# Patient Record
Sex: MALE | Race: WHITE | NOT HISPANIC OR LATINO | Employment: PART TIME | URBAN - METROPOLITAN AREA
[De-identification: names, ages, dates, MRNs, and addresses within clinical notes are randomized per-mention and may not be internally consistent; named-entity substitution may affect disease eponyms.]

---

## 2017-02-06 ENCOUNTER — OFFICE VISIT (OUTPATIENT)
Dept: URGENT CARE | Facility: URGENT CARE | Age: 57
End: 2017-02-06
Payer: COMMERCIAL

## 2017-02-06 VITALS
OXYGEN SATURATION: 97 % | TEMPERATURE: 98.4 F | HEIGHT: 70 IN | BODY MASS INDEX: 29.06 KG/M2 | WEIGHT: 203 LBS | SYSTOLIC BLOOD PRESSURE: 120 MMHG | DIASTOLIC BLOOD PRESSURE: 70 MMHG | HEART RATE: 76 BPM

## 2017-02-06 DIAGNOSIS — T14.8XXA BRUISING: ICD-10-CM

## 2017-02-06 DIAGNOSIS — R25.2 MUSCLE CRAMPS: ICD-10-CM

## 2017-02-06 DIAGNOSIS — S86.111A GASTROCNEMIUS MUSCLE TEAR, RIGHT, INITIAL ENCOUNTER: Primary | ICD-10-CM

## 2017-02-06 PROCEDURE — 99213 OFFICE O/P EST LOW 20 MIN: CPT | Performed by: PHYSICIAN ASSISTANT

## 2017-02-06 NOTE — PATIENT INSTRUCTIONS
Leg Cramps  A muscle cramp or spasm is a strong contraction of the muscle fibers. It is also called a charley horse. This may occur in the foot, calf, or thigh at night when the legs are elevated. If the spasm is prolonged, it can become very painful.  This may be caused by sleeping in an uncomfortable position, muscle fatigue, poor muscle tone from lack of exercise and stretching, dehydration, electrolyte imbalance, diabetes, alcohol use, and certain medicine.  Home care    Drink plenty of fluids during the day to prevent dehydration.    Stretch your legs before bedtime.    Eat a diet high in potassium. These foods include fresh fruit, such as bananas, oranges, cantaloupe, and honeydew melon. It also includes apple, prune, orange, grape and pineapple juices. Other foods high in potassium are white, red, and bell beans, baked potatoes, raw spinach, cod, flounder, halibut, salmon, and scallops.    Talk with your healthcare provider about taking mineral and vitamin supplements that contain magnesium and vitamin B-12 if you are not already taking these. Other prescription medicines may also be used.    Avoid stimulants such as caffeine, nicotine, decongestants.  How to relieve an acute leg cramp    For mild pain, getting out of the bed and walking may help. Some people find relief with heat and massage. You can apply heat with a warm shower, bath, or compress. Some people feel better with a cold packs. You can make an ice pack by filling a plastic bag that seals at the top with ice cubes and then wrapping it with a thin towel. Try both and use the method that feels best for 15 to 20 minutes at a time.    For severe pain, stretching the muscle that is in spasm may quickly relieve the pain.    When the spasm is in your foot, your toes may curl up or down. To stretch the muscle in spasm, bend your toes in the opposite direction. If the spasm pulls your toes up, bend them down. If the spasm pulls them down, bend them  up.    When the spasm is in your calf, bend the ankle so the foot points upward toward your knee.    When the spasm is in your thigh, bend or straighten the knee and hip until you feel relief.  Follow-up care  Follow up with your healthcare provider, or as advised.  When to seek medical advice  Call your healthcare provider right away if any of these occur:    Walking makes your pain worse and rest makes it better    You develop weakness in the affected leg    Pain or frequency of spasms increases and is not controlled by the above measures    5990-9705 [employer]. [address] [place] [address]89. All rights reserved. This information is not intended as a substitute for professional medical care. Always follow your healthcare professional's instructions.

## 2017-02-06 NOTE — NURSING NOTE
"Chief Complaint   Patient presents with     Urgent Care     Pt reports that about 3 days ago, he was walking then felt a severe sharp pain on the back of his right lower leg. Now, pt has brusing and swelling on the akle and the back of the leg.       Initial /70 mmHg  Pulse 76  Temp(Src) 98.4  F (36.9  C)  Ht 5' 10\" (1.778 m)  Wt 203 lb (92.08 kg)  BMI 29.13 kg/m2  SpO2 97% Estimated body mass index is 29.13 kg/(m^2) as calculated from the following:    Height as of this encounter: 5' 10\" (1.778 m).    Weight as of this encounter: 203 lb (92.08 kg).  Medication Reconciliation: complete    "

## 2017-02-07 DIAGNOSIS — E78.1 HYPERTRIGLYCERIDEMIA: ICD-10-CM

## 2017-02-07 LAB
CHOLEST SERPL-MCNC: 276 MG/DL
HDLC SERPL-MCNC: 34 MG/DL
LDLC SERPL CALC-MCNC: ABNORMAL MG/DL
LDLC SERPL DIRECT ASSAY-MCNC: 130 MG/DL
NONHDLC SERPL-MCNC: 242 MG/DL
TRIGL SERPL-MCNC: 495 MG/DL

## 2017-02-07 PROCEDURE — 83721 ASSAY OF BLOOD LIPOPROTEIN: CPT | Mod: 59 | Performed by: INTERNAL MEDICINE

## 2017-02-07 PROCEDURE — 80061 LIPID PANEL: CPT | Performed by: INTERNAL MEDICINE

## 2017-02-07 PROCEDURE — 36415 COLL VENOUS BLD VENIPUNCTURE: CPT | Performed by: INTERNAL MEDICINE

## 2017-03-01 DIAGNOSIS — E78.1 HYPERTRIGLYCERIDEMIA: ICD-10-CM

## 2017-03-01 DIAGNOSIS — Z91.89 RISK FOR CORONARY ARTERY DISEASE BETWEEN 10% AND 20% IN NEXT 10 YEARS: ICD-10-CM

## 2017-03-01 NOTE — TELEPHONE ENCOUNTER
Pt took fasting labs on 2/7 and still has not heard or received results  Please contact patient at [phone] to leave a detailed message on his machine

## 2017-03-08 NOTE — TELEPHONE ENCOUNTER
Let him know sorry about the delay-   Is this on the statin? If so I'd like to change it from simvastatin to atorvastatin to better help the #  If not then we need to restart it.

## 2017-03-09 RX ORDER — SIMVASTATIN 40 MG
40 TABLET ORAL AT BEDTIME
Qty: 90 TABLET | Refills: 1 | Status: SHIPPED | OUTPATIENT
Start: 2017-03-09 | End: 2017-12-09

## 2017-03-09 NOTE — TELEPHONE ENCOUNTER
Pt was not taking statin, he was hoping diet change and exercise would have helped cholesterol but evidently it didn't.  Med pended.

## 2017-03-13 DIAGNOSIS — I10 ESSENTIAL HYPERTENSION: ICD-10-CM

## 2017-03-13 RX ORDER — METOPROLOL TARTRATE 25 MG/1
25 TABLET, FILM COATED ORAL 2 TIMES DAILY
Qty: 60 TABLET | Refills: 2 | Status: SHIPPED | OUTPATIENT
Start: 2017-03-13 | End: 2017-07-12

## 2017-03-13 NOTE — TELEPHONE ENCOUNTER
metoprolol 25mg      Last Written Prescription Date: 02/22/16  Last Fill Quantity: 60, # refills: 11    Last Office Visit with G, P or [facility] prescribing provider:  06/29/16   Future Office Visit:   0     BP Readings from Last 3 Encounters:   02/06/17 120/70   06/29/16 118/70   06/22/16 118/80

## 2017-04-10 ENCOUNTER — OFFICE VISIT (OUTPATIENT)
Dept: INTERNAL MEDICINE | Facility: CLINIC | Age: 57
End: 2017-04-10
Payer: COMMERCIAL

## 2017-04-10 VITALS
BODY MASS INDEX: 28.91 KG/M2 | SYSTOLIC BLOOD PRESSURE: 158 MMHG | DIASTOLIC BLOOD PRESSURE: 94 MMHG | TEMPERATURE: 99.8 F | OXYGEN SATURATION: 94 % | WEIGHT: 201.5 LBS | HEART RATE: 77 BPM

## 2017-04-10 DIAGNOSIS — F10.20 UNCOMPLICATED ALCOHOL DEPENDENCE (H): ICD-10-CM

## 2017-04-10 DIAGNOSIS — I10 ESSENTIAL HYPERTENSION: ICD-10-CM

## 2017-04-10 DIAGNOSIS — M72.0 DUPUYTREN'S CONTRACTURE OF HAND: Primary | ICD-10-CM

## 2017-04-10 LAB
ALT SERPL W P-5'-P-CCNC: 70 U/L (ref 0–70)
AST SERPL W P-5'-P-CCNC: 47 U/L (ref 0–45)

## 2017-04-10 PROCEDURE — 36415 COLL VENOUS BLD VENIPUNCTURE: CPT | Performed by: INTERNAL MEDICINE

## 2017-04-10 PROCEDURE — 99214 OFFICE O/P EST MOD 30 MIN: CPT | Performed by: INTERNAL MEDICINE

## 2017-04-10 PROCEDURE — 84460 ALANINE AMINO (ALT) (SGPT): CPT | Performed by: INTERNAL MEDICINE

## 2017-04-10 PROCEDURE — 84450 TRANSFERASE (AST) (SGOT): CPT | Performed by: INTERNAL MEDICINE

## 2017-04-10 RX ORDER — NALTREXONE HYDROCHLORIDE 50 MG/1
50 TABLET, FILM COATED ORAL DAILY
Qty: 30 TABLET | Refills: 5 | Status: SHIPPED | OUTPATIENT
Start: 2017-04-10 | End: 2018-03-06

## 2017-04-10 NOTE — NURSING NOTE
"Chief Complaint   Patient presents with     Musculoskeletal Problem       Initial BP (!) 158/94  Pulse 77  Temp 99.8  F (37.7  C) (Oral)  Wt 201 lb 8 oz (91.4 kg)  SpO2 94%  BMI 28.91 kg/m2 Estimated body mass index is 28.91 kg/(m^2) as calculated from the following:    Height as of 2/6/17: 5' 10\" (1.778 m).    Weight as of this encounter: 201 lb 8 oz (91.4 kg).  Medication Reconciliation: complete    "

## 2017-04-10 NOTE — LETTER
[facility]  [address]  [address]  [phone]        Durga Aguirre  [address]  [facility] [address]      4/10/2017        Dear Mr. Durga Aguirre:    I am writing to inform you of the results of the laboratory tests you had done recently.    The results of your recent labs are as noted.   Liver function: minimal elevation related to alcohol  -ok to start the naltrexone    Thank you for allowing me to participate in your care. If you have any further questions or problems, please contact me via our nurse line at [phone].    Sincerely,          Rohit Solitario MD  Department of Internal Medicine  [facility]

## 2017-04-10 NOTE — PROGRESS NOTES
SUBJECTIVE:                                                    Durga Aguirre is a 57 year old male who presents to clinic today for the following health issues:    Patient is looking for a referral to go to ortho for his hand pain, long hx of dupuytren's contracture treated w/xiaflex injections with some success.      Starting drinking again- knows he needs to quit and intends to start tonight by going to AA.      Problem list and histories reviewed & adjusted, as indicated.  Additional history: as documented    Labs reviewed in EPIC    Reviewed and updated as needed this visit by clinical staff  Tobacco  Allergies       Reviewed and updated as needed this visit by Provider         ROS:  Constitutional, HEENT, cardiovascular, pulmonary, gi and gu systems are negative, except as otherwise noted.    OBJECTIVE:                                                    BP (!) 158/94  Pulse 77  Temp 99.8  F (37.7  C) (Oral)  Wt 201 lb 8 oz (91.4 kg)  SpO2 94%  BMI 28.91 kg/m2  Body mass index is 28.91 kg/(m^2).  GENERAL APPEARANCE: alert and no distress  MS: chronic subcutaneous linear scarring in palm L hand    Diagnostic test results:  Results for orders placed or performed in visit on 04/10/17 (from the past 24 hour(s))   AST   Result Value Ref Range    AST 47 (H) 0 - 45 U/L   ALT   Result Value Ref Range    ALT 70 0 - 70 U/L        ASSESSMENT/PLAN:                                                    1. Dupuytren's contracture of hand  Repeat xiaflex inj w/hand specialist  - ORTHOPEDICS ADULT REFERRAL    2. Essential hypertension  Poor control. Did not take med today + drinking more heavily  Cut back on alcohol - d/c entirely if can  con't meds and get BP rechecked next month in pharmacy     3. Uncomplicated alcohol dependence (H)  Start naltrexone as long as LFTs look ok   - AST  - ALT      Follow up with Provider - 3 mo      Rohit Solitario MD  [facility]

## 2017-07-03 ENCOUNTER — TELEPHONE (OUTPATIENT)
Dept: INTERNAL MEDICINE | Facility: CLINIC | Age: 57
End: 2017-07-03

## 2017-07-03 DIAGNOSIS — F41.1 GENERALIZED ANXIETY DISORDER: ICD-10-CM

## 2017-07-03 NOTE — TELEPHONE ENCOUNTER
Reason for Call:  Other call back    Detailed comments: patient received an a letter from [facility] requesting him to be part of a  Blood pressure study. He would like to know if he thought he would be a good candidate. Please call back.    Phone Number Patient can be reached at: Home number on file [phone] (home)    Best Time: anytime    Can we leave a detailed message on this number? YES    Call taken on 7/3/2017 at 2:43 PM by GRANT THOMPSON

## 2017-07-05 NOTE — TELEPHONE ENCOUNTER
He could be.     He can contact the # below and if still interested f/u to see me    [facility] Hypertension Study Summary     Thank you for your interest in the [facility] hypertension research study. If you would like to enroll or know more about using your genetics to determine which hypertensive medications may work best for you please contact the research coordinator as [phone] or email [email or web address]    If enrolled you would be asked to attend 5 to 8 study visits over the next 12 months.    The  may reach out to you to ask some questions about your medical history and availability for future visits.

## 2017-07-06 RX ORDER — CITALOPRAM HYDROBROMIDE 40 MG/1
TABLET ORAL
Qty: 45 TABLET | Refills: 2 | Status: SHIPPED | OUTPATIENT
Start: 2017-07-06 | End: 2018-03-30

## 2017-07-12 DIAGNOSIS — I10 ESSENTIAL HYPERTENSION: ICD-10-CM

## 2017-07-13 RX ORDER — METOPROLOL TARTRATE 25 MG/1
TABLET, FILM COATED ORAL
Qty: 60 TABLET | Refills: 0 | Status: SHIPPED | OUTPATIENT
Start: 2017-07-13 | End: 2017-08-14

## 2017-07-13 NOTE — TELEPHONE ENCOUNTER
metoprolol      Last Written Prescription Date: 03/13/17  Last Fill Quantity: 60, # refills: 2    Last Office Visit with G, P or [facility] prescribing provider:  04/10/17   Future Office Visit:    0    BP Readings from Last 3 Encounters:   04/10/17 (!) 158/94   02/06/17 120/70   06/29/16 118/70

## 2017-07-25 ENCOUNTER — OFFICE VISIT (OUTPATIENT)
Dept: INTERNAL MEDICINE | Facility: CLINIC | Age: 57
End: 2017-07-25
Payer: COMMERCIAL

## 2017-07-25 ENCOUNTER — TELEPHONE (OUTPATIENT)
Dept: ADDICTION MEDICINE | Facility: CLINIC | Age: 57
End: 2017-07-25

## 2017-07-25 VITALS
WEIGHT: 206.2 LBS | BODY MASS INDEX: 29.59 KG/M2 | HEART RATE: 50 BPM | TEMPERATURE: 99.2 F | DIASTOLIC BLOOD PRESSURE: 90 MMHG | OXYGEN SATURATION: 97 % | SYSTOLIC BLOOD PRESSURE: 132 MMHG

## 2017-07-25 DIAGNOSIS — F10.288 ALCOHOL DEPENDENCE WITH OTHER ALCOHOL-INDUCED DISORDER (H): ICD-10-CM

## 2017-07-25 DIAGNOSIS — F10.10 ALCOHOL ABUSE, EPISODIC DRINKING BEHAVIOR: ICD-10-CM

## 2017-07-25 DIAGNOSIS — I10 ESSENTIAL HYPERTENSION: Primary | ICD-10-CM

## 2017-07-25 LAB
ANION GAP SERPL CALCULATED.3IONS-SCNC: 5 MMOL/L (ref 3–14)
BUN SERPL-MCNC: 10 MG/DL (ref 7–30)
CALCIUM SERPL-MCNC: 8.2 MG/DL (ref 8.5–10.1)
CHLORIDE SERPL-SCNC: 107 MMOL/L (ref 94–109)
CO2 SERPL-SCNC: 26 MMOL/L (ref 20–32)
CREAT SERPL-MCNC: 0.85 MG/DL (ref 0.66–1.25)
GFR SERPL CREATININE-BSD FRML MDRD: ABNORMAL ML/MIN/1.7M2
GLUCOSE SERPL-MCNC: 110 MG/DL (ref 70–99)
POTASSIUM SERPL-SCNC: 4.3 MMOL/L (ref 3.4–5.3)
SODIUM SERPL-SCNC: 138 MMOL/L (ref 133–144)

## 2017-07-25 PROCEDURE — 36415 COLL VENOUS BLD VENIPUNCTURE: CPT | Performed by: INTERNAL MEDICINE

## 2017-07-25 PROCEDURE — 80048 BASIC METABOLIC PNL TOTAL CA: CPT | Performed by: INTERNAL MEDICINE

## 2017-07-25 PROCEDURE — 99214 OFFICE O/P EST MOD 30 MIN: CPT | Performed by: INTERNAL MEDICINE

## 2017-07-25 NOTE — PATIENT INSTRUCTIONS
1. Consider Addiction medicine consultation  2. Use benadryl/diphenhydramine for insect bite on arm.    3. Get your BP rechecked in our pharmacy in the next few weeks

## 2017-07-25 NOTE — PROGRESS NOTES
SUBJECTIVE:                                                    Durga Aguirre is a 57 year old male who presents to clinic today for the following health issues:      Hypertension Follow-up  BP Readings from Last 3 Encounters:   07/25/17 132/90   04/10/17 (!) 158/94   02/06/17 120/70      since seen unfortunately still drinking episodically- yesterday had 7 beers, most days has some at least.  He is taking naltrexone despite this.      Outpatient blood pressures are not being checked.    Low Salt Diet: low salt      Amount of exercise or physical activity: None    Problems taking medications regularly: No    Medication side effects: none  Diet: regular (no restrictions)    Problem list and histories reviewed & adjusted, as indicated.  Additional history: as documented    Labs reviewed in EPIC    Reviewed and updated as needed this visit by clinical staffAllergies       Reviewed and updated as needed this visit by Provider         ROS:  Constitutional, HEENT, cardiovascular, pulmonary, gi and gu systems are negative, except as otherwise noted.      OBJECTIVE:                                                    /90  Pulse 50  Temp 99.2  F (37.3  C) (Oral)  Wt 206 lb 3.2 oz (93.5 kg)  SpO2 97%  BMI 29.59 kg/m2  Body mass index is 29.59 kg/(m^2).  GENERAL APPEARANCE: alert, no distress and over weight  HENT: nose and mouth without ulcers or lesions  NECK: no adenopathy, no asymmetry, masses, or scars and thyroid normal to palpation  RESP: lungs clear to auscultation - no rales, rhonchi or wheezes  CV: regular rates and rhythm, normal S1 S2, no S3 or S4 and no murmur, click or rub  MS: extremities normal- no gross deformities noted  SKIN: no suspicious lesions or rashes    Diagnostic test results:  Results for orders placed or performed in visit on 07/25/17 (from the past 24 hour(s))   Basic metabolic panel   Result Value Ref Range    Sodium 138 133 - 144 mmol/L    Potassium 4.3 3.4 - 5.3 mmol/L    Chloride  107 94 - 109 mmol/L    Carbon Dioxide 26 20 - 32 mmol/L    Anion Gap 5 3 - 14 mmol/L    Glucose 110 (H) 70 - 99 mg/dL    Urea Nitrogen 10 7 - 30 mg/dL    Creatinine 0.85 0.66 - 1.25 mg/dL    GFR Estimate >90  Non  GFR Calc   >60 mL/min/1.7m2    GFR Estimate If Black >90   GFR Calc   >60 mL/min/1.7m2    Calcium 8.2 (L) 8.5 - 10.1 mg/dL          ASSESSMENT/PLAN:                                                    1. Essential hypertension  Continue medication - not at goal but did not take medication this am . Recheck later this month in pharmacy.  d/c alcohol  - Basic metabolic panel    2. Alcohol abuse, episodic drinking behavior  3. Alcohol dependence with other alcohol-induced disorder (H)  Hasn't been able to quit- in  My opinion the alcohol use is likely worsening his BP  - ADDICTION MEDICINE REFERRAL      Follow up with Provider - 1 mo or in pharmacy for BP recheck     Rohit Solitario MD  [facility]

## 2017-07-25 NOTE — LETTER
Durga Aguirre  [address]  [facility] [address]        July 26, 2017          Dear ,    We are writing to inform you of your test results.    Your test results fall within the expected range(s) or remain unchanged from previous results.  Please continue with current treatment plan.    Resulted Orders   Basic metabolic panel   Result Value Ref Range    Sodium 138 133 - 144 mmol/L    Potassium 4.3 3.4 - 5.3 mmol/L    Chloride 107 94 - 109 mmol/L    Carbon Dioxide 26 20 - 32 mmol/L    Anion Gap 5 3 - 14 mmol/L    Glucose 110 (H) 70 - 99 mg/dL    Urea Nitrogen 10 7 - 30 mg/dL    Creatinine 0.85 0.66 - 1.25 mg/dL    GFR Estimate >90  Non  GFR Calc   >60 mL/min/1.7m2    GFR Estimate If Black >90   GFR Calc   >60 mL/min/1.7m2    Calcium 8.2 (L) 8.5 - 10.1 mg/dL       If you have any questions or concerns, please call the clinic at the number listed above.       Sincerely,        Rohit Solitario MD

## 2017-07-25 NOTE — TELEPHONE ENCOUNTER
----- Message from Jyoti Redmond sent at 7/25/2017  8:14 AM CDT -----  Regarding: Addiction Med Referral  Please review.

## 2017-07-25 NOTE — MR AVS SNAPSHOT
After Visit Summary   7/25/2017    Durga Aguirre    MRN: [id number]           Patient Information     Date Of Birth          1960        Visit Information        Provider Department      7/25/2017 7:00 AM Rohit Solitario MD [facility]        Today's Diagnoses     Essential hypertension    -  1    Alcohol abuse, episodic drinking behavior        Alcohol dependence with other alcohol-induced disorder (H)          Care Instructions    1. Consider Addiction medicine consultation  2. Use benadryl/diphenhydramine for insect bite on arm.    3. Get your BP rechecked in our pharmacy in the next few weeks          Follow-ups after your visit        Additional Services     ADDICTION MEDICINE REFERRAL       The Addiction Medicine Service is prepared to provide consultation for and, if necessary, ongoing care for patients with the disease of Addiction.  As defined by the American Society of Addiction Medicine, Addiction is a primary, chronic disease of brain reward, motivation, memory and related circuitry.       Common problems that may warrant referral to the Addiction Medicine Service are:  Alcohol use disorder - diagnosis, treatment referral, acute and protracted withdrawal management, and ongoing medication assisted treatment including Antabuse and Naltrexone.  Opoid Use Disorder - medication assisted treatment including Buprenorphine (Suboxone) or extended release Naltrexone (Vivitrol)  Benzodiazepine dependence - extended outpatient detoxification  Many other issues pertaining to addiction, relapse, and recovery    Referrals to the Addiction Medicine Service assume that the referring provider has discussed the referral with the patient.  Referral will be reviewed and if appropriate, the patient will be contacted to schedule appointment.    Please answer the following questions so we can better service your patient:    Drug of choice: alcohol  Need for detox: No  Need for  "ongoing addiction treatment: Yes;  Please call detox number: [phone]  Do they have a [facility] PCP:  Yes   Are they willing to participate in a Suboxone group?  n/a  Please bring the following to your appointment:  >>   List of current medications   >>   Any relevant history                  Who to contact     If you have questions or need follow up information about today's clinic visit or your schedule please contact [facility] directly at [phone].  Normal or non-critical lab and imaging results will be communicated to you by MyChart, letter or phone within 4 business days after the clinic has received the results. If you do not hear from us within 7 days, please contact the clinic through [employer] or phone. If you have a critical or abnormal lab result, we will notify you by phone as soon as possible.  Submit refill requests through [email or web address] or call your pharmacy and they will forward the refill request to us. Please allow 3 business days for your refill to be completed.          Additional Information About Your Visit        [employer] [facility] Information     [email or web address] lets you send messages to your doctor, view your test results, renew your prescriptions, schedule appointments and more. To sign up, go to [email or web address] . Click on \"Log in\" on the left side of the screen, which will take you to the Welcome page. Then click on \"Sign up Now\" on the right side of the page.     You will be asked to enter the access code listed below, as well as some personal information. Please follow the directions to create your username and password.     Your access code is: [email or web address]  Expires: 10/23/2017  7:41 AM     Your access code will  in 90 days. If you need help or a new code, please call your [facility] or [phone].        Care EveryWhere ID     This is your Care EveryWhere ID. This could be used by other organizations to access your [facility] medical records  [id number]      "   Your Vitals Were     Pulse Temperature Pulse Oximetry BMI (Body Mass Index)          50 99.2  F (37.3  C) (Oral) 97% 29.59 kg/m2         Blood Pressure from Last 3 Encounters:   07/25/17 132/90   04/10/17 (!) 158/94   02/06/17 120/70    Weight from Last 3 Encounters:   07/25/17 206 lb 3.2 oz (93.5 kg)   04/10/17 201 lb 8 oz (91.4 kg)   02/06/17 203 lb (92.1 kg)              We Performed the Following     ADDICTION MEDICINE REFERRAL     Basic metabolic panel        Primary Care Provider Office Phone # Fax #    Rohit Solitario [phone]       [facility] [address] [place] [address]        Equal Access to Services     ARLENE CORLEY : Hadii soraida mesa hadasho Soomaali, waaxda luqadaha, qaybta kaalmada adeegyada, trung hilario . So [facility] [phone].    ATENCIÓN: Si habla español, tiene a lim disposición servicios gratuitos de asistencia lingüística. Canelo al [phone].    We comply with applicable federal civil rights laws and [place] laws. We do not discriminate on the basis of race, color, national origin, age, disability sex, sexual orientation or gender identity.            Thank you!     Thank you for choosing [facility]  for your care. Our goal is always to provide you with excellent care. Hearing back from our patients is one way we can continue to improve our services. Please take a few minutes to complete the written survey that you may receive in the mail after your visit with us. Thank you!             Your Updated Medication List - Protect others around you: Learn how to safely use, store and throw away your medicines at www.disposemymeds.org.          This list is accurate as of: 7/25/17  7:41 AM.  Always use your most recent med list.                   Brand Name Dispense Instructions for use Diagnosis    citalopram 40 MG tablet    celeXA    45 tablet    take one half tablet (20 mg) by mouth once a day     Generalized anxiety disorder       metoprolol 25 MG tablet    LOPRESSOR    60 tablet    TAKE ONE TABLET BY MOUTH TWICE DAILY    Essential hypertension       naltrexone 50 MG tablet    DEPADE;REVIA    30 tablet    Take 1 tablet (50 mg) by mouth daily    Uncomplicated alcohol dependence (H)       simvastatin 40 MG tablet    ZOCOR    90 tablet    Take 1 tablet (40 mg) by mouth At Bedtime    Risk for coronary artery disease between 10% and 20% in next 10 years, Hypertriglyceridemia

## 2017-07-25 NOTE — NURSING NOTE
"Chief Complaint   Patient presents with     Hypertension       Initial /90  Pulse 50  Temp 99.2  F (37.3  C) (Oral)  Wt 206 lb 3.2 oz (93.5 kg)  SpO2 97%  BMI 29.59 kg/m2 Estimated body mass index is 29.59 kg/(m^2) as calculated from the following:    Height as of 2/6/17: 5' 10\" (1.778 m).    Weight as of this encounter: 206 lb 3.2 oz (93.5 kg).  Medication Reconciliation: complete    "

## 2017-07-27 NOTE — TELEPHONE ENCOUNTER
Second attempt to reach pt, no answer. LVM requesting a call back for an appt.     Ana Cohen

## 2017-08-01 NOTE — TELEPHONE ENCOUNTER
Third and final attempt to reach pt. No answer. LVM requesting a call back for an appt.   Writer is routing this encounter to referring provider. Please inform pt that we tried to reach him to get him scheduled for an appt with an addiction specialist. Please have pt call us back if he is still interested in being seen at [facility]. [phone]. Writer is closing this encounter, no further action needed.     Thank you,  Ana Cohen     [facility]

## 2017-08-14 DIAGNOSIS — I10 ESSENTIAL HYPERTENSION: ICD-10-CM

## 2017-08-15 RX ORDER — METOPROLOL TARTRATE 25 MG/1
TABLET, FILM COATED ORAL
Qty: 60 TABLET | Refills: 0 | Status: SHIPPED | OUTPATIENT
Start: 2017-08-15 | End: 2017-09-18

## 2017-09-18 DIAGNOSIS — I10 ESSENTIAL HYPERTENSION: ICD-10-CM

## 2017-09-19 RX ORDER — METOPROLOL TARTRATE 25 MG/1
TABLET, FILM COATED ORAL
Qty: 60 TABLET | Refills: 0 | Status: SHIPPED | OUTPATIENT
Start: 2017-09-19 | End: 2017-10-20

## 2017-09-19 NOTE — TELEPHONE ENCOUNTER
Metoprolol       Last Written Prescription Date: 8/15/17  Last Fill Quantity: 60, # refills: 0    Last Office Visit with G, P or [facility] prescribing provider:  7/25/17   Future Office Visit:        BP Readings from Last 3 Encounters:   07/25/17 132/90   04/10/17 (!) 158/94   02/06/17 120/70     Routing refill request to provider for review/approval because:  Amina given x1 and patient did not follow up, please advise  Patient was due for a one month follow up or BP check

## 2017-09-23 ENCOUNTER — ALLIED HEALTH/NURSE VISIT (OUTPATIENT)
Dept: INTERNAL MEDICINE | Facility: CLINIC | Age: 57
End: 2017-09-23
Payer: COMMERCIAL

## 2017-09-23 VITALS — SYSTOLIC BLOOD PRESSURE: 139 MMHG | DIASTOLIC BLOOD PRESSURE: 86 MMHG

## 2017-09-23 DIAGNOSIS — Z01.30 BP CHECK: Primary | ICD-10-CM

## 2017-09-23 PROCEDURE — 99207 ZZC NO CHARGE NURSE ONLY: CPT | Performed by: INTERNAL MEDICINE

## 2017-09-23 NOTE — NURSING NOTE
Durga Aguirre is enrolled/participating in the retail pharmacy Blood Pressure Goals Achievement Program (BPGAP).  Durga Aguirre was evaluated at [facility] on September 23, 2017 at which time his blood pressure was:    BP Readings from Last 3 Encounters:   09/23/17 139/86   07/25/17 132/90   04/10/17 (!) 158/94     Reviewed lifestyle modifications for blood pressure control and reduction: including making healthy food choices, managing weight, getting regular exercise, smoking cessation, reducing alcohol consumption, monitoring blood pressure regularly.     Durga Aguirre is not experiencing symptoms.    Follow-Up: BP is at goal of < 140/90mmHg (patient 18+ years of age with or without diabetes).  Recommended follow-up at pharmacy in 6 months.     Recommendation to Provider: none    Durga Aguirre was evaluated for enrollment into the PGEN study today.    Patient eligible for enrollment:  No  Patient interested in enrollment:  Unknown    Completed by: Darlene Savage, Pharm. D.  [facility]  [phone]

## 2017-09-23 NOTE — MR AVS SNAPSHOT
"              After Visit Summary   2017    Durga Aguirre    MRN: [id number]           Patient Information     Date Of Birth          1960        Visit Information        Provider Department      2017 4:30 PM Rohit Solitario MD [facility]        Today's Diagnoses     BP check    -  1       Follow-ups after your visit        Who to contact     If you have questions or need follow up information about today's clinic visit or your schedule please contact [facility] directly at [phone].  Normal or non-critical lab and imaging results will be communicated to you by Shipping Easyhart, letter or phone within 4 business days after the clinic has received the results. If you do not hear from us within 7 days, please contact the clinic through Shipping Easyhart or phone. If you have a critical or abnormal lab result, we will notify you by phone as soon as possible.  Submit refill requests through LIFEmee or call your pharmacy and they will forward the refill request to us. Please allow 3 business days for your refill to be completed.          Additional Information About Your Visit        MyChart Information     LIFEmee lets you send messages to your doctor, view your test results, renew your prescriptions, schedule appointments and more. To sign up, go to [email or web address] . Click on \"Log in\" on the left side of the screen, which will take you to the Welcome page. Then click on \"Sign up Now\" on the right side of the page.     You will be asked to enter the access code listed below, as well as some personal information. Please follow the directions to create your username and password.     Your access code is: [email or web address]  Expires: 10/23/2017  7:41 AM     Your access code will  in 90 days. If you need help or a new code, please call your [facility] or [phone].        Care EveryWhere ID     This is your Care EveryWhere ID. This could be used by other " organizations to access your [place] medical records  [id number]         Blood Pressure from Last 3 Encounters:   09/23/17 139/86   07/25/17 132/90   04/10/17 (!) 158/94    Weight from Last 3 Encounters:   07/25/17 206 lb 3.2 oz (93.5 kg)   04/10/17 201 lb 8 oz (91.4 kg)   02/06/17 203 lb (92.1 kg)              Today, you had the following     No orders found for display       Primary Care Provider Office Phone # Fax #    Rohit Solitario [phone]       [address] [facility] [address]        Equal Access to Services     ARLENE CORLEY : Hadii soraida tolentinoo Sopriyank, waaxda luqadaha, qaybta kaalmada adeegyada, trung solano. So [facility] [phone].    ATENCIÓN: Si habla español, tiene a lim disposición servicios gratuitos de asistencia lingüística. Llame al [phone].    We comply with applicable federal civil rights laws and [place] laws. We do not discriminate on the basis of race, color, national origin, age, disability sex, sexual orientation or gender identity.            Thank you!     Thank you for choosing [facility]  for your care. Our goal is always to provide you with excellent care. Hearing back from our patients is one way we can continue to improve our services. Please take a few minutes to complete the written survey that you may receive in the mail after your visit with us. Thank you!             Your Updated Medication List - Protect others around you: Learn how to safely use, store and throw away your medicines at www.disposemymeds.org.          This list is accurate as of: 9/23/17  4:33 PM.  Always use your most recent med list.                   Brand Name Dispense Instructions for use Diagnosis    citalopram 40 MG tablet    celeXA    45 tablet    take one half tablet (20 mg) by mouth once a day    Generalized anxiety disorder       metoprolol 25 MG tablet    LOPRESSOR    60 tablet    TAKE ONE TABLET BY MOUTH  TWICE DAILY    Essential hypertension       naltrexone 50 MG tablet    DEPADE;REVIA    30 tablet    Take 1 tablet (50 mg) by mouth daily    Uncomplicated alcohol dependence (H)       simvastatin 40 MG tablet    ZOCOR    90 tablet    Take 1 tablet (40 mg) by mouth At Bedtime    Risk for coronary artery disease between 10% and 20% in next 10 years, Hypertriglyceridemia

## 2017-10-20 DIAGNOSIS — I10 ESSENTIAL HYPERTENSION: ICD-10-CM

## 2017-10-24 RX ORDER — METOPROLOL TARTRATE 25 MG/1
TABLET, FILM COATED ORAL
Qty: 60 TABLET | Refills: 8 | Status: SHIPPED | OUTPATIENT
Start: 2017-10-24 | End: 2018-04-20

## 2017-11-09 ENCOUNTER — OFFICE VISIT (OUTPATIENT)
Dept: INTERNAL MEDICINE | Facility: CLINIC | Age: 57
End: 2017-11-09
Payer: COMMERCIAL

## 2017-11-09 VITALS
SYSTOLIC BLOOD PRESSURE: 124 MMHG | TEMPERATURE: 98.3 F | BODY MASS INDEX: 29.11 KG/M2 | WEIGHT: 202.9 LBS | HEART RATE: 65 BPM | OXYGEN SATURATION: 94 % | DIASTOLIC BLOOD PRESSURE: 88 MMHG

## 2017-11-09 DIAGNOSIS — F10.20 UNCOMPLICATED ALCOHOL DEPENDENCE (H): ICD-10-CM

## 2017-11-09 DIAGNOSIS — K04.7 ABSCESSED TOOTH: ICD-10-CM

## 2017-11-09 DIAGNOSIS — I10 ESSENTIAL HYPERTENSION: ICD-10-CM

## 2017-11-09 DIAGNOSIS — Z01.818 PREOP GENERAL PHYSICAL EXAM: Primary | ICD-10-CM

## 2017-11-09 LAB — ETHANOL SERPL-MCNC: 0.01 G/DL

## 2017-11-09 PROCEDURE — 36415 COLL VENOUS BLD VENIPUNCTURE: CPT | Performed by: INTERNAL MEDICINE

## 2017-11-09 PROCEDURE — 80320 DRUG SCREEN QUANTALCOHOLS: CPT | Performed by: INTERNAL MEDICINE

## 2017-11-09 PROCEDURE — 99214 OFFICE O/P EST MOD 30 MIN: CPT | Performed by: INTERNAL MEDICINE

## 2017-11-09 NOTE — MR AVS SNAPSHOT
After Visit Summary   11/9/2017    Durga Aguirre    MRN: [id number]           Patient Information     Date Of Birth          1960        Visit Information        Provider Department      11/9/2017 11:40 AM Rohit Solitario MD [facility]        Today's Diagnoses     Preop general physical exam    -  1    Uncomplicated alcohol dependence (H)          Care Instructions      Before Your Surgery      Call your surgeon if there is any change in your health. This includes signs of a cold or flu (such as a sore throat, runny nose, cough, rash or fever).    Do not smoke, drink alcohol or take over the counter medicine (unless your surgeon or primary care doctor tells you to) for the 24 hours before and after surgery.    If you take prescribed drugs: Follow your doctor s orders about which medicines to take and which to stop until after surgery.    Eating and drinking prior to surgery: follow the instructions from your surgeon    Take a shower or bath the night before surgery. Use the soap your surgeon gave you to gently clean your skin. If you do not have soap from your surgeon, use your regular soap. Do not shave or scrub the surgery site.  Wear clean pajamas and have clean sheets on your bed.               Follow-ups after your visit        Who to contact     If you have questions or need follow up information about today's clinic visit or your schedule please contact [facility] directly at [phone].  Normal or non-critical lab and imaging results will be communicated to you by MyChart, letter or phone within 4 business days after the clinic has received the results. If you do not hear from us within 7 days, please contact the clinic through MyChart or phone. If you have a critical or abnormal lab result, we will notify you by phone as soon as possible.  Submit refill requests through thesixtyone or call your pharmacy and they will forward the refill  "request to us. Please allow 3 business days for your refill to be completed.          Additional Information About Your Visit        WalletKithart Information     [email or web address] lets you send messages to your doctor, view your test results, renew your prescriptions, schedule appointments and more. To sign up, go to [email or web address] . Click on \"Log in\" on the left side of the screen, which will take you to the Welcome page. Then click on \"Sign up Now\" on the right side of the page.     You will be asked to enter the access code listed below, as well as some personal information. Please follow the directions to create your username and password.     Your access code is: [email or web address]  Expires: 2018 12:13 PM     Your access code will  in 90 days. If you need help or a new code, please call your [place] clinic or [phone].        Care EveryWhere ID     This is your Care EveryWhere ID. This could be used by other organizations to access your [place] medical records  [id number]        Your Vitals Were     Pulse Temperature Pulse Oximetry BMI (Body Mass Index)          65 98.3  F (36.8  C) (Oral) 94% 29.11 kg/m2         Blood Pressure from Last 3 Encounters:   17 124/88   17 139/86   17 132/90    Weight from Last 3 Encounters:   17 202 lb 14.4 oz (92 kg)   17 206 lb 3.2 oz (93.5 kg)   04/10/17 201 lb 8 oz (91.4 kg)              We Performed the Following     Alcohol ethyl        Primary Care Provider Office Phone # Fax #    Rohit Solitario [phone]       [address] [place] [address]        Equal Access to Services     ARLENE CORLEY : Kevon Boswell, james beard, trung goodson. [facility] [phone].    ATENCIÓN: Si habla español, tiene a lim disposición servicios gratuitos de asistencia lingüística. Lldaren al [phone].    We comply with applicable federal civil rights laws " and [place] laws. We do not discriminate on the basis of race, color, national origin, age, disability, sex, sexual orientation, or gender identity.            Thank you!     Thank you for choosing [facility]  for your care. Our goal is always to provide you with excellent care. Hearing back from our patients is one way we can continue to improve our services. Please take a few minutes to complete the written survey that you may receive in the mail after your visit with us. Thank you!             Your Updated Medication List - Protect others around you: Learn how to safely use, store and throw away your medicines at www.disposemymeds.org.          This list is accurate as of: 11/9/17 12:13 PM.  Always use your most recent med list.                   Brand Name Dispense Instructions for use Diagnosis    citalopram 40 MG tablet    celeXA    45 tablet    take one half tablet (20 mg) by mouth once a day    Generalized anxiety disorder       metoprolol 25 MG tablet    LOPRESSOR    60 tablet    TAKE ONE TABLET BY MOUTH TWICE DAILY    Essential hypertension       naltrexone 50 MG tablet    DEPADE;REVIA    30 tablet    Take 1 tablet (50 mg) by mouth daily    Uncomplicated alcohol dependence (H)       simvastatin 40 MG tablet    ZOCOR    90 tablet    Take 1 tablet (40 mg) by mouth At Bedtime    Risk for coronary artery disease between 10% and 20% in next 10 years, Hypertriglyceridemia

## 2017-11-09 NOTE — PROGRESS NOTES
[address] [address]  [phone]  Dept: [phone]    PRE-OP EVALUATION:  Today's date: 2017    Durga Aguirre (: 1960) presents for pre-operative evaluation assessment as requested by Dr. Marcano.  He requires evaluation and anesthesia risk assessment prior to undergoing surgery/procedure for treatment of Tooth implant .  Proposed procedure: Tooth implant    Date of Surgery/ Procedure: 11-15-17  Time of Surgery/ Procedure: 12:30 PM  Hospital/Surgical Facility: [facility]  Fax number for surgical facility: [phone]  Primary Physician: Rohit Solitario  Type of Anesthesia Anticipated: MAC    Patient has a Health Care Directive or Living Will:  NO    Preop Questions 2017   1.  Do you have a history of heart attack, stroke, stent, bypass or surgery on an artery in the head, neck, heart or legs? No   2.  Do you ever have any pain or discomfort in your chest? No   3.  Do you have a history of  Heart Failure? No   4.   Are you troubled by shortness of breath when:  walking on a level surface, or up a slight hill, or at night? No   5.  Do you currently have a cold, bronchitis or other respiratory infection? No   6.  Do you have a cough, shortness of breath, or wheezing? No   7.  Do you sometimes get pains in the calves of your legs when you walk? No   8. Do you or anyone in your family have previous history of blood clots? No   9.  Do you or does anyone in your family have a serious bleeding problem such as prolonged bleeding following surgeries or cuts? No   10. Have you ever had problems with anemia or been told to take iron pills? No   11. Have you had any abnormal blood loss such as black, tarry or bloody stools? No   12. Have you ever had a blood transfusion? No   13. Have you or any of your relatives ever had problems with anesthesia? No   14. Do you have sleep apnea, excessive snoring or daytime drowsiness? No   15.  Do you have any prosthetic heart valves? No   16. Do you have prosthetic joints? No           HPI:                                                      Pt states he has a tooth abscess that needs to be removed a implant put in its place.     MEDICAL HISTORY:                                                    Patient Active Problem List    Diagnosis Date Noted     Dupuytren's contracture of hand 04/10/2017     Priority: Medium     Uncomplicated alcohol dependence (H) 04/10/2017     Priority: Medium     Hypertriglyceridemia 06/22/2016     Priority: Medium     Essential hypertension 02/22/2016     Priority: Medium     Advanced directives, counseling/discussion 09/23/2014     Priority: Medium     Advance Care Planning:   ACP Review and Resources Provided:  Reviewed chart for advance care plan.  Durga Aguirre has no plan or code status on file. Discussed available resources and provided with information. Confirmed code status reflects current choices pending further ACP discussions.  Confirmed/documented designated decision maker(s). See permanent comments section of demographics in clinical tab.   Added by Maribel Potts on 9/23/2014             Generalized anxiety disorder 02/18/2013     Priority: Medium     CARDIOVASCULAR SCREENING; LDL GOAL LESS THAN 130 10/31/2010     Priority: Medium      Past Medical History:   Diagnosis Date     Alcohol abuse- remission 2/18/2013     Other spontaneous pneumothorax 1997     Past Surgical History:   Procedure Laterality Date     HC EXCISION PARTIAL TALUS OR CALCANEUS, BONE      fx calcaneus- 9 screws in foot     Current Outpatient Prescriptions   Medication Sig Dispense Refill     metoprolol (LOPRESSOR) 25 MG tablet TAKE ONE TABLET BY MOUTH TWICE DAILY 60 tablet 8     citalopram (CELEXA) 40 MG tablet take one half tablet (20 mg) by mouth once a day 45 tablet 2     naltrexone (DEPADE;REVIA) 50 MG tablet Take 1 tablet (50 mg) by mouth daily 30 tablet 5     simvastatin (ZOCOR) 40 MG  tablet Take 1 tablet (40 mg) by mouth At Bedtime 90 tablet 1     OTC products: None, except as noted above    No Known Allergies   Latex Allergy: NO    Social History   Substance Use Topics     Smoking status: Former Smoker     Years: 20.00     Types: Cigarettes     Quit date: 10/9/2017     Smokeless tobacco: Never Used      Comment: 2-3 cigarettes per day      Alcohol use No      Comment: restarted may 2016     History   Drug Use No       REVIEW OF SYSTEMS:                                                    C: NEGATIVE for fever, chills, change in weight  E/M: NEGATIVE for ear, mouth and throat problems  R: NEGATIVE for significant cough or SOB  CV: NEGATIVE for chest pain, palpitations or peripheral edema    EXAM:                                                    /88  Pulse 65  Temp 98.3  F (36.8  C) (Oral)  Wt 202 lb 14.4 oz (92 kg)  SpO2 94%  BMI 29.11 kg/m2  GENERAL APPEARANCE: healthy, alert and no distress  HENT: ear canals and TM's normal and nose and mouth without ulcers or lesions  RESP: lungs clear to auscultation - no rales, rhonchi or wheezes  CV: regular rate and rhythm, normal S1 S2, no S3 or S4 and no murmur, click or rub   ABDOMEN: soft, nontender, no HSM or masses and bowel sounds normal  MS: extremities normal- no gross deformities noted  NEURO: Normal strength and tone, sensory exam grossly normal, mentation intact and speech normal    DIAGNOSTICS:                                                    No labs or EKG required for low risk surgery (cataract, skin procedure, breast biopsy, etc)    Recent Labs   Lab Test  07/25/17   0741  02/22/16   1207  02/17/16   1012   NA  138   --   140   POTASSIUM  4.3   --   4.4   CR  0.85   --   0.83   A1C   --   5.5   --         IMPRESSION:                                                    Reason for surgery/procedure: tooth abscess  Diagnosis/reason for consult: Hypertension    The proposed surgical procedure is considered LOW risk.    REVISED  CARDIAC RISK INDEX  The patient has the following serious cardiovascular risks for perioperative complications such as (MI, PE, VFib and 3  AV Block):  No serious cardiac risks  INTERPRETATION: 0 risks: Class I (very low risk - 0.4% complication rate)    The patient has the following additional risks for perioperative complications:  No identified additional risks      ICD-10-CM    1. Preop general physical exam Z01.818    2. Uncomplicated alcohol dependence (H) F10.20 Alcohol ethyl     CANCELED: BLOOD ALCOHOL COLLECTION       RECOMMENDATIONS:                                                        --Patient is to take all scheduled medications on the day of surgery EXCEPT for modifications listed below.    APPROVAL GIVEN to proceed with proposed procedure, without further diagnostic evaluation       Anbesol   Signed Electronically by: Rohit Solitario MD    Copy of this evaluation report is provided to requesting physician.    [facility] Preop Guidelines

## 2017-11-09 NOTE — LETTER
November 13, 2017      Durga Aguirre  [id number]  [facility] [address]        Dear ,    We are writing to inform you of your test results.    This shows a very small amount of alcohol in your blood this morning.      Resulted Orders   Alcohol ethyl   Result Value Ref Range    Ethanol g/dL 0.01 (H) <0.01 g/dL       If you have any questions or concerns, please call the clinic at the number listed above.       Sincerely,        Rohit Solitario MD

## 2017-12-09 DIAGNOSIS — E78.1 HYPERTRIGLYCERIDEMIA: ICD-10-CM

## 2017-12-09 DIAGNOSIS — Z91.89 RISK FOR CORONARY ARTERY DISEASE BETWEEN 10% AND 20% IN NEXT 10 YEARS: ICD-10-CM

## 2017-12-11 ENCOUNTER — OFFICE VISIT (OUTPATIENT)
Dept: URGENT CARE | Facility: URGENT CARE | Age: 57
End: 2017-12-11
Payer: COMMERCIAL

## 2017-12-11 VITALS
DIASTOLIC BLOOD PRESSURE: 90 MMHG | TEMPERATURE: 97.9 F | SYSTOLIC BLOOD PRESSURE: 130 MMHG | WEIGHT: 216 LBS | BODY MASS INDEX: 30.99 KG/M2 | HEART RATE: 80 BPM | RESPIRATION RATE: 20 BRPM

## 2017-12-11 DIAGNOSIS — K08.89 PAIN OF TOOTH SOCKET: Primary | ICD-10-CM

## 2017-12-11 DIAGNOSIS — R22.0 RIGHT FACIAL SWELLING: ICD-10-CM

## 2017-12-11 PROCEDURE — 99214 OFFICE O/P EST MOD 30 MIN: CPT | Performed by: PHYSICIAN ASSISTANT

## 2017-12-11 RX ORDER — HYDROCODONE BITARTRATE AND ACETAMINOPHEN 5; 325 MG/1; MG/1
1-2 TABLET ORAL EVERY 6 HOURS PRN
Qty: 5 TABLET | Refills: 0 | Status: SHIPPED | OUTPATIENT
Start: 2017-12-11 | End: 2018-04-20

## 2017-12-11 NOTE — NURSING NOTE
"Chief Complaint   Patient presents with     Dental Pain     rt lower tooth pain started last night,has dental appointment tomorrow       Initial /90 (Cuff Size: Adult Regular)  Pulse 80  Temp 97.9  F (36.6  C) (Oral)  Resp 20  Wt 216 lb (98 kg)  BMI 30.99 kg/m2 Estimated body mass index is 30.99 kg/(m^2) as calculated from the following:    Height as of 2/6/17: 5' 10\" (1.778 m).    Weight as of this encounter: 216 lb (98 kg).  Medication Reconciliation: complete Chloe AMAYA    "

## 2017-12-11 NOTE — PROGRESS NOTES
SUBJECTIVE:  Durga Aguirre is a 57 year old male with a chief complaint of sore throat.  Onset of symptoms was 2 day(s) ago.    Course of illness: still present.  Severity mild and moderate  Current and Associated symptoms: right tooth pain, gingival bleeding and swelling  Treatment measures tried include using tooth pick to try to pry out dorito chip, gums are bleeding around the tooth.  Predisposing factors include bad tooth, recent left side dental work, been chewing on right side.    Past Medical History:   Diagnosis Date     Alcohol abuse- remission 2/18/2013     Other spontaneous pneumothorax 1997     No Known Allergies  Social History   Substance Use Topics     Smoking status: Former Smoker     Years: 20.00     Types: Cigarettes     Quit date: 10/9/2017     Smokeless tobacco: Never Used      Comment: 2-3 cigarettes per day      Alcohol use No      Comment: restarted may 2016       ROS:  CONSTITUTIONAL:NEGATIVE for fever, chills, change in weight  INTEGUMENTARY/SKIN: NEGATIVE for worrisome rashes, moles or lesions  ENT/MOUTH: POSITIVE for right side lower tooth pain, swelling, bleeding around tooth  RESP:NEGATIVE for significant cough or SOB  CV: NEGATIVE for chest pain, palpitations or peripheral edema  GI: NEGATIVE for nausea, abdominal pain, heartburn, or change in bowel habits  MUSCULOSKELETAL: NEGATIVE for significant arthralgias or myalgia  NEURO: NEGATIVE for weakness, dizziness or paresthesias    OBJECTIVE:   /90 (Cuff Size: Adult Regular)  Pulse 80  Temp 97.9  F (36.6  C) (Oral)  Resp 20  Wt 216 lb (98 kg)  BMI 30.99 kg/m2  GENERAL APPEARANCE: healthy, alert and no distress  HENT: Positive for right side tooth pain, gingival bleeding around tooth  NECK: supple, non-tender to palpation, no adenopathy noted  SKIN: no suspicious lesions or rashes  NEURO: Normal strength and tone, sensory exam grossly normal,  normal speech and mentation      ASSESSMENT/PLAN:      ICD-10-CM    1. Pain of tooth  socket K08.89 amoxicillin-clavulanate (AUGMENTIN) 875-125 MG per tablet     MAGIC MOUTHWASH, ENTER INGREDIENTS IN COMMENTS,     HYDROcodone-acetaminophen (NORCO) 5-325 MG per tablet   2. Right facial swelling R22.0 amoxicillin-clavulanate (AUGMENTIN) 875-125 MG per tablet     Symptomatic treat with gargles, lozenges, and OTC analgesic as needed.   Patient has an issue with alcohol in the past and using naltrexone.  We have discussed the use of opiods with his hx and medications  Patient has legitimate tooth problems, swollen and bleeding gums around right lower molar  We gave just enough to get through the night till he can see dentistry tomorrow  Follow-up with primary clinic if not improving.

## 2017-12-11 NOTE — MR AVS SNAPSHOT
"              After Visit Summary   2017    Durga Aguirre    MRN: [id number]           Patient Information     Date Of Birth          1960        Visit Information        Provider Department      2017 4:40 PM Mark Penny PA-C [facility]        Today's Diagnoses     Pain of tooth socket    -  1    Right facial swelling           Follow-ups after your visit        Who to contact     If you have questions or need follow up information about today's clinic visit or your schedule please contact [facility] directly at [phone].  Normal or non-critical lab and imaging results will be communicated to you by MyChart, letter or phone within 4 business days after the clinic has received the results. If you do not hear from us within 7 days, please contact the clinic through Purewirehart or phone. If you have a critical or abnormal lab result, we will notify you by phone as soon as possible.  Submit refill requests through EatStreet or call your pharmacy and they will forward the refill request to us. Please allow 3 business days for your refill to be completed.          Additional Information About Your Visit        MyChart Information     EatStreet lets you send messages to your doctor, view your test results, renew your prescriptions, schedule appointments and more. To sign up, go to [email or web address] . Click on \"Log in\" on the left side of the screen, which will take you to the Welcome page. Then click on \"Sign up Now\" on the right side of the page.     You will be asked to enter the access code listed below, as well as some personal information. Please follow the directions to create your username and password.     Your access code is: [email or web address]  Expires: 2018 12:13 PM     Your access code will  in 90 days. If you need help or a new code, please call your [place] clinic or [phone].        Care EveryWhere ID     This is your " Care EveryWhere ID. This could be used by other organizations to access your [facility] medical records  [id number]        Your Vitals Were     Pulse Temperature Respirations BMI (Body Mass Index)          80 97.9  F (36.6  C) (Oral) 20 30.99 kg/m2         Blood Pressure from Last 3 Encounters:   12/11/17 130/90   11/09/17 124/88   09/23/17 139/86    Weight from Last 3 Encounters:   12/11/17 216 lb (98 kg)   11/09/17 202 lb 14.4 oz (92 kg)   07/25/17 206 lb 3.2 oz (93.5 kg)              Today, you had the following     No orders found for display         Today's Medication Changes          These changes are accurate as of: 12/11/17  5:08 PM.  If you have any questions, ask your nurse or doctor.               Start taking these medicines.        Dose/Directions    amoxicillin-clavulanate 875-125 MG per tablet   Commonly known as:  AUGMENTIN   Used for:  Pain of tooth socket, Right facial swelling        Dose:  1 tablet   Take 1 tablet by mouth 2 times daily   Quantity:  20 tablet   Refills:  0       HYDROcodone-acetaminophen 5-325 MG per tablet   Commonly known as:  NORCO   Used for:  Pain of tooth socket        Dose:  1-2 tablet   Take 1-2 tablets by mouth every 6 hours as needed   Quantity:  5 tablet   Refills:  0       MAGIC MOUTHWASH (ENTER INGREDIENTS IN COMMENTS)   Used for:  Pain of tooth socket        Dose:  5-10 mL   Swish and spit 5-10 mLs in mouth every 6 hours as needed Pharmacy please compound 4 grams of carafate susp in 30 ml of Benadryl (12.5 mg/5 ml), 60 ml Maalox and 30 ml Viscous Lidocaine   Quantity:  160 mL   Refills:  0            Where to get your medicines      These medications were sent to [facility] [address] [address]     Phone:  [phone]     amoxicillin-clavulanate 875-125 MG per tablet         Some of these will need a paper prescription and others can be bought over the counter.  Ask your nurse if you have  questions.     Bring a paper prescription for each of these medications     HYDROcodone-acetaminophen 5-325 MG per tablet    MAGIC MOUTHWASH (ENTER INGREDIENTS IN COMMENTS)                Primary Care Provider Office Phone # Fax #    Rohit Solitario [phone]       [address] [facility] [address]        Equal Access to Services     [facility] JORY : Hadii aad ku hadasho Soomaali, waaxda luqadaha, qaybta kaalmada adeegyada, waxay idiin hayaan adeeg kharash labrittanin . So [facility] [phone].    ATENCIÓN: Si habla español, tiene a lim disposición servicios gratuitos de asistencia lingüística. Llame al [phone].    We comply with applicable federal civil rights laws and [place] laws. We do not discriminate on the basis of race, color, national origin, age, disability, sex, sexual orientation, or gender identity.            Thank you!     Thank you for choosing [facility]  for your care. Our goal is always to provide you with excellent care. Hearing back from our patients is one way we can continue to improve our services. Please take a few minutes to complete the written survey that you may receive in the mail after your visit with us. Thank you!             Your Updated Medication List - Protect others around you: Learn how to safely use, store and throw away your medicines at www.disposemymeds.org.          This list is accurate as of: 12/11/17  5:08 PM.  Always use your most recent med list.                   Brand Name Dispense Instructions for use Diagnosis    amoxicillin-clavulanate 875-125 MG per tablet    AUGMENTIN    20 tablet    Take 1 tablet by mouth 2 times daily    Pain of tooth socket, Right facial swelling       citalopram 40 MG tablet    celeXA    45 tablet    take one half tablet (20 mg) by mouth once a day    Generalized anxiety disorder       HYDROcodone-acetaminophen 5-325 MG per tablet    NORCO    5 tablet    Take 1-2 tablets by mouth every  6 hours as needed    Pain of tooth socket       MAGIC MOUTHWASH (ENTER INGREDIENTS IN COMMENTS)     160 mL    Swish and spit 5-10 mLs in mouth every 6 hours as needed Pharmacy please compound 4 grams of carafate susp in 30 ml of Benadryl (12.5 mg/5 ml), 60 ml Maalox and 30 ml Viscous Lidocaine    Pain of tooth socket       metoprolol 25 MG tablet    LOPRESSOR    60 tablet    TAKE ONE TABLET BY MOUTH TWICE DAILY    Essential hypertension       naltrexone 50 MG tablet    DEPADE;REVIA    30 tablet    Take 1 tablet (50 mg) by mouth daily    Uncomplicated alcohol dependence (H)       simvastatin 40 MG tablet    ZOCOR    90 tablet    Take 1 tablet (40 mg) by mouth At Bedtime    Risk for coronary artery disease between 10% and 20% in next 10 years, Hypertriglyceridemia

## 2017-12-12 RX ORDER — SIMVASTATIN 40 MG
TABLET ORAL
Qty: 90 TABLET | Refills: 0 | Status: SHIPPED | OUTPATIENT
Start: 2017-12-12 | End: 2018-03-17

## 2018-03-06 DIAGNOSIS — F10.20 UNCOMPLICATED ALCOHOL DEPENDENCE (H): ICD-10-CM

## 2018-03-07 NOTE — TELEPHONE ENCOUNTER
Requested Prescriptions   Pending Prescriptions Disp Refills     naltrexone (DEPADE;REVIA) 50 MG tablet [Pharmacy Med Name: Naltrexone HCl Oral Tablet 50 MG] 30 tablet 4     Sig: TAKE ONE TABLET BY MOUTH ONE TIME DAILY    There is no refill protocol information for this order      Last Written Prescription Date:  04/10/17  Last Fill Quantity: 30,  # refills: 5   Last office visit: 11/9/2017 with prescribing provider:  11/09/17   Future Office Visit:  0

## 2018-03-17 DIAGNOSIS — Z91.89 RISK FOR CORONARY ARTERY DISEASE BETWEEN 10% AND 20% IN NEXT 10 YEARS: ICD-10-CM

## 2018-03-17 DIAGNOSIS — E78.1 HYPERTRIGLYCERIDEMIA: ICD-10-CM

## 2018-03-17 NOTE — LETTER
[facility]  [address] [id number] [phone]            Durga Aguirre  [address]  [facility] [address]        March 19, 2018    Dear Durga,    While refilling your prescription today, we noticed that you are due to have labs drawn.  We will refill your prescription for 30 days, but a follow-up appointment must be made before any additional refills can be approved.     Taking care of your health is important to us and we look forward to seeing you in the near future.  Please call us at [phone] or [phone] (or use [facility]) to schedule an appointment.     Please disregard this notice if you have already made an appointment.    Sincerely,        [facility]

## 2018-03-18 NOTE — TELEPHONE ENCOUNTER
"Requested Prescriptions   Pending Prescriptions Disp Refills     simvastatin (ZOCOR) 40 MG tablet [Pharmacy Med Name: Simvastatin Oral Tablet 40 MG]  Last Written Prescription Date: 12/12/2017  Last Fill Quantity: 90,  # refills: 0   Last office visit: 11/9/2017 with prescribing provider:  MAHIN   Future Office Visit:     30 tablet 0     Sig: TAKE ONE TABLET BY MOUTH AT BEDTIME    Statins Protocol Failed    3/17/2018  8:26 PM       Failed - LDL on file in past 12 months    Recent Labs   Lab Test  02/07/17   0940   LDL  Cannot estimate LDL when triglyceride exceeds 400 mg/dL  130*            Passed - No abnormal creatine kinase in past 12 months    No lab results found.            Passed - Recent (12 mo) or future (30 days) visit within the authorizing provider's specialty    Patient had office visit in the last 12 months or has a visit in the next 30 days with authorizing provider or within the authorizing provider's specialty.  See \"Patient Info\" tab in inbasket, or \"Choose Columns\" in Meds & Orders section of the refill encounter.           Passed - Patient is age 18 or older          "

## 2018-03-19 RX ORDER — SIMVASTATIN 40 MG
TABLET ORAL
Qty: 30 TABLET | Refills: 0 | Status: SHIPPED | OUTPATIENT
Start: 2018-03-19 | End: 2018-04-04

## 2018-03-20 RX ORDER — NALTREXONE HYDROCHLORIDE 50 MG/1
TABLET, FILM COATED ORAL
Qty: 30 TABLET | Refills: 1 | Status: SHIPPED | OUTPATIENT
Start: 2018-03-20 | End: 2018-04-20

## 2018-03-21 NOTE — TELEPHONE ENCOUNTER
Called the Patient and left a message to joão us back.  No CTC.    Paula.YVAN Heller ([facility])

## 2018-03-22 ENCOUNTER — DOCUMENTATION ONLY (OUTPATIENT)
Dept: LAB | Facility: CLINIC | Age: 58
End: 2018-03-22

## 2018-03-22 DIAGNOSIS — I10 ESSENTIAL HYPERTENSION: Primary | ICD-10-CM

## 2018-03-22 DIAGNOSIS — E78.1 HYPERTRIGLYCERIDEMIA: ICD-10-CM

## 2018-03-22 NOTE — PROGRESS NOTES
Please place or confirm orders for upcoming lab appointment on 03/23/18. Thank you.    Patient has labs, but they are not expected until 02/2019. Is it ok to draw for these labs? If not please place new orders, or please advice patient. Thank you.

## 2018-03-26 NOTE — TELEPHONE ENCOUNTER
Spoke with Durga and scheduled a Lab only appointment for 3/27/18 at 8:45 a.m and a return appointment with Chasity on 3/30/18.    Paula.YVAN Heller ([facility])

## 2018-03-27 DIAGNOSIS — E78.1 HYPERTRIGLYCERIDEMIA: ICD-10-CM

## 2018-03-27 DIAGNOSIS — I10 ESSENTIAL HYPERTENSION: ICD-10-CM

## 2018-03-27 LAB
ANION GAP SERPL CALCULATED.3IONS-SCNC: 8 MMOL/L (ref 3–14)
BUN SERPL-MCNC: 14 MG/DL (ref 7–30)
CALCIUM SERPL-MCNC: 8.7 MG/DL (ref 8.5–10.1)
CHLORIDE SERPL-SCNC: 104 MMOL/L (ref 94–109)
CHOLEST SERPL-MCNC: 280 MG/DL
CO2 SERPL-SCNC: 25 MMOL/L (ref 20–32)
CREAT SERPL-MCNC: 0.93 MG/DL (ref 0.66–1.25)
GFR SERPL CREATININE-BSD FRML MDRD: 83 ML/MIN/1.7M2
GLUCOSE SERPL-MCNC: 124 MG/DL (ref 70–99)
HDLC SERPL-MCNC: 24 MG/DL
LDLC SERPL CALC-MCNC: ABNORMAL MG/DL
LDLC SERPL DIRECT ASSAY-MCNC: 80 MG/DL
NONHDLC SERPL-MCNC: 256 MG/DL
POTASSIUM SERPL-SCNC: 4.2 MMOL/L (ref 3.4–5.3)
SODIUM SERPL-SCNC: 137 MMOL/L (ref 133–144)
TRIGL SERPL-MCNC: 1262 MG/DL

## 2018-03-27 PROCEDURE — 36415 COLL VENOUS BLD VENIPUNCTURE: CPT | Performed by: INTERNAL MEDICINE

## 2018-03-27 PROCEDURE — 83721 ASSAY OF BLOOD LIPOPROTEIN: CPT | Mod: 59 | Performed by: INTERNAL MEDICINE

## 2018-03-27 PROCEDURE — 80061 LIPID PANEL: CPT | Performed by: INTERNAL MEDICINE

## 2018-03-27 PROCEDURE — 80048 BASIC METABOLIC PNL TOTAL CA: CPT | Performed by: INTERNAL MEDICINE

## 2018-03-30 ENCOUNTER — OFFICE VISIT (OUTPATIENT)
Dept: INTERNAL MEDICINE | Facility: CLINIC | Age: 58
End: 2018-03-30
Payer: COMMERCIAL

## 2018-03-30 VITALS
HEART RATE: 63 BPM | WEIGHT: 207 LBS | RESPIRATION RATE: 24 BRPM | OXYGEN SATURATION: 97 % | HEIGHT: 70 IN | DIASTOLIC BLOOD PRESSURE: 94 MMHG | TEMPERATURE: 97.5 F | BODY MASS INDEX: 29.63 KG/M2 | SYSTOLIC BLOOD PRESSURE: 132 MMHG

## 2018-03-30 DIAGNOSIS — E78.1 HYPERTRIGLYCERIDEMIA: ICD-10-CM

## 2018-03-30 DIAGNOSIS — Z91.89 RISK FOR CORONARY ARTERY DISEASE BETWEEN 10% AND 20% IN NEXT 10 YEARS: ICD-10-CM

## 2018-03-30 DIAGNOSIS — F41.1 GENERALIZED ANXIETY DISORDER: ICD-10-CM

## 2018-03-30 PROCEDURE — 99213 OFFICE O/P EST LOW 20 MIN: CPT | Performed by: PHYSICIAN ASSISTANT

## 2018-03-30 RX ORDER — CITALOPRAM HYDROBROMIDE 40 MG/1
TABLET ORAL
Qty: 45 TABLET | Refills: 3 | Status: SHIPPED | OUTPATIENT
Start: 2018-03-30 | End: 2019-04-04

## 2018-03-30 RX ORDER — SIMVASTATIN 40 MG
TABLET ORAL
Qty: 90 TABLET | Refills: 1 | Status: CANCELLED | OUTPATIENT
Start: 2018-03-30

## 2018-03-30 ASSESSMENT — PAIN SCALES - GENERAL: PAINLEVEL: NO PAIN (0)

## 2018-03-30 NOTE — PROGRESS NOTES
"  SUBJECTIVE:   Durga Aguirre is a 58 year old male who presents to clinic today for the following health issues:    Chief Complaint   Patient presents with     Depression     Medication recheck     Lipids     Medication recheck, lab work was done on 3/27/18       Hyperlipidemia Follow-Up    Rate your low fat/cholesterol diet?: pt reports diet is high in fat     Taking statin?  Yes, no muscle aches from statin    Other lipid medications/supplements?:  None    Drank 5-6 drinks gin with tonic with 6 beer     Results show total cholesterol of 280, triglycerides of 1262, HDL of 24 and LDL of 80    Depression Followup    Status since last visit: Stable -  Reports things are good     See PHQ-9 for current symptoms.  Other associated symptoms: None    Complicating factors:   Significant life event:  No   Current substance abuse:  Alcohol  Anxiety or Panic symptoms:  No    PHQ-9 3/30/2018   Total Score 2   Q9: Suicide Ideation Not at all     PHQ-9  English  PHQ-9   Any Language  Suicide Assessment Five-step Evaluation and Treatment (SAFE-T)    Amount of exercise or physical activity: 6-7 days/week for an average of 45-60 minutes    Problems taking medications regularly: No    Medication side effects: none    Diet: as above       Problem list and histories reviewed & adjusted, as indicated.  Additional history: as documented      Reviewed and updated as needed this visit by clinical staff  Tobacco  Meds  Problems       Reviewed and updated as needed this visit by Provider  Meds  Problems         OBJECTIVE:     BP (!) 132/94  Pulse 63  Temp 97.5  F (36.4  C) (Oral)  Resp 24  Ht 5' 10\" (1.778 m)  Wt 207 lb (93.9 kg)  SpO2 97%  BMI 29.7 kg/m2  Body mass index is 29.7 kg/(m^2).  GENERAL: healthy, alert and no distress  RESP: lungs clear to auscultation - no rales, rhonchi or wheezes  CV: regular rates and rhythm, normal S1 S2, no S3 or S4 and no murmur, click or rub  PSYCH: mentation appears normal, affect " normal/bright    ASSESSMENT/PLAN:       1. Generalized anxiety disorder  - citalopram (CELEXA) 40 MG tablet; take one half tablet (20 mg) by mouth once a day  Dispense: 45 tablet; Refill: 3  - reviewed etoh abuse and needing to eliminate etoh and pt notes he is willing to work on this     2. Risk for coronary artery disease between 10% and 20% in next 10 years  3. Hypertriglyceridemia  - at time of visit, I noted I would discuss this with pcp   - reviewed case with pcp who ordered labs and he would noted he would contact pt with plan for treatment     Pt agreed to above plan and all questions are answered.     Chasity Freeman PA-C  [facility]

## 2018-03-30 NOTE — MR AVS SNAPSHOT
"              After Visit Summary   3/30/2018    Durga Aguirre    MRN: [id number]           Patient Information     Date Of Birth          1960        Visit Information        Provider Department      3/30/2018 9:20 AM Chasity Freeman PA-C [facility]        Today's Diagnoses     Generalized anxiety disorder        Risk for coronary artery disease between 10% and 20% in next 10 years        Hypertriglyceridemia          Care Instructions    Will call with advisement on cholesterol treatment           Follow-ups after your visit        Who to contact     If you have questions or need follow up information about today's clinic visit or your schedule please contact [facility] directly at [phone].  Normal or non-critical lab and imaging results will be communicated to you by Greystonehart, letter or phone within 4 business days after the clinic has received the results. If you do not hear from us within 7 days, please contact the clinic through MyChart or phone. If you have a critical or abnormal lab result, we will notify you by phone as soon as possible.  Submit refill requests through Gust or call your pharmacy and they will forward the refill request to us. Please allow 3 business days for your refill to be completed.          Additional Information About Your Visit        MyChart Information     Gust lets you send messages to your doctor, view your test results, renew your prescriptions, schedule appointments and more. To sign up, go to [email or web address] . Click on \"Log in\" on the left side of the screen, which will take you to the Welcome page. Then click on \"Sign up Now\" on the right side of the page.     You will be asked to enter the access code listed below, as well as some personal information. Please follow the directions to create your username and password.     Your access code is: [email or web address]  Expires: 6/28/2018 10:09 AM     Your access " "code will  in 90 days. If you need help or a new code, please call your [place] clinic or [phone].        Care EveryWhere ID     This is your Care EveryWhere ID. This could be used by other organizations to access your [place] medical records  [id number]        Your Vitals Were     Pulse Temperature Respirations Height Pulse Oximetry BMI (Body Mass Index)    63 97.5  F (36.4  C) (Oral) 24 5' 10\" (1.778 m) 97% 29.7 kg/m2       Blood Pressure from Last 3 Encounters:   18 (!) 132/94   17 130/90   17 124/88    Weight from Last 3 Encounters:   18 207 lb (93.9 kg)   17 216 lb (98 kg)   17 202 lb 14.4 oz (92 kg)              Today, you had the following     No orders found for display         Today's Medication Changes          These changes are accurate as of 3/30/18 10:09 AM.  If you have any questions, ask your nurse or doctor.               These medicines have changed or have updated prescriptions.        Dose/Directions    citalopram 40 MG tablet   Commonly known as:  celeXA   This may have changed:  See the new instructions.   Used for:  Generalized anxiety disorder   Changed by:  Chasity Freeman PA-C        take one half tablet (20 mg) by mouth once a day   Quantity:  45 tablet   Refills:  3            Where to get your medicines      [id number] [address] [facility]  [address] [address] [facility] [address]     Phone:  [phone]     citalopram 40 MG tablet                Primary Care Provider Office Phone # Fax #    Rohit Solitario [phone]       [address] [facility] [address]        Equal Access to Services     ARMIDA CORLEY : Kevon Boswell, waaxda luqadaha, qaybta kaalmada meri, trung solano. [facility] [phone].    ATENCIÓN: Si habla español, tiene a lim disposición servicios gratuitos de asistencia " lingüística. Canelo al [phone].    We comply with applicable federal civil rights laws and [place] laws. We do not discriminate on the basis of race, color, national origin, age, disability, sex, sexual orientation, or gender identity.            Thank you!     Thank you for choosing [facility]  for your care. Our goal is always to provide you with excellent care. Hearing back from our patients is one way we can continue to improve our services. Please take a few minutes to complete the written survey that you may receive in the mail after your visit with us. Thank you!             Your Updated Medication List - Protect others around you: Learn how to safely use, store and throw away your medicines at www.disposemymeds.org.          This list is accurate as of 3/30/18 10:09 AM.  Always use your most recent med list.                   Brand Name Dispense Instructions for use Diagnosis    citalopram 40 MG tablet    celeXA    45 tablet    take one half tablet (20 mg) by mouth once a day    Generalized anxiety disorder       HYDROcodone-acetaminophen 5-325 MG per tablet    NORCO    5 tablet    Take 1-2 tablets by mouth every 6 hours as needed    Pain of tooth socket       metoprolol tartrate 25 MG tablet    LOPRESSOR    60 tablet    TAKE ONE TABLET BY MOUTH TWICE DAILY    Essential hypertension       naltrexone 50 MG tablet    DEPADE;REVIA    30 tablet    TAKE ONE TABLET BY MOUTH ONE TIME DAILY    Uncomplicated alcohol dependence (H)       simvastatin 40 MG tablet    ZOCOR    30 tablet    TAKE ONE TABLET BY MOUTH AT BEDTIME    Risk for coronary artery disease between 10% and 20% in next 10 years, Hypertriglyceridemia

## 2018-03-31 ASSESSMENT — PATIENT HEALTH QUESTIONNAIRE - PHQ9: SUM OF ALL RESPONSES TO PHQ QUESTIONS 1-9: 2

## 2018-04-04 ENCOUNTER — TELEPHONE (OUTPATIENT)
Dept: INTERNAL MEDICINE | Facility: CLINIC | Age: 58
End: 2018-04-04

## 2018-04-04 DIAGNOSIS — R73.9 HYPERGLYCEMIA: ICD-10-CM

## 2018-04-04 DIAGNOSIS — E78.1 HYPERTRIGLYCERIDEMIA: Primary | ICD-10-CM

## 2018-04-04 NOTE — TELEPHONE ENCOUNTER
Pt's lipids and glucose up significantly- liekly related to alcohol use.  We need to get LFTs on him before I can recommend a change in his cholesterol medication. Get these done asap here.   As long as LFTs ok- will likely change simvastatin and add fibrate for the TG.

## 2018-04-12 ENCOUNTER — TELEPHONE (OUTPATIENT)
Dept: INTERNAL MEDICINE | Facility: CLINIC | Age: 58
End: 2018-04-12
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
## 2018-04-18 DIAGNOSIS — Z91.89 RISK FOR CORONARY ARTERY DISEASE BETWEEN 10% AND 20% IN NEXT 10 YEARS: ICD-10-CM

## 2018-04-18 DIAGNOSIS — R73.9 HYPERGLYCEMIA: ICD-10-CM

## 2018-04-18 DIAGNOSIS — E78.1 HYPERTRIGLYCERIDEMIA: ICD-10-CM

## 2018-04-18 LAB — HBA1C MFR BLD: 5.4 % (ref 0–5.6)

## 2018-04-18 PROCEDURE — 80061 LIPID PANEL: CPT | Performed by: INTERNAL MEDICINE

## 2018-04-18 PROCEDURE — 83036 HEMOGLOBIN GLYCOSYLATED A1C: CPT | Performed by: INTERNAL MEDICINE

## 2018-04-18 PROCEDURE — 36415 COLL VENOUS BLD VENIPUNCTURE: CPT | Performed by: INTERNAL MEDICINE

## 2018-04-18 PROCEDURE — 80076 HEPATIC FUNCTION PANEL: CPT | Performed by: INTERNAL MEDICINE

## 2018-04-19 LAB
ALBUMIN SERPL-MCNC: 3.7 G/DL (ref 3.4–5)
ALP SERPL-CCNC: 84 U/L (ref 40–150)
ALT SERPL W P-5'-P-CCNC: 33 U/L (ref 0–70)
AST SERPL W P-5'-P-CCNC: 22 U/L (ref 0–45)
BILIRUB DIRECT SERPL-MCNC: 0.1 MG/DL (ref 0–0.2)
BILIRUB SERPL-MCNC: 0.5 MG/DL (ref 0.2–1.3)
CHOLEST SERPL-MCNC: 187 MG/DL
HDLC SERPL-MCNC: 36 MG/DL
LDLC SERPL CALC-MCNC: 107 MG/DL
NONHDLC SERPL-MCNC: 151 MG/DL
PROT SERPL-MCNC: 6.9 G/DL (ref 6.8–8.8)
TRIGL SERPL-MCNC: 218 MG/DL

## 2018-04-20 ENCOUNTER — OFFICE VISIT (OUTPATIENT)
Dept: INTERNAL MEDICINE | Facility: CLINIC | Age: 58
End: 2018-04-20
Payer: COMMERCIAL

## 2018-04-20 VITALS
TEMPERATURE: 98.1 F | SYSTOLIC BLOOD PRESSURE: 130 MMHG | BODY MASS INDEX: 29.87 KG/M2 | DIASTOLIC BLOOD PRESSURE: 82 MMHG | HEART RATE: 65 BPM | OXYGEN SATURATION: 98 % | WEIGHT: 208.2 LBS

## 2018-04-20 DIAGNOSIS — E78.1 HYPERTRIGLYCERIDEMIA: Primary | ICD-10-CM

## 2018-04-20 DIAGNOSIS — F41.1 GENERALIZED ANXIETY DISORDER: ICD-10-CM

## 2018-04-20 DIAGNOSIS — I10 ESSENTIAL HYPERTENSION: ICD-10-CM

## 2018-04-20 DIAGNOSIS — F10.20 UNCOMPLICATED ALCOHOL DEPENDENCE (H): ICD-10-CM

## 2018-04-20 DIAGNOSIS — E78.1 HYPERTRIGLYCERIDEMIA: ICD-10-CM

## 2018-04-20 DIAGNOSIS — Z91.89 RISK FOR CORONARY ARTERY DISEASE BETWEEN 10% AND 20% IN NEXT 10 YEARS: ICD-10-CM

## 2018-04-20 PROCEDURE — 99214 OFFICE O/P EST MOD 30 MIN: CPT | Performed by: INTERNAL MEDICINE

## 2018-04-20 RX ORDER — METOPROLOL TARTRATE 25 MG/1
25 TABLET, FILM COATED ORAL 2 TIMES DAILY
Qty: 60 TABLET | Refills: 5 | Status: SHIPPED | OUTPATIENT
Start: 2018-04-20 | End: 2018-12-05

## 2018-04-20 RX ORDER — NALTREXONE HYDROCHLORIDE 50 MG/1
50 TABLET, FILM COATED ORAL DAILY
Qty: 30 TABLET | Refills: 5 | Status: SHIPPED | OUTPATIENT
Start: 2018-04-20 | End: 2018-06-30

## 2018-04-20 ASSESSMENT — PAIN SCALES - GENERAL: PAINLEVEL: NO PAIN (0)

## 2018-04-20 NOTE — PATIENT INSTRUCTIONS
The heart-healthy Mediterranean is a healthy eating plan based on typical foods and recipes of Mediterranean-style cooking. Here's how to adopt the Mediterranean diet.   If you're looking for a heart-healthy eating plan, the Mediterranean diet might be right for you. The Mediterranean diet incorporates the basics of healthy eating -- plus a splash of flavorful olive oil and perhaps even a glass of red wine -- among other components characterizing the traditional cooking style of countries bordering the [place].  Most healthy diets include fruits, vegetables, fish and whole grains, and limit unhealthy fats. While these parts of a healthy diet remain tried-and-true, subtle variations or differences in proportions of certain foods may make a difference in your risk of heart disease.  Research has shown that the traditional Mediterranean diet reduces the risk of heart disease. In fact, an analysis of more than 1.5 million healthy adults demonstrated that following a Mediterranean diet was associated with a reduced risk of death from heart disease and cancer, as well as a reduced incidence of Parkinson's and Alzheimer's diseases.   The Dietary Guidelines for Americans recommends the Mediterranean diet as an eating plan that can help promote health and prevent disease. And the Mediterranean diet is one your whole family can follow for good health.   The Mediterranean diet emphasizes:  Eating primarily plant-based foods, such as fruits and vegetables, whole grains, legumes and nuts   Replacing butter with healthy fats, such as olive oil   Using herbs and spices instead of salt to flavor foods   Limiting red meat to no more than a few times a month   Eating fish and poultry at least twice a week   Drinking red wine in moderation (optional)  The diet also recognizes the importance of being physically active, and enjoying meals with family and friends.  The Mediterranean diet traditionally includes fruits,  "vegetables and grains. For example, residents of [facility] average six or more servings a day of antioxidant-rich fruits and vegetables.  Grains in the Mediterranean region are typically whole grain and usually contain very few unhealthy trans fats, and bread is an important part of the diet. However, throughout the Mediterranean region, bread is eaten plain or dipped in olive oil -- not eaten with butter or margarine, which contains saturated or trans fats.   Nuts are another part of a healthy Mediterranean diet. Nuts are high in fat, but most of the fat is healthy. Because nuts are high in calories, they should not be eaten in large amounts -- generally no more than a handful a day. For the best nutrition, avoid candied or honey-roasted and heavily salted nuts.  The focus of the Mediterranean diet isn't on limiting total fat consumption, but rather on choosing healthier types of fat. The Mediterranean diet discourages saturated fats and hydrogenated oils (trans fats), both of which contribute to heart disease.  The Mediterranean diet features olive oil as the primary source of fat. Olive oil is mainly monounsaturated fat -- a type of fat that can help reduce low-density lipoprotein (LDL) cholesterol levels when used in place of saturated or trans fats. \"Extra-virgin\" and \"virgin\" olive oils (the least processed forms) also contain the highest levels of protective plant compounds that provide antioxidant effects.  Canola oil and some nuts contain the beneficial linolenic acid (a type of omega-3 fatty acid) in addition to healthy unsaturated fat. Omega-3 fatty acids lower triglycerides, decrease blood clotting, and are associated with decreased incidence of sudden heart attacks, improve the health of your blood vessels, and help moderate blood pressure. Fatty fish -- such as mackerel, lake trout, herring, sardines, albacore tuna and salmon -- are rich sources of omega-3 fatty acids. Fish is eaten on a regular basis in " the Mediterranean diet.  The health effects of alcohol have been debated for many years, and some doctors are reluctant to encourage alcohol consumption because of the health consequences of excessive drinking. However, alcohol -- in moderation -- has been associated with a reduced risk of heart disease in some research studies.  The Mediterranean diet typically includes a moderate amount of wine, usually red wine. This means no more than 5 ounces (148 milliliters) of wine daily for women of all ages and men older than age 65 and no more than 10 ounces (296 milliliters) of wine daily for younger men. More than this may increase the risk of health problems, including increased risk of certain types of cancer.   If you're unable to limit your alcohol intake to the amounts defined above, if you have a personal or family history of alcohol abuse, or if you have heart or liver disease, refrain from drinking wine or any other alcohol.  The Mediterranean diet is a delicious and healthy way to eat. Many people who switch to this style of eating say they'll never eat any other way. Here are some specific steps to get you started:   Eat your veggies and fruits -- and switch to whole grains. Avariety of plant foods should make up the majority of your meals. They should be minimally processed -- fresh and whole are best. Include veggies and fruits in every meal and eat them for snacks as well. Switch to whole-grain bread and cereal, and begin to eat more whole-grain rice and pasta products. Keep baby carrots, apples and bananas on hand for quick, satisfying snacks. Fruit salads are a wonderful way to eat a variety of healthy fruit.   Go nuts. Nuts and seeds are good sources of fiber, protein and healthy fats. Keep almonds, cashews, pistachios and walnuts on hand for a quick snack. Choose natural peanut butter, rather than the kind with hydrogenated fat added. Try blended sesame seeds (tahini) as a dip or spread for bread.    Pass on the butter. Try olive or canola oil as a healthy replacement for butter or margarine. Lightly drizzle it over vegetables. After cooking pasta, add a touch of olive oil, some garlic and green onions for flavoring. Dip bread in flavored olive oil or lightly spread it on whole-grain bread for a tasty alternative to butter. Try tahini as a dip or spread for bread too.   Spice it up. Herbs and spices make food tasty and can  for salt and fat in recipes.   Go fish. Eat fish at least twice a week. Fresh or water-packed tuna, salmon, trout, mackerel and herring are healthy choices. [place], bake or broil fish for great taste and easy cleanup. Avoid breaded and fried fish.   Rein in the red meat. Limit red meat to no more than a few times a month. Substitute fish and poultry for red meat. When choosing red meat, make sure it's lean and keep portions small (about the size of a deck of cards). Also avoid sausage, guardado and other high-fat, processed meats.   Choose low-fat dairy. Limit higher fat dairy products, such as whole or 2 percent milk, cheese and ice cream. Switch to skim milk, fat-free yogurt and low-fat cheese

## 2018-04-20 NOTE — MR AVS SNAPSHOT
After Visit Summary   4/20/2018    Durga Aguirre    MRN: [id number]           Patient Information     Date Of Birth          1960        Visit Information        Provider Department      4/20/2018 2:00 PM Rohit Solitario MD [facility]        Today's Diagnoses     Hypertriglyceridemia    -  1    Uncomplicated alcohol dependence (H)        Essential hypertension        Generalized anxiety disorder          Care Instructions    The heart-healthy Mediterranean is a healthy eating plan based on typical foods and recipes of Mediterranean-style cooking. Here's how to adopt the Mediterranean diet.   If you're looking for a heart-healthy eating plan, the Mediterranean diet might be right for you. The Mediterranean diet incorporates the basics of healthy eating -- plus a splash of flavorful olive oil and perhaps even a glass of red wine -- among other components characterizing the traditional cooking style of countries bordering the [place].  Most healthy diets include fruits, vegetables, fish and whole grains, and limit unhealthy fats. While these parts of a healthy diet remain tried-and-true, subtle variations or differences in proportions of certain foods may make a difference in your risk of heart disease.  Research has shown that the traditional Mediterranean diet reduces the risk of heart disease. In fact, an analysis of more than 1.5 million healthy adults demonstrated that following a Mediterranean diet was associated with a reduced risk of death from heart disease and cancer, as well as a reduced incidence of Parkinson's and Alzheimer's diseases.   The Dietary Guidelines for Americans recommends the Mediterranean diet as an eating plan that can help promote health and prevent disease. And the Mediterranean diet is one your whole family can follow for good health.   The Mediterranean diet emphasizes:  Eating primarily plant-based foods, such as fruits and  "vegetables, whole grains, legumes and nuts   Replacing butter with healthy fats, such as olive oil   Using herbs and spices instead of salt to flavor foods   Limiting red meat to no more than a few times a month   Eating fish and poultry at least twice a week   Drinking red wine in moderation (optional)  The diet also recognizes the importance of being physically active, and enjoying meals with family and friends.  The Mediterranean diet traditionally includes fruits, vegetables and grains. For example, residents of [facility] average six or more servings a day of antioxidant-rich fruits and vegetables.  Grains in the Mediterranean region are typically whole grain and usually contain very few unhealthy trans fats, and bread is an important part of the diet. However, throughout the Mediterranean region, bread is eaten plain or dipped in olive oil -- not eaten with butter or margarine, which contains saturated or trans fats.   Nuts are another part of a healthy Mediterranean diet. Nuts are high in fat, but most of the fat is healthy. Because nuts are high in calories, they should not be eaten in large amounts -- generally no more than a handful a day. For the best nutrition, avoid candied or honey-roasted and heavily salted nuts.  The focus of the Mediterranean diet isn't on limiting total fat consumption, but rather on choosing healthier types of fat. The Mediterranean diet discourages saturated fats and hydrogenated oils (trans fats), both of which contribute to heart disease.  The Mediterranean diet features olive oil as the primary source of fat. Olive oil is mainly monounsaturated fat -- a type of fat that can help reduce low-density lipoprotein (LDL) cholesterol levels when used in place of saturated or trans fats. \"Extra-virgin\" and \"virgin\" olive oils (the least processed forms) also contain the highest levels of protective plant compounds that provide antioxidant effects.  Canola oil and some nuts contain the " beneficial linolenic acid (a type of omega-3 fatty acid) in addition to healthy unsaturated fat. Omega-3 fatty acids lower triglycerides, decrease blood clotting, and are associated with decreased incidence of sudden heart attacks, improve the health of your blood vessels, and help moderate blood pressure. Fatty fish -- such as mackerel, lake trout, herring, sardines, albacore tuna and salmon -- are rich sources of omega-3 fatty acids. Fish is eaten on a regular basis in the Mediterranean diet.  The health effects of alcohol have been debated for many years, and some doctors are reluctant to encourage alcohol consumption because of the health consequences of excessive drinking. However, alcohol -- in moderation -- has been associated with a reduced risk of heart disease in some research studies.  The Mediterranean diet typically includes a moderate amount of wine, usually red wine. This means no more than 5 ounces (148 milliliters) of wine daily for women of all ages and men older than age 65 and no more than 10 ounces (296 milliliters) of wine daily for younger men. More than this may increase the risk of health problems, including increased risk of certain types of cancer.   If you're unable to limit your alcohol intake to the amounts defined above, if you have a personal or family history of alcohol abuse, or if you have heart or liver disease, refrain from drinking wine or any other alcohol.  The Mediterranean diet is a delicious and healthy way to eat. Many people who switch to this style of eating say they'll never eat any other way. Here are some specific steps to get you started:   Eat your veggies and fruits -- and switch to whole grains. Avariety of plant foods should make up the majority of your meals. They should be minimally processed -- fresh and whole are best. Include veggies and fruits in every meal and eat them for snacks as well. Switch to whole-grain bread and cereal, and begin to eat more  whole-grain rice and pasta products. Keep baby carrots, apples and bananas on hand for quick, satisfying snacks. Fruit salads are a wonderful way to eat a variety of healthy fruit.   Go nuts. Nuts and seeds are good sources of fiber, protein and healthy fats. Keep almonds, cashews, pistachios and walnuts on hand for a quick snack. Choose natural peanut butter, rather than the kind with hydrogenated fat added. Try blended sesame seeds (tahini) as a dip or spread for bread.   Pass on the butter. Try olive or canola oil as a healthy replacement for butter or margarine. Lightly drizzle it over vegetables. After cooking pasta, add a touch of olive oil, some garlic and green onions for flavoring. Dip bread in flavored olive oil or lightly spread it on whole-grain bread for a tasty alternative to butter. Try tahini as a dip or spread for bread too.   Spice it up. Herbs and spices make food tasty and can  for salt and fat in recipes.   Go fish. Eat fish at least twice a week. Fresh or water-packed tuna, salmon, trout, mackerel and herring are healthy choices. [place], bake or broil fish for great taste and easy cleanup. Avoid breaded and fried fish.   Rein in the red meat. Limit red meat to no more than a few times a month. Substitute fish and poultry for red meat. When choosing red meat, make sure it's lean and keep portions small (about the size of a deck of cards). Also avoid sausage, guardado and other high-fat, processed meats.   Choose low-fat dairy. Limit higher fat dairy products, such as whole or 2 percent milk, cheese and ice cream. Switch to skim milk, fat-free yogurt and low-fat cheese               Follow-ups after your visit        Who to contact     If you have questions or need follow up information about today's clinic visit or your schedule please contact [facility] directly at [phone].  Normal or non-critical lab and imaging results will be communicated to you by Lev  "letter or phone within 4 business days after the clinic has received the results. If you do not hear from us within 7 days, please contact the clinic through [employer] or phone. If you have a critical or abnormal lab result, we will notify you by phone as soon as possible.  Submit refill requests through [employer] or call your pharmacy and they will forward the refill request to us. Please allow 3 business days for your refill to be completed.          Additional Information About Your Visit        [employer] Information     [employer] lets you send messages to your doctor, view your test results, renew your prescriptions, schedule appointments and more. To sign up, go to [email or web address] . Click on \"Log in\" on the left side of the screen, which will take you to the Welcome page. Then click on \"Sign up Now\" on the right side of the page.     You will be asked to enter the access code listed below, as well as some personal information. Please follow the directions to create your username and password.     Your access code is: [email or web address]  Expires: 2018 10:09 AM     Your access code will  in 90 days. If you need help or a new code, please call your [place] [facility] clinic or [phone].        Care EveryWhere ID     This is your Care EveryWhere ID. This could be used by other organizations to access your [facility] medical records  [id number]        Your Vitals Were     Pulse Temperature Pulse Oximetry BMI (Body Mass Index)          65 98.1  F (36.7  C) (Oral) 98% 29.87 kg/m2         Blood Pressure from Last 3 Encounters:   18 130/82   18 (!) 132/94   17 130/90    Weight from Last 3 Encounters:   18 208 lb 3.2 oz (94.4 kg)   18 207 lb (93.9 kg)   17 216 lb (98 kg)              Today, you had the following     No orders found for display         Today's Medication Changes          These changes are accurate as of 18  2:27 PM.  If you have any questions, ask your nurse or " doctor.               These medicines have changed or have updated prescriptions.        Dose/Directions    metoprolol tartrate 25 MG tablet   Commonly known as:  LOPRESSOR   This may have changed:  See the new instructions.   Used for:  Essential hypertension   Changed by:  Rohit Solitario MD        Dose:  25 mg   Take 1 tablet (25 mg) by mouth 2 times daily   Quantity:  60 tablet   Refills:  5       naltrexone 50 MG tablet   Commonly known as:  DEPADE;REVIA   This may have changed:  See the new instructions.   Used for:  Uncomplicated alcohol dependence (H)   Changed by:  Rohit Solitario MD        Dose:  50 mg   Take 1 tablet (50 mg) by mouth daily   Quantity:  30 tablet   Refills:  5         Stop taking these medicines if you haven't already. Please contact your care team if you have questions.     HYDROcodone-acetaminophen 5-325 MG per tablet   Commonly known as:  NORCO   Stopped by:  Rohit Solitario MD                Where to get your medicines      These medications [facility] to [facility] [id number] [facility] [address] [address] [facility]  [address] [facility] [address]     Phone:  [phone]     metoprolol tartrate 25 MG tablet    naltrexone 50 MG tablet                Primary Care Provider Office Phone # Fax #    Rohit Solitario [phone]       [address] [address]        Equal Access to Services     ARLENE CORLEY : Hadii soraida mesa hadasho Soomaali, waaxda luqadaha, qaybta kaalmada adeegyada, trung hitchcock haymario hilario . So [facility] [phone].    ATENCIÓN: Si habla español, tiene a lim disposición servicios gratuitos de asistencia lingüística. Llame al [phone].    We comply with applicable federal civil rights laws and [place] laws. We do not discriminate on the basis of race, color, national origin, age, disability, sex, sexual orientation, or gender identity.            Thank you!     Thank you for choosing [place]  [facility]  for your care. Our goal is always to provide you with excellent care. Hearing back from our patients is one way we can continue to improve our services. Please take a few minutes to complete the written survey that you may receive in the mail after your visit with us. Thank you!             Your Updated Medication List - Protect others around you: Learn how to safely use, store and throw away your medicines at www.disposemymeds.org.          This list is accurate as of 4/20/18  2:27 PM.  Always use your most recent med list.                   Brand Name Dispense Instructions for use Diagnosis    citalopram 40 MG tablet    celeXA    45 tablet    take one half tablet (20 mg) by mouth once a day    Generalized anxiety disorder       metoprolol tartrate 25 MG tablet    LOPRESSOR    60 tablet    Take 1 tablet (25 mg) by mouth 2 times daily    Essential hypertension       naltrexone 50 MG tablet    DEPADE;REVIA    30 tablet    Take 1 tablet (50 mg) by mouth daily    Uncomplicated alcohol dependence (H)

## 2018-04-20 NOTE — PROGRESS NOTES
SUBJECTIVE:   Durga Aguirre is a 58 year old male who presents to clinic today for the following health issues:    Patient is here to review his recent labs.  He was in approximately 1 month ago had lipid panel done which revealed a significant triggers her level of over 1000.  That time he did admit to some binge eating and alcohol drinking the day before.  Since that time is cut this back though is still consuming some alcohol despite our recommendations that he not do so.  He also is exercising more often    Problem list and histories reviewed & adjusted, as indicated.  Additional history: as documented    Labs reviewed in EPIC    Reviewed and updated as needed this visit by clinical staff  Tobacco  Allergies  Meds       Reviewed and updated as needed this visit by Provider         ROS:  Constitutional, HEENT, cardiovascular, pulmonary, gi and gu systems are negative, except as otherwise noted.    OBJECTIVE:                                                    /82  Pulse 65  Temp 98.1  F (36.7  C) (Oral)  Wt 208 lb 3.2 oz (94.4 kg)  SpO2 98%  BMI 29.87 kg/m2  Body mass index is 29.87 kg/(m^2).  GENERAL APPEARANCE: alert, no distress and Nourishment overweight   RESP: lungs clear to auscultation - no rales, rhonchi or wheezes  CV: regular rates and rhythm, normal S1 S2, no S3 or S4 and no murmur, click or rub    Diagnostic test results:  Results for orders placed or performed in visit on 04/18/18   Lipid panel reflex to direct LDL Fasting   Result Value Ref Range    Cholesterol 187 <200 mg/dL    Triglycerides 218 (H) <150 mg/dL    HDL Cholesterol 36 (L) >39 mg/dL    LDL Cholesterol Calculated 107 (H) <100 mg/dL    Non HDL Cholesterol 151 (H) <130 mg/dL   Hemoglobin A1c   Result Value Ref Range    Hemoglobin A1C 5.4 0 - 5.6 %   Hepatic panel (Albumin, ALT, AST, Bili, Alk Phos, TP)   Result Value Ref Range    Bilirubin Direct 0.1 0.0 - 0.2 mg/dL    Bilirubin Total 0.5 0.2 - 1.3 mg/dL    Albumin 3.7  3.4 - 5.0 g/dL    Protein Total 6.9 6.8 - 8.8 g/dL    Alkaline Phosphatase 84 40 - 150 U/L    ALT 33 0 - 70 U/L    AST 22 0 - 45 U/L        ASSESSMENT/PLAN:                                                    1. Hypertriglyceridemia  Triglyceride level is much better likely due to his improved diet and reduction in alcohol use.  At this time is no indication for the use of medication for the triglycerides.    2. Uncomplicated alcohol dependence (H)  Needs to cut back further if not completely  - naltrexone (DEPADE;REVIA) 50 MG tablet; Take 1 tablet (50 mg) by mouth daily  Dispense: 30 tablet; Refill: 5    3. Essential hypertension  Could be a bit better continue metoprolol  - metoprolol tartrate (LOPRESSOR) 25 MG tablet; Take 1 tablet (25 mg) by mouth 2 times daily  Dispense: 60 tablet; Refill: 5    4. Generalized anxiety disorder  Continue citalopram      Rohit Solitario MD  [facility]    The 10-year ASCVD risk score (Madison RON Jr, et al., 2013) is: 12.2%    Values used to calculate the score:      Age: 58 years      Sex: Male      Is Non- : No      Diabetic: No      Tobacco smoker: No      Systolic Blood Pressure: 130 mmHg      Is BP treated: Yes      HDL Cholesterol: 36 mg/dL      Total Cholesterol: 187 mg/dL

## 2018-04-21 NOTE — TELEPHONE ENCOUNTER
"Requested Prescriptions   Pending Prescriptions Disp Refills     simvastatin (ZOCOR) 40 MG tablet [Pharmacy Med Name: Simvastatin Oral Tablet 40 MG]  Last Written Prescription Date:  03/19/2018-04/04/2018  Last Fill Quantity: 30 tablet,  # refills: 0   Last Office Visit: 4/20/2018   Future Office Visit:    30 tablet 0     Sig: TAKE ONE TABLET BY MOUTH AT BEDTIME    Statins Protocol Passed    4/20/2018  8:38 PM       Passed - LDL on file in past 12 months    Recent Labs   Lab Test  04/18/18   0809   LDL  107*            Passed - No abnormal creatine kinase in past 12 months    No lab results found.            Passed - Recent (12 mo) or future (30 days) visit within the authorizing provider's specialty    Patient had office visit in the last 12 months or has a visit in the next 30 days with authorizing provider or within the authorizing provider's specialty.  See \"Patient Info\" tab in inbasket, or \"Choose Columns\" in Meds & Orders section of the refill encounter.           Passed - Patient is age 18 or older          "

## 2018-04-23 RX ORDER — SIMVASTATIN 40 MG
TABLET ORAL
Qty: 30 TABLET | Refills: 5 | Status: SHIPPED | OUTPATIENT
Start: 2018-04-23 | End: 2018-11-30

## 2018-04-23 NOTE — TELEPHONE ENCOUNTER
Routing refill request to provider for review/approval because:  Labs out of range:  Lipids   Drug not active on patient's medication list

## 2018-06-30 ENCOUNTER — RADIANT APPOINTMENT (OUTPATIENT)
Dept: GENERAL RADIOLOGY | Facility: CLINIC | Age: 58
End: 2018-06-30
Attending: FAMILY MEDICINE
Payer: COMMERCIAL

## 2018-06-30 ENCOUNTER — OFFICE VISIT (OUTPATIENT)
Dept: URGENT CARE | Facility: URGENT CARE | Age: 58
End: 2018-06-30
Payer: COMMERCIAL

## 2018-06-30 VITALS
SYSTOLIC BLOOD PRESSURE: 120 MMHG | BODY MASS INDEX: 30.99 KG/M2 | DIASTOLIC BLOOD PRESSURE: 80 MMHG | WEIGHT: 216 LBS | RESPIRATION RATE: 18 BRPM | HEART RATE: 55 BPM | TEMPERATURE: 97.8 F

## 2018-06-30 DIAGNOSIS — S61.411A LACERATION OF RIGHT HAND WITHOUT FOREIGN BODY, INITIAL ENCOUNTER: Primary | ICD-10-CM

## 2018-06-30 DIAGNOSIS — S61.411A LACERATION OF RIGHT HAND WITHOUT FOREIGN BODY, INITIAL ENCOUNTER: ICD-10-CM

## 2018-06-30 PROCEDURE — 12002 RPR S/N/AX/GEN/TRNK2.6-7.5CM: CPT | Performed by: FAMILY MEDICINE

## 2018-06-30 PROCEDURE — 99214 OFFICE O/P EST MOD 30 MIN: CPT | Mod: 25 | Performed by: FAMILY MEDICINE

## 2018-06-30 PROCEDURE — 73140 X-RAY EXAM OF FINGER(S): CPT | Mod: RT

## 2018-06-30 RX ORDER — CEPHALEXIN 500 MG/1
500 CAPSULE ORAL 3 TIMES DAILY
Qty: 30 CAPSULE | Refills: 0 | Status: SHIPPED | OUTPATIENT
Start: 2018-06-30 | End: 2018-12-05

## 2018-06-30 RX ORDER — HYDROCODONE BITARTRATE AND ACETAMINOPHEN 5; 325 MG/1; MG/1
1 TABLET ORAL EVERY 4 HOURS PRN
Qty: 18 TABLET | Refills: 0 | Status: SHIPPED | OUTPATIENT
Start: 2018-06-30 | End: 2018-12-05

## 2018-06-30 NOTE — PROGRESS NOTES
SUBJECTIVE:     Chief Complaint   Patient presents with     Laceration     Pt states right hand laceration with       Durga Aguirre is a 58 year old male who presents to the clinic with a laceration on the right thumb sustained 1 hour(s) ago.  This is a accidental injury.    Mechanism of injury: .    Associated symptoms: Denies numbness, weakness, or loss of function  Last tetanus booster within 10 years: yes    EXAM:   The patient appears today in alert and in mild distress distress  VITALS: /80  Pulse 55  Temp 97.8  F (36.6  C) (Oral)  Resp 18  Wt 216 lb (98 kg)  BMI 30.99 kg/m2    Size of laceration: 3 centimeters  Characteristics of the laceration: bleeding- moderate, jagged and semi-circular  Tendon function intact: yes  Sensation to light touch intact: yes  Pulses intact: yes  Picture included in patient's chart: no    Assessment:  Laceration of right hand without foreign body, initial encounter    PLAN:  PROCEDURE NOTE:  I have obtained xrays to assess for bony involvement given depth and type of injury.   I have reviewed the xrays personally and dont see a bony injury.  There is a small bone fragment seen near the IP joint but no definite donor site.    Wound was locally injected with 10 cc's of Lidocaine 1% plain  Prepped and draped in the usual sterile fashion  Wound irrigated  Laceration was closed using 7 4-0 nylon interrupted sutures  After care instructions:  Keep wound clean and dry for the next 24-48 hours  Sutures out in 10 days  But will have see ortho prior as well to assess function, ROM, OT need

## 2018-06-30 NOTE — MR AVS SNAPSHOT
After Visit Summary   6/30/2018    Durga Aguirre    MRN: [id number]           Patient Information     Date Of Birth          1960        Visit Information        Provider Department      6/30/2018 2:10 PM Dain Berkowitz MD [facility]        Today's Diagnoses     Laceration of right hand without foreign body, initial encounter    -  1       Follow-ups after your visit        Additional Services     ORTHO  REFERRAL       [facility] is referring you to the Orthopedic  Services at [facility].       The  Representative will assist you in the coordination of your Orthopedic and Musculoskeletal Care as prescribed by your physician.    The  Representative will call you within 1 business day to help schedule your appointment, or you may contact the  Representative at:    All areas ~ [phone]     Type of Referral : Surgical / Specialist       Timeframe requested: 1 - 2 days    Coverage of these services is subject to the terms and limitations of your health insurance plan.  Please call member services at your health plan with any benefit or coverage questions.      If X-rays, CT or MRI's have been performed, please contact the facility where they were done to arrange for , prior to your scheduled appointment.  Please bring this referral request to your appointment and present it to your specialist.                  Who to contact     If you have questions or need follow up information about today's clinic visit or your schedule please contact [facility] directly at [phone].  Normal or non-critical lab and imaging results will be communicated to you by MyChart, letter or phone within 4 business days after the clinic has received the results. If you do not hear from us within 7 days, please contact the clinic through MyChart or phone. If you have a  critical or abnormal lab result, we will notify you by phone as soon as possible.  Submit refill requests through [employer] or call your pharmacy and they will forward the refill request to us. Please allow 3 business days for your refill to be completed.          Additional Information About Your Visit        Care EveryWhere ID     This is your Care EveryWhere ID. This could be used by other organizations to access your [place] medical records  [id number]        Your Vitals Were     Pulse Temperature Respirations BMI (Body Mass Index)          55 97.8  F (36.6  C) (Oral) 18 30.99 kg/m2         Blood Pressure from Last 3 Encounters:   06/30/18 120/80   04/20/18 130/82   03/30/18 (!) 132/94    Weight from Last 3 Encounters:   06/30/18 216 lb (98 kg)   04/20/18 208 lb 3.2 oz (94.4 kg)   03/30/18 207 lb (93.9 kg)              We Performed the Following     ORTHO  REFERRAL          Today's Medication Changes          These changes are accurate as of 6/30/18  3:29 PM.  If you have any questions, ask your nurse or doctor.               Start taking these medicines.        Dose/Directions    cephALEXin 500 MG capsule   Commonly known as:  KEFLEX   Used for:  Laceration of right hand without foreign body, initial encounter   Started by:  Dain Berkowitz MD        Dose:  500 mg   Take 1 capsule (500 mg) by mouth 3 times daily   Quantity:  30 capsule   Refills:  0       HYDROcodone-acetaminophen 5-325 MG per tablet   Commonly known as:  NORCO   Used for:  Laceration of right hand without foreign body, initial encounter   Started by:  Dain Berkowitz MD        Dose:  1 tablet   Take 1 tablet by mouth every 4 hours as needed for pain   Quantity:  18 tablet   Refills:  0         Stop taking these medicines if you haven't already. Please contact your care team if you have questions.     naltrexone 50 MG tablet   Commonly known as:  DEPADE;REVIA   Stopped by:  Dain Berkowitz MD                Where to get your  medicines      These medications were sent to [id number] [address]  [address] [facility] [address]     Phone:  [phone]     cephALEXin 500 MG capsule         Some of these will need a paper prescription and others can be bought over the counter.  Ask your nurse if you have questions.     Bring a paper prescription for each of these medications     HYDROcodone-acetaminophen 5-325 MG per tablet               Information about OPIOIDS     PRESCRIPTION OPIOIDS: WHAT YOU NEED TO KNOW   We gave you an opioid (narcotic) pain medicine. It is important to manage your pain, but opioids are not always the best choice. You should first try all the other options your care team gave you. Take this medicine for as short a time (and as few doses) as possible.     These medicines have risks:    DO NOT drive when on new or higher doses of pain medicine. These medicines can affect your alertness and reaction times, and you could be arrested for driving under the influence (DUI). If you need to use opioids long-term, talk to your care team about driving.    DO NOT operate heave machinery    DO NOT do any other dangerous activities while taking these medicines.     DO NOT drink any alcohol while taking these medicines.      If the opioid prescribed includes acetaminophen, DO NOT take with any other medicines that contain acetaminophen. Read all labels carefully. Look for the word  acetaminophen  or  Tylenol.  Ask your pharmacist if you have questions or are unsure.    You can get addicted to pain medicines, especially if you have a history of addiction (chemical, alcohol or substance dependence). Talk to your care team about ways to reduce this risk.    Store your pills in a secure place, locked if possible. We will not replace any lost or stolen medicine. If you don t finish your medicine, please throw away (dispose) as directed by your pharmacist. The [facility]  Control Agency has more information about safe disposal: https://www.pca.[facility].[place].us/living-green/managing-unwanted-medications.     All opioids tend to cause constipation. Drink plenty of water and eat foods that have a lot of fiber, such as fruits, vegetables, prune juice, apple juice and high-fiber cereal. Take a laxative (Miralax, milk of magnesia, Colace, Senna) if you don t move your bowels at least every other day.          Primary Care Provider Office Phone # Fax #    Rohit Solitario [phone]       [address] [facility] [address]        Equal Access to Services     ARLENE CORLEY : Hadii soraida martinez Sopriyank, waaxda chirag, qaybta kaalmada ademanju, trung solano. So [facility] [phone].    ATENCIÓN: Si habla español, tiene a lim disposición servicios gratuitos de asistencia lingüística. Llame al [phone].    We comply with applicable federal civil rights laws and [place] laws. We do not discriminate on the basis of race, color, national origin, age, disability, sex, sexual orientation, or gender identity.            Thank you!     Thank you for choosing [facility]  for your care. Our goal is always to provide you with excellent care. Hearing back from our patients is one way we can continue to improve our services. Please take a few minutes to complete the written survey that you may receive in the mail after your visit with us. Thank you!             Your Updated Medication List - Protect others around you: Learn how to safely use, store and throw away your medicines at www.disposemymeds.org.          This list is accurate as of 6/30/18  3:29 PM.  Always use your most recent med list.                   Brand Name Dispense Instructions for use Diagnosis    cephALEXin 500 MG capsule    KEFLEX    30 capsule    Take 1 capsule (500 mg) by mouth 3 times daily    Laceration of right hand without foreign body, initial  encounter       citalopram 40 MG tablet    celeXA    45 tablet    take one half tablet (20 mg) by mouth once a day    Generalized anxiety disorder       HYDROcodone-acetaminophen 5-325 MG per tablet    NORCO    18 tablet    Take 1 tablet by mouth every 4 hours as needed for pain    Laceration of right hand without foreign body, initial encounter       metoprolol tartrate 25 MG tablet    LOPRESSOR    60 tablet    Take 1 tablet (25 mg) by mouth 2 times daily    Essential hypertension       simvastatin 40 MG tablet    ZOCOR    30 tablet    TAKE ONE TABLET BY MOUTH AT BEDTIME    Risk for coronary artery disease between 10% and 20% in next 10 years, Hypertriglyceridemia

## 2018-07-02 ENCOUNTER — OFFICE VISIT (OUTPATIENT)
Dept: ORTHOPEDICS | Facility: CLINIC | Age: 58
End: 2018-07-02
Payer: COMMERCIAL

## 2018-07-02 VITALS — SYSTOLIC BLOOD PRESSURE: 122 MMHG | BODY MASS INDEX: 30.99 KG/M2 | DIASTOLIC BLOOD PRESSURE: 78 MMHG | WEIGHT: 216 LBS

## 2018-07-02 DIAGNOSIS — S61.411A LACERATION OF RIGHT HAND WITHOUT FOREIGN BODY, INITIAL ENCOUNTER: Primary | ICD-10-CM

## 2018-07-02 PROCEDURE — 99203 OFFICE O/P NEW LOW 30 MIN: CPT | Performed by: ORTHOPAEDIC SURGERY

## 2018-07-02 NOTE — LETTER
7/2/2018         RE: Durga Aguirre  [address]  [place] [address]        Dear Colleague,    Thank you for referring your patient, Durga Aguirre, to the [facility] ORTHOPEDIC SURGERY. Please see a copy of my visit note below.    HISTORY OF PRESENT ILLNESS:    Durga Aguirre is a 58 year old male who is seen in consultation at the request of Dr. Berkowitz for right hand laceration. DOI 6/30/18.  Patient stated he was running a hand held  helping install a window and the blade jumped back and went over his right thumb.  Patient went to  on 6/30/18 and had wound cleaned, stitched, and x-rayed. Patient is right hand dominant and works at the [employer] part-time.     Present symptoms: patient states no pain at this time (patient took pain medication right before appointment), patient states he is able to move the thumb. Denies numbness or tingling in digit.  Wound appeared clean.   Treatments tried to this point: stitches and wound care, splint, Rozet  Orthopedic PMH: right forearm fracture    Past Medical History:   Diagnosis Date     Alcohol abuse- remission 2/18/2013     Other spontaneous pneumothorax 1997       Past Surgical History:   Procedure Laterality Date     HC EXCISION PARTIAL TALUS OR CALCANEUS, BONE      fx calcaneus- 9 screws in foot       Family History   Problem Relation Age of Onset     Lymphoma Father        Social History     Social History     Marital status: Single     Spouse name: N/A     Number of children: 1     Years of education: N/A     Occupational History     unemployed Self     contractor     auto body work      Social History Main Topics     Smoking status: Light Tobacco Smoker     Years: 20.00     Types: Cigarettes     Last attempt to quit: 10/9/2017     Smokeless tobacco: Never Used      Comment: 2-3 cigarettes per day      Alcohol use No      Comment: restarted may 2016     Drug use: No     Sexual activity: Not Currently     Other Topics Concern      Not on file     Social History Narrative       Current Outpatient Prescriptions   Medication Sig Dispense Refill     cephALEXin (KEFLEX) 500 MG capsule Take 1 capsule (500 mg) by mouth 3 times daily 30 capsule 0     citalopram (CELEXA) 40 MG tablet take one half tablet (20 mg) by mouth once a day 45 tablet 3     HYDROcodone-acetaminophen (NORCO) 5-325 MG per tablet Take 1 tablet by mouth every 4 hours as needed for pain 18 tablet 0     metoprolol tartrate (LOPRESSOR) 25 MG tablet Take 1 tablet (25 mg) by mouth 2 times daily 60 tablet 5     order for DME Equipment being ordered: forearm splint 1 each 0     simvastatin (ZOCOR) 40 MG tablet TAKE ONE TABLET BY MOUTH AT BEDTIME 30 tablet 5       No Known Allergies    REVIEW OF SYSTEMS:  CONSTITUTIONAL:  NEGATIVE for fever, chills, change in weight  INTEGUMENTARY/SKIN:  NEGATIVE for worrisome rashes, moles or lesions  EYES:  NEGATIVE for vision changes or irritation  ENT/MOUTH:  NEGATIVE for ear, mouth and throat problems  RESP:  NEGATIVE for significant cough or SOB  BREAST:  NEGATIVE for masses, tenderness or discharge  CV:  Hypertension, NEGATIVE for chest pain, palpitations or peripheral edema  GI:  NEGATIVE for nausea, abdominal pain, heartburn, or change in bowel habits  :  Negative   MUSCULOSKELETAL:  See HPI above  NEURO:  NEGATIVE for weakness, dizziness or paresthesias  ENDOCRINE:  NEGATIVE for temperature intolerance, skin/hair changes  HEME/ALLERGY/IMMUNE:  NEGATIVE for bleeding problems  PSYCHIATRIC:  NEGATIVE for changes in mood or affect      PHYSICAL EXAM:  /78 (BP Location: Right arm, Patient Position: Chair, Cuff Size: Adult Large)  Wt 216 lb (98 kg)  BMI 30.99 kg/m2  Body mass index is 30.99 kg/(m^2).   GENERAL APPEARANCE: healthy, alert and no distress   HEENT: No apparent thyroid megaly. Clear sclera with normal ocular movement  RESPIRATORY: No labored breathing  SKIN: no suspicious lesions or rashes  NEURO: Normal strength and tone,  mentation intact and speech normal  VASCULAR: Good pulses, and capillary refill   LYMPH: no lymphadenopathy   PSYCH:  mentation appears normal and affect normal/bright    MUSCULOSKELETAL:  An oblique laceration on the ulnar side of the thumb near the IP joint  No drainage is noted  No surrounding erythema is noted  Sensation is intact distal to the incision in all directions  Independent flexion at the MCP joint as well as IP joint of the thumb is present  Full extension of the thumb is present     ASSESSMENT:  Thumb laceration, right    PLAN:  Obviously because of the fact that the laceration is caused by relatively blunt object (not a sharp knife), he may have some defect at the laceration site that may need to heal with secondary intention.  However for the time being we will see how he does with primary repair first.  For that reason, we will not start him on soaking the hand.  Continue with antibiotics.  Clean dressing on a daily basis  Follow-up in 1 week for suture removal      Imaging Interpretation:     FINGER RIGHT TWO OR MORE VIEWS   6/30/2018 2:54 PM      HISTORY: Laceration of right hand without foreign body, initial  encounter.     COMPARISON: None.         IMPRESSION: There is a nonspecific well-corticated osseous density  adjacent to the proximal phalanx of the thumb which appears to be  physiologic ossification or calcification. No radiopaque foreign  bodies are present. Minor degenerative changes noted in the DIP joint.  No evidence for fracture.     MD Gasper ALVES MD  Department of Orthopedic Surgery        Disclaimer: This note consists of symbols derived from keyboarding, dictation and/or voice recognition software. As a result, there may be errors in the script that have gone undetected. Please consider this when interpreting information found in this chart.      Again, thank you for allowing me to participate in the care of your patient.        Sincerely,        Gasper Hussein,  MD

## 2018-07-02 NOTE — MR AVS SNAPSHOT
After Visit Summary   7/2/2018    Durga Aguirre    MRN: [id number]           Patient Information     Date Of Birth          1960        Visit Information        Provider Department      7/2/2018 2:20 PM Gasper Hussein MD [facility]        Today's Diagnoses     Laceration of right hand without foreign body, initial encounter    -  1       Follow-ups after your visit        Who to contact     If you have questions or need follow up information about today's clinic visit or your schedule please contact [facility] directly at [phone].  Normal or non-critical lab and imaging results will be communicated to you by MyChart, letter or phone within 4 business days after the clinic has received the results. If you do not hear from us within 7 days, please contact the clinic through MyChart or phone. If you have a critical or abnormal lab result, we will notify you by phone as soon as possible.  Submit refill requests through [employer] or call your pharmacy and they will forward the refill request to us. Please allow 3 business days for your refill to be completed.          Additional Information About Your Visit        Care EveryWhere ID     This is your Care EveryWhere ID. This could be used by other organizations to access your [place] medical records  [id number]        Your Vitals Were     BMI (Body Mass Index)                   30.99 kg/m2            Blood Pressure from Last 3 Encounters:   07/02/18 122/78   06/30/18 120/80   04/20/18 130/82    Weight from Last 3 Encounters:   07/02/18 216 lb (98 kg)   06/30/18 216 lb (98 kg)   04/20/18 208 lb 3.2 oz (94.4 kg)              Today, you had the following     No orders found for display       Primary Care Provider Office Phone # Fax #    Rohit Solitario [phone]       [address] [place] [address]        Equal Access to Services     ARLENE CORLEY : Kevon martinez  Sopriyank, wadavidada luqadaha, qaybta kaalharsh jerez, trung chan elvinanita eldermarty xiomara. So [facility] [phone].    ATENCIÓN: Si santiago martínez, tiene a lim disposición servicios gratuitos de asistencia lingüística. Canelo al [phone].    We comply with applicable federal civil rights laws and [place] laws. We do not discriminate on the basis of race, color, national origin, age, disability, sex, sexual orientation, or gender identity.            Thank you!     Thank you for choosing [facility] ORTHOPEDIC SURGERY  for your care. Our goal is always to provide you with excellent care. Hearing back from our patients is one way we can continue to improve our services. Please take a few minutes to complete the written survey that you may receive in the mail after your visit with us. Thank you!             Your Updated Medication List - Protect others around you: Learn how to safely use, store and throw away your medicines at www.disposemymeds.org.          This list is accurate as of 7/2/18  3:32 PM.  Always use your most recent med list.                   Brand Name Dispense Instructions for use Diagnosis    cephALEXin 500 MG capsule    KEFLEX    30 capsule    Take 1 capsule (500 mg) by mouth 3 times daily    Laceration of right hand without foreign body, initial encounter       citalopram 40 MG tablet    celeXA    45 tablet    take one half tablet (20 mg) by mouth once a day    Generalized anxiety disorder       HYDROcodone-acetaminophen 5-325 MG per tablet    NORCO    18 tablet    Take 1 tablet by mouth every 4 hours as needed for pain    Laceration of right hand without foreign body, initial encounter       metoprolol tartrate 25 MG tablet    LOPRESSOR    60 tablet    Take 1 tablet (25 mg) by mouth 2 times daily    Essential hypertension       order for DME     1 each    Equipment being ordered: forearm splint    Laceration of right hand without foreign body, initial encounter       simvastatin 40 MG  tablet    ZOCOR    30 tablet    TAKE ONE TABLET BY MOUTH AT BEDTIME    Risk for coronary artery disease between 10% and 20% in next 10 years, Hypertriglyceridemia

## 2018-07-02 NOTE — PROGRESS NOTES
HISTORY OF PRESENT ILLNESS:    Durga Aguirre is a 58 year old male who is seen in consultation at the request of Dr. Berkowitz for right hand laceration. DOI 6/30/18.  Patient stated he was running a hand held  helping install a window and the blade jumped back and went over his right thumb.  Patient went to  on 6/30/18 and had wound cleaned, stitched, and x-rayed. Patient is right hand dominant and works at the [employer] part-time.     Present symptoms: patient states no pain at this time (patient took pain medication right before appointment), patient states he is able to move the thumb. Denies numbness or tingling in digit.  Wound appeared clean.   Treatments tried to this point: stitches and wound care, splint, Sallis  Orthopedic PMH: right forearm fracture    Past Medical History:   Diagnosis Date     Alcohol abuse- remission 2/18/2013     Other spontaneous pneumothorax 1997       Past Surgical History:   Procedure Laterality Date     HC EXCISION PARTIAL TALUS OR CALCANEUS, BONE      fx calcaneus- 9 screws in foot       Family History   Problem Relation Age of Onset     Lymphoma Father        Social History     Social History     Marital status: Single     Spouse name: N/A     Number of children: 1     Years of education: N/A     Occupational History     unemployed Self     contractor     auto body work      Social History Main Topics     Smoking status: Light Tobacco Smoker     Years: 20.00     Types: Cigarettes     Last attempt to quit: 10/9/2017     Smokeless tobacco: Never Used      Comment: 2-3 cigarettes per day      Alcohol use No      Comment: restarted may 2016     Drug use: No     Sexual activity: Not Currently     Other Topics Concern     Not on file     Social History Narrative       Current Outpatient Prescriptions   Medication Sig Dispense Refill     cephALEXin (KEFLEX) 500 MG capsule Take 1 capsule (500 mg) by mouth 3 times daily 30 capsule 0     citalopram (CELEXA) 40 MG tablet  take one half tablet (20 mg) by mouth once a day 45 tablet 3     HYDROcodone-acetaminophen (NORCO) 5-325 MG per tablet Take 1 tablet by mouth every 4 hours as needed for pain 18 tablet 0     metoprolol tartrate (LOPRESSOR) 25 MG tablet Take 1 tablet (25 mg) by mouth 2 times daily 60 tablet 5     order for DME Equipment being ordered: forearm splint 1 each 0     simvastatin (ZOCOR) 40 MG tablet TAKE ONE TABLET BY MOUTH AT BEDTIME 30 tablet 5       No Known Allergies    REVIEW OF SYSTEMS:  CONSTITUTIONAL:  NEGATIVE for fever, chills, change in weight  INTEGUMENTARY/SKIN:  NEGATIVE for worrisome rashes, moles or lesions  EYES:  NEGATIVE for vision changes or irritation  ENT/MOUTH:  NEGATIVE for ear, mouth and throat problems  RESP:  NEGATIVE for significant cough or SOB  BREAST:  NEGATIVE for masses, tenderness or discharge  CV:  Hypertension, NEGATIVE for chest pain, palpitations or peripheral edema  GI:  NEGATIVE for nausea, abdominal pain, heartburn, or change in bowel habits  :  Negative   MUSCULOSKELETAL:  See HPI above  NEURO:  NEGATIVE for weakness, dizziness or paresthesias  ENDOCRINE:  NEGATIVE for temperature intolerance, skin/hair changes  HEME/ALLERGY/IMMUNE:  NEGATIVE for bleeding problems  PSYCHIATRIC:  NEGATIVE for changes in mood or affect      PHYSICAL EXAM:  /78 (BP Location: Right arm, Patient Position: Chair, Cuff Size: Adult Large)  Wt 216 lb (98 kg)  BMI 30.99 kg/m2  Body mass index is 30.99 kg/(m^2).   GENERAL APPEARANCE: healthy, alert and no distress   HEENT: No apparent thyroid megaly. Clear sclera with normal ocular movement  RESPIRATORY: No labored breathing  SKIN: no suspicious lesions or rashes  NEURO: Normal strength and tone, mentation intact and speech normal  VASCULAR: Good pulses, and capillary refill   LYMPH: no lymphadenopathy   PSYCH:  mentation appears normal and affect normal/bright    MUSCULOSKELETAL:  An oblique laceration on the ulnar side of the thumb near the IP  joint  No drainage is noted  No surrounding erythema is noted  Sensation is intact distal to the incision in all directions  Independent flexion at the MCP joint as well as IP joint of the thumb is present  Full extension of the thumb is present     ASSESSMENT:  Thumb laceration, right    PLAN:  Obviously because of the fact that the laceration is caused by relatively blunt object (not a sharp knife), he may have some defect at the laceration site that may need to heal with secondary intention.  However for the time being we will see how he does with primary repair first.  For that reason, we will not start him on soaking the hand.  Continue with antibiotics.  Clean dressing on a daily basis  Follow-up in 1 week for suture removal      Imaging Interpretation:     FINGER RIGHT TWO OR MORE VIEWS   6/30/2018 2:54 PM      HISTORY: Laceration of right hand without foreign body, initial  encounter.     COMPARISON: None.         IMPRESSION: There is a nonspecific well-corticated osseous density  adjacent to the proximal phalanx of the thumb which appears to be  physiologic ossification or calcification. No radiopaque foreign  bodies are present. Minor degenerative changes noted in the DIP joint.  No evidence for fracture.     MD Gasper ALVES MD  Department of Orthopedic Surgery        Disclaimer: This note consists of symbols derived from keyboarding, dictation and/or voice recognition software. As a result, there may be errors in the script that have gone undetected. Please consider this when interpreting information found in this chart.

## 2018-09-23 ENCOUNTER — NURSE TRIAGE (OUTPATIENT)
Dept: NURSING | Facility: CLINIC | Age: 58
End: 2018-09-23

## 2018-09-23 ENCOUNTER — HOSPITAL ENCOUNTER (EMERGENCY)
Facility: CLINIC | Age: 58
Discharge: HOME | End: 2018-09-23
Attending: PHYSICIAN ASSISTANT | Admitting: PHYSICIAN ASSISTANT
Payer: COMMERCIAL

## 2018-09-23 VITALS
SYSTOLIC BLOOD PRESSURE: 147 MMHG | RESPIRATION RATE: 18 BRPM | HEIGHT: 71 IN | OXYGEN SATURATION: 95 % | TEMPERATURE: 98.9 F | DIASTOLIC BLOOD PRESSURE: 91 MMHG | BODY MASS INDEX: 30.55 KG/M2

## 2018-09-23 DIAGNOSIS — S00.252A: ICD-10-CM

## 2018-09-23 PROCEDURE — 99283 EMERGENCY DEPT VISIT LOW MDM: CPT

## 2018-09-23 PROCEDURE — 25000125 ZZHC RX 250: Performed by: PHYSICIAN ASSISTANT

## 2018-09-23 RX ORDER — POLYMYXIN B SULFATE AND TRIMETHOPRIM 1; 10000 MG/ML; [USP'U]/ML
1 SOLUTION OPHTHALMIC EVERY 4 HOURS
Qty: 3 ML | Refills: 0 | Status: SHIPPED | OUTPATIENT
Start: 2018-09-23 | End: 2018-09-30

## 2018-09-23 RX ORDER — PROPARACAINE HYDROCHLORIDE 5 MG/ML
1 SOLUTION/ DROPS OPHTHALMIC ONCE
Status: COMPLETED | OUTPATIENT
Start: 2018-09-23 | End: 2018-09-23

## 2018-09-23 RX ADMIN — PROPARACAINE HYDROCHLORIDE 1 DROP: 5 SOLUTION/ DROPS OPHTHALMIC at 15:35

## 2018-09-23 ASSESSMENT — ENCOUNTER SYMPTOMS: EYE PAIN: 1

## 2018-09-23 ASSESSMENT — VISUAL ACUITY
OS: 20/20
OD: 20/20

## 2018-09-23 NOTE — DISCHARGE INSTRUCTIONS
"*Follow up with opthalmology in 1-2 days if persistent eye pain.  *Use eyedrops as prescribed.  *I do not see a corneal abrasion on my exam today, though if the left eye pain he should follow-up promptly with ophthalmology or return to the emergency department.        Particle Removed from Eye    A particle got into your eye and stuck to the cornea (the clear part in the front of the eye). Your healthcare provider has removed this particle. The cornea is very sensitive and may still hurt for another 1 to 2 days while it heals.  If a metal particle was in your eye, a \"rust ring\" may have formed. This may require a second visit for complete removal.  Your healthcare provider may have put an eye patch on your eye. This may help the healing process. But you also may not receive an eye patch. For several types of injuries to the cornea, they are not recommended.  Home care    You may wear sunglasses to help reduce symptoms while the eye is healing, unless advised otherwise by your healthcare provider.    You may use acetaminophen or ibuprofen to control pain, unless another pain medicine was prescribed. (Note: If you have chronic liver or kidney disease, or if you have ever had a stomach ulcer or gastrointestinal bleeding, talk with your healthcare provider before using these medicines.)    If you received an eye patch:  ? It should not be left in place for more than 24 hours, unless advised otherwise by your healthcare provider.  ? Always keep your return appointment for patch removal and re-exam. Your eye could be harmed if the patch remains in place longer than advised.  ? Do not drive a motor vehicle or operate machinery with the patch in place. You will have trouble judging distances with only one eye.  ? If eye drops or ointment were prescribed, use as directed.  Follow-up care    No eye patch: If no patch was used, but the pain continues for more than 48 hours, you should have another eye exam.    Eye patch: If " your eye was patched and you were given a return appointment for patch removal and re-exam, do not miss the visit. Again, it could be harmful to your eye if the patch remains in place longer than advised. However, if you were asked to remove the eye patch within 24 hours (or as directed by your healthcare provider), contact your healthcare provider right away if your pain increases or get worse after removal of the eye patch.  When to seek medical advice  Call your healthcare provider right away if any of these occur:    Increasing eye pain or pain that does not improve after 24 hours    Discharge from the eye    Redness of the eye or swelling of the eyelids    Worsening vision  Date Last Reviewed: 2/24/2017 2000-2017 The [employer]. [address] [place] [address], [place] [address]. All rights reserved. This information is not intended as a substitute for professional medical care. Always follow your healthcare professional's instructions.

## 2018-09-23 NOTE — ED AVS SNAPSHOT
"  Emergency Department    [address] [facility] [address]    Phone:  [phone]    Fax:  [phone]                                       Durga Aguirre   MRN: [id number]    Department:   Emergency Department   Date of Visit:  9/23/2018           Patient Information     Date Of Birth          1960        Your diagnoses for this visit were:     Foreign body of eyelid, left        You were seen by Maribel Coronado PA-C.      Follow-up Information     Follow up with  Emergency Department.    Specialty:  EMERGENCY MEDICINE    Why:  As needed, If symptoms worsen    Contact information:    [address] [facility] [address] [phone]        Follow up with [facility] In 1 day.    Why:  As needed    Contact information:    [address] [facility]  [facility] [phone]        Discharge Instructions       *Follow up with opthalmology in 1-2 days if persistent eye pain.  *Use eyedrops as prescribed.  *I do not see a corneal abrasion on my exam today, though if the left eye pain he should follow-up promptly with ophthalmology or return to the emergency department.        Particle Removed from Eye    A particle got into your eye and stuck to the cornea (the clear part in the front of the eye). Your healthcare provider has removed this particle. The cornea is very sensitive and may still hurt for another 1 to 2 days while it heals.  If a metal particle was in your eye, a \"rust ring\" may have formed. This may require a second visit for complete removal.  Your healthcare provider may have put an eye patch on your eye. This may help the healing process. But you also may not receive an eye patch. For several types of injuries to the cornea, they are not recommended.  Home care    You may wear sunglasses to help reduce symptoms while the eye is healing, unless advised otherwise by your healthcare provider.    You may use acetaminophen " or ibuprofen to control pain, unless another pain medicine was prescribed. (Note: If you have chronic liver or kidney disease, or if you have ever had a stomach ulcer or gastrointestinal bleeding, talk with your healthcare provider before using these medicines.)    If you received an eye patch:  ? It should not be left in place for more than 24 hours, unless advised otherwise by your healthcare provider.  ? Always keep your return appointment for patch removal and re-exam. Your eye could be harmed if the patch remains in place longer than advised.  ? Do not drive a motor vehicle or operate machinery with the patch in place. You will have trouble judging distances with only one eye.  ? If eye drops or ointment were prescribed, use as directed.  Follow-up care    No eye patch: If no patch was used, but the pain continues for more than 48 hours, you should have another eye exam.    Eye patch: If your eye was patched and you were given a return appointment for patch removal and re-exam, do not miss the visit. Again, it could be harmful to your eye if the patch remains in place longer than advised. However, if you were asked to remove the eye patch within 24 hours (or as directed by your healthcare provider), contact your healthcare provider right away if your pain increases or get worse after removal of the eye patch.  When to seek medical advice  Call your healthcare provider right away if any of these occur:    Increasing eye pain or pain that does not improve after 24 hours    Discharge from the eye    Redness of the eye or swelling of the eyelids    Worsening vision  Date Last Reviewed: 2/24/2017 2000-2017 The [email or web address]. [address] [place] [address], [place] [id number]. All rights reserved. This information is not intended as a substitute for professional medical care. Always follow your healthcare professional's instructions.          Your next 10 appointments already scheduled     Sep 24, 2018  9:40 AM CDT    Office Visit with Rohit Solitario MD   [facility]    [address] [address] [phone]           Bring a current list of meds and any records pertaining to this visit. For Physicals, please bring immunization records and any forms needing to be filled out. Please arrive 10 minutes early to complete paperwork.              24 Hour Appointment Hotline       To make an appointment at any [facility], call [phone] ([phone]). If you don't have a family doctor or clinic, we will help you find one. [facility] are conveniently located to serve the needs of you and your family.             Review of your medicines      START taking        Dose / Directions Last dose taken    trimethoprim-polymyxin b ophthalmic solution   Commonly known as:  POLYTRIM   Dose:  1 drop   Quantity:  3 mL        Place 1 drop Into the left eye every 4 hours for 7 days   Refills:  0          Our records show that you are taking the medicines listed below. If these are incorrect, please call your family doctor or clinic.        Dose / Directions Last dose taken    cephALEXin 500 MG capsule   Commonly known as:  KEFLEX   Dose:  500 mg   Quantity:  30 capsule        Take 1 capsule (500 mg) by mouth 3 times daily   Refills:  0        citalopram 40 MG tablet   Commonly known as:  celeXA   Quantity:  45 tablet        take one half tablet (20 mg) by mouth once a day   Refills:  3        HYDROcodone-acetaminophen 5-325 MG per tablet   Commonly known as:  NORCO   Dose:  1 tablet   Quantity:  18 tablet        Take 1 tablet by mouth every 4 hours as needed for pain   Refills:  0        metoprolol tartrate 25 MG tablet   Commonly known as:  LOPRESSOR   Dose:  25 mg   Quantity:  60 tablet        Take 1 tablet (25 mg) by mouth 2 times daily   Refills:  5        order for DME   Quantity:  1 each        Equipment being ordered: forearm splint    Refills:  0        simvastatin 40 MG tablet   Commonly known as:  ZOCOR   Quantity:  30 tablet        TAKE ONE TABLET BY MOUTH AT BEDTIME   Refills:  5                Prescriptions were sent or printed at these locations (1 Prescription)                   Other Prescriptions                Printed at Department/Unit printer (1 of 1)         trimethoprim-polymyxin b (POLYTRIM) ophthalmic solution                Orders Needing Specimen Collection     None      Pending Results     No orders found from 9/21/2018 to 9/24/2018.            Pending Culture Results     No orders found from 9/21/2018 to 9/24/2018.            Pending Results Instructions     If you had any lab results that were not finalized at the time of your Discharge, you can call the ED Lab Result RN at [phone]. You will be contacted by this team for any positive Lab results or changes in treatment. The nurses are available 7 days a week from 10A to 6:30P.  You can leave a message 24 hours per day and they will return your call.        Test Results From Your Hospital Stay               Clinical Quality Measure: Blood Pressure Screening     Your blood pressure was checked while you were in the emergency department today. The last reading we obtained was  BP: (!) 147/91 . Please read the guidelines below about what these numbers mean and what you should do about them.  If your systolic blood pressure (the top number) is less than 120 and your diastolic blood pressure (the bottom number) is less than 80, then your blood pressure is normal. There is nothing more that you need to do about it.  If your systolic blood pressure (the top number) is 120-139 or your diastolic blood pressure (the bottom number) is 80-89, your blood pressure may be higher than it should be. You should have your blood pressure rechecked within a year by a primary care provider.  If your systolic blood pressure (the top number) is 140 or greater or your diastolic blood pressure  (the bottom number) is 90 or greater, you may have high blood pressure. High blood pressure is treatable, but if left untreated over time it can put you at risk for heart attack, stroke, or kidney failure. You should have your blood pressure rechecked by a primary care provider within the next 4 weeks.  If your provider in the emergency department today gave you specific instructions to follow-up with your doctor or provider even sooner than that, you should follow that instruction and not wait for up to 4 weeks for your follow-up visit.        Thank you for choosing [facility]       Thank you for choosing [facility] for your care. Our goal is always to provide you with excellent care. Hearing back from our patients is one way we can continue to improve our services. Please take a few minutes to complete the written survey that you may receive in the mail after you visit with us. Thank you!        Care EveryWhere ID     This is your Care EveryWhere ID. This could be used by other organizations to access your [facility] medical records  [id number]        Equal Access to Services     ARLENE CORLEY AH: Kevon Boswell, james beard, omar jerez, trung hilario . So [facility] [phone].    ATENCIÓN: Si habla español, tiene a lim disposición servicios gratuitos de asistencia lingüística. Llame al [phone].    We comply with applicable federal civil rights laws and [place] laws. We do not discriminate on the basis of race, color, national origin, age, disability, sex, sexual orientation, or gender identity.            After Visit Summary       This is your record. Keep this with you and show to your community pharmacist(s) and doctor(s) at your next visit.

## 2018-09-23 NOTE — ED NOTES
Bed: FT02  Expected date: 9/23/18  Expected time:   Means of arrival:   Comments:  Triage:eye pain

## 2018-09-23 NOTE — ED PROVIDER NOTES
"  History     Chief Complaint:  Object in eye    HPI   Durga Aguirre is a 58 year old male who presents with left eye pain. The patient reports that he was helping his neighbor use a chainsaw on a fallen tree less than one hour ago, when a small flake of wood, he believes, flew up under his left eyelid. He notes some localized pain, and the nurses state that his visual acuity measured at 20/20 before my examination. The patient denies all other concerns here in the ER today.     Allergies:  NKDA    Medications:    Keflex  Celexa  Norco   Metoprolol  Zocor    Past Medical History:    Alcohol abuse  Anxiety  HTN  Dupuytren's contracture of hand    Past Surgical History:    Orthopedic    Family History:    Lymphoma    Social History  Marital Status:  Single [1]  Light smoker: 20 years  Negative for alcohol use.      Review of Systems   Eyes: Positive for pain. Negative for visual disturbance.   All other systems reviewed and are negative.      Physical Exam     Patient Vitals for the past 24 hrs:   BP Temp Temp src Heart Rate Resp SpO2 Height   09/23/18 1549 (!) 147/91 98.9  F (37.2  C) Temporal 88 18 95 % 1.791 m (5' 10.5\")       Physical Exam  General: Alert, interactive.   Head:  Scalp is atraumatic.  Eyes:  EOM intact. The pupils are equal, round, and reactive to light. No scleral icterus or conjunctivitis. With retraction of upper lid there was a small, 1-2mm wood chip that was removed. Patient felt more comfortable after removal and denied pain. No periorbital edema, no discharge or crusting of lashes. Exam of eye with fluoroscein dye reveals no area of dye uptake or vertical stripping. No periorbital cellulitis.   ENT:                                      Ears:  The external ears are normal.  Nose:  The external nose is normal.  Throat:  The oropharynx is normal. Mucus membranes are moist.                 Neck:  Normal range of motion.  MS:  Normal range of motion.   Skin:  Warm and dry.   Neuro:  Strength and " sensation grossly intact.   Psych:  Awake. Alert.  Appropriate interactions.     Emergency Department Course     Interventions:  1535 Alcaine 0.5% opthalmic 1 drop       Emergency Department Course:  Nursing notes and vitals reviewed. (1540) I performed an exam of the patient as documented above.     Medicine administered as documented above.     (1601) I rechecked the patient and discussed the results of his workup thus far.     Findings and plan explained to the Patient. Patient discharged home with instructions regarding supportive care, medications, and reasons to return. The importance of close follow-up was reviewed. The patient was prescribed Polytrim.    I personally reviewed the laboratory results with the Patient and answered all related questions prior to discharge.     Impression & Plan      Medical Decision Making:  Durga Aguirre is a 58 year old male who presents for evaluation for a piece of wood under his let eye lid.  This was successfully removed with retraction of the upper eyelid with a Q-tip.  The patient felt much more comfortable after removal and had no further eye pain while here in the emergency department.  Visual acuity 20/20 in each eye.  On slit-lamp exam, no evidence of corneal abrasion or remaining foreign body. No evidence of emergent eye etiology including globe rupture.  I will treat the patient with antibiotic eyedrops in case there is a small corneal abrasion and he plans to follow-up with ophthalmology early next week for recheck.  Return precautions discussed including vision changes, increasing pain, periorbital redness, or any other new/concerning symptoms.    Diagnosis:    ICD-10-CM    1. Foreign body of eyelid, left H02.816        Disposition:  discharged to home    Discharge Medications:  New Prescriptions    TRIMETHOPRIM-POLYMYXIN B (POLYTRIM) OPHTHALMIC SOLUTION    Place 1 drop Into the left eye every 4 hours for 7 days     Scribe Disclosure:  Mike AUSTIN am  serving as a scribe on 9/23/2018 at 3:31 PM to personally document services performed by Maribel Coronado PA-C found based on my observations and the provider's statements to me.       Mike Alejandro  9/23/2018    EMERGENCY DEPARTMENT       Maribel Coronado PA-C  09/23/18 5170

## 2018-09-23 NOTE — ED AVS SNAPSHOT
Emergency Department    [address] [place] [address]    Phone:  [phone]    Fax:  [phone]                                       Durga Aguirre   MRN: [id number]    Department:   Emergency Department   Date of Visit:  9/23/2018           After Visit Summary Signature Page     I have received my discharge instructions, and my questions have been answered. I have discussed any challenges I see with this plan with the nurse or doctor.    ..........................................................................................................................................  Patient/Patient Representative Signature      ..........................................................................................................................................  Patient Representative Print Name and Relationship to Patient    ..................................................               ................................................  Date                                   Time    ..........................................................................................................................................  Reviewed by Signature/Title    ...................................................              ..............................................  Date                                               Time          22EPIC Rev 08/18

## 2018-11-01 ENCOUNTER — TRANSFERRED RECORDS (OUTPATIENT)
Dept: HEALTH INFORMATION MANAGEMENT | Facility: CLINIC | Age: 58
End: 2018-11-01

## 2018-11-30 DIAGNOSIS — E78.1 HYPERTRIGLYCERIDEMIA: ICD-10-CM

## 2018-11-30 DIAGNOSIS — Z91.89 RISK FOR CORONARY ARTERY DISEASE BETWEEN 10% AND 20% IN NEXT 10 YEARS: ICD-10-CM

## 2018-11-30 NOTE — TELEPHONE ENCOUNTER
"Requested Prescriptions   Pending Prescriptions Disp Refills     simvastatin (ZOCOR) 40 MG tablet [Pharmacy Med Name: Simvastatin Oral Tablet 40 MG] 30 tablet 4    Last Written Prescription Date:  4/23/18  Last Fill Quantity: 30,  # refills: 5   Last office visit: 4/20/2018 with prescribing provider:  4/20/18   Future Office Visit:     Sig: TAKE ONE TABLET BY MOUTH AT BEDTIME    Statins Protocol Passed    11/30/2018 10:51 AM       Passed - LDL on file in past 12 months    Recent Labs   Lab Test  04/18/18   0809   LDL  107*            Passed - No abnormal creatine kinase in past 12 months    No lab results found.            Passed - Recent (12 mo) or future (30 days) visit within the authorizing provider's specialty    Patient had office visit in the last 12 months or has a visit in the next 30 days with authorizing provider or within the authorizing provider's specialty.  See \"Patient Info\" tab in inbasket, or \"Choose Columns\" in Meds & Orders section of the refill encounter.             Passed - Patient is age 18 or older          "

## 2018-12-03 RX ORDER — SIMVASTATIN 40 MG
TABLET ORAL
Qty: 30 TABLET | Refills: 3 | Status: SHIPPED | OUTPATIENT
Start: 2018-12-03 | End: 2019-04-11

## 2018-12-05 ENCOUNTER — OFFICE VISIT (OUTPATIENT)
Dept: INTERNAL MEDICINE | Facility: CLINIC | Age: 58
End: 2018-12-05
Payer: COMMERCIAL

## 2018-12-05 VITALS
WEIGHT: 216 LBS | HEART RATE: 63 BPM | DIASTOLIC BLOOD PRESSURE: 86 MMHG | RESPIRATION RATE: 18 BRPM | BODY MASS INDEX: 30.55 KG/M2 | SYSTOLIC BLOOD PRESSURE: 150 MMHG | TEMPERATURE: 98.4 F | OXYGEN SATURATION: 94 %

## 2018-12-05 DIAGNOSIS — E78.1 HYPERTRIGLYCERIDEMIA: ICD-10-CM

## 2018-12-05 DIAGNOSIS — J02.9 SORE THROAT: Primary | ICD-10-CM

## 2018-12-05 DIAGNOSIS — I10 ESSENTIAL HYPERTENSION: ICD-10-CM

## 2018-12-05 DIAGNOSIS — Z11.59 NEED FOR HEPATITIS C SCREENING TEST: ICD-10-CM

## 2018-12-05 DIAGNOSIS — Z23 NEED FOR PROPHYLACTIC VACCINATION AND INOCULATION AGAINST INFLUENZA: ICD-10-CM

## 2018-12-05 LAB
DEPRECATED S PYO AG THROAT QL EIA: NORMAL
SPECIMEN SOURCE: NORMAL

## 2018-12-05 PROCEDURE — 87880 STREP A ASSAY W/OPTIC: CPT | Performed by: INTERNAL MEDICINE

## 2018-12-05 PROCEDURE — 87081 CULTURE SCREEN ONLY: CPT | Performed by: INTERNAL MEDICINE

## 2018-12-05 PROCEDURE — 99213 OFFICE O/P EST LOW 20 MIN: CPT | Performed by: INTERNAL MEDICINE

## 2018-12-05 RX ORDER — METOPROLOL TARTRATE 25 MG/1
25 TABLET, FILM COATED ORAL 2 TIMES DAILY
Qty: 60 TABLET | Refills: 4 | Status: SHIPPED | OUTPATIENT
Start: 2018-12-05 | End: 2019-04-29

## 2018-12-05 NOTE — MR AVS SNAPSHOT
After Visit Summary   12/5/2018    Durga Aguirre    MRN: [id number]           Patient Information     Date Of Birth          1960        Visit Information        Provider Department      12/5/2018 11:00 AM Rohit Solitario MD [facility]        Today's Diagnoses     Sore throat    -  1    Need for hepatitis C screening test        Need for prophylactic vaccination and inoculation against influenza        Hypertriglyceridemia        Essential hypertension           Follow-ups after your visit        Future tests that were ordered for you today     Open Future Orders        Priority Expected Expires Ordered    Basic metabolic panel Routine 4/1/2019 12/5/2019 12/5/2018    Lipid panel reflex to direct LDL Fasting Routine 4/1/2019 12/5/2019 12/5/2018    Hepatitis C Screen Reflex to HCV RNA Quant and Genotype Routine 4/1/2019 12/5/2019 12/5/2018            Who to contact     If you have questions or need follow up information about today's clinic visit or your schedule please contact [facility] directly at [phone].  Normal or non-critical lab and imaging results will be communicated to you by MyChart, letter or phone within 4 business days after the clinic has received the results. If you do not hear from us within 7 days, please contact the clinic through MyChart or phone. If you have a critical or abnormal lab result, we will notify you by phone as soon as possible.  Submit refill requests through [employer] or call your pharmacy and they will forward the refill request to us. Please allow 3 business days for your refill to be completed.          Additional Information About Your Visit        Care EveryWhere ID     This is your Care EveryWhere ID. This could be used by other organizations to access your [facility] medical records  [id number]        Your Vitals Were     Pulse Temperature Respirations Pulse Oximetry BMI (Body Mass Index)       63  98.4  F (36.9  C) (Oral) 18 94% 30.55 kg/m2        Blood Pressure from Last 3 Encounters:   12/05/18 150/86   09/23/18 (!) 147/91   07/02/18 122/78    Weight from Last 3 Encounters:   12/05/18 216 lb (98 kg)   07/02/18 216 lb (98 kg)   06/30/18 216 lb (98 kg)              We Performed the Following     Beta strep group A culture     Rapid strep screen        Primary Care Provider Office Phone # Fax #    Rohit Solitario [phone]       [address] [facility] [address]        Equal Access to Services     ARLENE CORLEY : Hadii soraida martinez Sopriyank, waaxda luqadaha, qaybta kaalmada adegeorgeda, trung solano. So [facility] [phone].    ATENCIÓN: Si habla español, tiene a lim disposición servicios gratuitos de asistencia lingüística. Llame al [phone].    We comply with applicable federal civil rights laws and [place] laws. We do not discriminate on the basis of race, color, national origin, age, disability, sex, sexual orientation, or gender identity.            Thank you!     Thank you for choosing [facility]  for your care. Our goal is always to provide you with excellent care. Hearing back from our patients is one way we can continue to improve our services. Please take a few minutes to complete the written survey that you may receive in the mail after your visit with us. Thank you!             Your Updated Medication List - Protect others around you: Learn how to safely use, store and throw away your medicines at www.disposemymeds.org.          This list is accurate as of 12/5/18 11:28 AM.  Always use your most recent med list.                   Brand Name Dispense Instructions for use Diagnosis    cephALEXin 500 MG capsule    KEFLEX    30 capsule    Take 1 capsule (500 mg) by mouth 3 times daily    Laceration of right hand without foreign body, initial encounter       citalopram 40 MG tablet    celeXA    45 tablet    take one half  tablet (20 mg) by mouth once a day    Generalized anxiety disorder       HYDROcodone-acetaminophen 5-325 MG tablet    NORCO    18 tablet    Take 1 tablet by mouth every 4 hours as needed for pain    Laceration of right hand without foreign body, initial encounter       metoprolol tartrate 25 MG tablet    LOPRESSOR    60 tablet    Take 1 tablet (25 mg) by mouth 2 times daily    Essential hypertension       order for DME     1 each    Equipment being ordered: forearm splint    Laceration of right hand without foreign body, initial encounter       simvastatin 40 MG tablet    ZOCOR    30 tablet    TAKE ONE TABLET BY MOUTH AT BEDTIME    Risk for coronary artery disease between 10% and 20% in next 10 years, Hypertriglyceridemia

## 2018-12-05 NOTE — PROGRESS NOTES
SUBJECTIVE:   Durga Aguirre is a 58 year old male who presents to clinic today for the following health issues:     Acute Illness   Acute illness concerns: Sore throat   Onset: 1 week    Fever: no    Chills/Sweats: no    Headache (location?): no    Sinus Pressure:no    Conjunctivitis:  no    Ear Pain: no    Rhinorrhea: no    Congestion: no    Sore Throat: YES     Cough: no    Wheeze: no    Decreased Appetite: no    Nausea: no    Vomiting: no    Diarrhea:  no    Dysuria/Freq.: no    Fatigue/Achiness: no    Sick/Strep Exposure: no     Therapies Tried and outcome: Milk - hurts     Problem list and histories reviewed & adjusted, as indicated.  Additional history: as documented    Labs reviewed in EPIC    Reviewed and updated as needed this visit by clinical staff  Tobacco  Allergies  Meds  Med Hx  Surg Hx  Fam Hx  Soc Hx      Reviewed and updated as needed this visit by Provider         ROS:  Constitutional, HEENT, cardiovascular, pulmonary, gi and gu systems are negative, except as otherwise noted.    OBJECTIVE:                                                    /86  Pulse 63  Temp 98.4  F (36.9  C) (Oral)  Resp 18  Wt 216 lb (98 kg)  SpO2 94%  BMI 30.55 kg/m2  Body mass index is 30.55 kg/(m^2).  GENERAL APPEARANCE: alert, no distress and over weight  HENT: ear canals and TM's normal, nose and mouth without ulcers or lesions, oral mucous membranes moist, oropharynx clear and normal cephalic/atraumatic  NECK: no adenopathy, no asymmetry, masses, or scars and thyroid normal to palpation  RESP: lungs clear to auscultation - no rales, rhonchi or wheezes  CV: regular rates and rhythm, normal S1 S2, no S3 or S4 and no murmur, click or rub    Diagnostic test results:  Results for orders placed or performed in visit on 12/05/18 (from the past 24 hour(s))   Rapid strep screen   Result Value Ref Range    Specimen Description Throat     Rapid Strep A Screen       NEGATIVE: No Group A streptococcal antigen  detected by immunoassay, await culture report.        ASSESSMENT/PLAN:                                                    1. Sore throat  Seems to be environmental more than anything.  Recently had a colonoscopy and a nasal cannula for oxygen.  This dried out his nose to the point where he was having some epistaxis but also resulted in some discomfort in his throat likely from the dry air.  He also has doing some remodeling and exposed to drywall dust, etc.  - Rapid strep screen  - Beta strep group A culture    2. Need for hepatitis C screening test  - Hepatitis C Screen Reflex to HCV RNA Quant and Genotype; Future    3. Need for prophylactic vaccination and inoculation against influenza    4. Hypertriglyceridemia not controlled today.  Needs follow-up  - Lipid panel reflex to direct LDL Fasting; Future    5. Essential hypertension  Take his medicine regularly  - Basic metabolic panel; Future      Follow up with Provider -spring 2019 with labs    Rohit Solitario MD  [facility]

## 2018-12-06 LAB
BACTERIA SPEC CULT: NORMAL
SPECIMEN SOURCE: NORMAL

## 2019-02-22 ENCOUNTER — TELEPHONE (OUTPATIENT)
Dept: INTERNAL MEDICINE | Facility: CLINIC | Age: 59
End: 2019-02-22

## 2019-02-22 NOTE — TELEPHONE ENCOUNTER
Patient called regarding bump on left hand.    Developed a 1/4' bump on left hand last night. Bump is itchy and warm. Annoying tolerable discomfort. Has been applying ice pack on it which has been helping things go away.    Patient declined recent trauma. Has been at a friends house next door helping with home.    Continue to monitor. May continue with ice QID. Topical hydrocortisone cream and/or oral Benadryl.    Advised to be seen in ER if left hand becomes hot, red, painful, fever, swelling or SOB.    Pt expressed understanding and acceptance of the plan.  Pt had no further questions at this time.  Advised can call back to clinic at any time with concerns.     Martha CASE RN, BSN, PHN

## 2019-03-09 DIAGNOSIS — F41.1 GENERALIZED ANXIETY DISORDER: Primary | ICD-10-CM

## 2019-03-11 NOTE — TELEPHONE ENCOUNTER
"Requested Prescriptions   Pending Prescriptions Disp Refills     hydrOXYzine (ATARAX) 25 MG tablet [Pharmacy Med Name: hydrOXYzine HCl Oral Tablet 25 MG] 60 tablet 2     Sig: TAKE 1-2 TABLETS (25-50 MG) BY MOUTH EVERY 6 HOURS AS NEEDED FOR ANXIETY    Antihistamines Protocol Failed - 3/10/2019  9:19 AM       Failed - Medication is active on med list       Passed - Recent (12 mo) or future (30 days) visit within the authorizing provider's specialty    Patient had office visit in the last 12 months or has a visit in the next 30 days with authorizing provider or within the authorizing provider's specialty.  See \"Patient Info\" tab in inbasket, or \"Choose Columns\" in Meds & Orders section of the refill encounter.             Passed - Patient is age 3 or older    Apply age and/or weight-based dosing for peds patients age 3 and older.    Forward request to provider for patients under the age of 3.          Discontinued on 02/06/2017    Last Written Prescription Date:  11/09/2014  Last Fill Quantity: 60,  # refills: 3   Last office visit: 12/5/2018 with prescribing provider:  Dr Solitario   Future Office Visit:      "

## 2019-03-12 RX ORDER — HYDROXYZINE HYDROCHLORIDE 25 MG/1
TABLET, FILM COATED ORAL
Qty: 60 TABLET | Refills: 0 | Status: SHIPPED | OUTPATIENT
Start: 2019-03-12 | End: 2019-05-09

## 2019-04-04 DIAGNOSIS — F41.1 GENERALIZED ANXIETY DISORDER: ICD-10-CM

## 2019-04-04 RX ORDER — CITALOPRAM HYDROBROMIDE 40 MG/1
TABLET ORAL
Qty: 45 TABLET | Refills: 2 | Status: SHIPPED | OUTPATIENT
Start: 2019-04-04 | End: 2019-04-29

## 2019-04-04 NOTE — TELEPHONE ENCOUNTER
"Requested Prescriptions   Pending Prescriptions Disp Refills     citalopram (CELEXA) 40 MG tablet 45 tablet 3     Sig: take one half tablet (20 mg) by mouth once a day    SSRIs Protocol Passed - 4/4/2019  9:26 AM       Passed - Recent (12 mo) or future (30 days) visit within the authorizing provider's specialty    Patient had office visit in the last 12 months or has a visit in the next 30 days with authorizing provider or within the authorizing provider's specialty.  See \"Patient Info\" tab in inbasket, or \"Choose Columns\" in Meds & Orders section of the refill encounter.             Passed - Medication is active on med list       Passed - Patient is age 18 or older        Last Written Prescription Date:  3/30/18  Last Fill Quantity: 45,  # refills: 3   Last office visit: 12/5/2018 with prescribing provider:  12/5/18   Future Office Visit:      "

## 2019-04-11 DIAGNOSIS — E78.1 HYPERTRIGLYCERIDEMIA: ICD-10-CM

## 2019-04-11 DIAGNOSIS — Z91.89 RISK FOR CORONARY ARTERY DISEASE BETWEEN 10% AND 20% IN NEXT 10 YEARS: ICD-10-CM

## 2019-04-11 NOTE — LETTER
[facility]  [address] [id number] [phone]            Durga Aguirre  [address]  [facility] [address]        April 12, 2019    Dear Durga,    While refilling your prescription today, we noticed that you are due to have labs drawn.  We will refill your prescription for 30 days, but a follow-up appointment must be made before any additional refills can be approved.     Taking care of your health is important to us and we look forward to seeing you in the near future.  Please call us at [phone] or [phone] (or use [employer]) to schedule an appointment.     Please disregard this notice if you have already made an appointment.    Sincerely,        [facility]

## 2019-04-12 RX ORDER — SIMVASTATIN 40 MG
TABLET ORAL
Qty: 30 TABLET | Refills: 0 | Status: SHIPPED | OUTPATIENT
Start: 2019-04-12 | End: 2019-04-29

## 2019-04-12 NOTE — TELEPHONE ENCOUNTER
"Last Written Prescription Date:  12/3/2018  Last Fill Quantity: 30,  # refills: 3   Last office visit: 12/5/2018 with prescribing provider:  Rohit Solitario   Future Office Visit:    Requested Prescriptions   Pending Prescriptions Disp Refills     simvastatin (ZOCOR) 40 MG tablet [Pharmacy Med Name: Simvastatin Oral Tablet 40 MG] 30 tablet 2     Sig: TAKE ONE TABLET BY MOUTH AT BEDTIME       Statins Protocol Passed - 4/11/2019  9:25 PM        Passed - LDL on file in past 12 months     Recent Labs   Lab Test 04/18/18  0809   *             Passed - No abnormal creatine kinase in past 12 months     No lab results found.             Passed - Recent (12 mo) or future (30 days) visit within the authorizing provider's specialty     Patient had office visit in the last 12 months or has a visit in the next 30 days with authorizing provider or within the authorizing provider's specialty.  See \"Patient Info\" tab in inbasket, or \"Choose Columns\" in Meds & Orders section of the refill encounter.              Passed - Medication is active on med list        Passed - Patient is age 18 or older          "

## 2019-04-29 ENCOUNTER — OFFICE VISIT (OUTPATIENT)
Dept: INTERNAL MEDICINE | Facility: CLINIC | Age: 59
End: 2019-04-29
Payer: COMMERCIAL

## 2019-04-29 VITALS
OXYGEN SATURATION: 96 % | DIASTOLIC BLOOD PRESSURE: 76 MMHG | RESPIRATION RATE: 16 BRPM | HEART RATE: 64 BPM | WEIGHT: 222 LBS | TEMPERATURE: 98.5 F | BODY MASS INDEX: 31.78 KG/M2 | HEIGHT: 70 IN | SYSTOLIC BLOOD PRESSURE: 136 MMHG

## 2019-04-29 DIAGNOSIS — I10 ESSENTIAL HYPERTENSION: ICD-10-CM

## 2019-04-29 DIAGNOSIS — Z91.89 RISK FOR CORONARY ARTERY DISEASE BETWEEN 10% AND 20% IN NEXT 10 YEARS: ICD-10-CM

## 2019-04-29 DIAGNOSIS — F41.1 GENERALIZED ANXIETY DISORDER: ICD-10-CM

## 2019-04-29 DIAGNOSIS — S29.011A MUSCLE STRAIN OF CHEST WALL, INITIAL ENCOUNTER: Primary | ICD-10-CM

## 2019-04-29 DIAGNOSIS — E78.1 HYPERTRIGLYCERIDEMIA: ICD-10-CM

## 2019-04-29 DIAGNOSIS — Z11.59 NEED FOR HEPATITIS C SCREENING TEST: ICD-10-CM

## 2019-04-29 DIAGNOSIS — F10.230 ALCOHOL DEPENDENCE WITH UNCOMPLICATED WITHDRAWAL (H): ICD-10-CM

## 2019-04-29 LAB
ANION GAP SERPL CALCULATED.3IONS-SCNC: 8 MMOL/L (ref 3–14)
BUN SERPL-MCNC: 11 MG/DL (ref 7–30)
CALCIUM SERPL-MCNC: 9.3 MG/DL (ref 8.5–10.1)
CHLORIDE SERPL-SCNC: 105 MMOL/L (ref 94–109)
CHOLEST SERPL-MCNC: 290 MG/DL
CO2 SERPL-SCNC: 25 MMOL/L (ref 20–32)
CREAT SERPL-MCNC: 0.82 MG/DL (ref 0.66–1.25)
GFR SERPL CREATININE-BSD FRML MDRD: >90 ML/MIN/{1.73_M2}
GLUCOSE SERPL-MCNC: 116 MG/DL (ref 70–99)
HCV AB SERPL QL IA: NONREACTIVE
HDLC SERPL-MCNC: 37 MG/DL
LDLC SERPL CALC-MCNC: ABNORMAL MG/DL
LDLC SERPL DIRECT ASSAY-MCNC: 114 MG/DL
NONHDLC SERPL-MCNC: 253 MG/DL
POTASSIUM SERPL-SCNC: 4.3 MMOL/L (ref 3.4–5.3)
SODIUM SERPL-SCNC: 138 MMOL/L (ref 133–144)
TRIGL SERPL-MCNC: 920 MG/DL

## 2019-04-29 PROCEDURE — 36415 COLL VENOUS BLD VENIPUNCTURE: CPT | Performed by: INTERNAL MEDICINE

## 2019-04-29 PROCEDURE — 80061 LIPID PANEL: CPT | Performed by: INTERNAL MEDICINE

## 2019-04-29 PROCEDURE — 86803 HEPATITIS C AB TEST: CPT | Performed by: INTERNAL MEDICINE

## 2019-04-29 PROCEDURE — 83721 ASSAY OF BLOOD LIPOPROTEIN: CPT | Mod: 59 | Performed by: INTERNAL MEDICINE

## 2019-04-29 PROCEDURE — 99214 OFFICE O/P EST MOD 30 MIN: CPT | Performed by: INTERNAL MEDICINE

## 2019-04-29 PROCEDURE — 80048 BASIC METABOLIC PNL TOTAL CA: CPT | Performed by: INTERNAL MEDICINE

## 2019-04-29 RX ORDER — METOPROLOL TARTRATE 25 MG/1
25 TABLET, FILM COATED ORAL 2 TIMES DAILY
Qty: 180 TABLET | Refills: 0 | Status: SHIPPED | OUTPATIENT
Start: 2019-04-29 | End: 2019-06-24

## 2019-04-29 RX ORDER — SIMVASTATIN 40 MG
TABLET ORAL
Qty: 90 TABLET | Refills: 1 | Status: SHIPPED | OUTPATIENT
Start: 2019-04-29 | End: 2019-12-09

## 2019-04-29 RX ORDER — CITALOPRAM HYDROBROMIDE 40 MG/1
40 TABLET ORAL EVERY MORNING
Qty: 90 TABLET | Refills: 0 | Status: SHIPPED | OUTPATIENT
Start: 2019-04-29 | End: 2019-06-24

## 2019-04-29 ASSESSMENT — ANXIETY QUESTIONNAIRES
5. BEING SO RESTLESS THAT IT IS HARD TO SIT STILL: NOT AT ALL
6. BECOMING EASILY ANNOYED OR IRRITABLE: NOT AT ALL
7. FEELING AFRAID AS IF SOMETHING AWFUL MIGHT HAPPEN: NOT AT ALL
3. WORRYING TOO MUCH ABOUT DIFFERENT THINGS: MORE THAN HALF THE DAYS
2. NOT BEING ABLE TO STOP OR CONTROL WORRYING: MORE THAN HALF THE DAYS
IF YOU CHECKED OFF ANY PROBLEMS ON THIS QUESTIONNAIRE, HOW DIFFICULT HAVE THESE PROBLEMS MADE IT FOR YOU TO DO YOUR WORK, TAKE CARE OF THINGS AT HOME, OR GET ALONG WITH OTHER PEOPLE: NOT DIFFICULT AT ALL
1. FEELING NERVOUS, ANXIOUS, OR ON EDGE: SEVERAL DAYS
GAD7 TOTAL SCORE: 5

## 2019-04-29 ASSESSMENT — PATIENT HEALTH QUESTIONNAIRE - PHQ9
SUM OF ALL RESPONSES TO PHQ QUESTIONS 1-9: 3
5. POOR APPETITE OR OVEREATING: NOT AT ALL

## 2019-04-29 ASSESSMENT — MIFFLIN-ST. JEOR: SCORE: 1818.24
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
## 2019-04-30 ASSESSMENT — ANXIETY QUESTIONNAIRES: GAD7 TOTAL SCORE: 5

## 2019-05-09 DIAGNOSIS — F41.1 GENERALIZED ANXIETY DISORDER: ICD-10-CM

## 2019-05-10 RX ORDER — HYDROXYZINE HYDROCHLORIDE 25 MG/1
TABLET, FILM COATED ORAL
Qty: 60 TABLET | Refills: 3 | Status: SHIPPED | OUTPATIENT
Start: 2019-05-10 | End: 2019-09-24

## 2019-05-10 NOTE — TELEPHONE ENCOUNTER
"Requested Prescriptions   Pending Prescriptions Disp Refills     hydrOXYzine (ATARAX) 25 MG tablet [Pharmacy Med Name: hydrOXYzine HCl Oral Tablet 25 MG] 60 tablet 0     Sig: TAKE 1-2 TABLETS (25-50 MG) BY MOUTH EVERY 6 HOURS AS NEEDED FOR ANXIETY       Antihistamines Protocol Passed - 5/9/2019  7:30 PM        Passed - Recent (12 mo) or future (30 days) visit within the authorizing provider's specialty     Patient had office visit in the last 12 months or has a visit in the next 30 days with authorizing provider or within the authorizing provider's specialty.  See \"Patient Info\" tab in inbasket, or \"Choose Columns\" in Meds & Orders section of the refill encounter.              Passed - Patient is age 3 or older     Apply age and/or weight-based dosing for peds patients age 3 and older.    Forward request to provider for patients under the age of 3.          Passed - Medication is active on med list          "

## 2019-05-20 ENCOUNTER — TELEPHONE (OUTPATIENT)
Dept: ADDICTION MEDICINE | Facility: CLINIC | Age: 59
End: 2019-05-20

## 2019-05-20 ENCOUNTER — OFFICE VISIT (OUTPATIENT)
Dept: INTERNAL MEDICINE | Facility: CLINIC | Age: 59
End: 2019-05-20
Payer: COMMERCIAL

## 2019-05-20 VITALS
OXYGEN SATURATION: 98 % | SYSTOLIC BLOOD PRESSURE: 154 MMHG | WEIGHT: 218.9 LBS | DIASTOLIC BLOOD PRESSURE: 82 MMHG | RESPIRATION RATE: 16 BRPM | HEART RATE: 50 BPM | BODY MASS INDEX: 31.41 KG/M2 | TEMPERATURE: 97.8 F

## 2019-05-20 DIAGNOSIS — F10.20 UNCOMPLICATED ALCOHOL DEPENDENCE (H): ICD-10-CM

## 2019-05-20 DIAGNOSIS — F41.1 GENERALIZED ANXIETY DISORDER: ICD-10-CM

## 2019-05-20 DIAGNOSIS — Z11.4 ENCOUNTER FOR SCREENING FOR HIV: ICD-10-CM

## 2019-05-20 DIAGNOSIS — I10 ESSENTIAL HYPERTENSION: Primary | ICD-10-CM

## 2019-05-20 DIAGNOSIS — Z11.59 NEED FOR HEPATITIS C SCREENING TEST: ICD-10-CM

## 2019-05-20 PROCEDURE — 99214 OFFICE O/P EST MOD 30 MIN: CPT | Performed by: INTERNAL MEDICINE

## 2019-05-20 RX ORDER — LISINOPRIL 10 MG/1
10 TABLET ORAL DAILY
Qty: 30 TABLET | Refills: 2 | Status: SHIPPED | OUTPATIENT
Start: 2019-05-20 | End: 2019-06-24

## 2019-05-20 ASSESSMENT — ANXIETY QUESTIONNAIRES
7. FEELING AFRAID AS IF SOMETHING AWFUL MIGHT HAPPEN: NEARLY EVERY DAY
IF YOU CHECKED OFF ANY PROBLEMS ON THIS QUESTIONNAIRE, HOW DIFFICULT HAVE THESE PROBLEMS MADE IT FOR YOU TO DO YOUR WORK, TAKE CARE OF THINGS AT HOME, OR GET ALONG WITH OTHER PEOPLE: NOT DIFFICULT AT ALL
1. FEELING NERVOUS, ANXIOUS, OR ON EDGE: NOT AT ALL
GAD7 TOTAL SCORE: 6
2. NOT BEING ABLE TO STOP OR CONTROL WORRYING: NOT AT ALL
3. WORRYING TOO MUCH ABOUT DIFFERENT THINGS: NEARLY EVERY DAY
6. BECOMING EASILY ANNOYED OR IRRITABLE: NOT AT ALL
5. BEING SO RESTLESS THAT IT IS HARD TO SIT STILL: NOT AT ALL

## 2019-05-20 ASSESSMENT — PATIENT HEALTH QUESTIONNAIRE - PHQ9
5. POOR APPETITE OR OVEREATING: NOT AT ALL
SUM OF ALL RESPONSES TO PHQ QUESTIONS 1-9: 3

## 2019-05-20 NOTE — Clinical Note
Please abstract the following data from this visit with this patient into the appropriate field in Epic:Colonoscopy done on this date: 11/1/2018 (approximately), by this group: [facility], results were polyp- repeat 5 yrs.

## 2019-05-20 NOTE — TELEPHONE ENCOUNTER
Please schedule appointment for patient with   Addiction Medicine Provider   For alcohol use.

## 2019-05-20 NOTE — PROGRESS NOTES
Subjective     Durga Aguirre is a 59 year old male who presents to clinic today for the following health issues:    HPI   Hypertension Follow-up      Do you check your blood pressure regularly outside of the clinic? Yes     Are you following a low salt diet? Yes    Are your blood pressures ever more than 140 on the top number (systolic) OR more   than 90 on the bottom number (diastolic), for example 140/90? No     Anxiety Follow-Up    How are you doing with your anxiety since your last visit? No change    Are you having other symptoms that might be associated with anxiety? No    Have you had a significant life event? OTHER: age of mom     Are you feeling depressed? No    Do you have any concerns with your use of alcohol or other drugs? No    Social History     Tobacco Use     Smoking status: Light Tobacco Smoker     Years: 20.00     Types: Cigarettes     Last attempt to quit: 10/9/2017     Years since quittin.6     Smokeless tobacco: Never Used     Tobacco comment: 2-3 cigarettes per day    Substance Use Topics     Alcohol use: No     Alcohol/week: 0.0 oz     Comment: restarted may 2016     Drug use: No     YUSUF-7 SCORE 6/3/2013 2019 2019   Total Score 8 - -   Total Score - 5 6     PHQ 3/30/2018 2019 2019   PHQ-9 Total Score 2 3 3   Q9: Thoughts of better off dead/self-harm past 2 weeks Not at all Not at all Not at all       Reviewed and updated as needed this visit by Provider         Review of Systems   ROS COMP: Constitutional, HEENT, cardiovascular, pulmonary, gi and gu systems are negative, except as otherwise noted.      Objective    /82   Pulse 50   Temp 97.8  F (36.6  C) (Temporal)   Resp 16   Wt 99.3 kg (218 lb 14.4 oz)   SpO2 98%   BMI 31.41 kg/m    Body mass index is 31.41 kg/m .  Physical Exam   GENERAL:anxious  RESP: lungs clear to auscultation  CV: regular rate and rhythm, normal S1 S2, no S3 or S4, no murmur, click or rub, no peripheral edema and peripheral pulses  "strong  MS: no gross musculoskeletal defects noted, no edema  PSYCH: concentration poor, anxious, judgement and insight intact and appearance well groomed    Labs reviewed in Epic        Assessment & Plan     (I10) Essential hypertension  Comment: uncontrolled- likely related to alcohol intake   Plan: lisinopril (PRINIVIL/ZESTRIL) 10 MG tablet        Start ACEi- f/u 1m o , alcohol cessation    (F10.20) Uncomplicated alcohol dependence (H)  Back drinking more regularly.  Would benefit from addiction med referral + outpt rx.   Plan: ADDICTION MEDICINE REFERRAL, MENTAL HEALTH         REFERRAL  - Adult; Outpatient Treatment;         Chemical Dependency Treatment; [facility]: Intensive         Outpatient Program [phone]; Patient call        to schedule          Anxiety  Taking citalopram but not consistent with dosing  rec'd 40 mg daily     Tobacco Cessation:   reports that he has been smoking cigarettes.  He has smoked for the past 20.00 years. He has never used smokeless tobacco.  Tobacco Cessation Action Plan: Information offered: Patient not interested at this time      BMI:   Estimated body mass index is 31.41 kg/m  as calculated from the following:    Height as of 4/29/19: 1.778 m (5' 10\").    Weight as of this encounter: 99.3 kg (218 lb 14.4 oz).     Return in about 1 month (around 6/17/2019) for BP Recheck.    Rohit Solitario MD  [facility]    "

## 2019-05-20 NOTE — PATIENT INSTRUCTIONS
Patient Education     Eating Heart-Healthy Food: Using the DASH Plan    Eating for your heart doesn t have to be hard or boring. You just need to know how to make healthier choices. The DASH eating plan has been developed to help you do just that. DASH stands for Dietary Approaches to Stop Hypertension. It is a plan that has been proven to be healthier for your heart and to lower your risk for high blood pressure. It can also help lower your risk for cancer, heart disease, osteoporosis, and diabetes.  Choosing from each food group  Choose foods from each of the food groups below each day. Try to get the recommended number of servings for each food group. The serving numbers are based on a diet of 2,000 calories a day. Talk to your doctor if you re unsure about your calorie needs. Along with getting the correct servings, the DASH plan also recommends a sodium intake less than 2,300 mg per day.        Grains  Servings: 6 to 8 a day  A serving is:    1 slice bread    1 ounce dry cereal    Half a cup cooked rice, pasta or cereal  Best choices: Whole grains and any grains high in fiber. Vegetables  Servings: 4 to 5 a day  A serving is:    1 cup raw leafy vegetable    Half a cup cut-up raw or cooked vegetable    Half a cup vegetable juice  Best choices: Fresh or frozen vegetables prepared without added salt or fat.   Fruits  Servings: 4 to 5 a day  A serving is:    1 medium fruit    One-quarter cup dried fruit    Half a cup fresh, frozen, or canned fruit    Half a cup of 100% fruit juices  Best choices: A variety of fresh fruits of different colors. Whole fruits are a better choice than fruit juices. Low-fat or fat-free dairy  Servings: 2 to 3 a day  A serving is:    1 cup milk    1 cup yogurt    One and a half ounces cheese  Best choices: Skim or 1% milk, low-fat or fat-free yogurt or buttermilk, and low-fat cheeses.         Lean meats, poultry, fish  Servings: 6 or fewer a day  A serving is:    1 ounce cooked meats,  poultry, or fish    1 egg  Best choices: Lean poultry and fish. Trim away visible fat. Broil, grill, roast, or boil instead of frying. Remove skin from poultry before eating. Limit how much red meat you eat.  Nuts, seeds, beans  Servings: 4 to 5 a week  A serving is:    One-third cup nuts (one and a half ounces)    2 tablespoons nut butter or seeds    Half a cup cooked dry beans or legumes  Best choices: Dry roasted nuts with no salt added, lentils, kidney beans, garbanzo beans, and whole bell beans.   Fats and oils  Servings: 2 to 3 a day  A serving is:    1 teaspoon vegetable oil    1 teaspoon soft margarine    1 tablespoon mayonnaise    2 tablespoons salad dressing  Best choices: Nut and vegetable oils (nontropical vegetable oils), such as olive and canola oil. Sweets  Servings: 5 a week or fewer  A serving is:    1 tablespoon sugar, maple syrup, or honey    1 tablespoon jam or jelly    1 half-ounce jelly beans (about 15)    1 cup lemonade  Best choices: Dried fruit can be a satisfying sweet. Choose low-fat sweets. And watch your serving sizes!      For more on the DASH eating plan, visit:  www.nhlbi.nih.gov/health/health-topics/topics/dash   Date Last Reviewed: 6/1/2016 2000-2018 [employer]. [address] [place] [address], [place] [address]. All rights reserved. This information is not intended as a substitute for professional medical care. Always follow your healthcare professional's instructions.

## 2019-05-20 NOTE — TELEPHONE ENCOUNTER
Pt wants to come in person and check out the clinic then schedule.     Rosalia Ardon  [facility]

## 2019-05-21 ASSESSMENT — ANXIETY QUESTIONNAIRES: GAD7 TOTAL SCORE: 6

## 2019-05-23 DIAGNOSIS — F10.20 UNCOMPLICATED ALCOHOL DEPENDENCE (H): ICD-10-CM

## 2019-05-24 RX ORDER — NALTREXONE HYDROCHLORIDE 50 MG/1
50 TABLET, FILM COATED ORAL DAILY
Qty: 30 TABLET | Refills: 4 | OUTPATIENT
Start: 2019-05-24

## 2019-05-24 NOTE — TELEPHONE ENCOUNTER
Requested Prescriptions   Pending Prescriptions Disp Refills     naltrexone (DEPADE/REVIA) 50 MG tablet [Pharmacy Med Name: Naltrexone HCl Oral Tablet 50 MG]  Last Written Prescription Date:  n/a  Last Fill Quantity: n/a,  # refills: n/a   Last Office Visit: 5/20/2019   Future Office Visit:    Next 5 appointments (look out 90 days)    Jun 24, 2019  1:20 PM CDT  [facility] Visit with [facility]  [facility] [address] [place] [id number] [phone]          30 tablet 4     Sig: Take 1 tablet (50 mg) by mouth daily       There is no refill protocol information for this order

## 2019-05-24 NOTE — TELEPHONE ENCOUNTER
I discussed with pt that before starting this he needs to be sober. Would recommend being seen in addiction clinic first and let them choose the most appropriate rx to help him.

## 2019-05-30 NOTE — TELEPHONE ENCOUNTER
Writer spoke with pt about scheduling an appt, pt declined stating that he's doing good right now, he's going to AA meeting and that's helping. Writer did informed pt that the referral is good for 1 year. Pt will call back if he needs more help.  Routing encounter to referring provider and Dr. Valencia.        Master Klein     [facility]   [phone]

## 2019-05-30 NOTE — TELEPHONE ENCOUNTER
Second attempt to reach pt; no answer. LVM requesting a call back for an appt. A final attempt will be made.     Master Klein

## 2019-06-06 ENCOUNTER — HOSPITAL ENCOUNTER (EMERGENCY)
Facility: CLINIC | Age: 59
Discharge: HOME | End: 2019-06-06
Attending: PHYSICIAN ASSISTANT | Admitting: PHYSICIAN ASSISTANT
Payer: COMMERCIAL

## 2019-06-06 VITALS
HEIGHT: 70 IN | HEART RATE: 76 BPM | BODY MASS INDEX: 30.06 KG/M2 | SYSTOLIC BLOOD PRESSURE: 159 MMHG | OXYGEN SATURATION: 97 % | TEMPERATURE: 98.4 F | DIASTOLIC BLOOD PRESSURE: 90 MMHG | RESPIRATION RATE: 18 BRPM | WEIGHT: 210 LBS

## 2019-06-06 DIAGNOSIS — S05.01XA RIGHT CORNEAL ABRASION, INITIAL ENCOUNTER: ICD-10-CM

## 2019-06-06 PROCEDURE — 99283 EMERGENCY DEPT VISIT LOW MDM: CPT

## 2019-06-06 PROCEDURE — 25000125 ZZHC RX 250: Performed by: PHYSICIAN ASSISTANT

## 2019-06-06 RX ORDER — PROPARACAINE HYDROCHLORIDE 5 MG/ML
1 SOLUTION/ DROPS OPHTHALMIC ONCE
Status: COMPLETED | OUTPATIENT
Start: 2019-06-06 | End: 2019-06-06

## 2019-06-06 RX ORDER — ERYTHROMYCIN 5 MG/G
0.5 OINTMENT OPHTHALMIC
Qty: 1 G | Refills: 0 | Status: SHIPPED | OUTPATIENT
Start: 2019-06-06 | End: 2019-09-24

## 2019-06-06 RX ADMIN — PROPARACAINE HYDROCHLORIDE 1 DROP: 5 SOLUTION/ DROPS OPHTHALMIC at 20:16

## 2019-06-06 RX ADMIN — FLUORESCEIN SODIUM 1 STRIP: 1 STRIP OPHTHALMIC at 20:16

## 2019-06-06 ASSESSMENT — ENCOUNTER SYMPTOMS
EYE REDNESS: 1
EYE PAIN: 0
EYE ITCHING: 0
EYE DISCHARGE: 1

## 2019-06-06 ASSESSMENT — MIFFLIN-ST. JEOR: SCORE: 1763.8

## 2019-06-06 NOTE — ED AVS SNAPSHOT
Emergency Department  [address] [place] [id number]  Phone:  [phone]  Fax:  [phone]                                    Durga Aguirre   MRN: [id number]    Department:   Emergency Department   Date of Visit:  6/6/2019           After Visit Summary Signature Page    I have received my discharge instructions, and my questions have been answered. I have discussed any challenges I see with this plan with the nurse or doctor.    ..........................................................................................................................................  Patient/Patient Representative Signature      ..........................................................................................................................................  Patient Representative Print Name and Relationship to Patient    ..................................................               ................................................  Date                                   Time    ..........................................................................................................................................  Reviewed by Signature/Title    ...................................................              ..............................................  Date                                               Time          22EPIC Rev 08/18

## 2019-06-07 NOTE — ED PROVIDER NOTES
"  History   Chief Complaint:  Eye Injury    HPI   Durga Aguirre is a 59 year old male, with a history of hypertension and hypertriglyceridemia, who presents to the ED for evaluation of right eye injury. The patient reports pouring concrete yesterday helping his neighbor finish a home project. He reports having the concrete fly into his right eye. The patient did not flush the eye immediately after the incident, but notes flushing it several hours afterwards. The patient notes having some redness, slight visual blurring, and clear discharge. The patient has been putting proparacaine eye drops in his eye frequently before coming into the ED(from a previous eye injury). The patient does not wear contacts. The patient denies any eye pain, itchiness, trauma or any other acute symptoms or injuries. He has no further concerns at this time.     Allergies:  No known drug allergies    Medications:    Celexa  Atarax  Lisinopril  Lopressor  Zocor    Past Medical History:    Essential hypertension  Hypertriglyceridemia  Alcohol abuse  Spontaneous pneumothorax    Past Surgical History:    Calcaneus fracture    Family History:    Lymphoma    Social History:  Smoking status: Yes  Alcohol use: No  Marital Status:  Single [1]    Review of Systems   Eyes: Positive for discharge, redness and visual disturbance. Negative for pain and itching.   All other systems reviewed and are negative.    Physical Exam     Patient Vitals for the past 24 hrs:   BP Temp Temp src Pulse Resp SpO2 Height Weight   06/06/19 1852 159/90 98.4  F (36.9  C) Oral 76 18 97 % 1.778 m (5' 10\") 95.3 kg (210 lb)     Physical Exam  General: Resting comfortably.  Alert and oriented.   Head:  The scalp, face, and head appear normal   Eyes:  The pupils are equal, round, and reactive to light     Extraocular muscles are intact    Right conjunctival injection    Mild tearing noted to the right eye. No purulent drainage.      No hyphema    Visual acuity: Right: 20/15, " Left: 20/20    Eye PH: Left 7, right 7.     Eye pressure, right: 15.     Lids were everted without obvious foreign body.    No hyphema.  Negative Zev sign.  No cell and flare.    Corneal abrasion noted at the 6 o'clock position of the right cornea. No ulcer seen.   CV:  Regular rate and rhythm     Normal S1/S2    No pathological murmur detected   Resp:  Lungs are clear to auscultation    Non-labored    No rales or wheezing   MS:  Normal muscular tone   Skin:  No rash or acute skin lesions noted   Neuro: Speech is normal and fluent.       Emergency Department Course   Interventions:  1920: Proparacaine 0.5% ophthalmic solution  1920: Fluorescein ophthalmic strip 1 strip    Emergency Department Course:  Past medical records, nursing notes, and vitals reviewed.  1925: I performed an exam of the patient and obtained history, as documented above.    1930: I performed a Woods slit lamp exam.    1945: I rechecked the patient. Explained findings to patient.    Findings and plan explained to the Patient. Patient discharged home with instructions regarding supportive care, medications, and reasons to return. The importance of close follow-up was reviewed.     Impression & Plan    Medical Decision Making:  Durga Aguirre is a 59 year old male who presents for evaluation of right eye pain. He states he was laying concrete with his neighbor yesterday when some splashed into his eye. He notes that sometimes he has had right eye pain and tearing. He has been using proparacaine drops from a previous eye injury. He states this will relive the pain temporarily. Visual acuity as above. Eye pressures to the right are 15. On exam, the eye is grossly normal aside from the conjunctival injection. No hyphema is present. Fluorescein does demonstrate  corneal abrasion at the 6 O'clock position. There does not appear to be any anterior chamber involvement with the slit lamp examination. There is negative Zev's sign. There are no signs of  orbital cellulitis or preseptal cellulitis at this time. No signs of open globe. I think the patient is safe to be discharged home with Erythromycin drops. I did instruct that he should not be using any drops from the previous injury as this can slow the healing and can be destructive. He was given contact information to follow up in two days with an ophthalmologist at the [facility]. He was asked to return immediately for any visual changes, uncontrolled pain, headache, vomiting, or any other concerns. All questions were answered prior to discharge. The patient understands and agrees to this plan.      Diagnosis:    ICD-10-CM    1. Right corneal abrasion, initial encounter S05.01XA      Disposition:  Discharged to home.    Discharge Medications:     Medication List      Started    erythromycin 5 MG/GM ophthalmic ointment  Commonly known as:  ROMYCIN  0.5 inches, Right Eye, 5 TIMES DAILY          Ace Kinney  6/6/2019    EMERGENCY DEPARTMENT  Scribe Disclosure:  I, Ace Kinney, am serving as a scribe at 7:18 PM on 6/6/2019 to document services personally performed by Yary Combs PA based on my observations and the provider's statements to me.         Yary Combs PA-C  06/06/19 [id number]

## 2019-06-24 ENCOUNTER — OFFICE VISIT (OUTPATIENT)
Dept: INTERNAL MEDICINE | Facility: CLINIC | Age: 59
End: 2019-06-24
Payer: COMMERCIAL

## 2019-06-24 VITALS
HEART RATE: 80 BPM | SYSTOLIC BLOOD PRESSURE: 114 MMHG | DIASTOLIC BLOOD PRESSURE: 70 MMHG | TEMPERATURE: 97.3 F | WEIGHT: 213.7 LBS | OXYGEN SATURATION: 96 % | RESPIRATION RATE: 16 BRPM | BODY MASS INDEX: 30.66 KG/M2

## 2019-06-24 DIAGNOSIS — M72.0 DUPUYTREN CONTRACTURE: ICD-10-CM

## 2019-06-24 DIAGNOSIS — Z11.4 ENCOUNTER FOR SCREENING FOR HIV: ICD-10-CM

## 2019-06-24 DIAGNOSIS — I10 ESSENTIAL HYPERTENSION: Primary | ICD-10-CM

## 2019-06-24 DIAGNOSIS — E78.1 HYPERTRIGLYCERIDEMIA: ICD-10-CM

## 2019-06-24 DIAGNOSIS — Z11.59 NEED FOR HEPATITIS C SCREENING TEST: ICD-10-CM

## 2019-06-24 DIAGNOSIS — F41.1 GENERALIZED ANXIETY DISORDER: ICD-10-CM

## 2019-06-24 DIAGNOSIS — F10.288 ALCOHOL DEPENDENCE WITH OTHER ALCOHOL-INDUCED DISORDER (H): ICD-10-CM

## 2019-06-24 LAB
ANION GAP SERPL CALCULATED.3IONS-SCNC: 8 MMOL/L (ref 3–14)
BUN SERPL-MCNC: 15 MG/DL (ref 7–30)
CALCIUM SERPL-MCNC: 9 MG/DL (ref 8.5–10.1)
CHLORIDE SERPL-SCNC: 107 MMOL/L (ref 94–109)
CHOLEST SERPL-MCNC: 190 MG/DL
CO2 SERPL-SCNC: 23 MMOL/L (ref 20–32)
CREAT SERPL-MCNC: 0.89 MG/DL (ref 0.66–1.25)
GFR SERPL CREATININE-BSD FRML MDRD: >90 ML/MIN/{1.73_M2}
GLUCOSE SERPL-MCNC: 107 MG/DL (ref 70–99)
HCV AB SERPL QL IA: NORMAL
HDLC SERPL-MCNC: 27 MG/DL
LDLC SERPL CALC-MCNC: ABNORMAL MG/DL
LDLC SERPL DIRECT ASSAY-MCNC: 76 MG/DL
NONHDLC SERPL-MCNC: 163 MG/DL
POTASSIUM SERPL-SCNC: 4.6 MMOL/L (ref 3.4–5.3)
SODIUM SERPL-SCNC: 138 MMOL/L (ref 133–144)
TRIGL SERPL-MCNC: 537 MG/DL

## 2019-06-24 PROCEDURE — 83721 ASSAY OF BLOOD LIPOPROTEIN: CPT | Mod: 59 | Performed by: INTERNAL MEDICINE

## 2019-06-24 PROCEDURE — 80061 LIPID PANEL: CPT | Performed by: INTERNAL MEDICINE

## 2019-06-24 PROCEDURE — 87389 HIV-1 AG W/HIV-1&-2 AB AG IA: CPT | Performed by: INTERNAL MEDICINE

## 2019-06-24 PROCEDURE — 36415 COLL VENOUS BLD VENIPUNCTURE: CPT | Performed by: INTERNAL MEDICINE

## 2019-06-24 PROCEDURE — 99214 OFFICE O/P EST MOD 30 MIN: CPT | Performed by: INTERNAL MEDICINE

## 2019-06-24 PROCEDURE — 80048 BASIC METABOLIC PNL TOTAL CA: CPT | Performed by: INTERNAL MEDICINE

## 2019-06-24 RX ORDER — LISINOPRIL 10 MG/1
10 TABLET ORAL DAILY
Qty: 90 TABLET | Refills: 1 | Status: SHIPPED | OUTPATIENT
Start: 2019-06-24 | End: 2020-01-30

## 2019-06-24 RX ORDER — CITALOPRAM HYDROBROMIDE 40 MG/1
40 TABLET ORAL EVERY MORNING
Qty: 90 TABLET | Refills: 1 | Status: SHIPPED | OUTPATIENT
Start: 2019-06-24 | End: 2020-03-09

## 2019-06-24 NOTE — PROGRESS NOTES
Subjective     Durga Aguirre is a 59 year old male who presents to clinic today for the following health issues:    HPI     Since seen he states his been sober    Hypertension Follow-up      Do you check your blood pressure regularly outside of the clinic? Yes     Are you following a low salt diet? Yes    Are your blood pressures ever more than 140 on the top number (systolic) OR more   than 90 on the bottom number (diastolic), for example 140/90? No    BP Readings from Last 3 Encounters:   06/24/19 114/70   06/06/19 159/90   05/20/19 154/82                  Reviewed and updated as needed this visit by Provider         Review of Systems   ROS COMP: Constitutional, HEENT, cardiovascular, pulmonary, gi and gu systems are negative, except as otherwise noted.      Objective    /70   Pulse 80   Temp 97.3  F (36.3  C) (Temporal)   Resp 16   Wt 96.9 kg (213 lb 11.2 oz)   SpO2 96%   BMI 30.66 kg/m    Body mass index is 30.66 kg/m .  Physical Exam   GENERAL APPEARANCE: alert, no distress and over weight  RESP: lungs clear to auscultation - no rales, rhonchi or wheezes  CV: regular rates and rhythm, normal S1 S2, no S3 or S4 and no murmur, click or rub  MSK: firm, palmar mass R hand          Assessment & Plan     1. Essential hypertension  Continue ACEi. Cont sobriety-  This in itself is done more for his blood pressure than any other meds  - lisinopril (PRINIVIL/ZESTRIL) 10 MG tablet; Take 1 tablet (10 mg) by mouth daily  Dispense: 90 tablet; Refill: 1  - Basic metabolic panel    2. Alcohol dependence with other alcohol-induced disorder (H)  Continue sobriety    3. Dupuytren contracture  - ORTHOPEDICS ADULT REFERRAL    4. Generalized anxiety disorder  - citalopram (CELEXA) 40 MG tablet; Take 1 tablet (40 mg) by mouth every morning  Dispense: 90 tablet; Refill: 1     Tobacco Cessation:   reports that he has been smoking cigarettes.  He has smoked for the past 20.00 years. He has never used smokeless  "tobacco.  Tobacco Cessation Action Plan: Information offered: Patient not interested at this time      BMI:   Estimated body mass index is 30.66 kg/m  as calculated from the following:    Height as of 6/6/19: 1.778 m (5' 10\").    Weight as of this encounter: 96.9 kg (213 lb 11.2 oz).   Weight management plan: Discussed healthy diet and exercise guidelines            Return in about 3 months (around 9/24/2019) for BP Recheck.    Rohit Solitario MD  [facility]      "

## 2019-06-24 NOTE — PATIENT INSTRUCTIONS
"  Patient Education     Triglycerides  Does this test have other names?  Lipid panel, fasting lipoprotein panel  What is this test?  This test measures the amount of triglycerides in your blood.  Triglycerides are one of several types of fats in your blood. Other kinds are LDL (\"bad\") cholesterol and HDL (\"good\") cholesterol.  Knowing your triglyceride level is important, especially if you have diabetes, are overweight or a smoker, or are mostly inactive. High triglyceride levels may put you at greater risk for a heart attack or stroke.    This test is part of a group of cholesterol and blood fat tests called a fasting lipoprotein panel, or lipid panel. This panel is recommended for all adults at least once every 5 years, or as recommended by your healthcare provider.  Knowing your triglyceride level helps your healthcare provider suggest healthy changes to your diet or lifestyle. If you have triglycerides that are high to very high, your provider is more likely to prescribe medicines to lower your triglycerides or your LDL cholesterol.  Why do I need this test?  You may need this test as part of a routine checkup. You may also need this test if you're overweight, drink too much alcohol, rarely exercise, or have other conditions like high blood pressure or diabetes.  If you are on cholesterol-lowering medicines, you may have this test to see how well your treatment is working.  What other tests might I have along with this test?  Your healthcare provider will order screening tests for LDL, HDL, and total cholesterol.  What do my test results mean?  Test results may vary depending on your age, gender, health history, the method used for the test, and other things. Your test results may not mean you have a problem. Ask your healthcare provider what your test results mean for you.   Results are given in milligrams per deciliter (mg/dL). Normal levels of triglycerides are less than 150 mg/dL.  Here are how higher " numbers are classified:    150 to 199 mg/dL: Borderline high    200 to 499 mg/dL: High    500 mg/dL and above: Very high  If you have a high triglyceride level, you have a greater risk for heart attack and stroke.  A triglyceride level above 150 mg/dL also means that you may have an increased risk for metabolic syndrome. This is a cluster of symptoms including high blood pressure, high blood sugar, and high body fat around the waist. These symptoms have been linked to increased risk for diabetes, heart disease, and stroke.  High triglycerides levels can also be caused by certain diseases or inherited conditions.  If your triglyceride level is above 200 mg/dL, your healthcare provider may recommend that you:    Lose weight    Limit high-fat foods containing saturated fats. These are animal fats found in meat, butter, and whole milk.    Limit trans fats, which are found in many processed foods like chips and store-bought cookies    Cut back on drinks with added sugars, such as soda    Limit your alcohol intake    Stop smoking    Control your blood pressure    Exercise for at least 30 minutes a day, 5 days a week    Limit the calories from fat in your diet to 25% to 35% of your total intake  If your triglycerides are extremely high--above 500 mg/dL--you may have an added risk for problems with your pancreas. You will likely need medicine to lower your levels along with recommended changes in diet and lifestyle.  How is this test done?  The test is done with a blood sample. A needle is used to draw blood from a vein in your arm or hand.   Does this test pose any risks?  Having a blood test with a needle carries some risks. These include bleeding, infection, bruising, and feeling lightheaded. When the needle pricks your arm or hand, you may feel a slight sting or pain. Afterward, the site may be sore.   What might affect my test results?  Not fasting for the required length of time before the test can affect your  results. Certain medicines can affect your results, as can drinking alcohol.  How do I prepare for the test?  If you have this test as part of a cholesterol screening, you will need to not eat or drink anything but water for 9 to 14 hours before the test. Be sure your healthcare provider knows about all medicines, herbs, vitamins, and supplements you are taking. This includes medicines that don't need a prescription and any illicit drugs you may use.    3830-6846 [employer]. [address] [place] [address]. All rights reserved. This information is not intended as a substitute for professional medical care. Always follow your healthcare professional's instructions.

## 2019-06-25 LAB — HIV 1+2 AB+HIV1 P24 AG SERPL QL IA: NONREACTIVE

## 2019-07-30 ENCOUNTER — NURSE TRIAGE (OUTPATIENT)
Dept: INTERNAL MEDICINE | Facility: CLINIC | Age: 59
End: 2019-07-30

## 2019-07-30 NOTE — TELEPHONE ENCOUNTER
Patient wanted to know provider thoughts if any thing strikes any concern.     Martha CASE RN, BSN, PHN

## 2019-07-30 NOTE — TELEPHONE ENCOUNTER
"    Additional Information    Negative: Stridor or severe difficulty breathing (e.g., struggling for breath)    Negative: Started suddenly after sting from bee, wasp, or yellow jacket    Negative: Started suddenly after taking a medicine or allergic food (e.g., nuts, seafood)    Negative: Started suddenly along with widespread hives    Negative: Started suddenly and taking an ACE Inhibitor medication (e.g., benazepril/LOTENSIN, captopril/CAPOTEN, enalapril/VASOTEC, lisinopril/ZESTRIL)    Negative: Can't swallow normal secretions (e.g., drooling or spitting)    Negative: Tongue or facial swelling    Negative: Sounds like a life-threatening emergency to the triager    Negative: Nasal allergies also present and they are acting up    Negative: Cold symptoms are main concern    Negative: Sore throat is main symptom    Negative: SEVERE sore throat pain    Negative: Fever present > 3 days (72 hours)    Hoarseness persists > 2 weeks    Patient wants to be seen    Negative: Fever > 103 F (39.4 C)    Negative: Difficulty breathing    Negative: Patient sounds very sick or weak to the triager    Negative: Resolved choking episode and hoarseness lasts > 30 minutes    Negative: Direct blow to front of neck    Negative: Hoarseness is a chronic symptom (recurrent or ongoing AND present > 4 weeks)    Negative: Mild hoarseness    Answer Assessment - Initial Assessment Questions  1. DESCRIPTION: \"Describe your voice.\"      Working on garage next door pulling off old cedar. Placed a mask on while doing so. Does not recall breathing in any dust. Slowly started to loose his voice  2. ONSET: \"When did the hoarseness begin?\"      About 5-6 days ago  3. COUGH: \"Is there a cough?\" If so, ask: \"How bad?\"      Cough at night sometime but not during the day. May wake up a few times.  4. FEVER: \"Do you have a fever?\" If so, ask: \"What is your temperature, how was it measured, and when did it start?\"      No  5. RESPIRATORY STATUS: \"Describe your " "breathing.\"       none  6. ALLERGIES: \"Any allergy symptoms?\" If so, ask: \"What are they?\"      Taking hydroxyzine for anxiety reasons. Denies nasal congestion, watery eyes or sneezing  7. IRRITANTS: \"Do you smoke?\" \"Have you been exposed to any irritating fumes?\"      Occasional smkoker, less than one a day.  8. CAUSE: \"What do you think is causing the hoarseness?\"      Unknown, does have a fan running at night. Concerned of possible throat cancer d/t recent relationship  9. OTHER SYMPTOMS: \"Do you have any other symptoms?\" (e.g., sore throat, swelling, foreign body, rash)      None. Denies truama to throat or neck. Can swallow without difficulty   10. PREGNANCY: \"Is there any chance you are pregnant?\" \"When was your last menstrual period?\"        N/A    Protocols used: [id number]      "

## 2019-07-30 NOTE — TELEPHONE ENCOUNTER
If only 5-6 days of sx would not be too concerned.   Can see him but typically do not pursue further eval unless sx persist beyond 3 wks

## 2019-08-07 NOTE — TELEPHONE ENCOUNTER
Pt called and is scheduled w/ Dr. Leblanc on 8/15/19 @ 2:45./ Reclosing encounter.     Rosalia Ardon  [facility]

## 2019-08-13 ENCOUNTER — TRANSFERRED RECORDS (OUTPATIENT)
Dept: HEALTH INFORMATION MANAGEMENT | Facility: CLINIC | Age: 59
End: 2019-08-13

## 2019-08-15 ENCOUNTER — OFFICE VISIT (OUTPATIENT)
Dept: ADDICTION MEDICINE | Facility: CLINIC | Age: 59
End: 2019-08-15
Payer: COMMERCIAL

## 2019-08-15 VITALS
HEIGHT: 70 IN | SYSTOLIC BLOOD PRESSURE: 130 MMHG | BODY MASS INDEX: 30.64 KG/M2 | DIASTOLIC BLOOD PRESSURE: 72 MMHG | HEART RATE: 82 BPM | TEMPERATURE: 99.7 F | WEIGHT: 214 LBS | OXYGEN SATURATION: 96 % | RESPIRATION RATE: 16 BRPM

## 2019-08-15 DIAGNOSIS — F10.20 UNCOMPLICATED ALCOHOL DEPENDENCE (H): Primary | ICD-10-CM

## 2019-08-15 PROCEDURE — 99203 OFFICE O/P NEW LOW 30 MIN: CPT | Performed by: FAMILY MEDICINE

## 2019-08-15 ASSESSMENT — MIFFLIN-ST. JEOR: SCORE: 1781.95
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
## 2019-08-28 ENCOUNTER — HOSPITAL ENCOUNTER (OUTPATIENT)
Dept: BEHAVIORAL HEALTH | Facility: CLINIC | Age: 59
Discharge: HOME | End: 2019-08-28
Attending: SOCIAL WORKER | Admitting: SOCIAL WORKER
Payer: COMMERCIAL

## 2019-08-28 PROCEDURE — H0001 ALCOHOL AND/OR DRUG ASSESS: HCPCS

## 2019-08-28 ASSESSMENT — ANXIETY QUESTIONNAIRES
2. NOT BEING ABLE TO STOP OR CONTROL WORRYING: SEVERAL DAYS
GAD7 TOTAL SCORE: 12
5. BEING SO RESTLESS THAT IT IS HARD TO SIT STILL: SEVERAL DAYS
7. FEELING AFRAID AS IF SOMETHING AWFUL MIGHT HAPPEN: MORE THAN HALF THE DAYS
1. FEELING NERVOUS, ANXIOUS, OR ON EDGE: MORE THAN HALF THE DAYS
4. TROUBLE RELAXING: SEVERAL DAYS
3. WORRYING TOO MUCH ABOUT DIFFERENT THINGS: NEARLY EVERY DAY
6. BECOMING EASILY ANNOYED OR IRRITABLE: MORE THAN HALF THE DAYS

## 2019-08-28 ASSESSMENT — PAIN SCALES - GENERAL: PAINLEVEL: MILD PAIN (2)

## 2019-08-28 ASSESSMENT — PATIENT HEALTH QUESTIONNAIRE - PHQ9: SUM OF ALL RESPONSES TO PHQ QUESTIONS 1-9: 3

## 2019-08-28 NOTE — PROGRESS NOTES
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 open    Do you have a preference for a particular treatment program? open    Criteria for Diagnosis     Criteria for Diagnosis  DSM-5 Criteria for Substance Use Disorder  Instructions: Determine whether the client currently meets the criteria for Substance Use Disorder using the diagnostic criteria in the DSM-V pp.481-589. Current means during the most recent 12 months outside a facility that controls access to substances    Category of Substance Severity (ICD-10 Code / DSM 5 Code)     Alcohol Use Disorder Severe  (10.20) (303.90)   Cannabis Use Disorder The patient does not meet the criteria for a Cannabis use disorder.   Hallucinogen Use Disorder The patient does not meet the criteria for a Hallucinogen use disorder.   Inhalant Use Disorder The patient does not meet the criteria for an Inhalant use disorder.   Opioid Use Disorder The patient does not meet the criteria for an Opioid use disorder.   Sedative, Hypnotic, or Anxiolytic Use Disorder The patient does not meet the criteria for a Sedative/Hypnotic use disorder.   Stimulant Related Disorder The patient does not meet the criteria for a Stimulant use disorder.   Tobacco Use Disorder Mild    (Z72.0)  (305.1)   Other (or unknown) Substance Use Disorder The patient does not meet the criteria for a Other (or unknown) Substance use disorder.       Collateral Contact Summary   Number of contacts made: 1    Contact with referring person:  No    If court related records were reviewed, summarize here: No court records had been reviewed at the time of this documentation.    Information from collateral contacts supported/largely agreed with information from the client and associated risk ratings.      Rule 25 Assessment Summary and Plan   's Recommendation    Client is recommended to attend the [facility] treatment program at [facility].      Collateral Contacts     Name:    [facility]   Relationship:    Medical records   Phone Number:    none Releases:    No     EHR was reviewed.      Collateral Contacts     Name:    None   Relationship:    none   Phone Number:    none   Releases:    No         ollateral Contacts      A problematic pattern of alcohol/drug use leading to clinically significant impairment or distress, as manifested by at least two of the following, occurring within a 12-month period:    2.) There is a persistent desire or unsuccessful efforts to cut down or control alcohol/drug use  3.) A great deal of time is spent in activities necessary to obtain alcohol, use alcohol, or recover from its effects.  4.) Craving, or a strong desire or urge to use alcohol/drug  6.) Continued alcohol use despite having persistent or recurrent social or interpersonal problems caused or exacerbated by the effects of alcohol/drug.  9.) Alcohol/drug use is continued despite knowledge of having a persistent or recurrent physical or psychological problem that is likely to have been caused or exacerbated by alcohol.  10.) Tolerance, as defined by either of the following: A need for markedly increased amounts of alcohol/drug to achieve intoxication or desired effect. and A markedly diminished effect with continued  use of the same amount of alcohol/drug.  11.) Withdrawal, as manifested by either of the following: The characteristic withdrawal syndrome for alcohol/drug (refer to Criteria A and B of the criteria set for alcohol/drug withdrawal). and Alcohol/drug (or a closely related substance, such as a benzodiazepine) is taken to relieve or avoid withdrawal symptoms.      Specify if: In early remission:  After full criteria for alcohol/drug use disorder were previously met, none of the criteria for alcohol/drug use disorder have been met for at least 3 months but for less than 12 months (with the exception that Criterion A4,  Craving or a strong desire or urge to use alcohol/drug  may be met).     In sustained remission:   After full criteria for alcohol use disorder were previously met, non of the criteria for alcohol/drug use disorder have been met at any time during a period of 12 months or longer (with the exception that Criterion A4,  Craving or strong desire or urge to use alcohol/drug  may be met).   Specify if:   This additional specifier is used if the individual is in an environment where access to alcohol is restricted.    Mild: Presence of 2-3 symptoms  Moderate: Presence of 4-5 symptoms  Severe: Presence of 6 or more symptoms
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
## 2019-08-29 ASSESSMENT — ANXIETY QUESTIONNAIRES: GAD7 TOTAL SCORE: 12

## 2019-09-10 ENCOUNTER — TRANSFERRED RECORDS (OUTPATIENT)
Dept: HEALTH INFORMATION MANAGEMENT | Facility: CLINIC | Age: 59
End: 2019-09-10

## 2019-09-16 ENCOUNTER — HOSPITAL ENCOUNTER (OUTPATIENT)
Dept: BEHAVIORAL HEALTH | Facility: CLINIC | Age: 59
End: 2019-09-16
Attending: SOCIAL WORKER
Payer: COMMERCIAL

## 2019-09-16 PROCEDURE — H2035 A/D TX PROGRAM, PER HOUR: HCPCS

## 2019-09-16 PROCEDURE — H2035 A/D TX PROGRAM, PER HOUR: HCPCS | Mod: HQ

## 2019-09-16 NOTE — PROGRESS NOTES
Outcome of vulnerable Adult Assessment for Outpatients:    1.  Do you have a physical, emotional or mental infirmity or dysfunction?       Yes (explain) - some chronic pain, anxiety    2.  Does this issue impair your ability to provide for your own care without help, including providing yourself with food, shelter, clothing, healthcare or supervision?       No      3.  Because of this issue, I need assistance to protect myself from maltreatment by others.      No    Based on the above information:    This person is not a functional Vulnerable Adult according to [place] Statute 626.5572 subdivision 21.

## 2019-09-16 NOTE — PROGRESS NOTES
Patient:  Durga Aguirre    Date: September 16, 2019    Comprehensive Assessment UPDATE        Comprehensive Summary Update and Review  Counselor met with patient on 9/16/2019 and reviewed the Comprehensive Assessment.    The following updates have been made: Last use date changed from 8/27/2019 to 9/15/2019.        [place] Suicide Severity Rating Reassessment    Have you ever wished you were dead or that you could go to sleep and not wake up? Past Month?  NO       Have you actually had any thoughts of killing yourself?   Past Month? NO    Have you been thinking about how you might do this?   Past Month?  N/A  Lifetime?   N/A     Have you had these thoughts and had some intention of acting on them?   Past Month?  N/A  Lifetime?   N/A     Have you started to work out the details of how to kill yourself?  Past Month?  N/A  Lifetime?   N/A     Do you intend to carry out this plan?   N/A     When you have the thoughts how long do they last?   N/A    Are there things - anyone or anything (ie Family, Yazidism, pain of death) that stopped you from wanting to die or acting on thoughts of suicide?   Does not apply        2008  [employer]  Used with permission by Loree Pelayo, PhD.

## 2019-09-16 NOTE — PROGRESS NOTES
"    Initial Services Plan          Immediate health & safety concerns: Other: none noted    Suggestions for client during the time between intake & completion of treatment plan:  Tour your treatment center (unit or outpatient clinic).  Introduce yourself to the treatment group.  Spend time getting to know your peers.  Complete the goals list for your treatment plan.  Review your client handbook.  Look for a support network (such as AA, NA, DBT group, a Zoroastrianism group, etc.)  Begin completing \"10 Worst Consequences\" assignment.    Client issues to be addressed in the first treatment sessions:  Arrange transportation as needed.  Acclimate client to group and IOP.    ELANA Herman  9/16/2019  4:05 PM  "

## 2019-09-17 ENCOUNTER — HOSPITAL ENCOUNTER (OUTPATIENT)
Dept: BEHAVIORAL HEALTH | Facility: CLINIC | Age: 59
End: 2019-09-17
Attending: SOCIAL WORKER
Payer: COMMERCIAL

## 2019-09-17 PROCEDURE — H2035 A/D TX PROGRAM, PER HOUR: HCPCS | Mod: HQ

## 2019-09-17 NOTE — PROGRESS NOTES
"Durga Aguirre  [id number]               Adult CD Progress Note and Treatment Plan Review     Attendance     Monday    Group Date: 9/16/2019   Group Attendance Attended group session   Group Therapy Type Addiction   Group Topic Covered Grief & Loss, Meditation/Breathing Exercises, Relapse Prevention and Thinking Errors/Negative Self-Talk   Client's Response To Group Topic Cooperative with task Discussed personal experience with topic Expressed readiness to alter behaviors Listened actively   Client Group Participation Detail Highly involved   Group Attendance (Time) 2.0 Hours   Individual Attendance 1 Hour   Family Attendance None   Other Comments/Information None      Tuesday     Group Date: 9/17/2019   Group Attendance Attended group session   Group Therapy Type Psychoeducation   Group Topic Covered Disease of Addiction/Choices in Recovery, Relapse Prevention and \"Parth to Self\" video   Client's Response To Group Topic Cooperative with task Listened actively   Client Group Participation Detail Highly involved   Group Attendance (Time) 2.0 Hours   Individual Attendance None   Family Attendance None   Other Comments/Information None      Wednesday    Group Date: 9/18/2019   Group Attendance Attended group session   Group Therapy Type Addiction   Group Topic Covered Coping Skills/Lifestyle Management, Relapse Prevention and Reviewed Previous Skills Group Topic (discussion of Parth to Self video)   Client's Response To Group Topic Cooperative with task Discussed personal experience with topic Expressed readiness to alter behaviors Listened actively   Client Group Participation Detail Highly involved   Group Attendance (Time) 2.0 Hours   Individual Attendance 1 Hour   Family Attendance None   Other Comments/Information None     Total # of Phase 1 Group Sessions: 4     Total # of Phase 2 Group Sessions:   Total # of Phase 3 Group Sessions:   Total # of 1:1 Sessions: 2    Support group attended this week: yes    Reporting " sobriety: Yes, client reports last use date of alcohol as 9/15/2019    Treatment Plan Review     Treatment Plan Review completed on:  9/20/2019     Projected discharge date: 1/23/2020    Client preferred learning style: by hands-on practice    Staff member(s) contributing: Marquis Quintero [facility]     Received supervision: Staffed with Megan Conde [facility]     Client involvement with treatment planning: contributed to goals and plan.    Client received copy of plan/revised plan: Yes, signed 9/18/2019    Client agrees with plan/revised plan: Yes    Changes to Treatment Plan: No    Client's Dimension 1 Goal(s) Develop effective strategies to maintain sobriety.    See below.   Client's Dimension 2 Goal(s) Disclose CD status to medical providers and follow up with medical interventions while in IOP.   Maintain good physical health. See below.   Client's Dimension 3 Goal(s) Learn how to apply self-care and psychotherapy to build a repertoire of daily habits that counteract PAWS, fatigue, depression and anxiety leading to increased resiliency and well-being.  Understand the relationship between mental health concerns and addiction.   See below.   Client's Dimension 4 Goal(s) Understand the impact your substance use has had on you, your family, and significant relationships.  Increase internal motivation to change.   See below.   Client's Dimension 5 Goal(s) Identify personal triggers and relapse warning signs.  Develop sober coping and living skills in order to address the development of a strategy for long term recovery See below.   Client's Dimension 6 Goal(s) Resolve issues with legal.  Increase sober support network See below.     New Goals added since last review: Yes - treatment plan created.    Goal(s) worked on since last review: Dim 4, 5, 6    Strategies effective: Yes    Treatment Coordination Activities: Yes - recommended sober support groups.    Medical, Mental Health and other appointments the client attended:  "None.    Medication issues: Client reports compliance with mental health medications of citalopram and hydroxyzine but does not find them helpful..    Physical and mental health problems: Client endorses conditions of hypertension, high cholesterol, and mild chronic pain.  Client endorses mental health concerns of anxiety.  Client denies current psychotherapy.    Review and evaluation of the individual abuse prevention plan: The program's individual abuse prevention plan (IAPP) is sufficient for this client.     Substance Use Disorders:    Alcohol Use Disorder Severe - 303.90 (F10.20)  Tobacco Use Disorder Mild - 305.10 (Z72.0)    ASAM Risk Rating: (Note the rationale for risk rating changes)    Dimension 1 0  Client exhibits no signs of intoxication or withdrawal throughout the week.    Dimension 2 1  On Monday client denies any new or worsening medical conditions.  Client reports exercise.    Dimension 3 2  On Monday client rates his depression, anxiety, or other mental health concerns as 5 of 10 (10 highest), due to \"new program\".  Client denies any thoughts of hurting himself or others.     Dimension 4 2  On Monday client rates his motivation for recovery as 9 of 10 (10 highest).  Client expressed 3 reasons to remain sober.     Dimension 5 3  On Monday client did not rate his cravings in the last week due to \"starting sobriety\".  He had been drinking until the night before treatment began.  Client reports a personal strength of having knowledge that he has \"taken it too far\".  He did not report use of any coping skills.    Dimension 6 3  On Monday client denied attendance at sober support meetings this week.  Client denies having a sponsor.  Client denies sober activities.    Data: (Need to include short narrative for the week on what was worked on)  offered feedback client did actively participate  On Monday client attended his first group therapy session.  Client introduced himself and gave a short explanation " of what got him here. Client actively participated in group, was alert and provided feedback.  Topics covered with coverage counselor over past 2 weeks reviewed.  Client received education about grief and loss and took part in group discussion.  Client practiced 4x4 and natural mindful breathing.  Client expresses this exercise as helpful.  On Tuesday, Readiness to Change, Relapse Prevention, and Recovery Planning were addressed.  The video  Parth to Self:  Protecting Sobriety with the Science of Safety  was presented.  On Wednesday these were further addressed through discussion of Recovery Management as presented in that video.   Client expressed something he was grateful for each day.      Intervention:   Behavioral Therapy, Cognitive Behavioral Therapy, Counselor Feedback, Psychoeducation, Emotional management, Group Feedback, Motivational Interviewing, Relapse Prevention, Twelve Step Facilitation, REBT    Assessment:    Stages of Change Model  Contemplation    Appears/Sounds:  Cooperative    Plan:   Client will try one new coping skill/activity before next session.  Client will prepare to present first half of 10 Worst Consequences assignment next week.  Practice choice of meditation techniques this week.  Express 3 things he is grateful for each night.  -Attend 2-3 sober support group meetings each week (this includes non-12-step/AA alternative meetings).  -Client will attend all weekly Phase 1 group sessions.   -Client to engage in sober activities each week.    Marquis Quintero, [facility]

## 2019-09-18 ENCOUNTER — HOSPITAL ENCOUNTER (OUTPATIENT)
Dept: BEHAVIORAL HEALTH | Facility: CLINIC | Age: 59
End: 2019-09-18
Attending: SOCIAL WORKER
Payer: COMMERCIAL

## 2019-09-18 PROCEDURE — H2035 A/D TX PROGRAM, PER HOUR: HCPCS

## 2019-09-18 PROCEDURE — H2035 A/D TX PROGRAM, PER HOUR: HCPCS | Mod: HQ

## 2019-09-18 NOTE — PROGRESS NOTES
Patient Safety Plan Template    Name:   Durga Aguirre YOB: 1960 Age:  59 year old MR Number:  [id number]   Step 1: Warning signs (Thoughts, images, mood, situation, behavior) that a crisis may be developin. Worrying about helping my ageing mom     2. Worrying about things I put off     3. Worrying about my future     Step 2: Internal coping strategies - Things I can do to take my mind off of my problems without contacting another person (relaxation technique, physical activity):     1. Quit drinking     2. Completing projects - keeping busy     3. Riding my bicycle     Step 3: People and social settings that provide distraction:     1. Name: helping my son   Phone: [phone]   2. Name: helping my mom   Phone: [phone]   3. Place: surfing TV shows   4. Place:      The one thing that is most important to me and worth living for is: family, my son     Step 4: People whom I can ask for help:     1. Name: Maricruz Beaulieu   Phone: [phone]     2. Name: Palak Aguirre   Phone: [phone]     3. Name: Jose Ramon Daniels   Phone: [phone]     Step 5: Professionals or agencies I can contact during a crisis:     1. Clinician Name: Dr Youssef - [facility]   Phone: [phone]   Clinician Pager or Emergency Contact #: NA     2. Clinician Name: Marquis Quintero [facility]   Phone: [phone]     Clinician Pager or Emergency Contact #: NA     3. Local Urgent Care Services: [facility]    Urgent Care Services Address: [address] [place] [id number]    Urgent Care Services Phone: [phone]     4. Suicide Prevention Lifeline Phone: 5-175-508-AOGJ ([phone])     Step 6: Making the environment safe:     1. No more liquor in the house in any form     2.      Safety Plan Template 2008 Adamaris Ordaz and [place] is reprinted with the express permission of the authors.  No portion of the Safety Plan Template may be reproduced without the express, written permission.  You can contact the  authors at [email or web address] or [email or web address].

## 2019-09-18 NOTE — PROGRESS NOTES
Comprehensive Assessment Summary     Based on client interview, review of previous assessments and   comprehensive assessment interview the following diagnosis and recommendations are:     Patient: Durga Aguirre  MRN; [id number]   : 1960  Age: 59 year old Sex: male    Client meets criteria for:  Alcohol Use Disorder Severe - 303.90 (F10.20)  Tobacco Use Disorder Mild - 305.10 (Z72.0)    Dimension One: Acute Intoxication/Withdrawal Potential     [place]atin  (Consider the client's ability to cope with withdrawal symptoms and current state of intoxication)   Client reports last use of alcohol as 9/15/2019.  Client exhibits no signs of intoxication or withdrawal.    Dimension Two: Biomedical Condition and Complications    Ratin  (Consider the degree to which any physical disorder would interfere with treatment for substance abuse, and the client's ability to tolerate any related discomfort; determine the impact of continued chemical use on the unborn child if the client is pregnant)   Client reports medical conditions that would not interfere with treatment.  Client endorses conditions of hypertension, high cholesterol, and mild chronic pain.  He reports having a PCP and that he is medication compliant.  Client has medical insurance and appears able to navigate the healthcare system independently.    Dimension Three: Emotional/Behavioral/Cognitive Conditions & Complications  Ratin  (Determine the degree to which any condition or complications are likely to interfere with treatment for substance abuse or with functioning in significant life areas and the likelihood of risk of harm to self or others)   Client endorses mental health concerns of anxiety.  Client denies current psychotherapy.  Client reports compliance with mental health medications of citalopram and hydroxyzine but does not find them helpful.  On 2019 client's PHQ-9 score was 3 out of 27, indicating minimal depression and his YUSUF-7  score was 12 out of 21, indicating moderate anxiety.  Client was evaluated at assessment and rated as very low risk for suicide.  Client denies any past or current suicidal ideation, intent or plan.  Client created a Patient Safety Plan and will be monitored throughout treatment.     Dimension Four: Treatment Acceptance/Resistance     Ratin  (Consider the amount of support and encouragement necessary to keep the client involved in treatment)   Client verbalizes motivation to change but exhibits inconsistent behaviors as evidenced by continued drinking between assessment and beginning treatment.  Client admits having a problem with alcohol and has been unable to quit on his own.  Client appears to be in the Contemplation stage of change as he expresses acceptance of need for change and willingness to follow recommendations.    Dimension Five: Continued Use/Relapse Prevention     Rating: 3  (Consider the degree to which the client's recognizes relapse issues and has the skills to prevent relapse of either substance use or mental health problems)   Client reports no detox and 1 previous residential treatment experience in .  Client appears to lack impulse control, insight into his relapse potential, and long-term sober maintenance skills.  Client appears to be at moderately high risk for relapse/continued use.    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  Client lives with his mother who he cares for.  He reports her to be supportive.  Client reports his environment to be safe.  Client appears to have minimal social connections.  Client appears to lack structure or meaningful activities.  He reports some attendance at sober support groups but appears to have a minimal support network.  Client reports current legal  involvement of disorderly conduct, court pending, and reports a history of DWI in 2013 and is still on vehicle interlock restrictions due to several failed tests during the past 6 years.    I have reviewed the information on the assessment, psychosocial and medical history and checklist:        it is current

## 2019-09-19 ENCOUNTER — HOSPITAL ENCOUNTER (OUTPATIENT)
Dept: BEHAVIORAL HEALTH | Facility: CLINIC | Age: 59
End: 2019-09-19
Attending: SOCIAL WORKER
Payer: COMMERCIAL

## 2019-09-19 PROCEDURE — H2035 A/D TX PROGRAM, PER HOUR: HCPCS | Mod: HQ

## 2019-09-20 NOTE — PROGRESS NOTES
Durga Aguirre  September 19, 2019  [id number]    [facility] Mental Health Skills Group Note  Mindfulness    Day: Thursday   Date: 09/19/19   Group Attendance Attended group session   Group Therapy Type Addiction, Psychoeducation and Psychotherapeutic   Group Topic Covered Mindfulness Skills in MH & Addiction    Client's Response To Group Topic Other - Participated when prompted, see details below.   Client Group Participation Detail Shows interest   Group Attendance (Time) 3.0 Hours   Individual Attendance None   Family Attendance None   Other Comments/Information None       Number of participants: 8      Length of Group:  3 hours      Goal of the Group: Learn mindfulness exercises to help clients focus on the present and increase their awareness of thoughts and emotions.  This process helps clients detach from unhelpful thinking patterns and be less affected by negative emotions. This practice has been shown to decrease reactivity and help facilitate effective action to work on changes in the present and thereby create a brighter, healthier future. This practice has also been shown to increase resiliency if practiced regularly.       [place]: Group Topics and Activities      I.  D3 Intervention and Group Topic addressed:  Learn mindfulness exercises to facilitate effective action, educate on mindfulness practice and its benefits.     II. Mindfulness and/or Motivational Component: Mindful Eating, Mindful Listening, Awareness of Sounds Meditation, Sitting Meditation      III. Additional Activity:  Mindful Eating, Mindful Listening, [place] exercise       IV. Review of Progress:   A. Client self-report: This was Durga's first session with this therapist and he shared with group what brought him to treatment. His drug of choice is alcohol and his SD is 9/16/19. He received his journal format today, along with the philosophy behind it, and he agreed to do this daily. Mindfulness: He learned some mindfulness today, was not  "familiar with this; Stress: 0 that he described \"I'm comfortably numb, at peace.\" We then spoke about the \"honeymoon period;\" Cravings: No  Mindful Eating: Durga enjoyed this and said the food tasted good.     Mindful Listening: He felt he bonded with his exercise partner and they had things in common.     B. Therapist Assessment: Durga participated when prompted and asked questions. At times it appeared he was not following the discussion but he was able to be redirected. Some of his conclusions were not connected to the discussion but he was willing to learn about the subject.   [place] exercise issue: A project he is doing for a neighbor  Comments before mindfulness included: \"I used to be worried and then drink and had weather challenges and I have 2 cars in his garage that he wants me to move out. I need to finish the project.\"   Comments after mindfulness included: \"It's about attitude, I need patience, I was too hung over, being confident, I enjoy what I do.\"     V.   Reflection and evaluation of today's group experience:  Gave verbal feedback and filled out evaluation form. Client wrote that the most important things learned were \"It's better to breathe than hold your breath\" and \"How to lower my tension by thinking\" [these comments do not reflect the discussion.]     VI. Assignments or activities to do for next week: Mindfulness in the day and journal before bed. Continue to monitor stress daily and use skills to manage.      Therapeutic techniques in this session:  Motivational Therapy, CBT/DBT/ACT, Supportive, Behavioral Modification and Mindfulness     Additional Treatment Referrals/Follow-up Issues to Address: Not indicated at this time      Change in Treatment Plan: No changes are indicated at this time. This group does complete one Treatment Plan intervention under D3.       Change in Diagnosis: No Changes at this time.           "

## 2019-09-20 NOTE — ADDENDUM NOTE
Encounter addended by: Marquis Quintero, [facility] on: 9/20/2019 5:31 PM   Actions taken: Pend clinical note

## 2019-09-21 DIAGNOSIS — F41.1 GENERALIZED ANXIETY DISORDER: ICD-10-CM

## 2019-09-22 NOTE — TELEPHONE ENCOUNTER
"Requested Prescriptions   Pending Prescriptions Disp Refills     hydrOXYzine (ATARAX) 25 MG tablet [Pharmacy Med Name: hydrOXYzine HCl Oral Tablet 25 MG] 60 tablet 2     Sig: TAKE 1-2 TABLETS (25-50 MG) BY MOUTH EVERY 6 HOURS AS NEEDED FOR ANXIETY   Last Written Prescription Date:  5/10/2019  Last Fill Quantity: 60,  # refills: 3   Last Office Visit: 6/24/2019   Future Office Visit:    Next 5 appointments (look out 90 days)    Sep 24, 2019  8:40 AM CDT  SHORT with Rohit Solitario MD  [facility] [address] [place] [id number] [phone]             Antihistamines Protocol Passed - 9/21/2019 12:15 PM        Passed - Recent (12 mo) or future (30 days) visit within the authorizing provider's specialty     Patient had office visit in the last 12 months or has a visit in the next 30 days with authorizing provider or within the authorizing provider's specialty.  See \"Patient Info\" tab in inbasket, or \"Choose Columns\" in Meds & Orders section of the refill encounter.              Passed - Patient is age 3 or older     Apply age and/or weight-based dosing for peds patients age 3 and older.    Forward request to provider for patients under the age of 3.          Passed - Medication is active on med list          "

## 2019-09-23 ENCOUNTER — HOSPITAL ENCOUNTER (OUTPATIENT)
Dept: BEHAVIORAL HEALTH | Facility: CLINIC | Age: 59
End: 2019-09-23
Attending: SOCIAL WORKER
Payer: COMMERCIAL

## 2019-09-23 PROCEDURE — H2035 A/D TX PROGRAM, PER HOUR: HCPCS | Mod: HQ

## 2019-09-24 ENCOUNTER — HOSPITAL ENCOUNTER (OUTPATIENT)
Dept: BEHAVIORAL HEALTH | Facility: CLINIC | Age: 59
End: 2019-09-24
Attending: SOCIAL WORKER
Payer: COMMERCIAL

## 2019-09-24 ENCOUNTER — OFFICE VISIT (OUTPATIENT)
Dept: INTERNAL MEDICINE | Facility: CLINIC | Age: 59
End: 2019-09-24
Payer: COMMERCIAL

## 2019-09-24 VITALS
WEIGHT: 212.9 LBS | HEART RATE: 77 BPM | OXYGEN SATURATION: 96 % | TEMPERATURE: 99.3 F | SYSTOLIC BLOOD PRESSURE: 132 MMHG | DIASTOLIC BLOOD PRESSURE: 78 MMHG | HEIGHT: 70 IN | BODY MASS INDEX: 30.48 KG/M2

## 2019-09-24 DIAGNOSIS — F10.20 UNCOMPLICATED ALCOHOL DEPENDENCE (H): Primary | ICD-10-CM

## 2019-09-24 DIAGNOSIS — F41.1 GENERALIZED ANXIETY DISORDER: ICD-10-CM

## 2019-09-24 PROCEDURE — 99213 OFFICE O/P EST LOW 20 MIN: CPT | Performed by: INTERNAL MEDICINE

## 2019-09-24 PROCEDURE — H2035 A/D TX PROGRAM, PER HOUR: HCPCS | Mod: HQ

## 2019-09-24 RX ORDER — HYDROXYZINE HYDROCHLORIDE 25 MG/1
TABLET, FILM COATED ORAL
Qty: 60 TABLET | Refills: 2 | OUTPATIENT
Start: 2019-09-24

## 2019-09-24 RX ORDER — HYDROXYZINE HYDROCHLORIDE 25 MG/1
TABLET, FILM COATED ORAL
Qty: 60 TABLET | Refills: 3 | Status: SHIPPED | OUTPATIENT
Start: 2019-09-24 | End: 2020-02-11

## 2019-09-24 ASSESSMENT — MIFFLIN-ST. JEOR: SCORE: 1776.96

## 2019-09-24 NOTE — PROGRESS NOTES
"Subjective     Durga Aguirre is a 59 year old male who presents to clinic today for the following health issues:    HPI   Back in outpatient rx for his alcohol addiction  Wants higher dose of hydroxyzine for sleep and anxiety  Taking citalopram  In past - on buspar- did not feel this was helpful.           Reviewed and updated as needed this visit by Provider         Review of Systems   ROS COMP: Constitutional, HEENT, cardiovascular, pulmonary, gi and gu systems are negative, except as otherwise noted.      Objective    /78   Pulse 77   Temp 99.3  F (37.4  C) (Oral)   Ht 1.778 m (5' 10\")   Wt 96.6 kg (212 lb 14.4 oz)   SpO2 96%   BMI 30.55 kg/m    Body mass index is 30.55 kg/m .  Physical Exam       none         Assessment & Plan     1. Generalized anxiety disorder  Needs to see psychiatry  Cont ssri and prn hydroxyzine but w/ongoing substance use d/o + mental health -> prefer psych to manage  - MENTAL HEALTH REFERRAL  - Adult; Psychiatry and Medication Management; Psychiatry; Other: [facility] [phone]; We will contact you to schedule the appointment or please call with any questions  - hydrOXYzine (ATARAX) 25 MG tablet; TAKE 1-2 TABLETS (25-50 MG) BY MOUTH EVERY 6 HOURS AS NEEDED FOR ANXIETY  Dispense: 60 tablet; Refill: 3    2. Uncomplicated alcohol dependence (H)  - MENTAL HEALTH REFERRAL  - Adult; Psychiatry and Medication Management; Psychiatry; Other: [facility] [phone]; We will contact you to schedule the appointment or please call with any questions     Tobacco Cessation:   reports that he has been smoking cigarettes. He has smoked for the past 20.00 years. He has never used smokeless tobacco.  Tobacco Cessation Action Plan: Information offered: Patient not interested at this time      BMI:   Estimated body mass index is 30.55 kg/m  as calculated from the following:    Height as of this encounter: 1.778 m (5' 10\").    Weight as of this " encounter: 96.6 kg (212 lb 14.4 oz).           Work on weight loss  Regular exercise    No follow-ups on file.    Rohit Solitario MD  [facility]

## 2019-09-24 NOTE — PROGRESS NOTES
Durga Aguirre  [id number]               Adult CD Progress Note and Treatment Plan Review     Attendance     Monday    Group Date: 9/23/2019   Group Attendance Attended group session   Group Therapy Type Addiction   Group Topic Covered Relapse Prevention and sober support group presentation   Client's Response To Group Topic Cooperative with task Discussed personal experience with topic Expressed readiness to alter behaviors Listened actively   Client Group Participation Detail Highly involved   Group Attendance (Time) 2.0 Hours   Individual Attendance None   Family Attendance None   Other Comments/Information None      Tuesday     Group Date: 9/24/2019   Group Attendance Attended group session   Group Therapy Type Psychoeducation   Group Topic Covered Meditation/Breathing Exercises and Stress Management   Client's Response To Group Topic Cooperative with task Discussed personal experience with topic Listened actively   Client Group Participation Detail Highly involved   Group Attendance (Time) 2.0 Hours   Individual Attendance None   Family Attendance None   Other Comments/Information None      Wednesday    Group Date: 9/25/2019   Group Attendance Attended group session   Group Therapy Type Addiction   Group Topic Covered Relapse Prevention and Acceptance Discussion   Client's Response To Group Topic Cooperative with task Discussed personal experience with topic Expressed readiness to alter behaviors Listened actively   Client Group Participation Detail Highly involved   Group Attendance (Time) 2.0 Hours   Individual Attendance None   Family Attendance None   Other Comments/Information None     Total # of Phase 1 Group Sessions: 8     Total # of Phase 2 Group Sessions:   Total # of Phase 3 Group Sessions:   Total # of 1:1 Sessions: 2    Support group attended this week: yes    Reporting sobriety: Yes, client reports last use date of alcohol as 9/15/2019    Treatment Plan Review     Treatment Plan Review completed  on:  9/26/2019     Projected discharge date: 1/23/2020    Client preferred learning style: by hands-on practice    Staff member(s) contributing[facility]     Received supervision: Staffed with KIA Johnson     Client involvement with treatment planning: contributed to goals and plan.    Client received copy of plan/revised plan: Yes, signed 9/18/2019    Client agrees with plan/revised plan: Yes    Changes to Treatment Plan: No    Client's Dimension 1 Goal(s) Develop effective strategies to maintain sobriety.    See below.   Client's Dimension 2 Goal(s) Disclose CD status to medical providers and follow up with medical interventions while in IOP.   Maintain good physical health. See below.   Client's Dimension 3 Goal(s) Learn how to apply self-care and psychotherapy to build a repertoire of daily habits that counteract PAWS, fatigue, depression and anxiety leading to increased resiliency and well-being.  Understand the relationship between mental health concerns and addiction.   See below.   Client's Dimension 4 Goal(s) Understand the impact your substance use has had on you, your family, and significant relationships.  Increase internal motivation to change.   See below.   Client's Dimension 5 Goal(s) Identify personal triggers and relapse warning signs.  Develop sober coping and living skills in order to address the development of a strategy for long term recovery See below.   Client's Dimension 6 Goal(s) Resolve issues with legal.  Increase sober support network See below.     New Goals added since last review: no    Goal(s) worked on since last review: Dim 4, 5    Strategies effective: Yes    Treatment Coordination Activities: Yes - recommended sober support groups.    Medical, Mental Health and other appointments the client attended: medical exam 9/24/2019    Medication issues: Client reports compliance with mental health medications of citalopram and hydroxyzine but does not find them  "helpful..    Physical and mental health problems: Client endorses conditions of hypertension, high cholesterol, and mild chronic pain.  Client endorses mental health concerns of anxiety.  Client denies current psychotherapy.    Review and evaluation of the individual abuse prevention plan: The program's individual abuse prevention plan (IAPP) is sufficient for this client.     Substance Use Disorders:    Alcohol Use Disorder Severe - 303.90 (F10.20)  Tobacco Use Disorder Mild - 305.10 (Z72.0)    ASAM Risk Rating: (Note the rationale for risk rating changes)    Dimension 1 0  Client exhibits no signs of intoxication or withdrawal throughout the week.    Dimension 2 1  On Monday client denies any new or worsening medical conditions.  Client reports exercise.    Dimension 3 2  On Monday client rates his depression, anxiety, or other mental health concerns as 2 of 10 (10 highest), due to \"nobody's perfect-feel good\".  Client denies any thoughts of hurting himself or others.     Dimension 4 2  On Monday client rates his motivation for recovery as 8 of 10 (10 highest).  Client expressed 3 reasons to remain sober.     Dimension 5 3  On Monday client rated his cravings in the last week as 5 of 10 (10 highest) due to \"just thoughts\".  Client reports a personal strength of having goals.  He reports using coping skills of breathing exercises and learning to relax.    Dimension 6 3  On Monday client reports attendance at 1 sober support meeting this week.  Client denies having a sponsor.  He reports he spoke with a supportive friend from the AA group he attends.  Client reports sober activities.    Data: (Need to include short narrative for the week on what was worked on)  offered feedback client did actively participate  On Monday, client participated with the check- in process and affirmation.  Readiness to Change, Relapse Prevention and Recovery Planning were addressed through a presentation by AA members of how sober " support groups helped them change and achieve sustained recovery.  Client actively participated and expressed that the information was helpful.  Client practiced 4x4 and natural mindful breathing.  Client expresses this exercise as helpful.  On Tuesday, group psychoeducation topic was stress management.   Client completed a worksheet to show that each person reacts to the same stressor indifferent ways.  Client identified several stress triggers and reactions.  Client introduced to REBT techniques to challenge irrational beliefs to help client develop more effective thoughts, feelings and behaviors.  Client gave examples of irrational beliefs or thoughts and how they could be disputed leading to ways to manage stress better and respond to it to reduce risk of relapse to substance use.  Client also participated in activities to illustrate how mindful breathing, meditation, exercise, and talking to supportive people in recovery were ways to reduce stress.  EDYTA talk  How to Make Stress Your Friend  was presented to help client think about stress in a more positive manner.  Client expressed these exercises were thought-provoking.  On Wednesday Readiness to Change and Relapse Prevention were addressed through discussion of group peer s use episode of last week.   Client gave supportive feedback to group peer.  Naltrexone and Antabuse were discussed.  Group peer presented her  Relapse Autopsy  and  Acceptance  assignments with discussion.  Client participated, provided supportive feedback, and spoke about how he related to behavioral, emotional, and mental changes that precede relapse.  Client took part in reading the acceptance chapter of the Big Book with group discussion.  Client expressed that he does accept his disease. Client expressed something he was grateful for each day.      Intervention:   Behavioral Therapy, Cognitive Behavioral Therapy, Counselor Feedback, Psychoeducation, Emotional management, Group  Feedback, Motivational Interviewing, Relapse Prevention, Twelve Step Facilitation, REBT    Assessment:    Stages of Change Model  Contemplation    Appears/Sounds:  Cooperative    Plan:   Client will try one new coping skill/activity before next session.  Client will prepare to present first half of 10 Worst Consequences assignment next week.  Practice choice of meditation techniques this week.  Express 3 things he is grateful for each night.  -Attend 2-3 sober support group meetings each week (this includes non-12-step/AA alternative meetings).  -Client will attend all weekly Phase 1 group sessions.   -Client to engage in sober activities each week.    Marquis Quintero, [facility]

## 2019-09-25 ENCOUNTER — HOSPITAL ENCOUNTER (OUTPATIENT)
Dept: BEHAVIORAL HEALTH | Facility: CLINIC | Age: 59
End: 2019-09-25
Attending: SOCIAL WORKER
Payer: COMMERCIAL

## 2019-09-25 PROCEDURE — H2035 A/D TX PROGRAM, PER HOUR: HCPCS | Mod: HQ

## 2019-09-26 ENCOUNTER — HOSPITAL ENCOUNTER (OUTPATIENT)
Dept: BEHAVIORAL HEALTH | Facility: CLINIC | Age: 59
End: 2019-09-26
Attending: SOCIAL WORKER
Payer: COMMERCIAL

## 2019-09-26 PROCEDURE — H2035 A/D TX PROGRAM, PER HOUR: HCPCS | Mod: HQ

## 2019-09-27 NOTE — PROGRESS NOTES
"Durga Aguirre  September 26, 2019  [id number]    [facility] Mental Health Skills Group Note  Mental Health Part 1    Day: Thursday   Date: 9/26/19   Group Attendance Attended group session   Group Therapy Type Addiction, Psychoeducation and Psychotherapeutic   Group Topic Covered Mental Health - Core Processes Part 1   Client's Response To Group Topic Other - Participated Fully, see details below.   Client Group Participation Detail Shows interest   Group Attendance (Time) 3.0 Hours   Individual Attendance None   Family Attendance None   Other Comments/Information None       Number of participants: 7     Length of Group:  3 hours     Goal of the Group: Clients learn key concepts of traditional CBT (cognitive distortions, conditioning, automatic thoughts, reframing) and then build on these by learning three core concepts of third wave therapies (ACT, DBT, MB): Acceptance, Cognitive Defusion and Being Present. The objective is to increase psychological flexibility and choose behaviors that serve the clients' personal values.      Rating scales are 1-10, with 1 being low and 10 being high or most severe.     [place]: Group Topics and Activities     I.  D3 Intervention and Group Topic addressed: Part 1:  3 Core processes to increase psychological flexibility and increase resiliency for mental and physical health.     II. Mindfulness and/or Motivational Component: Getting Comfortable with Moods, Exploring Affirmations to use this week, Liquid Mood meditation, Awareness of Sounds Meditation      III. Additional Activity:  Three Simple Steps to Acceptance; Using Affirmations to increase Self-Acceptance; Cognitive Defusion exercises     IV. Review of Progress:   A. Client's self-report: Durga reported this was his 10th day sober which is the longest he has gone since the 3 years of sobriety he had. He said he feels calmer. He did not remember what the journal was and when asked if he remembered to practice mindfulness he said, \"I " "try to be a nice person.\" He he has low stress mainly about work deadlines. He had a thought that \"I would have been drinking a beer at this time but water is much better.\"   Comments on Liquid Mood:  [place]     B. Therapist's Assessment: Durga participated fully although he is not always on topic he is easily redirected.   Favorite self-acceptance affirmation(s) from today: #1, #25    V.   Reflection and evaluation of today's group experience:  Gave verbal feedback during group and completed the evaluation. Comments included: The most important things learned today included  \"To be in the moment\" and \"Mindfulfull of anger\" and \"I do have a chance to better behavior and calmness.\"     VI. Assignments or activities to do for next week: Complete the journal and practice mindfulness daily. Try to use cognitive defusion throughout the week on negative thoughts and emotions.    Therapeutic techniques in this session:  Motivational Therapy, CBT/DBT/ACT, Supportive and Mindfulness    Additional Treatment Referrals/Follow-up Issues to Address: Not indicated at this time.    Change in Treatment Plan: Not indicated at this time. This group completes one of the Tx Plan interventions under D3.      Change in Diagnosis: No changes at this time.      "

## 2019-09-30 ENCOUNTER — HOSPITAL ENCOUNTER (OUTPATIENT)
Dept: BEHAVIORAL HEALTH | Facility: CLINIC | Age: 59
End: 2019-09-30
Attending: SOCIAL WORKER
Payer: COMMERCIAL

## 2019-09-30 PROCEDURE — H2035 A/D TX PROGRAM, PER HOUR: HCPCS | Mod: HQ

## 2019-09-30 NOTE — PROGRESS NOTES
Durga Aguirre  [id number]               Adult CD Progress Note and Treatment Plan Review     Attendance     Monday    Group Date: 9/30/2019   Group Attendance Attended group session   Group Therapy Type Addiction   Group Topic Covered Disease of Addiction/Choices in Recovery, Relapse Prevention, Self-Esteem/Self [place] and Identify and align with values   Client's Response To Group Topic Cooperative with task Discussed personal experience with topic Expressed readiness to alter behaviors Listened actively   Client Group Participation Detail Highly involved   Group Attendance (Time) 2.0 Hours   Individual Attendance None   Family Attendance None   Other Comments/Information None      Tuesday     Group Date: 10/1/2019   Group Attendance Attended group session   Group Therapy Type Psychoeducation   Group Topic Covered Meditation/Breathing Exercises and Stages of Change   Client's Response To Group Topic Cooperative with task Discussed personal experience with topic Expressed readiness to alter behaviors Listened actively   Client Group Participation Detail Highly involved   Group Attendance (Time) 2.0 Hours   Individual Attendance None   Family Attendance None   Other Comments/Information None      Wednesday    Group Date: 10/2/2019   Group Attendance Attended group session   Group Therapy Type Addiction   Group Topic Covered Meditation/Breathing Exercises, Relapse Prevention and Reviewed Previous Skills Group Topic (Change)   Client's Response To Group Topic Cooperative with task Discussed personal experience with topic Expressed readiness to alter behaviors Listened actively   Client Group Participation Detail Highly involved   Group Attendance (Time) 2.0 Hours   Individual Attendance None   Family Attendance None   Other Comments/Information None     Total # of Phase 1 Group Sessions: 12     Total # of Phase 2 Group Sessions:   Total # of Phase 3 Group Sessions:   Total # of 1:1 Sessions: 2    Support group attended  this week: yes    Reporting sobriety: Yes, client reports last use date of alcohol as 9/15/2019    Treatment Plan Review     Treatment Plan Review completed on:  10/05/2019     Projected discharge date: 1/23/2020    Client preferred learning style: by hands-on practice    Staff member(s) contributing: Marquis Quintero [facility]     Received supervision: Staffed with Megan Conde [facility]     Client involvement with treatment planning: contributed to goals and plan.    Client received copy of plan/revised plan: Yes, signed 9/18/2019    Client agrees with plan/revised plan: Yes    Changes to Treatment Plan: No    Client's Dimension 1 Goal(s) Develop effective strategies to maintain sobriety.    See below.   Client's Dimension 2 Goal(s) Disclose CD status to medical providers and follow up with medical interventions while in IOP.   Maintain good physical health. See below.   Client's Dimension 3 Goal(s) Learn how to apply self-care and psychotherapy to build a repertoire of daily habits that counteract PAWS, fatigue, depression and anxiety leading to increased resiliency and well-being.  Understand the relationship between mental health concerns and addiction.   See below.   Client's Dimension 4 Goal(s) Understand the impact your substance use has had on you, your family, and significant relationships.  Increase internal motivation to change.   See below.   Client's Dimension 5 Goal(s) Identify personal triggers and relapse warning signs.  Develop sober coping and living skills in order to address the development of a strategy for long term recovery See below.   Client's Dimension 6 Goal(s) Resolve issues with legal.  Increase sober support network See below.     New Goals added since last review: no    Goal(s) worked on since last review: Dim 4, 5    Strategies effective: Yes    Treatment Coordination Activities: Yes - recommended sober support groups.    Medical, Mental Health and other appointments the client attended:  "medical exam 9/24/2019    Medication issues: Client reports compliance with mental health medications of citalopram and hydroxyzine but does not find them helpful..    Physical and mental health problems: Client endorses conditions of hypertension, high cholesterol, and mild chronic pain.  Client endorses mental health concerns of anxiety.  Client denies current psychotherapy.    Review and evaluation of the individual abuse prevention plan: The program's individual abuse prevention plan (IAPP) is sufficient for this client.     Substance Use Disorders:    Alcohol Use Disorder Severe - 303.90 (F10.20)  Tobacco Use Disorder Mild - 305.10 (Z72.0)    ASAM Risk Rating: (Note the rationale for risk rating changes)    Dimension 1 0  Client exhibits no signs of intoxication or withdrawal throughout the week.  He reports dreaming about not being allowed to drink.    Dimension 2 1  On Monday client denies any new or worsening medical conditions.  Client reports exercise.    Dimension 3 2  On Monday client rates his depression, anxiety, or other mental health concerns as 3 of 10 (10 highest), due to \"trying to finish project by deadline\".  Client denies any thoughts of hurting himself or others.     Dimension 4 1  On Monday client rates his motivation for recovery as 9 of 10 (10 highest).  Client expressed 3 reasons to remain sober.  Client expresses greater motivation for sobriety and reports feeling better.  He is actively participating.  Risk rating lowered.    Dimension 5 3  On Monday client rated his cravings in the last week as 2 of 10 (10 highest) due to \"new into sobriety\".  Client reports a personal strength of working toward goals.  He reports using coping skill of drinking more water.    Dimension 6 2  On Monday client reports attendance at 1 sober support meeting this week.  Client denies having a sponsor.  Client reports sober activities.  Client lives with his supportive mother who he helps care for.  He has " been attending sober support groups.  Risk rating lowered.    Data: (Need to include short narrative for the week on what was worked on)  offered feedback client did actively participate  On Monday, client participated with the check- in process and affirmation.  Readiness to Change and Relapse Prevention were addressed through group peers  presentations of their  10 Worst Consequences  assignments with discussion.  Client participated, provided supportive feedback, and spoke about how he related to behaviors, thoughts, and feelings described in peers' assignments.  Client identified personal values and participated in a discussion of how they were violated through substance use.  Client agreed that realignment of his life to those values was a worthy goal of recovery.  On Tuesday, group psychoeducation topic was readiness to change.  This was addressed through reading and discussion of  Who Moved My Cheese .  Client related to reluctance to change despite obvious negative consequences of not changing.  Client expressed willingness to change now.  Client participated in self-soothing exercises.  Client practiced 4x4 and natural mindful breathing  On Wednesday Readiness to Change and Relapse Prevention were addressed through discussion of group peer s use episode of last night.  Client gave supportive feedback to group peer.  Naltrexone and Antabuse were discussed.  Client participated in a review and discussion of the process of change.  Client participated in a visualization meditation.  Group peers presented their  10 Worst Consequences  assignments with discussion.  Client participated, provided supportive feedback, and spoke about how he related to behaviors, thoughts, and feelings described in peers' assignments.  Client expressed something he was grateful for each day.       Intervention:   Behavioral Therapy, Cognitive Behavioral Therapy, Counselor Feedback, Psychoeducation, Emotional management, Group  Feedback, Motivational Interviewing, Relapse Prevention, Twelve Step Facilitation, REBT    Assessment:    Stages of Change Model  Contemplation    Appears/Sounds:  Cooperative    Plan:   Client will prepare to present second half of 10 Worst Consequences assignment next week.  Client will try one new coping skill/activity before next session.  Practice choice of meditation techniques this week.  Express 3 things he is grateful for each night.  -Attend 2-3 sober support group meetings each week (this includes non-12-step/AA alternative meetings).  -Client will attend all weekly Phase 1 group sessions.   -Client to engage in sober activities each week.    Marquis Quintero, [facility]

## 2019-10-01 ENCOUNTER — HOSPITAL ENCOUNTER (OUTPATIENT)
Dept: BEHAVIORAL HEALTH | Facility: CLINIC | Age: 59
End: 2019-10-01
Attending: SOCIAL WORKER
Payer: COMMERCIAL

## 2019-10-01 PROCEDURE — H2035 A/D TX PROGRAM, PER HOUR: HCPCS | Mod: HQ

## 2019-10-02 ENCOUNTER — HOSPITAL ENCOUNTER (OUTPATIENT)
Dept: BEHAVIORAL HEALTH | Facility: CLINIC | Age: 59
End: 2019-10-02
Attending: SOCIAL WORKER
Payer: COMMERCIAL

## 2019-10-02 PROCEDURE — H2035 A/D TX PROGRAM, PER HOUR: HCPCS | Mod: HQ

## 2019-10-03 ENCOUNTER — HOSPITAL ENCOUNTER (OUTPATIENT)
Dept: BEHAVIORAL HEALTH | Facility: CLINIC | Age: 59
End: 2019-10-03
Attending: SOCIAL WORKER
Payer: COMMERCIAL

## 2019-10-03 PROCEDURE — H2035 A/D TX PROGRAM, PER HOUR: HCPCS | Mod: HQ

## 2019-10-04 NOTE — PROGRESS NOTES
Durga Aguirre  October 3, 2019  [id number]      [facility] Mental Health Skills - Group Note  Mental Health - Part 2     Day:  Thursday   Date: 10/03/19   Group Attendance Attended group session   Group Therapy Type Addiction, Psychoeducation and Psychotherapeutic   Group Topic Covered Core processes of Mental Health    Client Group Participation Detail Participated when prompted, see details below   Group Attendance (Time) 3.0 Hours   Individual Attendance None   Family Attendance None   Other Comments/Information None         Number of participants: 6     Length of Group:  3 hours    Goal of the Group: Clients learn key concepts of traditional CBT (cognitive distortions, conditioning, automatic thoughts, reframing) and then build on these by learning three core concepts of third wave therapies (ACT, DBT, MB): Self-as-Context, Values and Committed Action. The objective is to increase psychological flexibility and choose behaviors that serve the clients' personal values.        Rating scales are 1-10, with 1 being low and 10 being high or most severe.     [place]: Group Topics and Activities     I.  D3 Intervention and Group Topic addressed: Mental Health Part 2    II. Mindfulness and/or Motivational Component:  Values and Committed Action, Psychological Flexibility, Excerpts from documentary  I AM  to clarify values and emphasize the importance of community and connection to others.      III. Additional Activity:  Completed examples of  auto-  behavior vs. values based actions, completed the AAQ-II scale; identified personal value to work on this week +  create a  goal that reflects this value + commit to one step toward that goal for this week.       IV. Review of Progress:   A. Client's self-report:  Durga reported he accomplished a lot this week and completed the project on his neighbor's home. He didn't remember the journal we had discussed the last 2 weeks but we reviewed it at the end of the group. Durga has  "had the mindfulness group but remembered little of that also. His short term memory may be a symptom of PAWS but will be monitored to see if it decreases. His stress is a 3 \"because I have another project to do.\" He said he had no cravings.     B. Therapist's Assessment: Durga participated with prompting. Some of his comments indicate he hasn't understood something but he usually will listen for clarification.   The value chosen to work on this week: Responsibility  The goal that reflects this value:  Complete his projects timely  The step to take this week toward this goal:  \"Keep a tighter schedule and don't get mentally side-tracked.\"     V.   Reflection and evaluation of today's group experience:  Gave verbal feedback and filled out evaluation form. The most important thing learned today was \"To keep (personally) values close at hand and in mind\" and \"To be more patient.\"     VI. Assignments or activities to do for next week: Continue to do journal nightly, practice one mindful activity a day, recognize the \"observing self\" and notice what values are reflected in daily choices and decisions.  Follow through on your STEP 1 from today's value's worksheet.     Therapeutic techniques in this session:  Motivational Therapy, CBT/DBT/ACT, Supportive, Behavioral Modification and Mindfulness     Additional Treatment Referrals/Follow-up Issues to Address: Not indicated at this time.      Change in Treatment Plan: Not indicated at this time. This session completes one Tx Plan intervention under D3.       Change in Diagnosis: Not at this time.      "

## 2019-10-07 ENCOUNTER — HOSPITAL ENCOUNTER (OUTPATIENT)
Dept: BEHAVIORAL HEALTH | Facility: CLINIC | Age: 59
End: 2019-10-07
Attending: SOCIAL WORKER
Payer: COMMERCIAL

## 2019-10-07 PROCEDURE — H2035 A/D TX PROGRAM, PER HOUR: HCPCS | Mod: HQ

## 2019-10-07 NOTE — PROGRESS NOTES
Durga Aguirre  [id number]               Adult CD Progress Note and Treatment Plan Review     Attendance     Monday    Group Date: 10/7/2019   Group Attendance Attended group session   Group Therapy Type Addiction   Group Topic Covered Anger/Conflict Management, Meditation/Breathing Exercises, Resentments, Self-Esteem/Self Sterling and process client's use episode   Client's Response To Group Topic Cooperative with task Discussed personal experience with topic Expressed readiness to alter behaviors Listened actively   Client Group Participation Detail Highly involved   Group Attendance (Time) 2.0 Hours   Individual Attendance None   Family Attendance None   Other Comments/Information None      Tuesday     Group Date: 10/8/2019   Group Attendance Attended group session   Group Therapy Type Psychoeducation   Group Topic Covered Spirituality and forgiveness in recovery.   Client's Response To Group Topic Cooperative with task Discussed personal experience with topic Listened actively   Client Group Participation Detail Highly involved   Group Attendance (Time) 2.0 Hours   Individual Attendance None   Family Attendance None   Other Comments/Information None      Wednesday    Group Date: 10/9/2019   Group Attendance Attended group session   Group Therapy Type Addiction   Group Topic Covered Disease of Addiction/Choices in Recovery, Relapse Prevention and Reviewed Previous Skills Group Topic (spirituality, forgiveness)   Client's Response To Group Topic Cooperative with task Discussed personal experience with topic Expressed readiness to alter behaviors Listened actively   Client Group Participation Detail Highly involved   Group Attendance (Time) 2.0 Hours   Individual Attendance 45 Minutes   Family Attendance None   Other Comments/Information None     Total # of Phase 1 Group Sessions: 16     Total # of Phase 2 Group Sessions:   Total # of Phase 3 Group Sessions:   Total # of 1:1 Sessions: 3    Support group attended this  week: no    Reporting sobriety: Yes, client reports last use date of alcohol as , 10/06/2019    Treatment Plan Review     Treatment Plan Review completed on:  10/11/2019     Projected discharge date: 1/23/2020    Client preferred learning style: by hands-on practice    Staff member(s) contributing: Marquis Quintero [facility]     Received supervision: Staffed with Megan Conde [facility]     Client involvement with treatment planning: contributed to goals and plan.    Client received copy of plan/revised plan: Yes, revised 10/09/2019    Client agrees with plan/revised plan: Yes    Changes to Treatment Plan: yes    Client's Dimension 1 Goal(s) Develop effective strategies to maintain sobriety.    See below.   Client's Dimension 2 Goal(s) Disclose CD status to medical providers and follow up with medical interventions while in IOP.   Maintain good physical health. See below.   Client's Dimension 3 Goal(s) Learn how to apply self-care and psychotherapy to build a repertoire of daily habits that counteract PAWS, fatigue, depression and anxiety leading to increased resiliency and well-being.  Understand the relationship between mental health concerns and addiction.   See below.   Client's Dimension 4 Goal(s) Understand the impact your substance use has had on you, your family, and significant relationships.  Increase internal motivation to change.   See below.   Client's Dimension 5 Goal(s) Identify personal triggers and relapse warning signs.  Develop sober coping and living skills in order to address the development of a strategy for long term recovery See below.   Client's Dimension 6 Goal(s) Resolve issues with legal.  Increase sober support network See below.     New Goals added since last review: Dim 2, 6:  Get prescription for naltrexone and/or antabuse on 10/14/2019.  Attend sober support group weekly.  Obtain a sponsor by 10/20/2019.    Goal(s) worked on since last review: Dim 4, 5    Strategies effective: Yes    Treatment  "Coordination Activities: Yes - revised tx plan    Medical, Mental Health and other appointments the client attended: medical scheduled 10/14/2019.  Client will ask his physician to prescribe him naltrexone and/or antabuse.    Medication issues: Client reports compliance with mental health medications of citalopram and hydroxyzine but does not find them helpful.  See above.    Physical and mental health problems: Client endorses conditions of hypertension, high cholesterol, and mild chronic pain.  Client endorses mental health concerns of anxiety.  Client denies current psychotherapy.    Review and evaluation of the individual abuse prevention plan: The program's individual abuse prevention plan (IAPP) is sufficient for this client.     Substance Use Disorders:    Alcohol Use Disorder Severe - 303.90 (F10.20)  Tobacco Use Disorder Mild - 305.10 (Z72.0)    ASAM Risk Rating: (Note the rationale for risk rating changes)    Dimension 1 0  On Monday client admits to drinking 4 beers Saturday and 8 beers Sunday.  He appears weepy and nervous, expressing remorse and disappointment for use episode.  Episode processed in group and 1:1 session. Client exhibits no signs of intoxication or withdrawal throughout the rest of the week.      Dimension 2 1  On Monday client reports new or worsening medical conditions of sore ribs.  He is seeing his physician next week 10/14/2019.  He agrees to ask for prescription for naltrexone and/or antabuse.  Client reports exercise.    Dimension 3 2  On Monday client rates his depression, anxiety, or other mental health concerns as 4 of 10 (10 highest), due to \"change in the weather\".  Client denies any thoughts of hurting himself or others.     Dimension 4 1  On Monday client rates his motivation for recovery as 9 of 10 (10 highest).  Client expressed 3 reasons to remain sober.  Client expresses renewed motivation to change.    Dimension 5 3  On Monday client rated his cravings in the last week " "as 7 of 10 (10 highest) due to \"working in garage\".  Rating was raised to 10 after he admitted his use episode.  Client knew beforehand that working in his garage would be a trigger, and was advised last week to have someone with him but his friend was unavailable.  Client reports a personal strength of honesty.  He reports using coping skill of admitting his use episode.    Dimension 6 2  On Monday client denies attendance at any sober support meeting this week, but did talk to a sober friend from the meeting he sometimes attends.  He agrees to begin attending every week, starting this week.Client denies having a sponsor.  He agrees to obtain one by 10/20/2019.  Client did not report sober activities.  Client lives with his supportive mother who he helps care for.     Data: (Need to include short narrative for the week on what was worked on)  offered feedback client did actively participate  On Monday, client participated with the check- in process and affirmation.  Readiness to Change and Relapse Prevention were addressed through discussion of client s use episode of last weekend.  Client received supportive feedback from group peers.  Client appears remorseful and motivated for sobriety.  Relapse Cycle reviewed.  Emotional regulation was addressed through group peer s presentation of his  Anger  assignment with discussion.  Client participated, provided supportive feedback, and spoke about how he related to behaviors, thoughts, and feelings described in peer's assignment.  Client identified personal resentments and participated in a discussion of how they were intensified with substance use.  Client participated in self-soothing exercises.  Client practiced 4x4 and natural mindful breathing.  On Tuesday, group psychoeducation topic was spirituality and forgiveness in recovery.  Client participated in a discussion of how spirituality differs from Scientologist.  Client took part in an exercise to determine his " Forgiveness IQ.  Client shared that he was better able to forgive others than himself.  Client identified ongoing resentments and how they only hurt himself.      On Wednesday client participated in a review and further discussion of spirituality and forgiveness.  Group peers presented their  Symptoms  and  10 [place]st [place]  assignments with discussion.  Client participated, provided supportive feedback, and spoke about how he related to behavioral, emotional, and physical symptoms and signs as well as behaviors, thoughts, and feelings described in peer s assignment.  Client expressed something he was grateful for each day.      Intervention:   Behavioral Therapy, Cognitive Behavioral Therapy, Counselor Feedback, Psychoeducation, Emotional management, Group Feedback, Motivational Interviewing, Relapse Prevention, Twelve Step Facilitation, REBT    Assessment:    Stages of Change Model  Contemplation    Appears/Sounds:  Cooperative    Plan:   Client will ask his physician to prescribe him naltrexone and/or antabuse 10/14/2019  Attend at least 1 sober support group meetings each week.  Obtain a sponsor.  Client will prepare to present second half of 10 [place]st [place] assignment next week.  Client will try one new coping skill/activity before next session.  Practice choice of meditation techniques this week.  Express 3 things he is grateful for each night.  -Client will attend all weekly Phase 1 group sessions.   -Client to engage in sober activities each week.    Marquis Quintero, [facility]

## 2019-10-08 ENCOUNTER — HOSPITAL ENCOUNTER (OUTPATIENT)
Dept: BEHAVIORAL HEALTH | Facility: CLINIC | Age: 59
End: 2019-10-08
Attending: SOCIAL WORKER
Payer: COMMERCIAL

## 2019-10-08 PROCEDURE — H2035 A/D TX PROGRAM, PER HOUR: HCPCS | Mod: HQ

## 2019-10-09 ENCOUNTER — HOSPITAL ENCOUNTER (OUTPATIENT)
Dept: BEHAVIORAL HEALTH | Facility: CLINIC | Age: 59
End: 2019-10-09
Attending: SOCIAL WORKER
Payer: COMMERCIAL

## 2019-10-09 PROCEDURE — H2035 A/D TX PROGRAM, PER HOUR: HCPCS | Mod: HQ

## 2019-10-09 PROCEDURE — H2035 A/D TX PROGRAM, PER HOUR: HCPCS

## 2019-10-10 ENCOUNTER — HOSPITAL ENCOUNTER (OUTPATIENT)
Dept: BEHAVIORAL HEALTH | Facility: CLINIC | Age: 59
End: 2019-10-10
Attending: SOCIAL WORKER
Payer: COMMERCIAL

## 2019-10-10 PROCEDURE — H2035 A/D TX PROGRAM, PER HOUR: HCPCS | Mod: HQ

## 2019-10-11 NOTE — PROGRESS NOTES
"Durga Aguirre  October 10, 2019  [id number]    [facility] Mental Health Process Group Note  Self-Care      Day: Thursday   Date: 10/10/19   Group Attendance Attended group session   Group Therapy Type Addiction, Psychoeducation and Psychotherapeutic   Group Topic Covered Self-Care and Addiction    Client's Response To Group Topic Showed Interest, stayed engaged   Client Group Participation Detail Participated when prompted, see details below   Group Attendance (Time) 3.0 Hours   Individual Attendance None   Family Attendance None   Other Comments/Information None     Number of participants:  4      Length of Group:  3 hours      Goal of the Group: Learn aspects of self-care to increase self-empowerment and reduce the impact of post-acute withdrawal symptoms. Learn how self-care can lead to clearer thinking and increased physical and mental resiliency.      [place]: Group Topics and Activities      I.  D3 Intervention and Group Topic addressed: Self-Care and Self-Compassion; Self-Care quiz and goals for next week     II. Mindfulness and/or Motivational Component: Self-Care Evaluation, Worksheet to prioritize needs, Laughter as Self-Care     III. Additional Activity: Completed a self-care evaluation and then prioritized what they felt was lacking and identified what aspect of self-care to address first. Then, developed steps to start this week to improve this area and be more proactive in their own healthcare routine.    The group also learned how Laughter is beneficial to our health.                  IV. Review of Progress:   A. Client's self-report: Durga reported that he asked God for help and was sent a sign of an airplane trail \"that was crossing over the moon.\" He is not doing his journal and does not remember learning mindfulness in the session devoted to it. His stress is a 2 and he said he has had no cravings except for coffee.        B. Therapist's Assessment: Durga participated with prompting but his comments are " "off topic and he is unable to remember content from groups he has already had. It is unclear if his memory issues are part of PAWS or not and will be monitored to see if this decreases.   The Self-Care area to address first: Exercise     The Step to work on this week: \"Start stretching more.\"             V.   Reflection and evaluation of today's group experience:  Gave verbal feedback and filled out evaluation form. The most important things learned today were \"Power of humor/watch my diet/it good though\"     VI. Assignments or activities to do for next week: Continue to complete the daily journal before bed, do at least one mindfulness exercise a day, practice cognitive defusion for negative thoughts and emotions.   Follow the steps s/he created regarding the self-care identified as most urgent to address.       Therapeutic techniques in this session:  Motivational Therapy, Supportive, Behavioral Modification and Mindfulness      Additional Treatment Referrals/Follow-up Issues to Address: Follow-up is not indicated at this time.       Change Treatment Plan:  No, however, this group does fulfill one intervention under D3.       Change Diagnosis: No changes this week.      "

## 2019-10-14 ENCOUNTER — OFFICE VISIT (OUTPATIENT)
Dept: INTERNAL MEDICINE | Facility: CLINIC | Age: 59
End: 2019-10-14
Payer: COMMERCIAL

## 2019-10-14 ENCOUNTER — ANCILLARY PROCEDURE (OUTPATIENT)
Dept: GENERAL RADIOLOGY | Facility: CLINIC | Age: 59
End: 2019-10-14
Attending: INTERNAL MEDICINE
Payer: COMMERCIAL

## 2019-10-14 ENCOUNTER — HOSPITAL ENCOUNTER (OUTPATIENT)
Dept: BEHAVIORAL HEALTH | Facility: CLINIC | Age: 59
End: 2019-10-14
Attending: SOCIAL WORKER
Payer: COMMERCIAL

## 2019-10-14 VITALS
WEIGHT: 208.3 LBS | SYSTOLIC BLOOD PRESSURE: 116 MMHG | TEMPERATURE: 97.7 F | BODY MASS INDEX: 29.82 KG/M2 | DIASTOLIC BLOOD PRESSURE: 74 MMHG | HEART RATE: 69 BPM | OXYGEN SATURATION: 100 % | HEIGHT: 70 IN

## 2019-10-14 DIAGNOSIS — R07.81 RIB PAIN ON RIGHT SIDE: ICD-10-CM

## 2019-10-14 DIAGNOSIS — F10.288 ALCOHOL DEPENDENCE WITH OTHER ALCOHOL-INDUCED DISORDER (H): Primary | ICD-10-CM

## 2019-10-14 DIAGNOSIS — I10 ESSENTIAL HYPERTENSION: ICD-10-CM

## 2019-10-14 DIAGNOSIS — F41.1 GENERALIZED ANXIETY DISORDER: ICD-10-CM

## 2019-10-14 LAB
ALBUMIN SERPL-MCNC: 4 G/DL (ref 3.4–5)
ALP SERPL-CCNC: 105 U/L (ref 40–150)
ALT SERPL W P-5'-P-CCNC: 38 U/L (ref 0–70)
AST SERPL W P-5'-P-CCNC: 20 U/L (ref 0–45)
BILIRUB DIRECT SERPL-MCNC: 0.2 MG/DL (ref 0–0.2)
BILIRUB SERPL-MCNC: 0.9 MG/DL (ref 0.2–1.3)
ERYTHROCYTE [DISTWIDTH] IN BLOOD BY AUTOMATED COUNT: 11.7 % (ref 10–15)
HCT VFR BLD AUTO: 44.6 % (ref 40–53)
HGB BLD-MCNC: 15.1 G/DL (ref 13.3–17.7)
MCH RBC QN AUTO: 30.9 PG (ref 26.5–33)
MCHC RBC AUTO-ENTMCNC: 33.9 G/DL (ref 31.5–36.5)
MCV RBC AUTO: 91 FL (ref 78–100)
PLATELET # BLD AUTO: 221 10E9/L (ref 150–450)
PROT SERPL-MCNC: 7.3 G/DL (ref 6.8–8.8)
RBC # BLD AUTO: 4.89 10E12/L (ref 4.4–5.9)
WBC # BLD AUTO: 8.1 10E9/L (ref 4–11)

## 2019-10-14 PROCEDURE — H2035 A/D TX PROGRAM, PER HOUR: HCPCS | Mod: HQ

## 2019-10-14 PROCEDURE — 36415 COLL VENOUS BLD VENIPUNCTURE: CPT | Performed by: INTERNAL MEDICINE

## 2019-10-14 PROCEDURE — 84165 PROTEIN E-PHORESIS SERUM: CPT | Performed by: INTERNAL MEDICINE

## 2019-10-14 PROCEDURE — 71101 X-RAY EXAM UNILAT RIBS/CHEST: CPT | Mod: RT

## 2019-10-14 PROCEDURE — 99214 OFFICE O/P EST MOD 30 MIN: CPT | Performed by: INTERNAL MEDICINE

## 2019-10-14 PROCEDURE — 85027 COMPLETE CBC AUTOMATED: CPT | Performed by: INTERNAL MEDICINE

## 2019-10-14 PROCEDURE — 80076 HEPATIC FUNCTION PANEL: CPT | Performed by: INTERNAL MEDICINE

## 2019-10-14 PROCEDURE — 00000402 ZZHCL STATISTIC TOTAL PROTEIN: Performed by: INTERNAL MEDICINE

## 2019-10-14 RX ORDER — NALTREXONE HYDROCHLORIDE 50 MG/1
50 TABLET, FILM COATED ORAL DAILY
Qty: 30 TABLET | Refills: 5 | Status: SHIPPED | OUTPATIENT
Start: 2019-10-14 | End: 2020-06-04

## 2019-10-14 ASSESSMENT — MIFFLIN-ST. JEOR: SCORE: 1756.09
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
## 2019-10-14 NOTE — LETTER
October 22, 2019      Durga Aguirre  [id number]  [place] [address]        Dear ,    We are writing to inform you of your test results.    Your lab results are normal.    Resulted Orders   Protein electrophoresis   Result Value Ref Range    Albumin Fraction 4.4 3.7 - 5.1 g/dL    Alpha 1 Fraction 0.3 0.2 - 0.4 g/dL    Alpha 2 Fraction 0.8 0.5 - 0.9 g/dL    Beta Fraction 0.9 0.6 - 1.0 g/dL    Gamma Fraction 0.8 0.7 - 1.6 g/dL    Monoclonal Peak 0.0 0.0 g/dL    ELP Interpretation:       Essentially normal electrophoretic pattern.  No monoclonal protein seen. Pathologic   significance requires clinical correlation.  KATIE Burton M.D., Ph.D., Pathologist.      CBC with platelets   Result Value Ref Range    WBC 8.1 4.0 - 11.0 10e9/L    RBC Count 4.89 4.4 - 5.9 10e12/L    Hemoglobin 15.1 13.3 - 17.7 g/dL    Hematocrit 44.6 40.0 - 53.0 %    MCV 91 78 - 100 fl    MCH 30.9 26.5 - 33.0 pg    MCHC 33.9 31.5 - 36.5 g/dL    RDW 11.7 10.0 - 15.0 %    Platelet Count 221 150 - 450 10e9/L   Hepatic panel (Albumin, ALT, AST, Bili, Alk Phos, TP)   Result Value Ref Range    Bilirubin Direct 0.2 0.0 - 0.2 mg/dL    Bilirubin Total 0.9 0.2 - 1.3 mg/dL    Albumin 4.0 3.4 - 5.0 g/dL    Protein Total 7.3 6.8 - 8.8 g/dL    Alkaline Phosphatase 105 40 - 150 U/L    ALT 38 0 - 70 U/L    AST 20 0 - 45 U/L       If you have any questions or concerns, please call the clinic at the number listed above.       Sincerely,        Rohit Solitario MD

## 2019-10-14 NOTE — NURSING NOTE
"Chief Complaint   Patient presents with     Medication Request     naltrexone     Musculoskeletal Problem     ongoing rib pain     /74   Pulse 69   Temp 97.7  F (36.5  C) (Temporal)   Ht 1.778 m (5' 10\")   Wt 94.5 kg (208 lb 4.8 oz)   SpO2 100%   BMI 29.89 kg/m   Estimated body mass index is 29.89 kg/m  as calculated from the following:    Height as of this encounter: 1.778 m (5' 10\").    Weight as of this encounter: 94.5 kg (208 lb 4.8 oz).        Health Maintenance due pending provider review:  NONE    n/a    Sulma Santos CMA  "

## 2019-10-15 ENCOUNTER — HOSPITAL ENCOUNTER (OUTPATIENT)
Dept: BEHAVIORAL HEALTH | Facility: CLINIC | Age: 59
End: 2019-10-15
Attending: SOCIAL WORKER
Payer: COMMERCIAL

## 2019-10-15 LAB
ALBUMIN SERPL ELPH-MCNC: 4.4 G/DL (ref 3.7–5.1)
ALPHA1 GLOB SERPL ELPH-MCNC: 0.3 G/DL (ref 0.2–0.4)
ALPHA2 GLOB SERPL ELPH-MCNC: 0.8 G/DL (ref 0.5–0.9)
B-GLOBULIN SERPL ELPH-MCNC: 0.9 G/DL (ref 0.6–1)
GAMMA GLOB SERPL ELPH-MCNC: 0.8 G/DL (ref 0.7–1.6)
M PROTEIN SERPL ELPH-MCNC: 0 G/DL
PROT PATTERN SERPL ELPH-IMP: NORMAL

## 2019-10-15 PROCEDURE — H2035 A/D TX PROGRAM, PER HOUR: HCPCS | Mod: HQ

## 2019-10-15 NOTE — PROGRESS NOTES
Durga Aguirre  [id number]               Adult CD Progress Note and Treatment Plan Review     Attendance     Monday    Group Date: 10/14/2019   Group Attendance Attended group session   Group Therapy Type Addiction   Group Topic Covered Disease of Addiction/Choices in Recovery, Relapse Prevention and Thinking Errors/Negative Self-Talk   Client's Response To Group Topic Cooperative with task Discussed personal experience with topic Expressed readiness to alter behaviors Listened actively   Client Group Participation Detail Highly involved   Group Attendance (Time) 2.0 Hours   Individual Attendance None   Family Attendance None   Other Comments/Information None      Tuesday     Group Date: 10/15/2019   Group Attendance Attended group session   Group Therapy Type Psychoeducation   Group Topic Covered Meditation/Breathing Exercises, Mindfulness, Relapse Prevention and Intro to DBT and States of Mind   Client's Response To Group Topic Cooperative with task Listened actively   Client Group Participation Detail Shows interest   Group Attendance (Time) 2.0 Hours   Individual Attendance None   Family Attendance None   Other Comments/Information None      Wednesday    Group Date: 10/16/2019   Group Attendance Attended group session   Group Therapy Type Addiction   Group Topic Covered Disease of Addiction/Choices in Recovery, Meditation/Breathing Exercises, Relapse Prevention and Reviewed Previous Skills Group Topic (intro to DBT)   Client's Response To Group Topic Cooperative with task Discussed personal experience with topic Expressed readiness to alter behaviors Listened actively   Client Group Participation Detail Highly involved   Group Attendance (Time) 2.0 Hours   Individual Attendance None   Family Attendance None   Other Comments/Information None     Total # of Phase 1 Group Sessions: 19     Total # of Phase 2 Group Sessions:   Total # of Phase 3 Group Sessions:   Total # of 1:1 Sessions: 3    Support group attended  this week: no    Reporting sobriety: Yes, client reports last use date of alcohol as , 10/06/2019    Treatment Plan Review     Treatment Plan Review completed on:  10/17/2019     Projected discharge date: 1/23/2020    Client preferred learning style: by hands-on practice    Staff member(s) contributing: ELANA Herman     Received supervision: no    Client involvement with treatment planning: contributed to goals and plan.    Client received copy of plan/revised plan: Yes, revised 10/09/2019    Client agrees with plan/revised plan: Yes    Changes to Treatment Plan: yes    Client's Dimension 1 Goal(s) Develop effective strategies to maintain sobriety.    See below.   Client's Dimension 2 Goal(s) Disclose CD status to medical providers and follow up with medical interventions while in IOP.   Maintain good physical health. See below.   Client's Dimension 3 Goal(s) Learn how to apply self-care and psychotherapy to build a repertoire of daily habits that counteract PAWS, fatigue, depression and anxiety leading to increased resiliency and well-being.  Understand the relationship between mental health concerns and addiction.   See below.   Client's Dimension 4 Goal(s) Understand the impact your substance use has had on you, your family, and significant relationships.  Increase internal motivation to change.   Client presented assignment.   Client's Dimension 5 Goal(s) Identify personal triggers and relapse warning signs.  Develop sober coping and living skills in order to address the development of a strategy for long term recovery See below.   Client's Dimension 6 Goal(s) Resolve issues with legal.  Increase sober support network See below.     New Goals added since last review: Dim 2, 6:  Get prescription for naltrexone and/or antabuse on 10/14/2019.  Attend sober support group weekly.  Obtain a sponsor by 10/20/2019.    Goal(s) worked on since last review: Dim 4, 5    Strategies effective: Yes    Treatment  "Coordination Activities: no    Medical, Mental Health and other appointments the client attended: medical 10/14/2019.  Client prescribed naltrexone.    Medication issues: Client reports compliance with mental health medications of citalopram and hydroxyzine but does not find them helpful.  See above.    Physical and mental health problems: Client endorses conditions of hypertension, high cholesterol, and mild chronic pain.  Client endorses mental health concerns of anxiety.  Client denies current psychotherapy.    Review and evaluation of the individual abuse prevention plan: The program's individual abuse prevention plan (IAPP) is sufficient for this client.     Substance Use Disorders:    Alcohol Use Disorder Severe - 303.90 (F10.20)  Tobacco Use Disorder Mild - 305.10 (Z72.0)    ASAM Risk Rating: (Note the rationale for risk rating changes)    Dimension 1 0  Client exhibits no signs of intoxication or withdrawal throughout the week.  He reports PAW symptom of \"thoughts of resentment\".    Dimension 2 1  On Monday client denies any new or worsening physical problems.  Client received prescription for naltrexone 10/14/2019.  Client reports exercise.    Dimension 3 2  On Monday client rates his depression, anxiety, or other mental health concerns as 1 of 10 (10 highest), due to \"getting things done\".  Client denies any thoughts of hurting himself or others.     Dimension 4 1  On Monday client rates his motivation for recovery as 10 of 10 (10 highest).  Client expressed 3 reasons to remain sober.  Client presented the first half of his \"10 Worst Consequences\" assignment..    Dimension 5 3  On Monday client rated his cravings in the last week as 1 of 10 (10 highest) due to \"new me\".  Client reports a personal strength of determined.  He reports using coping skill of listening to music.    Dimension 6 2  On Monday client reports attendance at 1 sober support meeting this week.  Client reports he now has a sponsor.  " "Client reports sober activities.  Client lives with his supportive mother who he helps care for.     Data: (Need to include short narrative for the week on what was worked on)  offered feedback client did actively participate  On Monday, client participated with the check- in process and affirmation.  Readiness to Change and Relapse Prevention were addressed through client and group peers  presentations of their  10 Worst Consequences  assignments with discussion.  Client presented the first part of his assignment and shared incidents during active substance use that have had negative impact on his life.  He provided supportive feedback to peers and spoke about how he related to behaviors, thoughts, and feelings described in their assignments.   On Tuesday, group psychoeducation topic was an introduction to DBT and it s 4 pillars of Mindfulness, Distress Tolerance, Interpersonal Effectiveness and Emotional Regulation.  Concepts of Reason Mind/Emotion Mind/Wise Mind, Doing Mind/Being Mind/Wise Mind were presented and discussed.  Client differentiated between states of mind.  Client participated in a guided mindfulness meditation.  On Wednesday client participated in introductions to new client.  Client participated in a review and further discussion of DBT.  Group peers presented their  Addiction as a Process  and  10 Worst Consequences  assignments with discussion.  Client participated, provided supportive feedback, and spoke about how he related to behaviors, thoughts, and feelings described in peer's assignment dealing with consequences and values violations.  Client also expressed thoughts about the addiction process as presented in other peer's assignment from [employer]'s \"The Addictive Personality\".  Client participated in self-soothing exercises.  Client practiced 4x4 and natural mindful breathing.  Client expressed something he was grateful for each day.      Intervention:   Behavioral Therapy, Cognitive " Behavioral Therapy, Counselor Feedback, Psychoeducation, Emotional management, Group Feedback, Motivational Interviewing, Relapse Prevention, Twelve Step Facilitation, REBT    Assessment:    Stages of Change Model  Contemplation    Appears/Sounds:  Cooperative    Plan:   Client will  and begin taking prescribed naltrexone.  Attend at least 1 sober support group meetings each week.  Maintain contact with sponsor.  Client will prepare to present second half of 10 Worst Consequences assignment next week.  Client will try one new coping skill/activity before next session.  Practice choice of meditation techniques this week.  Express 3 things he is grateful for each night.  -Client will attend all weekly Phase 1 group sessions.   -Client to engage in sober activities each week.    Marquis Quintero, [facility]

## 2019-10-16 ENCOUNTER — HOSPITAL ENCOUNTER (OUTPATIENT)
Dept: BEHAVIORAL HEALTH | Facility: CLINIC | Age: 59
End: 2019-10-16
Attending: SOCIAL WORKER
Payer: COMMERCIAL

## 2019-10-16 PROCEDURE — H2035 A/D TX PROGRAM, PER HOUR: HCPCS | Mod: HQ

## 2019-10-21 ENCOUNTER — HOSPITAL ENCOUNTER (OUTPATIENT)
Dept: BEHAVIORAL HEALTH | Facility: CLINIC | Age: 59
End: 2019-10-21
Attending: SOCIAL WORKER
Payer: COMMERCIAL

## 2019-10-21 PROCEDURE — H2035 A/D TX PROGRAM, PER HOUR: HCPCS | Mod: HQ

## 2019-10-22 ENCOUNTER — HOSPITAL ENCOUNTER (OUTPATIENT)
Dept: BEHAVIORAL HEALTH | Facility: CLINIC | Age: 59
End: 2019-10-22
Attending: SOCIAL WORKER
Payer: COMMERCIAL

## 2019-10-22 PROCEDURE — H2035 A/D TX PROGRAM, PER HOUR: HCPCS | Mod: HQ

## 2019-10-22 NOTE — PROGRESS NOTES
Durga Aguirre  [id number]               Adult CD Progress Note and Treatment Plan Review     Attendance     Monday    Group Date: 10/21/2019   Group Attendance Attended group session   Group Therapy Type Addiction   Group Topic Covered Disease of Addiction/Choices in Recovery, Emotions/Expression/Feelings and Strengths Exercise   Client's Response To Group Topic Cooperative with task Discussed personal experience with topic Expressed readiness to alter behaviors Listened actively   Client Group Participation Detail Highly involved   Group Attendance (Time) 2.0 Hours   Individual Attendance None   Family Attendance None   Other Comments/Information None      Tuesday     Group Date: 10/22/2019   Group Attendance Attended group session   Group Therapy Type Psychoeducation   Group Topic Covered Meditation/Breathing Exercises, Mindfulness, Relapse Prevention and Intro to DBT and States of Mind   Client's Response To Group Topic Cooperative with task Listened actively   Client Group Participation Detail Shows interest   Group Attendance (Time) 2.0 Hours   Individual Attendance None   Family Attendance None   Other Comments/Information None      Wednesday    Group Date: 10/23/2019   Group Attendance Attended group session   Group Therapy Type Addiction   Group Topic Covered Disease of Addiction/Choices in Recovery, Relapse Prevention, Reviewed Previous Skills Group Topic (communication) and sober support   Client's Response To Group Topic Cooperative with task Discussed personal experience with topic Listened actively   Client Group Participation Detail Highly involved   Group Attendance (Time) 2.0 Hours   Individual Attendance None   Family Attendance None   Other Comments/Information None     Total # of Phase 1 Group Sessions:  23     Total # of Phase 2 Group Sessions:   Total # of Phase 3 Group Sessions:   Total # of 1:1 Sessions: 3    Support group attended this week: no    Reporting sobriety: Yes, client reports last  use date of alcohol as , 10/06/2019    Treatment Plan Review     Treatment Plan Review completed on:  10/24/2019     Projected discharge date: 1/23/2020    Client preferred learning style: by hands-on practice    Staff member(s) contributing: Inga Guardado, intern; Marquis Quintero [facility], [facility]    Received supervision: no    Client involvement with treatment planning: contributed to goals and plan.    Client received copy of plan/revised plan: Yes, revised 10/09/2019    Client agrees with plan/revised plan: Yes    Changes to Treatment Plan: yes    Client's Dimension 1 Goal(s) Develop effective strategies to maintain sobriety.    See below.   Client's Dimension 2 Goal(s) Disclose CD status to medical providers and follow up with medical interventions while in IOP.   Maintain good physical health. See below.   Client's Dimension 3 Goal(s) Learn how to apply self-care and psychotherapy to build a repertoire of daily habits that counteract PAWS, fatigue, depression and anxiety leading to increased resiliency and well-being.  Understand the relationship between mental health concerns and addiction.   See below.   Client's Dimension 4 Goal(s) Understand the impact your substance use has had on you, your family, and significant relationships.  Increase internal motivation to change.   See below.   Client's Dimension 5 Goal(s) Identify personal triggers and relapse warning signs.  Develop sober coping and living skills in order to address the development of a strategy for long term recovery See below.   Client's Dimension 6 Goal(s) Resolve issues with legal.  Increase sober support network See below.     New Goals added since last review: Dim 2, 6:  Get prescription for naltrexone and/or antabuse on 10/14/2019.  Attend sober support group weekly.  Obtain a sponsor by 10/20/2019.    Goal(s) worked on since last review: Dim 4, 5    Strategies effective: Yes    Treatment Coordination Activities: no    Medical, Mental Health and  "other appointments the client attended: medical 10/14/2019.  Client prescribed naltrexone.    Medication issues: Client reports compliance with mental health medications of citalopram and hydroxyzine but does not find them helpful.  See above.    Physical and mental health problems: Client endorses conditions of hypertension, high cholesterol, and mild chronic pain.  Client endorses mental health concerns of anxiety.  Client denies current psychotherapy.    Review and evaluation of the individual abuse prevention plan: The program's individual abuse prevention plan (IAPP) is sufficient for this client.     Substance Use Disorders:    Alcohol Use Disorder Severe - 303.90 (F10.20)  Tobacco Use Disorder Mild - 305.10 (Z72.0)    ASAM Risk Rating: (Note the rationale for risk rating changes)    Dimension 1 0  Client exhibits no signs of intoxication or withdrawal throughout the week.     Dimension 2 1  On Monday client denies any new or worsening physical problems.  Client received prescription for naltrexone 10/14/2019 and has begun taking it.  Client reports exercise.    Dimension 3 2  On Monday client rates his depression, anxiety, or other mental health concerns as 2 of 10 (10 highest), due to \"medication\".  Client denies any thoughts of hurting himself or others.     Dimension 4 1  On Monday client rates his motivation for recovery as 10 of 10 (10 highest).  Client expressed 3 reasons to remain sober.     Dimension 5 3  On Monday client rated his cravings in the last week as 2 of 10 (10 highest) due to \"TV\".  Client reports a personal strength of helpfulness to others.  He reports using coping skill of going outside and listening to music through headphones.    Dimension 6 2  On Monday client denies attendance at any sober support meeting this week.  Client reports he does have a sponsor.  Client reports sober activities.  Client lives with his supportive mother who he helps care for.     Data: (Need to include " short narrative for the week on what was worked on)  offered feedback client did actively participate  On Monday, client participated with the check- in process and affirmation.  Readiness to Change and Relapse Prevention were addressed through group peer's presentation of his  Symptoms  assignment with discussion.  He provided supportive feedback to peer and spoke about how he related to behavioral, emotional, and physical symptoms and signs described in peer s assignment.   Client participated in an activity to identify personal strengths, and determined which he most possessed, somewhat possessed, and which were not applicable to him.    Client identified personal strengths to build upon and others he wanted to build up.  On Tuesday, communication skills were presented and processed with group peers.  Client expressed better understanding of assertive communication rather than aggressive, passive, or passive-aggressive styles.  Client completed an assertiveness questionnaire and identified as having a fairly assertive approach.    On Wednesday, Readiness to Change, Relapse Prevention and Recovery Planning were addressed through a presentation by a [employer] alumnus and AA member who discussed how sober support groups helped him change and achieve sustained recovery.  Client actively participated and expressed that the information was helpful.   Client participated in a review and further discussion of communication.  Client expressed how he was more aware of using assertive communication and listening skills.  Client expressed something he was grateful for each day.      Intervention:   Behavioral Therapy, Cognitive Behavioral Therapy, Counselor Feedback, Psychoeducation, Emotional management, Group Feedback, Motivational Interviewing, Relapse Prevention, Twelve Step Facilitation, REBT    Assessment:    Stages of Change Model  Contemplation    Appears/Sounds:  Cooperative    Plan:   Client will continue taking  prescribed naltrexone.  Attend at least 1 sober support group meetings each week.  Maintain contact with sponsor.  Client will prepare to present second half of 10 Worst Consequences assignment next week.  Client will try one new coping skill/activity before next session.  Practice choice of meditation techniques this week.  Express 3 things he is grateful for each night.  -Client will attend all weekly Phase 1 group sessions.   -Client to engage in sober activities each week.    Inga Guardado, intern; yudy Mcghee [facility], [facility]

## 2019-10-23 ENCOUNTER — HOSPITAL ENCOUNTER (OUTPATIENT)
Dept: BEHAVIORAL HEALTH | Facility: CLINIC | Age: 59
End: 2019-10-23
Attending: SOCIAL WORKER
Payer: COMMERCIAL

## 2019-10-23 PROCEDURE — H2035 A/D TX PROGRAM, PER HOUR: HCPCS | Mod: HQ

## 2019-10-24 ENCOUNTER — HOSPITAL ENCOUNTER (OUTPATIENT)
Dept: BEHAVIORAL HEALTH | Facility: CLINIC | Age: 59
End: 2019-10-24
Attending: SOCIAL WORKER
Payer: COMMERCIAL

## 2019-10-24 PROCEDURE — H2035 A/D TX PROGRAM, PER HOUR: HCPCS | Mod: HQ

## 2019-10-25 NOTE — PROGRESS NOTES
"Durga Aguirre  October 24, 2019  [id number]    [facility] Mental Health Skills Group Note  Neurobiology    Day: Thursday   Date: 10/24/19   Group Attendance Attended group session   Group Therapy Type Addiction, Psychoeducation and Psychotherapeutic   Group Topic Covered Neurobiology of Addiction    Client's Response To Group Topic Other - Participated fully, but not always on topic, see details below.   Client Group Participation Detail Shows interest   Group Attendance (Time) 3.0 Hours   Individual Attendance None   Family Attendance None   Other Comments/Information None       Number of participants: 4       Length of Group: 3 hours     Goal of the Group: Learn current neuroscience of addiction to better understand addiction-as-a-disease and to reduce confusion, shame and guilt leading to increased open-mindedness and psychological flexibility needed to build resilience. Clients learn what is meant by retrain the brain.        [place]: Group Topics and Activities      I.  D3 Intervention and Group Topic addressed: Neuroscience and the importance of treatment     II. Mindfulness and/or Motivational Component:  Michael Hopson 18-minute talk on the power of addiction and the value of compassion.    III. Additional Activity: Group learned the different roles of various areas of the brain and how these are changed by addiction.  They learned how these changes manifest in behaviors and lead to consequences. They learned how to strengthen the frontal cortex to reduce the impact of cravings and using  thoughts stemming from the midbrain's activity.  Group compared different areas of addiction that society may condone and others that still have stigma attached to them.           IV. Review of Progress:   Client self-report:  Durga reported his week went \"surprisingly well\" and \"My itinerary is getting down.\" He said he is \"slowing down and taking more meaningful action\" which led to discussion of mindfulness.  He does not want to do " "his journal and I am not if he is capable of getting a lot out of this exercise so did not address this with him. He said his biggest stress is not having a 's license. He said he is not having cravings and is taking Naltrexone \"in the morning and then before bed\" but he is only supposed to take these once a day and we addressed the danger not taking his medications as prescribed.      B.    Therapist Assessment:  Durga tends to become side-tracked but is usually easily redirected. He does participate fully as far as questions and comments, however, his comments are many times off topic. I did not require he complete the journal as I don't think he is able to follow the directions well.      V.   Reflection and evaluation of today's group experience:  Gave verbal feedback and filled out evaluation form. Client wrote the most important thing(s) learned today included, \"To be conscious\" [not sure in what context he is using this] and \"Why I behaved like I did when I was younger\" [I think by \"younger\" he was referring to when he drank alcohol, however, this has not been very long ago.]    VI. Assignments or activities to do for next week: Continue to do journal before bed, do at least one mindfulness exercise in the day time, address one self-care area a week, incorporate one stress reduction technique into daily schedule of structure and routine.     Therapeutic techniques in this session:  Motivational Therapy, CBT/DBT/ACT, Supportive, Behavioral Modification and Mindfulness      Additional Treatment Referrals/Follow-up Issues to Address: Not needed at this time.      Change in Treatment Plan: No change. This session completes one Treatment Plan intervention in D3.     Change in Diagnosis: No change in diagnosis indicated.      "

## 2019-10-28 ENCOUNTER — HOSPITAL ENCOUNTER (OUTPATIENT)
Dept: BEHAVIORAL HEALTH | Facility: CLINIC | Age: 59
End: 2019-10-28
Attending: SOCIAL WORKER
Payer: COMMERCIAL

## 2019-10-28 PROCEDURE — H2035 A/D TX PROGRAM, PER HOUR: HCPCS | Mod: HQ

## 2019-10-28 ASSESSMENT — ANXIETY QUESTIONNAIRES
7. FEELING AFRAID AS IF SOMETHING AWFUL MIGHT HAPPEN: NOT AT ALL
6. BECOMING EASILY ANNOYED OR IRRITABLE: NOT AT ALL
5. BEING SO RESTLESS THAT IT IS HARD TO SIT STILL: NOT AT ALL
3. WORRYING TOO MUCH ABOUT DIFFERENT THINGS: SEVERAL DAYS
1. FEELING NERVOUS, ANXIOUS, OR ON EDGE: NOT AT ALL
2. NOT BEING ABLE TO STOP OR CONTROL WORRYING: NOT AT ALL
GAD7 TOTAL SCORE: 1

## 2019-10-28 ASSESSMENT — PATIENT HEALTH QUESTIONNAIRE - PHQ9
5. POOR APPETITE OR OVEREATING: NOT AT ALL
SUM OF ALL RESPONSES TO PHQ QUESTIONS 1-9: 0

## 2019-10-28 NOTE — ADDENDUM NOTE
Encounter addended by: Marquis Quintero [facility] on: 10/28/2019 2:50 PM   Actions taken: Episode edited

## 2019-10-29 ENCOUNTER — HOSPITAL ENCOUNTER (OUTPATIENT)
Dept: BEHAVIORAL HEALTH | Facility: CLINIC | Age: 59
End: 2019-10-29
Attending: SOCIAL WORKER
Payer: COMMERCIAL

## 2019-10-29 PROCEDURE — H2035 A/D TX PROGRAM, PER HOUR: HCPCS | Mod: HQ

## 2019-10-29 ASSESSMENT — ANXIETY QUESTIONNAIRES: GAD7 TOTAL SCORE: 1

## 2019-10-29 NOTE — PROGRESS NOTES
[facility] SERVICES INTENSIVE OUTPATIENT PROGRAM  TRANSITION PROGRESS NOTE     Patient: Durga Aguirre  MRN; [id number]   : 1960  Age: 59 year old Sex: male  IOP ADMISSION DATE: 2019  TRANSITION DATE: 10/29/2019  TRANSITIONING FROM: Phase 1 TO: Phase 2  SUBSTANCE USE DISORDERS:   Alcohol Use Disorder Severe - 303.90 (F10.20)  Tobacco Use Disorder Mild - 305.10 (Z72.0)    LAST USE DATE: Client reports date of last use of alcohol as 10/06/2019.    Treatment Plan Review completed on:  10/31/2019     Attendance Grid:     Monday    Group Date: 10/28/2019   Group Attendance Attended group session   Group Therapy Type Addiction   Group Topic Covered Disease of Addiction/Choices in Recovery, Mindfulness and Relapse Prevention   Client Participation/Contribution Cooperative with task Discussed personal experience with topic Expressed readiness to alter behaviors Listened actively   Client Participation Detail Highly involved   Attendance 2.0 Hours   Individual Attendance 1 Hour   Family Attendance None   Other Comments/Information None      Tuesday     Group Date: 10/29/2019   Group Attendance Attended group session   Group Therapy Type Psychoeducation   Group Topic Covered Disease of Addiction/Choices in Recovery, Distress Tolerance, Emotional Regulation and Emotions/Expression/Feelings   Client Participation/Contribution Cooperative with task Expressed readiness to alter behaviors Expressed understanding of topic Listened actively   Client Participation Detail Highly involved   Attendance 2.0 Hours   Individual Attendance None   Family Attendance None   Other Comments/Information None      Wednesday    Group Date: 10/30/2019   Group Attendance Attended group session   Group Therapy Type Addiction   Group Topic Covered Disease of Addiction/Choices in Recovery, Relapse Prevention and Prodependence and despair turning to hope.   Client Participation/Contribution Cooperative with task Discussed personal  experience with topic Expressed readiness to alter behaviors Expressed understanding of topic   Client Participation Detail Highly involved   Attendance 2.0 Hours   Individual Attendance None   Family Attendance None   Other Comments/Information None     Total # of Phase 1 Group Sessions: 24     Total # of Phase 2 Group Sessions: 2  Total # of Phase 3 Group Sessions: N/A  Total # of 1:1 Sessions: 3  Length of stay/projected discharge date: 2020    Support group attended this week: no    Reporting sobriety: Yes.  Client reports last date of alcohol use as 10/06/2019.              Learning Style(s):    by hands-on practice      Staff member contributing: Inga Guardado, Intern;  ANA Mcghee LADC      Received supervision: Yes.  Intern supervised by ANA Mcghee LADC.    Client contributed to goals and plan: Yes    Client received a signed copy of treatment plan/revised plan: Yes    Changes to Treatment Plan: No.    Client agrees with plan/revised plan: Yes    Any changes in Vulnerable Adult Status: No    Treatment Coordination Activities: No    Medical, Mental Health and other appointments the client attended: None noted.    Medication issues:  Client reports compliance with mental health medications of citalopram and hydroxyzine but does not find them helpful.  Client reports compliance with prescribed Naltrexone.    Physical and mental health problems: Physical and mental health problems: Client endorses conditions of hypertension, high cholesterol, and mild chronic pain.  Client endorses mental health concerns of anxiety.  Client denies current psychotherapy.    Review and evaluation of the individual abuse prevention plan: The program's individual abuse prevention plan (IAPP) is sufficient for this client.     Dimension One: Acute Intoxication/Withdrawal Potential     Risk at admission to [facility]: 0. Risk at transition to Phase 2: Risk Ratin.     Treatment plan goal:   Develop effective strategies  to maintain sobriety. Goal Status: CONTINUE.     Current ASAM criteria: no withdrawal risk.     Treatment Summary/Justification of Transition DIM 1: Client exhibits no signs of intoxication or withdrawal throughout the week.     Dimension Two: Biomedical Conditions and Complaints     Risk at admission to IOP: 1. Risk at transition to Phase 2: Risk Ratin.     Treatment plan goal:   Disclose CD status to medical providers and follow up with medical interventions while in treatment program. CONTINUE.  Maintain good physical health. CONTINUE.     Current ASAM criteria: stable and manageable.       Treatment Summary/Justification of Transition DIM 2: On Monday, client denies any new or worsening medical conditions.  Dimension Three: Emotional/Behavioral/Cognitive Conditions and Complications     Risk at admission to IOP: 2. Risk at transition to Phase 2: Risk Ratin.     Treatment plan goal:   Learn how to apply self-care and psychotherapy to build a repertoire of daily habits that counteract PAWS, fatigue, depression and anxiety leading to increased resiliency and well-being. COMPLETE.  Understand the relationship between addiction and mental health issues. CONTINUE.     Current ASAM criteria: stable and manageable     Treatment Summary/Justification of Transition DIM 3: On Monday, client rated his depression and anxiety at 2 of 10, and cited feeling stability in his life.     PHQ-9  10/28/2019 (score at time of transition): Client scored 0 and indicated the symptoms are not difficult at all to his quality of daily living.  YUSUF-7 (score at time of transition): Client scored 1 and indicated the symptoms are not difficult at all because he is putting himself before the bottle.    Dimension Four: Readiness to Change     Risk at admission to IOP: 2. Risk at transition to Phase 2: Risk Ratin.     Treatment plan goal:   Understand the impact your substance use has had on you, your family, and significant  relationships.  CONTINUE.  Increase internal motivation to change. CONTINUE.     Current ASAM criteria: cooperative, engaged in treatment and ready for recovery.     Treatment Summary/Justification of Transition DIM 4: Throughout the week, client has expressed insight into his use and how it impacted others.  Client seems to be internalizing his disease more each day.     Dimension Five: Relapse/Continues Use/Continues Problem Potential     Risk at admission to IOP: 3. Risk at transition to Phase 2: Risk Rating: 3.     Treatment plan goal:   Identify personal triggers and relapse warning signs.   CONTINUE.   Develop sober coping and living skills in order to address the development of a strategy for long term recovery. CONTINUE.     Current ASAM criteria: recognizes risk well and demonstrates ability to manage potential problems, moderate risk of relapse and client has coping skills     Treatment Summary/Justification of Transition DIM 5: Client reports no further use episodes.  Client is proactive in engaging in organizational skills, as a  to thoughts of relapse.     Dimension Six: Recovery Environment     Risk at admission to IOP: 3. Risk at transition to Phase 2: Risk Ratin.     Treatment plan goal:   Resolve issues with legal. CONTINUE.  Increase sober support network. CONTINUE.     Current ASAM criteria: engaged in structured and meaningful activity and can cope with structure and support.     Treatment Summary/Justification of Transition DIM 6: Client states he feels self-respect for the first time in a long while.  Client is actively engaged in structuring a meaningful life of recovery.   Intervention:   Behavioral Therapy, Cognitive Behavioral Therapy, Counselor Feedback, Psychoeducation, Emotional management, Group Feedback, Motivational Interviewing, Relapse Prevention, Twelve Step Facilitation, REBT  On Monday, client participated with the check- in process and affirmation of peers as they  "shared.  Two peers presented assignments; one on \"Anxiety\" and another on \"Relapse Autopsy.\"   He provided supportive feedback to peers and spoke about how he related to behavioral, emotional, and physical symptoms and signs described in peer assignments.  Client identified personal strengths to build upon and feelings he was experiencing over the week.  On Tuesday, DBT skills were presented and processed with group peers.  Client expressed better understanding of how interventions such as DBT can strengthen and provide tools for recovery.  On Wednesday, client provided personal examples in response to the prodependence topic, as well as the despair into hope discussion.  Assessment:  Stages of Change Model: Contemplation   Appears/Sounds: Cooperative, Motivated.  PLAN: Transition client to Phase 2 effective 10/29/2019 due to completion of five mental health skills groups and progress toward other treatment goals.   Client currently meets the ASAM criteria to continue Outpatient (ASAM level 1)  level of care.    Inga Guardado Intern;  Marquis Quintero, MPS, [facility]      "

## 2019-10-30 ENCOUNTER — HOSPITAL ENCOUNTER (OUTPATIENT)
Dept: BEHAVIORAL HEALTH | Facility: CLINIC | Age: 59
End: 2019-10-30
Attending: SOCIAL WORKER
Payer: COMMERCIAL

## 2019-10-30 PROCEDURE — H2035 A/D TX PROGRAM, PER HOUR: HCPCS | Mod: HQ

## 2019-11-04 ENCOUNTER — HOSPITAL ENCOUNTER (OUTPATIENT)
Dept: BEHAVIORAL HEALTH | Facility: CLINIC | Age: 59
End: 2019-11-04
Attending: SOCIAL WORKER
Payer: COMMERCIAL

## 2019-11-04 PROCEDURE — H2035 A/D TX PROGRAM, PER HOUR: HCPCS | Mod: HQ

## 2019-11-05 ENCOUNTER — HOSPITAL ENCOUNTER (OUTPATIENT)
Dept: BEHAVIORAL HEALTH | Facility: CLINIC | Age: 59
End: 2019-11-05
Attending: SOCIAL WORKER
Payer: COMMERCIAL

## 2019-11-05 PROCEDURE — H2035 A/D TX PROGRAM, PER HOUR: HCPCS | Mod: HQ

## 2019-11-05 NOTE — PROGRESS NOTES
Durga Aguirre  [id number]               Adult CD Progress Note and Treatment Plan Review     Attendance     Monday    Group Date: 11/04/2019   Group Attendance Attended group session   Group Therapy Type Addiction   Group Topic Covered Disease of Addiction/Choices in Recovery and Relapse Prevention   Client's Response To Group Topic Cooperative with task Discussed personal experience with topic Expressed understanding of topic Listened actively   Client Group Participation Detail Highly involved   Group Attendance (Time) 2.0 Hours   Individual Attendance None   Family Attendance None   Other Comments/Information None      Tuesday     Group Date: 11/05/2019   Group Attendance Attended group session   Group Therapy Type Psychoeducation   Group Topic Covered Grief & Loss   Client's Response To Group Topic Cooperative with task Discussed personal experience with topic Expressed readiness to alter behaviors Listened actively   Client Group Participation Detail Highly involved   Group Attendance (Time) 2.0 Hours   Individual Attendance None   Family Attendance None   Other Comments/Information None      Wednesday    Group Date: 11/06/2019   Group Attendance Attended group session   Group Therapy Type Addiction   Group Topic Covered Disease of Addiction/Choices in Recovery and Relapse Prevention   Client's Response To Group Topic Cooperative with task Discussed personal experience with topic Listened actively   Client Group Participation Detail Highly involved   Group Attendance (Time) 2.0 Hours   Individual Attendance None   Family Attendance None   Other Comments/Information None     Total # of Phase 1 Group Sessions:  24     Total # of Phase 2 Group Sessions:    5  Total # of Phase 3 Group Sessions:   Total # of 1:1 Sessions: 3    Support group attended this week: NO.    Reporting sobriety: Yes, client reports last use date of alcohol as , 10/06/2019    Treatment Plan Review     Treatment Plan Review completed on:   11/08/2019     Projected discharge date: 1/23/2020    Client preferred learning style: by hands-on practice    Staff member(s) contributing: Inga Guardado, Intern; ANA Mcghee, [facility]    Received supervision: Yes.  Intern received supervision from Marquis Quintero.    Client involvement with treatment planning: contributed to goals and plan.    Client received copy of plan/revised plan: Yes, revised 10/09/2019    Client agrees with plan/revised plan: Yes    Changes to Treatment Plan: yes    Client's Dimension 1 Goal(s) Develop effective strategies to maintain sobriety.    See below.   Client's Dimension 2 Goal(s) Disclose CD status to medical providers and follow up with medical interventions while in IOP.   Maintain good physical health. See below.   Client's Dimension 3 Goal(s) Learn how to apply self-care and psychotherapy to build a repertoire of daily habits that counteract PAWS, fatigue, depression and anxiety leading to increased resiliency and well-being.  Understand the relationship between mental health concerns and addiction.   See below.   Client's Dimension 4 Goal(s) Understand the impact your substance use has had on you, your family, and significant relationships.  Increase internal motivation to change.   See below.   Client's Dimension 5 Goal(s) Identify personal triggers and relapse warning signs.  Develop sober coping and living skills in order to address the development of a strategy for long term recovery Client presented assignment.   Client's Dimension 6 Goal(s) Resolve issues with legal.  Increase sober support network See below.     New Goals added since last review: Dim 2, 6:  Get prescription for naltrexone and/or antabuse on 10/14/2019.  Attend sober support group weekly.  Obtain a sponsor by 10/20/2019.    Goal(s) worked on since last review: Dim 4, 5    Strategies effective: Client has not met new goals at this time.    Treatment Coordination Activities: faxed verification of attendance  "and progress to D[facility] 11/8/2019.    Medical, Mental Health and other appointments the client attended:a medical appointment on 10/14/2019.  Client prescribed naltrexone.Continues to take hydroxyzine.  Medication issues: Client reports compliance with mental health medications of citalopram and hydroxyzine but does not find them helpful.  See above.    Physical and mental health problems: Client endorses conditions of hypertension, high cholesterol, and mild chronic pain.  Client endorses mental health concerns of anxiety.  Client denies current psychotherapy.    Review and evaluation of the individual abuse prevention plan: The program's individual abuse prevention plan (IAPP) is sufficient for this client.     Substance Use Disorders:    Alcohol Use Disorder Severe - 303.90 (F10.20)  Tobacco Use Disorder Mild - 305.10 (Z72.0)    ASAM Risk Rating: (Note the rationale for risk rating changes)    Dimension 1 0  Client exhibits no signs of intoxication or withdrawal throughout the week.     Dimension 2 1  On Monday client denies any new or worsening physical problems.  Client received prescription for naltrexone 10/14/2019 and has begun taking it.  On Tuesday, client reports exercise.    Dimension 3 2  On Monday client rates his depression, anxiety, or other mental health concerns as 3 of 10 (10 highest), due to \"nervousness trying to cut down on smoking\".  Client denies any thoughts of hurting himself or others. On Tuesday, client reports feeling strong and positive about his \"management of life.\"    Dimension 4 1  On Monday client rates his motivation for recovery as 10 of 10 (10 highest).  Client expressed 3 reasons to remain sober. On Tuesday, client said resolving grief from the past would help him maintain recovery.    Dimension 5 3  On Monday client rated his cravings in the last week as 1 of 10 (10 highest) due to concentrating on responding, not reacting to stimuli.  Client reports a personal strength of " "remaining calm.  On Tuesday, client reports using coping skill of \" out things I can't control from things I can.\"    Dimension 6 2  On Monday client denies attendance at any sober support meeting this week.  Client reports he does have a sponsor.  On Tuesday, client reports sober activities around the house.  Client lives with his supportive mother who he helps care for.     Data: (Need to include short narrative for the week on what was worked on)  offered feedback client did actively participate  On Monday, client participated with the check- in process. Client participated in an AA presentation brought in to group tonight.  On Tuesday, grief and loss was the topic.  Client expressed better understanding of how he has unresolved, unexpressed grief from past losses.  On Wednesday, Readiness to Change and Relapse Prevention were addressed through client s and group peers' presentations of their  Triggers and Cues  assignments with discussion.  Client spoke about personal internal and external triggers and cues and how he could avoid or better respond to them.  Client participated, provided supportive feedback, and spoke about how he related to emotional and physical cues described in peers  assignments.  Client expressed something he was grateful for each day.      Intervention:   Behavioral Therapy, Cognitive Behavioral Therapy, Counselor Feedback, Psychoeducation, Emotional management, Group Feedback, Motivational Interviewing, Relapse Prevention, Twelve Step Facilitation, REBT    Assessment:    Stages of Change Model  Contemplation    Appears/Sounds:  Cooperative    Plan:   Client will continue taking prescribed naltrexone.  Attend at least 1 sober support group meetings each week.  Maintain contact with sponsor.  Client will prepare to present second half of [place] assignment next week.  Client will try one new coping skill/activity before next session.  Practice choice of meditation " techniques this week.  Express 3 things he is grateful for each night.  -Client will attend all weekly Phase 1 group sessions.   -Client to engage in sober activities each week.    Inga Guardado, Intern; ANA Mcghee, [facility]

## 2019-11-06 ENCOUNTER — HOSPITAL ENCOUNTER (OUTPATIENT)
Dept: BEHAVIORAL HEALTH | Facility: CLINIC | Age: 59
End: 2019-11-06
Attending: SOCIAL WORKER
Payer: COMMERCIAL

## 2019-11-06 PROCEDURE — H2035 A/D TX PROGRAM, PER HOUR: HCPCS | Mod: HQ

## 2019-11-06 PROCEDURE — H2035 A/D TX PROGRAM, PER HOUR: HCPCS

## 2019-11-06 NOTE — LETTER
[address]  [address]  [place] [address]            Dear [facility],    This is to verify that Durga Aguirre was admitted for treatment of chemical dependency at [place], [place] on 9/16/2019.    This individual is currently participating in:   ______ Inpatient   ______ [facility]odging [facility] (Residential Non-Medical)   ______ Day Outpatient   ___X___ Evening Outpatient       The client will participate in the program for a total of approximately 5 months, depending on the needs of the client.  His tentative completion date is 1/23/2020.  This client is progressing well and has entered the second phase of treatment.  He reports having ignition interlock but currently has no provisional 's license.  He is riding his bicycle to and from treatment.  With winter weather approaching it would be helpful if he could drive to insure attendance.  His scheduled sessions are Monday through Wednesday 5:30pm to 7:30pm until 12/2/2019.  He is then scheduled to begin Phase 3 which meets on Thursday evenings 5:30pm to 7:30pm and usually lasts 8 weeks.    Treatment is evidence based and helps clients to achieve a chemically free lifestyle through lectures, individual, family and group therapy.  Mr. Tee has been assigned to ANA Mcghee, ELANA  /  Telephone: [phone] as his primary counselor.  If you have further questions, please contact us.    Respectfully,        ELANA Herman

## 2019-11-07 NOTE — ADDENDUM NOTE
Encounter addended by: Marquis Quintero, [facility] on: 11/6/2019 7:50 PM   Actions taken: Delete clinical note

## 2019-11-07 NOTE — PROGRESS NOTES
Met with client 1:1 and discussed progress.  Client is completing homework as assigned and thinks he is learning much.  He reports changed behaviors as a result of re-framing thoughts and that his interactions with others have consequently improved.  Client requested verification of treatment be sent to [facility] so he can resume driving which I agreed to provide.

## 2019-11-08 NOTE — ADDENDUM NOTE
Encounter addended by: Marquis Quintero, [facility] on: 11/8/2019 2:33 PM   Actions taken: Clinical Note Signed

## 2019-11-08 NOTE — ADDENDUM NOTE
Encounter addended by: Marquis Quintero, [facility] on: 11/8/2019 2:16 PM   Actions taken: Letter saved

## 2019-11-11 ENCOUNTER — HOSPITAL ENCOUNTER (OUTPATIENT)
Dept: BEHAVIORAL HEALTH | Facility: CLINIC | Age: 59
End: 2019-11-11
Attending: SOCIAL WORKER
Payer: COMMERCIAL

## 2019-11-11 PROCEDURE — H2035 A/D TX PROGRAM, PER HOUR: HCPCS | Mod: HQ

## 2019-11-11 NOTE — PROGRESS NOTES
Durga Aguirre  [id number]               Adult CD Progress Note and Treatment Plan Review     Attendance     Monday    Group Date: 11/11/2019   Group Attendance Attended group session   Group Therapy Type Addiction   Group Topic Covered Disease of Addiction/Choices in Recovery, Leisure Exploration/Use of Leisure Time, Meditation/Breathing Exercises and Relapse Prevention   Client's Response To Group Topic Cooperative with task Discussed personal experience with topic Expressed understanding of topic Listened actively   Client Group Participation Detail Highly involved   Group Attendance (Time) 2.0 Hours   Individual Attendance None   Family Attendance None   Other Comments/Information None      Tuesday     Group Date: 11/12/2019   Group Attendance Attended group session   Group Therapy Type Psychoeducation   Group Topic Covered Coping Skills/Lifestyle Management, Disease of Addiction/Choices in Recovery, Relapse Prevention and Positive Psychology   Client's Response To Group Topic Cooperative with task Discussed personal experience with topic Expressed readiness to alter behaviors Expressed understanding of topic Listened actively   Client Group Participation Detail Highly involved   Group Attendance (Time) 2.0 Hours   Individual Attendance None   Family Attendance None   Other Comments/Information None      Wednesday    Group Date: 11/13/2019   Group Attendance Attended group session   Group Therapy Type Addiction   Group Topic Covered Disease of Addiction/Choices in Recovery, Grief & Loss, Relapse Prevention and Reviewed Previous Skills Group Topic (Positive Psychology/emotional sobriety)   Client's Response To Group Topic Cooperative with task Discussed personal experience with topic Listened actively   Client Group Participation Detail Highly involved   Group Attendance (Time) 2.0 Hours   Individual Attendance None   Family Attendance None   Other Comments/Information None     Total # of Phase 1 Group Sessions:   24     Total # of Phase 2 Group Sessions:    8  Total # of Phase 3 Group Sessions:   Total # of 1:1 Sessions: 3    Support group attended this week: No.    Reporting sobriety: Yes, client reports last use date of alcohol as , 10/06/2019    Treatment Plan Review     Treatment Plan Review completed on:  11/14/2019     Projected discharge date: 1/23/2020    Client preferred learning style: by hands-on practice    Staff member(s) contributing: Inga Guardado, Intern; Marquis Quintero [facility], [facility]    Received supervision: Yes.  Intern received supervision from Marquis Quintero.    Client involvement with treatment planning: contributed to goals and plan.    Client received copy of plan/revised plan: Yes, revised 10/09/2019    Client agrees with plan/revised plan: Yes    Changes to Treatment Plan: yes    Client's Dimension 1 Goal(s) Develop effective strategies to maintain sobriety.    See below.   Client's Dimension 2 Goal(s) Disclose CD status to medical providers and follow up with medical interventions while in IOP.   Maintain good physical health. See below.   Client's Dimension 3 Goal(s) Learn how to apply self-care and psychotherapy to build a repertoire of daily habits that counteract PAWS, fatigue, depression and anxiety leading to increased resiliency and well-being.  Understand the relationship between mental health concerns and addiction.   See below.   Client's Dimension 4 Goal(s) Understand the impact your substance use has had on you, your family, and significant relationships.  Increase internal motivation to change.   See below.   Client's Dimension 5 Goal(s) Identify personal triggers and relapse warning signs.  Develop sober coping and living skills in order to address the development of a strategy for long term recovery See below.   Client's Dimension 6 Goal(s) Resolve issues with legal.  Increase sober support network See below.     New Goals added since last review: Dim 2, 6:  Get prescription for naltrexone  "and/or antabuse on 10/14/2019.   Attend sober support group weekly. Obtain a sponsor by 10/20/2019. .    Goal(s) worked on since last review: Dim 4, 5    Strategies effective: Client has not met new goals at this time.    Treatment Coordination Activities: faxed verification of attendance and progress to [facility] 11/8/2019.    Medical, Mental Health and other appointments the client attended:a medical appointment on 10/14/2019.  Client prescribed naltrexone.Continues to take hydroxyzine.    Medication issues: Client reports compliance with mental health medications of citalopram and hydroxyzine but does not find them helpful.  See above.    Physical and mental health problems: Client endorses conditions of hypertension, high cholesterol, and mild chronic pain.  Client endorses mental health concerns of anxiety.  Client denies current psychotherapy.    Review and evaluation of the individual abuse prevention plan: The program's individual abuse prevention plan (IAPP) is sufficient for this client.     Substance Use Disorders:    Alcohol Use Disorder Severe - 303.90 (F10.20)  Tobacco Use Disorder Mild - 305.10 (Z72.0)    ASAM Risk Rating: (Note the rationale for risk rating changes)    Dimension 1 0  Client exhibits no signs of intoxication or withdrawal throughout the week.     Dimension 2 1  On Monday client denies any new or worsening physical problems.  Client received prescription for naltrexone 10/14/2019.  On Tuesday and Wednesday client reports exercise and \"doing well.\"    Dimension 3 2  On Monday client rates his depression, anxiety, or other mental health concerns as 2 of 10 (10 highest), down from 3 the week prior, due to \"things are good\".  Client denies any thoughts of hurting himself or others. On Tuesday, client reports feeling positive about his \"accomplishing goals and motivation.\" On Wednesday client accessed new emotions which he hadn't in the past    Dimension 4 1  On Monday client rates his motivation " "for recovery as remaining at 10 of 10 (10 highest).  Client expressed 3 reasons to remain sober, including his health, family, and becoming successful. On Tuesday, client said he is doing well, and it \"was an uneventful day.\"  On Wednesday, client had \"thoughts of the past\" which surfaced old grief yet to be processed.    Dimension 5 3  On Monday client rated his cravings in the last week as 3 of 10 (10 highest), up from 1 last week, due to \"watching Aamir on the show [place]and-a-Half Men\".  It was suggested he not watch that show if it's a trigger. Client reports a personal strength of tenacity.  On Tuesday, client reports using coping skill of \"using the words of old rock stars who have found sobriety.\"  On Wednesday, client learned about Emotional Sobriety in an extensive discussion as the result of a peer's lack thereof as evidenced by attitude and absence from group.  Positive Psychology was reviewed, and a peer presented her \"Grief\",  \"Mourning in recovery' and \"Feeling Better\" assignments.  Client appeared to empathize with her grief and described his personal experiences previously repressed.      Dimension 6 2  On Monday client denies attendance at any sober support meeting this week.  Client reports he does have a sponsor.  On Tuesday, client reports sober activities around the house and yards in the neighborhood.  Client lives with his supportive mother who he helps care for. On Wednesday, client finds great support for recovery by sharing with peers past emotional issues.    Data: (Need to include short narrative for the week on what was worked on)  offered feedback client did actively participate  On Monday, client participated with the check- in process and affirmation.  Client actively participated in a group exercise to identify sober activities and coping skills for each letter of the alphabet A-Z.  A list of over 100 activities/coping skills was collaboratively created.  Client participated in " self-soothing exercises.  Client practiced 4x4 and natural mindful breathing.  On Tuesday the Psychoeducation topic was Positive Psychology with discussion of how one's perspective leads to what is felt.  Video  Happiness  was presented and client processed with group peers.  Client shared that the topic was relevant and helpful to him.  On Wednesday Readiness to Change and Relapse Prevention were addressed through group peer discussion of the previous day topic of Positive Psychology with emphasis on emotional sobriety.  A peer presented her  Mourning in Recovery ,  Grief , and  Feeling Better:  Building Self-Esteem  assignments with discussion.  The group discussed the neurobiology of grief and that of addiction. Client discussed personal experiences with grief and loss, and about ways to feel better and increase self-esteem. During this session, client got in touch with past emotions about his former partner having had six miscarriages. He shared with the group, that at the time he was not able to be empathetic towards his ex.  However, he was able to share this new emotion openly in group.  The client  expressed gratitude for this ability to take ownership of these emotions.       Intervention:   Behavioral Therapy, Cognitive Behavioral Therapy, Counselor Feedback, Psychoeducation, Emotional management, Group Feedback, Motivational Interviewing, Relapse Prevention, Twelve Step Facilitation, REBT    Assessment:    Stages of Change Model  Contemplation    Appears/Sounds:  Cooperative    Plan:   Client will continue taking prescribed naltrexone.  Attend at least 1 sober support group meetings each week.  Maintain contact with sponsor.  Client will prepare to present second half of [place] assignment next week.  Client will try one new coping skill/activity before next session.  Practice choice of meditation techniques this week.  Express 3 things he is grateful for each night.  -Client will attend all  weekly Phase 2 group sessions.   -Client to engage in sober activities each week.    Inga Guardado, Intern; ANA Mcghee, [facility]

## 2019-11-12 ENCOUNTER — HOSPITAL ENCOUNTER (OUTPATIENT)
Dept: BEHAVIORAL HEALTH | Facility: CLINIC | Age: 59
End: 2019-11-12
Attending: SOCIAL WORKER
Payer: COMMERCIAL

## 2019-11-12 PROCEDURE — H2035 A/D TX PROGRAM, PER HOUR: HCPCS | Mod: HQ

## 2019-11-13 ENCOUNTER — HOSPITAL ENCOUNTER (OUTPATIENT)
Dept: BEHAVIORAL HEALTH | Facility: CLINIC | Age: 59
End: 2019-11-13
Attending: SOCIAL WORKER
Payer: COMMERCIAL

## 2019-11-13 PROCEDURE — H2035 A/D TX PROGRAM, PER HOUR: HCPCS | Mod: HQ

## 2019-11-14 ENCOUNTER — NURSE TRIAGE (OUTPATIENT)
Dept: INTERNAL MEDICINE | Facility: CLINIC | Age: 59
End: 2019-11-14

## 2019-11-14 NOTE — TELEPHONE ENCOUNTER
"  Additional Information    Negative: Shock suspected (e.g., cold/pale/clammy skin, too weak to stand, low BP, rapid pulse)    Negative: Similar pain previously and it was from 'heart attack'    Negative: Similar pain previously and it was from 'angina' and not relieved by nitroglycerin    Negative: Sounds like a life-threatening emergency to the triager    Negative: Chest pain    Negative: Sinus pain and congestion    Negative: Headache or pain in upper forehead    Negative: Toothache is the main symptom    Negative: Followed a face injury    Negative: Difficulty breathing or unusual sweating (e.g., sweating without exertion)    Negative: Can't close the mouth fully (i.e., \"mouth is locked open\", patient will have difficulty talking)    Negative: Fever and localized red rash    Negative: Fever and area of face is swollen    Negative: Patient sounds very sick or weak to the triager    Negative: SEVERE pain (e.g., excruciating, unable to do any normal activities)    Negative: Localized red rash and painful to the touch    Negative: Painful rash with multiple small blisters grouped together (i.e., dermatomal distribution or 'band' or 'stripe')    Negative: Swollen area of face and toothache    Negative: Swollen area of face that is painful to touch    Negative: Swelling around the eye    Face pain present > 24 hours    Protocols used: FACE PAIN-A-[place]    "

## 2019-11-14 NOTE — PROGRESS NOTES
Wednesday    Group Date: 11/13/2019   Group Attendance Attended group session   Group Therapy Type Addiction   Group Topic Covered Co-occurring Illness, Neurobiology of Addiction, Relapse Prevention, Reviewed Previous Skills Group Topic (Positive psychology/emotional sobriety) and Self-Esteem/Self [place]   Client's Response To Group Topic Cooperative with task Discussed personal experience with topic Listened actively   Client Group Participation Detail Highly involved   Group Attendance (Time) 2.0 Hours   Individual Attendance None   Family Attendance None   Other Comments/Information None

## 2019-11-14 NOTE — TELEPHONE ENCOUNTER
Patient calling. Having pain in jaw on right side of face when opens mouth. He stuck his finger into ear and opened jaw and feels like it is coming from behind ear. Still has wisdom tooth on that side, removed the other 3 but says that one was suggested not to remove. Is unsure if this is a dental issue or something else. Intensity is sporadic. Recently had xrays on ribs due to rib pain and found out he broke 3 ribs that he did not know of. Facial pain for a few weeks. Biking for last 2 months and no breathing difficulties or chest pain. Notes he takes hydroxyzine for anxiety. See below for further details. appt made with Paula TERAN for tomorrow.

## 2019-11-14 NOTE — ADDENDUM NOTE
Encounter addended by: Inga Guardado on: 11/14/2019 5:06 PM   Actions taken: Pend clinical note, Clinical Note Signed

## 2019-11-19 ENCOUNTER — HOSPITAL ENCOUNTER (OUTPATIENT)
Dept: BEHAVIORAL HEALTH | Facility: CLINIC | Age: 59
End: 2019-11-19
Attending: SOCIAL WORKER | Admitting: FAMILY MEDICINE
Payer: COMMERCIAL

## 2019-11-19 DIAGNOSIS — F19.90 SUBSTANCE USE DISORDER: ICD-10-CM

## 2019-11-19 PROCEDURE — H2035 A/D TX PROGRAM, PER HOUR: HCPCS | Mod: HQ

## 2019-11-20 ENCOUNTER — HOSPITAL ENCOUNTER (OUTPATIENT)
Dept: BEHAVIORAL HEALTH | Facility: CLINIC | Age: 59
End: 2019-11-20
Attending: SOCIAL WORKER
Payer: COMMERCIAL

## 2019-11-20 PROBLEM — F19.90 SUBSTANCE USE DISORDER: Status: ACTIVE | Noted: 2019-11-20

## 2019-11-20 PROCEDURE — H2035 A/D TX PROGRAM, PER HOUR: HCPCS | Mod: HQ

## 2019-11-21 NOTE — TELEPHONE ENCOUNTER
Called patient and he states he is no longer having any sx and cancelled appt for today, 11/21/2019. Will follow up with dentist if sx return.     MACIEL Reynolds

## 2019-11-25 ENCOUNTER — HOSPITAL ENCOUNTER (OUTPATIENT)
Dept: BEHAVIORAL HEALTH | Facility: CLINIC | Age: 59
End: 2019-11-25
Attending: SOCIAL WORKER
Payer: COMMERCIAL

## 2019-11-25 PROCEDURE — H2035 A/D TX PROGRAM, PER HOUR: HCPCS | Mod: HQ

## 2019-11-26 ENCOUNTER — HOSPITAL ENCOUNTER (OUTPATIENT)
Dept: BEHAVIORAL HEALTH | Facility: CLINIC | Age: 59
End: 2019-11-26
Attending: SOCIAL WORKER
Payer: COMMERCIAL

## 2019-11-26 PROCEDURE — H2035 A/D TX PROGRAM, PER HOUR: HCPCS | Mod: HQ

## 2019-11-26 NOTE — PROGRESS NOTES
Durga Aguirre  [id number]               Adult CD Progress Note and Treatment Plan Review     Attendance     Monday    Group Date: 11/25/2019   Group Attendance Attended group session   Group Therapy Type Addiction   Group Topic Covered Coping Skills/Lifestyle Management, Disease of Addiction/Choices in Recovery and Values in Addiction   Client's Response To Group Topic Cooperative with task Discussed personal experience with topic Verbalizations were off topic   Client Group Participation Detail Highly involved   Group Attendance (Time) 2.0 Hours   Individual Attendance None   Family Attendance None   Other Comments/Information None      Tuesday     Group Date: 11/26/2019   Group Attendance Attended group session   Group Therapy Type Psychoeducation   Group Topic Covered Neurobiology of Addiction   Client's Response To Group Topic Cooperative with task Discussed personal experience with topic Expressed readiness to alter behaviors Expressed understanding of topic Listened actively   Client Group Participation Detail Highly involved   Group Attendance (Time) 2.0 Hours   Individual Attendance None   Family Attendance None   Other Comments/Information None      Wednesday    Group Date: 11/27/2019   Group Attendance Excused from group session   Group Therapy Type Addiction and Absent from group   Group Topic Covered Absent from group   Client's Response To Group Topic Absent from group.   Client Group Participation Detail Absent from group   Group Attendance (Time) None - Absent from group   Individual Attendance None   Family Attendance None   Other Comments/Information None     Total # of Phase 1 Group Sessions:  24     Total # of Phase 2 Group Sessions:  12  Total # of Phase 3 Group Sessions:   Total # of 1:1 Sessions: 3    Support group attended this week: No.    Reporting sobriety: Yes, client reports last use date of alcohol as , 10/06/2019    Treatment Plan Review     Treatment Plan Review completed on:   11/27/2019     Projected discharge date: 1/23/2020    Client preferred learning style: by hands-on practice    Staff member(s) contributing: Inga Guardado, Intern;  ANA Mcghee, [facility]    Received supervision: Yes.  Intern received supervision from Marquis Quintero.    Client involvement with treatment planning: contributed to goals and plan.    Client received copy of plan/revised plan: Yes, revised 10/09/2019    Client agrees with plan/revised plan: Yes    Changes to Treatment Plan: yes    Client's Dimension 1 Goal(s) Develop effective strategies to maintain sobriety.    See below.   Client's Dimension 2 Goal(s) Disclose CD status to medical providers and follow up with medical interventions while in IOP.   Maintain good physical health. See below.   Client's Dimension 3 Goal(s) Learn how to apply self-care and psychotherapy to build a repertoire of daily habits that counteract PAWS, fatigue, depression and anxiety leading to increased resiliency and well-being.  Understand the relationship between mental health concerns and addiction.   See below.   Client's Dimension 4 Goal(s) Understand the impact your substance use has had on you, your family, and significant relationships.  Increase internal motivation to change.   See below.   Client's Dimension 5 Goal(s) Identify personal triggers and relapse warning signs.  Develop sober coping and living skills in order to address the development of a strategy for long term recovery See below.   Client's Dimension 6 Goal(s) Resolve issues with legal.  Increase sober support network See below.     New Goals: No    Goal(s) worked on since last review: Dim 4, 5    Strategies effective: Yes    Medical, Mental Health and other appointments the client attended:a medical appointment on 10/14/2019.  Client prescribed naltrexone.  Continues to take hydroxyzine.    Medication issues: Yes - . and Client reports compliance with mental health medications of citalopram and hydroxyzine  "but does not find them helpful.  See above.    Physical and mental health problems: Client endorses conditions of hypertension, high cholesterol, and mild chronic pain.  Client endorses mental health concerns of anxiety.  Client denies current psychotherapy.    Review and evaluation of the individual abuse prevention plan: The program's individual abuse prevention plan (IAPP) is sufficient for this client.     Substance Use Disorders:    Alcohol Use Disorder Severe - 303.90 (F10.20)  Tobacco Use Disorder Mild - 305.10 (Z72.0)    ASAM Risk Rating: (Note the rationale for risk rating changes)    Dimension 1 0  Client exhibits no signs of intoxication or withdrawal throughout the week.     Dimension 2 1  On Monday, client denies any new or worsening physical problems.  Client received prescription for naltrexone 10/14/2019 and is compliant.     Dimension 3 2  On Monday, client rates his depression, anxiety, or other mental health concerns as 1 of 10 (10 highest), the lowest it has been, due to currently having very little anxiety in sobriety.  Client denies any thoughts of hurting himself or others.    Dimension 4 1  On Monday, client rates his motivation for recovery as remaining at 10 of 10 (10 highest).  Client expressed 3 reasons to remain sober, including getting his 's license reinstated.     Dimension 5 3  On Monday, client rated his cravings in the last week as 1 of 10 (10 highest), the same as last week, due to \"normal thoughts\". Client reports a personal strength of patience.    Dimension 6 2  On Monday, client denies attendance at any sober support meeting this week, although he did bike to the local [employer], but didn't go inside.  Client reports sober activities around the house and yard, including cleaning and doing laundry, which he finds therapeutic. Client lives with his supportive mother, who he helps care for.     Data: (Need to include short narrative for the week on what was worked " "on)  offered feedback client did actively participate     On Monday client participated in checking in, inlcuding a report on taking his hydroxyzine, which seems to be better now.  Client thinks it is helping his anxiety level.  Client participated in discussion of values, and which of his core values were violated while he was actively drinking.  Client has reinstated the \"value of self-respect\" which he finds most important.    On Tuesday the psychoeducation topic was the neurobiology of addiction, with discussion.  A video presentation, \"Pleasures Unwoven,\" was shown, dealing with this topic.  Client processed with group peers.  Client found the information on how the brain is involved in actually creating the disease state of addiction very interesting, and processed some of his questions with the group.  Client expressed something he was grateful for each day.    On Wednesday client was excused due to transportation problems.      Intervention:   Behavioral Therapy, Cognitive Behavioral Therapy, Counselor Feedback, Psychoeducation, Emotional management, Group Feedback, Motivational Interviewing, Relapse Prevention, Twelve Step Facilitation, REBT    Assessment:    Stages of Change Model  Contemplation    Appears/Sounds:  Cooperative    Plan:   Client will continue taking prescribed naltrexone.  Attend at least 1 sober support group meetings each week.  Maintain contact with sponsor.  Client will prepare to present second half of [place], Symptoms, Anxiety and Recovery, and 5 ways to manage anxiety assignments next week.  Client will try one new coping skill/activity before next session.  Practice choice of meditation techniques this week.  Express 3 things he is grateful for each night.  -Client will attend all weekly Phase 2 group sessions.   -Client to engage in sober activities each week.    Inga Guardado, Intern;  Marquis Quintero, ANA, [facility]  "

## 2019-11-27 NOTE — ADDENDUM NOTE
Encounter addended by: Marquis Quintero, [facility] on: 11/27/2019 4:41 PM   Actions taken: Clinical Note Signed

## 2019-12-04 ENCOUNTER — HOSPITAL ENCOUNTER (OUTPATIENT)
Dept: BEHAVIORAL HEALTH | Facility: CLINIC | Age: 59
End: 2019-12-04
Attending: SOCIAL WORKER
Payer: COMMERCIAL

## 2019-12-04 PROCEDURE — H2035 A/D TX PROGRAM, PER HOUR: HCPCS | Mod: HQ

## 2019-12-09 ENCOUNTER — HOSPITAL ENCOUNTER (OUTPATIENT)
Dept: BEHAVIORAL HEALTH | Facility: CLINIC | Age: 59
End: 2019-12-09
Attending: SOCIAL WORKER
Payer: COMMERCIAL

## 2019-12-09 DIAGNOSIS — E78.1 HYPERTRIGLYCERIDEMIA: ICD-10-CM

## 2019-12-09 DIAGNOSIS — Z91.89 RISK FOR CORONARY ARTERY DISEASE BETWEEN 10% AND 20% IN NEXT 10 YEARS: ICD-10-CM

## 2019-12-09 PROCEDURE — H2035 A/D TX PROGRAM, PER HOUR: HCPCS | Mod: HQ

## 2019-12-10 ENCOUNTER — HOSPITAL ENCOUNTER (OUTPATIENT)
Dept: BEHAVIORAL HEALTH | Facility: CLINIC | Age: 59
End: 2019-12-10
Attending: SOCIAL WORKER
Payer: COMMERCIAL

## 2019-12-10 PROCEDURE — H2035 A/D TX PROGRAM, PER HOUR: HCPCS | Mod: HQ

## 2019-12-10 RX ORDER — SIMVASTATIN 40 MG
TABLET ORAL
Qty: 30 TABLET | Refills: 5 | Status: SHIPPED | OUTPATIENT
Start: 2019-12-10 | End: 2020-06-04

## 2019-12-10 ASSESSMENT — ANXIETY QUESTIONNAIRES
3. WORRYING TOO MUCH ABOUT DIFFERENT THINGS: NOT AT ALL
5. BEING SO RESTLESS THAT IT IS HARD TO SIT STILL: NOT AT ALL
6. BECOMING EASILY ANNOYED OR IRRITABLE: NOT AT ALL
7. FEELING AFRAID AS IF SOMETHING AWFUL MIGHT HAPPEN: NOT AT ALL
2. NOT BEING ABLE TO STOP OR CONTROL WORRYING: NOT AT ALL
GAD7 TOTAL SCORE: 0
1. FEELING NERVOUS, ANXIOUS, OR ON EDGE: NOT AT ALL

## 2019-12-10 ASSESSMENT — PATIENT HEALTH QUESTIONNAIRE - PHQ9
SUM OF ALL RESPONSES TO PHQ QUESTIONS 1-9: 0
5. POOR APPETITE OR OVEREATING: NOT AT ALL

## 2019-12-10 NOTE — PROGRESS NOTES
[facility] INTENSIVE OUTPATIENT PROGRAM  TRANSITION PROGRESS NOTE     Patient: Durga Aguirre  MRN; [id number]   : 1960  Age: 59 year old Sex: male  IOP ADMISSION DATE: 2019  TRANSITION DATE: 2019  TRANSITIONING FROM: Phase 2 TO: Phase 3  SUBSTANCE USE DISORDERS:   Alcohol Use Disorder Severe - 303.90 (F10.20)  Tobacco Use Disorder Mild - 305.10 (Z72.0)    LAST USE DATE: Client reports date of last use of alcohol as 10/06/2019.    Treatment Plan Review completed on:  2019     Attendance Grid:     Monday    Group Date: 2019   Group Attendance Attended group session   Group Therapy Type Addiction   Group Topic Covered Meditation/Breathing Exercises, Thinking Errors/Negative Self-Talk and Acceptance   Client Participation/Contribution Cooperative with task Discussed personal experience with topic Expressed readiness to alter behaviors Listened actively   Client Participation Detail Highly involved   Attendance 2.0 Hours   Individual Attendance None   Family Attendance None   Other Comments/Information None      Tuesday     Group Date: 12/10/2019   Group Attendance Attended group session   Group Therapy Type Psychoeducation   Group Topic Covered Meditation/Breathing Exercises, Relapse Prevention and  Relapse Cycle   Client Participation/Contribution Cooperative with task Expressed readiness to alter behaviors Expressed understanding of topic Listened actively   Client Participation Detail Highly involved   Attendance 2.0 Hours   Individual Attendance None   Family Attendance None   Other Comments/Information None      Wednesday    Group Date: 2019   Group Attendance Attended group session   Group Therapy Type Addiction   Group Topic Covered Disease of Addiction/Choices in Recovery, Relapse Prevention and Reviewed Previous Skills Group Topic (Relapse Cycle)   Client Participation/Contribution Cooperative with task Discussed personal experience with topic Expressed  readiness to alter behaviors Expressed understanding of topic   Client Participation Detail Highly involved   Attendance 2.0 Hours   Individual Attendance None   Family Attendance None   Other Comments/Information None     Total # of Phase 1 Group Sessions: 24     Total # of Phase 2 Group Sessions: 16  Total # of Phase 3 Group Sessions: N/A  Total # of 1:1 Sessions: 3    Length of stay/projected discharge date: 2020    Support group attended this week: yes    Reporting sobriety: Yes.  Client reports last date of alcohol use as 10/06/2019.              Learning Style(s):    by hands-on practice      Staff member contributing: Marquis Quintero [facility]      Received supervision: no    Client contributed to goals and plan: Yes    Client received a signed copy of treatment plan/revised plan: Yes    Changes to Treatment Plan: No.    Client agrees with plan/revised plan: Yes    Any changes in Vulnerable Adult Status: No    Treatment Coordination Activities: No    Medical, Mental Health and other appointments the client attended: None noted.    Medication issues:  Client reports compliance with mental health medications of citalopram and hydroxyzine but does not find them helpful.  Client reports compliance with prescribed Naltrexone.    Physical and mental health problems: Physical and mental health problems: Client endorses conditions of hypertension, high cholesterol, and mild chronic pain.  Client endorses mental health concerns of anxiety.  Client denies current psychotherapy.    Review and evaluation of the individual abuse prevention plan: The program's individual abuse prevention plan (IAPP) is sufficient for this client.     Dimension One: Acute Intoxication/Withdrawal Potential     Risk at admission to [facility]: 0. Risk at transition to Phase 3: Risk Ratin.     Treatment plan goal:   Develop effective strategies to maintain sobriety. Goal Status: CONTINUE.     Current ASAM criteria: no withdrawal risk.     Treatment  "Summary/Justification of Transition DIM 1: Client exhibits no signs of intoxication or withdrawal throughout the week.  Client confirms compliance with prescribed Naltrexone.  No concerns at this time.    Dimension Two: Biomedical Conditions and Complaints     Risk at admission to IOP: 1. Risk at transition to Phase 2: Risk Ratin.     Treatment plan goal:   Disclose CD status to medical providers and follow up with medical interventions while in treatment program. CONTINUE.  Maintain good physical health. CONTINUE.     Current ASAM criteria: stable and manageable.       Treatment Summary/Justification of Transition DIM 2: On Monday, client denies any new or worsening medical conditions.  Dimension Three: Emotional/Behavioral/Cognitive Conditions and Complications     Risk at admission to IOP: 2. Risk at transition to Phase 3: Risk Ratin.     Treatment plan goal:   Learn how to apply self-care and psychotherapy to build a repertoire of daily habits that counteract PAWS, fatigue, depression and anxiety leading to increased resiliency and well-being. COMPLETE.  Understand the relationship between addiction and mental health issues. CONTINUE.     Current ASAM criteria: stable and manageable     Treatment Summary/Justification of Transition DIM 3: On Monday, client rated his depression and anxiety at 2 of 10, stating \"it's normal\".  Client reports he doesn't feel much anxiety lately, but became more anxious while working on his \"Anxiety and Recovery\" assignment, so he did not complete it.  His feelings were processed in group in lieu of presenting assignment.  PHQ-9  10/28/2019 (score at time of transition to Phase 2): Client scored 0 and indicated the symptoms are not difficult at all to his quality of daily living.  YUSUF-7 (score at time of transition to Phase 2): Client scored 1 and indicated the symptoms are not difficult at all because he is putting himself before the bottle.    PHQ-9  12/10/2019 (score at " "time of transition from Phase 2): Client scored 0 and indicated the symptoms are not difficult at all to his quality of daily living.  YUSUF-7 12/10/2019 (score at time of transition from Phase 2): Client scored 0 and indicated the symptoms are not difficult at all because he is putting himself before the bottle.    Risk rating lowered due to clients report of low anxiety and low screening scores.  Client's mental health issues appear stable and manageable.    Dimension Four: Readiness to Change     Risk at admission to IOP: 2. Risk at transition to Phase 3: Risk Ratin.     Treatment plan goal:   Understand the impact your substance use has had on you, your family, and significant relationships.  CONTINUE.  Increase internal motivation to change. CONTINUE.     Current ASAM criteria: cooperative, engaged in treatment and ready for recovery.     Treatment Summary/Justification of Transition DIM 4: On Monday, client rated his motivation for recovery as 10 of 10(10 highest).  He consistently reports this level.  Client expressed 3 reasons to remain sober.  Client continues to express insight into his use and how it has impacted others.  Client completed his \"Symptoms\" assignment and was open and honest.  Risk rating lowered.    Dimension Five: Relapse/Continues Use/Continues Problem Potential     Risk at admission to IOP: 3. Risk at transition to Phase 3: Risk Ratin.     Treatment plan goal:   Identify personal triggers and relapse warning signs.   COMPLETE.   Develop sober coping and living skills in order to address the development of a strategy for long term recovery. CONTINUE.     Current ASAM criteria: recognizes risk well and demonstrates ability to manage potential problems, moderate risk of relapse and client has coping skills     Treatment Summary/Justification of Transition DIM 5: On Monday client rated his cravings in the last week as 2 of 10 (10 highest), due to \"no trigger - loss of crown/teeth\".   " Client reports a personal strength of patience.  He used housework as a coping skill this week.  On Wednesday client presented the final half of his Triggers and Cues of Relapse assignment.  He identified several internal triggers, especially anger, and how to better cope with them.  Client listened attentively to feedback from his peers.  Risk rating lowered.    Dimension Six: Recovery Environment     Risk at admission to [facility]: 3. Risk at transition to Phase 3: Risk Ratin.     Treatment plan goal:   Resolve issues with legal. CONTINUE.  Increase sober support network. CONTINUE.     Current ASAM criteria: engaged in structured and meaningful activity and can cope with structure and support.     Treatment Summary/Justification of Transition DIM 6: On Monday client reports attendance at 1 sober support meeting this week and contact with his sponsor.  Client reports sober activities playing cards, visit with family and friends, stretching and isometric exercises.  client reports he is waiting to be called to work, but is enjoying the time off.  Client continues to diligently care for his mother.  Risk rating lowered.  Intervention:   Behavioral Therapy, Cognitive Behavioral Therapy, Counselor Feedback, Psychoeducation, Emotional management, Group Feedback, Motivational Interviewing, Relapse Prevention, Twelve Step Facilitation, REBT  On Monday client participated with the check- in process and affirmation.  Readiness to Change and Relapse Prevention were addressed through group peer s presentation of his  Acceptance  assignment with discussion.  Client participated, provided supportive feedback, and spoke about how he related to changing one s thoughts, feelings, and perspective to accept life situations.  Client participated in a visualization meditative exercise.  Client expresses this exercise was helpful.   On Tuesday, client actively participated in a group exercise to identify triggers and described which were  "personally dangerous for him.   Client received psychoeducation and participated in discussion of the Relapse Cycle.   Client discussed personally relevant triggers, what thoughts he had when experiencing triggers, what cravings felt like mentally and physically, and what he had told himself to give permission to use in the past.  Client actively participated in a discussion of the abstinence violation effect following a use episode and how to avoid it from spiraling into full relapse by recognizing he is human, that humans make mistakes, and to learn from it to spiral upward into growth and further resilience.  Client participated in self-soothing exercises.  Client practiced 4x4 and natural mindful breathing.  On Wednesday the Relapse Cycle, trigger recognition and management strategies were reviewed and further discussed.  Client learned relapse warning signs and received tips to avoid relapse.  Client presented the second part of his \"Triggers and Cues\" assignment and how his personal experience related to the group topic.  He also presented his  Symptoms  assignment and spoke about behavioral, emotional, and physical symptoms and signs he had experienced.  Client received supportive feedback.  Client expressed something he was grateful for each day.    Assessment:  Stages of Change Model: Contemplation into preparation   Appears/Sounds: Cooperative, Motivated.  Client has made great progress, is taking personal responsibility, and is gaining self-respect.  PLAN: Transition client to Phase 3 effective 11/19/2019 due to completion of all mental health skills groups and assignments due in the first 2 phases.  He continues progress toward other treatment goals.   Client currently meets the ASAM criteria to continue Outpatient (ASAM level 1)  level of care.    Marquis Quintero [facility]       "

## 2019-12-10 NOTE — TELEPHONE ENCOUNTER
"Requested Prescriptions   Pending Prescriptions Disp Refills     simvastatin (ZOCOR) 40 MG tablet [Pharmacy Med Name: Simvastatin Oral Tablet 40 MG] 30 tablet 0     Sig: TAKE ONE TABLET BY MOUTH AT BEDTIME       [facility] Protocol Passed - 12/9/2019  8:33 PM        Passed - LDL on file in past 12 months     Recent Labs   Lab Test 06/24/19  1123   LDL Cannot estimate LDL when triglyceride exceeds 400 mg/dL  76             Passed - No abnormal creatine kinase in past 12 months     No lab results found.             Passed - Recent (12 mo) or future (30 days) visit within the authorizing provider's specialty     Patient has had an office visit with the authorizing provider or a provider within the authorizing providers department within the previous 12 mos or has a future within next 30 days. See \"Patient Info\" tab in inbasket, or \"Choose Columns\" in Meds & Orders section of the refill encounter.              Passed - Medication is active on med list        Passed - Patient is age 18 or older          Prescription approved per [facility].    Martha OWENSN, RN, PHN      "

## 2019-12-11 ENCOUNTER — HOSPITAL ENCOUNTER (OUTPATIENT)
Dept: BEHAVIORAL HEALTH | Facility: CLINIC | Age: 59
End: 2019-12-11
Attending: SOCIAL WORKER
Payer: COMMERCIAL

## 2019-12-11 PROCEDURE — H2035 A/D TX PROGRAM, PER HOUR: HCPCS | Mod: HQ

## 2019-12-11 ASSESSMENT — ANXIETY QUESTIONNAIRES: GAD7 TOTAL SCORE: 0

## 2019-12-19 ENCOUNTER — HOSPITAL ENCOUNTER (OUTPATIENT)
Dept: BEHAVIORAL HEALTH | Facility: CLINIC | Age: 59
End: 2019-12-19
Attending: SOCIAL WORKER
Payer: COMMERCIAL

## 2019-12-19 PROCEDURE — H2035 A/D TX PROGRAM, PER HOUR: HCPCS | Mod: HQ

## 2019-12-20 NOTE — PROGRESS NOTES
Durga Aguirre  [id number]               Adult CD Progress Note and Treatment Plan Review     Attendance     Thursday    Group Date: 12/19/2019   Group Attendance Attended group session   Group Therapy Type Addiction   Group Topic Covered Coping Skills/Lifestyle Management, Relapse Prevention and SMART goals   Client's Response To Group Topic Cooperative with task Discussed personal experience with topic Expressed readiness to alter behaviors Listened actively   Client Group Participation Detail Highly involved   Group Attendance (Time) 2.0 Hours   Individual Attendance None   Family Attendance None   Other Comments/Information None       Total # of Phase 1 Group Sessions:  24     Total # of Phase 2 Group Sessions:  16  Total # of Phase 3 Group Sessions:    1  Total # of 1:1 Sessions: 3    Support group attended this week: No.    Reporting sobriety: Yes, client reported last use date of alcohol as , 10/06/2019    Treatment Plan Review     Treatment Plan Review completed on:  12/20/2019     Projected discharge date: 2/06/2020    Client preferred learning style: by hands-on practice    Staff member(s) contributing: ANA Mcghee, ELANA    Received supervision: no    Client involvement with treatment planning: contributed to goals and plan.    Client received copy of plan/revised plan: Yes, revised 10/09/2019    Client agrees with plan/revised plan: Yes    Changes to Treatment Plan: yes    Client's Dimension 1 Goal(s) Develop effective strategies to maintain sobriety.    See below.   Client's Dimension 2 Goal(s) Disclose CD status to medical providers and follow up with medical interventions while in IOP.   Maintain good physical health. See below.   Client's Dimension 3 Goal(s) Learn how to apply self-care and psychotherapy to build a repertoire of daily habits that counteract PAWS, fatigue, depression and anxiety leading to increased resiliency and well-being.  Understand the relationship between mental health  concerns and addiction.   See below.   Client's Dimension 4 Goal(s) Understand the impact your substance use has had on you, your family, and significant relationships.  Increase internal motivation to change.   See below.   Client's Dimension 5 Goal(s) Identify personal triggers and relapse warning signs.  Develop sober coping and living skills in order to address the development of a strategy for long term recovery See below.   Client's Dimension 6 Goal(s) Resolve issues with legal.  Increase sober support network See below.     New Goals: No    Goal(s) worked on since last review: Dim 4, 5, 6    Strategies effective: Yes    Medical, Mental Health and other appointments the client attended: none noted.    Medication issues: Yes, client reports compliance with mental health medications of citalopram and hydroxyzine but does not find them helpful.  Client prescribed naltrexone.    Physical and mental health problems: Client endorses conditions of hypertension, high cholesterol, and mild chronic pain.  Client endorses mental health concerns of anxiety.  Client denies current psychotherapy.    Review and evaluation of the individual abuse prevention plan: The program's individual abuse prevention plan (IAPP) is sufficient for this client.     Substance Use Disorders:    Alcohol Use Disorder Severe - 303.90 (F10.20)  Tobacco Use Disorder Mild - 305.10 (Z72.0)    ASAM Risk Rating: (Note the rationale for risk rating changes)      Dimension 1 0  On Thursday client exhibits no signs of intoxication or withdrawal.  Dimension 2 1  On Thursday client denies any new or worsening physical problems.    Dimension 3 2  On Thursday client rates his depression, anxiety, or other mental health concerns as 1 of 10 (10 highest).  Client denies any thoughts of hurting himself or others.    Dimension 4 1  On Thursday client rates his motivation for recovery as remaining at 10 of 10 (10 highest).  Client expressed 3 reasons to remain  "sober.  Dimension 5 3  On Thursday client rated his cravings in the last week as 1 of 10 (10 highest) due to \"just thoughts\".  Client reports a personal strength of patience.  He reports using cooking as a coping skill.  Dimension 6 2  On Thursday client reports attendance at 3 sober support meeting this week.  He reports meeting with his sponsor weekly.  Client reports sober activities.  Client's SMART goal was to try skating this weekend.    Data: (Need to include short narrative for the week on what was worked on)  offered feedback good insight client did actively participate     This week we discussed motivation to change, relapse prevention and recovery planning.  Client participated with the check- in process.  Two group peers had recent use episodes which were discussed.  Client provided supportive feedback.  Recovery Planning was addressed through peer's presentation of the final portion of his  Recovery Care Plan  assignment with discussion.  Client spoke about how he related to plans for self-care and strengthening his spirituality.  Client expressed something he was grateful for.       Intervention:   Behavioral Therapy, Cognitive Behavioral Therapy, Counselor Feedback, Psychoeducation, Emotional management, Group Feedback, Motivational Interviewing, Relapse Prevention, Twelve Step Facilitation, REBT    Assessment:    Stages of Change Model  Contemplation    Appears/Sounds:  Cooperative    Plan:   Client will continue taking prescribed naltrexone.  Attend at least 1 sober support group meetings each week.  Maintain contact with sponsor.  Client will try one new coping skill/activity before next session.  Practice choice of meditation techniques this week.  Express 3 things he is grateful for each night.  -Client will attend all weekly Phase 2 group sessions.   -Client to engage in sober activities each week.    Marquis Quintero, ANA, LADC  "

## 2019-12-26 ENCOUNTER — HOSPITAL ENCOUNTER (OUTPATIENT)
Dept: BEHAVIORAL HEALTH | Facility: CLINIC | Age: 59
End: 2019-12-26
Attending: SOCIAL WORKER
Payer: COMMERCIAL

## 2019-12-26 PROCEDURE — H2035 A/D TX PROGRAM, PER HOUR: HCPCS

## 2019-12-27 NOTE — PROGRESS NOTES
CHEMICAL DEPENDENCY TRANSITION SUMMARY      EVALUATION COUNSELOR:  ELANA Finn      TREATMENT COUNSELOR:  ELANA Mcghee      REFERRAL SOURCE:  DVS     PROGRAM:  [facility] Adult Intensive Outpatient Chemical Dependency program in [facility] mixed Substance Use Disorder group.     ADMISSION DATE:  9/16/2019        LAST SESSION DATE:  12/26/2019     TRANSITION DATE:  12/30/2019      ADMISSION IMPRESSION:   Alcohol Use Disorder Severe - 303.90 (F10.20)  Tobacco Use Disorder Mild - 305.10 (Z72.0)    TRANSITION IMPRESSION:   Alcohol Use Disorder Severe - 303.90 (F10.20)  Tobacco Use Disorder Mild - 305.10 (Z72.0)    REASON FOR TRANSITION:  Client admitted to counselor that he had been continuously drinking throughout treatment with longest period of sobriety being 1 week.     LAST DATE OF USE:  Client reports last use of alcohol as 12/26/2019 at 2am.        HOURS OF TREATMENT COMPLETED:  This client completed 24 sessions of Phase I, 16 sessions of Phase 2, 1 session of Phase 3, plus service initiation, treatment planning, and individual sessions for a total of 96 hours of treatment.      REASON FOR EVALUATION:  Durga Aguirre was assessed 8/28/2019 by ELANA Finn with [facility] and referred for treatment. He reported his doctor recommended treatment and that it was a requirement for his [facility] program.     SERVICES PROVIDED:  The services provided included: treatment planning, recovery skill building, relapse prevention skills, problem solving, managing conflicts and communication skill building, clarifying values, expressing feelings, social skill building, 12-step facilitation, individual therapy, group therapy and transition management.        ISSUES ADDRESSED IN TREATMENT:     DIMENSION 1:  Acute Withdrawal Issues and Detox:    Admit risk rating 0, transition risk rating 0.    Client's goal in this dimension was for client to develop  effective strategies to maintain sobriety. Client did not meet this goal.  He reported a use episode on 10/06/2019 which was processed in group and he then dishonestly reported abstinence thereafter until he admitted to counselor on 12/26/2019 that he had been regularly drinking throughout his time in treatment.  Client reported regularly consuming two 25-ounce bottles of beer almost every night after group, and up to 1 qt liquor on other days.      DIMENSION 2:  Biomedical Conditions and Complaints.    Admit risk rating 1, transition risk rating 1.    Client denied any current health issues that would interfere with treatment.  He reported medication compliance for hypertension and high cholesterol.  He reports using OTC pain medications for mild chronic pain.  Client reports he has a primary care clinic.  Client denied any new or worsening physical problems throughout treatment.  Client s goals were to disclose CD status to medical providers, follow up with medical interventions while in IOP, and to maintain good physical health.  Client maintained good physical health by regularly meeting exercise goals.  Client appears to tolerate and cope with physical discomfort and able to get the services he needs.      DIMENSION 3:  Emotional, Behavioral, Cognitive Conditions and Complications:    Admit risk rating 2, transition risk rating 2.    Client endorses mental health concerns of anxiety.  Client denies current psychotherapy.  Client reports compliance with mental health medications of citalopram and hydroxyzine but does not find them helpful.  On 12/10/2019 client's PHQ-9 score was 0 out of 27, indicating minimal (normal) depression and his YUSUF-7 score was 0 out of 21, indicating minimal (normal) anxiety.  He indicated that these symptoms were not at all difficult to his daily living.  Client's goals in this area were to learn how to apply self-care and psychotherapy to build a repertoire of daily habits that  counteract PAWS, fatigue, depression and anxiety leading to increased resiliency and well-being and to understand the relationship between addiction and mental health issues.  Client partially met these goals by completion of 5 of 6 mental health skills groups with related assignments, and participation in group discussions concerning those topics.  Client did not complete 1 mental health skills group about stress management and did not complete his  Anxiety and Recovery  assignment, though it was generally discussed in group.  Client was able to list 5 ways his addiction impacted his mental health.  Client denied any suicidal or self-injurious ideation, intent or plan throughout treatment.        DIMENSION 4:  Readiness to Change:    Admit risk rating 2, transition risk rating 2.    Client verbalizes motivation to change with legal reinforcement but show inconsistent behavior as evidenced by continued drinking while claiming abstinence.  Client's goals in this dimension were to understand the impact substance use has had on him, his family, and significant relationships, and to increase internal motivation to change.  Client appeared to partially meet these goals.  The client completed all treatment strategies in this dimension, including completion of  10 Worst Consequences  and  Symptoms  assignments.  Client expressed 3 reasons he wished to remain sober each week and had regular and punctual attendance in groups except for excused absences.  He was an active participant throughout treatment.  Client demonstrates that he is contemplating change.        DIMENSION 5:  Relapse, Continued Use, Continued Problem Potential:    Admit risk rating 3, transition risk rating 4.    Client's goals in this area were for client to identify personal triggers and relapse warning signs, and to develop sober coping and living skills in order to address the development of a strategy for long term recovery.  Client partially  completed treatment strategies.  Client completed his  Triggers and Cues  assignment.  Client appears to have gained some insight into himself and his relapse triggers.  He is not using learned strategies to prevent them from causing relapse however.  Client has been dishonest with himself and others to continue drinking.  Client did not complete a Recovery Care Plan.        DIMENSION 6:  Recovery Environment:    Admit risk rating 3, transition risk rating 3.    Client's goals in this dimension were to resolve legal issues and to increase his sober support network.  Client partially met these goals.  Client claims to have attended sober support groups and to be working with a sponsor.  Client is under GIROPTIC interlock requirements.  Client is unemployed at this time.  He lives in his mother s home who he cares for.  Client reports she is a regular drinker and begins in the morning.  She has him buy her liquor and it is always available in the house.  Client s environment appears to be unsupportive.      STRENGTHS:  Client was cooperative and actively participated throughout outpatient treatment.  Client appears to recognize the harmful consequences of continued use.  Client expresses hope toward the future.  Client identifies patience, determined, and helpful to others as personal strengths.      PROGNOSIS:  This client has made some progress but receives a guarded prognosis due to continued drinking while in treatment without disclosure, but expresses willingness to enter a higher level of care.  This may be upgraded if he follows all continuation of care recommendations.       LIVING ARRANGEMENTS AT TRANSITION:  Client will receive residential treatment at [facility].         CONTINUATION OF CARE RECOMMENDATIONS:    Client is recommended to attend [facility], arriving at 10am Monday 12/30/2019, and follow all recommendations followed by step-down aftercare.  Client is recommended to abstain from all  non-prescribed mood-altering substances.    Client is recommended to continue to manage his mental and physical health with his health care providers and follow recommendations made by them.    Client is recommended to take medications as prescribed and continually follow up with his prescribing healthcare providers as directed to do so.    Client is recommended to attend sober support meetings on a regular basis.   Client is recommended to maintain regular contact with his sponsor.   Client is recommended to maintain open communication with family and friends about sober life.  Client is recommended to maintain contact with supportive people in recovery.  Client is recommended to engage in self-care activities daily.  Client is recommended to remain law abiding.    This information has been disclosed to you from records protected by   Federal confidentiality rules (42 CFR part 2). The Federal rules prohibit   you from making any further disclosure of this information unless further   disclosure is expressly permitted by the written consent of the person to   whom it pertains or as otherwise permitted by 42 CFR part 2. A general   authorization for the release of medical or other information is NOT   sufficient for this purpose. The Federal rules restrict any use of the   information to criminally investigate or prosecute any alcohol or drug   abuse patient.    Electronically signed on 12/27/2019 at 14:25 by  ANA Mcghee, ELANA

## 2019-12-27 NOTE — PROGRESS NOTES
Assessment and Placement Summary Update     Patient name:   Durga Aguirre   Patient phone: [phone] (home)    Last #:   [id number]   : 1960      PMI #: [id number]   Patient address:   [address]  [facility] [address]     Date of Original Assessment / Last Update: 2019 Update Assessment Date: 2019   Updated by:   ELANA Herman    phone number: [phone]   Referred by:   [facility] / phone number: [phone]   Referral to:   [facility] program at [facility] in [place], [place]   NPI: [place] NPI #: [id number]   Summary:  This patient had a Rule 25 Assessment on 2019 at [facility] in [place], [place] completed by ELANA Mims.  INSIDE: The patient's Rule 25 Assessment completed on 2019 is in the patient's electronic medical record in Epic in the Chart Review section under the Notes/Trans Tab.    Reason for today's update: Client called counselor 2019 and admitted he had been drinking continuously throughout his time in IOP/OP treatment.       Substance Use History Update:           X = Primary Drug Used   Age of First Use Most Recent Pattern of Use and Duration   Need enough information to show pattern (both frequency and amounts) and to show tolerance for each chemical that has a diagnosis   Date of last use and time, if needed   Withdrawal Potential? Requiring special care Method of use  (oral, smoked, snort, IV, etc)      Alcohol     13 Past 24 hours: 1 qt hard liquor  Past 3 mos: average 4 beers per evening after treatment, up to 1 qt liquor/day on weekends.   Past year: 80% sober   (): 1.75L/day  2am 2019 no oral      Marijuana/  Hashish   13 High school 1980 no smoked      Cocaine/Crack     15 Gram/week for 20 years  no Snort/smoke      Meth/  Amphetamines   No use          Heroin     No use          Other Opiates/  Synthetics   No use           Inhalants     No use          Benzodiazepines     No use          Hallucinogens     14 4-5 times in high school 1980 no oral      Barbiturates/  Sedatives/  Hypnotics No use          Over-the-Counter Drugs   No use          Other     No use          Nicotine     20 3-5 cigs/day 2019 no smoke     Dimension: Severity Rating/ Reason for Changes from Previous Assessment:  Dimension I: Acute intoxication/Withdrawal potential     Previous ratin Current ratin   Comments:   Client admits to feeling hung-over, shows slight hand tremor.     Dimension II: Biomedical Conditions and Complications     Previous ratin Current ratin   Comments:   Client reports hypertension, high cholesterol and slight chronic pain.     Dimension III: Emotional/Behavioral/Cognitive     Previous ratin Current ratin   Comments:   Client endorses mental health concerns of anxiety.  Client denies current psychotherapy.  Client reports compliance with mental health medications of citalopram and hydroxyzine but does not find them helpful.      Dimension IV: Readiness for Change     Previous ratin Current ratin   Comments:   Client verbalizes motivation to change but exhibits inconsistent behaviors as evidenced by continued drinking throughout outpatient treatment.     Dimension V: Relapse/Continued Use/Continued problem potential     Previous rating:   3 Current ratin   Comments:   Client reports continuous drinking throughout outpatient treatment.     Dimension VI: Recovery environment    Previous rating:   3 Current rating:   3   Comments:   Client lives with his mother who he cares for.  She has him buy her liquor.  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       Summary of Assessment Update and Recommendations:   What was the outcome of last referral?  Client attended outpatient programming beginning 2019 but admits to continuous drinking throughout.     Reason for  changes in the Risk Description since last assessment? Dimension 5:  Continued drinking despite attending outpatient treatment.       Recommendation and rationale for current request and significant issues that need to be addressed:  Client has continued drinking despite attending outpatient treatment exhibiting need for higher level of care.

## 2019-12-30 ENCOUNTER — HOSPITAL ENCOUNTER (OUTPATIENT)
Dept: BEHAVIORAL HEALTH | Facility: CLINIC | Age: 59
Discharge: HOME | End: 2019-12-30
Attending: FAMILY MEDICINE | Admitting: FAMILY MEDICINE
Payer: COMMERCIAL

## 2019-12-30 VITALS
WEIGHT: 212 LBS | HEART RATE: 59 BPM | BODY MASS INDEX: 29.68 KG/M2 | SYSTOLIC BLOOD PRESSURE: 177 MMHG | HEIGHT: 71 IN | DIASTOLIC BLOOD PRESSURE: 113 MMHG

## 2019-12-30 DIAGNOSIS — F17.200 NICOTINE DEPENDENCE: Primary | ICD-10-CM

## 2019-12-30 PROCEDURE — H2035 A/D TX PROGRAM, PER HOUR: HCPCS | Mod: HQ

## 2019-12-30 PROCEDURE — 10020000 ZZH LODGING PLUS FACILITY CHARGE ADULT

## 2019-12-30 PROCEDURE — 40000007 ZZH STATISTIC ADULT CD FACE TO FACE-NO CHRG

## 2019-12-30 RX ORDER — AMOXICILLIN 250 MG
2 CAPSULE ORAL DAILY PRN
COMMUNITY
End: 2020-06-09

## 2019-12-30 RX ORDER — IBUPROFEN 200 MG
400 TABLET ORAL EVERY 6 HOURS PRN
COMMUNITY
End: 2020-06-09

## 2019-12-30 RX ORDER — ACETAMINOPHEN 325 MG/1
325-650 TABLET ORAL EVERY 4 HOURS PRN
COMMUNITY
End: 2020-06-09

## 2019-12-30 RX ORDER — LORATADINE 10 MG/1
10 TABLET ORAL DAILY PRN
COMMUNITY
End: 2020-06-09

## 2019-12-30 RX ORDER — LANOLIN ALCOHOL/MO/W.PET/CERES
3 CREAM (GRAM) TOPICAL
Status: ON HOLD | COMMUNITY
End: 2021-07-30

## 2019-12-30 RX ORDER — MAGNESIUM HYDROXIDE/ALUMINUM HYDROXICE/SIMETHICONE 120; 1200; 1200 MG/30ML; MG/30ML; MG/30ML
30 SUSPENSION ORAL EVERY 6 HOURS PRN
COMMUNITY
End: 2020-06-09

## 2019-12-30 ASSESSMENT — ANXIETY QUESTIONNAIRES
6. BECOMING EASILY ANNOYED OR IRRITABLE: NOT AT ALL
5. BEING SO RESTLESS THAT IT IS HARD TO SIT STILL: NOT AT ALL
3. WORRYING TOO MUCH ABOUT DIFFERENT THINGS: NOT AT ALL
IF YOU CHECKED OFF ANY PROBLEMS ON THIS QUESTIONNAIRE, HOW DIFFICULT HAVE THESE PROBLEMS MADE IT FOR YOU TO DO YOUR WORK, TAKE CARE OF THINGS AT HOME, OR GET ALONG WITH OTHER PEOPLE: NOT DIFFICULT AT ALL
GAD7 TOTAL SCORE: 2
7. FEELING AFRAID AS IF SOMETHING AWFUL MIGHT HAPPEN: NOT AT ALL
4. TROUBLE RELAXING: NOT AT ALL
1. FEELING NERVOUS, ANXIOUS, OR ON EDGE: SEVERAL DAYS
2. NOT BEING ABLE TO STOP OR CONTROL WORRYING: SEVERAL DAYS

## 2019-12-30 ASSESSMENT — MIFFLIN-ST. JEOR: SCORE: 1788.76

## 2019-12-30 ASSESSMENT — PAIN SCALES - GENERAL: PAINLEVEL: MILD PAIN (2)

## 2019-12-30 ASSESSMENT — PATIENT HEALTH QUESTIONNAIRE - PHQ9: SUM OF ALL RESPONSES TO PHQ QUESTIONS 1-9: 1

## 2019-12-30 NOTE — PROGRESS NOTES
Initial Services Plan        Service Initiation Date: 12/30/2019    Immediate health and/or safety concerns: No    Identify health and safety concern(s) below and include plan to address:    None Identified    Treatment suggestions for client during the time between intake (admit date) and completion of the individual treatment plan:     Look for a sober support network, i.e. 12 step, [facility] Recovery, [facility], etc  Tour the treatment center or outpatient clinic  Introduce yourself to your treatment group. Spend time getting to know your peers  Review your patient or client handbook  Begin working on your treatment goal list    Completed by: ELANA Costa  Date completed: 12/30/2019 at 10:56 AM
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
## 2019-12-30 NOTE — PROGRESS NOTES
Name: Durga Aguirre  Date: 12/30/2019  Medical Record: [id number]    Envelope Number: [id number]    List of Contents (List each item separately in new row):     One cell phone with case     Admission:  I am responsible for any personal items that are not sent to the safe or pharmacy.  [facility] is not responsible for loss, theft or damage of any property in my possession.      Patient Signature:  ___________________________________________       Date/Time:__________________________    Staff Signature: __________________________________       Date/Time:__________________________    2nd Staff person, if patient is unable/unwilling to sign:      __________________________________________________________       Date/Time: __________________________      Discharge:  [facility] has returned all of my personal belongings:    Patient Signature: ________________________________________     Date/Time: ____________________________________    Staff Signature: ______________________________________     Date/Time:_____________________________________

## 2019-12-31 ENCOUNTER — HOSPITAL ENCOUNTER (OUTPATIENT)
Dept: BEHAVIORAL HEALTH | Facility: CLINIC | Age: 59
End: 2019-12-31
Attending: FAMILY MEDICINE
Payer: COMMERCIAL

## 2019-12-31 PROBLEM — F19.20 CHEMICAL DEPENDENCY (H): Status: ACTIVE | Noted: 2019-12-31

## 2019-12-31 PROCEDURE — H2035 A/D TX PROGRAM, PER HOUR: HCPCS | Mod: HQ

## 2019-12-31 PROCEDURE — H2035 A/D TX PROGRAM, PER HOUR: HCPCS

## 2019-12-31 PROCEDURE — 10020000 ZZH LODGING PLUS FACILITY CHARGE ADULT

## 2019-12-31 ASSESSMENT — ANXIETY QUESTIONNAIRES: GAD7 TOTAL SCORE: 2

## 2019-12-31 NOTE — PROGRESS NOTES
Patient:  Durga Aguirre    Date: December 31, 2019    Comprehensive Assessment UPDATE       Comprehensive Summary Update and Review  Counselor met with patient on 12/31/2019 and reviewed the Comprehensive Assessment.      There were no changes/updates identified by patient or in chart entries.

## 2019-12-31 NOTE — PROGRESS NOTES
Comprehensive Assessment Summary     Based on client interview, review of previous assessments and   comprehensive assessment interview the following diagnosis and recommendations are:     Patient: Durga Aguirre  MRN; [id number]   : 1960  Age: 59 year old Sex: male       Client meets criteria for:  303.90 Alcohol Dependence  305.10 Nicotine Dependence    Dimension One: Acute Intoxication/Withdrawal Potential     Ratin  (Consider the client's ability to cope with withdrawal symptoms and current state of intoxication)   No current concerns, last use reported as 19.    Dimension Two: Biomedical Condition and Complications    Ratin  (Consider the degree to which any physical disorder would interfere with treatment for substance abuse, and the client's ability to tolerate any related discomfort; determine the impact of continued chemical use on the unborn child if the client is pregnant)   Has a history of hypertension, high cholesterol, and mild pain but no current concerns.    Dimension Three: Emotional/Behavioral/Cognitive Conditions & Complications  Ratin  (Determine the degree to which any condition or complications are likely to interfere with treatment for substance abuse or with functioning in significant life areas and the likelihood of risk of harm to self or others)   Patient reports mild anxiety but nothing that is negatively impacting him at this time.     Dimension Four: Treatment Acceptance/Resistance     Ratin  (Consider the amount of support and encouragement necessary to keep the client involved in treatment) Patient is motivated for recovery at this time but has a 45 yr history of drinking with minimal sober time recently.     Dimension Five: Continued Use/Relaspe Prevention     Ratin  (Consider the degree to which the client's recognizes relapse issues and has the skills to prevent relapse of either substance use or mental health problems)   Patient has a  history of relapse and had recently been in OP CD but continued to experience relapse.     Dimension Six: Recovery Environment     Ratin  (Consider the degree to which key areas of the client's life are supportive of or antagonistic to treatment participation and recovery)   Patient reports minimal sober support at this time (mom, and two AA friends). He is currently unemployed and had been living with mom after losing his home.     I have reviewed the information on the assessment, psychosocial and medical history and checklist:        it is current

## 2019-12-31 NOTE — PROGRESS NOTES
First Group Note:    D. Pt attended their first group session on this date. They were briefed on group guidelines and gave a brief introduction of themself.   I. Counselor and group peers welcomed patient to the group and reminded them of the schedule and that a counselor will be following up with them 1:1 to begin tx planning.   A. Pt appears appropriate for group at this time.   P. Pt will continue to follow the CD programming and guidelines.     NOAH Do

## 2020-01-01 ENCOUNTER — HOSPITAL ENCOUNTER (OUTPATIENT)
Dept: BEHAVIORAL HEALTH | Facility: CLINIC | Age: 60
End: 2020-01-01
Attending: FAMILY MEDICINE
Payer: COMMERCIAL

## 2020-01-01 PROCEDURE — H2035 A/D TX PROGRAM, PER HOUR: HCPCS | Mod: HQ

## 2020-01-01 PROCEDURE — 10020000 ZZH LODGING PLUS FACILITY CHARGE ADULT

## 2020-01-02 ENCOUNTER — HOSPITAL ENCOUNTER (OUTPATIENT)
Dept: BEHAVIORAL HEALTH | Facility: CLINIC | Age: 60
End: 2020-01-02
Attending: FAMILY MEDICINE
Payer: COMMERCIAL

## 2020-01-02 PROCEDURE — 10020000 ZZH LODGING PLUS FACILITY CHARGE ADULT

## 2020-01-02 PROCEDURE — H2035 A/D TX PROGRAM, PER HOUR: HCPCS | Mod: HQ

## 2020-01-02 PROCEDURE — H2035 A/D TX PROGRAM, PER HOUR: HCPCS

## 2020-01-03 ENCOUNTER — HOSPITAL ENCOUNTER (OUTPATIENT)
Dept: BEHAVIORAL HEALTH | Facility: CLINIC | Age: 60
End: 2020-01-03
Attending: FAMILY MEDICINE
Payer: COMMERCIAL

## 2020-01-03 PROCEDURE — 10020000 ZZH LODGING PLUS FACILITY CHARGE ADULT

## 2020-01-03 PROCEDURE — H2035 A/D TX PROGRAM, PER HOUR: HCPCS | Mod: HQ

## 2020-01-04 ENCOUNTER — HOSPITAL ENCOUNTER (OUTPATIENT)
Dept: BEHAVIORAL HEALTH | Facility: CLINIC | Age: 60
End: 2020-01-04
Attending: FAMILY MEDICINE
Payer: COMMERCIAL

## 2020-01-04 PROCEDURE — H2035 A/D TX PROGRAM, PER HOUR: HCPCS | Mod: HQ

## 2020-01-04 PROCEDURE — 10020000 ZZH LODGING PLUS FACILITY CHARGE ADULT

## 2020-01-05 ENCOUNTER — HOSPITAL ENCOUNTER (OUTPATIENT)
Dept: BEHAVIORAL HEALTH | Facility: CLINIC | Age: 60
End: 2020-01-05
Attending: FAMILY MEDICINE
Payer: COMMERCIAL

## 2020-01-05 PROCEDURE — 10020000 ZZH LODGING PLUS FACILITY CHARGE ADULT

## 2020-01-05 PROCEDURE — H2035 A/D TX PROGRAM, PER HOUR: HCPCS | Mod: HQ

## 2020-01-05 PROCEDURE — H2035 A/D TX PROGRAM, PER HOUR: HCPCS

## 2020-01-06 ENCOUNTER — HOSPITAL ENCOUNTER (OUTPATIENT)
Dept: BEHAVIORAL HEALTH | Facility: CLINIC | Age: 60
End: 2020-01-06
Attending: FAMILY MEDICINE
Payer: COMMERCIAL

## 2020-01-06 PROCEDURE — H2035 A/D TX PROGRAM, PER HOUR: HCPCS | Mod: HQ

## 2020-01-06 PROCEDURE — H2035 A/D TX PROGRAM, PER HOUR: HCPCS

## 2020-01-06 PROCEDURE — 10020000 ZZH LODGING PLUS FACILITY CHARGE ADULT

## 2020-01-07 ENCOUNTER — HOSPITAL ENCOUNTER (OUTPATIENT)
Dept: BEHAVIORAL HEALTH | Facility: CLINIC | Age: 60
End: 2020-01-07
Attending: FAMILY MEDICINE
Payer: COMMERCIAL

## 2020-01-07 PROCEDURE — H2035 A/D TX PROGRAM, PER HOUR: HCPCS | Mod: HQ

## 2020-01-07 PROCEDURE — 10020000 ZZH LODGING PLUS FACILITY CHARGE ADULT

## 2020-01-07 PROCEDURE — H2035 A/D TX PROGRAM, PER HOUR: HCPCS

## 2020-01-08 ENCOUNTER — HOSPITAL ENCOUNTER (OUTPATIENT)
Dept: BEHAVIORAL HEALTH | Facility: CLINIC | Age: 60
End: 2020-01-08
Attending: FAMILY MEDICINE
Payer: COMMERCIAL

## 2020-01-08 PROCEDURE — H2035 A/D TX PROGRAM, PER HOUR: HCPCS

## 2020-01-08 PROCEDURE — H2035 A/D TX PROGRAM, PER HOUR: HCPCS | Mod: HQ

## 2020-01-08 PROCEDURE — 10020000 ZZH LODGING PLUS FACILITY CHARGE ADULT

## 2020-01-08 NOTE — PROGRESS NOTES
"Patient:  Durga Aguirre            Adult CD Progress Note and Treatment Plan Review     Attendance  Please refer to OP BEH CD Adult Attendance Record Documentation Flowsheet    Support group attended this week: yes    Reporting sobriety:  yes    Treatment Plan     Treatment Plan Review competed on: 1/8/20    Client preferred learning style: Visual  Hands on  Verbal    Staff Members contributing : Roberto Carlos Gibson [facility]; Zion Chamberlain [facility]; Nickie Gotti [facility] Received Supervision: No    Client: contributed to goals and plan.    Client received copy of plan/revised plan: Yes    Client agrees with plan/revised plan: Yes        Changes to Treatment Plan: No    New Goals added since last review : No additional goals added at this time    Goals worked on since last review: Sobriety,  group therapy, tx plan assignments, building a sober support network, psychoeducation.    Strategies effective: yes    Strategies need these changes: No changes needed at this time    ASAM Risk Rating:    Dimension 1 0 Patient reports his last date of use as 12/27/2019. Patient denies any withdrawal symptoms that would interfere with full participation in treatment programming at this time. Patient will continue to be monitored throughout treatment.     Dimension 2 0 Patient denies any biomedical concerns that would interfere with full participation in treatment programming at this time. Patient reports that she is currently medication compliant. Patient appears able to access medical aid as needed. Patient will continue to be monitored throughout treatment.     Dimension 3 1 Pt denied having suicidal thoughts at this time. Pt reported no significant changes in his mood or changes in his stress level this past week    Dimension 4 2  Pt expresses internal motivation for change. Pt is active in group process, accepts and provides feedback, has good insight, and appears engaged. Pt is supportive of her group peers. Pt reported that \"a good " "life\" is what motivated him to be sober and to stay in treatment this week. Pt is working on his \"First Step\" assignment.    Dimension 5 4 Pt is working on identifying his relapse triggers and warning signs. Pt needs to continue to explore how to handle those in recovery.       Dimension 6 4 Pt is working on building his sober support network. Pt is attending mandatory AA meetings and reports having minimal sober support out in the community.     Any changes in Vulnerable Adult Status?  No  If yes, add to treatment plan and individual abuse prevention plan.    Family Involvement:   none schedule this week    Data:   Pt offered feedback, had good insight, and patient actively participated in group. Pt shares openly during group check-in.    Intervention:   Counselor feedback  Group feedback  Relapse prevention  Aftercare planning  Cognitive behavior therapy  Counselor feedback  Education  Emotional management  Motivational enhancement therapy   Twelve Step facilitation  Mental health education  Pt and counselor reviewed and signed ISP and assessment summary.    Assessment:   Stages of Change Model  Preparation/Determination    Appears/Sounds:  Cooperative  Motivated  Engaged       Plan:  Focus on recovery environment      Continue group therapy, go to AA/NA meetings, work with sponsor, build sober support network, engage in daily structured activities, and have sober fun.      ELANA Do        "

## 2020-01-09 ENCOUNTER — HOSPITAL ENCOUNTER (OUTPATIENT)
Dept: BEHAVIORAL HEALTH | Facility: CLINIC | Age: 60
End: 2020-01-09
Attending: FAMILY MEDICINE
Payer: COMMERCIAL

## 2020-01-09 PROCEDURE — 10020000 ZZH LODGING PLUS FACILITY CHARGE ADULT

## 2020-01-09 PROCEDURE — H2035 A/D TX PROGRAM, PER HOUR: HCPCS

## 2020-01-09 PROCEDURE — H2035 A/D TX PROGRAM, PER HOUR: HCPCS | Mod: HQ

## 2020-01-09 NOTE — PROGRESS NOTES
Pt presented his 1st Step assignment on this date. They talked about the history of their use including: tolerance, withdrawal, and consequences. Pt also talked about how they missed out on opportunities due to their use over the years. They also talked about how their use had affected multiple people negatively, not only themselves.   Pt was given feedback from counselor and tx peers and asked questions to clarify.   Pt appeared to be impacted by the assignment emotionally and also allowed themselves some perspective when they looking back.   Pt verbalized that he believes that he feels that he has accepted that he is powerless over alcohol/drugs and that he can never use socially/recreationally again.           Zion Chamberlain, ELANA

## 2020-01-09 NOTE — ADDENDUM NOTE
Encounter addended by: Zion Chamberlain [facility] on: 1/9/2020 7:43 AM   Actions taken: Charge Capture section accepted

## 2020-01-10 ENCOUNTER — HOSPITAL ENCOUNTER (OUTPATIENT)
Dept: BEHAVIORAL HEALTH | Facility: CLINIC | Age: 60
End: 2020-01-10
Attending: FAMILY MEDICINE
Payer: COMMERCIAL

## 2020-01-10 PROCEDURE — H2035 A/D TX PROGRAM, PER HOUR: HCPCS | Mod: HQ

## 2020-01-10 PROCEDURE — H2035 A/D TX PROGRAM, PER HOUR: HCPCS

## 2020-01-10 PROCEDURE — 10020000 ZZH LODGING PLUS FACILITY CHARGE ADULT
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
## 2020-01-11 ENCOUNTER — HOSPITAL ENCOUNTER (OUTPATIENT)
Dept: BEHAVIORAL HEALTH | Facility: CLINIC | Age: 60
End: 2020-01-11
Attending: FAMILY MEDICINE
Payer: COMMERCIAL

## 2020-01-11 DIAGNOSIS — F17.200 NICOTINE DEPENDENCE: Primary | ICD-10-CM

## 2020-01-11 PROCEDURE — H2035 A/D TX PROGRAM, PER HOUR: HCPCS | Mod: HQ

## 2020-01-11 PROCEDURE — 10020000 ZZH LODGING PLUS FACILITY CHARGE ADULT

## 2020-01-11 RX ORDER — NICOTINE 21 MG/24HR
1 PATCH, TRANSDERMAL 24 HOURS TRANSDERMAL EVERY 24 HOURS
Qty: 30 PATCH | Refills: 1 | Status: SHIPPED | OUTPATIENT
Start: 2020-01-11 | End: 2020-06-16

## 2020-01-12 ENCOUNTER — HOSPITAL ENCOUNTER (OUTPATIENT)
Dept: BEHAVIORAL HEALTH | Facility: CLINIC | Age: 60
End: 2020-01-12
Attending: FAMILY MEDICINE
Payer: COMMERCIAL

## 2020-01-12 PROCEDURE — 10020000 ZZH LODGING PLUS FACILITY CHARGE ADULT

## 2020-01-12 PROCEDURE — H2035 A/D TX PROGRAM, PER HOUR: HCPCS | Mod: HQ

## 2020-01-13 ENCOUNTER — HOSPITAL ENCOUNTER (OUTPATIENT)
Dept: BEHAVIORAL HEALTH | Facility: CLINIC | Age: 60
End: 2020-01-13
Attending: FAMILY MEDICINE
Payer: COMMERCIAL

## 2020-01-13 PROCEDURE — H2035 A/D TX PROGRAM, PER HOUR: HCPCS

## 2020-01-13 PROCEDURE — 10020000 ZZH LODGING PLUS FACILITY CHARGE ADULT

## 2020-01-13 PROCEDURE — H2035 A/D TX PROGRAM, PER HOUR: HCPCS | Mod: HQ

## 2020-01-13 NOTE — PROGRESS NOTES
"Patient:  Durga Aguirre            Adult CD Progress Note and Treatment Plan Review     Attendance  Please refer to OP BEH CD Adult Attendance Record Documentation Flowsheet    Support group attended this week: yes    Reporting sobriety:  yes    Treatment Plan     Treatment Plan Review competed on: 1/13/20    Client preferred learning style: Visual  Hands on  Verbal    Staff Members contributing : Roebrto Carlos Gibson [facility]; Zion Chamberlain [facility]; Nickie Gotti [facility] Received Supervision: No    Client: contributed to goals and plan.    Client received copy of plan/revised plan: Yes    Client agrees with plan/revised plan: Yes        Changes to Treatment Plan: No    New Goals added since last review : No additional goals added at this time    Goals worked on since last review: Sobriety,  group therapy, tx plan assignments, building a sober support network, psychoeducation.    Strategies effective: yes    Strategies need these changes: No changes needed at this time    ASAM Risk Rating:    Dimension 1 0 Patient reports his last date of use as 12/27/2019. Patient denies any withdrawal symptoms that would interfere with full participation in treatment programming at this time. Patient will continue to be monitored throughout treatment.     Dimension 2 0 Patient denies any biomedical concerns that would interfere with full participation in treatment programming at this time. Patient reports that she is currently medication compliant. Patient appears able to access medical aid as needed. Patient will continue to be monitored throughout treatment.  Pt has an upcoming dental appt on 1/14/20.     Dimension 3 1 Pt denied having suicidal thoughts at this time. Pt reported no significant changes in his mood or changes in his stress level this past week. He reports that \"patience and confidence\" is helping him manage stress and difficult emotions.     Dimension 4 2  Pt expresses internal motivation for change. Pt is active in group " "process, accepts and provides feedback, has good insight, and appears engaged. Pt is supportive of her group peers. Pt reported that \"my age, my future\" is what motivated him to be sober and to stay in treatment this week. Pt presented his \"Drug Use Hx\" assignment in group the morning of 1/13/20.    Dimension 5 4 Pt is working on identifying his relapse triggers and warning signs. Pt needs to continue to explore how to handle those in recovery.   Pt reported that \"I feel very peaceful\" this past week.     Dimension 6 4 Pt is working on building his sober support network. Pt is attending mandatory AA meetings and reports having minimal sober support out in the community. He reported that his mom and his sister came to visit him this week in treatment. Pt is looking at restarting OP in [place] following discharge from  on 1/27/20.     Any changes in Vulnerable Adult Status?  No  If yes, add to treatment plan and individual abuse prevention plan.    Family Involvement:   none schedule this week    Data:   Pt offered feedback, had good insight, and patient actively participated in group. Pt shares openly during group check-in.    Intervention:   Counselor feedback  Group feedback  Relapse prevention  Aftercare planning  Cognitive behavior therapy  Counselor feedback  Education  Emotional management  Motivational enhancement therapy   Twelve Step facilitation  Mental health education  Pt and counselor reviewed and signed ISP and assessment summary.    Assessment:   Stages of Change Model  Preparation/Determination    Appears/Sounds:  Cooperative  Motivated  Engaged       Plan:  Focus on recovery environment      Continue group therapy, go to AA/NA meetings, work with sponsor, build sober support network, engage in daily structured activities, and have sober fun.      ELANA Do        "

## 2020-01-13 NOTE — PROGRESS NOTES
D. Pt presented their drug use history in group on this date. They talked about ages, substances, amounts, and consequences of use. They showed the progression of their drug use over the years.   I. Counselor and tx peers gave pt helpful feedback and asked clarifying questions.   A. Pt was open and honest when discussing their drug use.   P. Pt appeared to have gained a different perspective on their drug use over the years. They can better see certain themes that have run through their life over the years. Pt has a better idea of what to watch out for in the way of relapse triggers and how to address them in recovery.

## 2020-01-14 ENCOUNTER — HOSPITAL ENCOUNTER (OUTPATIENT)
Dept: BEHAVIORAL HEALTH | Facility: CLINIC | Age: 60
End: 2020-01-14
Attending: FAMILY MEDICINE
Payer: COMMERCIAL

## 2020-01-14 PROCEDURE — 10020000 ZZH LODGING PLUS FACILITY CHARGE ADULT

## 2020-01-14 PROCEDURE — H2035 A/D TX PROGRAM, PER HOUR: HCPCS | Mod: HQ

## 2020-01-15 NOTE — PROGRESS NOTES
"Patient hydroxyzine was prescribed for 1-2 TABLETS (25-50 MG) BY MOUTH EVERY 6 HOURS AS NEEDED FOR ANXIETY. Patient came to the Med Room @1757 and took 1 pill. Later this evening @2011 patient came to the med room and requested for his 2nd pill. Writer was called into the med room and patient was encouraged to come back @12Am because he has to wait 6 hours. Patient refused and took the 2nd pill ASA. Patient  was irritable and stated \"I don't appreciate you guys trying to tell me how to take my medication when the order allows me to take up to 2 pills every 6 hours\". Writer told patient that he will have to talk with his Nurse in a morning.   "

## 2020-01-15 NOTE — PROGRESS NOTES
CHEMICAL DEPENDENCY TRANSITION SUMMARY    PATIENT NAME:  JOANNA BANUELOS  :  1960    EVALUATION COUNSELOR: ELANA Costa  TREATMENT COUNSELORS: Roberto Carlos Gibson [facility],  Zion Chamberlain  [facility]  REFERRAL SOURCE:  [facility]  PROGRAM:  [facility]  ADMISSION DATE: 19  DATE OF LAST SESSION: 20  TRANSITION DATE: 1/15/20  ADMISSION DIAGNOSIS:    Alcohol Use Disorder Severe - 303.90 (F10.20)  Tobacco Use Disorder Mild - 305.10 (Z72.0)    TRANSITION DIAGNOSIS:  Alcohol Use Disorder Severe - 303.90 (F10.20)  Tobacco Use Disorder Mild - 305.10 (Z72.0)    TRANSITION STATUS: Patient was discharged due to a behavioral issue when he broke a treatment peer's confidentiality.   LAST USE DATE: Patient reported last date of use as :19  DAYS OF TREATMENT COMPLETED:  Patient completed 16 days of treatment      SERVICES PROVIDED:  Our services included assessment, treatment planning and education regarding chemical dependency, mental illness, relationships, and relapse prevention.  The patient also participated in individual therapy, group therapy, recovery oriented workshops, spiritual care counseling, recovery skills training and aftercare planning.    ISSUES ADDRESS IN TREATMENT:    DIMENSION 1 - ACUTE INTOXICATION/WITHDRAWAL POTENTIAL  ADMISSION RISK RATIN  DISCHARGE RISK RATIN  Patient entered into [facility] on 19. . Patient reported last date of use as 19. Throughout treatment patient denied any withdrawal symptoms that would interfere with full participation in treatment programming.     DIMENSION 2 - BIOMEDICAL COMPLICATIONS AND CONDITIONS  ADMISSION RISK RATIN  DISCHARGE RISK RATIN  Upon admissions patient denied any medical concerns that would interfere with full participation in treatment programming. Patient maintain medication compliance throughout treatment. Upon discharge patient appeared able to access  "medical aid as needed.     DIMENSION 3 - EMOTIONAL, BEHAVIORAL, COGNITIVE CONDITIONS AND COMPLICATIONS  ADMISSION RISK RATIN  DISCHARGE RISK RATIN  Upon admissions patient reported a mental health diagnosis of depression and anxiety. To address this concern patient met with staff therapist Paula Pereira. Patient completed and presented the assignments: \"Working through Grief\" and  \"Coping with Stress\".  He talked about multiple deaths that impacted his life over the years including: his father, ex wife, and his dog.  Upon transition patient denied any suicidal thoughts or ideations.     DIMENSION 4 - READINESS FOR CHANGE  ADMISSION RISK RATIN  DISCHARGE RISK RATIN  Pt appeared to make progress with readiness while in [facility]. Pt entered [facility] at the contemplation stage of change and transitioned to action stage. Pt presented the assignments, \"First Step\" and \"Drug Use History\". Pt talked about use of alcohol and other substances over the years and consequences that resulted from his abuse/addiction.     DIMENSION 5 - RELAPSE, CONTINUED USE AND CONTINUED PROBLEM POTENTIAL   ADMISSION RISK RATIN  DISCHARGE RISK RATING: 3  Patient attended workshops, groups, and lectures about the relapse process while in [facility].  Patient was better able to identify his relapse triggers and potential warning signs leading to use. He completed his \"Relapse Prevention Plan\" and identified lack of structure in his life as well as complicated grief as areas to be aware of in recovery.  He also talked about the positives in his life when he was three years sober from [id number].     DIMENSION 6 - RECOVERY ENVIRONMENT  ADMISSION RISK RATIN  DISCHARGE RISK RATING: 3  Patient attended the \"Relationships\" workshop while in [facility]. He attended several 12 Step meetings and began making connections with others in recovery. He completed the assignment, \"Building My Support Network\", and talked about " what he needs to do to feel connected/supported in his recovery. Patient was redirected multiple times while in treatment as he has a tendency to give advice, pontificate, and would benefit by focusing primarily on himself and continue implement positive changes in his own recovery. Patient also needs to expand his sober network as he admits to having a tendency to isolate.     STRENGTHS: Patient maintained a positive attitude while in treatment. Patient was an active participant in group therapy and was always open for feedback from their counselors and peers. Patient appears motivated for recovery at this time and willing to incorporate positive changes into their  life.     LIVING ARRANGEMENTS AT DISCHARGE: Living with his mother    PROGNOSIS:  Prognosis for this patient is guarded at this time due his behavioral discharge and not being able to complete the LP program as a result.     CONTINUING CARE RECOMMENDATIONS AND REFERRALS:    1.  Abstain from all mood-altering chemicals unless prescribed by a licensed medical provider, and take all medications as prescribed.  2.  Attend a minimum of three AA/NA/Sober support groups weekly in the community.  3.  Maintain regular contact with your sponsor. Obtain a permanent male sponsor and maintain regular contact with him.  4.  Admit immediately into [facility] and follow all recommendations.  5.  Continue to invest in building a sober support network.  6.  Continue to monitor and understand relapse triggers and stressors through the use and development of healthy coping skills.  7.  Obtain a mental health therapist and attend all scheduled programming  8. Attend all scheduled medical appointments.  9. Take medication as prescribed       This information has been disclosed to you from records protected by Federal confidentiality rules (42 CFR part 2). The Federal rules prohibit you from making any further disclosure of this information unless further disclosure is  expressly permitted by the written consent of the person to whom it pertains or as otherwise permitted by 42 CFR part 2. A general authorization for the release of medical or other information is NOT sufficient for this purpose. The Federal rules restrict any use of the information to criminally investigate or prosecute any alcohol or drug abuse patient.       ELANA Joe LADC

## 2020-01-15 NOTE — PROGRESS NOTES
Name: Durga Aguirre  Date: 1/14/2020  Medical Record: [id number]    Envelope Number: [id number]    List of Contents (List each item separately in new row):   One cell phone with case                  Repackaged from envelope #[id number]    Admission:  I am responsible for any personal items that are not sent to the safe or pharmacy.  [facility] is not responsible for loss, theft or damage of any property in my possession.      Patient Signature:  ___________________________________________       Date/Time:__________________________    Staff Signature: __________________________________       Date/Time:__________________________    2nd Staff person, if patient is unable/unwilling to sign:      __________________________________________________________       Date/Time: __________________________      Discharge:  [facility] has returned all of my personal belongings:    Patient Signature: ________________________________________     Date/Time: ____________________________________    Staff Signature: ______________________________________     Date/Time:_____________________________________

## 2020-01-16 NOTE — ADDENDUM NOTE
Encounter addended by: Zion Chamberlain [facility] on: 1/16/2020 7:37 AM   Actions taken: Clinical Note Signed

## 2020-01-16 NOTE — ADDENDUM NOTE
Encounter addended by: Roberto Carlos Gibson LADC on: 1/16/2020 10:50 AM   Actions taken: Episode resolved

## 2020-01-17 ENCOUNTER — TELEPHONE (OUTPATIENT)
Dept: INTERNAL MEDICINE | Facility: CLINIC | Age: 60
End: 2020-01-17

## 2020-01-17 NOTE — TELEPHONE ENCOUNTER
LOV 12/2019. Handicap parking permit initially issued by [facility]. Expires 1/31/20.    Can this be renewed by Dr. Solitario?  Pls advise.    Mobile on file.  Okay to leave msg.

## 2020-01-17 NOTE — TELEPHONE ENCOUNTER
MR, please review request for hadicap parking permit, Last OV was actually 10/14/2019, NOT 12/19 as noted below, was seen for rib pain and naltrexone rx.     Sulma Santos, CMA

## 2020-01-20 ENCOUNTER — HOSPITAL ENCOUNTER (OUTPATIENT)
Dept: BEHAVIORAL HEALTH | Facility: CLINIC | Age: 60
End: 2020-01-20
Attending: SOCIAL WORKER
Payer: COMMERCIAL

## 2020-01-20 PROCEDURE — H2035 A/D TX PROGRAM, PER HOUR: HCPCS

## 2020-01-20 PROCEDURE — H2035 A/D TX PROGRAM, PER HOUR: HCPCS | Mod: HQ

## 2020-01-20 NOTE — TELEPHONE ENCOUNTER
Pt informed, advised to return to podiatry, who issues the parking permit previously.    Sulma Santos, CMA

## 2020-01-20 NOTE — PROGRESS NOTES
Initial Services Plan          Immediate health & safety concerns: Other: none identified    Suggestions for client during the time between intake & completion of treatment plan:  Tour your treatment center (unit or outpatient clinic).  Introduce yourself to the treatment group.  Spend time getting to know your peers.  Complete the goals list for your treatment plan.  Review your client handbook.  Look for a support network (such as AA, NA, DBT group, a Ep[employer] group, etc.)    Client issues to be addressed in the first treatment sessions:  Identify motivations(s) for coming to treatment, i.e. legal, family, job, self  Acclimate client to group.    ELANA Herman  1/20/2020  4:35 PM

## 2020-01-20 NOTE — TELEPHONE ENCOUNTER
I dont see a reason for any handicap parking. If another provider gav him one and he wants an extension pt should contact initial provider

## 2020-01-20 NOTE — PROGRESS NOTES
Patient:  Durga Aguirre    Date: January 20, 2020    Comprehensive Assessment UPDATE        Comprehensive Summary Update and Review  Counselor met with patient on 1/20/2020 and reviewed the Comprehensive Assessment.    The following updates have been made: Last use date changed from 12/27/2019 to 1/16/2020.        [place] Suicide Severity Rating Reassessment    Have you ever wished you were dead or that you could go to sleep and not wake up? Past Month?  NO       Have you actually had any thoughts of killing yourself?   Past Month? NO    Have you been thinking about how you might do this?   Past Month?  N/A  Lifetime?   N/A     Have you had these thoughts and had some intention of acting on them?   Past Month?  N/A  Lifetime?   N/A     Have you started to work out the details of how to kill yourself?  Past Month?  N/A  Lifetime?   N/A     Do you intend to carry out this plan?   N/A     When you have the thoughts how long do they last?   N/A    Are there things - anyone or anything (ie Family, Adventism, pain of death) that stopped you from wanting to die or acting on thoughts of suicide?   Does not apply        2008  [employer]  Used with permission by Loree Pelayo, PhD.

## 2020-01-21 NOTE — PROGRESS NOTES
Monday    Group Date: 1/20/2020   Group Attendance Attended group session   Group Therapy Type Addiction   Group Topic Covered Disease of Addiction/Choices in Recovery and Meditation/Breathing Exercises   Client's Response To Group Topic Cooperative with task Discussed personal experience with topic Listened actively   Client Group Participation Detail Highly involved   Group Attendance (Time) 2.0 Hours   Individual Attendance 31 Minutes   Family Attendance None   Other Comments/Information Client attended first IOP group since discharge from [facility] and participated with the check- in process.  Client introduced self to group and gave short history of how he got here.  Client participated in processing a use episode of group peer and provided supportive feedback.  Group peer presented his  Symptoms  assignment with discussion.  Client provided supportive feedback to peer and spoke about how he related to behavioral, emotional, and physical symptoms and signs described in peer s assignment.

## 2020-01-23 ENCOUNTER — HOSPITAL ENCOUNTER (OUTPATIENT)
Dept: BEHAVIORAL HEALTH | Facility: CLINIC | Age: 60
End: 2020-01-23
Attending: SOCIAL WORKER
Payer: COMMERCIAL

## 2020-01-23 PROCEDURE — H2035 A/D TX PROGRAM, PER HOUR: HCPCS | Mod: HQ | Performed by: SOCIAL WORKER

## 2020-01-23 NOTE — PROGRESS NOTES
Durga Aguirre  [id number]               Adult CD Progress Note and Treatment Plan Review       Total # of Phase 1 Group Sessions:  2     Total # of Phase 2 Group Sessions:    Total # of Phase 3 Group Sessions:   Total # of 1:1 Sessions: 1    Support group attended this week: yes    Reporting sobriety: Yes, client reported last use date of alcohol as 1/15/2020.    Treatment Plan Review     Treatment Plan Review completed on:  Scheduled 1/27/2020     Projected discharge date: 6/25/2020    Client preferred learning style: by hands-on practice    Staff member(s) contributing: ANA Mcghee, [facility]    Received supervision: Staffed with Megan Conde [facility]     Client involvement with treatment planning: contributed to goals.    Client received copy of plan/revised plan: Scheduled 1/27/2020    Client agrees with plan/revised plan: TBD    Changes to Treatment Plan: Scheduled 1/27/2020    Client's Dimension 1 Goal(s) Develop effective strategies to maintain sobriety.    See below.   Client's Dimension 2 Goal(s) Disclose CD status to medical providers and follow up with medical interventions while in IOP.   Maintain good physical health. See below.   Client's Dimension 3 Goal(s) Learn how to apply self-care and psychotherapy to build a repertoire of daily habits that counteract PAWS, fatigue, depression and anxiety leading to increased resiliency and well-being.  Understand the relationship between mental health concerns and addiction.   See below.   Client's Dimension 4 Goal(s) Understand the impact your substance use has had on you, your family, and significant relationships.  Increase internal motivation to change.   See below.   Client's Dimension 5 Goal(s) Identify personal triggers and relapse warning signs.  Develop sober coping and living skills in order to address the development of a strategy for long term recovery See below.   Client's Dimension 6 Goal(s) Resolve issues with legal.  Increase sober support network  "See below.     New Goals: Scheduled 1/27/2020    Goal(s) worked on since last review: Dim 4, 5    Strategies effective: Yes    Medical, Mental Health and other appointments the client attended:  None noted    Medication issues: Yes - Client reports compliance with mental health medications of citalopram and hydroxyzine.    Physical and mental health problems: Client endorses conditions of hypertension, high cholesterol, and mild chronic pain.  Client endorses mental health concerns of anxiety.  Client denies current psychotherapy.    Review and evaluation of the individual abuse prevention plan: The program's individual abuse prevention plan (IAPP) is sufficient for this client.     Substance Use Disorders:    Alcohol Use Disorder Severe - 303.90 (F10.20)  Tobacco Use Disorder Mild - 305.10 (Z72.0)    ASAM Risk Rating: (Note the rationale for risk rating changes)      Dimension 1 0  Client exhibits no signs of intoxication or withdrawal.  Dimension 2 0  Client denies any new or worsening physical problems.    Dimension 3 1  Client rates his depression, anxiety, or other mental health concerns as 3-4 of 10 (10 highest) due to \"employment\".  Client denies any thoughts of hurting himself or others.    Dimension 4 2  Client rates his motivation for recovery as 10 of 10 (10 highest).  Client expressed 3 reasons to remain sober.   Client was discharged 1/15/2020 and drank that same day.  He denies use since.  Dimension 5 3  Client rated his cravings in the last week as 10 of 10 (10 highest) due to \"leaving treatment/disappointment\".  Client reports a personal strength of acknowledging he has a problem.  He reports using a coping skill of asking for help.  Dimension 6 2  Client reports attendance at 2 sober support meeting this week.  He reports having a sponsor. Client reports sober activities.    Intervention:   Behavioral Therapy, Cognitive Behavioral Therapy, Counselor Feedback, Psychoeducation, Emotional management, " Group Feedback, Motivational Interviewing, Relapse Prevention, Twelve Step Facilitation, REBT    Assessment:    Stages of Change Model  Contemplation    Appears/Sounds:  Cooperative    Plan:   Client will prepare to collaboratively create treatment plan Monday 1/27/2020  Client will continue taking prescribed naltrexone.  Attend at least 1 sober support group meetings each week.  Maintain contact with sponsor.  Client will try one new coping skill/activity before next session.  Practice choice of meditation techniques this week.  Express 3 things he is grateful for each night.  -Client will attend all weekly Phase 1 group sessions.   -Client to engage in sober activities each week.    ANA Mcghee, [facility]

## 2020-01-24 NOTE — PROGRESS NOTES
Durga Aguirre  January 23, 2020  [id number]    [facility] Mental Health Skills Group Note  Mindfulness    Day: Thursday   Date: 1/23/20   Group Attendance Attended group session   Group Therapy Type Addiction, Psychoeducation and Psychotherapeutic   Group Topic Covered Mindfulness Skills in MH & Addiction    Client's Response To Group Topic Other - Participated Fully, see details below.   Client Group Participation Detail Shows interest   Group Attendance (Time) 3.0 Hours   Individual Attendance None   Family Attendance None   Other Comments/Information None       Number of participants: 4      Length of Group:  3 hours      Goal of the Group: Learn mindfulness exercises to help clients focus on the present and increase their awareness of thoughts and emotions.  This process helps clients detach from unhelpful thinking patterns and be less affected by negative emotions. This practice has been shown to decrease reactivity and help facilitate effective action to work on changes in the present and thereby create a brighter, healthier future. This practice has also been shown to increase resiliency if practiced regularly.       [place]: Group Topics and Activities      I.  D3 Intervention and Group Topic addressed:  Learn mindfulness exercises to facilitate effective action, educate on mindfulness practice and its benefits.     II. Mindfulness and/or Motivational Component: Mindful Eating, Mindful Listening, Awareness of Sounds Meditation, Sitting Meditation      III. Additional Activity:  Mindful Eating, Mindful Listening, [place] exercise       IV. Review of Progress:   A. Client self-report: This was Durga's first session with this therapist and he shared with group what brought him to treatment. His drug of choice is Alcohol and his SD is 1/17/20. He received his journal format today, along with the philosophy behind it, and he agreed to do this daily. Durga was in this program in 2019 and then went to residential care but  "left early due to a behavior issue. Mindfulness:He did not remember learning this but was able to do the exercises today; Stress: 3, \"because I'm not working now and I'm trying to quit smoking. He was praised for this; Cravings: he reported no cravings. Durga reported being worried about getting a job. His old employer would not hire him back but he also has an offer at the [employer] that he will follow up on.  Mindful Eating: \"The aries made me smile. I've never smiled for a piece of fruit before.\"    Mindful Listening: \"We found out we had things in common.\"     B. Therapist Assessment: Durga participated fully and speaks tangentially. He was able to be redirected quickly today.         [place] exercise issue: \"Improper responses\"  Comments before mindfulness included: \"I'm right, I'm used to being the boss, their ideas are wrong, I'm quick to argue\"  Comments after mindfulness included: \"Own my own behavior, listen & think, understand my response before responding, don't interrupt or give advice, respond not react\"    V.   Reflection and evaluation of today's group experience:  Gave verbal feedback and filled out evaluation form. Client wrote that the most important things learned were \"To be conscious of the moment\" and \"proper way to respond while communicating\" and \"skills to relax.\"      VI. Assignments or activities to do for next week: Mindfulness in the day and journal before bed. Continue to monitor stress daily and use skills to manage.      Therapeutic techniques in this session:  Motivational Therapy, CBT/DBT/ACT, Supportive, Behavioral Modification and Mindfulness     Additional Treatment Referrals/Follow-up Issues to Address: Not indicated at this time      Change in Treatment Plan: No changes are indicated at this time. This group does complete one Treatment Plan intervention under D3.       Change in Diagnosis: No Changes at this time.             "

## 2020-01-28 ENCOUNTER — HOSPITAL ENCOUNTER (OUTPATIENT)
Dept: BEHAVIORAL HEALTH | Facility: CLINIC | Age: 60
End: 2020-01-28
Attending: SOCIAL WORKER
Payer: COMMERCIAL

## 2020-01-28 PROCEDURE — H2035 A/D TX PROGRAM, PER HOUR: HCPCS | Mod: HQ

## 2020-01-28 PROCEDURE — H2035 A/D TX PROGRAM, PER HOUR: HCPCS

## 2020-01-29 ENCOUNTER — HOSPITAL ENCOUNTER (OUTPATIENT)
Dept: BEHAVIORAL HEALTH | Facility: CLINIC | Age: 60
End: 2020-01-29
Attending: SOCIAL WORKER
Payer: COMMERCIAL

## 2020-01-29 DIAGNOSIS — I10 ESSENTIAL HYPERTENSION: ICD-10-CM

## 2020-01-29 PROCEDURE — H2035 A/D TX PROGRAM, PER HOUR: HCPCS | Mod: HQ

## 2020-01-29 NOTE — PROGRESS NOTES
Tuesday     Group Date: 1/28/2020   Group Attendance Attended group session   Group Therapy Type Psychoeducation   Group Topic Covered Meditation/Breathing Exercises, Mindfulness, Relapse Prevention and Stress Management   Client's Response To Group Topic Cooperative with task Discussed personal experience with topic Expressed readiness to alter behaviors   Client Group Participation Detail Highly involved   Group Attendance (Time) 2.0 Hours   Individual Attendance 45 Minutes   Family Attendance None   Other Comments/Information The group psychoeducation topic was stress management.   Client completed a worksheet to show that each person reacts to the same stressor in different ways.  Client identified several stress triggers and reactions.  Client introduced to REBT techniques to challenge irrational beliefs in order to help client develop more effective thoughts, feelings and behaviors.  Client gave examples of irrational beliefs or thoughts, and how they could be disputed thus leading to ways to manage stress better and respond to it to reduce risk of relapse to substance use.  Client also participated in activities to illustrate how mindful breathing, meditation, exercise, and talking to supportive people in recovery are ways to reduce stress.  Client walked with a peer in order to share any concerns and breath in fresh air.  Client reported feeling energized upon returning.

## 2020-01-29 NOTE — PROGRESS NOTES
Met with client 1:1 for 45 minutes after group session for collaborative treatment planning session.

## 2020-01-30 RX ORDER — LISINOPRIL 10 MG/1
10 TABLET ORAL DAILY
Qty: 30 TABLET | Refills: 5 | Status: SHIPPED | OUTPATIENT
Start: 2020-01-30 | End: 2020-06-16

## 2020-01-30 NOTE — TELEPHONE ENCOUNTER
"Requested Prescriptions   Pending Prescriptions Disp Refills     lisinopril (PRINIVIL/ZESTRIL) 10 MG tablet [Pharmacy Med Name: Lisinopril Oral Tablet 10 MG]  Last Written Prescription Date:  06/24/2019  Last Fill Quantity: 90,  # refills: 01   Last Office Visit: 10/14/2019   Future Office Visit:      30 tablet 0     Sig: Take 1 tablet (10 mg) by mouth daily       ACE Inhibitors (Including Combos) Protocol Failed - 1/29/2020  7:30 PM        Failed - Blood pressure under 140/90 in past 12 months     BP Readings from Last 3 Encounters:   10/14/19 116/74   09/24/19 132/78   08/15/19 130/72                 Passed - Recent (12 mo) or future (30 days) visit within the authorizing provider's specialty     Patient has had an office visit with the authorizing provider or a provider within the authorizing providers department within the previous 12 mos or has a future within next 30 days. See \"Patient Info\" tab in inbasket, or \"Choose Columns\" in Meds & Orders section of the refill encounter.              Passed - Medication is active on med list        Passed - Patient is age 18 or older        Passed - Normal serum creatinine on file in past 12 months     Recent Labs   Lab Test 06/24/19  1123   CR 0.89             Passed - Normal serum potassium on file in past 12 months     Recent Labs   Lab Test 06/24/19  1123   POTASSIUM 4.6               "

## 2020-01-30 NOTE — PROGRESS NOTES
Wednesday    Group Date: 1/29/2020   Group Attendance Attended group session   Group Therapy Type Addiction   Group Topic Covered Disease of Addiction/Choices in Recovery, Relapse Prevention and Reviewed Previous Skills Group Topic (Stress Management.)   Client's Response To Group Topic Cooperative with task Discussed personal experience with topic Verbalizations were off topic Other - Client left session 19 minutes early as he reported he was exhausted due to a new job. .   Client Group Participation Detail Shows interest, Adequate participation and Other - Off topic at times.    Group Attendance (Time) 2.0 Hours   Individual Attendance None   Family Attendance None   Other Comments/Information On Wednesday client participated in quick check in followed by a brief discussion on yesterday s topic of:  Stress Management  in which client shared that he is using the new coping skill of communication with others, to assist him manage stress in his life. Readiness to Change and Relapse Prevention were addressed through client presenting the first half of his  [place] Assignment  and group peers  presentations of their  [place]  and  Triggers and Cues  assignments with discussion.  Client presented his assignment in a thorough manner and listened attentively to feedback, then provided supportive feedback to his peers who presented, he also spoke about how he related to behaviors, thoughts, and feelings as well as, emotional, and physical symptoms and signs described in peers  assignments. Client expressed his personal strength is his  honesty .

## 2020-01-30 NOTE — PROGRESS NOTES
Durga Aguirre  [id number]               Adult CD Progress Note and Treatment Plan Review       Total # of Phase 1 Group Sessions:  4     Total # of Phase 2 Group Sessions:    Total # of Phase 3 Group Sessions:   Total # of 1:1 Sessions: 2    Support group attended this week: YES.    Reporting sobriety: Yes, client reported last use date of alcohol as 1/16/2020.    Treatment Plan Review     Treatment Plan Review completed on:  01/30/2020    Projected discharge date: 6/25/2020.    Client preferred learning style: by hands-on practice.    Staff member(s) contributing: Marquis Quintero, MPS, [facility];  Inga Guardado, Intern.    Received supervision: YES.  Intern received supervision from Marquis Quintero.    Client involvement with treatment planning: Client contributed to goals and planning.    Client received copy of plan/revised plan: yes, signed 01/28/2020.    Client agrees with plan/revised plan: yes    Changes to Treatment Plan: YES.  NEW PLAN CREATED ON  01/28/2020.    Client's Dimension 1 Goal(s) Develop effective strategies to maintain sobriety.    See below.   Client's Dimension 2 Goal(s) Disclose CD status to medical providers and follow up with medical interventions while in IOP.   Maintain good physical health. See below.   Client's Dimension 3 Goal(s) Learn how to apply self-care and psychotherapy to build a repertoire of daily habits that counteract PAWS, fatigue, depression and anxiety leading to increased resiliency and well-being.  Understand the relationship between mental health concerns and addiction.   See below.   Client's Dimension 4 Goal(s) Understand the impact your substance use has had on you, your family, and significant relationships.  Increase internal motivation to change.   Client presented assignment.   Client's Dimension 5 Goal(s) Identify personal triggers and relapse warning signs.  Develop sober coping and living skills in order to address the development of a strategy for long term recovery See  "below.   Client's Dimension 6 Goal(s) Resolve issues with legal.  Increase sober support network See below.     New Goals: Created new treatment plan on 01/28/2020.    Goal(s) worked on since last review: Dim 3, 4, 5.    Strategies effective: yes    Medical, Mental Health and other appointments the client attended:  NONE.    Medication issues: YES.  Client reports compliance with mental health medications of citalopram and hydroxyzine.    Physical and mental health problems: Client endorses conditions of hypertension, high cholesterol, and mild chronic pain.  Client endorses mental health concerns of anxiety.  Client denies current psychotherapy.    Review and evaluation of the individual abuse prevention plan: The program's individual abuse prevention plan (IAPP) is sufficient for this client.     Substance Use Disorders:    Alcohol Use Disorder Severe - 303.90 (F10.20)  Tobacco Use Disorder Mild - 305.10 (Z72.0)    ASAM Risk Rating: (Note the rationale for risk rating changes)      Dimension 1 0  Client exhibits no signs of intoxication or withdrawal.  Dimension 2 0  Client denies any new or worsening physical problems.    Dimension 3 1  Client was excused from group on Monday,  On Tuesday, client rates his depression, anxiety, or other mental health concerns as 2 of 10 (10 highest) down from 3-4 last week due to \"feeling good about things.\"  Client denies any thoughts of hurting himself or others.    Dimension 4 2  Client rates his motivation for recovery as 10 of 10 (10 highest).  Client expressed 1 thing he is grateful for. Client was discharged from a residential program on 01/15/2020 and drank that same day.  He denies any substance use since.  Dimension 5 3  Client rated his cravings in the last week as 2 of 10 (10 highest) due to getting back into this treatment program.  Client reports a personal strength of stubbornness and resiliency.  He reports a major coping skill as having a new job and working " again.  Dimension 6 2  Client reports attendance at 1 sober support meeting this week.  He reports having a sponsor. Client reports sober activities.    Intervention:   Behavioral Therapy, Cognitive Behavioral Therapy, Counselor Feedback, Psychoeducation, Emotional management, Group Feedback, Motivational Interviewing, Relapse Prevention, Twelve Step Facilitation, REBT    Assessment:    Stages of Change Model  Contemplation    Appears/Sounds:  Cooperative    Plan:   Client will continue taking prescribed naltrexone.  Attend at least 1 sober support group meetings each week.  Maintain contact with sponsor.  Client will try one new coping skill/activity before next session.  Practice choice of meditation techniques this week.  Express 3 things he is grateful for each night.  -Client will attend all weekly Phase 1 group sessions.   -Client to engage in sober activities each week.    ANA Mcghee, [facility];  Inga Guardado, Intern.

## 2020-01-31 ENCOUNTER — TELEPHONE (OUTPATIENT)
Dept: ORTHOPEDICS | Facility: CLINIC | Age: 60
End: 2020-01-31

## 2020-01-31 NOTE — TELEPHONE ENCOUNTER
Durga Aguirre is a 59 year old [facility]. No further information was given.     Patient expecting call back: YES      Preferred contact number:  [phone] (home)    Can we leave a detailed message on this number: NO consent on file      Phone call to patient. He states he was given our number to call to renew his disability parking certificate. He states she shattered his heel several years ago. Informed that he has seen Dr. Hussein for a hand laceration. Recommended he contact PCP office. He verbalized understanding.     DIONNE Price RN

## 2020-02-03 NOTE — TELEPHONE ENCOUNTER
Per Patient was trying to send this message to podiatry who gave the handicap parking permit before. This was done by Dr Pérez. Dr Pérez please advise. You seen patient 02/03/2014

## 2020-02-03 NOTE — TELEPHONE ENCOUNTER
I do facilitate short-term handicap parking. I don't see anything documented that I provided one when I saw himn once in 2014.     If this is something that he will need long term, I typically suggest that patients talk with their PCP.  I see that Dr. Whittaker is already aware of the request.      If he is looking for a short term permit, I will need to see him in clinic to decide if it is reasonable.  Maybe new orthoses would help, or a steroid injection?     Dr. Pérez

## 2020-02-10 ENCOUNTER — HOSPITAL ENCOUNTER (OUTPATIENT)
Dept: BEHAVIORAL HEALTH | Facility: CLINIC | Age: 60
End: 2020-02-10
Attending: SOCIAL WORKER
Payer: COMMERCIAL

## 2020-02-10 ENCOUNTER — OFFICE VISIT (OUTPATIENT)
Dept: PODIATRY | Facility: CLINIC | Age: 60
End: 2020-02-10
Payer: COMMERCIAL

## 2020-02-10 VITALS
DIASTOLIC BLOOD PRESSURE: 78 MMHG | WEIGHT: 216.2 LBS | BODY MASS INDEX: 30.95 KG/M2 | SYSTOLIC BLOOD PRESSURE: 128 MMHG | HEIGHT: 70 IN

## 2020-02-10 DIAGNOSIS — M19.072 ARTHRITIS OF LEFT SUBTALAR JOINT: Primary | ICD-10-CM

## 2020-02-10 DIAGNOSIS — M79.672 LEFT FOOT PAIN: ICD-10-CM

## 2020-02-10 DIAGNOSIS — F41.1 GENERALIZED ANXIETY DISORDER: ICD-10-CM

## 2020-02-10 PROCEDURE — H2035 A/D TX PROGRAM, PER HOUR: HCPCS | Mod: HQ

## 2020-02-10 PROCEDURE — 99203 OFFICE O/P NEW LOW 30 MIN: CPT | Performed by: PODIATRIST

## 2020-02-10 ASSESSMENT — MIFFLIN-ST. JEOR: SCORE: 1786.93
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
## 2020-02-10 NOTE — PATIENT INSTRUCTIONS
Thank you for choosing [facility] Podiatry / Foot & Ankle Surgery!    DR. WADDELL'S CLINIC LOCATIONS     MONDAY - [place] WEDNESDAY (AM ONLY) - [place]   [address] [place] [address] [address] [address]   [phone] / [phone] / [phone]       THURSDAY - [place] SCHEDULE SURGERY: [phone]   [address] [facility] [address] APPOINTMENTS: [phone]   [place] [address] BILLING QUESTIONS: [phone] / [phone]       [facility] LOCATIONS  [facility]  [address]  [place] [address]  Phone: [phone]  Fax: [phone] [facility]  [address]  [place] [address]  Phone: [phone]   Fax: [phone]   [facility]  [address]  [place] [address]  Phone: [phone]  Fax: [phone] [facility]  [address]  [place] [address]  Phone: [phone]   Fax: [phone]   [facility]   [address]  [place] [address]  Phone: [phone]  Fax: [phone] * Please call any location listed to make an appointment for a casting/fitting. Your referral was sent to their central office and they will all have the order on file.     WEARING YOUR CUSTOM FOOT ORTHOTICS   Most insurance plans cover one pair of orthotics per year. You must check with your   insurance plan to see what your payment responsibility will be. Please call your   insurance company by calling the number on the back of your insurance card.   Orthotic's are non-refundable and non-returnable.   Orthotics are made of various designs. Some orthotics are covered with material that extends beyond your toes. If your orthotic is of this design, you will likely need to trim the toe end to get a proper fit. The insole from your shoe can be used as a template. Simply overlay the shoe  insert on top of the custom orthotic. Align the heel end while tracing the length of the insert onto the custom orthotic. Use a large scissor to trim the toe end until you get a proper fit in the shoe.   The orthotic needs to be pushed as far back in the shoe as possible. The heel portion should not ride forward so as not to irritate your heel.   Orthotics are designed to work with socks. Excessive perspiration will shorten the life span of the orthotics. Remove the orthotic from the shoe frequently for proper drying.   The break-in period lasts for weeks. People new to orthotics will likely experience new aches and pains. The orthotic is forcing your foot into a new position. Arch, foot and leg muscle aches and fatigue are common during these weeks. Minor discomfort can be considered normal break in phenomenon. Start wearing your orthotic around your home your first day. Limited activity for one to two hours is recommended. You can increase one or two additional hours each day provided the aches and pains are subsiding. The degree of discomfort, fatigue and problems will dictate the speed of break in. You may require multiple weeks to work up to full time use.   Do not continue wearing your orthotics if they are creating problems such as blisters or sores. Do not hesitate to call the clinic to speak with a nurse regarding orthotic   break in, fit, trimming, etc. You may also need to see the doctor if the orthotics are   simply not working out. Adjustments are sometimes made to improve orthotic   function.     Orthotics will only work in certain styles and types of shoes. Orthotics rarely work in dress shoes. Slip-ons, clogs, sandals and heels are particularly troublesome. Specially designed orthotics may be necessary for these types of shoes. Your custom orthotic was designed for activities that require appropriate walking or running shoes. Lace up athletic shoes, walking shoes or work boots should work  appropriately. You may need a wider or longer shoe. Shoes with a removable  or insert work best. In general, you want to remove an insert from the shoe before placing the orthotic into the shoe. Shoes without a removable liner may not work as well.     When purchasing new shoes, bring your orthotics along to get a proper fit. Shop at stores that are familiar with orthotics.   Frequent washing of the orthotic may shorten the life span of the top cover. The top cover can be replaced but will generally last one to five years depending on use and foot perspiration.         FYI: BODY WEIGHT AND YOUR FEET  The following information is included in the after visit summary for all patients. Body weight can be a sensitive issue to discuss in clinic, but we think the following information is very important. Although we focus on the feet and ankles, we do support the overall health of our patients.     Many things can cause foot and ankle problems. Foot structure, activity level, foot mechanics and injuries are common causes of pain. One very important issue that often goes unmentioned, is body weight. Extra weight can cause increased stress on muscles, ligaments, bones and tendons. Sometimes just a few extra pounds is all it takes to put one over her/his threshold. Without reducing that stress, it can be difficult to alleviate pain. As Foot & Ankle specialists, our job is addressing the lower extremity problem and possible causes. Regarding extra body weight, we encourage patients to discuss diet and weight management plans with their primary care doctors. It is this team approach that gives you the best opportunity for pain relief and getting you back on your feet.      [facility] has a Comprehensive Weight Management Program. This program includes counseling, education, non-surgical and surgical approaches to weight loss. If you are interested in learning more either talk to you primary care provider or call  [phone].

## 2020-02-11 RX ORDER — HYDROXYZINE HYDROCHLORIDE 25 MG/1
TABLET, FILM COATED ORAL
Qty: 60 TABLET | Refills: 2 | Status: SHIPPED | OUTPATIENT
Start: 2020-02-11 | End: 2020-06-04

## 2020-02-11 NOTE — TELEPHONE ENCOUNTER
"Requested Prescriptions   Pending Prescriptions Disp Refills     hydrOXYzine (ATARAX) 25 MG tablet [Pharmacy Med Name: hydrOXYzine HCl Oral Tablet 25 MG] 60 tablet 2     Sig: TAKE 1-2 TABLETS (25-50 MG) BY MOUTH EVERY 6 HOURS AS NEEDED FOR ANXIETY       Antihistamines Protocol Passed - 2/10/2020 10:06 PM        Passed - Recent (12 mo) or future (30 days) visit within the authorizing provider's specialty     Patient has had an office visit with the authorizing provider or a provider within the authorizing providers department within the previous 12 mos or has a future within next 30 days. See \"Patient Info\" tab in inbasket, or \"Choose Columns\" in Meds & Orders section of the refill encounter.              Passed - Patient is age 3 or older     Apply age and/or weight-based dosing for peds patients age 3 and older.    Forward request to provider for patients under the age of 3.          Passed - Medication is active on med list          "

## 2020-02-12 ENCOUNTER — HOSPITAL ENCOUNTER (OUTPATIENT)
Dept: BEHAVIORAL HEALTH | Facility: CLINIC | Age: 60
End: 2020-02-12
Attending: SOCIAL WORKER
Payer: COMMERCIAL

## 2020-02-12 PROCEDURE — H2035 A/D TX PROGRAM, PER HOUR: HCPCS | Mod: HQ

## 2020-02-13 NOTE — GROUP NOTE
Group Therapy Documentation    PATIENT'S NAME: Durga Aguirre  MRN:   [id number]  :   1960  ACCT. NUMBER: [id number]  DATE OF SERVICE: 20  START TIME:  5:30 PM  END TIME:  7:30 PM  FACILITATOR(S): Marquis Quintero LADC  TOPIC: BEH Group Therapy  Number of patients attending the group:  11  Group Length:  2 Hours    Group Therapy Type: Addiction    Summary of Group / Topics Discussed:    Co-occurring Illness, Meditation/Breathing Exercises, and Thinking Errors/Negative Self-Talk  Client participated with the check- in process.  Group members presented their  Treatment of Depression in Patients with Alcohol or other Drug Dependence ,  Alcoholism and Affective Disorder:  Clinical Course of Depressive Symptoms ,  5 Ways Addiction Impacted my Mental Health , and  10 Worst Consequences  assignments with discussion of co-occurring disorders, disputing underlying beliefs, and reframing of thoughts.  Client participated in a  [place] Meditation  mindfulness guided visualization exercise.       Group Attendance:  Attended group session    Patient's response to the group topic/interactions:  cooperative with task, discussed personal experience with topic and listened actively    Patient appeared to be Actively participating.        Client specific details:  Client participated, provided supportive feedback, and gained understanding of co-occurring disorders and the role substance use had in exacerbating mental health issues as presented by group peers in their  Treatment of Depression in Patients with Alcohol or other Drug Dependence ,  Alcoholism and Affective Disorder:  Clinical Course of Depressive Symptoms , and  5 Ways Addiction Impacted my Mental Health  assignments.  Client also related to behaviors, thoughts, and feelings described in peer s  10 Worst Consequences  assignment.  Client said the guided visualization exercise reminded him of happy times as a child.

## 2020-02-17 ENCOUNTER — HOSPITAL ENCOUNTER (OUTPATIENT)
Dept: BEHAVIORAL HEALTH | Facility: CLINIC | Age: 60
End: 2020-02-17
Attending: SOCIAL WORKER
Payer: COMMERCIAL

## 2020-02-17 PROCEDURE — H2035 A/D TX PROGRAM, PER HOUR: HCPCS | Mod: HQ

## 2020-02-18 ENCOUNTER — HOSPITAL ENCOUNTER (OUTPATIENT)
Dept: BEHAVIORAL HEALTH | Facility: CLINIC | Age: 60
End: 2020-02-18
Attending: SOCIAL WORKER
Payer: COMMERCIAL

## 2020-02-18 PROCEDURE — H2035 A/D TX PROGRAM, PER HOUR: HCPCS

## 2020-02-18 NOTE — GROUP NOTE
Group Therapy Documentation    PATIENT'S NAME: Durga Aguirre  MRN:   [id number]  :   1960  ACCT. NUMBER: [id number]  DATE OF SERVICE: 20  START TIME:  5:30 PM  END TIME:  7:30 PM  FACILITATOR(S): Inga Guardado; Marquis Quintero, [facility]  TOPIC: BEH Group Therapy  Number of patients attending the group:  10  Group Length:  2 Hours    Group Therapy Type: Addiction    Summary of Group / Topics Discussed:    Choices in Recovery, Thinking Errors/Negative Self-Talk, and cost of addiction.   Client participated in the check-in process.  Client participated in a discussion of the cost of addiction, including financial, lost opportunities, time, relationships, self-image, and other losses.  Client provided examples of their own losses.  To end session, client expressed something they were grateful for in recovery.      Group Attendance:  Attended group session    Patient's response to the group topic/interactions:  cooperative with task, discussed personal experience with topic and expressed readiness to alter behaviors    Patient appeared to be Actively participating and Engaged.        Client specific details:  Client was open with regard to feeling proud of his recovery. Client expressed frustration with the stress and injury he is experiencing as the result of past surgery and current work with his ankle.  Client was affirmed for keeping his work commitment.  Client provided specific examples of his addiction costs.  Relationship costs were most troubling to him.  Client plans to refrain from smoking again this next week.

## 2020-02-19 ENCOUNTER — HOSPITAL ENCOUNTER (OUTPATIENT)
Dept: BEHAVIORAL HEALTH | Facility: CLINIC | Age: 60
End: 2020-02-19
Attending: SOCIAL WORKER
Payer: COMMERCIAL

## 2020-02-19 PROCEDURE — H2035 A/D TX PROGRAM, PER HOUR: HCPCS | Mod: HQ

## 2020-02-19 NOTE — GROUP NOTE
Group Therapy Documentation    PATIENT'S NAME: Durga Aguirre  MRN:   [id number]  :   1960  [id number]  DATE OF SERVICE: 20  START TIME:  5:30 PM  END TIME:  7:30 PM  FACILITATOR(S): Inga Guardado Daniel Gerard  TOPIC: BEH Group Therapy  Number of patients attending the group:  9  Group Length:  2 Hours    Group Therapy Type: Addiction    Summary of Group / Topics Discussed:    Psychoeducation/Skills Relapse Prevention (ROYA)  This topic will give a general overview of basic relapse prevention, including definitions of warning signs, triggers and cravings and the importance of addressing healthy coping skills for ongoing relapse prevention.    Objective(s):  Patients will identify 4 key warning signs and triggers  Patients will identify 4 healthy coping mechanisms         Structure (modalities, homework, worksheets, etc.):  Provide psychoeducation on relapse prevention and healthy coping methods.  Facilitate group discussion around each patient s current awareness of warning signs,  triggers and cravings.     Expected therapeutic outcome(s):    Patient will:  Be able to manage triggers and cravings without returning to substance use.      Therapeutic outcomes measured by:  Patients will name 4 of their warning signs and triggers  Patients will name 4 healthy coping mechanisms for dealing with their warning signs and triggers      Group Attendance:  {Group Attendance:287097}    Patient's response to the group topic/interactions:  {OPBEHCLIENTRESPONSE:028051}    Patient appeared to be {Engagement:[id number]}.        Client specific details:  ***.

## 2020-02-19 NOTE — GROUP NOTE
Group Therapy Documentation    PATIENT'S NAME: Durga Aguirre  MRN:   [id number]  :   1960  [id number]  DATE OF SERVICE: 20  START TIME:  5:30 PM  END TIME:  7:30 PM  FACILITATOR(S): Inga Guardado Daniel Gerard  TOPIC: BEH Group Therapy  Number of patients attending the group:  9  Group Length:  2 Hours    Group Therapy Type: Addiction    Summary of Group / Topics Discussed:    Psychoeducation/Skills Relapse Prevention (ROYA)  This topic will give a general overview of basic relapse prevention, including definitions of warning signs, triggers and cravings and the importance of addressing healthy coping skills for ongoing relapse prevention.    Objective(s):    Patients will identify 4 key warning signs and triggers    Patients will identify 4 healthy coping mechanisms         Structure (modalities, homework, worksheets, etc.):    Provide psychoeducation on relapse prevention and healthy coping methods.    Facilitate group discussion around each patient s current awareness of warning signs,  triggers and cravings.     Expected therapeutic outcome(s):    Patient will:    Be able to manage triggers and cravings without returning to substance use.      Therapeutic outcomes measured by:    Patients will name 4 of their warning signs and triggers    Patients will name 4 healthy coping mechanisms for dealing with their warning signs and triggers      Group Attendance:  Attended group session    Patient's response to the group topic/interactions:  cooperative with task, discussed personal experience with topic and expressed understanding of topic    Patient appeared to be Actively participating and Engaged.        Client specific details:  Client participated in check in process.  Client participated in guided grounding exercise  5 Senses.      Dr. Prabhjot Leblanc s video presentation on Alcohol Withdrawal Syndrome was presented.  Client participated in active discussion following the video.   "Client participated in reading aloud the handout detailing the symptoms of PAWS (Post Acute Withdrawal Syndrome).  Client shared their own specific experience with symptoms and how a deeper knowledge of withdrawal symptoms is helpful for ongoing sobriety.  Client plans to continue to utilize coping skills to be proactive with PAWS symptoms.  Client is grateful for, \"being as healthy as I am.\"  "

## 2020-02-20 ENCOUNTER — HOSPITAL ENCOUNTER (OUTPATIENT)
Dept: BEHAVIORAL HEALTH | Facility: CLINIC | Age: 60
End: 2020-02-20
Attending: SOCIAL WORKER
Payer: COMMERCIAL

## 2020-02-20 PROCEDURE — H2035 A/D TX PROGRAM, PER HOUR: HCPCS | Mod: HQ | Performed by: SOCIAL WORKER

## 2020-02-20 NOTE — PROGRESS NOTES
Durga Aguirre  [id number]               Adult CD Progress Note and Treatment Plan Review       Total # of Phase 1 Group Sessions:  10     Total # of Phase 2 Group Sessions:    Total # of Phase 3 Group Sessions:   Total # of 1:1 Sessions: 2    Support group attended this week: NONE.    Reporting sobriety: Yes, client reported last use date of alcohol as 1/16/2020.    Treatment Plan Review     Treatment Plan Review completed on:  2/20/2020    Projected discharge date: 6/25/2020.    Client preferred learning style: By hands-on practice.    Staff member(s) contributing: Marquis Quintero, MPS, [facility]; Inga Guardado, Intern    Received supervision: YES.  Intern received supervision from Marquis Quintero    Client involvement with treatment planning: Client contributed to goals and planning.    Client received copy of plan/revised plan: Yes, signed 01/28/2020.    Client agrees with plan/revised plan: YES.    Changes to Treatment Plan: NO.    New Goals: NO.    Goal(s) worked on since last review: Dim 3, 4, 5.    Strategies effective: YES.    Medical, Mental Health and other appointments the client attended:  NONE.    Medication issues: YES.  Client reports compliance with mental health medications of citalopram and hydroxyzine.    Physical and mental health problems: Client endorses conditions of hypertension, high cholesterol, and mild chronic pain.  Client endorses mental health concerns of anxiety.  Client denies current psychotherapy.    Review and evaluation of the individual abuse prevention plan: The program's individual abuse prevention plan (IAPP) is sufficient for this client.     Substance Use Disorders:    Alcohol Use Disorder Severe - 303.90 (F10.20)  Tobacco Use Disorder Mild - 305.10 (Z72.0)    ASAM Risk Rating: (Note the rationale for risk rating changes)      Dimension 1 0  Client exhibits no signs of intoxication or withdrawal.  Dimension 2 0  Client denies any new or worsening physical problems.    Dimension 3 1   "Client rates his depression, anxiety, or other mental health concerns as 1 of 10 (10 highest) no change from last report due to \"learning to understand myself\".  Client denies any thoughts of hurting himself or others.    Dimension 4 2  Client rates his motivation for recovery as 10 of 10 (10 highest).    Dimension 5 3  Client rated his cravings in the last week as 2 of 10 (10 highest).  Client reports a personal strength of patience.  He using coping skill of deep breathing to improve sleep quality.  Dimension 6 2  Client reports missing his sober support meeting this week.  He reports having a sponsor. Client reports sober activities., such as cooking.    ANA Mcghee, LADC  "

## 2020-02-21 NOTE — PROGRESS NOTES
Durga Aguirre  February 20, 2020  [id number]    [facility] Mental Health Process Group Note  Stress Management     Day: Thursday   Group Date: 2/20/20   Group Attendance Attended group session   Group Therapy Type Addiction, Psychoeducation and Psychotherapeutic   Group Topic Covered Stress Management and Reduction in Addiction    Client's Response To Group Topic Other - Participated Fully, see details below.   Client Group Participation Detail Shows interest   Group Attendance (Time) 3.0 Hours   Individual Attendance None   Family Attendance None   Other Comments/Information None       Number of participants: 5      Length of Group: 3 hours       Goal of the Group: Learn the importance of Stress Management and techniques to reduce PAWS and stress-related symptoms, increase resiliency, and learn healthy routines to replace dysfunctional habits       [place]: Group Topics and Activities      I.  D3 Intervention and Group Topic addressed: Stress management, productivity, balance and procrastination     II. Mindfulness and/or Motivational Component: Group completed progressive relaxation and deep breathing techniques and identified skills to work on this week. Group also did the mindfulness exercise Awareness of Sounds       III. Additional Activity: Clients identified personal stressors and if/how addiction intensified their stress. They completed the [place] exercise that involves the mindfulness work.  The group also learned tips to overcome procrastination and the mental health benefits of de-cluttering their environment.        IV. Review of Progress:   A. Client's self-report: Durga reported that he is not working enough hours but he also likes having the time off. He said he spends most of his time watch TV. He is not doing his journal and we discussed this and he agreed to do this on the days he is working. He is doing mindfulness before bed and focuses on his breathing. His stress is at 2 and he reported no cravings.  "             Stress Screenin, low stress      B. Therapist's assessment: Durga participated fully in the group process and activities. He was redirect several times for going off topic but quickly complied with request.               Skill(s) to use this week to reduce stress: \"Clean the porch out\" and de-clutter.       V.   Reflection and evaluation of today's group experience:  Gave verbal feedback and filled out evaluation form. Client wrote the most important thing learned today was \"Underlying reasons for my behavior. Make a plan & time to accomplish tasks\" and \"Things to help reduce situations that would cause panic attacks\" and he will try his aromatherapy inhaler.     VI. Assignments or activities to do for next week: Continue to do journal before bed, do at least one mindfulness exercise in the day, address one self-care area a week, incorporate one stress reduction technique into daily schedule of structure and routine for this week.      Therapeutic techniques in this session:  Motivational Therapy, CBT/DBT/ACT, Supportive, Behavioral Modification and Mindfulness      Additional Treatment Referrals/Follow-up Issues to Address: Not needed at this time.      Change in Treatment Plan: No change, this group completes one intervention in Treatment Plan under Dimension 3.        Change in Diagnosis: No change in diagnosis indicated.   "

## 2020-02-24 ENCOUNTER — HOSPITAL ENCOUNTER (OUTPATIENT)
Dept: BEHAVIORAL HEALTH | Facility: CLINIC | Age: 60
End: 2020-02-24
Attending: SOCIAL WORKER
Payer: COMMERCIAL

## 2020-02-24 PROCEDURE — H2035 A/D TX PROGRAM, PER HOUR: HCPCS | Mod: HQ

## 2020-02-25 NOTE — GROUP NOTE
"Group Therapy Documentation    PATIENT'S NAME: Durga Aguirre  MRN:   [id number]  :   1960  ACCT. NUMBER: [id number]  DATE OF SERVICE: 20  START TIME:  5:30 PM  END TIME:  7:30 PM  FACILITATOR(S): Inga Guardado; Marquis Quintero [facility]  TOPIC: BEH Group Therapy  Number of patients attending the group:  9  Group Length:  2 Hours    Group Therapy Type: Addiction    Summary of Group / Topics Discussed:    Leisure Exploration/Use of Leisure Time    Client participated in the check-in process.  Client shared motivation to remain sober.  Client discussed any issues which occurred over the weekend.  Group member admitted to drinking throughout treatment, followed by group discussion. Client participated in deep breathing exercise.  Client participated in group exercise brainstorming sober activities for each letter of the alphabet, A - Z.  Client expressed something they were grateful for.      Group Attendance:  Attended group session    Patient's response to the group topic/interactions:  cooperative with task, discussed personal experience with topic, expressed readiness to alter behaviors and gave appropriate feedback to peers    Patient appeared to be Engaged and actively participated.      Client specific details: Client shared a personal strength of perseverance, a quality he said he is \"very glad I have.\"  Client wondered if he \"was more pleasant to be around.\"  Client provided very supportive feedback and support for peer who admitted drinking throughout IOP, sharing that his experience was similar.  Client plans to continue to attend AA and talk to his sponsor.  "

## 2020-02-26 ENCOUNTER — HOSPITAL ENCOUNTER (OUTPATIENT)
Dept: BEHAVIORAL HEALTH | Facility: CLINIC | Age: 60
End: 2020-02-26
Attending: SOCIAL WORKER
Payer: COMMERCIAL

## 2020-02-26 PROCEDURE — H2035 A/D TX PROGRAM, PER HOUR: HCPCS | Mod: HQ

## 2020-02-27 ENCOUNTER — HOSPITAL ENCOUNTER (OUTPATIENT)
Dept: BEHAVIORAL HEALTH | Facility: CLINIC | Age: 60
End: 2020-02-27
Attending: SOCIAL WORKER
Payer: COMMERCIAL

## 2020-02-27 PROCEDURE — H2035 A/D TX PROGRAM, PER HOUR: HCPCS | Mod: HQ

## 2020-02-27 NOTE — GROUP NOTE
Group Therapy Documentation    PATIENT'S NAME: Durga Aguirre  MRN:   [id number]  :   1960  ACCT. NUMBER: [id number]  DATE OF SERVICE: 20  START TIME:  5:30 PM  END TIME:  7:30 PM  FACILITATOR(S): Marquis Quintero LADC  TOPIC: BEH Group Therapy  Number of patients attending the group:  11  Group Length:  2 Hours    Group Therapy Type: Addiction    Summary of Group / Topics Discussed:    Choices in Recovery and Reviewed Previous Skills Group Topic (communication)  Client participated with the check- in process.  The previous day's topic of communication was reviewed with additional information presented regarding blocks to effective communication and tips to more effectively communicate.  Client participated in group discussion.  Group members presented their  10 Worst Consequences  and  Symptoms  assignments with discussion of disputing underlying beliefs and reframing of thoughts.  Client expressed something they were grateful for.      Group Attendance:  Attended group session    Patient's response to the group topic/interactions:  cooperative with task, discussed personal experience with topic and listened actively    Patient appeared to be Attentive.        Client specific details:  Client participated, provided supportive feedback, and spoke about how he related to behaviors, thoughts, feelings and to behavioral, emotional, and physical symptoms and signs described in peers  assignments.  Client described sarcasm as as a block to his effective communication and that he is trying to be aware.

## 2020-02-27 NOTE — PROGRESS NOTES
Durga Aguirre  [id number]               Adult CD Progress Note and Treatment Plan Review       Total # of Phase 1 Group Sessions:  13     Total # of Phase 2 Group Sessions:    Total # of Phase 3 Group Sessions:   Total # of 1:1 Sessions: 2    Support group attended this week: Yes, client attended RBA.    Reporting sobriety: Yes, client reported last use date of alcohol as 1/16/2020.    Treatment Plan Review     Treatment Plan Review completed on:  02/27/2020    Projected discharge date: 6/25/2020.    Client preferred learning style: By hands-on practice.    Staff member(s) contributing: Marquis Quintero MPS, [facility]; Inga Guardado, Intern    Received supervision: YES.  Intern received supervision from Marquis Quintero    Client involvement with treatment planning: Client contributed to goals and planning.    Client received copy of plan/revised plan: Yes, signed 01/28/2020.    Client agrees with plan/revised plan: YES.    Changes to Treatment Plan: NO.    New Goals: NO.    Goal(s) worked on since last review: Dim 3, 4, 5.    Strategies effective: YES.    Medical, Mental Health and other appointments the client attended:  NONE.    Medication issues: YES.  Client reports compliance with mental health medications of citalopram and hydroxyzine.    Physical and mental health problems: Client endorses conditions of hypertension, high cholesterol, and mild chronic pain.  Client endorses mental health concerns of anxiety.  Client denies current psychotherapy.    Review and evaluation of the individual abuse prevention plan: The program's individual abuse prevention plan (IAPP) is sufficient for this client.     Substance Use Disorders:    Alcohol Use Disorder Severe - 303.90 (F10.20)  Tobacco Use Disorder Mild - 305.10 (Z72.0)    ASAM Risk Rating: (Note the rationale for risk rating changes)      Dimension 1 0  Client exhibits no signs of intoxication or withdrawal throughout the week.  Dimension 2 0  Client denies any new or worsening  "physical problems and reports \"feeling good.\"   Dimension 3 1  Client rates his depression, anxiety, or other mental health concerns as 2 of 10 (10 highest) up from 1 at last report due to \"no particular issue.\" Client denies any thoughts of hurting himself or others.    Dimension 4 2  Client rates his motivation for recovery as 10 of 10 (10 highest) and expressed no concern or reason this will change.    Dimension 5 3  Client rated his cravings in the last week as 2 of 10 (10 highest), no change from last week.  Client said this is \"my new normal!\"  Client reports a personal strength of perseverance. He is said reasons to stay sober include, \"to be respectful, and be respected.\"  Dimension 6 2  Client reports attending his sober support meeting [facility] this week.  He reports having a sponsor. Client reports sober activities, such as installing a new built-in microwave for his mother.    Marquis Quintero, ANA, LADC  Inga Guardado, Intern  "

## 2020-02-28 NOTE — ADDENDUM NOTE
Encounter addended by: Marquis Quintero, Formerly named [facility] on: 2/27/2020 8:30 PM   Actions taken: Clinical Note Signed

## 2020-02-28 NOTE — PROGRESS NOTES
Durga Aguirre  February 27, 2020  [id number]    [facility] Mental Health Skills Group Note  Neurobiology    Day: Thursday   Date: 2/27/2020   Group Attendance Attended group session   Group Therapy Type Addiction, Psychoeducation and Psychotherapeutic   Group Topic Covered Neurobiology of Addiction    Client's Response To Group Topic Other - Participated fully, see details below.   Client Group Participation Detail Shows interest   Group Attendance (Time) 3.0 Hours   Individual Attendance None   Family Attendance None   Other Comments/Information None       Number of participants: 3      Length of Group: 3 hours     Goal of the Group: Learn current neuroscience of addiction to better understand addiction-as-a-disease and to reduce confusion, shame and guilt leading to increased open-mindedness and psychological flexibility needed to build resilience. Clients learn what is meant by retrain the brain.        [place]: Group Topics and Activities      I.  D3 Intervention and Group Topic addressed: Neuroscience and the importance of treatment     II. Mindfulness and/or Motivational Component:  Video: How Addiction Tricks the addicts Brain, Why Mindfulness is a Superpower.     III. Additional Activity: Group learned the different roles of various areas of the brain and how these are changed by addiction.  They learned how these changes manifest in behaviors and lead to consequences. They learned how to strengthen the frontal cortex to reduce the impact of cravings and using  thoughts stemming from the midbrain's activity.  Group compared different areas of addiction that society may condone and others that still have stigma attached to them.           IV. Review of Progress:   A. Client self-report: Client shared his drug of choice is alcohol and that his date of last use is 1/16/2020.  He states he rates his cravings at a 3 on a scale from one to 10 and that this is no change from last week. Client reports his stress level has  "been at a 2 over the last week which he describes as normal and no change from last week. He states he continues to use the recovery skills of journaling and meditation maybe three times a week to cope with stress and as relapse prevention techniques.         B.    Therapist Assessment:  Client participated fully in the group session and activities. He asked important questions, listened attentively and stayed on the topic, much of the time, when involved in discussion.  Client stayed engaged throughout the session.      V.   Reflection and evaluation of today's group experience:  Gave verbal feedback and filled out evaluation form. Client wrote the most important thing(s) learned today included, \"That I'm in the process of healing, brain and body. First by not drinking. Practicing meditation, continue deep breath.\"  He descibes that he is aware after this session that he needs to \"Just not quit working the program\".     VI. Assignments or activities to do for next week: Continue to do journal before bed, do at least one mindfulness exercise in the day time, address one self-care area a week, incorporate one stress reduction technique into daily schedule of structure and routine.     Therapeutic techniques in this session:  Motivational Therapy, CBT/DBT/ACT, Supportive, Behavioral Modification and Mindfulness      Additional Treatment Referrals/Follow-up Issues to Address: Not needed at this time.      Change in Treatment Plan: No change. This session completes one Treatment Plan intervention in D3.     Change in Diagnosis: No change in diagnosis indicated.    "

## 2020-03-02 ENCOUNTER — HOSPITAL ENCOUNTER (OUTPATIENT)
Dept: BEHAVIORAL HEALTH | Facility: CLINIC | Age: 60
End: 2020-03-02
Attending: SOCIAL WORKER
Payer: COMMERCIAL

## 2020-03-02 PROCEDURE — H2035 A/D TX PROGRAM, PER HOUR: HCPCS | Mod: HQ
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
## 2020-03-04 NOTE — ADDENDUM NOTE
Encounter addended by: Marquis Quintero, [facility] on: 3/3/2020 9:01 PM   Actions taken: Clinical Note Signed

## 2020-03-08 DIAGNOSIS — F41.1 GENERALIZED ANXIETY DISORDER: ICD-10-CM

## 2020-03-09 ENCOUNTER — HOSPITAL ENCOUNTER (OUTPATIENT)
Dept: BEHAVIORAL HEALTH | Facility: CLINIC | Age: 60
End: 2020-03-09
Attending: SOCIAL WORKER
Payer: COMMERCIAL

## 2020-03-09 PROCEDURE — H2035 A/D TX PROGRAM, PER HOUR: HCPCS | Mod: HQ

## 2020-03-09 RX ORDER — CITALOPRAM HYDROBROMIDE 40 MG/1
40 TABLET ORAL EVERY MORNING
Qty: 30 TABLET | Refills: 6 | Status: SHIPPED | OUTPATIENT
Start: 2020-03-09 | End: 2020-08-26

## 2020-03-09 NOTE — TELEPHONE ENCOUNTER
"Requested Prescriptions   Pending Prescriptions Disp Refills     citalopram (CELEXA) 40 MG tablet [Pharmacy Med Name: Citalopram Hydrobromide Oral Tablet 40 MG] 30 tablet 0     Sig: Take 1 tablet (40 mg) by mouth every morning       SSRIs Protocol Passed - 3/8/2020 10:58 PM        Passed - Recent (12 mo) or future (30 days) visit within the authorizing provider's specialty     Patient has had an office visit with the authorizing provider or a provider within the authorizing providers department within the previous 12 mos or has a future within next 30 days. See \"Patient Info\" tab in inbasket, or \"Choose Columns\" in Meds & Orders section of the refill encounter.              Passed - Medication is active on med list        Passed - Patient is age 18 or older             "

## 2020-03-10 ENCOUNTER — HOSPITAL ENCOUNTER (OUTPATIENT)
Dept: BEHAVIORAL HEALTH | Facility: CLINIC | Age: 60
End: 2020-03-10
Attending: SOCIAL WORKER
Payer: COMMERCIAL

## 2020-03-10 PROCEDURE — H2035 A/D TX PROGRAM, PER HOUR: HCPCS | Mod: HQ

## 2020-03-10 NOTE — GROUP NOTE
"Group Therapy Documentation    PATIENT'S NAME: Durga Aguirre  MRN:   [id number]  :   1960  ACCT. NUMBER: [id number]  DATE OF SERVICE: 3/09/20  START TIME:  5:30 PM  END TIME:  8:30 PM  FACILITATOR(S): Inga Guardado; Marquis Quintero, [facility]  TOPIC: BEH Group Therapy  Number of patients attending the group:  9  Group Length: 2 hours    Group Therapy Type: Addiction    Summary of Group / Topics Discussed:    Mindfulness and the AA 12 Step Recovery Program    Client participated in check-in process.  Client shared one personal strength with the group and three reasons to remain sober.  Client participated in the AA presentation facilitated by two AA members from the community.  The AA facilitators stressed the importance of finding a home meeting the client doesn t ever want to miss attending, and one where they can connect with others.  Client denied any travel out of state and denied any symptoms which may indicate influenza or COVID-19.  Client participated in a mindfulness exercise to \"talk to your anger (or anxiety) and let it go\".    Group Attendance:  Attended group session    Patient's response to the group topic/interactions:  cooperative with task, discussed personal experience with topic and expressed understanding of topic    Patient appeared to be Actively participating and Engaged.       Client specific details:  Client shared several examples of his involvement in AA during the presentation, and expressed ways in which his recovery has benefited from having a sponsor and a home meeting.  Client shared one thing he was grateful for today.  "

## 2020-03-11 NOTE — ADDENDUM NOTE
Encounter addended by: Marquis Quintero, [facility] on: 3/11/2020 3:20 PM   Actions taken: Clinical Note Signed

## 2020-03-11 NOTE — GROUP NOTE
Group Therapy Documentation    PATIENT'S NAME: Durga Aguirre  MRN:   [id number]  :   1960  ACCT. NUMBER: [id number]  DATE OF SERVICE: 3/10/20  START TIME:  5:30 PM  END TIME:  8:30 PM  FACILITATOR(S): Inga Guardado; Marquis Quintero, [facility]  TOPIC: BEH Group Therapy  Number of patients attending the group:  9  Group Length:  3 Hours    Group Therapy Type: Psychoeducation    Summary of Group / Topics Discussed:    Psychoeducation/Skills DBT skills (MH)  This topic will focus primarily on mindfulness DBT skills through explanations of mindfulness and its application.     Objective(s):    Patient will identify the fundamental aspects of mindfulness DBT skills.     Patient will identify how mindfulness skills improve functioning and reduce stress.    Patient will be able to explain the basic structure of DBT through an overview of the 4 DBT learning modules.    Structure (modalities, homework, worksheets, etc)     Provide psychoeducation on wise mind, clear mind, radical acceptance skills and specific ways to practice each of these skills.     Mindfulness skills will be taught and practiced during group.    Provide psychoeducation on the core concepts of how and why to practice mindfulness skills.     Facilitate group discussion on the many benefits of mindfulness practice.    Define dialectical and mindfulness.    Use teach-back techniques to ensure patients understanding.    Patient will be provided handouts enhance learning.    Expected therapeutic outcome(s):  Patient will:    Utilize mindfulness skills in daily routines to facilitate effective life balance and increase ability to manage stress.    Therapeutic outcome(s) measured by:    Patient will provide three examples of how mindfulness skills improve functioning and reduce stress in their daily routine.    Distress Tolerance, Emotion Regulation, and Mindfulness in addiction and recovery were discussed.    Group Attendance:  Attended group  session    Patient's response to the group topic/interactions:  cooperative with task, discussed personal experience with topic, expressed readiness to alter behaviors and expressed understanding of topic    Patient appeared to be Actively participating and Engaged.        Client specific details:      Client participated in check-in process.  Client shared whether he faced any new issues.  Client participated in a visualization exercise during which client could work on centering himself, led by Marquis Quintero, as the introduction to Dialectical Behavior Therapy, tonight s psychoeducation topic.  Various states of mind for approaching life in general, and specifically in addiction and recovery, were detailed in hand-outs and discussed.  Client viewed a Vandana Sodo (creator of DBT) video, which focused on Distress Tolerance.  Client reflected on a personal interaction as it related to DBT. Client shared one thing he was grateful for.

## 2020-03-12 ENCOUNTER — HOSPITAL ENCOUNTER (OUTPATIENT)
Dept: BEHAVIORAL HEALTH | Facility: CLINIC | Age: 60
End: 2020-03-12
Attending: SOCIAL WORKER
Payer: COMMERCIAL

## 2020-03-12 PROCEDURE — H2035 A/D TX PROGRAM, PER HOUR: HCPCS | Performed by: SOCIAL WORKER

## 2020-03-12 NOTE — PROGRESS NOTES
Durga Aguirre  [id number]               Adult CD Progress Note and Treatment Plan Review       Total # of Phase 1 Group Sessions:  17     Total # of Phase 2 Group Sessions:    Total # of Phase 3 Group Sessions:   Total # of 1:1 Sessions: 2    Support group attended this week: Yes, client attended RBA.    Reporting sobriety: Yes, client reported last use date of alcohol as 1/16/2020.    Treatment Plan Review     Treatment Plan Review completed on:  03/12/2020    Projected discharge date: 6/25/2020.    Client preferred learning style: By hands-on practice.    Staff member(s) contributing: Zion Lau, [facility], Marquis Quintero, [facility], [facility]; Inga Guardado, Intern    Received supervision: YES.  Intern received supervision from Marquis Quintero    Client involvement with treatment planning: Client contributed to goals and planning.    Client received copy of plan/revised plan: Yes, signed 01/28/2020.    Client agrees with plan/revised plan: YES.    Changes to Treatment Plan: NO.    New Goals: NO.    Goal(s) worked on since last review: Dim 3, 4, 5.    Strategies effective: YES.    Medical, Mental Health and other appointments the client attended:  Client had an orthopedic appointment this week.    Medication issues: YES.  Client reports compliance with mental health medications of citalopram and hydroxyzine.    Physical and mental health problems: Client endorses conditions of hypertension, high cholesterol, and mild chronic pain.  Client endorses mental health concerns of anxiety.  Client denies current psychotherapy.    Review and evaluation of the individual abuse prevention plan: The program's individual abuse prevention plan (IAPP) is sufficient for this client.     Substance Use Disorders:    Alcohol Use Disorder Severe - 303.90 (F10.20)  Tobacco Use Disorder Mild - 305.10 (Z72.0)    ASAM Risk Rating: (Note the rationale for risk rating changes)      Dimension 1 0  Client exhibits no signs of intoxication or withdrawal this  "week.  Dimension 2 0  Client denies any new or worsening physical problems, except a minor injury to his foot. and reports \"feeling really good.\"   Dimension 3 1  Client rates his depression, anxiety, or other mental health concerns as 1 of 10 (10 highest) down from 2 last week. Client denies any thoughts of hurting himself or others.    Dimension 4 2  Client rates his motivation for recovery as 10 of 10 (10 highest) and does not anticipate this changing.    Dimension 5 3  Client rated his cravings in the last week as 2 of 10 (10 highest), down from 1 last week.   He is using a coping still of practicing isometrics.  He reports a personal strength of adaptability.  Dimension 6 2  Client reports attending his sober support meeting this week.  He reports having a sponsor. Client reports sober activities, such as walking, working in the yard, and doing a full day's work.    ELANA Hanson, Marquis Quintero, [facility], ELANA, Inga Guardado, Intern  "

## 2020-03-13 NOTE — GROUP NOTE
Group Therapy Documentation    PATIENT'S NAME: Durga Aguirre  MRN:   [id number]  :   1960  ACCT. NUMBER: [id number]  DATE OF SERVICE: 3/12/20  START TIME:  5:30 PM  END TIME:  8:30 PM  FACILITATOR(S): Megan Conde [facility]  TOPIC: BEH Group Therapy  Number of patients attending the group:  Only one person able to attend, we covered this topic in an individual session format from 5:30 PM - 6:50 PM  Group Length:  0.5 Hours    Group Therapy Type: Addiction    Summary of Group / Topics Discussed:    Psychoeducation/Skills Cognitive Defusion and Acceptance (ACT & CBT) IOP  Clients learn key concepts of traditional CBT and build on these by learning three core concepts of third wave therapies (Acceptance, Being Present, Cognitive Defusion) and how to apply these in recovery.     Objective(s):    Increase psychological flexibility  by changing clients  relationships with negative thoughts and emotions and build resilience to cravings and negative moods    Practice 2 skills to detach from negative thoughts and emotions, observe them in action and then choose behaviors that serve clients  personal value system.    Structure (modalities, homework, worksheets, etc):    Psychoeducation on evolution to CBT    Demonstrate and practice in Liquid Mood meditation    Explore Self-Acceptance with positive statements to use each day     Handout on practicing Acceptance    Handout on Cognitive Defusion with thoughts and emotions     Visualizations for cognitive defusion and sitting with emotions    Expected therapeutic outcome(s):     Reduction of ruminating thoughts and enhanced emotional stability    Detachment from negative emotions in order to use these constructively (messengers)     Therapeutic outcome(s) measured by:   Teachback and Group Review and Evaluation      Group Attendance:  Durga was the only one in attendance so topic was covered in individual session format.    Patient's response to the group  "topic/interactions:  did not share thoughts verbally, discussed personal experience with topic and expressed readiness to alter behaviors    Patient appeared to be Attentive and Engaged.        Client specific details:  Durga reported that he continues to be busy working at the "[employer]" and getting his paperwork caught up. He missed his court date on 3/04/20 and has rescheduled this. Durga spoke about a past relationship that he misses and said the best part of sobriety for him is \"The clarity and being able to get up in the morning and function.\"         Journal: \"I wrote down a bunch of things that were good.\" He plans to practice cognitive defusion once a day this week.   Mindfulness/Relaxation: Deep breathing throughout the day.  Stress from 1-10 (with 10 high): 2 \"mostly about mom\" who is 88 years old.  Cravings: No  Group Review and Evaluation was completed by client as follows:   1. What was the most important thing(s) that you learned from this lecture today? \"to understand my inner feelings and self-control.\"    2. Do you recognize concerns for yourself in this area that you would like to learn more about? \"practice makes perfect\"  3. Did you learn something that surprised you today? \"That I've experienced so many thoughts and feelings we discussed\"  4.   Did you have any negative responses to this lecture? No  .  "

## 2020-03-16 ENCOUNTER — TELEPHONE (OUTPATIENT)
Dept: INTERNAL MEDICINE | Facility: CLINIC | Age: 60
End: 2020-03-16

## 2020-03-16 NOTE — TELEPHONE ENCOUNTER
Reason for Call:  Patient request    Detailed comments: Patient would like to know more information regarding the Vitamin B / Probiotic blends    Phone Number Patient can be reached at: Cell number on file:    Telephone Information:   Mobile [phone]       Best Time: anytime    Can we leave a detailed message on this number? YES    Call taken on 3/16/2020 at 4:08 PM by Yong Yang

## 2020-03-19 ENCOUNTER — HOSPITAL ENCOUNTER (OUTPATIENT)
Dept: BEHAVIORAL HEALTH | Facility: CLINIC | Age: 60
End: 2020-03-19
Attending: SOCIAL WORKER
Payer: COMMERCIAL

## 2020-03-19 NOTE — PROGRESS NOTES
"Durga Aguirre is a 60 year old male who is being evaluated via a billable telephone visit.      The patient has been notified of following:     \"This telephone visit will be conducted via a call between you and your clinican. We have found that certain health care needs can be provided without the need for a physical exam.  This service lets us provide the care you need with a phone conversation.\"     COREEN Calxito stated he is doing well and keeping busy with his lawn and fixing things at home. He was laid off his [employer] job on Sat and is somewhat concerned about finding work. Durga is only going out when he needs grocery of for necessities. He reported feeling well and doing his journal occasionally and practicing mindfulness and deep breathing \"most days.\" He is starting to read more and this also helps him fall asleep at night. His stress is a 2 (with 10 high) and he has not had cravings. He is exploring online support groups and mindfulness sites. He is down to 1-2 cigarettes a day and is biking for more exercise.   I. Telephone visit included motivational and supportive therapy, review of coping skills he is using, suggestions for online connections to sober support.   LENNY Calixto participated fully in the conversation and was upbeat and said he enjoyed talking with me today. He said he was impressed with the program and learning a lot. Durga is applying what he is learning and completed his weekly summary of symptoms, etc, over the phone today. Durga spoke of the positive changes that sobriety has brought him including, \"I'm more in control - nothing seems overwhelming like it did.\"  CRISTELA Calixto will continue to explore online sober support, do his journal and practice mindfulness and deep breathing. He will continue to participate in telephone sessions during the week. He will continue to limit going out to places with a lot of people during this \"social distancing\" period.     Assessment/Plan:   See plan " above.       I have reviewed the note as documented above.  This accurately captures the substance of my conversation with the patient.      Phone call contact time  Call Started at 3:46 PM  Call Ended at 4:14 PM     KIA Becker

## 2020-03-20 NOTE — PROGRESS NOTES
Durga Aguirre  [id number]               Adult CD Progress Note and Treatment Plan Review       Total # of Phase 1 Group Sessions:  18     Total # of Phase 2 Group Sessions:    Total # of Phase 3 Group Sessions:   Total # of 1:1 Sessions: 2    Support group attended this week: No    Reporting sobriety: Yes, client reported last use date of alcohol as 1/16/2020.    Treatment Plan Review     Treatment Plan Review completed on:  03/19/2020    Projected discharge date: 6/25/2020.    Client preferred learning style: By hands-on practice.    Staff member(s) contributing: ELANA Hanson    Received supervision: No    Client involvement with treatment planning: Client contributed to goals and planning.    Client received copy of plan/revised plan: Yes, signed 01/28/2020.    Client agrees with plan/revised plan: YES.    Changes to Treatment Plan: NO.    New Goals: NO.    Goal(s) worked on since last review: Dim 3, 4, 5.    Strategies effective: YES.    Medical, Mental Health and other appointments the client attended:  No    Medication issues: YES.  Client reports compliance with mental health medications of citalopram and hydroxyzine.    Physical and mental health problems: Client endorses conditions of hypertension, high cholesterol, and mild chronic pain.  Client endorses mental health concerns of anxiety.  Client denies current psychotherapy.    Review and evaluation of the individual abuse prevention plan: The program's individual abuse prevention plan (IAPP) is sufficient for this client.     Substance Use Disorders:    Alcohol Use Disorder Severe - 303.90 (F10.20)  Tobacco Use Disorder Mild - 305.10 (Z72.0)    ASAM Risk Rating: (Note the rationale for risk rating changes)      Dimension 1 0  Client denies signs of intoxication or withdrawal this week.  Dimension 2 0  Client denies any new or worsening physical problems.   Dimension 3 1  Client rates his depression, anxiety, or other mental health concerns as 0  of 10 (10 highest) down from 1 last week. Client denies any thoughts of hurting himself or others.    Dimension 4 2  Client rates his motivation for recovery as 10 of 10 (10 highest).     Dimension 5 3  Client reported no cravings this week.    Dimension 6 2  Client reports attending no sober support meeting this week.  He reports having a sponsor.    ELANA Hanson

## 2020-03-26 ENCOUNTER — HOSPITAL ENCOUNTER (OUTPATIENT)
Dept: BEHAVIORAL HEALTH | Facility: CLINIC | Age: 60
End: 2020-03-26
Attending: SOCIAL WORKER
Payer: COMMERCIAL

## 2020-03-27 NOTE — PROGRESS NOTES
"Durga Aguirre is a 60 year old male who is being evaluated via a billable telephone visit.      The patient has been notified of following:     \"This telephone visit will be conducted via a call between you and your clinician. We have found that certain health care needs can be provided without the need for a physical exam.  This service lets us provide the care you need with a phone conversation.\"     COREEN Calixto reported that he is keeping busy and glad that his mother is living with him at this time. He is newly unemployed and is keeping busy with chores and yard work. He spoke of the grief he has experienced in the last couple years and how he can feel contentment in simple things and not get overly upset over the social distancing and being unemployed. He is grateful that no one in his family is sick and he had groceries and gas in the car and added, \"I count my blessing all day long.\" He is using headphones for his mindfulness practice. He doesn't have email but he is exploring online options for sober support at this time.   I. Telephone Visits using motivational and supportive therapy, reviewing coping strategies, relapse prevention, and moods.   LENNY Calixto was upbeat and said he feels good and is not letting the COVID19 virus get him down but he is also taking steps to protect himself. He is stretching more and will begin to exercise more this week when he gets his bike out.   CRISTELA Calixto will explore options for online sober support, begin to ride his bike at least 3 times a week, staying the 6' away from others. He will continue to use mindfulness daily and do his gratitude list \"in my head\" before bed.       I have reviewed the note as documented above.  This accurately captures the substance of my conversation with the patient.      Phone call contact time  Call Started at 4:01 PM   Call Ended at 4:30 PM     KIA Becker     "

## 2020-06-02 ENCOUNTER — TELEPHONE (OUTPATIENT)
Dept: BEHAVIORAL HEALTH | Facility: CLINIC | Age: 60
End: 2020-06-02

## 2020-06-02 NOTE — TELEPHONE ENCOUNTER
Pt called very intoxicated and crying.  He reports he has been drinking daily since leaving [facility] when Covid hit.  He expresses he has stayed at home for 2.5 months, only leaving to go to liquor store.  He started with smaller amounts but is now up to 1.75 liters vodka daily.  I recommended he go to detox immediately and told him there is a bed available now but it can only be held for 2 hours.  Pt refuses to go now but says he will go tomorrow.  Pt instructed to call detox intake [phone] when he is ready or go straight to ED.

## 2020-06-03 DIAGNOSIS — F41.1 GENERALIZED ANXIETY DISORDER: ICD-10-CM

## 2020-06-03 DIAGNOSIS — Z91.89 RISK FOR CORONARY ARTERY DISEASE BETWEEN 10% AND 20% IN NEXT 10 YEARS: ICD-10-CM

## 2020-06-03 DIAGNOSIS — E78.1 HYPERTRIGLYCERIDEMIA: ICD-10-CM

## 2020-06-04 DIAGNOSIS — F10.288 ALCOHOL DEPENDENCE WITH OTHER ALCOHOL-INDUCED DISORDER (H): ICD-10-CM

## 2020-06-04 RX ORDER — HYDROXYZINE HYDROCHLORIDE 25 MG/1
TABLET, FILM COATED ORAL
Qty: 60 TABLET | Refills: 0 | Status: SHIPPED | OUTPATIENT
Start: 2020-06-04 | End: 2020-06-16

## 2020-06-04 RX ORDER — NALTREXONE HYDROCHLORIDE 50 MG/1
50 TABLET, FILM COATED ORAL DAILY
Qty: 30 TABLET | Refills: 5 | Status: SHIPPED | OUTPATIENT
Start: 2020-06-04 | End: 2020-06-30

## 2020-06-04 RX ORDER — SIMVASTATIN 40 MG
TABLET ORAL
Qty: 30 TABLET | Refills: 0 | Status: SHIPPED | OUTPATIENT
Start: 2020-06-04 | End: 2020-06-16

## 2020-06-04 NOTE — TELEPHONE ENCOUNTER
Naltrexone    Routing refill request to provider for review/approval because:  Drug not on the [facility] refill protocol     Martha OWENSN, RN, PHN

## 2020-06-09 ENCOUNTER — HOSPITAL ENCOUNTER (INPATIENT)
Facility: CLINIC | Age: 60
LOS: 3 days | Discharge: HOME | End: 2020-06-12
Attending: EMERGENCY MEDICINE | Admitting: PSYCHIATRY & NEUROLOGY
Payer: COMMERCIAL

## 2020-06-09 ENCOUNTER — TELEPHONE (OUTPATIENT)
Dept: BEHAVIORAL HEALTH | Facility: CLINIC | Age: 60
End: 2020-06-09

## 2020-06-09 DIAGNOSIS — Z20.822 SEVERE ACUTE RESPIRATORY SYNDROME CORONAVIRUS 2 (SARS-COV-2) INFECTION RULED OUT BY LABORATORY TESTING: ICD-10-CM

## 2020-06-09 DIAGNOSIS — R11.2 NAUSEA AND VOMITING, INTRACTABILITY OF VOMITING NOT SPECIFIED, UNSPECIFIED VOMITING TYPE: ICD-10-CM

## 2020-06-09 DIAGNOSIS — R11.2 NON-INTRACTABLE VOMITING WITH NAUSEA, UNSPECIFIED VOMITING TYPE: ICD-10-CM

## 2020-06-09 DIAGNOSIS — R25.1 TREMOR: ICD-10-CM

## 2020-06-09 DIAGNOSIS — F10.220 ALCOHOL DEPENDENCE WITH UNCOMPLICATED INTOXICATION (H): ICD-10-CM

## 2020-06-09 PROBLEM — F10.939 ALCOHOL WITHDRAWAL WITH COMPLICATION WITH INPATIENT TREATMENT, WITH UNSPECIFIED COMPLICATION (H): Status: ACTIVE | Noted: 2020-06-09

## 2020-06-09 LAB
ALBUMIN SERPL-MCNC: 4.2 G/DL (ref 3.4–5)
ALCOHOL BREATH TEST: 0.06 (ref 0–0.01)
ALCOHOL BREATH TEST: 0.12 (ref 0–0.01)
ALP SERPL-CCNC: 102 U/L (ref 40–150)
ALT SERPL W P-5'-P-CCNC: 114 U/L (ref 0–70)
AMPHETAMINES UR QL SCN: NEGATIVE
ANION GAP SERPL CALCULATED.3IONS-SCNC: 11 MMOL/L (ref 3–14)
AST SERPL W P-5'-P-CCNC: 118 U/L (ref 0–45)
BARBITURATES UR QL: NEGATIVE
BASOPHILS # BLD AUTO: 0.1 10E9/L (ref 0–0.2)
BASOPHILS NFR BLD AUTO: 1 %
BENZODIAZ UR QL: NEGATIVE
BILIRUB SERPL-MCNC: 1.2 MG/DL (ref 0.2–1.3)
BUN SERPL-MCNC: 8 MG/DL (ref 7–30)
CALCIUM SERPL-MCNC: 8.9 MG/DL (ref 8.5–10.1)
CANNABINOIDS UR QL SCN: NEGATIVE
CHLORIDE SERPL-SCNC: 100 MMOL/L (ref 94–109)
CO2 SERPL-SCNC: 26 MMOL/L (ref 20–32)
COCAINE UR QL: NEGATIVE
CREAT SERPL-MCNC: 0.72 MG/DL (ref 0.66–1.25)
DIFFERENTIAL METHOD BLD: ABNORMAL
EOSINOPHIL # BLD AUTO: 0.1 10E9/L (ref 0–0.7)
EOSINOPHIL NFR BLD AUTO: 0.9 %
ERYTHROCYTE [DISTWIDTH] IN BLOOD BY AUTOMATED COUNT: 12.6 % (ref 10–15)
ETHANOL UR QL SCN: POSITIVE
GFR SERPL CREATININE-BSD FRML MDRD: >90 ML/MIN/{1.73_M2}
GLUCOSE SERPL-MCNC: 128 MG/DL (ref 70–99)
HCT VFR BLD AUTO: 44.4 % (ref 40–53)
HGB BLD-MCNC: 15.3 G/DL (ref 13.3–17.7)
IMM GRANULOCYTES # BLD: 0 10E9/L (ref 0–0.4)
IMM GRANULOCYTES NFR BLD: 0.2 %
LIPASE SERPL-CCNC: 154 U/L (ref 73–393)
LYMPHOCYTES # BLD AUTO: 0.9 10E9/L (ref 0.8–5.3)
LYMPHOCYTES NFR BLD AUTO: 16.2 %
MCH RBC QN AUTO: 31.9 PG (ref 26.5–33)
MCHC RBC AUTO-ENTMCNC: 34.5 G/DL (ref 31.5–36.5)
MCV RBC AUTO: 93 FL (ref 78–100)
MONOCYTES # BLD AUTO: 0.3 10E9/L (ref 0–1.3)
MONOCYTES NFR BLD AUTO: 4.6 %
NEUTROPHILS # BLD AUTO: 4.5 10E9/L (ref 1.6–8.3)
NEUTROPHILS NFR BLD AUTO: 77.1 %
NRBC # BLD AUTO: 0 10*3/UL
NRBC BLD AUTO-RTO: 0 /100
OPIATES UR QL SCN: NEGATIVE
PLATELET # BLD AUTO: 140 10E9/L (ref 150–450)
POTASSIUM SERPL-SCNC: 3.5 MMOL/L (ref 3.4–5.3)
PROT SERPL-MCNC: 8 G/DL (ref 6.8–8.8)
RBC # BLD AUTO: 4.79 10E12/L (ref 4.4–5.9)
SARS-COV-2 RNA SPEC QL NAA+PROBE: NORMAL
SODIUM SERPL-SCNC: 137 MMOL/L (ref 133–144)
SPECIMEN SOURCE: NORMAL
WBC # BLD AUTO: 5.8 10E9/L (ref 4–11)

## 2020-06-09 PROCEDURE — 25000128 H RX IP 250 OP 636: Performed by: EMERGENCY MEDICINE

## 2020-06-09 PROCEDURE — 96374 THER/PROPH/DIAG INJ IV PUSH: CPT | Performed by: EMERGENCY MEDICINE

## 2020-06-09 PROCEDURE — 99285 EMERGENCY DEPT VISIT HI MDM: CPT | Mod: Z6 | Performed by: EMERGENCY MEDICINE

## 2020-06-09 PROCEDURE — 12800008 ZZH R&B CD ADULT

## 2020-06-09 PROCEDURE — 85025 COMPLETE CBC W/AUTO DIFF WBC: CPT | Performed by: EMERGENCY MEDICINE

## 2020-06-09 PROCEDURE — 82075 ASSAY OF BREATH ETHANOL: CPT | Performed by: EMERGENCY MEDICINE

## 2020-06-09 PROCEDURE — 25000132 ZZH RX MED GY IP 250 OP 250 PS 637: Performed by: CLINICAL NURSE SPECIALIST

## 2020-06-09 PROCEDURE — 83690 ASSAY OF LIPASE: CPT | Performed by: EMERGENCY MEDICINE

## 2020-06-09 PROCEDURE — 25000132 ZZH RX MED GY IP 250 OP 250 PS 637: Performed by: EMERGENCY MEDICINE

## 2020-06-09 PROCEDURE — 80307 DRUG TEST PRSMV CHEM ANLYZR: CPT | Performed by: EMERGENCY MEDICINE

## 2020-06-09 PROCEDURE — 99285 EMERGENCY DEPT VISIT HI MDM: CPT | Mod: 25 | Performed by: EMERGENCY MEDICINE

## 2020-06-09 PROCEDURE — 80320 DRUG SCREEN QUANTALCOHOLS: CPT | Performed by: EMERGENCY MEDICINE

## 2020-06-09 PROCEDURE — HZ2ZZZZ DETOXIFICATION SERVICES FOR SUBSTANCE ABUSE TREATMENT: ICD-10-PCS | Performed by: PSYCHIATRY & NEUROLOGY

## 2020-06-09 PROCEDURE — U0003 INFECTIOUS AGENT DETECTION BY NUCLEIC ACID (DNA OR RNA); SEVERE ACUTE RESPIRATORY SYNDROME CORONAVIRUS 2 (SARS-COV-2) (CORONAVIRUS DISEASE [COVID-19]), AMPLIFIED PROBE TECHNIQUE, MAKING USE OF HIGH THROUGHPUT TECHNOLOGIES AS DESCRIBED BY CMS-2020-01-R: HCPCS | Performed by: EMERGENCY MEDICINE

## 2020-06-09 PROCEDURE — 82075 ASSAY OF BREATH ETHANOL: CPT | Mod: 91 | Performed by: EMERGENCY MEDICINE

## 2020-06-09 PROCEDURE — C9803 HOPD COVID-19 SPEC COLLECT: HCPCS | Performed by: EMERGENCY MEDICINE

## 2020-06-09 PROCEDURE — 80053 COMPREHEN METABOLIC PANEL: CPT | Performed by: EMERGENCY MEDICINE

## 2020-06-09 RX ORDER — LANOLIN ALCOHOL/MO/W.PET/CERES
100 CREAM (GRAM) TOPICAL DAILY
Status: DISCONTINUED | OUTPATIENT
Start: 2020-06-09 | End: 2020-06-12 | Stop reason: HOSPADM

## 2020-06-09 RX ORDER — DIAZEPAM 5 MG
5 TABLET ORAL ONCE
Status: COMPLETED | OUTPATIENT
Start: 2020-06-09 | End: 2020-06-09

## 2020-06-09 RX ORDER — CALCIUM CARBONATE 500 MG/1
500 TABLET, CHEWABLE ORAL DAILY PRN
Status: DISCONTINUED | OUTPATIENT
Start: 2020-06-09 | End: 2020-06-12 | Stop reason: HOSPADM

## 2020-06-09 RX ORDER — LOPERAMIDE HCL 2 MG
2 CAPSULE ORAL 4 TIMES DAILY PRN
Status: DISCONTINUED | OUTPATIENT
Start: 2020-06-09 | End: 2020-06-12 | Stop reason: HOSPADM

## 2020-06-09 RX ORDER — SIMVASTATIN 40 MG
40 TABLET ORAL AT BEDTIME
Status: DISCONTINUED | OUTPATIENT
Start: 2020-06-10 | End: 2020-06-12 | Stop reason: HOSPADM

## 2020-06-09 RX ORDER — TRAZODONE HYDROCHLORIDE 50 MG/1
50 TABLET, FILM COATED ORAL
Status: DISCONTINUED | OUTPATIENT
Start: 2020-06-09 | End: 2020-06-12 | Stop reason: HOSPADM

## 2020-06-09 RX ORDER — ATENOLOL 50 MG/1
50 TABLET ORAL DAILY PRN
Status: DISCONTINUED | OUTPATIENT
Start: 2020-06-09 | End: 2020-06-12 | Stop reason: HOSPADM

## 2020-06-09 RX ORDER — ONDANSETRON 4 MG/1
4 TABLET, ORALLY DISINTEGRATING ORAL EVERY 6 HOURS PRN
Status: DISCONTINUED | OUTPATIENT
Start: 2020-06-09 | End: 2020-06-12 | Stop reason: HOSPADM

## 2020-06-09 RX ORDER — ALUMINA, MAGNESIA, AND SIMETHICONE 2400; 2400; 240 MG/30ML; MG/30ML; MG/30ML
30 SUSPENSION ORAL EVERY 4 HOURS PRN
Status: DISCONTINUED | OUTPATIENT
Start: 2020-06-09 | End: 2020-06-12 | Stop reason: HOSPADM

## 2020-06-09 RX ORDER — LISINOPRIL 10 MG/1
10 TABLET ORAL ONCE
Status: COMPLETED | OUTPATIENT
Start: 2020-06-09 | End: 2020-06-09

## 2020-06-09 RX ORDER — DIAZEPAM 5 MG
5-20 TABLET ORAL EVERY 30 MIN PRN
Status: DISCONTINUED | OUTPATIENT
Start: 2020-06-09 | End: 2020-06-12 | Stop reason: HOSPADM

## 2020-06-09 RX ORDER — CALCIUM CARBONATE 500 MG/1
1 TABLET, CHEWABLE ORAL 2 TIMES DAILY
COMMUNITY
End: 2020-06-09

## 2020-06-09 RX ORDER — LANOLIN ALCOHOL/MO/W.PET/CERES
3 CREAM (GRAM) TOPICAL
Status: DISCONTINUED | OUTPATIENT
Start: 2020-06-09 | End: 2020-06-12 | Stop reason: HOSPADM

## 2020-06-09 RX ORDER — CITALOPRAM HYDROBROMIDE 40 MG/1
40 TABLET ORAL EVERY MORNING
Status: DISCONTINUED | OUTPATIENT
Start: 2020-06-10 | End: 2020-06-12 | Stop reason: HOSPADM

## 2020-06-09 RX ORDER — HYDROXYZINE HYDROCHLORIDE 25 MG/1
25 TABLET, FILM COATED ORAL EVERY 4 HOURS PRN
Status: DISCONTINUED | OUTPATIENT
Start: 2020-06-09 | End: 2020-06-11

## 2020-06-09 RX ORDER — IBUPROFEN 600 MG/1
600 TABLET, FILM COATED ORAL EVERY 6 HOURS PRN
Status: DISCONTINUED | OUTPATIENT
Start: 2020-06-09 | End: 2020-06-12 | Stop reason: HOSPADM

## 2020-06-09 RX ORDER — ONDANSETRON 2 MG/ML
4 INJECTION INTRAMUSCULAR; INTRAVENOUS EVERY 30 MIN PRN
Status: DISCONTINUED | OUTPATIENT
Start: 2020-06-09 | End: 2020-06-09

## 2020-06-09 RX ORDER — MULTIPLE VITAMINS W/ MINERALS TAB 9MG-400MCG
1 TAB ORAL DAILY
Status: DISCONTINUED | OUTPATIENT
Start: 2020-06-09 | End: 2020-06-12 | Stop reason: HOSPADM

## 2020-06-09 RX ORDER — FOLIC ACID 1 MG/1
1 TABLET ORAL DAILY
Status: DISCONTINUED | OUTPATIENT
Start: 2020-06-09 | End: 2020-06-12 | Stop reason: HOSPADM

## 2020-06-09 RX ORDER — LISINOPRIL 10 MG/1
10 TABLET ORAL DAILY
Status: DISCONTINUED | OUTPATIENT
Start: 2020-06-10 | End: 2020-06-12 | Stop reason: HOSPADM

## 2020-06-09 RX ADMIN — LISINOPRIL 10 MG: 10 TABLET ORAL at 21:27

## 2020-06-09 RX ADMIN — DIAZEPAM 10 MG: 5 TABLET ORAL at 23:35

## 2020-06-09 RX ADMIN — CALCIUM CARBONATE (ANTACID) CHEW TAB 500 MG 500 MG: 500 CHEW TAB at 22:49

## 2020-06-09 RX ADMIN — DIAZEPAM 10 MG: 5 TABLET ORAL at 19:45

## 2020-06-09 RX ADMIN — TRAZODONE HYDROCHLORIDE 50 MG: 50 TABLET ORAL at 22:49

## 2020-06-09 RX ADMIN — MULTIPLE VITAMINS W/ MINERALS TAB 1 TABLET: TAB at 16:04

## 2020-06-09 RX ADMIN — DIAZEPAM 5 MG: 5 TABLET ORAL at 18:37

## 2020-06-09 RX ADMIN — DIAZEPAM 5 MG: 5 TABLET ORAL at 16:04

## 2020-06-09 RX ADMIN — DIAZEPAM 5 MG: 5 TABLET ORAL at 17:25

## 2020-06-09 RX ADMIN — FOLIC ACID 1 MG: 1 TABLET ORAL at 16:04

## 2020-06-09 RX ADMIN — DIAZEPAM 5 MG: 5 TABLET ORAL at 16:14

## 2020-06-09 RX ADMIN — DIAZEPAM 5 MG: 5 TABLET ORAL at 21:49

## 2020-06-09 RX ADMIN — ONDANSETRON 4 MG: 2 INJECTION INTRAMUSCULAR; INTRAVENOUS at 15:52

## 2020-06-09 RX ADMIN — DIAZEPAM 10 MG: 5 TABLET ORAL at 21:00

## 2020-06-09 RX ADMIN — ALUMINUM HYDROXIDE, MAGNESIUM HYDROXIDE, AND DIMETHICONE 30 ML: 400; 400; 40 SUSPENSION ORAL at 23:35

## 2020-06-09 RX ADMIN — THIAMINE HCL TAB 100 MG 100 MG: 100 TAB at 16:04

## 2020-06-09 RX ADMIN — DIAZEPAM 20 MG: 5 TABLET ORAL at 22:49

## 2020-06-09 ASSESSMENT — ENCOUNTER SYMPTOMS
DYSPHORIC MOOD: 1
VOMITING: 1
NAUSEA: 1

## 2020-06-09 ASSESSMENT — ACTIVITIES OF DAILY LIVING (ADL)
FALL_HISTORY_WITHIN_LAST_SIX_MONTHS: NO
ORAL_HYGIENE: INDEPENDENT
PRIOR_FUNCTIONAL_LEVEL_COMMENT: INDEPENDENT
BATHING: 0-->INDEPENDENT
DRESS: 0-->INDEPENDENT
AMBULATION: 0-->INDEPENDENT
LAUNDRY: WITH SUPERVISION
SWALLOWING: 0-->SWALLOWS FOODS/LIQUIDS WITHOUT DIFFICULTY
DRESS: INDEPENDENT
TRANSFERRING: 0-->INDEPENDENT
COGNITION: 0 - NO COGNITION ISSUES REPORTED
TOILETING: 0-->INDEPENDENT
RETIRED_COMMUNICATION: 0-->UNDERSTANDS/COMMUNICATES WITHOUT DIFFICULTY
HYGIENE/GROOMING: INDEPENDENT
RETIRED_EATING: 0-->INDEPENDENT

## 2020-06-09 NOTE — ED NOTES
ED to Behavioral Floor Handoff    SITUATION  Durga Aguirre is a 60 year old male who speaks English and lives in a home with others The patient arrived in the ED by private car from home with a complaint of Addiction Problem (seeking detox from ETOH, no seizure history, drinking 1 liter of Vodka kkper day, last drink this am) and Vomiting (states that he has vomited 5x today)  .The patient's current symptoms started/worsened 1 day(s) ago and during this time the symptoms have increased.   In the ED, pt was diagnosed with   Final diagnoses:   Alcohol dependence with uncomplicated intoxication (H)   Non-intractable vomiting with nausea, unspecified vomiting type        Initial vitals were: BP: (!) 179/113  Pulse: 107  Temp: 97.5  F (36.4  C)  Resp: 18  SpO2: 96 %   --------  Is the patient diabetic? No   If yes, last blood glucose? --     If yes, was this treated in the ED? --  --------  Is the patient inebriated (ETOH) Yes or Impaired on other substances? No  MSSA done? Yes  Last MSSA score: --    Were withdrawal symptoms treated? Yes  Does the patient have a seizure history? No. If yes, date of most recent seizure--  --------  Is the patient patient experiencing suicidal ideation? denies current or recent suicidal ideation     Homicidal ideation? denies current or recent homicidal ideation or behaviors.    Self-injurious behavior/urges? denies current or recent self injurious behavior or ideation.  ------  Was pt aggressive in the ED No  Was a code called No  Is the pt now cooperative? Yes  -------  Meds given in ED:   Medications   ondansetron (ZOFRAN) injection 4 mg (4 mg Intravenous Given 6/9/20 1552)   diazepam (VALIUM) tablet 5-20 mg (5 mg Oral Given 6/9/20 1837)   folic acid (FOLVITE) tablet 1 mg (1 mg Oral Given 6/9/20 1604)   vitamin B1 (THIAMINE) tablet 100 mg (100 mg Oral Given 6/9/20 1604)   multivitamin w/minerals (THERA-VIT-M) tablet 1 tablet (1 tablet Oral Given 6/9/20 1604)   diazepam (VALIUM) tablet  5 mg (5 mg Oral Given 20 1605)      Family present during ED course? No  Family currently present? No    BACKGROUND  Does the patient have a cognitive impairment or developmental disability? No  Allergies: No Known Allergies.   Social demographics are   Social History     Socioeconomic History     Marital status: Single     Spouse name: Not on file     Number of children: 1     Years of education: Not on file     Highest education level: Not on file   Occupational History     Occupation: unemployed     Employer: SELF     Comment: contractor     Occupation: auto body work   Social Needs     Financial resource strain: Not on file     Food insecurity     Worry: Not on file     Inability: Not on file     Transportation needs     Medical: Not on file     Non-medical: Not on file   Tobacco Use     Smoking status: Light Tobacco Smoker     Packs/day: 0.25     Years: 20.00     Pack years: 5.00     Types: Cigarettes     Last attempt to quit: 10/9/2017     Years since quittin.6     Smokeless tobacco: Never Used     Tobacco comment: 2-3 cigarettes per day    Substance and Sexual Activity     Alcohol use: No     Alcohol/week: 0.0 standard drinks     Comment: restarted may 2016     Drug use: No     Sexual activity: Not Currently   Lifestyle     Physical activity     Days per week: Not on file     Minutes per session: Not on file     Stress: Not on file   Relationships     Social connections     Talks on phone: Not on file     Gets together: Not on file     Attends Gnosticist service: Not on file     Active member of club or organization: Not on file     Attends meetings of clubs or organizations: Not on file     Relationship status: Not on file     Intimate partner violence     Fear of current or ex partner: Not on file     Emotionally abused: Not on file     Physically abused: Not on file     Forced sexual activity: Not on file   Other Topics Concern     Parent/sibling w/ CABG, MI or angioplasty before 65F 55M? Not Asked    Social History Narrative     Not on file        ASSESSMENT  Labs results   Labs Ordered and Resulted from Time of ED Arrival Up to the Time of Departure from the ED   CBC WITH PLATELETS DIFFERENTIAL - Abnormal; Notable for the following components:       Result Value    Platelet Count 140 (*)     All other components within normal limits   COMPREHENSIVE METABOLIC PANEL - Abnormal; Notable for the following components:    Glucose 128 (*)      (*)      (*)     All other components within normal limits   DRUG ABUSE SCREEN 6 CHEM DEP URINE ([facility]) - Abnormal; Notable for the following components:    Ethanol Qual Urine Positive (*)     All other components within normal limits   ALCOHOL BREATH TEST POCT - Abnormal; Notable for the following components:    Alcohol Breath Test 0.118 (*)     All other components within normal limits   ALCOHOL BREATH TEST POCT - Abnormal; Notable for the following components:    Alcohol Breath Test 0.064 (*)     All other components within normal limits   LIPASE   COVID-19 VIRUS (CORONAVIRUS) BY PCR   PERIPHERAL IV CATHETER   MSSA SCORE AND VS   NOTIFY      Imaging Studies: No results found for this or any previous visit (from the past 24 hour(s)).   Most recent vital signs BP (!) 179/113   Pulse 107   Temp 97.5  F (36.4  C) (Oral)   Resp 18   SpO2 96%    Abnormal labs/tests/findings requiring intervention:---   Pain control: good  Nausea control: good    RECOMMENDATION  Are any infection precautions needed (MRSA, VRE, etc.)? No If yes, what infection? --  ---  Does the patient have mobility issues? independently. If yes, what device does the pt use? ---  ---  Is patient on 72 hour hold or commitment? No If on 72 hour hold, have hold and rights been given to patient? No  Are admitting orders written if after 10 p.m. ?No  Tasks needing to be completed:---     Raheem Armstrong RN   [facility]-- [phone] [place] ED   [phone] [facility] ED

## 2020-06-09 NOTE — DISCHARGE INSTRUCTIONS
Behavioral Discharge Planning and Instructions  THANK YOU FOR CHOOSING [address]  [phone]    Summary: You were admitted to [facility] on 6/9/2020  for detoxification from alcohol.  A medical exam was performed that included lab work. You have met with a  and opted to attend residential treatment.  Please take care and make your recovery a daily priority, Durga! It was a pleasure working with you and the entire treatment team here wishes you the very best in your recovery!     Recommendation:  Enter and complete a residential treatment program such as [facility] or [facility] and follow all continuing care recommendations.      Main Diagnoses:  Per Dr. Slade MD:   DIAGNOSES:   Axis I:   1.  Alcohol use disorder, severe.   2.  Alcohol withdrawal, severe.     3.  Panic disorder.     Major Treatments, Procedures and Findings:  You were treated for alcohol withdrawal detoxification using [facility] protocol. You completed a chemical dependency assessment. You had labs drawn and those results were reviewed with you. Please take a copy of your lab work with you to your next primary care physician appointment.    Symptoms to Report:  If you experience more anxiety, confusion, sleeplessness, deep sadness or thoughts of suicide, notify your treatment team or notify your primary care physician. IF ANY OF THE SYMPTOMS YOU ARE EXPERIENCING ARE A MEDICAL EMERGENCY CALL 911 IMMEDIATELY.     Lifestyle Adjustment: Adjust your lifestyle to get enough sleep, relaxation, exercise and good nutrition. Continue to develop healthy coping skills to decrease stress and promote a sober living environment. Do not use mood altering substances including alcohol, illegal drugs or addictive medications other than what is currently prescribed.     Disposition: Home, follow up with residential referrals to:    [facility]  Email: [email or web address]  P: [phone] F: [phone]  About:  [facility] ([facility]) is a behavioral health organization dedicated to helping people with mental health and substance use disorders Move Forward in [facility]. [facility] provides Residential and Intensive Outpatient Co-Occurring Disorders Treatment, Individual and Intensive Outpatient (IOP) mental health counseling services, chemical dependency treatment, and sober housing.     [facility]  Address: [address] [place] [address]   Phone: [phone]  About: [facility] offers full-service residential alcohol and drug recovery treatment for women and men of all levels of career/employment status, professional achievement, educational background and/or academic standing. Our residential treatment facility is located on the [place] of [facility] and our program is designed for people who want to get away from outside influences to reflect and find serenity in a [place] setting without distraction.    Facts about COVID19 at www.cdc.gov/COVID19 and www.[place].gov/covid19    Keeping hands clean is one of the most important steps we can take to avoid getting sick and spreading germs to others.  Please wash your hands frequently and lather with soap for at least 20 seconds!    Follow-up Appointment:   Date/time: July 6th @ 8:30AM for lab work  Date/time: July 10th @ 10:20 with Dr. Panfilo Whittaker - [facility]  Address: [address] [address]   Phone: [phone]    Resources:   *due to covid-19 AA/NA meetings are being held online*  AA meetings can be found online; search for them at: http://aa-intergroup.org/directory.php  AA meetings via [place] for [place] area can be found online at: https://a[place].org/find-a-meeting/holiday-closings/  NA meetings via [place] for [place] area can be found online at: https://sites.google.com/view/[place]regionofnarcoticsanonymous/home?authuser=2  AA/NA and Sponsors are excellent resources for support and you can find  one at any support group meeting.   Alcoholics Anonymous (www.alcoholics-anonymous.org): for local information 24 hours/day  AA Intergroup service office in [place] (http://www.aastpaul.org/) [phone]  AA Intergroup service office in [facility]: [phone]. (http://www.a[place].org/)  Narcotics Anonymous (www.ScentbirdinCodigames.org) (987) 505-4650  https://aafairviewriverside.org/meetings  SMART Recovery - self management for addiction recovery:  www.smartrecNagual Soundsy.org  Pathways ~ A Health Crisis Resource & Support Center:  162.397.3525.  https://prescribetoprevent.org/patient-education/videos/  http://www.harmreduction.org  [facility] 254-777-7150  Support Group:  AA/NA and Sponsor/support.  National [place] on Mental Illness (www.[place].dionne.org): 389.250.2361 or 781-772-3428.  Alcoholics Anonymous (www.alcoholics-anonymous.org): Check your phone book for your local chapter.  Suicide Awareness Voices of Education (SAVE) (www.save.org): 675-520-KWXI (7304)  National Suicide Prevention Line (www.mentalhealthmn.org): 451-357-SSAD (7100)  Mental Health Consumer/Survivor Network of [place] (www.mhcsn.net): 991.509.1989 or 201-839-5230  Mental Health Association of [place] (www.mentalhealth.org): 536.809.1236 or 506-239-7702   Substance Abuse and Mental Health Services (www.samhsa.gov)  [place] Opioid Prevention Coalition: www.opioidcoalition.org    [place] Recovery Connection ([facility])  [facility] connects people seeking recovery to resources that help foster and sustain long-term recovery.  Whether you are seeking resources for treatment, transportation, housing, job training, education, health care or other pathways to recovery, [facility] is a great place to start.  356.488.4458.  www.[facility].org    General Medication Instructions:   See your medication sheet(s) for instructions.   Take all medications as prescribed.  Make no changes unless your doctor suggests them.   Go to all your doctor visits.  Be  sure to have all your required lab tests. This way, your medicines can be refilled on time.  Do not use any forms of alcohol.    Please Note:  If you have any questions at anytime after you are discharged please call [facility] detox unit [facility] at [phone].  [facility], Behavioral Intake [phone]  Medical Records call [phone]  Outpatient Behavioral Intake call [phone]  LP+ Wait List/Bed Availability call [phone]    Please remember to take all of your behavioral discharge planning and lab paperwork to any follow up appointments, it contains your lab results, diagnosis, medication list and discharge recommendations.      THANK YOU FOR CHOOSING [facility]

## 2020-06-09 NOTE — ED PROVIDER NOTES
[facility] [place] ([facility])     June 9, 2020    History     Chief Complaint   Patient presents with     Addiction Problem     seeking detox from ETOH, no seizure history, drinking 1 liter of Vodka kkper day, last drink this am     Vomiting     states that he has vomited 5x today     The history is provided by the patient and medical records.     Durga Aguirre is a 60 year old male with a past medical history significant for alcohol dependence, anxiety, arthritis and HTN who presents to the Emergency Department for evaluation of addiction problems and vomiting.    Patient reports he drinks approximately a quart of vodka a day. Patient states he has been drinking this amount for years/most of his adult life.  Patient was trying to wean down off of the vodka and try to switch to beer and wine yesterday.  Patient states this change made him vomit all day today. Patient reports increased anxiety when he does not drink. Patient notes he wakes up with tremors in his hands usually. Patient denies withdrawal seizures and DTs.  The patient has previously been admitted to [facility].  PAST MEDICAL HISTORY:   Past Medical History:   Diagnosis Date     Alcohol abuse- remission 2/18/2013     Arthritis      Hypertension      Other spontaneous pneumothorax 1997       PAST SURGICAL HISTORY:   Past Surgical History:   Procedure Laterality Date     COLONOSCOPY       HC EXCISION PARTIAL TALUS OR CALCANEUS, BONE      fx calcaneus- 9 screws in foot       Past medical history, past surgical history, medications, and allergies were reviewed with the patient. Additional pertinent items: None    FAMILY HISTORY:   Family History   Problem Relation Age of Onset     No Known Problems Mother      Lymphoma Father      No Known Problems Maternal Grandmother      No Known Problems Maternal Grandfather      No Known Problems Paternal Grandmother      No Known Problems Paternal Grandfather      No Known Problems Sister       No Known Problems Son      No Known Problems Brother      No Known Problems Daughter      No Known Problems Other      Unknown/Adopted No family hx of      Depression No family hx of      Anxiety Disorder No family hx of      Schizophrenia No family hx of      Bipolar Disorder No family hx of      Suicide No family hx of      Substance Abuse No family hx of      Dementia No family hx of      Shani Disease No family hx of      Parkinsonism No family hx of      Autism Spectrum Disorder No family hx of      Intellectual Disability (Mental Retardation) No family hx of      Mental Illness No family hx of        SOCIAL HISTORY:   Social History     Tobacco Use     Smoking status: Light Tobacco Smoker     Packs/day: 0.25     Years: 20.00     Pack years: 5.00     Types: Cigarettes     Last attempt to quit: 10/9/2017     Years since quittin.6     Smokeless tobacco: Never Used     Tobacco comment: 2-3 cigarettes per day    Substance Use Topics     Alcohol use: No     Alcohol/week: 0.0 standard drinks     Comment: restarted may 2016     Social history was reviewed with the patient. Additional pertinent items: None      Patient's Medications   New Prescriptions    No medications on file   Previous Medications    ACETAMINOPHEN (TYLENOL) 325 MG TABLET    Take 325-650 mg by mouth every 4 hours as needed for mild pain    ALUM & MAG HYDROXIDE-SIMETHICONE (MYLANTA/MAALOX) 200-200-20 MG/5ML SUSP SUSPENSION    Take 30 mLs by mouth every 6 hours as needed for indigestion    CITALOPRAM (CELEXA) 40 MG TABLET    Take 1 tablet (40 mg) by mouth every morning    GUAIFENESIN (ROBITUSSIN) 20 MG/ML SOLN SOLUTION    Take 10 mLs by mouth every 4 hours as needed for cough    HYDROXYZINE (ATARAX) 25 MG TABLET    TAKE 1-2 TABLETS (25-50 MG) BY MOUTH EVERY 6 HOURS AS NEEDED FOR ANXIETY    IBUPROFEN (ADVIL/MOTRIN) 200 MG TABLET    Take 400 mg by mouth every 6 hours as needed for mild pain    LISINOPRIL (PRINIVIL/ZESTRIL) 10 MG TABLET     Take 1 tablet (10 mg) by mouth daily    LORATADINE (CLARITIN) 10 MG TABLET    Take 10 mg by mouth daily as needed for allergies    MELATONIN 3 MG TABLET    Take 3 mg by mouth nightly as needed for sleep    NALTREXONE (DEPADE/REVIA) 50 MG TABLET    Take 1 tablet (50 mg) by mouth daily    NICOTINE (NICODERM CQ) 14 MG/24HR 24 HR PATCH    Place 1 patch onto the skin every 24 hours    NICOTINE (NICORETTE) 2 MG GUM    Place 1-2 each (2-4 mg) inside cheek as needed for smoking cessation    PHENOL-MENTHOL (CEPASTAT) 14.5 MG LOZENGE    Place 1 lozenge inside cheek every 2 hours as needed for moderate pain    SENNA-DOCUSATE (SENOKOT-S/PERICOLACE) 8.6-50 MG TABLET    Take 2 tablets by mouth daily as needed for constipation    SIMVASTATIN (ZOCOR) 40 MG TABLET    TAKE ONE TABLET BY MOUTH AT BEDTIME   Modified Medications    No medications on file   Discontinued Medications    No medications on file        No Known Allergies     Review of Systems   Gastrointestinal: Positive for nausea and vomiting.   Psychiatric/Behavioral: Positive for dysphoric mood. Negative for self-injury and suicidal ideas.        Positive for alcohol abuse   All other systems reviewed and are negative.    A complete review of systems was performed with pertinent positives and negatives noted in the HPI, and all other systems negative.       Physical Exam   BP: (!) 179/113  Pulse: 107  Temp: 97.5  F (36.4  C)  Resp: 18  SpO2: 96 %      Physical Exam  Constitutional:       General: He is not in acute distress.     Appearance: He is not diaphoretic.   HENT:      Head: Normocephalic.      Mouth/Throat:      Pharynx: No oropharyngeal exudate.   Eyes:      Extraocular Movements: Extraocular movements intact.   Neck:      Musculoskeletal: Neck supple.   Cardiovascular:      Heart sounds: Normal heart sounds.   Pulmonary:      Effort: No respiratory distress.      Breath sounds: Normal breath sounds.   Abdominal:      General: There is no distension.       Palpations: Abdomen is soft.      Tenderness: There is no abdominal tenderness.   Musculoskeletal:         General: No deformity.   Skin:     General: Skin is dry.   Neurological:      Mental Status: He is alert.      Comments: Alert, moderate tremors   Psychiatric:      Comments: Severe anxiety         ED Course   3:02 PM  The patient was seen and examined by Dr. Zhao in Room ED16C.        Procedures               Results for orders placed or performed during the hospital encounter of 06/09/20 (from the past 24 hour(s))   Alcohol breath test POCT   Result Value Ref Range    Alcohol Breath Test 0.118 (A) 0.00 - 0.01   CBC with platelets differential   Result Value Ref Range    WBC 5.8 4.0 - 11.0 10e9/L    RBC Count 4.79 4.4 - 5.9 10e12/L    Hemoglobin 15.3 13.3 - 17.7 g/dL    Hematocrit 44.4 40.0 - 53.0 %    MCV 93 78 - 100 fl    MCH 31.9 26.5 - 33.0 pg    MCHC 34.5 31.5 - 36.5 g/dL    RDW 12.6 10.0 - 15.0 %    Platelet Count 140 (L) 150 - 450 10e9/L    Diff Method Automated Method     % Neutrophils 77.1 %    % Lymphocytes 16.2 %    % Monocytes 4.6 %    % Eosinophils 0.9 %    % Basophils 1.0 %    % Immature Granulocytes 0.2 %    Nucleated RBCs 0 0 /100    Absolute Neutrophil 4.5 1.6 - 8.3 10e9/L    Absolute Lymphocytes 0.9 0.8 - 5.3 10e9/L    Absolute Monocytes 0.3 0.0 - 1.3 10e9/L    Absolute Eosinophils 0.1 0.0 - 0.7 10e9/L    Absolute Basophils 0.1 0.0 - 0.2 10e9/L    Abs Immature Granulocytes 0.0 0 - 0.4 10e9/L    Absolute Nucleated RBC 0.0    Comprehensive metabolic panel   Result Value Ref Range    Sodium 137 133 - 144 mmol/L    Potassium 3.5 3.4 - 5.3 mmol/L    Chloride 100 94 - 109 mmol/L    Carbon Dioxide 26 20 - 32 mmol/L    Anion Gap 11 3 - 14 mmol/L    Glucose 128 (H) 70 - 99 mg/dL    Urea Nitrogen 8 7 - 30 mg/dL    Creatinine 0.72 0.66 - 1.25 mg/dL    GFR Estimate >90 >60 mL/min/[1.73_m2]    GFR Estimate If Black >90 >60 mL/min/[1.73_m2]    Calcium 8.9 8.5 - 10.1 mg/dL    Bilirubin Total 1.2 0.2 -  1.3 mg/dL    Albumin 4.2 3.4 - 5.0 g/dL    Protein Total 8.0 6.8 - 8.8 g/dL    Alkaline Phosphatase 102 40 - 150 U/L     (H) 0 - 70 U/L     (H) 0 - 45 U/L   Lipase   Result Value Ref Range    Lipase 154 73 - 393 U/L     Medications   ondansetron (ZOFRAN) injection 4 mg (4 mg Intravenous Given 6/9/20 1552)   diazepam (VALIUM) tablet 5-20 mg (5 mg Oral Given 6/9/20 1725)   folic acid (FOLVITE) tablet 1 mg (1 mg Oral Given 6/9/20 1604)   vitamin B1 (THIAMINE) tablet 100 mg (100 mg Oral Given 6/9/20 1604)   multivitamin w/minerals (THERA-VIT-M) tablet 1 tablet (1 tablet Oral Given 6/9/20 1604)   diazepam (VALIUM) tablet 5 mg (5 mg Oral Given 6/9/20 1604)      Results for orders placed or performed during the hospital encounter of 06/09/20   CBC with platelets differential     Status: Abnormal   Result Value Ref Range    WBC 5.8 4.0 - 11.0 10e9/L    RBC Count 4.79 4.4 - 5.9 10e12/L    Hemoglobin 15.3 13.3 - 17.7 g/dL    Hematocrit 44.4 40.0 - 53.0 %    MCV 93 78 - 100 fl    MCH 31.9 26.5 - 33.0 pg    MCHC 34.5 31.5 - 36.5 g/dL    RDW 12.6 10.0 - 15.0 %    Platelet Count 140 (L) 150 - 450 10e9/L    Diff Method Automated Method     % Neutrophils 77.1 %    % Lymphocytes 16.2 %    % Monocytes 4.6 %    % Eosinophils 0.9 %    % Basophils 1.0 %    % Immature Granulocytes 0.2 %    Nucleated RBCs 0 0 /100    Absolute Neutrophil 4.5 1.6 - 8.3 10e9/L    Absolute Lymphocytes 0.9 0.8 - 5.3 10e9/L    Absolute Monocytes 0.3 0.0 - 1.3 10e9/L    Absolute Eosinophils 0.1 0.0 - 0.7 10e9/L    Absolute Basophils 0.1 0.0 - 0.2 10e9/L    Abs Immature Granulocytes 0.0 0 - 0.4 10e9/L    Absolute Nucleated RBC 0.0    Comprehensive metabolic panel     Status: Abnormal   Result Value Ref Range    Sodium 137 133 - 144 mmol/L    Potassium 3.5 3.4 - 5.3 mmol/L    Chloride 100 94 - 109 mmol/L    Carbon Dioxide 26 20 - 32 mmol/L    Anion Gap 11 3 - 14 mmol/L    Glucose 128 (H) 70 - 99 mg/dL    Urea Nitrogen 8 7 - 30 mg/dL    Creatinine  0.72 0.66 - 1.25 mg/dL    GFR Estimate >90 >60 mL/min/[1.73_m2]    GFR Estimate If Black >90 >60 mL/min/[1.73_m2]    Calcium 8.9 8.5 - 10.1 mg/dL    Bilirubin Total 1.2 0.2 - 1.3 mg/dL    Albumin 4.2 3.4 - 5.0 g/dL    Protein Total 8.0 6.8 - 8.8 g/dL    Alkaline Phosphatase 102 40 - 150 U/L     (H) 0 - 70 U/L     (H) 0 - 45 U/L   Lipase     Status: None   Result Value Ref Range    Lipase 154 73 - 393 U/L   Alcohol breath test POCT     Status: Abnormal   Result Value Ref Range    Alcohol Breath Test 0.118 (A) 0.00 - 0.01            Assessments & Plan (with Medical Decision Making)   60-year-old male presents office with a chief complaint of requesting alcohol detox and nausea and vomiting.  Patient was given Zofran addition to IV fluids.  He was also given p.o. Valium.  This helped with the symptoms significantly.  Initially he was tremulous and anxious.  He is feeling much better at this point.  Patient has mild increases in his ALT and AST but labs are otherwise unremarkable.  His alcohol is positive as expected at 0.118.  We do have a bed available on the detox unit.  The patient is medically cleared and will be admitted to detox.    I have reviewed the nursing notes.    I have reviewed the findings, diagnosis, plan and need for follow up with the patient.    New Prescriptions    No medications on file       Final diagnoses:   Alcohol dependence with uncomplicated intoxication (H)   Non-intractable vomiting with nausea, unspecified vomiting type   I, Edgardo Mcnulty, mira serving as a trained medical scribe to document services personally performed by Ezekiel Zhao DO, based on the provider's statements to me.     I, Ezekiel Zhao DO, was physically present and have reviewed and verified the accuracy of this note documented by Edgardo Mcnulty.    6/9/2020   [facility], [place], EMERGENCY DEPARTMENT     Ezekiel Zhao DO  06/09/20 1800

## 2020-06-09 NOTE — PHARMACY-ADMISSION MEDICATION HISTORY
Admission Medication History Completed by Pharmacy    See [facility] Admission Navigator for allergy information, preferred outpatient pharmacy, prior to admission medications and immunization status.     Medication History Sources:     Patient, Dispense Report     Changes made to PTA medication list (reason):    Added: None    Deleted:   o Acetaminophen 325 mg, 1-2 T PO q4 hrs prn, patient stopped taking   o Alum & Mag hydroxide-simethicone [phone]/5ml susp, Take 30ml po q6 hrs prn, patient stopped taking  o Guaifenesin 20mg/ml solution, take 10 ml PO q4 prn for cough, patient reported stopped taking  o Ibuprofen 200 mg tab, Take 2 tabs PO q6 hrs prn, patient reported not taking  o Loratadine 10 mg tab, Take 10 mg PO every day prn allergies, patient reported not taking  o Melatonin 3 mg tab, Take 1 tab PO at bedtime, patient reported not taking  o Senokot-S/Pericolace 8.6-50mg tablet, Take 2 tabs PO daily prn, patient reported not taking    Changed: None    Additional Information:    Patient was a moderate historian. He knew the prescriptions he was taking based on their indication instead of their names. For the OTC meds, he wasn't sure when the last doses he took were.      Prior to Admission medications    Medication Sig Last Dose Taking? Auth Provider   citalopram (CELEXA) 40 MG tablet Take 1 tablet (40 mg) by mouth every morning 6/8/2020 at am Yes Rohit Solitario MD   hydrOXYzine (ATARAX) 25 MG tablet TAKE 1-2 TABLETS (25-50 MG) BY MOUTH EVERY 6 HOURS AS NEEDED FOR ANXIETY 6/8/2020 at afternoon Yes Rohit Solitario MD   lisinopril (PRINIVIL/ZESTRIL) 10 MG tablet Take 1 tablet (10 mg) by mouth daily 6/8/2020 at am Yes Rohit Solitario MD   nicotine (NICORETTE) 2 MG gum Place 1-2 each (2-4 mg) inside cheek as needed for smoking cessation 6/9/2020 at am Yes Guicho Mckeon MD   simvastatin (ZOCOR) 40 MG tablet TAKE ONE TABLET BY MOUTH AT BEDTIME Past Week at pm Yes Rohit Solitario MD    melatonin 3 MG tablet Take 3 mg by mouth nightly as needed for sleep More than a month at Unknown time  Reported, Patient   naltrexone (DEPADE/REVIA) 50 MG tablet Take 1 tablet (50 mg) by mouth daily Unknown at Unknown time  Rohit Solitario MD   nicotine (NICODERM CQ) 14 MG/24HR 24 hr patch Place 1 patch onto the skin every 24 hours More than a month at Unknown time  Guicho Mckeon MD   phenol-menthol (CEPASTAT) 14.5 MG lozenge Place 1 lozenge inside cheek every 2 hours as needed for moderate pain Unknown at Unknown time  Reported, Patient       Date completed: 06/09/20    Medication history completed by: Dain Sullivan

## 2020-06-09 NOTE — TELEPHONE ENCOUNTER
S: [place] ED seeking Detox for a 61yo male presenting ETOH    B: Pt reports drinking a quart or more of vodka daily for the past few years. Pt denies seizures or DT. Pt CACHORRO was 0.118 upon arrival, presented anxious and tremulous. Pt received ativan in ED. Pt denies MH concerns. Pt denies acute medical concerns.     A: Medically cleared, utox pending, COVID pending, voluntary.     R: Patient cleared and ready for behavioral bed placement: Yes    Pt accepted for 3A/[facility]    Unit and ED informed

## 2020-06-10 LAB
CHOLEST SERPL-MCNC: 259 MG/DL
GGT SERPL-CCNC: 288 U/L (ref 0–75)
HDLC SERPL-MCNC: 53 MG/DL
LDLC SERPL CALC-MCNC: ABNORMAL MG/DL
NONHDLC SERPL-MCNC: 206 MG/DL
SARS-COV-2 PCR COMMENT: NORMAL
SARS-COV-2 RNA SPEC QL NAA+PROBE: NEGATIVE
SPECIMEN SOURCE: NORMAL
TRIGL SERPL-MCNC: 406 MG/DL
TSH SERPL DL<=0.005 MIU/L-ACNC: 0.95 MU/L (ref 0.4–4)
VIT B12 SERPL-MCNC: 377 PG/ML (ref 193–986)

## 2020-06-10 PROCEDURE — 84443 ASSAY THYROID STIM HORMONE: CPT | Performed by: CLINICAL NURSE SPECIALIST

## 2020-06-10 PROCEDURE — 99207 ZZC CONSULT E&M CHANGED TO INITIAL LEVEL: CPT | Performed by: PHYSICIAN ASSISTANT

## 2020-06-10 PROCEDURE — 12800008 ZZH R&B CD ADULT

## 2020-06-10 PROCEDURE — 25000132 ZZH RX MED GY IP 250 OP 250 PS 637: Performed by: CLINICAL NURSE SPECIALIST

## 2020-06-10 PROCEDURE — 99221 1ST HOSP IP/OBS SF/LOW 40: CPT | Mod: 95 | Performed by: PSYCHIATRY & NEUROLOGY

## 2020-06-10 PROCEDURE — 82607 VITAMIN B-12: CPT | Performed by: CLINICAL NURSE SPECIALIST

## 2020-06-10 PROCEDURE — 82977 ASSAY OF GGT: CPT | Performed by: CLINICAL NURSE SPECIALIST

## 2020-06-10 PROCEDURE — 25000132 ZZH RX MED GY IP 250 OP 250 PS 637: Performed by: PHYSICIAN ASSISTANT

## 2020-06-10 PROCEDURE — 99222 1ST HOSP IP/OBS MODERATE 55: CPT | Performed by: PHYSICIAN ASSISTANT

## 2020-06-10 PROCEDURE — 36415 COLL VENOUS BLD VENIPUNCTURE: CPT | Performed by: CLINICAL NURSE SPECIALIST

## 2020-06-10 PROCEDURE — H2032 ACTIVITY THERAPY, PER 15 MIN: HCPCS

## 2020-06-10 PROCEDURE — 99207 ZZC CDG-HISTORY COMP: MEETS EXP. PROBLEM FOCUSED-DOWN CODED LACK OF ROS: CPT | Performed by: PSYCHIATRY & NEUROLOGY

## 2020-06-10 PROCEDURE — 80061 LIPID PANEL: CPT | Performed by: CLINICAL NURSE SPECIALIST

## 2020-06-10 RX ADMIN — DIAZEPAM 10 MG: 5 TABLET ORAL at 05:17

## 2020-06-10 RX ADMIN — TRAZODONE HYDROCHLORIDE 50 MG: 50 TABLET ORAL at 21:43

## 2020-06-10 RX ADMIN — HYDROXYZINE HYDROCHLORIDE 25 MG: 25 TABLET, FILM COATED ORAL at 20:18

## 2020-06-10 RX ADMIN — HYDROXYZINE HYDROCHLORIDE 25 MG: 25 TABLET, FILM COATED ORAL at 11:32

## 2020-06-10 RX ADMIN — THIAMINE HCL TAB 100 MG 100 MG: 100 TAB at 09:24

## 2020-06-10 RX ADMIN — DIAZEPAM 10 MG: 5 TABLET ORAL at 09:24

## 2020-06-10 RX ADMIN — LISINOPRIL 10 MG: 10 TABLET ORAL at 09:24

## 2020-06-10 RX ADMIN — DIAZEPAM 10 MG: 5 TABLET ORAL at 01:42

## 2020-06-10 RX ADMIN — CALCIUM CARBONATE (ANTACID) CHEW TAB 500 MG 500 MG: 500 CHEW TAB at 09:39

## 2020-06-10 RX ADMIN — HYDROXYZINE HYDROCHLORIDE 25 MG: 25 TABLET, FILM COATED ORAL at 15:11

## 2020-06-10 RX ADMIN — MULTIPLE VITAMINS W/ MINERALS TAB 1 TABLET: TAB at 09:24

## 2020-06-10 RX ADMIN — DIAZEPAM 10 MG: 5 TABLET ORAL at 18:13

## 2020-06-10 RX ADMIN — DIAZEPAM 10 MG: 5 TABLET ORAL at 12:30

## 2020-06-10 RX ADMIN — FOLIC ACID 1 MG: 1 TABLET ORAL at 09:24

## 2020-06-10 RX ADMIN — TRAZODONE HYDROCHLORIDE 50 MG: 50 TABLET ORAL at 20:18

## 2020-06-10 RX ADMIN — NICOTINE POLACRILEX 4 MG: 4 GUM, CHEWING BUCCAL at 18:54

## 2020-06-10 RX ADMIN — CITALOPRAM HYDROBROMIDE 40 MG: 40 TABLET ORAL at 09:24

## 2020-06-10 RX ADMIN — NICOTINE POLACRILEX 4 MG: 4 GUM, CHEWING BUCCAL at 10:34

## 2020-06-10 RX ADMIN — DIAZEPAM 5 MG: 5 TABLET ORAL at 20:18

## 2020-06-10 RX ADMIN — SIMVASTATIN 40 MG: 40 TABLET, FILM COATED ORAL at 20:18

## 2020-06-10 ASSESSMENT — ACTIVITIES OF DAILY LIVING (ADL)
LAUNDRY: WITH SUPERVISION
ORAL_HYGIENE: INDEPENDENT
DRESS: INDEPENDENT
HYGIENE/GROOMING: INDEPENDENT
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
## 2020-06-10 NOTE — PROGRESS NOTES
06/09/20 0635   Patient Belongings   Disposition of meds  Sent to security/pharmacy per site process   Patient Belongings other (see comments)   Patient Belongings Remaining with Patient other (see comments)   Patient Belongings Put in Hospital Secure Location (Security or Locker, etc.) other (see comments)   Belongings Search Yes   Clothing Search Yes   Second Staff Durga     STORAGE BIN:   Shoes, backpack, wallet, socks, shaver, gum, deodorant, shorts,  cord    MED ROOM BIN:   Cell phone    SECURITY:   Meds, visa, id, MC  A             Admission:  I am responsible for any personal items that are not sent to the safe or pharmacy.  [facility] is not responsible for loss, theft or damage of any property in my possession.    Signature:  _________________________________ Date: _______  Time: _____                                              Staff Signature:  ____________________________ Date: ________  Time: _____      2nd Staff person, if patient is unable/unwilling to sign:    Signature: ________________________________ Date: ________  Time: _____   Discharge:  [facility] has returned all of my personal belongings:    Signature: _________________________________ Date: ________  Time: _____                                          Staff Signature:  ____________________________ Date: ________  Time: _____

## 2020-06-10 NOTE — PROGRESS NOTES
Patient has been visible in the milieu. AOx4. Cooperative with treament.    Pt complained of heartburn; TUMS administered at 0939.    MSSA= 10 and 12; administered 10mg valiumx2.     Patient denied depression and SI. He endorsed anxiety 4/10 during am assessment. At 1130am, he approached staff and stated that he was feeling extremely anxious; hydroxyzine 25mg was administered. After lunch, pt's MSSA was completed; he was tearful and tremulous.    We'll continue to monitor.

## 2020-06-10 NOTE — CONSULTS
Internal Medicine Initial Visit      Durga Aguirre MRN# [id number]   YOB: 1960 Age: 60 year old   Date of Admission: 6/9/2020  PCP: Rohit Solitario    Referring Provider: Behavioral Health - Mame June MD  Reason for Visit: General Medical Evaluation, EtOH abuse and withdrawal         Assessment and Recommendations:   Durga Aguirre is a 60 year old year old man with a history of HTN, HLD, arthritis, depression who is admitted to station 3a with EtOH abuse and withdrawal.        EtOH abuse and withdrawal. Pt drinking a quart of vodka daily for several months. No hx of DT or withdrawal seizures. Management per psychiatry team.     Transaminitis  Admission labs: , , t-bili 1.2. Baseline LFTs normal in the 20-30s. Hep C ab nonreactive 4/2019. No abdominal or liver imaging  -repeat LFTs with PCP in 4-6 weeks  -avoid hepatotoxins including EtOH    Thrombocytopenia  Platelets on admission 140 with BL normal in the 200s (221 in 10/2019). No evidence of bruising or bleeding.   - if any signs of bleeding repeat CBC  -  Follow up with PCP for repeat CBC and consideration of peripheral smear    HTN  Admission bps in the 180s/110s, now improved in the 101-130/67-80s range.   -continue PTA lisinopril, hold for sbp <100    HLD  Hypertriglyceridemia  Cholesterol panel today: triglycerides 406, total cholesterol 259, HDL 53, non-. In the setting of EtOH abuse.   -continue simvastatin  -encourage healthy diet with decreased processed sugar/carbohydrate  -follow up with PCP for repeat testing and consideration for tirglyceride lowering medication (such as fenofibrate)     Medicine will sign off, please page with any additional concerns.     Dilia White PA-C  Hospitalist Service   Pager: [phone]     Reason for Admission:   EtOH abuse and withdrawal         History of Present Illness:   History is obtained from the patient and medical record.     Durga Aguirre is a 60 year old  "year old man with a history of HTN, HLD, arthritis, depression who is admitted to station 3a with EtOH abuse and withdrawal.     Internal Medicine service was asked to see patient for a general medication evaluation. Currently, patient has a mild HA which he notes is typical of when he drinks EtOH and had vomited non-bloody emesis x5 prior to admission. He continues to have \"shakes\" that he says are much improved vs yesterday.     Denies current N/V, F/C, chets pain, sob, palpitations, dizziness, lightheadedness, change in weight, change in BM, urinary complaints, rashes or lesions, pain, numbness, tingling, pins and needles or other complaints.           Review of Systems:       10 point ROS was performed and negative unless otherwise noted in HPI.           Past Medical History:   Reviewed and updated in Epic.  Past Medical History:   Diagnosis Date     Alcohol abuse- remission 2013     Arthritis      Hypertension      Other spontaneous pneumothorax              Past Surgical History:   Reviewed and updated in Epic.  Past Surgical History:   Procedure Laterality Date     COLONOSCOPY       HC EXCISION PARTIAL TALUS OR CALCANEUS, BONE      fx calcaneus- 9 screws in foot             Social History:     Social History     Tobacco Use     Smoking status: Light Tobacco Smoker     Packs/day: 0.25     Years: 20.00     Pack years: 5.00     Types: Cigarettes     Last attempt to quit: 10/9/2017     Years since quittin.6     Smokeless tobacco: Never Used     Tobacco comment: 2-3 cigarettes per day    Substance Use Topics     Alcohol use: No     Alcohol/week: 0.0 standard drinks     Comment: restarted may 2016     Drug use: No             Family History:   Reviewed and updated in Epic.  Family History   Problem Relation Age of Onset     No Known Problems Mother      Lymphoma Father      No Known Problems Maternal Grandmother      No Known Problems Maternal Grandfather      No Known Problems Paternal Grandmother  "     No Known Problems Paternal Grandfather      No Known Problems Sister      No Known Problems Son      No Known Problems Brother      No Known Problems Daughter      No Known Problems Other      Unknown/Adopted No family hx of      Depression No family hx of      Anxiety Disorder No family hx of      Schizophrenia No family hx of      Bipolar Disorder No family hx of      Suicide No family hx of      Substance Abuse No family hx of      Dementia No family hx of      [place] Disease No family hx of      Parkinsonism No family hx of      Autism Spectrum Disorder No family hx of      Intellectual Disability (Mental Retardation) No family hx of      Mental Illness No family hx of              Allergies:   No Known Allergies          Medications:     Medications Prior to Admission   Medication Sig Dispense Refill Last Dose     citalopram (CELEXA) 40 MG tablet Take 1 tablet (40 mg) by mouth every morning 30 tablet 6 6/8/2020 at am     hydrOXYzine (ATARAX) 25 MG tablet TAKE 1-2 TABLETS (25-50 MG) BY MOUTH EVERY 6 HOURS AS NEEDED FOR ANXIETY 60 tablet 0 6/8/2020 at afternoon     lisinopril (PRINIVIL/ZESTRIL) 10 MG tablet Take 1 tablet (10 mg) by mouth daily 30 tablet 5 6/8/2020 at am     nicotine (NICORETTE) 2 MG gum Place 1-2 each (2-4 mg) inside cheek as needed for smoking cessation 110 tablet 3 6/9/2020 at am     simvastatin (ZOCOR) 40 MG tablet TAKE ONE TABLET BY MOUTH AT BEDTIME 30 tablet 0 Past Week at pm     melatonin 3 MG tablet Take 3 mg by mouth nightly as needed for sleep   More than a month at Unknown time     naltrexone (DEPADE/REVIA) 50 MG tablet Take 1 tablet (50 mg) by mouth daily 30 tablet 5 Unknown at Unknown time     nicotine (NICODERM CQ) 14 MG/24HR 24 hr patch Place 1 patch onto the skin every 24 hours 30 patch 1 More than a month at Unknown time     phenol-menthol (CEPASTAT) 14.5 MG lozenge Place 1 lozenge inside cheek every 2 hours as needed for moderate pain   Unknown at Unknown time         Current Facility-Administered Medications   Medication     alum & mag hydroxide-simethicone (MAALOX  ES) suspension 30 mL     atenolol (TENORMIN) tablet 50 mg     calcium carbonate (TUMS) chewable tablet 500 mg     citalopram (celeXA) tablet 40 mg     diazepam (VALIUM) tablet 5-20 mg     folic acid (FOLVITE) tablet 1 mg     hydrOXYzine (ATARAX) tablet 25 mg     ibuprofen (ADVIL/MOTRIN) tablet 600 mg     lisinopril (ZESTRIL) tablet 10 mg     loperamide (IMODIUM) capsule 2 mg     magnesium hydroxide (MILK OF MAGNESIA) suspension 30 mL     melatonin tablet 3 mg     multivitamin w/minerals (THERA-VIT-M) tablet 1 tablet     nicotine (NICORETTE) gum 2-4 mg     ondansetron (ZOFRAN-ODT) ODT tab 4 mg     simvastatin (ZOCOR) tablet 40 mg     traZODone (DESYREL) tablet 50 mg     vitamin B1 (THIAMINE) tablet 100 mg     Facility-Administered Medications Ordered in Other Encounters   Medication     Self Administer Medications: Behavioral Services            Physical Exam:   Blood pressure 130/86, pulse 97, temperature 97.7  F (36.5  C), temperature source Temporal, resp. rate 16, SpO2 94 %.  There is no height or weight on file to calculate BMI.  GENERAL: Alert and oriented x 3. NAD, ambulatory, cooperative  HEENT: Anicteric sclera. Mucous membranes moist. Pupils equal and round. EOMI. NC. AT.  CV: RRR. S1, S2. No murmurs appreciated.   RESPIRATORY: Effort normal on room air. Lungs CTAB with no wheezing, rales, rhonchi.   GI: Abdomen soft, non distended, non tender. NABS  MUSCULOSKELETAL: No joint swelling or tenderness.  NEUROLOGICAL: No cerebellar signs. No focal deficits. Moves all extremities.   EXTREMITIES: No peripheral edema. Intact bilateral pedal pulses.   SKIN: No jaundice. No rashes.           Data:   CBC:  Recent Labs   Lab Test 06/09/20  1555   WBC 5.8   RBC 4.79   HGB 15.3   HCT 44.4   MCV 93   MCH 31.9   MCHC 34.5   RDW 12.6   *       CMP:  Recent Labs   Lab Test 06/09/20  1555      POTASSIUM 3.5    CHLORIDE 100   OTIS 8.9   CO2 26   BUN 8   CR 0.72   *   *   *   BILITOTAL 1.2   ALBUMIN 4.2   PROTTOTAL 8.0   ALKPHOS 102       TSH:  TSH   Date Value Ref Range Status   02/22/2016 0.82 0.40 - 4.00 mU/L Final       Tox screen: EtOH breathalizer 0.118  HCG: n/a    Unresulted Labs Ordered in the Past 30 Days of this Admission     Date and Time Order Name Status Description    6/9/2020 2216 Vitamin B12 In process     6/9/2020 2216 TSH with free T4 reflex and/or T3 as indicated In process     6/9/2020 2216 Lipid panel In process     6/9/2020 1804 SARS-CoV-2 COVID-19 Virus (Coronavirus) RT-PCR In process

## 2020-06-10 NOTE — PLAN OF CARE
NADEEM      Durga Aguirre is a 60 year old year old male with a chief complaint of alcohol problem        S = Situation:     Patient a voluntary admission for alcohol withdrawal and detox      B  = Background:   Patient has been drinking a liter of vodka daily for several months. Patient breathalyzer 0.118 in the ER and urine drug screen was positive for ethanol. Patient denies a history of withdrawal seizures and DT's. Does report he has had hallucinations at home when trying to detox from alcohol. Denies having ever been to a formal detox in the past. Has been to CD treatments in the past. Denies any IP mental health hospitalizations. Patient has a  medical history of HTN, elevated cholesterol, depression, and arthritis.    A  =  Assessment:   Patient affect full range, mood is anxious. Patient denies SI, SIB, HI, or hallucinations. Patient reports losses in life and being unable to work as stressors contributing to relapse. Patient in active withdrawal during admission assessment. MSSA 20.     R =   Request or Recommendation:   Patient is on MSSA monitoring with Valium. Is on withdrawal precautions. To be seen by psychiatry, internal medicine, and case management in the am.

## 2020-06-10 NOTE — PLAN OF CARE
Behavioral Team Discussion: (6/10/2020)    Continued Stay Criteria/Rationale: Patient admitted for alcohol withdrawal, complicated.  Plan: The following services will be provided to the patient; psychiatric assessment, medication management, therapeutic milieu, individual and group support, and skills groups.   Participants: 3A Provider: Dr. Mame June MD; 3A RN's: Nicky Pierre, RN; 3A CM's: Qian Hernandez LPC [facility]  Summary/Recommendation: Providers will assess today for treatment recommendations, discharge planning, and aftercare plans. CM will meet with pt for discharge planning.   Medical/Physical: Internal medicine consult to be completed 6/10/2020.  Precautions:   Behavioral Orders   Procedures     Code 1 - Restrict to Unit     Routine Programming     As clinically indicated     Status 15     Every 15 minutes.     Withdrawal precautions     Rationale for change in precautions or plan: N/A  Progress: No Change.

## 2020-06-11 PROCEDURE — 25000132 ZZH RX MED GY IP 250 OP 250 PS 637: Performed by: CLINICAL NURSE SPECIALIST

## 2020-06-11 PROCEDURE — 25000132 ZZH RX MED GY IP 250 OP 250 PS 637: Performed by: PHYSICIAN ASSISTANT

## 2020-06-11 PROCEDURE — 12800008 ZZH R&B CD ADULT

## 2020-06-11 PROCEDURE — 99232 SBSQ HOSP IP/OBS MODERATE 35: CPT | Mod: 95 | Performed by: PSYCHIATRY & NEUROLOGY

## 2020-06-11 PROCEDURE — 25000132 ZZH RX MED GY IP 250 OP 250 PS 637: Performed by: PSYCHIATRY & NEUROLOGY

## 2020-06-11 RX ORDER — HYDROXYZINE HYDROCHLORIDE 50 MG/1
50 TABLET, FILM COATED ORAL EVERY 6 HOURS PRN
Status: DISCONTINUED | OUTPATIENT
Start: 2020-06-11 | End: 2020-06-12 | Stop reason: HOSPADM

## 2020-06-11 RX ADMIN — TRAZODONE HYDROCHLORIDE 50 MG: 50 TABLET ORAL at 21:23

## 2020-06-11 RX ADMIN — MULTIPLE VITAMINS W/ MINERALS TAB 1 TABLET: TAB at 08:45

## 2020-06-11 RX ADMIN — FOLIC ACID 1 MG: 1 TABLET ORAL at 08:45

## 2020-06-11 RX ADMIN — HYDROXYZINE HYDROCHLORIDE 25 MG: 25 TABLET, FILM COATED ORAL at 08:47

## 2020-06-11 RX ADMIN — CITALOPRAM HYDROBROMIDE 40 MG: 40 TABLET ORAL at 08:45

## 2020-06-11 RX ADMIN — LISINOPRIL 10 MG: 10 TABLET ORAL at 08:45

## 2020-06-11 RX ADMIN — NICOTINE POLACRILEX 4 MG: 4 GUM, CHEWING BUCCAL at 08:44

## 2020-06-11 RX ADMIN — TRAZODONE HYDROCHLORIDE 50 MG: 50 TABLET ORAL at 23:14

## 2020-06-11 RX ADMIN — THIAMINE HCL TAB 100 MG 100 MG: 100 TAB at 08:45

## 2020-06-11 RX ADMIN — HYDROXYZINE HYDROCHLORIDE 50 MG: 50 TABLET, FILM COATED ORAL at 17:54

## 2020-06-11 RX ADMIN — SIMVASTATIN 40 MG: 40 TABLET, FILM COATED ORAL at 21:23

## 2020-06-11 RX ADMIN — HYDROXYZINE HYDROCHLORIDE 25 MG: 25 TABLET, FILM COATED ORAL at 13:05

## 2020-06-11 RX ADMIN — NICOTINE POLACRILEX 4 MG: 4 GUM, CHEWING BUCCAL at 16:22

## 2020-06-11 ASSESSMENT — ACTIVITIES OF DAILY LIVING (ADL)
HYGIENE/GROOMING: INDEPENDENT
ORAL_HYGIENE: INDEPENDENT
DRESS: INDEPENDENT

## 2020-06-11 NOTE — PLAN OF CARE
Continues in detox status on alcohol withdrawal protocol. MSSA 6 and 6. Appetite improving. Fluids encouraged. Denies suicide ideation and thoughts of self harm.Numerous requests for Valium to treat his anxiety.Medicated with Hydroxyzine x2. Spent most of the day in TV lounge socializing with peers.

## 2020-06-11 NOTE — PROGRESS NOTES
"Rule 25 Assessment  Background Information   1. Date of Assessment Request  2. Date of Assessment  6/11/2020 3. Date Service Authorized     4.   [facility] Hernandez L[facility], [facility]  5.  Phone Number   [phone] 6. Referent  Self 7. Assessment Site  [facility]     8. Client Name   Durga Aguirre 9. Date of Birth  1960 Age  60 year old 10. Gender  male  11. PMI/ Insurance No.  Payor: [facility] / Plan: [facility] [place] / Product Type: HMO /    [id number]   12. Client's Primary Language:  English  13. Do you require special accommodations, such as an  or assistance with written material? No    14. Current Address: [address] [place] [id number]   15. Client Phone Numbers: [phone] (home)      16. Tell me what has happened to bring you here today.    \"alcohol abuse.\"     17. Have you had other rule 25 assessments?     Yes. When, Where, and What circumstances: July 2019, referred to IOP; CD update 12/26/19, referred to LP; 2018, no referral to treatment.     DIMENSION I - Acute Intoxication /Withdrawal Potential   1. Chemical use most recent 12 months outside a facility and other significant use history (client self-report)              X = Primary Drug Used   Age of First Use Most Recent Pattern of Use and Duration   Need enough information to show pattern (both frequency and amounts) and to show tolerance for each chemical that has a diagnosis   Date of last use and time, if needed   Withdrawal Potential? Requiring special care Method of use  (oral, smoked, snort, IV, etc)   X Alcohol 10 Current: 1L vodka/day; pt reports he relapsed in 3/17/20; pt has been drinking 1L/day for most of his life.   2019 Rule 25 notes highest use was in 2013, 1.75L/day 6/9/2020 AM Yes Oral    Marijuana/  Hashish 10 Used in high school 1980 Yes Smoke    Cocaine/Crack 14 No current use  Heaviest: couple grams/day for about 10 years 1997 Yes Snort, smoke    Meth/  Amphetamines No use   "          Heroin No use             Other Opiates/  Synthetics No use            Inhalants No use             Benzodiazepines No use            Hallucinogens No use             Barbiturates/  Sedatives/  Hypnotics No use            Over-the-Counter Drugs No use            Other No use                 Nicotine 17 No use since 2020, use at time of quitting was 2-3 cigarettes/day.  2020 Yes Smoke     2. Do you use greater amounts of alcohol/other drugs to feel intoxicated or achieve the desired effect?  Yes.  Or use the same amount and get less of an effect?  Yes.  Example: The patient reported having increased use and tolerance issues with alcohol.    3A. Have you ever been to detox?     No    3B. When was the first time?     The patient denied ever having a detoxification admission.    3C. How many times since then?     The patient denied ever having a detoxification admission.    3D. Date of most recent detox:     Current admission, 2020     4.  Withdrawal symptoms: Have you had any of the following withdrawal symptoms?  Past 12 months Recent (past 30 days)   Sweating (Rapid Pulse)  Shaky / Jittery / Tremors  Fatigue / Extremely Tired  Muscle Aches  Vivid / Unpleasant Dreams Sweating (Rapid Pulse)  Shaky / Jittery / Tremors  High Blood Pressure  Nausea / Vomiting  Diarrhea  Diminished Appetite  Anxiety / Worried     's Visual Observations and Symptoms: Pt reports minimal withdrawal symptoms, has anxiety and some mild discomfort.     Based on the above information, is withdrawal likely to require attention as part of treatment participation?  No    Dimension I Ratings   Acute intoxication/Withdrawal potential - The placing authority must use the criteria in Dimension I to determine a client s acute intoxication and withdrawal potential.    RISK DESCRIPTIONS - Severity ratin Client can tolerate and cope with withdrawal discomfort. The client displays mild to moderate intoxication or signs  and symptoms interfering with daily functioning but does not immediately endanger self or others. Client poses minimal risk of severe withdrawal.    REASONS SEVERITY WAS ASSIGNED (What about the amount of the person s use and date of most recent use and history of withdrawal problems suggests the potential of withdrawal symptoms requiring professional assistance? )     Pt appears to be in mild withdrawal at this time. Pt is being treated for his withdrawal at [facility]'s inpatient detox unit. Pt denies a history of seizures or hallucinations during withdrawal. Pt is capable of managing withdrawal concerns. Pt reports that his last use of ETOH was on 6/9/2020. Pt was given a UA at time of ER admit and the UA was POS for ethanol. Pt was given a breathalyzer at the time of ER admit and his CACHORRO was 0.064.       DIMENSION II - Biomedical Complications and Conditions   1a. Do you have any current health/medical conditions?(Include any infectious diseases, allergies, or chronic or acute pain, history of chronic conditions)       Yes.   Illnesses/Medical Conditions you are receiving care for: blood pressure, liver issues.  Past Medical History:   Diagnosis Date     Alcohol abuse- remission 2/18/2013     Arthritis      Hypertension      Other spontaneous pneumothorax 1997      1b. On a scale of mild, moderate to severe please specify the severity of the patient's diabetes and/or neuropathy.    The patient denied having a history of being diagnosed with diabetes or neuropathy.    2. Do you have a health care provider? When was your most recent appointment? What concerns were identified?     The patient's PCP is:   Rohit Solitario   [address]  [address]  [facility] [id number] [phone]   Last appointment: Dec 2019    3. If indicated by answers to items 1 or 2: How do you deal with these concerns? Is that working for you? If you are not receiving care for this problem, why not?      The patient reported  utilizing diet and exercise to manage the above medical issues.     4A. List current medication(s) including over-the-counter or herbal supplements--including pain management:     Current Facility-Administered Medications   Medication     alum & mag hydroxide-simethicone (MAALOX  ES) suspension 30 mL     atenolol (TENORMIN) tablet 50 mg     calcium carbonate (TUMS) chewable tablet 500 mg     citalopram (celeXA) tablet 40 mg     diazepam (VALIUM) tablet 5-20 mg     folic acid (FOLVITE) tablet 1 mg     hydrOXYzine (ATARAX) tablet 25 mg     ibuprofen (ADVIL/MOTRIN) tablet 600 mg     lisinopril (ZESTRIL) tablet 10 mg     loperamide (IMODIUM) capsule 2 mg     magnesium hydroxide (MILK OF MAGNESIA) suspension 30 mL     melatonin tablet 3 mg     multivitamin w/minerals (THERA-VIT-M) tablet 1 tablet     nicotine (NICORETTE) gum 2-4 mg     ondansetron (ZOFRAN-ODT) ODT tab 4 mg     simvastatin (ZOCOR) tablet 40 mg     traZODone (DESYREL) tablet 50 mg     vitamin B1 (THIAMINE) tablet 100 mg     Facility-Administered Medications Ordered in Other Encounters   Medication     Self Administer Medications: Behavioral Services     Prior to Admission medications    Medication Sig Start Date End Date Taking? Authorizing Provider   citalopram (CELEXA) 40 MG tablet Take 1 tablet (40 mg) by mouth every morning 3/9/20  Yes Rohit Solitario MD   hydrOXYzine (ATARAX) 25 MG tablet TAKE 1-2 TABLETS (25-50 MG) BY MOUTH EVERY 6 HOURS AS NEEDED FOR ANXIETY 6/4/20  Yes Rohit Solitario MD   lisinopril (PRINIVIL/ZESTRIL) 10 MG tablet Take 1 tablet (10 mg) by mouth daily 1/30/20  Yes Rohit Solitario MD   nicotine (NICORETTE) 2 MG gum Place 1-2 each (2-4 mg) inside cheek as needed for smoking cessation 12/30/19  Yes Guicho Mckeon MD   simvastatin (ZOCOR) 40 MG tablet TAKE ONE TABLET BY MOUTH AT BEDTIME 6/4/20  Yes Rohit Solitario MD   melatonin 3 MG tablet Take 3 mg by mouth nightly as needed for sleep    Reported, Patient    naltrexone (DEPADE/REVIA) 50 MG tablet Take 1 tablet (50 mg) by mouth daily 20   Rohit Solitario MD   nicotine (NICODERM CQ) 14 MG/24HR 24 hr patch Place 1 patch onto the skin every 24 hours 20   Guicho Mckeon MD   phenol-menthol (CEPASTAT) 14.5 MG lozenge Place 1 lozenge inside cheek every 2 hours as needed for moderate pain    Reported, Patient       4B. Do you follow current medical recommendations/take medications as prescribed?     Yes    4C. When did you last take your medication?     2020     4D. Do you need a referral to have a follow up with a primary care physician?    No.    5. Has a health care provider/healer ever recommended that you reduce or quit alcohol/drug use?     Yes    6. Are you pregnant?     NA, because the patient is male    7. Have you had any injuries, assaults/violence towards you, accidents, health related issues, overdose(s) or hospitalizations related to your use of alcohol or other drugs:     No    8. Do you have any specific physical needs/accommodations? No    Dimension II Ratings   Biomedical Conditions and Complications - The placing authority must use the criteria in Dimension II to determine a client s biomedical conditions and complications.   RISK DESCRIPTIONS - Severity ratin Client tolerates and ck with physical discomfort and is able to get the services that the client needs.    REASONS SEVERITY WAS ASSIGNED (What physical/medical problems does this person have that would inhibit his or her ability to participate in treatment? What issues does he or she have that require assistance to address?)    Pt is capable of navigating the healthcare system autonomously. Pt denies any biomedical conditions that would interfere with treatment. Pt reports he has been compliant with medications. Pt's vital signs were last recorded on 2020 at 1108:  Vital signs:  Temp: 97.4  F (36.3  C) Temp src: Temporal BP: 121/86 Pulse: 77   Resp: 16 SpO2: 96 %      "     Estimated body mass index is 31.02 kg/m  as calculated from the following:    Height as of 2/10/20: 1.778 m (5' 10\").    Weight as of 2/10/20: 98.1 kg (216 lb 3.2 oz).     DIMENSION III - Emotional, Behavioral, Cognitive Conditions and Complications   1. (Optional) Tell me what it was like growing up in your family. (substance use, mental health, discipline, abuse, support)     Family constellation: Patient reports he grew up with both parents. Patient reports 1 sister. Patient reports his childhood was \"better than normal\". Pt's dad  in  from cancer; pt's mom is alive.   Family CD history: Pt reports his mom \"likes a shot of vodka in the morning\" but denies she has alcoholism.   Family MH history: Pt reports no hx of MH issues in family.   Abuse: No abuse history noted.   Support: Pt felt supported growing up.     Family History   Problem Relation Age of Onset     No Known Problems Mother      Lymphoma Father      No Known Problems Maternal Grandmother      No Known Problems Maternal Grandfather      No Known Problems Paternal Grandmother      No Known Problems Paternal Grandfather      No Known Problems Sister      No Known Problems Son      No Known Problems Brother      No Known Problems Daughter      No Known Problems Other      Unknown/Adopted No family hx of      Depression No family hx of      Anxiety Disorder No family hx of      Schizophrenia No family hx of      Bipolar Disorder No family hx of      Suicide No family hx of      Substance Abuse No family hx of      Dementia No family hx of      Carter Disease No family hx of      Parkinsonism No family hx of      Autism Spectrum Disorder No family hx of      Intellectual Disability (Mental Retardation) No family hx of      Mental Illness No family hx of       2. When was the last time that you had significant problems...  A. with feeling very trapped, lonely, sad, blue, depressed or hopeless  about the future? Past month - dealing with " unresolved grief/loss, drinks to cope.     B. with sleep trouble, such as bad dreams, sleeping restlessly, or falling  asleep during the day? Never    C. with feeling very anxious, nervous, tense, scared, panicked, or like  something bad was going to happen? Past month - usually withdrawal related.     D. with becoming very distressed and upset when something reminded  you of the past? 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     E. with thinking about ending your life or committing suicide? Never     3. When was the last time that you did the following things two or more times?  A. Lied or conned to get things you wanted or to avoid having to do  something? 2 - 12 months ago hiding, minimizing his use    B. Had a hard time paying attention at school, work, or home? 2 - 12 months ago related to use/withdrawal.     C. Had a hard time listening to instructions at school, work, or home? Never     D. Were a bully or threatened other people? Never     E. Started physical fights with other people? Never     Note: These questions are from the Global Appraisal of Individual Needs--Short Screener. Any item marked  past month  or  2 to 12 months ago  will be scored with a severity rating of at least 2.     For each item that has occurred in the past month or past year ask follow up questions to determine how often the person has felt this way or has the behavior occurred? How recently? How has it affected their daily living? And, whether they were using or in withdrawal at the time?    See above.     4A. If the person has answered item 2E with  in the past year  or  the past month , ask about frequency and history of suicide in the family or someone close and whether they were under the influence.     Any history of suicide in your family? Or someone close to you?     A couple of friends have committed suicide, all  "occurred many years.     4B. If the person answered item 2E  in the past month  ask about  intent, plan, means and access and any other follow-up information  to determine imminent risk. Document any actions taken to intervene  on any identified imminent risk.      The patient denied having any suicide ideation within the past month.    5A. Have you ever been diagnosed with a mental health problem?     Yes, explain: anxiety      5B. Are you receiving care for any mental health issues? If yes, what is the focus of that care or treatment?  Are you satisfied with the service? Most recent appointment?  How has it been helpful?     Yes, prescribed medications by primary. No current or hx with therapist.     6. Have you been prescribed medications for emotional/psychological problems?     Yes, see dimension 2.     7. Does your MH provider know about your use?     Yes.  7B. What does he or she have to say about it?(DSM) \"Quit drinking\".    8A. Have you ever been verbally, emotionally, physically or sexually abused?      No     Follow up questions to learn current risk, continuing emotional impact.      The patient denied having any history of being verbally, emotionally, physically or sexually abused.    8B. Have you received counseling for abuse?      The patient denied having any history of being verbally, emotionally, physically or sexually abused.    9. Have you ever experienced or been part of a group that experienced community violence, historical trauma, rape or assault?     No    10A. :    No    11. Do you have problems with any of the following things in your daily life?    No      Note: If the person has any of the above problems, follow up with items 12, 13, and 14. If none of the issues in item 11 are a problem for the person, skip to item 15.    The patient denied having any history of having problems with headaches, dizziness, problem solving, concentration, performing job/school work, remembering, in " relationships with others, reading, writing, calculation, fights, being fired or arrests in his daily life.    12. Have you been diagnosed with traumatic brain injury or Alzheimer s?  No    13. If the answer to #12 is no, ask the following questions:    Have you ever hit your head or been hit on the head? No    Were you ever seen in the Emergency Room, hospital or by a doctor because of an injury to your head? No    Have you had any significant illness that affected your brain (brain tumor, meningitis, West Nile Virus, stroke or seizure, heart attack, near drowning or near suffocation)? No    14. If the answer to #12 is yes, ask if any of the problems identified in #11 occurred since the head injury or loss of oxygen. No    15A. Highest grade of school completed:     Associate degree/vocational certificate    15B. Do you have a learning disability? No    15C. Did you ever have tutoring in Math or English? No    15D. Have you ever been diagnosed with Fetal Alcohol Effects or Fetal Alcohol Syndrome? No    16. If yes to item 15 B, C, or D: How has this affected your use or been affected by your use?     The patient denied having any history of a learning disability, tutoring in math or English or being diagnosed with Fetal Alcohol Effects or Fetal Alcohol Syndrome.    Dimension III Ratings   Emotional/Behavioral/Cognitive - The placing authority must use the criteria in Dimension III to determine a client s emotional, behavioral, and cognitive conditions and complications.   RISK DESCRIPTIONS - Severity ratin Client has difficulty with impulse control and lacks coping skills. Client has thoughts of suicide or harm to others without means; however, the thoughts may interfere with participation in some treatment activities. Client has difficulty functioning in significant life areas. Client has moderate symptoms of emotional, behavioral, or cognitive problems. Client is able to participate in most treatment  "activities.    REASONS SEVERITY WAS ASSIGNED - What current issues might with thinking, feelings or behavior pose barriers to participation in a treatment program? What coping skills or other assets does the person have to offset those issues? Are these problems that can be initially accommodated by a treatment provider? If not, what specialized skills or attributes must a provider have?    Patient denied suicidal ideation. Pt endorses MH dx of: anxiety. Pt reports being prescribed medications for MH, reports compliance except last couple of days before hospitalization, couldn't remember if he was taking them. Pt reports minimal past MH treatment. Pt reports his childhood was \"better than normal\", and he experienced no abuse. Pt lacks sufficient coping skills for managing MH sx of anxiety, and to process various life losses that have occurred including the loss of his home, car, divorce, the death of his girlfriend and the death of his dog. Pt's anxiety, grief and substance use appear to be significantly correlated. Pt appears to have limited insight into his mental health. Pt would benefit from individual therapy to address mental health symptoms, unresolved grief/loss, poor adjustment to life transitions.           DIMENSION IV - Readiness for Change   1. You ve told me what brought you here today. (first section) What do you think the problem really is?     \"Habit, loneliness.\"     2. Tell me how things are going. Ask enough questions to determine whether the person has use related problems or assets that can be built upon in the following areas: Family/friends/relationships; Legal; Financial; Emotional; Educational; Recreational/ leisure; Vocational/employment; Living arrangements (DSM)      Social support: Pt lives with his mother who he cares for, she is elderly and supportive. Pt reports his sister is currently caring for her so he can attend treatment. Lacks sufficient social engagement and sober social " "support.   Legal: Hx of DWI, reports last DWI was in 2013; pt currently has interlock in his car, the amount of required time is extended every time he fails.   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   Emotional: Pt has issues with anxiety, may lack insight into emotions and their connection with substance use.   Educational: Vocational degree  Recreational: Pt engages in recreational activities of bicycling.   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   Living situation: pt lives with his mother, reports this is supportive though pt lacks structure.     3. What activities have you engaged in when using alcohol/other drugs that could be hazardous to you or others (i.e. driving a car/motorcycle/boat, operating machinery, unsafe sex, sharing needles for drugs or tattoos, etc     The patient reported having a history of biking under the influence of alcohol or drugs.    4. How much time do you spend getting, using or getting over using alcohol or drugs? (DSM)     Not sure.     5. Reasons for drinking/drug use (Use the space below to record answers. It may not be necessary to ask each item.)  Like the feeling No   Trying to forget problems Yes   To cope with stress Yes   To relieve physical pain No   To cope with anxiety Yes   To cope with depression No   To relax or unwind Yes   Makes it easier to talk with people No   Partner encourages use No   Most friends drink or use No   To cope with family problems Yes   Afraid of withdrawal symptoms/to feel better Yes   Other (specify)  No     A. What concerns other people about your alcohol or drug use/Has anyone told you that you use too much? What did they say? (DSM)     Yes people have expressed concerns about his use.     B. What did you think about that/ do you think you have a problem with alcohol or drug use?     \"I know.\"     6. What changes are you willing to make? What substance are you willing to stop using? How are you going to do that? Have you tried that before? " "What interfered with your success with that goal?      Pt willing to attend treatment. Yes pt has attempted this, the death of his dog triggered his relapse.     7. What would be helpful to you in making this change?     \"Stay tuned.\"     Dimension IV Ratings   Readiness for Change - The placing authority must use the criteria in Dimension IV to determine a client s readiness for change.   RISK DESCRIPTIONS - Severity ratin Client displays verbal compliance, but lacks consistent behaviors; has low motivation for change; and is passively involved in treatment.    REASONS SEVERITY WAS ASSIGNED - (What information did the person provide that supports your assessment of his or her readiness to change? How aware is the person of problems caused by continued use? How willing is she or he to make changes? What does the person feel would be helpful? What has the person been able to do without help?)      Patient endorses internal motivation to be sober. Patient endorses a willingness to follow treatment recommendations at this time. Patient has a history of non-compliance with treatment recommendations, has hidden use in the past while in IOP but also has admitted to use. Patient has continued to use despite social, emotional, legal consequences. Patient appears to be in the contemplation stage of change within the stages of change model at this time.       DIMENSION V - Relapse, Continued Use, and Continued Problem Potential   1A. In what ways have you tried to control, cut-down or quit your use? If you have had periods of sobriety, how did you accomplish that? What was helpful? What happened to prevent you from continuing your sobriety? (DSM)     Pt has attempted IOP treatment and residential treatment. Pt has attempted to cut down on his own, switch to beer, etc. Pt reports COVID-19 precipitated his relapse.     1B. What were the circumstances of your most recent relapse with mood altering chemicals?    Pt reports he " "was sober from -March following his  discharge from . Pt reports he relapsed in March with COVID-19, no more IOP accountability (all check ins were being done via phone) or meetings.     2. Have you experienced cravings? If yes, ask follow up questions to determine if the person recognizes triggers and if the person has had any success in dealing with them.     Yes, ck with medications.     3. Have you been treated for alcohol/other drug abuse/dependence? Yes.  3B. Number of times(lifetime) (over what period) 2.  3C. Number of times completed treatment (lifetime) 0.  3D. During the past three years have you participated in outpatient and/or residential?  Yes.  3E. When and where? IOP Sept-Dec 2019 [place], discharged to  due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

## 2020-06-11 NOTE — PROGRESS NOTES
[facility], [place]   Psychiatric Progress Note  TTelemedicine Visit: The patient's condition can be safely assessed and treated via synchronous audio and visual telemedicine encounter.   Start Time: 9.15  Stop Time: 9.21  Reason for Telemedicine Visit: Covid-19   Originating Site (Patient Location): [facility]  Distant Site (Provider Location): Provider Remote Setting   Consent: The patient/guardian has verbally consented to: the potential risks and benefits of telemedicine (video visit) versus in person care; bill my insurance or make self-payment for services provided; and responsibility for payment of non-covered services.   Mode of Communication: Video Conference via polycom  As the provider I attest to compliance with applicable laws and regulations related to telemedicine.       The patient/guardian has been notified of the following:   This telemedicine visit is conducted live between you and your clinician. We have found that certain health care needs can be provided without the need for a physical exam. This service lets us provide the care you need with a telemedicine conversation.        Interim history   This is a 60 year old male with 1.  Alcohol use disorder, severe.   2.  Alcohol withdrawal, severe.     3.  Panic disorder. Pt seen in rounds. Patient's mood is good   Energy Level:improving  Sleep:poor  Appetite:better ,improving  motivation interest   Denied any Suicidal/homicidal ideation/plan intent.  Denied any psychosis  No prior suicde attempts  No access to gun  Pt is in alcohol withdrawal still being monitered every 4 hrs for it,   Pt mssa score are monitered  Tolerating meds and has no side effects.              Medications:     Current Facility-Administered Medications   Medication     alum & mag hydroxide-simethicone (MAALOX  ES) suspension 30 mL     atenolol (TENORMIN) tablet 50 mg     calcium carbonate (TUMS) chewable tablet 500 mg     citalopram (celeXA)  tablet 40 mg     diazepam (VALIUM) tablet 5-20 mg     folic acid (FOLVITE) tablet 1 mg     hydrOXYzine (ATARAX) tablet 25 mg     ibuprofen (ADVIL/MOTRIN) tablet 600 mg     lisinopril (ZESTRIL) tablet 10 mg     loperamide (IMODIUM) capsule 2 mg     magnesium hydroxide (MILK OF MAGNESIA) suspension 30 mL     melatonin tablet 3 mg     multivitamin w/minerals (THERA-VIT-M) tablet 1 tablet     nicotine (NICORETTE) gum 2-4 mg     ondansetron (ZOFRAN-ODT) ODT tab 4 mg     simvastatin (ZOCOR) tablet 40 mg     traZODone (DESYREL) tablet 50 mg     vitamin B1 (THIAMINE) tablet 100 mg     Facility-Administered Medications Ordered in Other Encounters   Medication     Self Administer Medications: Behavioral Services             Allergies:   No Known Allergies         Psychiatric Examination:   Blood pressure 121/86, pulse 77, temperature 97.4  F (36.3  C), temperature source Temporal, resp. rate 16, SpO2 96 %.  Weight is 0 lbs 0 oz  There is no height or weight on file to calculate BMI.    A 10-point review of systems is reviewed and is negative except for psychiatric symptoms above.       @vital  Appearance:  awake, alert, appeared as age stated, adequate groomed and slightly unkempt  Attitude:  cooperative  Eye Contact:  good  Mood:   Better  Affect:  congruent   Speech:  clear, coherent normal rate   Psychomotor Behavior:  no evidence of tardive dyskinesia, dystonia, or tics  Thought Process:  logical, linear and goal oriented  Associations:  no loose associations  Thought Content:  no evidence of psychotic thought and active suicidal ideation present  Denied any active suicidal /homicidation ideation plan intent   Insight:  fair  Judgment:  fair  Oriented to:  time, person, and place  Attention Span and Concentration:  intact  Recent and Remote Memory:  intact  Language:  english with appropriate syntax and vocabulary  Fund of Knowledge: appropriate  Muscle Strength and Tone: normal  Gait and Station: Normal                  Labs: Reviewed         DX   1.  Alcohol use disorder, severe.   2.  Alcohol withdrawal, severe.     3.  Panic disorder.    PLAN   Alcohol intoxication/withdrawal, presently is on MSSA protocol with Valium. Continue the same MSSA protocol as ordered. Continue thiamine 100 mg p.o. daily, M.V.I. one p.o. daily and folate 1 mg p.o. Daily  Will continue mssa protocal to detox off alcohol on valium,  Pt is c/o of termor ,  he has sweats, feels shakey he has elevated pulse 91  On mssa client scored scored 6 today and  needed needed 0 mg po as of yet , total dose since admission was 130    MSSA    Eating Disturbances: ate and enjoyed all of it or not applicable  Tremor: 2  Sleep Disturbance: slept through the night or not applicable  Clouding of Sensorium: no evidence  Hallucinations: 0 - none  Quality of Contact: 0 - awareness of examiner and people around him/her  Agitation: 1 - somewhat more than normal activity  Paroxysmal Sweats: 1 - barely perceptible sweating  Temperature: 99.5 or below  Pulse: 1 - 70 to 79  Total MSSA Score: 6      Panic disorder  Continue Celexa 40 mg patient will have hydroxyzine as needed to help him he complained of having a panic attack today morning  He is in withdrawal we will monitor see if it is occurring in the context of withdrawal or independent of it  Laboratory/Imaging: reviewed with patient   Consults: internal medicine consult reviewed  Patient will be treated in therapeutic milieu with appropriate individual and group therapies as described.  PDMP CHECKED     Supportive psychotheraoy provided, chance talked about recovery enviroment, relapse prevention, triggers to use.  Discussed with patient many issues of addiction,triggers, relapse, and establishing a solid recovery program.  Asked pt to be med complinat   Medical diagnoses to be addressed this admission:    Plan:  Transaminitis  Admission labs: , , t-bili 1.2. Baseline LFTs normal in the 20-30s. Hep C ab nonreactive  4/2019. No abdominal or liver imaging  -repeat LFTs with PCP in 4-6 weeks  -avoid hepatotoxins including EtOH    Thrombocytopenia  Platelets on admission 140 with BL normal in the 200s (221 in 10/2019). No evidence of bruising or bleeding.   - if any signs of bleeding repeat CBC  -  Follow up with PCP for repeat CBC and consideration of peripheral smear     HTN  Admission bps in the 180s/110s, now improved in the 101-130/67-80s range.   -continue PTA lisinopril, hold for sbp <100     HLD  Hypertriglyceridemia  Cholesterol panel today: triglycerides 406, total cholesterol 259, HDL 53, non-. In the setting of EtOH abuse.   -continue simvastatin  -encourage healthy diet with decreased processed sugar/carbohydrate  -follow up with PCP for repeat testing and consideration for tirglyceride lowering medication (such as fenofibrate)      Medicine will sign off, please page with any additional concerns.      Dilia White PA-C  Hospitalist Service   Pager: [phone]       Legal Status: voluntary    Safety Assessment:   Checks:  15 min  Precautions: withdrawal precautions  Pt has not required locked seclusion or restraints in the past 24 hours to maintain safety, please refer to RN documentation for further details.  Discussed with patient many issues of addiction,triggers, relapse, and establishing a solid recovery program.  Able to give informed consent:good  Discussed Risks/Benefits/Side Effects/Alternatives: goodAfter discussion of the indications, risks, benefits, alternatives and consequences of no treatment, the patient elects to do treatment

## 2020-06-11 NOTE — PROGRESS NOTES
Case Management Note  6/11/2020    Per pt's IOP counselor Marquis Quintero, Marquis recommends higher level of care for pt. Writer reported this to pt who agreed with this recommendation and is requesting referral to residential level of care. Pt in need of new Rule 25. Provided pt with info on [facility], [facility], [facility], and [facility] to review.     Addendum: Met with pt, completed Rule 25. Pt and writer reviewed residential treatment options and spoke with pt's old counselor Marquis Quintero for his input. Pt ultimately decided to be referred to [facility], and [facility]. Pt signed releases and Rule 25 was sent for review. Pt is okay with going home to wait for a treatment bed so he can spend some time with his mom before he goes. Pt given [facility] transportation number to make a cab ride when his discharge date/time is finalized. Pt's AVS updated.     Qian Hernandez, MACRINA, LADC

## 2020-06-11 NOTE — PLAN OF CARE
MSSA:    5; no valium  11; 10 mg valium given  8; 5 mg valium given    Pt endorses anxiety. Has been visible in milieu. Denies SI/SIB

## 2020-06-11 NOTE — PROGRESS NOTES
" 06/10/20 2221   Art Therapy   Type of Intervention structured groups   Response participates with encouragement    Hours 1   Treatment Detail   (Art Therapy - Heart directive=gratitude/ janey)   Objective- Patients use art media, the creative process & resulting artwork to:explore feelings,reconcile emotions, gain self-awareness/ self esteem, manage behaviors, develop social skills, improve reality orientation, & reduce anxiety /depression.    Outcome- Pt was  engaged,pleasant and cooperative.  The theme of his art was \" regaining strength.\" He did a quite detailed drawing of mountains in [place]. His parents are from there and he said drinking has taken over his life and he hasn't returned there for over 20 years and he would like to go there. He georges the mountains in [place].  "

## 2020-06-11 NOTE — PLAN OF CARE
"Patient was monitored for alcohol detox on MSSA. Scored 7 and 6. Has not received Valium since 20:18 yesterday (6/10/2020). As of 20:00 patient is now Out of Detox.     Patient received PRN Hydroxyzine 50 mg once. During check-in patient discussed desires and motivation to attend treatment. Asked questions about possible discharge tomorrow, will speak to his . Requested PRN Trazodone to assist with sleep. States appetite is \"good.\" Ate 100% dinner. Denies SI, SIB, HIB, and psychotic symptoms.   "

## 2020-06-12 VITALS
RESPIRATION RATE: 16 BRPM | SYSTOLIC BLOOD PRESSURE: 130 MMHG | TEMPERATURE: 98 F | OXYGEN SATURATION: 94 % | DIASTOLIC BLOOD PRESSURE: 74 MMHG | HEART RATE: 85 BPM

## 2020-06-12 PROCEDURE — 25000132 ZZH RX MED GY IP 250 OP 250 PS 637: Performed by: PSYCHIATRY & NEUROLOGY

## 2020-06-12 PROCEDURE — 99239 HOSP IP/OBS DSCHRG MGMT >30: CPT | Mod: 95 | Performed by: PSYCHIATRY & NEUROLOGY

## 2020-06-12 PROCEDURE — 25000132 ZZH RX MED GY IP 250 OP 250 PS 637: Performed by: CLINICAL NURSE SPECIALIST

## 2020-06-12 PROCEDURE — 25000132 ZZH RX MED GY IP 250 OP 250 PS 637: Performed by: PHYSICIAN ASSISTANT

## 2020-06-12 RX ADMIN — MULTIPLE VITAMINS W/ MINERALS TAB 1 TABLET: TAB at 08:50

## 2020-06-12 RX ADMIN — HYDROXYZINE HYDROCHLORIDE 50 MG: 50 TABLET, FILM COATED ORAL at 12:34

## 2020-06-12 RX ADMIN — LISINOPRIL 10 MG: 10 TABLET ORAL at 08:50

## 2020-06-12 RX ADMIN — CITALOPRAM HYDROBROMIDE 40 MG: 40 TABLET ORAL at 08:50

## 2020-06-12 RX ADMIN — THIAMINE HCL TAB 100 MG 100 MG: 100 TAB at 08:50

## 2020-06-12 RX ADMIN — FOLIC ACID 1 MG: 1 TABLET ORAL at 08:50

## 2020-06-12 ASSESSMENT — ACTIVITIES OF DAILY LIVING (ADL)
DRESS: INDEPENDENT
ORAL_HYGIENE: INDEPENDENT
HYGIENE/GROOMING: INDEPENDENT

## 2020-06-12 NOTE — PROGRESS NOTES
Case Management Note  6/12/2020    Received VM from [facility] that pt has been added to waitlist.     Received VM from [facility] requesting Dim 3 be updated for pt's MH needs as their residential is co-occurring. Writer faxed updated Rule 25, and contacted them to notify them of update. Pt updated of status of both of these referrals.     Qian Hernandez, MACRINA, [facility]

## 2020-06-12 NOTE — PROGRESS NOTES
Patient discharged with personal belongings and discharge medications to home. Discharge medication instructions and lab results reviewed. Patient verbalizes understanding. Denies thoughts of self harm.Patient escorted off the unit by Psyche Associate Annemarie YUNG

## 2020-06-12 NOTE — DISCHARGE SUMMARY
[id number] Visit: The patient's condition can be safely assessed and treated via synchronous audio and visual telemedicine encounter.   Start Time: 8.16  Stop Time: 8.22  Reason for Telemedicine Visit: Covid-19   Originating Site (Patient Location): Station [facility]  Distant Site (Provider Location): Provider Remote Setting   Consent: The patient/guardian has verbally consented to: the potential risks and benefits of telemedicine (video visit) versus in person care; bill my insurance or make self-payment for services provided; and responsibility for payment of non-covered services.   Mode of Communication: Video Conference via polycom  As the provider I attest to compliance with applicable laws and regulations related to telemedicine.       The patient/guardian has been notified of the following:   This telemedicine visit is conducted live between you and your clinician. We have found that certain health care needs can be provided without the need for a physical exam. This service lets us provide the care you need with a telemedicine conversation.

## 2020-06-12 NOTE — DISCHARGE SUMMARY
Admit Date:     06/09/2020           REASON FOR TELEMEDICINE:  COVID-19.        DISCHARGE DIAGNOSES:   Axis I:   1.  Alcohol use disorder, severe.   2.  Alcohol withdrawal, completed.   3.  Panic disorder.      Please see detailed admission by Dr. June on 06/10/2020.      HOSPITAL COURSE:  During the hospitalization, the patient was detoxed off alcohol using MSSA protocol on Valium.  The patient completed detox.  He was seen by Dilia White on 06/10/2020.  His energy, motivation, sleep and interest improved.  He had lab work done, which was normal complete metabolic panel, elevated liver enzymes, AST is 118, ALT is 114, most likely from alcoholism, alcoholic hepatitis nonviral suspected.  He has low platelets 140, thrombocytopenia most likely from drinking.  He did not have any suicidal ideation, plan or intent.  He met with the .  His plan is to go home and do outpatient treatment.      DISCHARGE DISPOSITION:  The patient is going home.  He is going to [facility] when a bed is available.  Ideally, I would like for him to go directly, but he wants to go home and wait until there is a bed.      DISCHARGE FOLLOWUP:  He has followup on 07/10 with Dr. Solitario.        DISCHARGE DIAGNOSES:   1.  Celexa 40 mg.   2.  Lisinopril 10 mg.   3.  Melatonin 3 mg.   4.  Naltrexone 50 mg.   5.  Simvastatin 40 mg.        Patient's prognosis is guarded if he relapses.  The patient does not have any suicidal ideation, plan or intent.                    Labs:   No results found for this or any previous visit (from the past 48 hour(s)).  Because this patient meets criteria for an Alcohol Use Disorder, I performed the following brief intervention on the date of this note:              1) Expressed concern that the patient is drinking at unhealthy levels known to increase their risk of alcohol related problems              2) Gave feedback linking alcohol use and health, including personalized feedback explaining how alcohol  use can interact with their medical and/or psychiatric problems, and with prescribed medications.              3) Advised patient to abstain.      DISCHARGE MENTAL STATUS EXAMINATION:  The patient is alert, oriented x3.  Good fund of knowledge.  Good use of language.  Recent and remote memory, language, fund of knowledge are all adequate.  Euthymic mood congruent affect  Speech normal rate/rhythm linear tp no loose asso,The patient does not have any active suicidal or homicidal ideation.  Does not have any auditory or visual hallucination.  Fair insight/judgment At this time, the patient was stable to be discharged.        Pt was not determined to not be a danger to himself or others. At the current time of discharge, the patient does not meet criteria for involuntary hospitalization. On the day of discharge, the patient reports that they do not have suicidal or homicidal ideation and would never hurt themselves or others. Steps taken to minimize risk include: assessing patient s behavior and thought process daily during hospital stay, discharging patient with adequate plan for follow up for mental and physical health and discussing safety plan of returning to the hospital should the patient ever have thoughts of harming themselves or others. Therefore, based on all available evidence including the factors cited above, the patient does not appear to be at imminent risk for self-harm, and is appropriate for outpatient level of care.     Educated about side effects/risk vs benefits /alternative including non treatment.Pt consented to be on medication.     .Total time spent on discharge summary more than 35 min  More than  20 min  planning, coordination of care, medication reconciliation and performance of physical exam on day of discharge.Care was coordinated with unit RN and unit therapist    .   Durga Aguirre   Home Medication Instructions SPENSER:[id number]    Printed on:06/15/20 1311   Medication Information                       citalopram (CELEXA) 40 MG tablet  Take 1 tablet (40 mg) by mouth every morning             hydrOXYzine (ATARAX) 25 MG tablet  TAKE 1-2 TABLETS (25-50 MG) BY MOUTH EVERY 6 HOURS AS NEEDED FOR ANXIETY             lisinopril (PRINIVIL/ZESTRIL) 10 MG tablet  Take 1 tablet (10 mg) by mouth daily             melatonin 3 MG tablet  Take 3 mg by mouth nightly as needed for sleep             naltrexone (DEPADE/REVIA) 50 MG tablet  Take 1 tablet (50 mg) by mouth daily             nicotine (NICODERM CQ) 14 MG/24HR 24 hr patch  Place 1 patch onto the skin every 24 hours             nicotine (NICORETTE) 2 MG gum  Place 1-2 each (2-4 mg) inside cheek as needed for smoking cessation             simvastatin (ZOCOR) 40 MG tablet  TAKE ONE TABLET BY MOUTH AT BEDTIME               MCKYALA WHITEHEAD MD             D: 2020   T: 2020   MT: PK      Name:     JOANNA BANUELOS   MRN:      [id number]        Account:        [id number]   :      1960           Admit Date:     2020                                  Discharge Date:       Document: [id number]       cc: Rohit Solitario MD

## 2020-06-16 ENCOUNTER — TELEPHONE (OUTPATIENT)
Dept: ADDICTION MEDICINE | Facility: CLINIC | Age: 60
End: 2020-06-16

## 2020-06-16 ENCOUNTER — OFFICE VISIT (OUTPATIENT)
Dept: INTERNAL MEDICINE | Facility: CLINIC | Age: 60
End: 2020-06-16
Payer: COMMERCIAL

## 2020-06-16 VITALS
SYSTOLIC BLOOD PRESSURE: 116 MMHG | HEART RATE: 67 BPM | TEMPERATURE: 97.6 F | WEIGHT: 219 LBS | BODY MASS INDEX: 31.35 KG/M2 | OXYGEN SATURATION: 97 % | DIASTOLIC BLOOD PRESSURE: 84 MMHG | HEIGHT: 70 IN

## 2020-06-16 DIAGNOSIS — I10 ESSENTIAL HYPERTENSION: ICD-10-CM

## 2020-06-16 DIAGNOSIS — F10.288 ALCOHOL DEPENDENCE WITH OTHER ALCOHOL-INDUCED DISORDER (H): Primary | ICD-10-CM

## 2020-06-16 DIAGNOSIS — Z91.89 RISK FOR CORONARY ARTERY DISEASE BETWEEN 10% AND 20% IN NEXT 10 YEARS: ICD-10-CM

## 2020-06-16 DIAGNOSIS — E78.1 HYPERTRIGLYCERIDEMIA: ICD-10-CM

## 2020-06-16 PROCEDURE — 99495 TRANSJ CARE MGMT MOD F2F 14D: CPT | Performed by: INTERNAL MEDICINE

## 2020-06-16 RX ORDER — SIMVASTATIN 40 MG
40 TABLET ORAL AT BEDTIME
Qty: 90 TABLET | Refills: 3 | Status: SHIPPED | OUTPATIENT
Start: 2020-06-16 | End: 2021-07-29

## 2020-06-16 RX ORDER — LISINOPRIL 10 MG/1
10 TABLET ORAL DAILY
Qty: 90 TABLET | Refills: 1 | Status: SHIPPED | OUTPATIENT
Start: 2020-06-16 | End: 2021-04-07

## 2020-06-16 RX ORDER — HYDROXYZINE PAMOATE 100 MG
100 CAPSULE ORAL AT BEDTIME
COMMUNITY
End: 2020-06-16

## 2020-06-16 RX ORDER — HYDROXYZINE PAMOATE 100 MG
100 CAPSULE ORAL
Qty: 90 CAPSULE | Refills: 3 | Status: SHIPPED | OUTPATIENT
Start: 2020-06-16 | End: 2020-06-22

## 2020-06-16 ASSESSMENT — MIFFLIN-ST. JEOR: SCORE: 1799.63

## 2020-06-16 NOTE — TELEPHONE ENCOUNTER
Please review referral. Please route back to reception pool #[phone].     Thank you,    Sallie Pineda    [facility]

## 2020-06-16 NOTE — PROGRESS NOTES
Subjective     Durga Aguirre is a 60 year old male who presents to clinic today for the following health issues:    [facility]     Hospital Follow-up Visit:    Hospital/Nursing Home/IP Rehab Facility: [facility]  Date of Admission: 06/09/2020  Date of Discharge: 06/12/2020  Reason(s) for Admission: alcohol use disorder, severe, alcohol withdrawal      Was your hospitalization related to COVID-19? No   Problems taking medications regularly:  None  Medication changes since discharge: None  Problems adhering to non-medication therapy:  None    Summary of hospitalization:  [facility] discharge summary reviewed  Diagnostic Tests/Treatments reviewed.  Follow up needed: none  Other Healthcare Providers Involved in Patient s Care:         None  Update since discharge: improved.       Post Discharge Medication Reconciliation: discharge medications reconciled, continue medications without change.  Plan of care communicated with patient                DISCHARGE DIAGNOSES:   Axis I:   1.  Alcohol use disorder, severe.   2.  Alcohol withdrawal, completed.   3.  Panic disorder.      Please see detailed admission by Dr. June on 06/10/2020.      HOSPITAL COURSE:  During the hospitalization, the patient was detoxed off alcohol using MSSA protocol on Valium.  The patient completed detox.  He was seen by Dilia White on 06/10/2020.  His energy, motivation, sleep and interest improved.  He had lab work done, which was normal complete metabolic panel, elevated liver enzymes, AST is 118, ALT is 114, most likely from alcoholism, alcoholic hepatitis nonviral suspected.  He has low platelets 140, thrombocytopenia most likely from drinking.  He did not have any suicidal ideation, plan or intent.  He met with the .  His plan is to go home and do outpatient treatment.      DISCHARGE DISPOSITION:  The patient is going home.  He is going to [facility] when a bed is available.  Ideally, I would like for him  "to go directly, but he wants to go home and wait until there is a bed.      DISCHARGE FOLLOWUP:  He has followup on 07/10 with Dr. Solitario.        DISCHARGE DIAGNOSES:   1.  Celexa 40 mg.   2.  Lisinopril 10 mg.   3.  Melatonin 3 mg.   4.  Naltrexone 50 mg.   5.  Simvastatin 40 mg.         Since d/c - he feels ok. No alcohol.     did have some minor shakes yesterday.     Reviewed and updated as needed this visit by Provider       Review of Systems   Constitutional, HEENT, cardiovascular, pulmonary, gi and gu systems are negative, except as otherwise noted.      Objective    /84   Pulse 67   Temp 97.6  F (36.4  C) (Temporal)   Ht 1.778 m (5' 10\")   Wt 99.3 kg (219 lb)   SpO2 97%   BMI 31.42 kg/m    Body mass index is 31.42 kg/m .  Physical Exam   GENERAL APPEARANCE: alert, no distress and over weight  HENT: nose and mouth without ulcers or lesions and normal cephalic/atraumatic  NECK: no adenopathy, no asymmetry, masses, or scars and thyroid normal to palpation  RESP: lungs clear to auscultation - no rales, rhonchi or wheezes  CV: regular rates and rhythm, normal S1 S2, no S3 or S4 and no murmur, click or rub  MS: extremities normal- no gross deformities noted  SKIN: no suspicious lesions or rashes    Labs reviewed in Epic        Assessment & Plan     1. Alcohol dependence with other alcohol-induced disorder (H)  -Sobriety is always been the biggest challenge for him.  Doing okay currently on naltrexone.  He did bring up using gabapentin in addition to this.  I have recommended he get an addiction medicine consult to help devise the best pharmacological approach at maintaining his sobriety.  Will be enrolling in outpatient treatment soon as well  - hydrOXYzine (VISTARIL) 100 MG capsule; Take 1 capsule (100 mg) by mouth nightly as needed (anxiety, insomnia)  Dispense: 90 capsule; Refill: 3  - MENTAL HEALTH REFERRAL  - Adult; Addiction Medicine Provider; Addiction Medicine Evaluation & Treatment; Addiction " Medicine Consultation, Evaluation & Treatment [phone]; Alcohol; Medication Assisted Treatment: Alcohol; We will contact you t...    2. Essential hypertension  Continue medication as is  - lisinopril (ZESTRIL) 10 MG tablet; Take 1 tablet (10 mg) by mouth daily  Dispense: 90 tablet; Refill: 1    3. Hypertriglyceridemia  4. Risk for coronary artery disease between 10% and 20% in next 10 years  - simvastatin (ZOCOR) 40 MG tablet; Take 1 tablet (40 mg) by mouth At Bedtime  Dispense: 90 tablet; Refill: 3       See Patient Instructions    No follow-ups on file.    Rohit Solitario MD  [facility]

## 2020-06-17 NOTE — TELEPHONE ENCOUNTER
"Please schedule appointment for patient with  Addiction Medicine Provider   For alcohol use     Our staff will relay the following information: Please offer our patient to call [place]'s assessment line - [phone]. They can ask for a \"chemical assessment\" or \"rule 25\". This will allow them to discuss possible psychosocial treatment options including individual therapy or any group options including outpatient, intensive outpatient, or residential (inpatient) treatment.     Note routed to PCP - Please feel free to reach out to myself with any specific patient care needs as well. I can help address concerns and/or relay the information to the provider who will be scheduled. No need to reply if consult is sufficient.     Guicho Mckeon MD    "

## 2020-06-17 NOTE — TELEPHONE ENCOUNTER
Writer was able to get a hold of patient, he stated that he is no longer interested in seeing an addiction medicine provider. Patient was also informed that his referral is good for up to 1 year. Writer is closing this encounter, no further action needed.       Sallie Pineda    [facility]

## 2020-06-20 DIAGNOSIS — F41.1 GENERALIZED ANXIETY DISORDER: ICD-10-CM

## 2020-06-22 DIAGNOSIS — F10.288 ALCOHOL DEPENDENCE WITH OTHER ALCOHOL-INDUCED DISORDER (H): ICD-10-CM

## 2020-06-22 RX ORDER — HYDROXYZINE PAMOATE 100 MG
100 CAPSULE ORAL
Qty: 90 CAPSULE | Refills: 3 | Status: SHIPPED | OUTPATIENT
Start: 2020-06-22 | End: 2020-06-23

## 2020-06-22 RX ORDER — HYDROXYZINE HYDROCHLORIDE 25 MG/1
TABLET, FILM COATED ORAL
Qty: 60 TABLET | Refills: 10 | OUTPATIENT
Start: 2020-06-22

## 2020-06-23 ENCOUNTER — TELEPHONE (OUTPATIENT)
Dept: INTERNAL MEDICINE | Facility: CLINIC | Age: 60
End: 2020-06-23

## 2020-06-23 DIAGNOSIS — F10.288 ALCOHOL DEPENDENCE WITH OTHER ALCOHOL-INDUCED DISORDER (H): ICD-10-CM

## 2020-06-23 DIAGNOSIS — F41.1 GENERALIZED ANXIETY DISORDER: ICD-10-CM

## 2020-06-23 RX ORDER — HYDROXYZINE HYDROCHLORIDE 25 MG/1
TABLET, FILM COATED ORAL
Qty: 60 TABLET | Refills: 2 | Status: CANCELLED | OUTPATIENT
Start: 2020-06-23

## 2020-06-23 RX ORDER — HYDROXYZINE PAMOATE 100 MG
100 CAPSULE ORAL 3 TIMES DAILY PRN
Qty: 90 CAPSULE | Refills: 1 | Status: SHIPPED | OUTPATIENT
Start: 2020-06-23 | End: 2020-09-11

## 2020-06-23 RX ORDER — HYDROXYZINE PAMOATE 100 MG
CAPSULE ORAL
Status: CANCELLED | OUTPATIENT
Start: 2020-06-23

## 2020-06-23 NOTE — TELEPHONE ENCOUNTER
"Patient calling clinic stating he is going to an in-house treatment for 1 month for alcohol dependency starting Thursday. He picked up refill for hydroxyzine, but was only given 30 capsules. He is concerned because he states, \"In the morning I am a nervous wreck. The directions state to use it only at bedtime, but it is worse in the morning.\"     Patient requests rx be changed to at least BID, and if possible TID. Per chart review, he has had it ordered more frequently in the past for anxiety. Provider to review.          "

## 2020-06-23 NOTE — TELEPHONE ENCOUNTER
Reviewed information  Script changed to TID dosing of vistaril 100 mg   Sent to [facility] pharmacy for patient.

## 2020-06-23 NOTE — TELEPHONE ENCOUNTER
"Thurs AM pt is going to be checking himself in to [facility] for a 1 month inpatient treatment for alcohol.      OV - RX for hydroxyzine was changed from 25mg tablets PRN during the day, to 100mg capsule at night.   Pt takes this RX for anxiety.  However, the 1 time PM dose is not working for him.   He has anxiety throughout the day (that is not controlled w/o taking hydroxyzine PRN). Also, experiencing shaking.     Up to date guidelines show that adult dosing for anxiety for hydroxyzine (Vistaril) are:     s labelin to 100 mg 4 times daily    Pt looked this up. And he is wondering if RX can be changed? So he is able to take tablet as needed during the day, when he is feeling anxious / or shaky. And also take it at night for sleep.  He needs enough to last for 1 month.    "

## 2020-06-29 DIAGNOSIS — F10.288 ALCOHOL DEPENDENCE WITH OTHER ALCOHOL-INDUCED DISORDER (H): ICD-10-CM

## 2020-06-30 ENCOUNTER — DOCUMENTATION ONLY (OUTPATIENT)
Dept: INTERNAL MEDICINE | Facility: CLINIC | Age: 60
End: 2020-06-30

## 2020-06-30 DIAGNOSIS — I10 ESSENTIAL HYPERTENSION: ICD-10-CM

## 2020-06-30 DIAGNOSIS — F10.288 ALCOHOL DEPENDENCE WITH OTHER ALCOHOL-INDUCED DISORDER (H): ICD-10-CM

## 2020-06-30 DIAGNOSIS — E78.1 HYPERTRIGLYCERIDEMIA: Primary | ICD-10-CM

## 2020-06-30 RX ORDER — NALTREXONE HYDROCHLORIDE 50 MG/1
50 TABLET, FILM COATED ORAL DAILY
Qty: 30 TABLET | Refills: 5 | Status: SHIPPED | OUTPATIENT
Start: 2020-06-30 | End: 2020-12-31

## 2020-08-25 DIAGNOSIS — F41.1 GENERALIZED ANXIETY DISORDER: ICD-10-CM

## 2020-08-26 RX ORDER — CITALOPRAM HYDROBROMIDE 40 MG/1
40 TABLET ORAL EVERY MORNING
Qty: 30 TABLET | Refills: 5 | Status: SHIPPED | OUTPATIENT
Start: 2020-08-26 | End: 2021-04-07

## 2020-09-11 ENCOUNTER — TELEPHONE (OUTPATIENT)
Dept: INTERNAL MEDICINE | Facility: CLINIC | Age: 60
End: 2020-09-11

## 2020-09-11 DIAGNOSIS — F10.288 ALCOHOL DEPENDENCE WITH OTHER ALCOHOL-INDUCED DISORDER (H): ICD-10-CM

## 2020-09-11 RX ORDER — HYDROXYZINE PAMOATE 50 MG/1
50-100 CAPSULE ORAL 3 TIMES DAILY PRN
Qty: 180 CAPSULE | Refills: 11 | Status: SHIPPED | OUTPATIENT
Start: 2020-09-11 | End: 2021-08-23

## 2020-09-11 RX ORDER — HYDROXYZINE PAMOATE 100 MG
100 CAPSULE ORAL 3 TIMES DAILY PRN
Qty: 90 CAPSULE | Refills: 1 | Status: SHIPPED | OUTPATIENT
Start: 2020-09-11 | End: 2020-09-11

## 2020-09-11 NOTE — TELEPHONE ENCOUNTER
Cub faxed in a request for hydrOXYzine (VISTARIL) 50 MG capsule instead of the 100mg. Cannot get 100mg so please send new Rx for 50mg, 2 caps, 3 x daily.

## 2020-09-14 NOTE — MR AVS SNAPSHOT
After Visit Summary   4/10/2017    Durga Aguirre    MRN: [id number]           Patient Information     Date Of Birth          1960        Visit Information        Provider Department      4/10/2017 7:40 AM Rohit Solitario MD [facility]        Today's Diagnoses     Dupuytren's contracture of hand    -  1    Essential hypertension          Care Instructions    Stop by the pharmacy and have your BP rechecked there in the next 30 days.    Stop the alcohol         Follow-ups after your visit        Additional Services     ORTHOPEDICS ADULT REFERRAL       Your provider has referred you to: [facility] [phone]   [email or web address]      Please be aware that coverage of these services is subject to the terms and limitations of your health insurance plan.  Call member services at your health plan with any benefit or coverage questions.      Please bring the following to your appointment:    >>   Any x-rays, CTs or MRIs which have been performed.  Contact the facility where they were done to arrange for  prior to your scheduled appointment.    >>   List of current medications   >>   This referral request   >>   Any documents/labs given to you for this referral                  Who to contact     If you have questions or need follow up information about today's clinic visit or your schedule please contact [facility] directly at [phone].  Normal or non-critical lab and imaging results will be communicated to you by MyChart, letter or phone within 4 business days after the clinic has received the results. If you do not hear from us within 7 days, please contact the clinic through MyChart or phone. If you have a critical or abnormal lab result, we will notify you by phone as soon as possible.  Submit refill requests through [employer] or call your pharmacy and they will forward the refill  [employer]NDR[employer] OU: RX ARTIFICIAL TEARS AS NEEDED TO INCREASE COMFORT OU. IF SYMPTOMS PERSIST CONSIDER PUNCTAL PLUGS. "request to us. Please allow 3 business days for your refill to be completed.          Additional Information About Your Visit        MyChart Information     [employer] lets you send messages to your doctor, view your test results, renew your prescriptions, schedule appointments and more. To sign up, go to [email or web address] . Click on \"Log in\" on the left side of the screen, which will take you to the Welcome page. Then click on \"Sign up Now\" on the right side of the page.     You will be asked to enter the access code listed below, as well as some personal information. Please follow the directions to create your username and password.     Your access code is: [email or web address]  Expires: 2017  8:29 AM     Your access code will  in 90 days. If you need help or a new code, please call your [place] clinic or [phone].        Care EveryWhere ID     This is your Care EveryWhere ID. This could be used by other organizations to access your [facility] medical records  [id number]        Your Vitals Were     Pulse Temperature Pulse Oximetry BMI (Body Mass Index)          77 99.8  F (37.7  C) (Oral) 94% 28.91 kg/m2         Blood Pressure from Last 3 Encounters:   04/10/17 (!) 158/94   17 120/70   16 118/70    Weight from Last 3 Encounters:   04/10/17 201 lb 8 oz (91.4 kg)   17 203 lb (92.1 kg)   16 202 lb (91.6 kg)              We Performed the Following     ORTHOPEDICS ADULT REFERRAL        Primary Care Provider Office Phone # Fax #    Rohit Solitario [phone]       [facility] [address] [facility] [address]        Thank you!     Thank you for choosing [facility]  for your care. Our goal is always to provide you with excellent care. Hearing back from our patients is one way we can continue to improve our services. Please take a few minutes to complete the written survey that you may receive in the mail after your visit " with us. Thank you!             Your Updated Medication List - Protect others around you: Learn how to safely use, store and throw away your medicines at www.disposemymeds.org.          This list is accurate as of: 4/10/17  8:29 AM.  Always use your most recent med list.                   Brand Name Dispense Instructions for use    citalopram 40 MG tablet    celeXA    30 tablet    Take 0.5 tablets (20 mg) by mouth daily       metoprolol 25 MG tablet    LOPRESSOR    60 tablet    Take 1 tablet (25 mg) by mouth 2 times daily       simvastatin 40 MG tablet    ZOCOR    90 tablet    Take 1 tablet (40 mg) by mouth At Bedtime

## 2020-10-08 ENCOUNTER — OFFICE VISIT (OUTPATIENT)
Dept: INTERNAL MEDICINE | Facility: CLINIC | Age: 60
End: 2020-10-08
Payer: COMMERCIAL

## 2020-10-08 VITALS
BODY MASS INDEX: 31.5 KG/M2 | HEIGHT: 70 IN | WEIGHT: 220 LBS | OXYGEN SATURATION: 97 % | SYSTOLIC BLOOD PRESSURE: 136 MMHG | TEMPERATURE: 97.6 F | HEART RATE: 78 BPM | DIASTOLIC BLOOD PRESSURE: 86 MMHG

## 2020-10-08 DIAGNOSIS — L72.3 SEBACEOUS CYST: Primary | ICD-10-CM

## 2020-10-08 DIAGNOSIS — F10.288 ALCOHOL DEPENDENCE WITH OTHER ALCOHOL-INDUCED DISORDER (H): ICD-10-CM

## 2020-10-08 PROCEDURE — 99213 OFFICE O/P EST LOW 20 MIN: CPT | Performed by: INTERNAL MEDICINE

## 2020-10-08 ASSESSMENT — MIFFLIN-ST. JEOR: SCORE: 1804.16

## 2020-10-08 NOTE — NURSING NOTE
"Chief Complaint   Patient presents with     Mass     upper L leg near groin area x few months, pt states feels like under skin, not like a pimple, but doesn't think its a hernia     /86   Pulse 78   Temp 97.6  F (36.4  C) (Temporal)   Ht 1.778 m (5' 10\")   Wt 99.8 kg (220 lb)   SpO2 97%   BMI 31.57 kg/m   Estimated body mass index is 31.57 kg/m  as calculated from the following:    Height as of this encounter: 1.778 m (5' 10\").    Weight as of this encounter: 99.8 kg (220 lb).        Health Maintenance due pending provider review:  Declined flu vaccine    Sulma Santos CMA  "

## 2020-10-08 NOTE — PROGRESS NOTES
"Subjective     Durga Aguirre is a 60 year old male who presents to clinic today for the following health issues:    HPI         Chief Complaint   Patient presents with     Mass     upper L leg near groin area x few months, pt states feels like under skin, not like a pimple, but doesn't think its a hernia     Also recently completed inpatient alcohol treatment program.  He notes that since has been drinking.  This is not daily but occasional.    Review of Systems   Constitutional, HEENT, cardiovascular, pulmonary, gi and gu systems are negative, except as otherwise noted.      Objective    /86   Pulse 78   Temp 97.6  F (36.4  C) (Temporal)   Ht 1.778 m (5' 10\")   Wt 99.8 kg (220 lb)   SpO2 97%   BMI 31.57 kg/m    Body mass index is 31.57 kg/m .  Physical Exam   GENERAL APPEARANCE: healthy, alert and no distress   (male): no hernias and sebaceous cyst noted in the left groin.  Not inflamed          Assessment & Plan     Sebaceous cyst  Monitor    Alcohol dependence with other alcohol-induced disorder (H)  Recommended that he continues outpatient treatment.  Consider seeing our addiction medicine specialist naltrexone seems to be doing little to deter his use.  - MENTAL HEALTH REFERRAL  - Adult; Addiction Medicine Provider; Addiction Medicine Evaluation & Treatment; Addiction Medicine Consultation, Evaluation & Treatment [phone]; Alcohol; Medication Assisted Treatment: Alcohol; We will contact you t...     BMI:   Estimated body mass index is 31.57 kg/m  as calculated from the following:    Height as of this encounter: 1.778 m (5' 10\").    Weight as of this encounter: 99.8 kg (220 lb).                No follow-ups on file.    Rohit Solitario MD  [facility]      "

## 2020-12-16 NOTE — TELEPHONE ENCOUNTER
"Today was sawing wood w/ chain saw and took safety goggles off briefly and \"a chunk of wood flew into my eye\". Piece of wood is now stuck under eyelid. Pain in eyeball. He made appt for tomorrow. Advised ED now per guideline. Pt asked about UC and explained why ED is needed for eye injury rather than UC. Discussed guideline home care advice. Pt voiced understanding and agreed to have someone bring him to ED now. Priyanka Solomon RN/FNA        Reason for Disposition    Foreign body (FB) stuck on eyeball  (Exception: contact lens)    Additional Information    Negative: Doesn't sound like foreign body (FB) in the eye    Negative: Foreign body is a piece of chemical    Protocols used: EYE - FOREIGN BODY-ADULT-AH    " Savannah Marrero

## 2020-12-31 ENCOUNTER — OFFICE VISIT (OUTPATIENT)
Dept: INTERNAL MEDICINE | Facility: CLINIC | Age: 60
End: 2020-12-31
Payer: COMMERCIAL

## 2020-12-31 VITALS
SYSTOLIC BLOOD PRESSURE: 136 MMHG | OXYGEN SATURATION: 98 % | DIASTOLIC BLOOD PRESSURE: 88 MMHG | HEIGHT: 70 IN | WEIGHT: 214.8 LBS | HEART RATE: 99 BPM | BODY MASS INDEX: 30.75 KG/M2 | TEMPERATURE: 96 F

## 2020-12-31 DIAGNOSIS — F10.288 ALCOHOL DEPENDENCE WITH OTHER ALCOHOL-INDUCED DISORDER (H): ICD-10-CM

## 2020-12-31 DIAGNOSIS — F41.1 GENERALIZED ANXIETY DISORDER: ICD-10-CM

## 2020-12-31 DIAGNOSIS — N64.4 BREAST PAIN IN MALE: Primary | ICD-10-CM

## 2020-12-31 PROCEDURE — 99214 OFFICE O/P EST MOD 30 MIN: CPT | Performed by: INTERNAL MEDICINE

## 2020-12-31 RX ORDER — BUSPIRONE HYDROCHLORIDE 5 MG/1
TABLET ORAL
Qty: 120 TABLET | Refills: 3 | Status: ON HOLD | OUTPATIENT
Start: 2020-12-31 | End: 2021-03-13

## 2020-12-31 ASSESSMENT — MIFFLIN-ST. JEOR: SCORE: 1780.58

## 2020-12-31 NOTE — NURSING NOTE
"No chief complaint on file.    /88   Pulse 99   Temp 96  F (35.6  C) (Temporal)   Ht 1.778 m (5' 10\")   Wt 97.4 kg (214 lb 12.8 oz)   SpO2 98%   BMI 30.82 kg/m   Estimated body mass index is 30.82 kg/m  as calculated from the following:    Height as of this encounter: 1.778 m (5' 10\").    Weight as of this encounter: 97.4 kg (214 lb 12.8 oz).        Health Maintenance due pending provider review:  Declines flu vacc today        Sulma Santos, YVAN  "

## 2020-12-31 NOTE — PROGRESS NOTES
"Subjective     Durga Aguirre is a 60 year old male who presents to clinic today for the following health issues:    HPI         Complains of area on his left breast which is painful.  Is approximately above his nipple in the breast itself.  Denies any other areas of chest pain or shortness of breath.  Denies any trauma to the breast.  Also denies any breast enlargement or right breast discomfort.     Pt reports recently quit smoking about 3 days ago, but increased alcohol intake-now drinking pint today.  Long history of recurrent alcohol treatment successfully for period time before he relapses.  No longer taking naltrexone.  Feels his anxiety is worse and is seems to always be a driving factor for his alcohol intake      Review of Systems   Constitutional, HEENT, cardiovascular, pulmonary, gi and gu systems are negative, except as otherwise noted.      Objective    /88   Pulse 99   Temp 96  F (35.6  C) (Temporal)   Ht 1.778 m (5' 10\")   Wt 97.4 kg (214 lb 12.8 oz)   SpO2 98%   BMI 30.82 kg/m    Body mass index is 30.82 kg/m .  Physical Exam   GENERAL APPEARANCE: alert, no distress and over weight  BREAST: tenderness 12 o'clock position  L breast , both breasts normal without masses or nipple discharge and no palpable axillary masses or adenopathy  PSYCH:  anxious             Assessment & Plan     Breast pain in male  Nothing on exam other than reproducible breast pain   - MA Diagnostic Digital Left; Future  - US Breast Left Limited 1-3 Quadrants; Future    Generalized anxiety disorder  Alcohol dependence with other alcohol-induced disorder (H)  Start buspar  See mental health   Stop alcohol   - MENTAL HEALTH REFERRAL  - Adult; Assessments and Testing; MH/CD Assessment Center - Assess & Treat; Mental and Chemical Health Evaluation - determine appropriate level of care and admit to program; [facility]: [facility] [phone]; We will contac...         BMI:   Estimated body mass index is 30.82 kg/m  as " "calculated from the following:    Height as of this encounter: 1.778 m (5' 10\").    Weight as of this encounter: 97.4 kg (214 lb 12.8 oz).            See Patient Instructions    No follow-ups on file.    Rohit Solitario MD  [facility]      "

## 2021-01-18 ENCOUNTER — TELEPHONE (OUTPATIENT)
Dept: INTERNAL MEDICINE | Facility: CLINIC | Age: 61
End: 2021-01-18

## 2021-01-18 NOTE — TELEPHONE ENCOUNTER
The studies were ordered due to pain in the breast on exam. If he feels this has resolved and on self exam of his breast he has no pain , ok to hold off.   Will need to call breast center and cancel appt.

## 2021-01-18 NOTE — TELEPHONE ENCOUNTER
Pt wondering if he still needs  The chest imaging?  No longer has chest pain, Says it gradually went away, and he stopped thinking about it, maybe has been gone x 1 week.   Denies SOB, no trouble breathing, no chest pain or pressure.     He wants to know if he should keep appt on 1/21 (Thurs) for mammography and Breast US?

## 2021-03-12 ENCOUNTER — HOSPITAL ENCOUNTER (INPATIENT)
Facility: CLINIC | Age: 61
LOS: 2 days | Discharge: HOME | End: 2021-03-15
Attending: FAMILY MEDICINE | Admitting: PSYCHIATRY & NEUROLOGY
Payer: COMMERCIAL

## 2021-03-12 ENCOUNTER — TELEPHONE (OUTPATIENT)
Dept: BEHAVIORAL HEALTH | Facility: CLINIC | Age: 61
End: 2021-03-12

## 2021-03-12 DIAGNOSIS — F10.220 ALCOHOL DEPENDENCE WITH UNCOMPLICATED INTOXICATION (H): ICD-10-CM

## 2021-03-12 DIAGNOSIS — Z11.52 ENCOUNTER FOR SCREENING LABORATORY TESTING FOR SEVERE ACUTE RESPIRATORY SYNDROME CORONAVIRUS 2 (SARS-COV-2): ICD-10-CM

## 2021-03-12 DIAGNOSIS — F10.229 ALCOHOL DEPENDENCE WITH INTOXICATION WITH COMPLICATION (H): ICD-10-CM

## 2021-03-12 LAB
ALBUMIN SERPL-MCNC: 3.5 G/DL (ref 3.4–5)
ALCOHOL BREATH TEST: 0.28 (ref 0–0.01)
ALP SERPL-CCNC: 126 U/L (ref 40–150)
ALT SERPL W P-5'-P-CCNC: 131 U/L (ref 0–70)
AMPHETAMINES UR QL SCN: NEGATIVE
ANION GAP SERPL CALCULATED.3IONS-SCNC: 12 MMOL/L (ref 3–14)
AST SERPL W P-5'-P-CCNC: 286 U/L (ref 0–45)
BARBITURATES UR QL: NEGATIVE
BASOPHILS # BLD AUTO: 0.1 10E9/L (ref 0–0.2)
BASOPHILS NFR BLD AUTO: 2 %
BENZODIAZ UR QL: NEGATIVE
BILIRUB SERPL-MCNC: 0.8 MG/DL (ref 0.2–1.3)
BUN SERPL-MCNC: 23 MG/DL (ref 7–30)
CALCIUM SERPL-MCNC: 7.8 MG/DL (ref 8.5–10.1)
CANNABINOIDS UR QL SCN: NEGATIVE
CHLORIDE SERPL-SCNC: 102 MMOL/L (ref 94–109)
CO2 SERPL-SCNC: 23 MMOL/L (ref 20–32)
COCAINE UR QL: NEGATIVE
CREAT SERPL-MCNC: 1.64 MG/DL (ref 0.66–1.25)
DIFFERENTIAL METHOD BLD: ABNORMAL
EOSINOPHIL # BLD AUTO: 0.2 10E9/L (ref 0–0.7)
EOSINOPHIL NFR BLD AUTO: 3.7 %
ERYTHROCYTE [DISTWIDTH] IN BLOOD BY AUTOMATED COUNT: 15.9 % (ref 10–15)
ETHANOL UR QL SCN: POSITIVE
GFR SERPL CREATININE-BSD FRML MDRD: 44 ML/MIN/{1.73_M2}
GLUCOSE SERPL-MCNC: 138 MG/DL (ref 70–99)
HCT VFR BLD AUTO: 40.8 % (ref 40–53)
HGB BLD-MCNC: 13.5 G/DL (ref 13.3–17.7)
IMM GRANULOCYTES # BLD: 0 10E9/L (ref 0–0.4)
IMM GRANULOCYTES NFR BLD: 0.4 %
LABORATORY COMMENT REPORT: NORMAL
LYMPHOCYTES # BLD AUTO: 1.5 10E9/L (ref 0.8–5.3)
LYMPHOCYTES NFR BLD AUTO: 30.3 %
MCH RBC QN AUTO: 27.8 PG (ref 26.5–33)
MCHC RBC AUTO-ENTMCNC: 33.1 G/DL (ref 31.5–36.5)
MCV RBC AUTO: 84 FL (ref 78–100)
MONOCYTES # BLD AUTO: 0.3 10E9/L (ref 0–1.3)
MONOCYTES NFR BLD AUTO: 6.1 %
NEUTROPHILS # BLD AUTO: 2.9 10E9/L (ref 1.6–8.3)
NEUTROPHILS NFR BLD AUTO: 57.5 %
NRBC # BLD AUTO: 0 10*3/UL
NRBC BLD AUTO-RTO: 0 /100
OPIATES UR QL SCN: NEGATIVE
PLATELET # BLD AUTO: 196 10E9/L (ref 150–450)
POTASSIUM SERPL-SCNC: 3.6 MMOL/L (ref 3.4–5.3)
PROT SERPL-MCNC: 6.8 G/DL (ref 6.8–8.8)
RBC # BLD AUTO: 4.85 10E12/L (ref 4.4–5.9)
SARS-COV-2 RNA RESP QL NAA+PROBE: NEGATIVE
SODIUM SERPL-SCNC: 137 MMOL/L (ref 133–144)
SPECIMEN SOURCE: NORMAL
WBC # BLD AUTO: 5.1 10E9/L (ref 4–11)

## 2021-03-12 PROCEDURE — 83036 HEMOGLOBIN GLYCOSYLATED A1C: CPT | Performed by: FAMILY MEDICINE

## 2021-03-12 PROCEDURE — 250N000013 HC RX MED GY IP 250 OP 250 PS 637: Performed by: FAMILY MEDICINE

## 2021-03-12 PROCEDURE — 80320 DRUG SCREEN QUANTALCOHOLS: CPT | Performed by: FAMILY MEDICINE

## 2021-03-12 PROCEDURE — C9803 HOPD COVID-19 SPEC COLLECT: HCPCS

## 2021-03-12 PROCEDURE — 80307 DRUG TEST PRSMV CHEM ANLYZR: CPT | Performed by: FAMILY MEDICINE

## 2021-03-12 PROCEDURE — 85025 COMPLETE CBC W/AUTO DIFF WBC: CPT | Performed by: FAMILY MEDICINE

## 2021-03-12 PROCEDURE — 80053 COMPREHEN METABOLIC PANEL: CPT | Performed by: FAMILY MEDICINE

## 2021-03-12 PROCEDURE — HZ2ZZZZ DETOXIFICATION SERVICES FOR SUBSTANCE ABUSE TREATMENT: ICD-10-PCS | Performed by: PSYCHIATRY & NEUROLOGY

## 2021-03-12 PROCEDURE — 82075 ASSAY OF BREATH ETHANOL: CPT

## 2021-03-12 PROCEDURE — 99285 EMERGENCY DEPT VISIT HI MDM: CPT | Mod: 25

## 2021-03-12 PROCEDURE — 87635 SARS-COV-2 COVID-19 AMP PRB: CPT | Performed by: FAMILY MEDICINE

## 2021-03-12 PROCEDURE — 99285 EMERGENCY DEPT VISIT HI MDM: CPT | Performed by: FAMILY MEDICINE

## 2021-03-12 RX ORDER — HYDROXYZINE HYDROCHLORIDE 50 MG/1
50 TABLET, FILM COATED ORAL ONCE
Status: COMPLETED | OUTPATIENT
Start: 2021-03-12 | End: 2021-03-12

## 2021-03-12 RX ORDER — SIMVASTATIN 40 MG
40 TABLET ORAL ONCE
Status: COMPLETED | OUTPATIENT
Start: 2021-03-12 | End: 2021-03-12

## 2021-03-12 RX ADMIN — HYDROXYZINE HYDROCHLORIDE 50 MG: 50 TABLET, FILM COATED ORAL at 23:38

## 2021-03-12 RX ADMIN — SIMVASTATIN 40 MG: 40 TABLET, FILM COATED ORAL at 23:48

## 2021-03-13 PROBLEM — E87.6 HYPOKALEMIA: Status: ACTIVE | Noted: 2021-03-12

## 2021-03-13 PROBLEM — F10.229 ALCOHOL DEPENDENCE WITH INTOXICATION WITH COMPLICATION (H): Status: ACTIVE | Noted: 2021-03-13

## 2021-03-13 LAB
CHOLEST SERPL-MCNC: 287 MG/DL
CK SERPL-CCNC: 77 U/L (ref 30–300)
CREAT SERPL-MCNC: 1.07 MG/DL (ref 0.66–1.25)
GFR SERPL CREATININE-BSD FRML MDRD: 73 ML/MIN/{1.73_M2}
GGT SERPL-CCNC: 584 U/L (ref 0–75)
HBA1C MFR BLD: 5.3 % (ref 0–5.6)
HDLC SERPL-MCNC: 28 MG/DL
LDLC SERPL CALC-MCNC: ABNORMAL MG/DL
NONHDLC SERPL-MCNC: 259 MG/DL
TRIGL SERPL-MCNC: 1276 MG/DL
TSH SERPL DL<=0.005 MIU/L-ACNC: 0.87 MU/L (ref 0.4–4)
VIT B12 SERPL-MCNC: 399 PG/ML (ref 193–986)

## 2021-03-13 PROCEDURE — 36415 COLL VENOUS BLD VENIPUNCTURE: CPT | Performed by: PSYCHIATRY & NEUROLOGY

## 2021-03-13 PROCEDURE — 82607 VITAMIN B-12: CPT | Performed by: PSYCHIATRY & NEUROLOGY

## 2021-03-13 PROCEDURE — 82565 ASSAY OF CREATININE: CPT | Performed by: PSYCHIATRY & NEUROLOGY

## 2021-03-13 PROCEDURE — 84443 ASSAY THYROID STIM HORMONE: CPT | Performed by: PSYCHIATRY & NEUROLOGY

## 2021-03-13 PROCEDURE — 99223 1ST HOSP IP/OBS HIGH 75: CPT | Mod: AI | Performed by: PSYCHIATRY & NEUROLOGY

## 2021-03-13 PROCEDURE — 82550 ASSAY OF CK (CPK): CPT | Performed by: NURSE PRACTITIONER

## 2021-03-13 PROCEDURE — H2032 ACTIVITY THERAPY, PER 15 MIN: HCPCS

## 2021-03-13 PROCEDURE — 99221 1ST HOSP IP/OBS SF/LOW 40: CPT | Performed by: NURSE PRACTITIONER

## 2021-03-13 PROCEDURE — 99207 PR CONSULT E&M CHANGED TO INITIAL LEVEL: CPT | Performed by: NURSE PRACTITIONER

## 2021-03-13 PROCEDURE — 250N000013 HC RX MED GY IP 250 OP 250 PS 637: Performed by: EMERGENCY MEDICINE

## 2021-03-13 PROCEDURE — 82550 ASSAY OF CK (CPK): CPT | Performed by: PSYCHIATRY & NEUROLOGY

## 2021-03-13 PROCEDURE — 80061 LIPID PANEL: CPT | Performed by: PSYCHIATRY & NEUROLOGY

## 2021-03-13 PROCEDURE — 82977 ASSAY OF GGT: CPT | Performed by: PSYCHIATRY & NEUROLOGY

## 2021-03-13 PROCEDURE — 250N000013 HC RX MED GY IP 250 OP 250 PS 637: Performed by: PSYCHIATRY & NEUROLOGY

## 2021-03-13 PROCEDURE — 250N000013 HC RX MED GY IP 250 OP 250 PS 637: Performed by: NURSE PRACTITIONER

## 2021-03-13 PROCEDURE — 128N000004 HC R&B CD ADULT

## 2021-03-13 RX ORDER — SIMVASTATIN 40 MG
40 TABLET ORAL AT BEDTIME
Status: DISCONTINUED | OUTPATIENT
Start: 2021-03-13 | End: 2021-03-13

## 2021-03-13 RX ORDER — AMOXICILLIN 250 MG
1 CAPSULE ORAL 2 TIMES DAILY PRN
Status: DISCONTINUED | OUTPATIENT
Start: 2021-03-13 | End: 2021-03-15 | Stop reason: HOSPADM

## 2021-03-13 RX ORDER — CLONIDINE HYDROCHLORIDE 0.1 MG/1
0.1 TABLET ORAL 3 TIMES DAILY PRN
Status: DISCONTINUED | OUTPATIENT
Start: 2021-03-13 | End: 2021-03-15 | Stop reason: HOSPADM

## 2021-03-13 RX ORDER — LANOLIN ALCOHOL/MO/W.PET/CERES
3 CREAM (GRAM) TOPICAL
Status: DISCONTINUED | OUTPATIENT
Start: 2021-03-13 | End: 2021-03-15 | Stop reason: HOSPADM

## 2021-03-13 RX ORDER — FOLIC ACID 1 MG/1
1 TABLET ORAL DAILY
Status: DISCONTINUED | OUTPATIENT
Start: 2021-03-13 | End: 2021-03-15 | Stop reason: HOSPADM

## 2021-03-13 RX ORDER — LISINOPRIL 10 MG/1
10 TABLET ORAL DAILY
Status: DISCONTINUED | OUTPATIENT
Start: 2021-03-13 | End: 2021-03-15 | Stop reason: HOSPADM

## 2021-03-13 RX ORDER — LOPERAMIDE HCL 2 MG
2 CAPSULE ORAL 4 TIMES DAILY PRN
Status: DISCONTINUED | OUTPATIENT
Start: 2021-03-13 | End: 2021-03-15 | Stop reason: HOSPADM

## 2021-03-13 RX ORDER — DIAZEPAM 5 MG
5 TABLET ORAL ONCE
Status: COMPLETED | OUTPATIENT
Start: 2021-03-13 | End: 2021-03-13

## 2021-03-13 RX ORDER — CITALOPRAM HYDROBROMIDE 40 MG/1
40 TABLET ORAL EVERY MORNING
Status: DISCONTINUED | OUTPATIENT
Start: 2021-03-13 | End: 2021-03-15 | Stop reason: HOSPADM

## 2021-03-13 RX ORDER — CALCIUM CARBONATE 500 MG/1
500 TABLET, CHEWABLE ORAL DAILY PRN
Status: DISCONTINUED | OUTPATIENT
Start: 2021-03-13 | End: 2021-03-15 | Stop reason: HOSPADM

## 2021-03-13 RX ORDER — ONDANSETRON 4 MG/1
4 TABLET, ORALLY DISINTEGRATING ORAL EVERY 6 HOURS PRN
Status: DISCONTINUED | OUTPATIENT
Start: 2021-03-13 | End: 2021-03-15 | Stop reason: HOSPADM

## 2021-03-13 RX ORDER — ATENOLOL 50 MG/1
50 TABLET ORAL DAILY PRN
Status: DISCONTINUED | OUTPATIENT
Start: 2021-03-13 | End: 2021-03-15 | Stop reason: HOSPADM

## 2021-03-13 RX ORDER — DIAZEPAM 5 MG
5-20 TABLET ORAL EVERY 30 MIN PRN
Status: DISCONTINUED | OUTPATIENT
Start: 2021-03-13 | End: 2021-03-15 | Stop reason: HOSPADM

## 2021-03-13 RX ORDER — CLONIDINE HYDROCHLORIDE 0.1 MG/1
0.1 TABLET ORAL ONCE
Status: COMPLETED | OUTPATIENT
Start: 2021-03-13 | End: 2021-03-13

## 2021-03-13 RX ORDER — MULTIPLE VITAMINS W/ MINERALS TAB 9MG-400MCG
1 TAB ORAL DAILY
Status: DISCONTINUED | OUTPATIENT
Start: 2021-03-13 | End: 2021-03-15 | Stop reason: HOSPADM

## 2021-03-13 RX ORDER — LANOLIN ALCOHOL/MO/W.PET/CERES
100 CREAM (GRAM) TOPICAL DAILY
Status: DISCONTINUED | OUTPATIENT
Start: 2021-03-13 | End: 2021-03-15 | Stop reason: HOSPADM

## 2021-03-13 RX ORDER — ATORVASTATIN CALCIUM 40 MG/1
40 TABLET, FILM COATED ORAL EVERY EVENING
Status: DISCONTINUED | OUTPATIENT
Start: 2021-03-13 | End: 2021-03-15 | Stop reason: HOSPADM

## 2021-03-13 RX ORDER — CARBOXYMETHYLCELLULOSE SODIUM 5 MG/ML
1 SOLUTION/ DROPS OPHTHALMIC
Status: DISCONTINUED | OUTPATIENT
Start: 2021-03-13 | End: 2021-03-15 | Stop reason: HOSPADM

## 2021-03-13 RX ORDER — HYDROXYZINE HYDROCHLORIDE 50 MG/1
50-100 TABLET, FILM COATED ORAL 3 TIMES DAILY PRN
Status: DISCONTINUED | OUTPATIENT
Start: 2021-03-13 | End: 2021-03-15 | Stop reason: HOSPADM

## 2021-03-13 RX ORDER — MAGNESIUM HYDROXIDE/ALUMINUM HYDROXICE/SIMETHICONE 120; 1200; 1200 MG/30ML; MG/30ML; MG/30ML
30 SUSPENSION ORAL EVERY 4 HOURS PRN
Status: DISCONTINUED | OUTPATIENT
Start: 2021-03-13 | End: 2021-03-15 | Stop reason: HOSPADM

## 2021-03-13 RX ADMIN — DIAZEPAM 10 MG: 5 TABLET ORAL at 16:17

## 2021-03-13 RX ADMIN — DIAZEPAM 10 MG: 5 TABLET ORAL at 20:03

## 2021-03-13 RX ADMIN — MELATONIN TAB 3 MG 3 MG: 3 TAB at 21:57

## 2021-03-13 RX ADMIN — DIAZEPAM 10 MG: 5 TABLET ORAL at 12:09

## 2021-03-13 RX ADMIN — FOLIC ACID 1 MG: 1 TABLET ORAL at 08:38

## 2021-03-13 RX ADMIN — MULTIPLE VITAMINS W/ MINERALS TAB 1 TABLET: TAB at 08:38

## 2021-03-13 RX ADMIN — DIAZEPAM 5 MG: 5 TABLET ORAL at 00:28

## 2021-03-13 RX ADMIN — ATORVASTATIN CALCIUM 40 MG: 40 TABLET, FILM COATED ORAL at 20:03

## 2021-03-13 RX ADMIN — DIAZEPAM 10 MG: 5 TABLET ORAL at 08:38

## 2021-03-13 RX ADMIN — HYDROXYZINE HYDROCHLORIDE 100 MG: 50 TABLET, FILM COATED ORAL at 20:02

## 2021-03-13 RX ADMIN — LISINOPRIL 10 MG: 10 TABLET ORAL at 08:38

## 2021-03-13 RX ADMIN — DIAZEPAM 10 MG: 5 TABLET ORAL at 18:05

## 2021-03-13 RX ADMIN — CITALOPRAM HYDROBROMIDE 40 MG: 40 TABLET ORAL at 09:09

## 2021-03-13 RX ADMIN — MELATONIN TAB 3 MG 3 MG: 3 TAB at 02:39

## 2021-03-13 RX ADMIN — THIAMINE HCL TAB 100 MG 100 MG: 100 TAB at 08:38

## 2021-03-13 RX ADMIN — CLONIDINE HYDROCHLORIDE 0.1 MG: 0.1 TABLET ORAL at 09:09

## 2021-03-13 ASSESSMENT — MIFFLIN-ST. JEOR: SCORE: 1749.27

## 2021-03-13 ASSESSMENT — ACTIVITIES OF DAILY LIVING (ADL)
ORAL_HYGIENE: INDEPENDENT
HYGIENE/GROOMING: INDEPENDENT
DRESS: INDEPENDENT

## 2021-03-13 NOTE — PROGRESS NOTES
SPIRITUAL HEALTH SERVICES  SPIRITUAL ASSESSMENT Progress Note  [facility] [place] [place]     REFERRAL SOURCE: request at admission for chaplaincy care    In consultation with unit staff, determined that tomorrow would be more appropriate for a visit.    PLAN: Will communicate with unit . SHS remains available, as needed.    Lore Yoo  Chaplain Resident  Pager: [phone]

## 2021-03-13 NOTE — ED NOTES
Patient reported that he has been in a detox facility. Writer reminded patient that it is a lock unit, no cellphones allowed. Patient cannot leave the unit until the doctor will see and talk to him. Patient verbalized understanding.

## 2021-03-13 NOTE — ED TRIAGE NOTES
Detox from alcohol; 1 qt daily consumption; pt has been drinking since last year (march 17; trigger caused him to start drinking).  Last drink 1 hour PTA

## 2021-03-13 NOTE — CONSULTS
Internal Medicine Initial Visit      Durga Aguirre MRN# [id number]   YOB: 1960 Age: 61 year old   Date of Admission: 3/12/2021  PCP: Rohit Solitario    Referring Provider: Behavioral Health - Mame June MD  Reason for Visit: General Medical Evaluation          Assessment and Recommendations:   Durga Aguirre is a 61 year old year old male with a history of alcohol use disorder, hyperlipidemia, arthritis, and hypertension who is admitted to station 3 a with alcohol withdrawal. Internal Medicine consultation was ordered for general medical evaluation       Alcohol use disorder:  Alcohol withdrawal:  -Management per psychiatry team.  -Continues on MSSA protocol with Valium    HTN:  -As needed clonidine for SBP greater than 160 and/or DBP greater than 100  -Continue lisinopril 10 mg daily    Transaminitis:  Consistently presents in similar fashion when detoxing from alcohol.  No current concern for acute liver failure or viral process.  Ratio AST and ALT consistent with alcohol use.    -Recommend recheck LFTs post-discharge with PCP    Hyperlipidemia:  Severe hypertriglyceridemia:  Not a new diagnosis.  Very likely exacerbated by alcohol use disorder.  Triglycerides on 3/13: 1276 mg/dL.  Total cholesterol is 287 mg/dL.  Risk/benefit regarding treatment in the setting of his elevated liver enzymes due to alcohol use.  As his triglycerides are extremely high and the relative risk of LFT elevation from statin use is low, I would favor keeping him on a statin and follow with primary care.    -Change simvastatin to atorvastatin because it has a greater effect lowering triglycerides while also lowering cholesterol.    -Would not start on fenofibrate in addition to a statin at this time in setting of severe alcohol use disorder with elevated LFTs.  Double agents would increase risk of further elevating LFTs.    -Patient needs to follow-up with PCP in 2 to 3 weeks after discharge for recheck of his  "cholesterol and triglycerides.    JOSE F, resolved:  Apparent JOSE F with creatinine 1.64 on admission.  Rechecked this morning and now 1.07.  No further concern or need to recheck.    Medicine will sign off, please page with any additional concerns.     Adelina Vargas  Hospitalist Service   Pager: [phone]  7a-6p M-F and 7a-3p weekends/holidays, through 3/14  Otherwise page job code [phone] (3B), [phone] (3A), or [phone] ([facility] and )  Text paging via Mavatar is appreciated.   Reason for Admission:   Alcohol detox         History of Present Illness:   History is obtained from the patient and medical record.     Durga Aguirre is a 61 year old year old male with a history as listed above admitted to behavioral health for detoxification from alcohol.    Internal Medicine service was asked to see patient for a general medication evaluation. Currently, patient is resting quietly in his room.  He states that he feels a little better than last night.  He has \"shakes\" and headache.  He says that the Valium is helping him and asks when he can have another dose.  His only request is eyedrops for some dry eye.          Review of Systems:   Constitutional: negative for fever/chills  Eyes: negative for vision changes.  Complaint of dry eye.  Ears/Nose/Throat: negative for ear pain, sore throat, nasal or sinus congestion   Cardiovascular: negative for chest pain or palpitations   Respiratory: negative for cough or shortness of breath   Gastrointestinal: negative for abdominal pain, N/V, diarrhea or constipation   Genitourinary: negative for dysuria, urinary urgency or frequency   Musculoskeletal: negative for joint pain   Neurologic: Positive for headache.  Negative for weakness of extremities.          Past Medical History:   Reviewed and updated in Epic.  Past Medical History:   Diagnosis Date     Alcohol abuse- remission 2/18/2013     Arthritis      Hypertension      Other spontaneous pneumothorax 1997             Past " Surgical History:   Reviewed and updated in Epic.  Past Surgical History:   Procedure Laterality Date     COLONOSCOPY       HC EXCISION PARTIAL TALUS OR CALCANEUS, BONE      fx calcaneus- 9 screws in foot             Social History:     Social History     Tobacco Use     Smoking status: Former Smoker     Packs/day: 0.25     Years: 20.00     Pack years: 5.00     Types: Cigarettes     Smokeless tobacco: Never Used     Tobacco comment: 2-3 cigarettes per day    Substance Use Topics     Alcohol use: No     Alcohol/week: 0.0 standard drinks     Comment: restarted may 2016     Drug use: No             Family History:   Reviewed and updated in Epic.  Family History   Problem Relation Age of Onset     No Known Problems Mother      Lymphoma Father      No Known Problems Maternal Grandmother      No Known Problems Maternal Grandfather      No Known Problems Paternal Grandmother      No Known Problems Paternal Grandfather      No Known Problems Sister      No Known Problems Son      No Known Problems Brother      No Known Problems Daughter      No Known Problems Other      Unknown/Adopted No family hx of      Depression No family hx of      Anxiety Disorder No family hx of      Schizophrenia No family hx of      Bipolar Disorder No family hx of      Suicide No family hx of      Substance Abuse No family hx of      Dementia No family hx of      Alcona Disease No family hx of      Parkinsonism No family hx of      Autism Spectrum Disorder No family hx of      Intellectual Disability (Mental Retardation) No family hx of      Mental Illness No family hx of              Allergies:   No Known Allergies          Medications:     Medications Prior to Admission   Medication Sig Dispense Refill Last Dose     hydrOXYzine (VISTARIL) 50 MG capsule Take 1-2 capsules ( mg) by mouth 3 times daily as needed for itching or anxiety 180 capsule 11 3/12/2021 at Unknown time     lisinopril (ZESTRIL) 10 MG tablet Take 1 tablet (10 mg) by  "mouth daily 90 tablet 1 3/11/2021 at Unknown time     simvastatin (ZOCOR) 40 MG tablet Take 1 tablet (40 mg) by mouth At Bedtime 90 tablet 3 3/11/2021 at Unknown time     busPIRone (BUSPAR) 5 MG tablet Start at 5 mg twice daily for 3 days, then 7.5 mg (1.5 tabs) twice daily for 3 days, then 10 mg (2 tabs) twice daily 120 tablet 3 Unknown at Unknown time     citalopram (CELEXA) 40 MG tablet Take 1 tablet (40 mg) by mouth every morning 30 tablet 5 Unknown at Unknown time     melatonin 3 MG tablet Take 3 mg by mouth nightly as needed for sleep   More than a month at Unknown time        Current Facility-Administered Medications   Medication     alum & mag hydroxide-simethicone (MAALOX) suspension 30 mL     atenolol (TENORMIN) tablet 50 mg     calcium carbonate (TUMS) chewable tablet 500 mg     carboxymethylcellulose PF (REFRESH PLUS) 0.5 % ophthalmic solution 1 drop     citalopram (celeXA) tablet 40 mg     cloNIDine (CATAPRES) tablet 0.1 mg     diazepam (VALIUM) tablet 5-20 mg     folic acid (FOLVITE) tablet 1 mg     hydrOXYzine (ATARAX) tablet  mg     lisinopril (ZESTRIL) tablet 10 mg     loperamide (IMODIUM) capsule 2 mg     melatonin tablet 3 mg     multivitamin w/minerals (THERA-VIT-M) tablet 1 tablet     ondansetron (ZOFRAN-ODT) ODT tab 4 mg     senna-docusate (SENOKOT-S/PERICOLACE) 8.6-50 MG per tablet 1 tablet     simvastatin (ZOCOR) tablet 40 mg     thiamine (B-1) tablet 100 mg     Facility-Administered Medications Ordered in Other Encounters   Medication     Self Administer Medications: Behavioral Services            Physical Exam:   Blood pressure 116/75, pulse 76, temperature 97.9  F (36.6  C), temperature source Temporal, resp. rate 16, height 1.727 m (5' 8\"), weight 98 kg (216 lb), SpO2 96 %.  Body mass index is 32.84 kg/m .  GENERAL: Alert and oriented x 3.   HEENT: Anicteric sclera. Mucous membranes dry.  CV: RRR. S1, S2. No murmurs appreciated.   RESPIRATORY: Effort normal on room air. Lungs CTAB " with no wheezing, rales, rhonchi.   GI: Abdomen soft, slightly distended, non tender. No guarding, rigidity or rebound tenderness.  MUSCULOSKELETAL: No joint swelling or tenderness.  NEUROLOGICAL: No focal deficits. Moves all extremities.   EXTREMITIES: No peripheral edema. Intact bilateral pedal pulses.   SKIN: No jaundice. No rashes.           Data:   CBC:  Recent Labs   Lab Test 03/12/21  2207   WBC 5.1   RBC 4.85   HGB 13.5   HCT 40.8   MCV 84   MCH 27.8   MCHC 33.1   RDW 15.9*          CMP:  Recent Labs   Lab Test 03/13/21  0753 03/12/21  2207   NA  --  137   POTASSIUM  --  3.6   CHLORIDE  --  102   OTIS  --  7.8*   CO2  --  23   BUN  --  23   CR 1.07 1.64*   GLC  --  138*   AST  --  286*   ALT  --  131*   BILITOTAL  --  0.8   ALBUMIN  --  3.5   PROTTOTAL  --  6.8   ALKPHOS  --  126       TSH:  TSH   Date Value Ref Range Status   03/13/2021 0.87 0.40 - 4.00 mU/L Final       Tox screen: Positive for alcohol only    Unresulted Labs Ordered in the Past 30 Days of this Admission     No orders found for last 31 day(s).

## 2021-03-13 NOTE — ED NOTES
ED to Behavioral Floor Handoff    SITUATION  Durga Aguirre is a 61 year old male who speaks English and lives in a home alone The patient arrived in the ED by private car from home with a complaint of Alcohol Intoxication  .The patient's current symptoms started/worsened 10 month(s) ago and during this time the symptoms have increased.   In the ED, pt was diagnosed with   Final diagnoses:   None        Initial vitals were: BP: 98/63  Pulse: 71  Temp: 99.2  F (37.3  C)  Resp: 14  Weight: 99.2 kg (218 lb 12.8 oz)  SpO2: 96 %   --------  Is the patient diabetic? No   If yes, last blood glucose? --     If yes, was this treated in the ED? --  --------  Is the patient inebriated (ETOH) Yes or Impaired on other substances? No  MSSA done? Yes  Last MSSA score: --    Were withdrawal symptoms treated? No  Does the patient have a seizure history? No. If yes, date of most recent seizure--  --------  Is the patient patient experiencing suicidal ideation? denies current or recent suicidal ideation     Homicidal ideation? denies current or recent homicidal ideation or behaviors.    Self-injurious behavior/urges? denies current or recent self injurious behavior or ideation.  ------  Was pt aggressive in the ED No  Was a code called No  Is the pt now cooperative? Yes  -------  Meds given in ED: Medications - No data to display   Family present during ED course? No  Family currently present? No    BACKGROUND  Does the patient have a cognitive impairment or developmental disability? No  Allergies: No Known Allergies.   Social demographics are   Social History     Socioeconomic History     Marital status: Single     Spouse name: None     Number of children: 1     Years of education: None     Highest education level: None   Occupational History     Occupation: unemployed     Employer: SELF     Comment: contractor     Occupation: auto body work   Social Needs     Financial resource strain: None     Food insecurity     Worry: None      Inability: None     Transportation needs     Medical: None     Non-medical: None   Tobacco Use     Smoking status: Former Smoker     Packs/day: 0.25     Years: 20.00     Pack years: 5.00     Types: Cigarettes     Smokeless tobacco: Never Used     Tobacco comment: 2-3 cigarettes per day    Substance and Sexual Activity     Alcohol use: No     Alcohol/week: 0.0 standard drinks     Comment: restarted may 2016     Drug use: No     Sexual activity: Not Currently   Lifestyle     Physical activity     Days per week: None     Minutes per session: None     Stress: None   Relationships     Social connections     Talks on phone: None     Gets together: None     Attends Protestant service: None     Active member of club or organization: None     Attends meetings of clubs or organizations: None     Relationship status: None     Intimate partner violence     Fear of current or ex partner: None     Emotionally abused: None     Physically abused: None     Forced sexual activity: None   Other Topics Concern     Parent/sibling w/ CABG, MI or angioplasty before 65F 55M? Not Asked   Social History Narrative     None        ASSESSMENT  Labs results   Labs Ordered and Resulted from Time of ED Arrival Up to the Time of Departure from the ED   ALCOHOL BREATH TEST POCT - Abnormal; Notable for the following components:       Result Value    Alcohol Breath Test 0.275 (*)     All other components within normal limits   DRUG ABUSE SCREEN 6 CHEM DEP URINE ([facility])   CBC WITH PLATELETS DIFFERENTIAL   COMPREHENSIVE METABOLIC PANEL   SARS-COV-2 (COVID-19) VIRUS RT-PCR      Imaging Studies: No results found for this or any previous visit (from the past 24 hour(s)).   Most recent vital signs BP 98/63   Pulse 71   Temp 99.2  F (37.3  C) (Oral)   Resp 14   Wt 99.2 kg (218 lb 12.8 oz)   SpO2 96%   BMI 31.39 kg/m     Abnormal labs/tests/findings requiring intervention:---   Pain control: pt had none  Nausea control: pt had none    RECOMMENDATION  Are  any infection precautions needed (MRSA, VRE, etc.)? No If yes, what infection? --  ---  Does the patient have mobility issues? independently. If yes, what device does the pt use? ---  ---  Is patient on 72 hour hold or commitment? No If on 72 hour hold, have hold and rights been given to patient? No  Are admitting orders written if after 10 p.m. ?No  Tasks needing to be completed:---     Franklin Anglin, RN    [phone] [facility]   [phone] [facility]
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
## 2021-03-13 NOTE — PROGRESS NOTES
03/13/21 0204   Patient Belongings   Did you bring any home meds/supplements to the hospital?  Yes   Disposition of meds  Sent to security/pharmacy per site process   Patient Belongings locker   Patient Belongings Put in Hospital Secure Location (Security or Locker, etc.) cash/credit card;clothing;glasses;money (see comment);wallet;shoes;cell phone/electronics   Belongings Search Yes   Clothing Search Yes   Second Staff Gary GANN   Comment All of patient's belongings are in a bin and his discharge bin. His important cards and cash of $9.00 are in a security envelop in his discharge bin.     BIN:  Shorts, shirt, jacket, a back pack with sunglasses and glasses and shoes and socks.    DISCHARGE BIN: Phone, earphone and wallet    SECURITY ENV # [id number] - [place] ID, 2  [employer] Card, 2 VISA, MasterCard, Go To Card, Cash of $9.00.    A               Admission:  I am responsible for any personal items that are not sent to the safe or pharmacy.  [facility] is not responsible for loss, theft or damage of any property in my possession.    Signature:  _________________________________ Date: _______  Time: _____                                              Staff Signature:  ____________________________ Date: ________  Time: _____      2nd Staff person, if patient is unable/unwilling to sign:    Signature: ________________________________ Date: ________  Time: _____     Discharge:  [facility] has returned all of my personal belongings:    Signature: _________________________________ Date: ________  Time: _____                                          Staff Signature:  ____________________________ Date: ________  Time: _____

## 2021-03-13 NOTE — TELEPHONE ENCOUNTER
S: [facility]. [facility], 61/M, alcohol detox    B: Pt reports drinking a quart daily for the last year after being laid off due to the pandemic. Pt denies DTs and seizures.   Pt reports hx of hypertension  Pt denies sub use     Medically cleared eating, drinking, ambulating indep   Patient cleared and ready for behavioral bed placement: Yes   No covid concerns, test ordered     A: Voluntary     R:     Intake awaiting lab work

## 2021-03-13 NOTE — PLAN OF CARE
"SBAR    S = Situation:   Durga Aguirre is a 61 year old year old male voluntarily admitted to detox from alcohol.      B  = Background:   Patient reported drinking one quart of vodka daily for the past year. Last drink was at approximately 7pm on 3/12/21. Breathalyzer was 0.275 at 2142 on 3/12/2021.    Elevated liver enzymes; ALT=131, KCP=041.    While in the ED, patient was given valium 5mg, hydroxyzine 50mg, and simvastatin 40mg. MSSAs in the ED were 2, 5 and 7.    Patient has a history of anxiety, hyperlipidemia and hypertension.     A  =  Assessment:   Patient was cooperative with the admission process. Safety search was completed; no contrabands found.     Patient endorsed anxiety 5/10 and depression 3/10. Denied SI/SIB.    MSSA=4; patient had mild tremors and anxiety. No significant withdrawal symptoms noted.    Vital Signs: BP (!) 143/90   Pulse 79   Temp 97.1  F (36.2  C) (Temporal)   Resp 18   Ht 1.727 m (5' 8\")   Wt 98 kg (216 lb)   SpO2 96%   BMI 32.84 kg/m    R =   Request or Recommendation:   MSSA protocol was initiated with valium. Psychiatrist and internal medicine to assess. PTA medications that the patient is currently using were ordered. Precaution: withdrawal.  "

## 2021-03-13 NOTE — ED PROVIDER NOTES
[facility] EMERGENCY DEPARTMENT ([facility])    3/12/21        History     Chief Complaint   Patient presents with     Alcohol Intoxication     The history is provided by the patient and medical records.     Durga Aguirre is a 61 year old male with a history of alcohol dependence, HTN, and anxiety who presents to the Emergency Department seeking detox from alcohol. Patient reports he has been drinking one quart vodka daily for the past year since being laid off due to COVID. He states he wakes up with sweats, shakes, and restlessness daily. His last drink was prior to arrival. No history of withdrawal seizures or DTs. No drug use.    Past Medical History  Past Medical History:   Diagnosis Date     Alcohol abuse- remission 2/18/2013     Arthritis      Hypertension      Other spontaneous pneumothorax 1997     Past Surgical History:   Procedure Laterality Date     COLONOSCOPY       HC EXCISION PARTIAL TALUS OR CALCANEUS, BONE      fx calcaneus- 9 screws in foot     hydrOXYzine (VISTARIL) 50 MG capsule  lisinopril (ZESTRIL) 10 MG tablet  simvastatin (ZOCOR) 40 MG tablet  busPIRone (BUSPAR) 5 MG tablet  citalopram (CELEXA) 40 MG tablet  melatonin 3 MG tablet  nicotine (NICORETTE) 2 MG gum      No Known Allergies  Family History  Family History   Problem Relation Age of Onset     No Known Problems Mother      Lymphoma Father      No Known Problems Maternal Grandmother      No Known Problems Maternal Grandfather      No Known Problems Paternal Grandmother      No Known Problems Paternal Grandfather      No Known Problems Sister      No Known Problems Son      No Known Problems Brother      No Known Problems Daughter      No Known Problems Other      Unknown/Adopted No family hx of      Depression No family hx of      Anxiety Disorder No family hx of      Schizophrenia No family hx of      Bipolar Disorder No family hx of      Suicide No family hx of      Substance Abuse No family hx of      Dementia No family hx  of      Shani Disease No family hx of      Parkinsonism No family hx of      Autism Spectrum Disorder No family hx of      Intellectual Disability (Mental Retardation) No family hx of      Mental Illness No family hx of      Social History   Social History     Tobacco Use     Smoking status: Former Smoker     Packs/day: 0.25     Years: 20.00     Pack years: 5.00     Types: Cigarettes     Smokeless tobacco: Never Used     Tobacco comment: 2-3 cigarettes per day    Substance Use Topics     Alcohol use: No     Alcohol/week: 0.0 standard drinks     Comment: restarted may 2016     Drug use: No      Past medical history, past surgical history, medications, allergies, family history, and social history were reviewed with the patient. No additional pertinent items.       Review of Systems   ROS: 14 point ROS neg other than the symptoms noted above in the HPI.  A complete review of systems was performed with pertinent positives and negatives noted in the HPI, and all other systems negative.    Physical Exam   BP: 98/63  Pulse: 71  Temp: 99.2  F (37.3  C)  Resp: 14  Weight: 99.2 kg (218 lb 12.8 oz)  SpO2: 96 %  Physical Exam  Constitutional:       General: He is not in acute distress.     Appearance: He is not diaphoretic.   HENT:      Head: Atraumatic.      Comments: Patient is missing his upper front teeth  Eyes:      General: No scleral icterus.     Pupils: Pupils are equal, round, and reactive to light.   Cardiovascular:      Heart sounds: Normal heart sounds.   Pulmonary:      Effort: No respiratory distress.      Breath sounds: Normal breath sounds.   Abdominal:      General: Bowel sounds are normal.      Palpations: Abdomen is soft.      Tenderness: There is no abdominal tenderness.   Musculoskeletal:         General: No tenderness.   Skin:     General: Skin is warm.      Findings: No rash.   Neurological:      General: No focal deficit present.      Mental Status: He is oriented to person, place, and time.    Psychiatric:         Attention and Perception: Attention normal.         Mood and Affect: Mood is depressed.         Speech: Speech is slurred.         Behavior: Behavior normal.         Thought Content: Thought content normal.         ED Course   9:29 PM  The patient was seen and examined by Miguel Mathews MD in Room ED16A.      Procedures                         Results for orders placed or performed during the hospital encounter of 03/12/21   Drug abuse screen 6 urine (chem dep)     Status: Abnormal   Result Value Ref Range    Amphetamine Qual Urine Negative NEG^Negative    Barbiturates Qual Urine Negative NEG^Negative    Benzodiazepine Qual Urine Negative NEG^Negative    Cannabinoids Qual Urine Negative NEG^Negative    Cocaine Qual Urine Negative NEG^Negative    Ethanol Qual Urine Positive (A) NEG^Negative    Opiates Qualitative Urine Negative NEG^Negative   CBC with platelets differential     Status: Abnormal   Result Value Ref Range    WBC 5.1 4.0 - 11.0 10e9/L    RBC Count 4.85 4.4 - 5.9 10e12/L    Hemoglobin 13.5 13.3 - 17.7 g/dL    Hematocrit 40.8 40.0 - 53.0 %    MCV 84 78 - 100 fl    MCH 27.8 26.5 - 33.0 pg    MCHC 33.1 31.5 - 36.5 g/dL    RDW 15.9 (H) 10.0 - 15.0 %    Platelet Count 196 150 - 450 10e9/L    Diff Method Automated Method     % Neutrophils 57.5 %    % Lymphocytes 30.3 %    % Monocytes 6.1 %    % Eosinophils 3.7 %    % Basophils 2.0 %    % Immature Granulocytes 0.4 %    Nucleated RBCs 0 0 /100    Absolute Neutrophil 2.9 1.6 - 8.3 10e9/L    Absolute Lymphocytes 1.5 0.8 - 5.3 10e9/L    Absolute Monocytes 0.3 0.0 - 1.3 10e9/L    Absolute Eosinophils 0.2 0.0 - 0.7 10e9/L    Absolute Basophils 0.1 0.0 - 0.2 10e9/L    Abs Immature Granulocytes 0.0 0 - 0.4 10e9/L    Absolute Nucleated RBC 0.0    Comprehensive metabolic panel     Status: Abnormal (In process)   Result Value Ref Range    Sodium 137 133 - 144 mmol/L    Potassium 3.6 3.4 - 5.3 mmol/L    Chloride 102 94 - 109 mmol/L    Carbon Dioxide  23 20 - 32 mmol/L    Anion Gap 12 3 - 14 mmol/L    Glucose 138 (H) 70 - 99 mg/dL    Urea Nitrogen 23 7 - 30 mg/dL    Creatinine 1.64 (H) 0.66 - 1.25 mg/dL    GFR Estimate 44 (L) >60 mL/min/[1.73_m2]    GFR Estimate If Black 51 (L) >60 mL/min/[1.73_m2]    Calcium 7.8 (L) 8.5 - 10.1 mg/dL    Bilirubin Total 0.8 0.2 - 1.3 mg/dL    Albumin 3.5 3.4 - 5.0 g/dL    Protein Total 6.8 6.8 - 8.8 g/dL    Alkaline Phosphatase 126 40 - 150 U/L    ALT PENDING 0 - 70 U/L     (H) 0 - 45 U/L   Asymptomatic SARS-CoV-2 COVID-19 Virus (Coronavirus) by PCR     Status: None    Specimen: Nasopharyngeal   Result Value Ref Range    SARS-CoV-2 Virus Specimen Source Nasopharyngeal     SARS-CoV-2 PCR Result NEGATIVE     SARS-CoV-2 PCR Comment (Note)    Alcohol breath test POCT     Status: Abnormal   Result Value Ref Range    Alcohol Breath Test 0.275 (A) 0.00 - 0.01     Medications   hydrOXYzine (VISTARIL) capsule 50 mg (has no administration in time range)   simvastatin (ZOCOR) tablet 40 mg (has no administration in time range)        Assessments & Plan (with Medical Decision Making)   Patient is presenting seeking detox from alcohol.  He has a history significant for longstanding daily use, and develops both physical and psychological symptoms when he attempts to stop drinking.  This has made it impossible for him to stop drinking in the community without assistance.  His alcohol level today 0.275.  Today he presents intoxicated and seeking assistance, and appears to be an appropriate candidate for voluntary detox admission.  Labs were obtained and reviewed, revealing some liver transaminitis which is likely due to his excessive alcohol use, and elevation in his creatinine from previous, but not to a level that would preclude detox admission.  Patient is tolerating oral medications and fluids.  He appears to be an appropriate candidate for voluntary detox admission.    I have reviewed the nursing notes. I have reviewed the findings,  diagnosis, plan and need for follow up with the patient.    New Prescriptions    No medications on file       Final diagnoses:   Alcohol dependence with intoxication with complication (H)     I, Grace Jeong, am serving as a trained medical scribe to document services personally performed by Miguel Mathews MD, based on the provider's statements to me.      IMiguel MD, was physically present and have reviewed and verified the accuracy of this note documented by Grace Jeong.     --  Miguel Mathews MD  [facility] EMERGENCY DEPARTMENT  3/12/2021     Miguel Mathews MD  03/12/21 [id number]

## 2021-03-14 PROCEDURE — 128N000004 HC R&B CD ADULT

## 2021-03-14 PROCEDURE — 250N000013 HC RX MED GY IP 250 OP 250 PS 637: Performed by: PSYCHIATRY & NEUROLOGY

## 2021-03-14 PROCEDURE — H2032 ACTIVITY THERAPY, PER 15 MIN: HCPCS

## 2021-03-14 PROCEDURE — 250N000013 HC RX MED GY IP 250 OP 250 PS 637: Performed by: NURSE PRACTITIONER

## 2021-03-14 RX ADMIN — THIAMINE HCL TAB 100 MG 100 MG: 100 TAB at 08:09

## 2021-03-14 RX ADMIN — FOLIC ACID 1 MG: 1 TABLET ORAL at 08:09

## 2021-03-14 RX ADMIN — ATORVASTATIN CALCIUM 40 MG: 40 TABLET, FILM COATED ORAL at 20:10

## 2021-03-14 RX ADMIN — CITALOPRAM HYDROBROMIDE 40 MG: 40 TABLET ORAL at 08:09

## 2021-03-14 RX ADMIN — HYDROXYZINE HYDROCHLORIDE 100 MG: 50 TABLET, FILM COATED ORAL at 11:49

## 2021-03-14 RX ADMIN — LISINOPRIL 10 MG: 10 TABLET ORAL at 08:09

## 2021-03-14 RX ADMIN — HYDROXYZINE HYDROCHLORIDE 100 MG: 50 TABLET, FILM COATED ORAL at 20:10

## 2021-03-14 RX ADMIN — MELATONIN TAB 3 MG 3 MG: 3 TAB at 21:13

## 2021-03-14 RX ADMIN — MULTIPLE VITAMINS W/ MINERALS TAB 1 TABLET: TAB at 08:09

## 2021-03-14 RX ADMIN — HYDROXYZINE HYDROCHLORIDE 100 MG: 50 TABLET, FILM COATED ORAL at 16:08

## 2021-03-14 ASSESSMENT — ACTIVITIES OF DAILY LIVING (ADL)
DRESS: INDEPENDENT
HYGIENE/GROOMING: INDEPENDENT
ORAL_HYGIENE: INDEPENDENT

## 2021-03-14 NOTE — PLAN OF CARE
Pt participated in Therapeutic Recreation group with focus on stress reduction, leisure education, and acquisition of knowledge and skills. Pt was fully engaged and cooperative in group recreational intervention; leisure inventory. Pt was focused on the written portion for the first part of group and then contributed to the group discussion following. Pt discussed several recreational interests and positive coping skills that are healthy outlets. Pt mood was calm and was appropriate with interactions.

## 2021-03-14 NOTE — PHARMACY-ADMISSION MEDICATION HISTORY
Admission Medication History Completed by Pharmacy    See [facility] Admission Navigator for allergy information, preferred outpatient pharmacy, prior to admission medications and immunization status.     Medication History Sources:     Patient, dispense report     Changes made to PTA medication list (reason):    Added:   o B complex- take 1 tablet po every day, per patient     Deleted:   o Buspirone 5 mg tablet- Take 10 mg two times per day, per patient discontinued because it affected his short term thinking.     Changed: None    Additional Information:    Patient's report lined up nicely with fill history.     Prior to Admission medications    Medication Sig Last Dose Taking? Auth Provider   citalopram (CELEXA) 40 MG tablet Take 1 tablet (40 mg) by mouth every morning 3/13/2021 at Unknown time Yes Rohit Solitario MD   hydrOXYzine (VISTARIL) 50 MG capsule Take 1-2 capsules ( mg) by mouth 3 times daily as needed for itching or anxiety 3/13/2021 at Unknown time Yes Rohit Solitario MD   lisinopril (ZESTRIL) 10 MG tablet Take 1 tablet (10 mg) by mouth daily 3/13/2021 at Unknown time Yes Rohit Solitario MD   melatonin 3 MG tablet Take 3 mg by mouth nightly as needed for sleep Past Month at Unknown time Yes Reported, Patient   simvastatin (ZOCOR) 40 MG tablet Take 1 tablet (40 mg) by mouth At Bedtime 3/13/2021 at Unknown time Yes Rohit Solitario MD   vitamin B complex with vitamin C (VITAMIN  B COMPLEX) tablet Take 1 tablet by mouth daily  Yes Reported, Patient       Date completed: 03/13/21    Medication history completed by: Susy Nava

## 2021-03-14 NOTE — PROGRESS NOTES
03/14/21 0606   Sleep/Rest/Relaxation   Sleep/Rest/Relaxation appears asleep   Night Time # Hours 6.75 hours     MSSA=4 and 3. Patient endorsed mild sweats and tremors. We'll continue to monitor.

## 2021-03-14 NOTE — PLAN OF CARE
"Pt continues to be monitored for alcohol withdrawal. MSSA =  4 and 1  this shift.  Pt symptoms are minimal.  He is visible in the lounge. He is pleasant and cooperative. He denies SI/SIB/HI. Anticipate he will be\"out of detox\" tomorrow.  Discharge plan is still being formulated.  /76   Pulse 66   Temp 97.6  F (36.4  C) (Temporal)   Resp 16   Ht 1.727 m (5' 8\")   Wt 98 kg (216 lb)   SpO2 97%   BMI 32.84 kg/m       "

## 2021-03-14 NOTE — PROGRESS NOTES
"SPIRITUAL HEALTH SERVICES  SPIRITUAL ASSESSMENT Progress Note  [facility] ([facility]) 3AW     REFERRAL SOURCE: Responded to a hospital  request    Brief visit with Durga in which he named feeling better today and looking towards discharge at which time he hopes to \"get reengaged with an AA group and Jew.\" He noted the difficulty of isolation and a desire to rebuild whatever connections and support he can.     PLAN: I will remain available for further support throughout his hospitalization    Gilberto Jacobsen, [facility]  Staff   Pager [phone]    "

## 2021-03-14 NOTE — PLAN OF CARE
Pt scored 3 times on MSSA for alcohol withdrawal, scores of 9, 10, 10 and received a total of 30 mg valium during evening shift. Pt reports increasing agitation and restlessness.    Pt attended afternoon music therapy group. Denied SI

## 2021-03-14 NOTE — PLAN OF CARE
Music Therapy Group note    Total time in session: 55 minutes    Number of patients in group: 6    Scope of service: holistic    Patient progress: initial encounter    Patient response/reaction to treatment intervention(s): Durga was a positive, contributing member of group, polite and engaged in session today.  Noted to have hand shakes/tremors.  Appeared to experience mood elevation and relaxation during group time.  Pleasant.     Sonia Larson, MT-BC  Board-Certified Music Therapist

## 2021-03-15 VITALS
OXYGEN SATURATION: 92 % | RESPIRATION RATE: 16 BRPM | HEIGHT: 68 IN | BODY MASS INDEX: 32.74 KG/M2 | DIASTOLIC BLOOD PRESSURE: 90 MMHG | TEMPERATURE: 97.3 F | WEIGHT: 216 LBS | HEART RATE: 66 BPM | SYSTOLIC BLOOD PRESSURE: 152 MMHG

## 2021-03-15 PROCEDURE — 99239 HOSP IP/OBS DSCHRG MGMT >30: CPT | Performed by: PSYCHIATRY & NEUROLOGY

## 2021-03-15 PROCEDURE — 250N000013 HC RX MED GY IP 250 OP 250 PS 637: Performed by: PSYCHIATRY & NEUROLOGY

## 2021-03-15 PROCEDURE — H0001 ALCOHOL AND/OR DRUG ASSESS: HCPCS | Performed by: COUNSELOR

## 2021-03-15 RX ORDER — LANOLIN ALCOHOL/MO/W.PET/CERES
100 CREAM (GRAM) TOPICAL DAILY
Qty: 30 TABLET | Refills: 0 | Status: ON HOLD | OUTPATIENT
Start: 2021-03-15 | End: 2021-08-01

## 2021-03-15 RX ORDER — LANOLIN ALCOHOL/MO/W.PET/CERES
3 CREAM (GRAM) TOPICAL
Qty: 30 TABLET | Refills: 0 | Status: SHIPPED | OUTPATIENT
Start: 2021-03-15 | End: 2021-11-15

## 2021-03-15 RX ADMIN — THIAMINE HCL TAB 100 MG 100 MG: 100 TAB at 08:07

## 2021-03-15 RX ADMIN — HYDROXYZINE HYDROCHLORIDE 50 MG: 50 TABLET, FILM COATED ORAL at 11:21

## 2021-03-15 RX ADMIN — LISINOPRIL 10 MG: 10 TABLET ORAL at 08:07

## 2021-03-15 RX ADMIN — CITALOPRAM HYDROBROMIDE 40 MG: 40 TABLET ORAL at 08:07

## 2021-03-15 RX ADMIN — MULTIPLE VITAMINS W/ MINERALS TAB 1 TABLET: TAB at 08:07

## 2021-03-15 RX ADMIN — FOLIC ACID 1 MG: 1 TABLET ORAL at 08:07

## 2021-03-15 RX ADMIN — HYDROXYZINE HYDROCHLORIDE 50 MG: 50 TABLET, FILM COATED ORAL at 08:07

## 2021-03-15 NOTE — PROGRESS NOTES
03/15/21 0606   Sleep/Rest/Relaxation   Sleep/Rest/Relaxation appears asleep   Night Time # Hours 6.75 hours     No concerns.

## 2021-03-15 NOTE — PROGRESS NOTES
Case Management Note  3/15/2021    Met with pt in AM to discuss discharge planning. Pt reports a plan to return home, pursue IOP at  [facility], planned to set this up through his primary care doctor. Suggested to pt that case management assist pt with this. Pt agreeable to CM assistance.     Pt completed paperwork which was sent to . Reached out to pt's preferred counselor Marquis, who reports no openings until likely 4/19. Updated pt of this, pt is okay with waiting. AVS updated.     Qian Hernandez, LPCC, LADC

## 2021-03-15 NOTE — PLAN OF CARE
Pt verbalized complete understanding of all discharge instructions. Pt discharged to home transported by med cab.

## 2021-03-15 NOTE — DISCHARGE INSTRUCTIONS
"Behavioral Discharge Planning and Instructions  THANK YOU FOR CHOOSING THE [facility]  3A  [phone]    Summary: You were admitted to Station 3A on 3/12/21 for detoxification from alcohol.  A medical exam was performed that included lab work. You have met with a  and opted to return home, attend IOP at [facility].  Please take care and make your recovery a priority!    Main Diagnosis: Per Dr. Mame June MD;  303.90 (F10.20) Alcohol Use Disorder Severe    Recommendation:  Remain abstinent from all mood-altering chemicals except those prescribed by a medical provider. If problems persist, consider obtaining a chemical dependency assessment (resources below) and following all recommendations.      Disposition: Home    [facility]  Start date/time: Not yet established. Please call Marquis ([phone]) to schedule a start date.   Address: [address] [place] [id number]  Counselor: Marquis [email or web address]  Office: [phone]   About: \"All of our programs are designed to help patients understand the negative effects of alcohol and/or drugs on their lives and relationships. Patients will work with their counselor to develop a personalized plan that outlines treatment goals. By working towards these goals, individuals will develop a positive self-image and learn the coping skills necessary to achieve and maintain recovery.\"    Major Treatments, Procedures and Findings:  You have withdrawn from alcohol using the MSSA protocol with valium.  You have met with a  to develop a treatment plan for discharge.  You have had labs drawn and those results have been reviewed with you. Please take a copy of your lab work with you to your next primary care physician appointment.    Primary Provider:  Rohit Solitario MD  [facility]  Please follow up with primary MD  within 30 days for " "post hospitalization checkup.      Symptoms to Report:  If you experience more anxiety, confusion, sleeplessness, deep sadness or thoughts of suicide, notify your treatment team or notify your primary care physician. IF ANY OF THE SYMPTOMS YOU ARE EXPERIENCING ARE A MEDICAL EMERGENCY CALL 911 IMMEDIATELY.     Lifestyle Adjustment: Adjust your lifestyle to get enough sleep, relaxation, exercise and  good nutrition. Continue to develop healthy coping skills to decrease stress and promote a sober living environment. Do not use alcohol, illegal drugs or addictive medications other than what is currently prescribed. AA, NA, and  Sponsor are excellent resources for support.     General Medication Instructions:   See your medication sheet(s) for instructions.   Take all medicines as directed.  Make no changes unless your doctor suggests them.     DISCHARGE RESOURCES:  Facts about COVID19 at www.cdc.gov/COVID19 and www.[place].gov/covid19    Keeping hands clean is one of the most important steps we can take to avoid getting sick and spreading germs to others.  Please wash your hands frequently and lather with soap for at least 20 seconds!    Resources:   Resources for on line recovery meetings:  *due to covid-19 AA/NA meetings are being held online*  AA meetings can be found online; search for them at: http://aa-intergroup.org/directory.php  AA meetings via [place] for [place] area can be found online at: https://a[place].org/find-a-meeting/holiday-closings/  NA meetings via [place] for [place] area can be found online at: https://sites.[place].com/view/[place]regionofnarcoticsanonymous/home?authuser=2  Www.[employer]  has online resources for meeting and recovery care including Podcast \"Let's Talk:Addiction & Recovery Podcasts  Www.[employer].org   -SMART Recovery - self management for addiction recovery:  www.smartrecBusportaly.org    -Pathways ~ A Health Crisis Resource & Support Center: 881.662.4752.  -[facility] [phone] "   -[facility] Behavioral Intake [phone] or [phone].  -Suicide Awareness Voices of Education (SAVE) (www.save.org): 185-082-RRGU (2592)  -National Suicide Prevention Line (www.mentalhealthmn.org): 649-780-YFGQ (0792)  -National [place] on Mental Illness (www.[place].dionne.org): 579.666.3178 or 725-110-1369.  -Kfvm7yils: text the word LIFE to 81016 for immediate support and crisis intervention  -Mental Health Consumer/Survivor Network of [place] (www.mhcsn.net): 889.187.8229 or 321-014-2429  -Mental Health Association of [place] (www.mentalhealth.org): 279.583.1738 or 750-567-8908     -Substance Abuse and Mental Health Services (www.samhsa.gov)  -Harm Reduction Coalition (www. Harmreduction.org)  -www.prescribetoprevent.org or http://prescribetoprevent.org/video  -Poison control 6-837-739-0700   **[place] Opioid Prevention Coalition: www.opioidcoalition.org    Sober Support Group Information:  AA/NA & Sponsor/Support  -Alcoholics Anonymous (www.alcoholics-anonymous.org): for local information 24 hours/day  -AA Intergroup service office in [place] (http://www.aastpaul.org/) 273.431.6109  -AA Intergroup service office in [facility]: 257.738.4797. (http://www.a[place].org/)  -Narcotics Anonymous (www.nam[place].org) (670) 988-5628   **Sober Fun Activities: www.sober-activities.[employer] [place]/[facility]//[facility] Recovery Connection (MRC)  [facility] connects people seeking recovery to resources that help foster and sustain long-term recovery.  Whether you are seeking resources for treatment, transportation, housing, job training, education, health care or other pathways to recovery, [facility] is a great place to start.    Phone: 557.203.5694.  www.[place]yme.[employer] (Great listing of all types of recovery and non-recovery related resources)    Any follow up concerns:  Nursing questions call the Unit 3A[facility] [place] [phone]  Medical Record call [phone]  Outpatient Behavioral  Intake call [phone]  LP+ Wait List/Bed Availability call [phone]    The entire treatment team has appreciated the opportunity to work with you.  We wish you the best in the future and with your lifelong recovery goals. Please bring this discharge folder with you to all follow up appointments.  It contains your lab results, diagnosis, medication list and discharge recommendations.    THANK YOU FOR CHOOSING THE [facility]

## 2021-03-15 NOTE — PLAN OF CARE
Behavioral Team Discussion: (3/15/2021)    Continued Stay Criteria/Rationale: Patient admitted for Chemical Use Issues.  Plan: The following services will be provided to the patient; psychiatric assessment, medication management, therapeutic milieu, individual and group support, and skills groups.   Participants: 3A Provider: Dr. Mame June MD; 3A RN: Mike Pendleton RN; 3A CM's: Qian Hernandez.  Summary/Recommendation: Providers will assess today for treatment recommendations, discharge planning, and aftercare plans. CM will meet with pt for discharge planning.   Medical/Physical: Pt reports history of arthritis and hypertension  Precautions:   Behavioral Orders   Procedures     Code 1 - Restrict to Unit     Routine Programming     As clinically indicated     Status 15     Every 15 minutes.     Withdrawal precautions     Rationale for change in precautions or plan: N/A  Progress: Initial.

## 2021-03-15 NOTE — PLAN OF CARE
Problem: Alcohol Withdrawal  Goal: Alcohol Withdrawal Symptom Control  Outcome: Completed     Pt has not been medicated for 24 hours for alcohol withdrawal. Pt is now Out Of Detox.    Pt has been visible in milieu. Pt endorses anxiety, denied SI

## 2021-03-15 NOTE — PROGRESS NOTES
"[facility]  UNIVERSAL ADULT DIAGNOSTIC ASSESSMENT - Substance Use Disorder    Provider Name and Credentials: Magui Love [facility]    PATIENT'S NAME: Durga Aguirre  PREFERRED NAME: Durga  PRONOUNS: he/him/his     MRN: [id number]  : 1960   Last 4 SSN: [id number]  ACCT. NUMBER:  [id number]  DATE OF SERVICE: 3/15/2021   START TIME: 3/15/2021 at 12:51 pm  END TIME: 3/15/2021 at 1:36 pm  PREFERRED PHONE: [phone]   May we leave a program related message: Yes  SERVICE MODALITY:  Phone Visit:      Provider verified identity through the following two step process.  Patient provided:  Patient     The patient has been notified of the following:      \"We have found that certain health care needs can be provided without the need for a face to face visit.  This service lets us provide the care you need with a phone conversation.       I will have full access to your [facility] medical record during this entire phone call.   I will be taking notes for your medical record.      Since this is like an office visit, we will bill your insurance company for this service.       There are potential benefits and risks of telephone visits (e.g. limits to patient confidentiality) that differ from in-person visits.?Confidentiality still applies for telephone services, and nobody will record the visit.  It is important to be in a quiet, private space that is free of distractions (including cell phone or other devices) during the visit.??      If during the course of the call I believe a telephone visit is not appropriate, you will not be charged for this service\"     Consent has been obtained for this service by care team member: Yes           Identifying Information:  Patient is a 61 year old,  male who was referred for an assessment by self. The pronoun use throughout this assessment reflects the patient's chosen pronoun. Patient attended the session alone.     Chief Complaint:   The reason for " "seeking services at this time is: \"drinking\"  The problem(s) began at age 14. Patient has attempted to resolve these concerns in the past through treatment.  Patient is in active withdrawal, but is currently admitted to [facility] for medical detoxification and withdrawal monitoring and is not an imminent safety risk to self or others, and may proceed with the assessment interview    Social/Family History:  Patient reported he grew up in [place]. Patient was raised by biological parents. Patient reported that his childhood was \"excellent\". Patient reports he has 1 sister, patient denies history of childhood abuse and neglect. Reports father and mother were drinkers but not problem drinkers.  Patient describes current relationships with family of origin as: positive.      The patient describes his cultural background as \"White, Anabaptist\".  Cultural influences and impact on patient's life structure, values, norms, and healthcare: none identified.  Contextual influences on patient's health include: Individual Factors substance abuse.  Patient identified his preferred language to be English. Patient reported he does not need the assistance of an  or other support involved in therapy.     Patient reports he is not involved in community of maximo activities. Patients reports spirituality impacts his recovery in the following ways: \"no\".     Patient reported had no significant delays in developmental tasks.  Patient's highest education level was associate degree / vocational certificate. Patient identified the following learning problems: none reported.  Patient reports he is able to understand written materials.    Patient reported the following relationship history: single, 1 child.   Patient identified his sexual orientation as heterosexual.  Patient reported having one child(juany).     Patient's current living/housing situation involves staying in own home/apartment.  Patient lives with his " "mother and he reports that housing is stable. Patient identified mother and siblings as part of his support system.  Patient identified the quality of these relationships as good.      Patient reports engaging in the following recreational/leisure activities: \"walking and bicycling\". Patient is currently unemployed.  Patient reports his income is obtained through unemployment income.  Patient does identify finances as a current stressor.      Patient reports upcoming legal court date for disorderly conduct with neighbor.  Patient denies being on probation / parole / under the jurisdiction of the court.    Patient's Strengths and Limitations:  Patient identified the following strengths or resources that will help him succeed in treatment: positive attitude. Things that may interfere with the patient's success in treatment include: \"my mind\".     Personal and Family Medical History:   Patient did report a family history of mental health concerns.  Patient reports the following family history:   Family History   Problem Relation Age of Onset     No Known Problems Mother      Lymphoma Father      No Known Problems Maternal Grandmother      No Known Problems Maternal Grandfather      No Known Problems Paternal Grandmother      No Known Problems Paternal Grandfather      No Known Problems Sister      No Known Problems Son      No Known Problems Brother      No Known Problems Daughter      No Known Problems Other      Unknown/Adopted No family hx of      Depression No family hx of      Anxiety Disorder No family hx of      Schizophrenia No family hx of      Bipolar Disorder No family hx of      Suicide No family hx of      Substance Abuse No family hx of      Dementia No family hx of      Shani Disease No family hx of      Parkinsonism No family hx of      Autism Spectrum Disorder No family hx of      Intellectual Disability (Mental Retardation) No family hx of      Mental Illness No family hx of         Patient " reported the following previous mental health diagnoses: Reports no past MH diagnosis.  Patient reports their primary mental health symptoms include: depression and these do impact his ability to function.   Patient has received mental health services in the past: when in treatment at [facility] in [place].  Psychiatric Hospitalizations: None.  Patient denies a history of civil commitment.  Current mental health services/providers include:  No current providers.    GAIN-SS Tool:    When was the last time that you had significant problems...  a. with feeling very trapped, lonely, sad, blue, depressed or hopeless about the future? Past Month  b. with sleep trouble, such as bad dreams, sleeping restlessly, or falling asleep during the day? Past Month  c. with feeling very anxious, nervous, tense, scared, panicked or like something bad was going to happen?  2 - 12 months ago  d. with becoming very distressed and upset when something reminded you of the past?  2 - 12 months ago  e. with thinking about ending your life or committing suicide?  Never  When was the last time that you did the following things two or more times?  a. Lied or conned to get things you wanted or to avoid having to do something?   Past Month  b. Had a hard time paying attention at school, work or home? Never  c. Had a hard time listening to instructions at school, work or home?  Never  d. Were a bully or threatened other people?  Never  e. Started physical fights with other people?  1+ years ago    Patient has had a physical exam to rule out medical causes for current symptoms.  Date of last physical exam was within the past year. Client was encouraged to follow up with PCP if symptoms were to develop. The patient has a [facility] Provider, who is named Rohit Solitario. Patient reports the following current medical concerns: alcohol use and high blood pressure.  Patient denies any issues with pain..   Patient denies pregnancy, is  male. There are not significant appetite / nutritional concerns / weight changes. Patient does not report a history of an eating disorder. Patient does not report a history of head injury / trauma / cognitive impairment.     Patient reports current meds as:   Outpatient Medications Marked as Taking for the 3/12/21 encounter (Hospital Encounter)   Medication Sig     citalopram (CELEXA) 40 MG tablet Take 1 tablet (40 mg) by mouth every morning     hydrOXYzine (VISTARIL) 50 MG capsule Take 1-2 capsules ( mg) by mouth 3 times daily as needed for itching or anxiety     lisinopril (ZESTRIL) 10 MG tablet Take 1 tablet (10 mg) by mouth daily     melatonin 3 MG tablet Take 1 tablet (3 mg) by mouth nightly as needed for sleep     melatonin 3 MG tablet Take 3 mg by mouth nightly as needed for sleep     simvastatin (ZOCOR) 40 MG tablet Take 1 tablet (40 mg) by mouth At Bedtime     thiamine (B-1) 100 MG tablet Take 1 tablet (100 mg) by mouth daily     vitamin B complex with vitamin C (VITAMIN  B COMPLEX) tablet Take 1 tablet by mouth daily     Current Facility-Administered Medications   Medication     alum & mag hydroxide-simethicone (MAALOX) suspension 30 mL     atenolol (TENORMIN) tablet 50 mg     atorvastatin (LIPITOR) tablet 40 mg     calcium carbonate (TUMS) chewable tablet 500 mg     carboxymethylcellulose PF (REFRESH PLUS) 0.5 % ophthalmic solution 1 drop     citalopram (celeXA) tablet 40 mg     cloNIDine (CATAPRES) tablet 0.1 mg     diazepam (VALIUM) tablet 5-20 mg     folic acid (FOLVITE) tablet 1 mg     hydrOXYzine (ATARAX) tablet  mg     lisinopril (ZESTRIL) tablet 10 mg     loperamide (IMODIUM) capsule 2 mg     melatonin tablet 3 mg     multivitamin w/minerals (THERA-VIT-M) tablet 1 tablet     ondansetron (ZOFRAN-ODT) ODT tab 4 mg     senna-docusate (SENOKOT-S/PERICOLACE) 8.6-50 MG per tablet 1 tablet     thiamine (B-1) tablet 100 mg     Facility-Administered Medications Ordered in Other Encounters    Medication     Self Administer Medications: Behavioral Services       Medication Adherence:  Patient reports taking prescribed medications as prescribed.    Patient Allergies:  No Known Allergies    Medical History:    Past Medical History:   Diagnosis Date     Alcohol abuse- remission 2/18/2013     Arthritis      Hypertension      Other spontaneous pneumothorax 1997       Rating Scales:    PHQ9:    PHQ-9 SCORE 10/28/2019 12/10/2019 12/30/2019   PHQ-9 Total Score 0 0 1   ;      Substance Use:  Patient reported the following biological family members or relatives with chemical health issues: None identified.  Patient has received substance use disorder and/or gambling treatment in the past.  Patient reports the following dates and locations of treatment services:  [facility].  Patient has been to detox.  Patient is not currently receiving any chemical dependency treatment. Patient reports they have attended the following support groups: AA in the past.        Substance Age of first use Pattern and duration of use (include amounts and frequency) Date of last use     Withdrawal potential Route of administration   Has  used Alcohol 13 Age 13: First Use  Age 38: Problem drinking began, daily  Current: Past year, 1 quart/day 3/12/21 Yes oral   Has not used Marijuana   N/A         Has not used Amphetamines   N/A       Has not used Cocaine/ crack    N/A       Has not used Hallucinogens N/A       Has not used Inhalants N/A       Has not used Heroin N/A       Has not used Other Opiates N/A       Has not used Benzodiazepine   N/A       Has not used Barbiturates N/A       Has not used Over the counter meds. N/A       Has not used Caffeine N/A       Has  used Nicotine  16 Reports he quit smoking 3 months prior 3 months No smoked   Has not used other substances not listed above:  Identify:  N/A            Patient reported the following problems as a result of their substance use: management of  "household tasks.  Patient is concerned about substance use.     Patient reports experiencing the following withdrawal symptoms within the past 12 months: none and the following within the past 30 days: sweating, shaky/jittery/tremors, fatigue, sad/depressed feeling, high blood pressure, nausea/vomiting, dizziness, diarrhea, diminished appetite and anxiety/worry.   Patients reports urges to use Alcohol.  Patient reports he has used more Alcohol than intended and over a longer period of time than intended. Patient reports he has had unsuccessful attempts to cut down or control use of Alcohol.  Patient reports longest period of abstinence was 3 years and return to use was due to \"death of a pet\". Patient reports he has needed to use more Alcohol to achieve the same effect.  Patient does  report diminished effect with use of same amount of Alcohol.     Patient does  report a great deal of time is spent in activities necessary to obtain, use, or recover from Alcohol effects.  Patient does  report important social, occupational, or recreational activities are given up or reduced because of Alcohol use.  Alcohol use is continued despite knowledge of having a persistent or recurrent physical or psychological problem that is likely to have caused or exacerbated by use.  Patient reports the following problem behaviors while under the influence of substances: one identified. Patient reports his recovery goals are \"to have more focus and apply myself and do more activities outdoors\".     Patient reports substance use has not impacted his ability to function in a school setting. Patient reports substance use has not impacted his ability to function in a work setting.  Patients demographics and history impact his recovery in the following ways:  None identified.     Patient does have a history of gambling concerns and/or treatment, reports having been in treatment in 2020 in [facility]. Patient does not have other " addictive behaviors he is concerned about.       Dimension Scale Ratings:    Dimension 1 -  Acute Intoxication/Withdrawal: 1 - Minor Problem  Dimension 2 - Biomedical: 1 - Minor Problem  Dimension 3 - Emotional/Behavioral/Cognitive Conditions: 2 - Moderate Problem  Dimension 4 - Readiness to Change:  1 - Minor Problem  Dimension 5 - Relapse/Continued Use/ Continued Problem Potential: 3 - Severe Problem  Dimension 6 - Recovery Environment:  3-Severe Problem    Significant Losses / Trauma / Abuse / Neglect Issues:   Patient did not serve in the .  There are indications or report of significant loss, trauma, abuse or neglect issues related to: death of girlfriend and pet.  Concerns for possible neglect are not present.     Safety Assessment:   Current Safety Concerns:  [place] Suicide Severity Rating Scale (Short Version)  [place] Suicide Severity Rating (Short Version) 6/6/2019 6/9/2020 3/12/2021 3/13/2021   Over the past 2 weeks have you felt down, depressed, or hopeless? no yes yes -   Over the past 2 weeks have you had thoughts of killing yourself? no no no -   Have you ever attempted to kill yourself? no no no -   Q1 Wished to be Dead (Past Month) - no no no   Q2 Suicidal Thoughts (Past Month) - no no no   Q3 Suicidal Thought Method - - no no   Q4 Suicidal Intent without Specific Plan - - no no   Q5 Suicide Intent with Specific Plan - - no no   Q6 Suicide Behavior (Lifetime) - - no no   Required Interventions - - Room searched;Room made safe;Patient searched;Belongings removed;Provider notified -   Interventions - - Monitored via video -     Patient denies current homicidal ideation and behaviors.  Patient denies current self-injurious ideation and behaviors.    Patient denied risk behaviors associated with substance use.  Patient denies any high risk behaviors associated with mental health symptoms.  Patient reports the following current concerns for their personal safety: None.  Patient reports there  are not  firearms in the house.      History of Safety Concerns:  Patient denied a history of homicidal ideation.     Patient denied a history of personal safety concerns.    Patient denied a history of assaultive behaviors.    Patient denied a history of sexual assault behaviors.     Patient denied a history of risk behaviors associated with substance use.  Patient denies any history of high risk behaviors associated with mental health symptoms.  Patient reports the following protective factors: positive relationships positive family connections, forward/future oriented thinking, dedication to family/friends, safe and stable environment, living with other people, daily obligations, effective problem-solving skills and sense of meaning    Risk Plan:  See Recommendations for Safety and Risk Management Plan    Review of Symptoms per patient report:  Substance Use:  vomiting, hangovers, daily use and cravings/urges to use     Diagnostic Criteria:  OP BEH ROYA CRITERIA: Substance is often taken in larger amounts or over a longer period than was intended.  Met for:  Alcohol, There is persistent desire or unsuccessful efforts to cut down or control use of the substance.  Met for:  Alcohol,  A great deal of time is spent in activities necessary to obtain the substance, use the substance, or recover from its effects.  Met for:  Alcohol, Craving, or a strong desire or urge to use the substance.  Met for:  Alcohol, Recurrent use of the substance resulting in a failure to fulfill major role obligations at work, school, or home.  Met for:  Alcohol, Important social, occupational, or recreational activities are given up or reduced because of the substance.  Met for:  Alcohol, Tolerance:  either a need for markedly increased amounts of the substance to achieve the desired effect or a markedly diminished effect with continued use of the dame amount of the substance.  Met for:  Alcohol, Withdrawal:  either patient endorses  characteristic withdrawal syndrome for the substance or the substance (or closely related substance) is taken to relieve or avoid withdrawal symptoms.  Met for:  Alcohol       As evidenced by self report and criteria, client meets the following DSM5 Diagnoses:   Alcohol Use Disorder-Severe    Recommendations:     1. Plan for Safety and Risk Management:   Recommended that patient call 911 or go to the local ED should there be a change in any of these risk factors..            Report to child / adult protection services was NA.     2. Patient's identified chemical dependency will be addressed by outpatient CD treatment.     3. Recommendations for treatment focus:   1)  Complete an Outpatient CD Treatment Program.   2)  Abstain from all mood-altering chemicals unless prescribed by a licensed provider.   3)  Attend weekly 12-step support group meetings.     4)  Actively work with a male sponsor or  through [employer] ([phone]).   5)  Follow all the recommendations of your treatment/medical providers.  6)  Remain law abiding and follow all recommendations of the Courts/PO.  7)  Patient may benefit from obtaining a full mental health evaluation.  8)  Patient could benefit from 1:1 psychotherapy        4.  Mental Health Referrals:   The following referral(s) will be initiated: Intensive Outpatient Chemical Health Treatment . Next Scheduled Appointment: Admission [id number].    5. ROYA Referrals:    Recommendations:  Outpatient Treatment .  Patient reports they are willing to follow these recommendations. Patient does not have a history of opiate use.    6. Records:   These were reviewed at time of assessment.   Information in this assessment was obtained from the medical record and provided by patient who is a good historian. Patient will have open access to their mental health medical record.    [facility] Assessment ID: [id number]  Provider Name/ Credentials:  Magui HUITRON  [facility]  March 15, 2021

## 2021-04-05 DIAGNOSIS — F41.1 GENERALIZED ANXIETY DISORDER: ICD-10-CM

## 2021-04-05 DIAGNOSIS — I10 ESSENTIAL HYPERTENSION: ICD-10-CM

## 2021-04-07 RX ORDER — LISINOPRIL 10 MG/1
TABLET ORAL
Qty: 90 TABLET | Refills: 1 | Status: SHIPPED | OUTPATIENT
Start: 2021-04-07 | End: 2021-08-23

## 2021-04-07 RX ORDER — CITALOPRAM HYDROBROMIDE 40 MG/1
40 TABLET ORAL EVERY MORNING
Qty: 30 TABLET | Refills: 5 | Status: ON HOLD | OUTPATIENT
Start: 2021-04-07 | End: 2021-08-01

## 2021-07-22 ENCOUNTER — HOSPITAL ENCOUNTER (OUTPATIENT)
Dept: MAMMOGRAPHY | Facility: CLINIC | Age: 61
End: 2021-07-22
Attending: INTERNAL MEDICINE
Payer: COMMERCIAL

## 2021-07-22 DIAGNOSIS — N64.4 BREAST PAIN IN MALE: ICD-10-CM

## 2021-07-22 PROCEDURE — 77066 DX MAMMO INCL CAD BI: CPT

## 2021-07-22 PROCEDURE — 76642 ULTRASOUND BREAST LIMITED: CPT | Mod: LT

## 2021-07-26 ENCOUNTER — NURSE TRIAGE (OUTPATIENT)
Dept: INTERNAL MEDICINE | Facility: CLINIC | Age: 61
End: 2021-07-26

## 2021-07-26 ENCOUNTER — TELEPHONE (OUTPATIENT)
Dept: BEHAVIORAL HEALTH | Facility: CLINIC | Age: 61
End: 2021-07-26

## 2021-07-26 NOTE — TELEPHONE ENCOUNTER
PCP:  LEBRON    Patient called concerned with many issues, difficult to get him to focus.    His primary issues are chest pain and enter detox.  See Assessment below for chest pain. Triage assesses as non-cardiac, continuation of breast pain     Patient requesteid a referral for inpatient detox at [facility].  He would like to go into treatment after that. Explained to pt he does not need a referral but can go to [facility].    Marcela Luque, MSN, RN       Additional Information    Negative: Severe difficulty breathing (e.g., struggling for each breath, speaks in single words)    Negative: Passed out (i.e., fainted, collapsed and was not responding)    Negative: Difficult to awaken or acting confused (e.g., disoriented, slurred speech)    Negative: Shock suspected (e.g., cold/pale/clammy skin, too weak to stand, low BP, rapid pulse)    Negative: Chest pain lasting longer than 5 minutes and ANY of the following:* Over 45 years old* Over 30 years old and at least one cardiac risk factor (e.g., diabetes, high blood pressure, high cholesterol, smoker, or strong family history of heart disease)* History of heart disease (i.e., angina, heart attack, heart failure, bypass surgery, takes nitroglycerin)* Pain is crushing, pressure-like, or heavy    Negative: Heart beating < 50 beats per minute OR > 140 beats per minute    Negative: Visible sweat on face or sweat dripping down face    Negative: Sounds like a life-threatening emergency to the triager    Negative: SEVERE chest pain    Negative: Pain also in shoulder(s) or arm(s) or jaw    Negative: Difficulty breathing    Negative: Cocaine use within last 3 days    Negative: Major surgery in the past month    Negative: Hip or leg fracture (broken bone) in past month (or had cast on leg or ankle in past month)    Negative: Illness requiring prolonged bedrest in past month (e.g., immobilization, long hospital stay)    Negative: Long-distance travel in past month (e.g., car,  "bus, train, plane; with trip lasting 6 or more hours)    Negative: History of prior 'blood clot' in leg or lungs (i.e., deep vein thrombosis, pulmonary embolism)    Negative: History of inherited increased risk of blood clots (e.g., Factor 5 Leiden, Anti-thrombin 3, Protein C or Protein S deficiency, Prothrombin mutation)    Negative: Heart beating irregularly or very rapidly    Negative: Chest pain lasting longer than 5 minutes and occurred in last 3 days (72 hours) (Exception: feels exactly the same as previously diagnosed heartburn and has accompanying sour taste in mouth)    Negative: Chest pain or 'angina' comes and goes and is happening more often (increasing in frequency) or getting worse (increasing in severity) (Exception: chest pains that last only a few seconds)    Negative: Dizziness or lightheadedness    Negative: Coughing up blood    Negative: Patient sounds very sick or weak to the triager    Negative: Fever > 100.4 F (38.0 C)    Negative: Chest pain(s) lasting a few seconds persists > 3 days    Negative: Rash in same area as pain (may be described as 'small blisters')    Negative: All other patients with chest pain (Exception: fleeting chest pain lasting a few seconds)    Negative: Patient wants to be seen    Negative: Chest pain(s) lasting a few seconds from coughing    Negative: Chest pain(s) lasting a few seconds    Answer Assessment - Initial Assessment Questions  1. LOCATION: \"Where does it hurt?\"         Upper left chest.    2. RADIATION: \"Does the pain go anywhere else?\" (e.g., into neck, jaw, arms, back)    Denies        3. ONSET: \"When did the chest pain begin?\" (Minutes, hours or days)         Few Months    4. PATTERN \"Does the pain come and go, or has it been constant since it started?\"  \"Does it get worse with exertion?\"     Pain in intermittent.  Moving makes it worse.  Exertion doesn't seem to make it worse.   Is positional     5. DURATION: \"How long does it last\" (e.g., seconds, " "minutes, hours)    Depends on position        6. SEVERITY: \"How bad is the pain?\"  (e.g., Scale 1-10; mild, moderate, or severe)     - MILD (1-3): doesn't interfere with normal activities      - MODERATE (4-7): interferes with normal activities or awakens from sleep     - SEVERE (8-10): excruciating pain, unable to do any normal activities      Varies.  Never above 7    7. CARDIAC RISK FACTORS: \"Do you have any history of heart problems or risk factors for heart disease?\" (e.g., angina, prior heart attack; diabetes, high blood pressure, high cholesterol, smoker, or strong family history of heart disease)    Smoker, now a pipe, Trying to quit        8. PULMONARY RISK FACTORS: \"Do you have any history of lung disease?\"  (e.g., blood clots in lung, asthma, emphysema, birth control pills)        None of the above    9. CAUSE: \"What do you think is causing the chest pain?\"        \"Don't know\"    10. OTHER SYMPTOMS: \"Do you have any other symptoms?\" (e.g., dizziness, nausea, vomiting, sweating, fever, difficulty breathing, cough)          Denies all of the above    11. PREGNANCY: \"Is there any chance you are pregnant?\" \"When was your last menstrual period?\"        NA    Protocols used: CHEST PAIN-A-[place]      "

## 2021-07-26 NOTE — TELEPHONE ENCOUNTER
Patient had left 2 phone messages on Saturday 7/24/21 saying he was tired of drinking and needed to go to detox.  I returned his calls today at 3:30pm and spoke with him.  He was advised to go to detox via [facility].  He realizes he needs residential tx, and expresses that he needs more than a 30 day program.  We discussed [facility], and [facility].  Durga said he needed to finish a couple projects at home and then would call his sponsor to drive him to [facility] in the next couple days.  I told him he has been talking about this for a long time and that now is the time for action.  Pt thanked me for my help and promised to keep in touch.

## 2021-07-29 ENCOUNTER — HOSPITAL ENCOUNTER (INPATIENT)
Facility: CLINIC | Age: 61
LOS: 2 days | Discharge: HOME | End: 2021-08-01
Attending: FAMILY MEDICINE | Admitting: PSYCHIATRY & NEUROLOGY
Payer: COMMERCIAL

## 2021-07-29 DIAGNOSIS — R55 VASOVAGAL SYNCOPE: ICD-10-CM

## 2021-07-29 DIAGNOSIS — Z11.52 ENCOUNTER FOR SCREENING LABORATORY TESTING FOR SEVERE ACUTE RESPIRATORY SYNDROME CORONAVIRUS 2 (SARS-COV-2): ICD-10-CM

## 2021-07-29 DIAGNOSIS — F10.220 ALCOHOL DEPENDENCE WITH UNCOMPLICATED INTOXICATION (H): ICD-10-CM

## 2021-07-29 DIAGNOSIS — F10.229 ALCOHOL DEPENDENCE WITH INTOXICATION WITH COMPLICATION (H): ICD-10-CM

## 2021-07-29 DIAGNOSIS — F32.9 MAJOR DEPRESSIVE DISORDER WITH SINGLE EPISODE, REMISSION STATUS UNSPECIFIED: Primary | ICD-10-CM

## 2021-07-29 LAB
ALBUMIN SERPL-MCNC: 3.8 G/DL (ref 3.4–5)
ALCOHOL BREATH TEST: 0.18 (ref 0–0.01)
ALP SERPL-CCNC: 112 U/L (ref 40–150)
ALT SERPL W P-5'-P-CCNC: 111 U/L (ref 0–70)
ANION GAP SERPL CALCULATED.3IONS-SCNC: 9 MMOL/L (ref 3–14)
AST SERPL W P-5'-P-CCNC: 166 U/L (ref 0–45)
BASOPHILS # BLD AUTO: 0.1 10E3/UL (ref 0–0.2)
BASOPHILS NFR BLD AUTO: 2 %
BILIRUB SERPL-MCNC: 0.5 MG/DL (ref 0.2–1.3)
BUN SERPL-MCNC: 15 MG/DL (ref 7–30)
CALCIUM SERPL-MCNC: 7.8 MG/DL (ref 8.5–10.1)
CHLORIDE BLD-SCNC: 110 MMOL/L (ref 94–109)
CO2 SERPL-SCNC: 23 MMOL/L (ref 20–32)
CREAT SERPL-MCNC: 0.91 MG/DL (ref 0.66–1.25)
EOSINOPHIL # BLD AUTO: 0.3 10E3/UL (ref 0–0.7)
EOSINOPHIL NFR BLD AUTO: 6 %
ERYTHROCYTE [DISTWIDTH] IN BLOOD BY AUTOMATED COUNT: 12.7 % (ref 10–15)
GFR SERPL CREATININE-BSD FRML MDRD: 89 ML/MIN/1.73M2
GLUCOSE BLD-MCNC: 137 MG/DL (ref 70–99)
HCT VFR BLD AUTO: 37.6 % (ref 40–53)
HGB BLD-MCNC: 12.3 G/DL (ref 13.3–17.7)
IMM GRANULOCYTES # BLD: 0 10E3/UL
IMM GRANULOCYTES NFR BLD: 0 %
LYMPHOCYTES # BLD AUTO: 1.2 10E3/UL (ref 0.8–5.3)
LYMPHOCYTES NFR BLD AUTO: 24 %
MAGNESIUM SERPL-MCNC: 2.1 MG/DL (ref 1.6–2.3)
MCH RBC QN AUTO: 29 PG (ref 26.5–33)
MCHC RBC AUTO-ENTMCNC: 32.7 G/DL (ref 31.5–36.5)
MCV RBC AUTO: 89 FL (ref 78–100)
MONOCYTES # BLD AUTO: 0.3 10E3/UL (ref 0–1.3)
MONOCYTES NFR BLD AUTO: 7 %
NEUTROPHILS # BLD AUTO: 2.9 10E3/UL (ref 1.6–8.3)
NEUTROPHILS NFR BLD AUTO: 61 %
NRBC # BLD AUTO: 0 10E3/UL
NRBC BLD AUTO-RTO: 0 /100
PLATELET # BLD AUTO: 170 10E3/UL (ref 150–450)
POTASSIUM BLD-SCNC: 3.5 MMOL/L (ref 3.4–5.3)
PROT SERPL-MCNC: 6.9 G/DL (ref 6.8–8.8)
RBC # BLD AUTO: 4.24 10E6/UL (ref 4.4–5.9)
SARS-COV-2 RNA RESP QL NAA+PROBE: NEGATIVE
SODIUM SERPL-SCNC: 142 MMOL/L (ref 133–144)
TROPONIN I SERPL-MCNC: <0.015 UG/L (ref 0–0.04)
WBC # BLD AUTO: 4.9 10E3/UL (ref 4–11)

## 2021-07-29 PROCEDURE — 84443 ASSAY THYROID STIM HORMONE: CPT | Performed by: NURSE PRACTITIONER

## 2021-07-29 PROCEDURE — 83735 ASSAY OF MAGNESIUM: CPT | Performed by: FAMILY MEDICINE

## 2021-07-29 PROCEDURE — 82607 VITAMIN B-12: CPT | Performed by: NURSE PRACTITIONER

## 2021-07-29 PROCEDURE — 99285 EMERGENCY DEPT VISIT HI MDM: CPT | Mod: 25 | Performed by: FAMILY MEDICINE

## 2021-07-29 PROCEDURE — 85025 COMPLETE CBC W/AUTO DIFF WBC: CPT | Performed by: FAMILY MEDICINE

## 2021-07-29 PROCEDURE — C9803 HOPD COVID-19 SPEC COLLECT: HCPCS

## 2021-07-29 PROCEDURE — U0003 INFECTIOUS AGENT DETECTION BY NUCLEIC ACID (DNA OR RNA); SEVERE ACUTE RESPIRATORY SYNDROME CORONAVIRUS 2 (SARS-COV-2) (CORONAVIRUS DISEASE [COVID-19]), AMPLIFIED PROBE TECHNIQUE, MAKING USE OF HIGH THROUGHPUT TECHNOLOGIES AS DESCRIBED BY CMS-2020-01-R: HCPCS | Performed by: FAMILY MEDICINE

## 2021-07-29 PROCEDURE — 93010 ELECTROCARDIOGRAM REPORT: CPT | Performed by: FAMILY MEDICINE

## 2021-07-29 PROCEDURE — 80053 COMPREHEN METABOLIC PANEL: CPT | Performed by: FAMILY MEDICINE

## 2021-07-29 PROCEDURE — 93005 ELECTROCARDIOGRAM TRACING: CPT

## 2021-07-29 PROCEDURE — 82977 ASSAY OF GGT: CPT | Performed by: NURSE PRACTITIONER

## 2021-07-29 PROCEDURE — 250N000013 HC RX MED GY IP 250 OP 250 PS 637: Performed by: FAMILY MEDICINE

## 2021-07-29 PROCEDURE — 99285 EMERGENCY DEPT VISIT HI MDM: CPT | Mod: 25

## 2021-07-29 PROCEDURE — 36415 COLL VENOUS BLD VENIPUNCTURE: CPT | Performed by: FAMILY MEDICINE

## 2021-07-29 PROCEDURE — HZ2ZZZZ DETOXIFICATION SERVICES FOR SUBSTANCE ABUSE TREATMENT: ICD-10-PCS | Performed by: FAMILY MEDICINE

## 2021-07-29 PROCEDURE — 82075 ASSAY OF BREATH ETHANOL: CPT

## 2021-07-29 PROCEDURE — 96360 HYDRATION IV INFUSION INIT: CPT

## 2021-07-29 PROCEDURE — 84484 ASSAY OF TROPONIN QUANT: CPT | Performed by: FAMILY MEDICINE

## 2021-07-29 PROCEDURE — 258N000003 HC RX IP 258 OP 636: Performed by: FAMILY MEDICINE

## 2021-07-29 RX ORDER — SODIUM CHLORIDE 9 MG/ML
INJECTION, SOLUTION INTRAVENOUS CONTINUOUS
Status: DISCONTINUED | OUTPATIENT
Start: 2021-07-29 | End: 2021-07-30

## 2021-07-29 RX ORDER — CALCIUM GLUCONATE 94 MG/ML
1 INJECTION, SOLUTION INTRAVENOUS ONCE
Status: DISCONTINUED | OUTPATIENT
Start: 2021-07-29 | End: 2021-07-29

## 2021-07-29 RX ORDER — LORAZEPAM 1 MG/1
1 TABLET ORAL ONCE
Status: COMPLETED | OUTPATIENT
Start: 2021-07-29 | End: 2021-07-29

## 2021-07-29 RX ORDER — LORAZEPAM 1 MG/1
1-4 TABLET ORAL EVERY 30 MIN PRN
Status: DISCONTINUED | OUTPATIENT
Start: 2021-07-29 | End: 2021-07-30 | Stop reason: ALTCHOICE

## 2021-07-29 RX ORDER — CALCIUM CARBONATE 500 MG/1
1000 TABLET, CHEWABLE ORAL ONCE
Status: COMPLETED | OUTPATIENT
Start: 2021-07-29 | End: 2021-07-29

## 2021-07-29 RX ADMIN — SODIUM CHLORIDE 1000 ML: 9 INJECTION, SOLUTION INTRAVENOUS at 20:00

## 2021-07-29 RX ADMIN — CALCIUM CARBONATE (ANTACID) CHEW TAB 500 MG 1000 MG: 500 CHEW TAB at 22:20

## 2021-07-29 RX ADMIN — LORAZEPAM 1 MG: 1 TABLET ORAL at 19:32

## 2021-07-29 RX ADMIN — LORAZEPAM 1 MG: 1 TABLET ORAL at 22:20

## 2021-07-29 NOTE — ED PROVIDER NOTES
ED Provider Note  [facility]      History     Chief Complaint   Patient presents with     Alcohol Intoxication     Pt is seeking detox from ETOH. Drinks approx 1/2 quart of Vodka daily. Last drink was a couple of hours ago before arrival.  Denies any hx of ETOH withdrawal seizures. Denies suicidal thoughts or plans     HPI  Durga Aguirre is a 61 year old male who presents seeking detox from alcohol.  Patient states he has a longstanding history of alcoholism.  He has succeeded with brief periods of sobriety.  He last went through detox here in March of this year.  He relapsed shortly after.  He is drinking a liter of vodka per day.  He finds that in the morning he is shaking and tremulous and has to drink alcohol.  He was recently called back to work and was afraid that he would not be able to go to work without drinking and so presents seeking detox so that he might return to his employment without drinking or without medical consequence of withdrawal.  He also has a history of hypertension, hyperlipidemia, depression and anxiety.  He states he is taking his medication.  He denies acute mental health symptoms.  There is a prior history of cocaine use, denies any recently.  He was recently evaluated with ultrasound and mammogram after he developed a painful mass in his chest wall, he reports this work-up was negative and is not bothering him today.    Past Medical History  Past Medical History:   Diagnosis Date     Alcohol abuse- remission 2/18/2013     Arthritis      Hypertension      Other spontaneous pneumothorax 1997     Past Surgical History:   Procedure Laterality Date     COLONOSCOPY       HC EXCISION PARTIAL TALUS OR CALCANEUS, BONE      fx calcaneus- 9 screws in foot     citalopram (CELEXA) 40 MG tablet  hydrOXYzine (VISTARIL) 50 MG capsule  lisinopril (ZESTRIL) 10 MG tablet  melatonin 3 MG tablet  melatonin 3 MG tablet  simvastatin (ZOCOR) 40 MG tablet  thiamine (B-1) 100 MG  tablet  vitamin B complex with vitamin C (VITAMIN  B COMPLEX) tablet      No Known Allergies  Family History  Family History   Problem Relation Age of Onset     No Known Problems Mother      Lymphoma Father      No Known Problems Maternal Grandmother      No Known Problems Maternal Grandfather      No Known Problems Paternal Grandmother      No Known Problems Paternal Grandfather      No Known Problems Sister      No Known Problems Son      No Known Problems Brother      No Known Problems Daughter      No Known Problems Other      Unknown/Adopted No family hx of      Depression No family hx of      Anxiety Disorder No family hx of      Schizophrenia No family hx of      Bipolar Disorder No family hx of      Suicide No family hx of      Substance Abuse No family hx of      Dementia No family hx of      Oliver Disease No family hx of      Parkinsonism No family hx of      Autism Spectrum Disorder No family hx of      Intellectual Disability (Mental Retardation) No family hx of      Mental Illness No family hx of      Social History   Social History     Tobacco Use     Smoking status: Former Smoker     Packs/day: 0.25     Years: 20.00     Pack years: 5.00     Types: Cigarettes     Smokeless tobacco: Never Used     Tobacco comment: 2-3 cigarettes per day    Substance Use Topics     Alcohol use: No     Alcohol/week: 0.0 standard drinks     Comment: restarted may 2016     Drug use: No      Past medical history, past surgical history, medications, allergies, family history, and social history were reviewed with the patient. No additional pertinent items.       Review of Systems  A complete review of systems was performed with pertinent positives and negatives noted in the HPI, and all other systems negative.    Physical Exam   BP: 91/57 (left arm)  Pulse: 94  Temp: 98.2  F (36.8  C)  Resp: 20  Height:  (5 ft 11 inches)  Weight: 100.8 kg (222 lb 4.8 oz)  SpO2: 94 %  Physical Exam  Vitals and nursing note reviewed.    Constitutional:       General: He is not in acute distress.     Appearance: He is not diaphoretic.   HENT:      Head: Atraumatic.   Eyes:      General: No scleral icterus.     Pupils: Pupils are equal, round, and reactive to light.   Cardiovascular:      Heart sounds: Normal heart sounds.   Pulmonary:      Effort: No respiratory distress.      Breath sounds: Normal breath sounds.   Chest:      Chest wall: Tenderness (There is an area of his left upper lateral chest wall but no palpable mass ecchymosis or other abnormality) present.   Abdominal:      General: Bowel sounds are normal.      Palpations: Abdomen is soft.      Tenderness: There is no abdominal tenderness.   Musculoskeletal:         General: No tenderness.   Skin:     General: Skin is warm.      Findings: No rash.   Neurological:      General: No focal deficit present.      Mental Status: He is oriented to person, place, and time.   Psychiatric:         Attention and Perception: Attention normal.         Mood and Affect: Mood is depressed.         Speech: Speech normal.         Behavior: Behavior normal.         Thought Content: Thought content normal.         Cognition and Memory: Cognition normal.         ED Course      Procedures            EKG Interpretation:      Interpreted by Miguel Mathews MD  Time reviewed: 2020  Symptoms at time of EKG: Syncope during blood draw  Rhythm: normal sinus   Rate: Normal  Axis: Normal  Ectopy: none  Conduction: left bundle branch block (complete) and slightly prolonged QT interval  ST Segments/ T Waves: No ST-T wave changes and No acute ischemic changes  Q Waves: nonspecific  Comparison to prior: No old EKG available    Clinical Impression: Normal sinus rhythm with left bundle branch block              The medical record was reviewed and interpreted.  Current labs reviewed and interpreted.  Previous labs reviewed and interpreted.  EKG reviewed and interpreted: Normal sinus rhythm with left bundle branch block and  slightly prolonged QT interval.       Results for orders placed or performed during the hospital encounter of 07/29/21   Comprehensive metabolic panel     Status: Abnormal   Result Value Ref Range    Sodium 142 133 - 144 mmol/L    Potassium 3.5 3.4 - 5.3 mmol/L    Chloride 110 (H) 94 - 109 mmol/L    Carbon Dioxide (CO2) 23 20 - 32 mmol/L    Anion Gap 9 3 - 14 mmol/L    Urea Nitrogen 15 7 - 30 mg/dL    Creatinine 0.91 0.66 - 1.25 mg/dL    Calcium 7.8 (L) 8.5 - 10.1 mg/dL    Glucose 137 (H) 70 - 99 mg/dL    Alkaline Phosphatase 112 40 - 150 U/L     (H) 0 - 45 U/L     (H) 0 - 70 U/L    Protein Total 6.9 6.8 - 8.8 g/dL    Albumin 3.8 3.4 - 5.0 g/dL    Bilirubin Total 0.5 0.2 - 1.3 mg/dL    GFR Estimate 89 >60 mL/min/1.73m2   CBC with platelets and differential     Status: Abnormal   Result Value Ref Range    WBC Count 4.9 4.0 - 11.0 10e3/uL    RBC Count 4.24 (L) 4.40 - 5.90 10e6/uL    Hemoglobin 12.3 (L) 13.3 - 17.7 g/dL    Hematocrit 37.6 (L) 40.0 - 53.0 %    MCV 89 78 - 100 fL    MCH 29.0 26.5 - 33.0 pg    MCHC 32.7 31.5 - 36.5 g/dL    RDW 12.7 10.0 - 15.0 %    Platelet Count 170 150 - 450 10e3/uL    % Neutrophils 61 %    % Lymphocytes 24 %    % Monocytes 7 %    % Eosinophils 6 %    % Basophils 2 %    % Immature Granulocytes 0 %    NRBCs per 100 WBC 0 <1 /100    Absolute Neutrophils 2.9 1.6 - 8.3 10e3/uL    Absolute Lymphocytes 1.2 0.8 - 5.3 10e3/uL    Absolute Monocytes 0.3 0.0 - 1.3 10e3/uL    Absolute Eosinophils 0.3 0.0 - 0.7 10e3/uL    Absolute Basophils 0.1 0.0 - 0.2 10e3/uL    Absolute Immature Granulocytes 0.0 <=0.0 10e3/uL    Absolute NRBCs 0.0 10e3/uL   Troponin I     Status: Normal   Result Value Ref Range    Troponin I <0.015 0.000 - 0.045 ug/L   EKG 12 lead     Status: None (Preliminary result)   Result Value Ref Range    Systolic Blood Pressure  mmHg    Diastolic Blood Pressure  mmHg    Ventricular Rate 75 BPM    Atrial Rate 394 BPM    IA Interval  ms    QRS Duration 146 ms     ms     QTc 504 ms    P Axis  degrees    R AXIS 11 degrees    T Axis 225 degrees    Interpretation ECG       Wide QRS rhythm  Left bundle branch block  Abnormal ECG     Alcohol breath test POCT     Status: Abnormal   Result Value Ref Range    Alcohol Breath Test 0.185 (A) 0.00 - 0.01   Asymptomatic COVID-19 Virus (Coronavirus) by PCR Nasopharyngeal     Status: None (In process)    Specimen: Nasopharyngeal; Swab    Narrative    The following orders were created for panel order Asymptomatic COVID-19 Virus (Coronavirus) by PCR Nasopharyngeal.  Procedure                               Abnormality         Status                     ---------                               -----------         ------                     SARS-COV2 (COVID-19) Vir...[[id number]]                      In process                   Please view results for these tests on the individual orders.   CBC with platelets differential     Status: Abnormal    Narrative    The following orders were created for panel order CBC with platelets differential.  Procedure                               Abnormality         Status                     ---------                               -----------         ------                     CBC with platelets and d...[[id number]]  Abnormal            Final result                 Please view results for these tests on the individual orders.     Medications   LORazepam (ATIVAN) tablet 1-4 mg (has no administration in time range)   0.9% sodium chloride BOLUS (1,000 mLs Intravenous New Bag 7/29/21 2000)     Followed by   sodium chloride 0.9% infusion (has no administration in time range)   LORazepam (ATIVAN) tablet 1 mg (1 mg Oral Given 7/29/21 1932)        Assessments & Plan (with Medical Decision Making)   61-year-old man history of alcohol dependence, now in relapse for several months, drinking a liter a day, presenting seeking detox from alcohol.  Reporting that he develops severe shakes and restlessness and sweats when he attempts  to stop drinking.  This is preventing him from returning to work because he needs to drink almost continuously.  There is no history of DTs or seizures but he does have medical comorbidity including hypertension hypercholesterolemia.  He would appear to be an appropriate candidate for voluntary detox admission, is denying other medical or mental health symptoms.  Labs were obtained which reveal transaminitis consistent with his previous labs.  His electrolytes are normal with the exception of slightly low calcium, however this is very near normal when corrected for albumin 3.8.  I see he has had similar borderline low calcium with low albumin in the past.  This is likely not clinically relevant, however he was given a dose of oral calcium and this could be rechecked as an outpatient.  Troponin is 0.  While having his labs drawn, he had a brief syncopal event which appears consistent with vasovagal episode.  He has a history of prior similar events while having his blood drawn.  We did give him a liter of IV fluids, his vital signs are stable and he had no recurrent symptoms.  His EKG shows left bundle branch block and slight prolongation of QT interval.  No old EKG for comparison.  The only chest pain the patient has experienced previously is related to a small mass in his left chest which was evaluated and found to be only of benign etiology.  He is not having any ACS symptoms, and his syncopal event clearly appears related to having his blood drawn.  Patient will agree to voluntary detox and appears appropriate for detox admission.  He is awaiting bed placement and will be signed out to the night physician at shift change due to staffing issues on the detox unit.    I have reviewed the nursing notes. I have reviewed the findings, diagnosis, plan and need for follow up with the patient.    New Prescriptions    No medications on file       Final diagnoses:   Alcohol dependence with intoxication with complication (H)    Vasovagal syncope       --  Miguel Mathews MD  [facility] EMERGENCY DEPARTMENT  7/29/2021     Miguel Mathews MD  07/29/21 [id number]       Miguel Mathews MD  07/29/21 [id number]

## 2021-07-29 NOTE — ED TRIAGE NOTES
Pt is seeking detox from ETOH. Drinks approx 1/2 quart of Vodka daily. Last drink was a couple of hours ago before arrival.  Denies any hx of ETOH withdrawal seizures. Denies suicidal thoughts or plan.

## 2021-07-30 PROBLEM — R55 VASOVAGAL SYNCOPE: Status: ACTIVE | Noted: 2021-07-30

## 2021-07-30 LAB
AMPHETAMINES UR QL SCN: NORMAL
ATRIAL RATE - MUSE: 394 BPM
BARBITURATES UR QL: NORMAL
BENZODIAZ UR QL: NORMAL
CANNABINOIDS UR QL SCN: NORMAL
COCAINE UR QL: NORMAL
DIASTOLIC BLOOD PRESSURE - MUSE: NORMAL MMHG
FOLATE SERPL-MCNC: 10.5 NG/ML
GGT SERPL-CCNC: 427 U/L (ref 0–75)
INTERPRETATION ECG - MUSE: NORMAL
OPIATES UR QL SCN: NORMAL
P AXIS - MUSE: NORMAL DEGREES
PR INTERVAL - MUSE: NORMAL MS
QRS DURATION - MUSE: 146 MS
QT - MUSE: 452 MS
QTC - MUSE: 504 MS
R AXIS - MUSE: 11 DEGREES
SYSTOLIC BLOOD PRESSURE - MUSE: NORMAL MMHG
T AXIS - MUSE: 225 DEGREES
TSH SERPL DL<=0.005 MIU/L-ACNC: 0.68 MU/L (ref 0.4–4)
VENTRICULAR RATE- MUSE: 75 BPM
VIT B12 SERPL-MCNC: 442 PG/ML (ref 193–986)

## 2021-07-30 PROCEDURE — 128N000004 HC R&B CD ADULT

## 2021-07-30 PROCEDURE — 250N000013 HC RX MED GY IP 250 OP 250 PS 637: Performed by: FAMILY MEDICINE

## 2021-07-30 PROCEDURE — 99207 PR CDG-MDM COMPONENT: MEETS HIGH - UP CODED: CPT | Performed by: PSYCHIATRY & NEUROLOGY

## 2021-07-30 PROCEDURE — 250N000013 HC RX MED GY IP 250 OP 250 PS 637: Performed by: PSYCHIATRY & NEUROLOGY

## 2021-07-30 PROCEDURE — 36415 COLL VENOUS BLD VENIPUNCTURE: CPT | Performed by: NURSE PRACTITIONER

## 2021-07-30 PROCEDURE — 99232 SBSQ HOSP IP/OBS MODERATE 35: CPT | Performed by: PHYSICIAN ASSISTANT

## 2021-07-30 PROCEDURE — 250N000013 HC RX MED GY IP 250 OP 250 PS 637: Performed by: PHYSICIAN ASSISTANT

## 2021-07-30 PROCEDURE — 93010 ELECTROCARDIOGRAM REPORT: CPT | Performed by: INTERNAL MEDICINE

## 2021-07-30 PROCEDURE — G0177 OPPS/PHP; TRAIN & EDUC SERV: HCPCS

## 2021-07-30 PROCEDURE — 99207 PR CONSULT E&M CHANGED TO SUBSEQUENT LEVEL: CPT | Performed by: PHYSICIAN ASSISTANT

## 2021-07-30 PROCEDURE — 80307 DRUG TEST PRSMV CHEM ANLYZR: CPT | Performed by: FAMILY MEDICINE

## 2021-07-30 PROCEDURE — 250N000011 HC RX IP 250 OP 636: Performed by: NURSE PRACTITIONER

## 2021-07-30 PROCEDURE — 82746 ASSAY OF FOLIC ACID SERUM: CPT | Performed by: NURSE PRACTITIONER

## 2021-07-30 PROCEDURE — 250N000013 HC RX MED GY IP 250 OP 250 PS 637: Performed by: NURSE PRACTITIONER

## 2021-07-30 PROCEDURE — 93005 ELECTROCARDIOGRAM TRACING: CPT

## 2021-07-30 PROCEDURE — 99223 1ST HOSP IP/OBS HIGH 75: CPT | Mod: AI | Performed by: PSYCHIATRY & NEUROLOGY

## 2021-07-30 RX ORDER — CITALOPRAM HYDROBROMIDE 40 MG/1
40 TABLET ORAL EVERY MORNING
Status: DISCONTINUED | OUTPATIENT
Start: 2021-07-30 | End: 2021-07-30

## 2021-07-30 RX ORDER — TRAZODONE HYDROCHLORIDE 50 MG/1
50 TABLET, FILM COATED ORAL
Status: DISCONTINUED | OUTPATIENT
Start: 2021-07-30 | End: 2021-07-30

## 2021-07-30 RX ORDER — LISINOPRIL 10 MG/1
10 TABLET ORAL DAILY
Status: DISCONTINUED | OUTPATIENT
Start: 2021-07-30 | End: 2021-08-01 | Stop reason: HOSPADM

## 2021-07-30 RX ORDER — HYDROXYZINE HYDROCHLORIDE 25 MG/1
25-50 TABLET, FILM COATED ORAL EVERY 6 HOURS PRN
Status: DISCONTINUED | OUTPATIENT
Start: 2021-07-30 | End: 2021-08-01 | Stop reason: HOSPADM

## 2021-07-30 RX ORDER — ACETAMINOPHEN 325 MG/1
650 TABLET ORAL EVERY 4 HOURS PRN
Status: DISCONTINUED | OUTPATIENT
Start: 2021-07-30 | End: 2021-08-01 | Stop reason: HOSPADM

## 2021-07-30 RX ORDER — AMOXICILLIN 250 MG
1 CAPSULE ORAL DAILY PRN
Status: DISCONTINUED | OUTPATIENT
Start: 2021-07-30 | End: 2021-08-01 | Stop reason: HOSPADM

## 2021-07-30 RX ORDER — BUSPIRONE HYDROCHLORIDE 5 MG/1
10 TABLET ORAL 2 TIMES DAILY
COMMUNITY
End: 2021-08-23

## 2021-07-30 RX ORDER — SIMVASTATIN 10 MG
40 TABLET ORAL AT BEDTIME
Status: DISCONTINUED | OUTPATIENT
Start: 2021-07-30 | End: 2021-08-01 | Stop reason: HOSPADM

## 2021-07-30 RX ORDER — ATENOLOL 50 MG/1
50 TABLET ORAL DAILY PRN
Status: DISCONTINUED | OUTPATIENT
Start: 2021-07-30 | End: 2021-08-01 | Stop reason: HOSPADM

## 2021-07-30 RX ORDER — HYDROXYZINE HYDROCHLORIDE 25 MG/1
25 TABLET, FILM COATED ORAL EVERY 6 HOURS PRN
Status: DISCONTINUED | OUTPATIENT
Start: 2021-07-30 | End: 2021-07-30 | Stop reason: DRUGHIGH

## 2021-07-30 RX ORDER — ONDANSETRON 4 MG/1
4 TABLET, ORALLY DISINTEGRATING ORAL EVERY 6 HOURS PRN
Status: DISCONTINUED | OUTPATIENT
Start: 2021-07-30 | End: 2021-07-30

## 2021-07-30 RX ORDER — DIAZEPAM 5 MG
5-20 TABLET ORAL EVERY 30 MIN PRN
Status: DISCONTINUED | OUTPATIENT
Start: 2021-07-30 | End: 2021-08-01

## 2021-07-30 RX ORDER — LANOLIN ALCOHOL/MO/W.PET/CERES
100 CREAM (GRAM) TOPICAL DAILY
Status: DISCONTINUED | OUTPATIENT
Start: 2021-07-30 | End: 2021-08-01 | Stop reason: HOSPADM

## 2021-07-30 RX ORDER — CITALOPRAM HYDROBROMIDE 20 MG/1
20 TABLET ORAL EVERY MORNING
Status: DISCONTINUED | OUTPATIENT
Start: 2021-07-31 | End: 2021-08-01 | Stop reason: HOSPADM

## 2021-07-30 RX ORDER — HYDROXYZINE HYDROCHLORIDE 25 MG/1
25 TABLET, FILM COATED ORAL ONCE
Status: COMPLETED | OUTPATIENT
Start: 2021-07-30 | End: 2021-07-30

## 2021-07-30 RX ORDER — MULTIPLE VITAMINS W/ MINERALS TAB 9MG-400MCG
1 TAB ORAL DAILY
Status: DISCONTINUED | OUTPATIENT
Start: 2021-07-30 | End: 2021-08-01 | Stop reason: HOSPADM

## 2021-07-30 RX ORDER — FOLIC ACID 1 MG/1
1 TABLET ORAL DAILY
Status: DISCONTINUED | OUTPATIENT
Start: 2021-07-30 | End: 2021-08-01 | Stop reason: HOSPADM

## 2021-07-30 RX ORDER — MAGNESIUM HYDROXIDE/ALUMINUM HYDROXICE/SIMETHICONE 120; 1200; 1200 MG/30ML; MG/30ML; MG/30ML
30 SUSPENSION ORAL EVERY 4 HOURS PRN
Status: DISCONTINUED | OUTPATIENT
Start: 2021-07-30 | End: 2021-08-01 | Stop reason: HOSPADM

## 2021-07-30 RX ORDER — CLONIDINE HYDROCHLORIDE 0.1 MG/1
0.1 TABLET ORAL EVERY 6 HOURS PRN
Status: DISCONTINUED | OUTPATIENT
Start: 2021-07-30 | End: 2021-08-01 | Stop reason: HOSPADM

## 2021-07-30 RX ADMIN — HYDROXYZINE HYDROCHLORIDE 25 MG: 25 TABLET, FILM COATED ORAL at 20:14

## 2021-07-30 RX ADMIN — HYDROXYZINE HYDROCHLORIDE 25 MG: 25 TABLET, FILM COATED ORAL at 16:13

## 2021-07-30 RX ADMIN — Medication 1 TABLET: at 08:26

## 2021-07-30 RX ADMIN — B-COMPLEX W/ C & FOLIC ACID TAB 1 TABLET: TAB at 14:55

## 2021-07-30 RX ADMIN — ONDANSETRON 4 MG: 4 TABLET, ORALLY DISINTEGRATING ORAL at 04:37

## 2021-07-30 RX ADMIN — THIAMINE HCL TAB 100 MG 100 MG: 100 TAB at 08:26

## 2021-07-30 RX ADMIN — DIAZEPAM 10 MG: 5 TABLET ORAL at 20:14

## 2021-07-30 RX ADMIN — CLONIDINE HYDROCHLORIDE 0.1 MG: 0.1 TABLET ORAL at 16:42

## 2021-07-30 RX ADMIN — SIMVASTATIN 40 MG: 10 TABLET, FILM COATED ORAL at 20:13

## 2021-07-30 RX ADMIN — NICOTINE POLACRILEX 4 MG: 2 GUM, CHEWING BUCCAL at 18:26

## 2021-07-30 RX ADMIN — DIAZEPAM 10 MG: 5 TABLET ORAL at 16:13

## 2021-07-30 RX ADMIN — DIAZEPAM 10 MG: 5 TABLET ORAL at 12:15

## 2021-07-30 RX ADMIN — DIAZEPAM 10 MG: 5 TABLET ORAL at 04:36

## 2021-07-30 RX ADMIN — NICOTINE POLACRILEX 4 MG: 2 GUM, CHEWING BUCCAL at 14:57

## 2021-07-30 RX ADMIN — DIAZEPAM 10 MG: 5 TABLET ORAL at 08:26

## 2021-07-30 RX ADMIN — HYDROXYZINE HYDROCHLORIDE 25 MG: 25 TABLET, FILM COATED ORAL at 00:53

## 2021-07-30 RX ADMIN — FOLIC ACID 1 MG: 1 TABLET ORAL at 08:26

## 2021-07-30 RX ADMIN — CITALOPRAM HYDROBROMIDE 40 MG: 40 TABLET ORAL at 08:26

## 2021-07-30 RX ADMIN — LISINOPRIL 10 MG: 10 TABLET ORAL at 08:26

## 2021-07-30 RX ADMIN — LORAZEPAM 1 MG: 1 TABLET ORAL at 00:53

## 2021-07-30 ASSESSMENT — ACTIVITIES OF DAILY LIVING (ADL)
LAUNDRY: WITH SUPERVISION
ORAL_HYGIENE: INDEPENDENT
HYGIENE/GROOMING: INDEPENDENT
DRESS: INDEPENDENT
ORAL_HYGIENE: INDEPENDENT
HYGIENE/GROOMING: INDEPENDENT

## 2021-07-30 ASSESSMENT — MIFFLIN-ST. JEOR
SCORE: 1824.12
SCORE: 1778.76

## 2021-07-30 NOTE — ED NOTES
Writer inserted IV. Pt shared his fear of needles and continued discussing needles. Pt became diaphoretic over the course of several minutes and then had a syncopal episode. Nursing staff helped pt to cart. Dr. Traore at bedside. EKG ordered.

## 2021-07-30 NOTE — CONSULTS
[facility]    Internal Medicine Initial Consult       Date of Admission: 7/29/2021  Consult Requested by: Yen Doshi MD  Reason for Consult: HTN    Assessment & Recommendations  Durga Aguirre is a 61 year old man with a history of alcohol abuse, HTN, HLD, and depression/anxiety who is admitted to station 3A for detox from alcohol.        Alcohol Abuse & Withdrawal. Management per psychiatry team.     Transaminitis. Not a new issue and in pattern c/w ETOH abuse. , , T bili and alk phos wnl.  - No need for lab recheck here. Alcohol cessation advised.      Uncontrolled HTN. Reports OP control on lisinopril 10 mg. Uncontrolled here in setting of ETOH w/d. Was 90s/50s in ED responsive to IV fluids as he was likely dehydrated, this AM is 189/140.   - Recheck BP after AM doses of lisinopril and Valium.   - Will make PRN clonidine available here for SBP > 180 or DBP > 110.     Prolonged QT Interval. EKG in ED (checked for vasovagal event w/ blood draw) w/ QT interval 504 ms. No prior for comparison. Lytes wnl. Reports being on Celexa 40 mg for many years.   - Repeat EKG is pending. If QT interval still prolonged primary team will need to stop Celexa and address alternative therapy for his depression/anxiety. Also stopped Zofran for now (ordered PRN on admit) which has QT prolonging effects.     Addendum: Repeat QTc is unchanged. Notified primary Dr. Doshi who discussed w/ pharmacy and plans to decrease Celexa from 40 to 20 mg. Will need ongoing f/u of QT interval.     HLD. Continue home statin.     Medicine will follow BP and QT interval, please page with any additional concerns.     Danica Cohen PA-C  Hospitalist Service  Pager: [phone]  7a-6p M-F and 7a-3p weekends/holidays, through 8/1  Otherwise page job code [phone] (3B), [phone] (3A), or [phone] ([facility] and )  Text paging via [employer] is  appreciated  ______________________________________________________________________    Reason for Admission  Alcohol Abuse    Chief Complaint   Alcohol Withdrawal    History of Present Illness   History is obtained from the patient and medical record.     Durga Aguirre is a 61 year old year old man with a history of alcohol abuse, HTN, HLD, and depression/anxiety admitted to behavioral health for detox from alcohol.    Internal Medicine service was asked to see patient for a general medical evaluation. Currently, patient is feeling ok. Mild sweats. BP elevated but no headache, chest pain, dyspnea and reports he is normally 130s/80s on home lisinopril. Reports a cough and loss of voice 2 weeks ago but symptoms have resolved. Was not tested for covid. Has been vaccinated. No bowel or bladder complaints. Has been on Celexa for many years without known QT interval concerns. No known cardiac history.     Review of Systems   10 point ROS performed and negative unless otherwise noted in HPI     Past Medical History    I have reviewed this patient's medical history and updated it with pertinent information if needed.   Past Medical History:   Diagnosis Date     Alcohol abuse      Anxiety and depression      Arthritis      Hyperlipidemia      Hypertension      Mass of left chest wall      Other spontaneous pneumothorax 1997        Past Surgical History   I have reviewed this patient's surgical history and updated it with pertinent information if needed.  Past Surgical History:   Procedure Laterality Date     COLONOSCOPY       HC EXCISION PARTIAL TALUS OR CALCANEUS, BONE      fx calcaneus- 9 screws in foot        Social History   Social History     Tobacco Use     Smoking status: Former Smoker     Packs/day: 0.25     Years: 20.00     Pack years: 5.00     Types: Cigarettes     Smokeless tobacco: Never Used     Tobacco comment: 2-3 cigarettes per day    Substance Use Topics     Alcohol use: Yes     Drug use: No       Family  "History   I have reviewed this patient's family history and updated it with pertinent information if needed.   Family History   Problem Relation Age of Onset     Lymphoma Father        Medications   Medications Prior to Admission   Medication Sig Dispense Refill Last Dose     citalopram (CELEXA) 40 MG tablet Take 1 tablet (40 mg) by mouth every morning 30 tablet 5 7/29/2021 at Unknown time     hydrOXYzine (VISTARIL) 50 MG capsule Take 1-2 capsules ( mg) by mouth 3 times daily as needed for itching or anxiety 180 capsule 11      lisinopril (ZESTRIL) 10 MG tablet TAKE 1 TABLET BY MOUTH ONE TIME DAILY  90 tablet 1 7/29/2021 at Unknown time     simvastatin (ZOCOR) 40 MG tablet TAKE 1 TABLET BY MOUTH AT BEDTIME  90 tablet 1 Past Week at Unknown time     melatonin 3 MG tablet Take 1 tablet (3 mg) by mouth nightly as needed for sleep 30 tablet 0      melatonin 3 MG tablet Take 3 mg by mouth nightly as needed for sleep        thiamine (B-1) 100 MG tablet Take 1 tablet (100 mg) by mouth daily 30 tablet 0      vitamin B complex with vitamin C (VITAMIN  B COMPLEX) tablet Take 1 tablet by mouth daily          Allergies    No Known Allergies    Physical Exam   BP (!) 189/140   Pulse 78   Temp 97.6  F (36.4  C) (Temporal)   Resp 16   Ht 1.803 m (5' 11\")   Wt 100.7 kg (222 lb)   SpO2 96%   BMI 30.96 kg/m     GENERAL: Alert and oriented x 3. Lying in bed, appears comfortable. Conversant.   HEENT: Anicteric sclera. Mucous membranes moist.   CV: RRR. S1, S2. No murmurs appreciated.   RESPIRATORY: Effort normal on room air. Lungs CTAB with no wheezing, rales, rhonchi.   GI: Abdomen soft, non distended, non tender.   NEUROLOGICAL: No focal deficits. Moves all extremities.   EXTREMITIES: No peripheral edema. Warm and well perfused.   SKIN: No jaundice. No rashes.     Data   Data reviewed today: I reviewed all medications, new labs and imaging results over the last 24 hours.     "

## 2021-07-30 NOTE — PLAN OF CARE
"  Problem: Adult Inpatient Plan of Care  Goal: Plan of Care Review  7/30/2021 0355 by Denilson Plascencia, RN  Outcome: No Change  7/30/2021 0355 by Denilson Plascencia, RN  Outcome: No Change     Durga is a 61 year old male admitted from [facility] adult ED due to alcoholism. He reports a long history of alcohol use disorder. Reports drinking about one litre of vodka daily. He wishes to \"get clean\" and stay sober for his new job. He is voluntarily seeking for detox from alcohol. Has a history of HTN, hyperlipidemia, depression, and anxiety.     He denied SI/SIB/hallucinations. Endorsed a history of withdrawal seizure but no DTs. Started on MSSA with valium protocols. Initial MSSA score = 11. He got 10mg of valium. Staff started 15 minutes safety checks. Will continue to monitor.   "

## 2021-07-30 NOTE — PROGRESS NOTES
[place] Prescription Drug Control Monitoring Note:      No PDMP history located for patient.

## 2021-07-30 NOTE — PHARMACY-ADMISSION MEDICATION HISTORY
Admission Medication History Completed by Pharmacy    See [facility] Admission Navigator for allergy information, preferred outpatient pharmacy, prior to admission medications and immunization status.     Medication History Sources:     Patient    Pharmacy fill history via [employer]        Changes made to PTA medication list (reason):    Added: buspirone (per fill history, pt)    Deleted: None    Changed: None    Additional Information:    Buspirone 5 mg tablets are prescribed to take 2 tablets (10 mg) po twice daily, pt reports taking one tablet (5 mg) once daily.    No prescriptions found in     Pt reports using 2-4 hydroxyzine capsules daily.    Prior to Admission medications    Medication Sig Last Dose Taking? Auth Provider   busPIRone (BUSPAR) 5 MG tablet Take 5 mg by mouth daily Past Week at Unknown time Yes Unknown, Entered By History   citalopram (CELEXA) 40 MG tablet Take 1 tablet (40 mg) by mouth every morning 7/29/2021 at Unknown time Yes Rohit Solitario MD   hydrOXYzine (VISTARIL) 50 MG capsule Take 1-2 capsules ( mg) by mouth 3 times daily as needed for itching or anxiety Past Week at Unknown time Yes Rohit Solitario MD   lisinopril (ZESTRIL) 10 MG tablet TAKE 1 TABLET BY MOUTH ONE TIME DAILY  7/29/2021 at Unknown time Yes Rohit Solitario MD   simvastatin (ZOCOR) 40 MG tablet TAKE 1 TABLET BY MOUTH AT BEDTIME  Past Week at Unknown time Yes Rohit Solitario MD   vitamin B complex with vitamin C (VITAMIN  B COMPLEX) tablet Take 1 tablet by mouth daily Past Week at Unknown time Yes Reported, Patient   melatonin 3 MG tablet Take 1 tablet (3 mg) by mouth nightly as needed for sleep  Patient not taking: Reported on 7/30/2021 Not Taking at Unknown time  Mame June MD   thiamine (B-1) 100 MG tablet Take 1 tablet (100 mg) by mouth daily  Patient not taking: Reported on 7/30/2021 Not Taking at Unknown time  Mame June MD       Date completed: 07/30/21    Medication  history completed by: Rosamaria Ocasio, [facility]

## 2021-07-30 NOTE — PROGRESS NOTES
Met with pt to discuss aftercare plans. Pt reports he is interested in outpatient treatment referral to [facility]. Pt states he has not had a recent CD assessment. 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 Pt has [employer]ita [employer]ILL[employer] for insurance. AVS initaited.

## 2021-07-30 NOTE — PROGRESS NOTES
Durga was isolative and withdrawn to self sleeping/napping most of the day. He was compliant with all his scheduled medications. Scored 10 & 8 on MSSA checks. Got 10mg prn valium X2 per protocol. Denied any medication adverse side effects and none observed. Had EKG done today. Was found to have QT prolongation. Some of his medications that might cause QT prolongation were discontinued. Pt.denied SI/SIB/hallucinations. No safety or behavioral concerns. Will continue to monitor.

## 2021-07-30 NOTE — PLAN OF CARE
Pt's MSSA was 10 and 9. He received Valium 10 mg x2 this shift.  He reported feeling anxious 7/10. He received atarax 25 mg, and reported this was helpful in decreasing his anxiety. Pt's B/p was 181/ 113 he received Clonidine 0.1 mg. Pt's B/P was retaken and was 162/104.

## 2021-07-30 NOTE — PROGRESS NOTES
07/30/21 0421   Patient Belongings   Did you bring any home meds/supplements to the hospital?  No   Patient Belongings remains with patient;locker   Patient Belongings Remaining with Patient glasses   Patient Belongings Put in Hospital Secure Location (Security or Locker, etc.) cash/credit card;cell phone/electronics;clothing;money (see comment);wallet;shoes   Belongings Search Yes   Clothing Search Yes   Second Staff Gary GANN and Ángel GALVEZ   Comment All of patient's belongings are in a bin, his discharge bin and his room. His important cards and cash of $55.00 are in a security envelop.     BIN:  Shorts, shirt, shoes and socks and some personal stuff.    DISCHARGE BIN:  Phone and wallet    ROOM:  [facility]    SECURITY ENV # [id number] - [place] Drivers License, [facility] Card, VISA, MasterCard, Go To Card, Cash $55.00    A               Admission:  I am responsible for any personal items that are not sent to the safe or pharmacy.  [facility] is not responsible for loss, theft or damage of any property in my possession.    Signature:  _________________________________ Date: _______  Time: _____                                              Staff Signature:  ____________________________ Date: ________  Time: _____      2nd Staff person, if patient is unable/unwilling to sign:    Signature: ________________________________ Date: ________  Time: _____     Discharge:  [facility] has returned all of my personal belongings:    Signature: _________________________________ Date: ________  Time: _____                                          Staff Signature:  ____________________________ Date: ________  Time: _____
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
## 2021-07-30 NOTE — PLAN OF CARE
Behavioral Team Discussion: (7/30/2021)    Continued Stay Criteria/Rationale: Patient admitted for Chemical Use Issues.  Plan: The following services will be provided to the patient; psychiatric assessment, medication management, therapeutic milieu, individual and group support, and skills groups.   Participants: 3A Provider: Dr. Yen Doshi MD; 3A RN: Denilson Plascencia RN; 3A CM's: Rosalia Love.  Summary/Recommendation: Providers will assess today for treatment recommendations, discharge planning, and aftercare plans. CM will meet with pt for discharge planning.   Medical/Physical: Per ED note:    Alcohol abuse- remission 2/18/2013     Arthritis       Hypertension       Other spontaneous pneumothorax 1997     Precautions:   Behavioral Orders   Procedures     Code 1 - Restrict to Unit     Routine Programming     As clinically indicated     Status 15     Every 15 minutes.     Withdrawal precautions     Rationale for change in precautions or plan: N/A  Progress: Initial.,

## 2021-07-31 LAB
CHOLEST SERPL-MCNC: 190 MG/DL
FASTING STATUS PATIENT QL REPORTED: YES
HDLC SERPL-MCNC: 50 MG/DL
LDLC SERPL CALC-MCNC: 79 MG/DL
NONHDLC SERPL-MCNC: 140 MG/DL
TRIGL SERPL-MCNC: 303 MG/DL

## 2021-07-31 PROCEDURE — 250N000013 HC RX MED GY IP 250 OP 250 PS 637: Performed by: NURSE PRACTITIONER

## 2021-07-31 PROCEDURE — 250N000013 HC RX MED GY IP 250 OP 250 PS 637: Performed by: CLINICAL NURSE SPECIALIST

## 2021-07-31 PROCEDURE — 128N000004 HC R&B CD ADULT

## 2021-07-31 PROCEDURE — 250N000013 HC RX MED GY IP 250 OP 250 PS 637: Performed by: PSYCHIATRY & NEUROLOGY

## 2021-07-31 PROCEDURE — H2032 ACTIVITY THERAPY, PER 15 MIN: HCPCS

## 2021-07-31 PROCEDURE — 36415 COLL VENOUS BLD VENIPUNCTURE: CPT | Performed by: NURSE PRACTITIONER

## 2021-07-31 PROCEDURE — G0177 OPPS/PHP; TRAIN & EDUC SERV: HCPCS

## 2021-07-31 PROCEDURE — 82465 ASSAY BLD/SERUM CHOLESTEROL: CPT | Performed by: NURSE PRACTITIONER

## 2021-07-31 RX ADMIN — LISINOPRIL 10 MG: 10 TABLET ORAL at 08:39

## 2021-07-31 RX ADMIN — Medication 1 TABLET: at 08:39

## 2021-07-31 RX ADMIN — Medication 10 MG: at 22:37

## 2021-07-31 RX ADMIN — SIMVASTATIN 40 MG: 10 TABLET, FILM COATED ORAL at 21:41

## 2021-07-31 RX ADMIN — NICOTINE POLACRILEX 4 MG: 2 GUM, CHEWING BUCCAL at 09:26

## 2021-07-31 RX ADMIN — CITALOPRAM HYDROBROMIDE 20 MG: 20 TABLET ORAL at 08:39

## 2021-07-31 RX ADMIN — HYDROXYZINE HYDROCHLORIDE 50 MG: 25 TABLET, FILM COATED ORAL at 12:00

## 2021-07-31 RX ADMIN — HYDROXYZINE HYDROCHLORIDE 25 MG: 25 TABLET, FILM COATED ORAL at 16:05

## 2021-07-31 RX ADMIN — FOLIC ACID 1 MG: 1 TABLET ORAL at 08:39

## 2021-07-31 RX ADMIN — NICOTINE POLACRILEX 4 MG: 2 GUM, CHEWING BUCCAL at 12:18

## 2021-07-31 RX ADMIN — HYDROXYZINE HYDROCHLORIDE 50 MG: 25 TABLET, FILM COATED ORAL at 08:39

## 2021-07-31 RX ADMIN — NICOTINE POLACRILEX 4 MG: 2 GUM, CHEWING BUCCAL at 18:27

## 2021-07-31 RX ADMIN — HYDROXYZINE HYDROCHLORIDE 25 MG: 25 TABLET, FILM COATED ORAL at 21:41

## 2021-07-31 RX ADMIN — B-COMPLEX W/ C & FOLIC ACID TAB 1 TABLET: TAB at 08:39

## 2021-07-31 RX ADMIN — THIAMINE HCL TAB 100 MG 100 MG: 100 TAB at 08:39

## 2021-07-31 ASSESSMENT — ACTIVITIES OF DAILY LIVING (ADL)
LAUNDRY: UNABLE TO COMPLETE
HYGIENE/GROOMING: INDEPENDENT
ORAL_HYGIENE: INDEPENDENT
DRESS: INDEPENDENT

## 2021-07-31 NOTE — PLAN OF CARE
Problem: Substance Withdrawal  Goal: Substance Withdrawal  Description: Signs and symptoms of listed problems will be absent or manageable.  Outcome: Improving  Patient observed sleeping for about 6.75 hours. He scored a 5 and a 6 on the MSSA and reported no concerns; no problems identified.

## 2021-07-31 NOTE — PROGRESS NOTES
Durga  attended a Life Skills group this evening that involved sharing reflections according to question prompts. Pleasant and talkative. Discussed a series of losses in his life in recent years.      07/30/21 2100   Occupational Therapy   Type of Intervention structured groups   Response Initiates, socially acceptable   Hours 1

## 2021-07-31 NOTE — PROGRESS NOTES
"Music Therapy Group note    Clinical Hours in session: 1.0    Number of patients in group: 8    Scope of service: psychodynamic     Patient progress: initial encounter    Intervention: \"Welcome Home\"    Goal of group: to provide a safe place to set recovery goals through music and writing prompts    Patient response/reaction to treatment intervention(s):  Durga initially passed saying \"it's too personal and I don't want to cry\".  Later in session he piped up and wanted to share that he is ready to \"stop making excuses\". He states he struggles with procrastination and when he leaves here he wants to \"put on my big boy boots and do what needs to be done\". MT asked him what supports he might need. He named his mom and a friend. MT inquired if 2 people are enough supports.  \"Oh I go to AA too\" he said.  He was open and appears to be beginning to try to make a change (moving from pre-contemplation to contemplation).  He thanked MT for group.      Sonia Larson, MT-BC  Board-Certified Music Therapist             "

## 2021-07-31 NOTE — PLAN OF CARE
Problem: Substance Withdrawal  Goal: Substance Withdrawal  Outcome: Improving  Goal: Social and Therapeutic (Substance Withdrawal)  Outcome: Improving     MSSA score this shift 4 and 3.  Patient reports that he is feeling better.  Patient is pleasant, calm, and cooperative.  He is engaged with staff.  Patient endorses some moderate anxiety.  PRN hydroxyzine 50 mg administered x2 today, which patient endorses is helpful in deceasing anxiety.  Patient denies hallucinations, SI, HI, SIB, and pain.  BP elevated at 0805 at 171/97.  Recheck 156/98, 162/98.  Order parameters not met for PRN clonidine, will continue to monitor.  Patient is medication compliant.  No aggressive behaviors are observed.  Patient remains safe on unit.  Will continue to monitor.  Deepti Grayson RN

## 2021-07-31 NOTE — PLAN OF CARE
"Pt's MSSA was 3 and 3. He has not received or required any Valium this shift. He has not had any symptoms from alcohol withdrawal.  Pt reports anxiety \"all the time\". He received atarax 50 mg for anxiety, he reported this to be helpful for him.  "

## 2021-08-01 VITALS
TEMPERATURE: 97.8 F | OXYGEN SATURATION: 98 % | DIASTOLIC BLOOD PRESSURE: 91 MMHG | HEIGHT: 71 IN | HEART RATE: 62 BPM | BODY MASS INDEX: 29.68 KG/M2 | WEIGHT: 212 LBS | RESPIRATION RATE: 16 BRPM | SYSTOLIC BLOOD PRESSURE: 148 MMHG

## 2021-08-01 LAB
ATRIAL RATE - MUSE: 80 BPM
DIASTOLIC BLOOD PRESSURE - MUSE: NORMAL MMHG
INTERPRETATION ECG - MUSE: NORMAL
P AXIS - MUSE: 56 DEGREES
PR INTERVAL - MUSE: 174 MS
QRS DURATION - MUSE: 150 MS
QT - MUSE: 438 MS
QTC - MUSE: 505 MS
R AXIS - MUSE: 5 DEGREES
SYSTOLIC BLOOD PRESSURE - MUSE: NORMAL MMHG
T AXIS - MUSE: 156 DEGREES
VENTRICULAR RATE- MUSE: 80 BPM

## 2021-08-01 PROCEDURE — 250N000013 HC RX MED GY IP 250 OP 250 PS 637: Performed by: PSYCHIATRY & NEUROLOGY

## 2021-08-01 PROCEDURE — 99239 HOSP IP/OBS DSCHRG MGMT >30: CPT | Performed by: CLINICAL NURSE SPECIALIST

## 2021-08-01 PROCEDURE — H0001 ALCOHOL AND/OR DRUG ASSESS: HCPCS | Performed by: COUNSELOR

## 2021-08-01 PROCEDURE — 250N000013 HC RX MED GY IP 250 OP 250 PS 637: Performed by: NURSE PRACTITIONER

## 2021-08-01 RX ORDER — CITALOPRAM HYDROBROMIDE 20 MG/1
20 TABLET ORAL EVERY MORNING
Qty: 30 TABLET | Refills: 0 | Status: SHIPPED | OUTPATIENT
Start: 2021-08-02 | End: 2021-08-23

## 2021-08-01 RX ORDER — CARBOXYMETHYLCELLULOSE SODIUM 5 MG/ML
1 SOLUTION/ DROPS OPHTHALMIC
Status: DISCONTINUED | OUTPATIENT
Start: 2021-08-01 | End: 2021-08-01 | Stop reason: HOSPADM

## 2021-08-01 RX ADMIN — HYDROXYZINE HYDROCHLORIDE 25 MG: 25 TABLET, FILM COATED ORAL at 08:27

## 2021-08-01 RX ADMIN — LISINOPRIL 10 MG: 10 TABLET ORAL at 08:26

## 2021-08-01 RX ADMIN — THIAMINE HCL TAB 100 MG 100 MG: 100 TAB at 08:27

## 2021-08-01 RX ADMIN — NICOTINE POLACRILEX 4 MG: 2 GUM, CHEWING BUCCAL at 13:06

## 2021-08-01 RX ADMIN — CITALOPRAM HYDROBROMIDE 20 MG: 20 TABLET ORAL at 08:27

## 2021-08-01 RX ADMIN — HYDROXYZINE HYDROCHLORIDE 50 MG: 25 TABLET, FILM COATED ORAL at 13:06

## 2021-08-01 RX ADMIN — FOLIC ACID 1 MG: 1 TABLET ORAL at 08:26

## 2021-08-01 RX ADMIN — Medication 1 TABLET: at 08:26

## 2021-08-01 RX ADMIN — B-COMPLEX W/ C & FOLIC ACID TAB 1 TABLET: TAB at 08:27

## 2021-08-01 RX ADMIN — CARBOXYMETHYLCELLULOSE SODIUM 1 DROP: 5 SOLUTION/ DROPS OPHTHALMIC at 08:53

## 2021-08-01 ASSESSMENT — ACTIVITIES OF DAILY LIVING (ADL)
LAUNDRY: UNABLE TO COMPLETE
DRESS: INDEPENDENT
ORAL_HYGIENE: INDEPENDENT
HYGIENE/GROOMING: INDEPENDENT

## 2021-08-01 NOTE — PROGRESS NOTES
Writer met with patient to complete CD Assessment. Client would like to attend [facility]/CD [facility], and reports that he has already been in touch with Graeme Quintero, one of the counselors there. Client reports he will start as soon as there is availability. Also plans on setting up a PCP appointment this week and will request a referral for psychiatrist. Client was provided with resources in regard to finding a therapist in the community.

## 2021-08-01 NOTE — PLAN OF CARE
Patient planning on discharging from unit today at 1600.  Patient to be picked up by friend at this time.  AVS and labs reviewed with patient and all questions answered.  Patient indentifies support systems as his mother and friends.  Patient identifies coping mechanisms as staying busy and hopefully getting a job.  Patient denies SI, HI, and SIB.  Patient pleasant and cooperative with discharge process.  Deepti Grayson RN

## 2021-08-01 NOTE — PROGRESS NOTES
Brief medicine note:  - Pt out of detox, but BPs still elevated. Pt asymptomatic. He was instructed to follow up with his PCP next week for hospital follow and BP check. Medicine signing off. Please feel free to call with questions.        Jose Priest PA-C  Internal Medicine Hospitalist   [facility] Hospitalist group  [phone]
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
## 2021-08-01 NOTE — PLAN OF CARE
BP continues to be elevated today.  Most recent 163/103.  Provider notified, no changes made.  Patient encouraged to follow up with primary care provider.  Deepti Grayson RN

## 2021-08-01 NOTE — DISCHARGE SUMMARY
Psychiatric Discharge Summary    Durga Aguirre MRN# [id number]   Age: 61 year old YOB: 1960     Date of Admission:  7/29/2021  Date of Discharge:  8/1/2021  4:01 PM  Admitting Physician:  Yen Doshi MD  Discharge Physician:  Debra A. Naegele, APRN CNS (Contact: [phone])         Event Leading to Hospitalization:   Per ED Note dated 7/29/21:     Durga Aguirre is a 61 year old male who presents seeking detox from alcohol.  Patient states he has a longstanding history of alcoholism.  He has succeeded with brief periods of sobriety.  He last went through detox here in March of this year.  He relapsed shortly after.  He is drinking a liter of vodka per day.  He finds that in the morning he is shaking and tremulous and has to drink alcohol.  He was recently called back to work and was afraid that he would not be able to go to work without drinking and so presents seeking detox so that he might return to his employment without drinking or without medical consequence of withdrawal.  He also has a history of hypertension, hyperlipidemia, depression and anxiety.  He states he is taking his medication.  He denies acute mental health symptoms.  There is a prior history of cocaine use, denies any recently.  He was recently evaluated with ultrasound and mammogram after he developed a painful mass in his chest wall, he reports this work-up was negative and is not bothering him today.     61-year-old man history of alcohol dependence, now in relapse for several months, drinking a liter a day, presenting seeking detox from alcohol.  Reporting that he develops severe shakes and restlessness and sweats when he attempts to stop drinking.  This is preventing him from returning to work because he needs to drink almost continuously.  There is no history of DTs or seizures but he does have medical comorbidity including hypertension hypercholesterolemia.  He would appear to be an appropriate candidate for  voluntary detox admission, is denying other medical or mental health symptoms.  Labs were obtained which reveal transaminitis consistent with his previous labs.  His electrolytes are normal with the exception of slightly low calcium, however this is very near normal when corrected for albumin 3.8.  I see he has had similar borderline low calcium with low albumin in the past.  This is likely not clinically relevant, however he was given a dose of oral calcium and this could be rechecked as an outpatient.  Troponin is 0.  While having his labs drawn, he had a brief syncopal event which appears consistent with vasovagal episode.  He has a history of prior similar events while having his blood drawn.  We did give him a liter of IV fluids, his vital signs are stable and he had no recurrent symptoms.  His EKG shows left bundle branch block and slight prolongation of QT interval.  No old EKG for comparison.  The only chest pain the patient has experienced previously is related to a small mass in his left chest which was evaluated and found to be only of benign etiology.  He is not having any ACS symptoms, and his syncopal event clearly appears related to having his blood drawn.  Patient will agree to voluntary detox and appears appropriate for detox admission.  He is awaiting bed placement and will be signed out to the night physician at shift change due to staffing issues on the detox unit.        Per my interview with patient:     Patient was most recently hospitalized on station 3A in 3/2021. 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 He reports that he was drinking two beers or more daily during the programming, however. Has not had any significant periods of sobriety since last admission to detox. Currently drinking 1 L of vodka daily.       No history of seizures or DTs. He reports intense cravings. Denies blackouts. Denies illicit substance use.  "Interested in residential CD treatment, which was recommended. Mentioned that he was \"kicked out\" of [facility] in the past. He has been naltrexone in the past, though does not feel it was helpful. He has not tried Campral or Antabuse.     Patient has tolerance, withdrawal, progressive use, loss of control, spending more time and more amount than intended. Patient has made attempts to quit, is experiencing cravings, and reports negative consequences.  Patient does not alvarez.       See Admission note by Yen Doshi MD found on 7/30/2021 for additional details.          DIagnoses:      Alcohol Use Disorder, severe, withdrawal, complicated by transaminitis, HTN and severe dyslipidemia  Depressive Disorder, unspecified (MDD vs alcohol induced)  Anxiety Disorder, unspecified (alcohol/withdrawal induced vs YUSUF)  History of Panic Disorder  Remote history of cocaine use  Prolonged QTc         Labs:     Results for orders placed or performed during the hospital encounter of 07/29/21   Comprehensive metabolic panel     Status: Abnormal   Result Value Ref Range    Sodium 142 133 - 144 mmol/L    Potassium 3.5 3.4 - 5.3 mmol/L    Chloride 110 (H) 94 - 109 mmol/L    Carbon Dioxide (CO2) 23 20 - 32 mmol/L    Anion Gap 9 3 - 14 mmol/L    Urea Nitrogen 15 7 - 30 mg/dL    Creatinine 0.91 0.66 - 1.25 mg/dL    Calcium 7.8 (L) 8.5 - 10.1 mg/dL    Glucose 137 (H) 70 - 99 mg/dL    Alkaline Phosphatase 112 40 - 150 U/L     (H) 0 - 45 U/L     (H) 0 - 70 U/L    Protein Total 6.9 6.8 - 8.8 g/dL    Albumin 3.8 3.4 - 5.0 g/dL    Bilirubin Total 0.5 0.2 - 1.3 mg/dL    GFR Estimate 89 >60 mL/min/1.73m2   CBC with platelets and differential     Status: Abnormal   Result Value Ref Range    WBC Count 4.9 4.0 - 11.0 10e3/uL    RBC Count 4.24 (L) 4.40 - 5.90 10e6/uL    Hemoglobin 12.3 (L) 13.3 - 17.7 g/dL    Hematocrit 37.6 (L) 40.0 - 53.0 %    MCV 89 78 - 100 fL    MCH 29.0 26.5 - 33.0 pg    MCHC 32.7 31.5 - 36.5 g/dL    RDW 12.7 " 10.0 - 15.0 %    Platelet Count 170 150 - 450 10e3/uL    % Neutrophils 61 %    % Lymphocytes 24 %    % Monocytes 7 %    % Eosinophils 6 %    % Basophils 2 %    % Immature Granulocytes 0 %    NRBCs per 100 WBC 0 <1 /100    Absolute Neutrophils 2.9 1.6 - 8.3 10e3/uL    Absolute Lymphocytes 1.2 0.8 - 5.3 10e3/uL    Absolute Monocytes 0.3 0.0 - 1.3 10e3/uL    Absolute Eosinophils 0.3 0.0 - 0.7 10e3/uL    Absolute Basophils 0.1 0.0 - 0.2 10e3/uL    Absolute Immature Granulocytes 0.0 <=0.0 10e3/uL    Absolute NRBCs 0.0 10e3/uL   SARS-COV2 (COVID-19) Virus RT-PCR     Status: Normal    Specimen: Nasopharyngeal; Swab   Result Value Ref Range    SARS CoV2 PCR Negative Negative    Narrative    Testing was performed using the Xpert Xpress SARS-CoV-2 Assay on the  Cepheid Gene-Xpert Instrument Systems. Additional information about  this Emergency Use Authorization (EUA) assay can be found via the Lab  Guide. This test should be ordered for the detection of SARS-CoV-2 in  individuals who meet SARS-CoV-2 clinical and/or epidemiological  criteria. Test performance is unknown in asymptomatic patients. This  test is for in vitro diagnostic use under the FDA EUA for  laboratories certified under CLIA to perform high complexity testing.  This test has not been FDA cleared or approved. A negative result  does not rule out the presence of PCR inhibitors in the specimen or  target RNA in concentration below the limit of detection for the  assay. The possibility of a false negative should be considered if  the patient's recent exposure or clinical presentation suggests  COVID-19. This test was validated by the [facility]. This laboratory is certified under  the Clinical Laboratory Improvement Amendments of 1988 (CLIA-88) as  qualified to perform high complexity laboratory testing.     Troponin I     Status: Normal   Result Value Ref Range    Troponin I <0.015 0.000 - 0.045 ug/L   Magnesium      Status: Normal   Result Value Ref Range    Magnesium 2.1 1.6 - 2.3 mg/dL   Drug abuse screen 1 urine (ED)     Status: Normal   Result Value Ref Range    Amphetamines Urine Screen Negative Screen Negative    Barbiturates Urine Screen Negative Screen Negative    Benzodiazepines Urine Screen Negative Screen Negative    Cannabinoids Urine Screen Negative Screen Negative    Cocaine Urine Screen Negative Screen Negative    Opiates Urine Screen Negative Screen Negative   GGT     Status: Abnormal   Result Value Ref Range     (H) 0 - 75 U/L   TSH with free T4 reflex     Status: Normal   Result Value Ref Range    TSH 0.68 0.40 - 4.00 mU/L   Vitamin B12     Status: Normal   Result Value Ref Range    Vitamin B12 442 193 - 986 pg/mL   Folate     Status: Normal   Result Value Ref Range    Folic Acid 10.5 >=5.4 ng/mL   Lipid panel     Status: Abnormal   Result Value Ref Range    Cholesterol 190 <200 mg/dL    Triglycerides 303 (H) <150 mg/dL    Direct Measure HDL 50 >=40 mg/dL    LDL Cholesterol Calculated 79 <=100 mg/dL    Non HDL Cholesterol 140 (H) <130 mg/dL    Patient Fasting > 8hrs? Yes    EKG 12 lead     Status: None   Result Value Ref Range    Systolic Blood Pressure  mmHg    Diastolic Blood Pressure  mmHg    Ventricular Rate 75 BPM    Atrial Rate 394 BPM    NM Interval  ms    QRS Duration 146 ms     ms    QTc 504 ms    P Axis  degrees    R AXIS 11 degrees    T Axis 225 degrees    Interpretation ECG       Wide QRS rhythm  Left bundle branch block  Abnormal ECG  Unconfirmed report - interpretation of this ECG is computer generated - see medical record for final interpretation  Confirmed by - EMERGENCY ROOM, PHYSICIAN ([id number]),  ELY LAMBERT ([id number]) on 7/30/2021 9:15:28 AM     EKG 12-lead, complete     Status: None   Result Value Ref Range    Systolic Blood Pressure  mmHg    Diastolic Blood Pressure  mmHg    Ventricular Rate 80 BPM    Atrial Rate 80 BPM    NM Interval 174 ms    QRS Duration 150 ms      ms    QTc 505 ms    P Axis 56 degrees    R AXIS 5 degrees    T Axis 156 degrees    Interpretation ECG       Sinus rhythm  Left bundle branch block  Abnormal ECG  When compared with ECG of 29-JUL-2021 20:09, (unconfirmed)  No significant change was found  Confirmed by MD PERRY JANE ([id number]) on 8/1/2021 3:52:47 PM     Internal Medicine Adult IP Consult for [facility] Detox on 3A: Patient to be seen: Routine within 24 hrs; Call back #: [phone]; Co-manage alcohol detox.; Consultant may enter orders: Yes; Requesting provider? Attending physician; Name: Glenda     Status: None ()    Dainca Ochoa PA     7/30/2021 12:56 PM  [facility]    Internal Medicine Initial Consult       Date of Admission: 7/29/2021  Consult Requested by: Yen Doshi MD  Reason for Consult: HTN    Assessment & Recommendations  Durga Aguirre is a 61 year old man with a history of alcohol   abuse, HTN, HLD, and depression/anxiety who is admitted to   station 3[facility] for detox from alcohol.        Alcohol Abuse & Withdrawal. Management per psychiatry team.     Transaminitis. Not a new issue and in pattern c/w ETOH abuse. AST   166, , T bili and alk phos wnl.  - No need for lab recheck here. Alcohol cessation advised.      Uncontrolled HTN. Reports OP control on lisinopril 10 mg.   Uncontrolled here in setting of ETOH w/d. Was 90s/50s in ED   responsive to IV fluids as he was likely dehydrated, this AM is   189/140.   - Recheck BP after AM doses of lisinopril and Valium.   - Will make PRN clonidine available here for SBP > 180 or DBP >   110.     Prolonged QT Interval. EKG in ED (checked for vasovagal event w/   blood draw) w/ QT interval 504 ms. No prior for comparison. Lytes   wnl. Reports being on Celexa 40 mg for many years.   - Repeat EKG is pending. If QT interval still prolonged primary   team will need to stop Celexa and address alternative therapy for   his  depression/anxiety. Also stopped Zofran for now (ordered PRN   on admit) which has QT prolonging effects.     Addendum: Repeat QTc is unchanged. Notified primary Dr. Doshi   who discussed w/ pharmacy and plans to decrease Celexa from 40 to   20 mg. Will need ongoing f/u of QT interval.     HLD. Continue home statin.     Medicine will follow BP and QT interval, please page with any   additional concerns.     Danica Cohen PA-C  Hospitalist Service  Pager: [phone]  7a-6p M-F and 7a-3p weekends/holidays, through 8/1  Otherwise page job code [phone] (3B), [phone] (3A), or [phone] ([facility] and 4A)  Text paging via [employer] is appreciated  __________________________________________________________________  ____    Reason for Admission  Alcohol Abuse    Chief Complaint   Alcohol Withdrawal    History of Present Illness   History is obtained from the patient and medical record.     Durga Aguirre is a 61 year old year old man with a history of   alcohol abuse, HTN, HLD, and depression/anxiety admitted to   behavioral health for detox from alcohol.    Internal Medicine service was asked to see patient for a general   medical evaluation. Currently, patient is feeling ok. Mild   sweats. BP elevated but no headache, chest pain, dyspnea and   reports he is normally 130s/80s on home lisinopril. Reports a   cough and loss of voice 2 weeks ago but symptoms have resolved.   Was not tested for covid. Has been vaccinated. No bowel or   bladder complaints. Has been on Celexa for many years without   known QT interval concerns. No known cardiac history.     Review of Systems   10 point ROS performed and negative unless otherwise noted in HPI       Past Medical History    I have reviewed this patient's medical history and updated it   with pertinent information if needed.   Past Medical History:   Diagnosis Date     Alcohol abuse      Anxiety and depression      Arthritis      Hyperlipidemia      Hypertension      Mass of left chest  "wall      Other spontaneous pneumothorax 1997        Past Surgical History   I have reviewed this patient's surgical history and updated it   with pertinent information if needed.  Past Surgical History:   Procedure Laterality Date     COLONOSCOPY       HC EXCISION PARTIAL TALUS OR CALCANEUS, BONE      fx calcaneus- 9 screws in foot        Social History   Social History     Tobacco Use     Smoking status: Former Smoker     Packs/day: 0.25     Years: 20.00     Pack years: 5.00     Types: Cigarettes     Smokeless tobacco: Never Used     Tobacco comment: 2-3 cigarettes per day    Substance Use Topics     Alcohol use: Yes     Drug use: No       Family History   I have reviewed this patient's family history and updated it with   pertinent information if needed.   Family History   Problem Relation Age of Onset     Lymphoma Father        Medications   Medications Prior to Admission   Medication Sig Dispense Refill Last Dose     citalopram (CELEXA) 40 MG tablet Take 1 tablet (40 mg) by mouth   every morning 30 tablet 5 7/29/2021 at Unknown time     hydrOXYzine (VISTARIL) 50 MG capsule Take 1-2 capsules (   mg) by mouth 3 times daily as needed for itching or anxiety 180   capsule 11      lisinopril (ZESTRIL) 10 MG tablet TAKE 1 TABLET BY MOUTH ONE   TIME DAILY  90 tablet 1 7/29/2021 at Unknown time     simvastatin (ZOCOR) 40 MG tablet TAKE 1 TABLET BY MOUTH AT   BEDTIME  90 tablet 1 Past Week at Unknown time     melatonin 3 MG tablet Take 1 tablet (3 mg) by mouth nightly as   needed for sleep 30 tablet 0      melatonin 3 MG tablet Take 3 mg by mouth nightly as needed for   sleep        thiamine (B-1) 100 MG tablet Take 1 tablet (100 mg) by mouth   daily 30 tablet 0      vitamin B complex with vitamin C (VITAMIN  B COMPLEX) tablet   Take 1 tablet by mouth daily          Allergies    No Known Allergies    Physical Exam   BP (!) 189/140   Pulse 78   Temp 97.6  F (36.4  C) (Temporal)     Resp 16   Ht 1.803 m (5' 11\")  "  Wt 100.7 kg (222 lb)   SpO2   96%   BMI 30.96 kg/m     GENERAL: Alert and oriented x 3. Lying in bed, appears   comfortable. Conversant.   HEENT: Anicteric sclera. Mucous membranes moist.   CV: RRR. S1, S2. No murmurs appreciated.   RESPIRATORY: Effort normal on room air. Lungs CTAB with no   wheezing, rales, rhonchi.   GI: Abdomen soft, non distended, non tender.   NEUROLOGICAL: No focal deficits. Moves all extremities.   EXTREMITIES: No peripheral edema. Warm and well perfused.   SKIN: No jaundice. No rashes.     Data   Data reviewed today: I reviewed all medications, new labs and   imaging results over the last 24 hours.      Alcohol breath test POCT     Status: Abnormal   Result Value Ref Range    Alcohol Breath Test 0.185 (A) 0.00 - 0.01   Asymptomatic COVID-19 Virus (Coronavirus) by PCR Nasopharyngeal     Status: Normal    Specimen: Nasopharyngeal; Swab    Narrative    The following orders were created for panel order Asymptomatic COVID-19 Virus (Coronavirus) by PCR Nasopharyngeal.  Procedure                               Abnormality         Status                     ---------                               -----------         ------                     SARS-COV2 (COVID-19) Vir...[[id number]]  Normal              Final result                 Please view results for these tests on the individual orders.   Urine Drugs of Abuse Screen     Status: Normal    Narrative    The following orders were created for panel order Urine Drugs of Abuse Screen.  Procedure                               Abnormality         Status                     ---------                               -----------         ------                     Drug abuse screen 1 urin...[[id number]]  Normal              Final result                 Please view results for these tests on the individual orders.   CBC with platelets differential     Status: Abnormal    Narrative    The following orders were created for panel order CBC with platelets  differential.  Procedure                               Abnormality         Status                     ---------                               -----------         ------                     CBC with platelets and d...[[id number]]  Abnormal            Final result                 Please view results for these tests on the individual orders.            Consults:   Consultation during this admission received from internal medicine    Danica Cohen PA   Physician Assistant - C   Medicine   Consults       Addendum   Date of Service:  7/30/2021  9:55 AM   Creation Time:  7/30/2021  9:55 AM         Consult Orders             Addendum        Expand AllCollapse All      []Hide copied text    []Hover for details  [facility]     Internal Medicine Initial Consult       Date of Admission: 7/29/2021  Consult Requested by: Yen Doshi MD  Reason for Consult: HTN     Assessment & Recommendations  Durga Aguirre is a 61 year old man with a history of alcohol abuse, HTN, HLD, and depression/anxiety who is admitted to station 3A for detox from alcohol.        Alcohol Abuse & Withdrawal. Management per psychiatry team.      Transaminitis. Not a new issue and in pattern c/w ETOH abuse. , , T bili and alk phos wnl.  - No need for lab recheck here. Alcohol cessation advised.       Uncontrolled HTN. Reports OP control on lisinopril 10 mg. Uncontrolled here in setting of ETOH w/d. Was 90s/50s in ED responsive to IV fluids as he was likely dehydrated, this AM is 189/140.   - Recheck BP after AM doses of lisinopril and Valium.   - Will make PRN clonidine available here for SBP > 180 or DBP > 110.      Prolonged QT Interval. EKG in ED (checked for vasovagal event w/ blood draw) w/ QT interval 504 ms. No prior for comparison. Lytes wnl. Reports being on Celexa 40 mg for many years.   - Repeat EKG is pending. If QT interval still prolonged primary team will need to stop  Celexa and address alternative therapy for his depression/anxiety. Also stopped Zofran for now (ordered PRN on admit) which has QT prolonging effects.      Addendum: Repeat QTc is unchanged. Notified primary Dr. Doshi who discussed w/ pharmacy and plans to decrease Celexa from 40 to 20 mg. Will need ongoing f/u of QT interval.      HLD. Continue home statin.      Medicine will follow BP and QT interval, please page with any additional concerns.      Danica Cohen PA-C  Hospitalist Service  Pager: [phone]  7a-6p M-F and 7a-3p weekends/holidays, through 8/1  Otherwise page job code [phone] (3B), [phone] (3A), or [phone] ([facility] and )  Text paging via [employer] is appreciated  ______________________________________________________________________     Reason for Admission  Alcohol Abuse     Chief Complaint   Alcohol Withdrawal     History of Present Illness   History is obtained from the patient and medical record.      Durga Aguirre is a 61 year old year old man with a history of alcohol abuse, HTN, HLD, and depression/anxiety admitted to behavioral health for detox from alcohol.     Internal Medicine service was asked to see patient for a general medical evaluation. Currently, patient is feeling ok. Mild sweats. BP elevated but no headache, chest pain, dyspnea and reports he is normally 130s/80s on home lisinopril. Reports a cough and loss of voice 2 weeks ago but symptoms have resolved. Was not tested for covid. Has been vaccinated. No bowel or bladder complaints. Has been on Celexa for many years without known QT interval concerns. No known cardiac history.      Review of Systems   10 point ROS performed and negative unless otherwise noted in HPI      Past Medical History    I have reviewed this patient's medical history and updated it with pertinent information if needed.        Past Medical History:   Diagnosis Date     Alcohol abuse       Anxiety and depression       Arthritis       Hyperlipidemia        Hypertension       Mass of left chest wall       Other spontaneous pneumothorax 1997         Past Surgical History   I have reviewed this patient's surgical history and updated it with pertinent information if needed.        Past Surgical History:   Procedure Laterality Date     COLONOSCOPY         HC EXCISION PARTIAL TALUS OR CALCANEUS, BONE         fx calcaneus- 9 screws in foot         Social History   Social History            Tobacco Use     Smoking status: Former Smoker       Packs/day: 0.25       Years: 20.00       Pack years: 5.00       Types: Cigarettes     Smokeless tobacco: Never Used     Tobacco comment: 2-3 cigarettes per day    Substance Use Topics     Alcohol use: Yes     Drug use: No         Family History   I have reviewed this patient's family history and updated it with pertinent information if needed.         Family History   Problem Relation Age of Onset     Lymphoma Father           Medications   Prescriptions Prior to Admission           Medications Prior to Admission   Medication Sig Dispense Refill Last Dose     citalopram (CELEXA) 40 MG tablet Take 1 tablet (40 mg) by mouth every morning 30 tablet 5 7/29/2021 at Unknown time     hydrOXYzine (VISTARIL) 50 MG capsule Take 1-2 capsules ( mg) by mouth 3 times daily as needed for itching or anxiety 180 capsule 11       lisinopril (ZESTRIL) 10 MG tablet TAKE 1 TABLET BY MOUTH ONE TIME DAILY  90 tablet 1 7/29/2021 at Unknown time     simvastatin (ZOCOR) 40 MG tablet TAKE 1 TABLET BY MOUTH AT BEDTIME  90 tablet 1 Past Week at Unknown time     melatonin 3 MG tablet Take 1 tablet (3 mg) by mouth nightly as needed for sleep 30 tablet 0       melatonin 3 MG tablet Take 3 mg by mouth nightly as needed for sleep           thiamine (B-1) 100 MG tablet Take 1 tablet (100 mg) by mouth daily 30 tablet 0       vitamin B complex with vitamin C (VITAMIN  B COMPLEX) tablet Take 1 tablet by mouth daily                  Allergies    No Known  "Allergies     Physical Exam   BP (!) 189/140   Pulse 78   Temp 97.6  F (36.4  C) (Temporal)   Resp 16   Ht 1.803 m (5' 11\")   Wt 100.7 kg (222 lb)   SpO2 96%   BMI 30.96 kg/m     GENERAL: Alert and oriented x 3. Lying in bed, appears comfortable. Conversant.   HEENT: Anicteric sclera. Mucous membranes moist.   CV: RRR. S1, S2. No murmurs appreciated.   RESPIRATORY: Effort normal on room air. Lungs CTAB with no wheezing, rales, rhonchi.   GI: Abdomen soft, non distended, non tender.   NEUROLOGICAL: No focal deficits. Moves all extremities.   EXTREMITIES: No peripheral edema. Warm and well perfused.   SKIN: No jaundice. No rashes.      Data   Data reviewed today: I reviewed all medications, new labs and imaging results over the last 24 hours.                           Hospital Course:   Durga Aguirre was admitted to [facility] with attending Yen Doshi MD found as a voluntary patient. The patient was placed under status 15 (15 minute checks) to ensure patient safety.     MSSA protocol was initiated due to the patient's history of alcohol abuse and concern for withdrawal symptoms.    Patient was continued of Celexa but at reduced dose of 20 mg. Continued Buspar 5 mg/hydroxyzine daily for anxiety. Continued lisinopril and simvastiatn. Recommended patient follow up with primary care. Discussed risks, benefits and side effects of medications with patient.     Patient was educated on the detrimental effects of alcohol on his mental/psycial health and possible advisee side effects with prescribed medications.     Durga Aguirre did participate in groups and was visible in the milieu.     The patient's symptoms of suicidal ideaiton improved. Patient 's mood improved and he denied suicidal thinking.     Durga Aguirre was released to home. At the time of discharge Durga Aguirre was determined to not be a danger to himself or others.          Discharge Medications:     Discharge Medication List as of " 8/1/2021  2:34 PM      CONTINUE these medications which have CHANGED    Details   citalopram (CELEXA) 20 MG tablet Take 1 tablet (20 mg) by mouth every morning, Disp-30 tablet, R-0, E-Prescribe         CONTINUE these medications which have NOT CHANGED    Details   busPIRone (BUSPAR) 5 MG tablet Take 5 mg by mouth daily, Historical      hydrOXYzine (VISTARIL) 50 MG capsule Take 1-2 capsules ( mg) by mouth 3 times daily as needed for itching or anxiety, Disp-180 capsule, R-11, E-Prescribe      lisinopril (ZESTRIL) 10 MG tablet TAKE 1 TABLET BY MOUTH ONE TIME DAILY , Disp-90 tablet, R-1, E-Prescribe      simvastatin (ZOCOR) 40 MG tablet TAKE 1 TABLET BY MOUTH AT BEDTIME , Disp-90 tablet, R-1, E-Prescribe      melatonin 3 MG tablet Take 1 tablet (3 mg) by mouth nightly as needed for sleep, Disp-30 tablet, R-0, E-Prescribe         STOP taking these medications       thiamine (B-1) 100 MG tablet Comments:   Reason for Stopping:         vitamin B complex with vitamin C (VITAMIN  B COMPLEX) tablet Comments:   Reason for Stopping:                    Psychiatric Examination:   Appearance:  awake, alert and adequately groomed  Attitude:  cooperative  Eye Contact:  good  Mood:  better  Affect:  appropriate and in normal range  Speech:  clear, coherent  Psychomotor Behavior:  no evidence of tardive dyskinesia, dystonia, or tics  Thought Process:  logical, linear and goal oriented  Associations:  no loose associations  Thought Content:  no evidence of suicidal ideation or homicidal ideation  Insight:  fair  Judgment:  fair  Oriented to:  time, person, and place  Attention Span and Concentration:  intact  Recent and Remote Memory:  intact  Language: Able to name objects, Able to repeat phrases and Able to read and write  Fund of Knowledge: appropriate  Muscle Strength and Tone: normal  Gait and Station: Normal         Discharge Plan:   Behavioral Discharge Planning and Instructions  THANK YOU FOR CHOOSING [facility]  [address]  [phone]     Summary: You were admitted to [facility] on 7/30/21 for detoxification from alcohol.  A medical exam was performed that included lab work. You have met with a  and opted to follow-up with outpatient treatment at [facility] in [place].  Please take care and make your recovery a daily priority, Durga!  It was a pleasure working with you and the entire treatment team here wishes you the very best in your recovery!      Recommendation:  Complete outpatient treatment     Main Diagnoses:  Per Dr. Yen Doshi MD;  303.90 (F10.20) Alcohol Use Disorder Severe     Major Treatments, Procedures and Findings:  You were treated for alcohol detoxification using [facility] protocol. You had a chemical dependency assessment. You had labs drawn and those results were reviewed with you. Please take a copy of your lab work with you to your next primary care provider appointment.     Symptoms to Report:  If you experience more anxiety, confusion, sleeplessness, deep sadness or thoughts of suicide, notify your treatment team or notify your primary care provider. IF ANY OF THE SYMPTOMS YOU ARE EXPERIENCING ARE A MEDICAL EMERGENCY CALL 911 IMMEDIATELY.      Lifestyle Adjustment: Adjust your lifestyle to get enough sleep, relaxation, exercise and good nutrition. Continue to develop healthy coping skills to decrease stress and promote a sober living environment. Do not use mood altering substances including alcohol, illegal drugs or addictive medications other than what is currently prescribed.      Disposition: Home     Medical Follow-Up:  PCP: Rohit Solitaroi  Phone: [phone]  Address [address] [place] [address]     Blood pressure continues to be elevated since being out of detox.  Please follow up with your primary care provider within one week.      Treatment Follow-Up:     -CD Assessment was completed and will be reviewed by Javier FARRIS/ML IOP, client will  start as soon as possible.      - Utilize www.[employer].Virgance to find a therapist.      Recovery apps for your phone to locate current in person and zoom recovery meetings  [employer] [place] - meeting milena  AA  - meeting milena  Meeting guide - meeting milena  Quick NA meeting - meeting milena  Edda- has various apps     Resources:  *due to covid-19 most AA/NA meetings are being held online*  AA meetings online search for them at: https://aaFlashstartsintergroup.org (worldwide meeting listings)  AA meetings for [place] area can be found online at: https://a[place].org (click local online meetings listings)  NA meetings for [place] area can be found online at: https://www.nam[place].org  (click find a meeting)  AA and NA Sponsors are excellent resources for support and you can find one at any support group meeting.   Alcoholics Anonymous (https://aa.org/): for information 24 hours/day  AA Intergroup service office in [place] (http://www.aastpaul.org/) 802.688.2895  AA Intergroup service office in [facility]: [phone]. (http://www.aaBiOxyDyn.org/)  Narcotics Anonymous (www.nam[place].org) (792) 926-2176  https://aafairviewriverside.org/meetings  SMART Recovery - self management for addiction recovery:  www.Intelclinicrecovery.org  Pathways ~ A Health Crisis Resource & Support Center:  506.117.2691.  https://prescribetoprevent.org/patient-education/videos/  http://www.harmreduction.org  [facility] 109-005-2075  Support Group:  AA/NA and Sponsor/support.  National [place] on Mental Illness (www.[place].dionne.org): 802.712.2839 or 970-515-8479.  Alcoholics Anonymous (www.alcoholics-anonymous.org): Check your phone book for your local chapter.  Suicide Awareness Voices of Education (SAVE) (www.save.org): 565-285-ZOMS (2665)  National Suicide Prevention Line (www.mentalhealthmn.org): 592-429-FAYX (2104)  Mental Health Consumer/Survivor Network of [place] (www.mhcsn.net): 961-219-1840 or 293-616-0001  Mental Health  Association of [place] (www.mentalhealth.org): 837.234.4289 or 700-023-1850   Substance Abuse and Mental Health Services (www.samhsa.gov)  [place] Opioid Prevention Coalition: www.opioidcoalition.org     [place] Recovery Connection (MRC)  [facility] connects people seeking recovery to resources that help foster and sustain long-term recovery.  Whether you are seeking resources for treatment, transportation, housing, job training, education, health care or other pathways to recovery, [facility] is a great place to start.  738.456.5731.  www.minnesotarecovery.org     Great Pod casts for nutrition and wellness  Listen on Apple Podcasts  Dishing Up Nutrition   [employer]   Nutrition       Understand the connection between what you eat and how you feel. Hosted by licensed nutritionists and dietitians from [employer] we share practical, real-life solutions for healthier living through nutrition.      General Medication Instructions:   See your medication sheet(s) for instructions.   Take all medications as prescribed.  Make no changes unless your primary care provider suggests them.   Go to all your primary care provider visits.  Be sure to have all your required lab tests. This way, your medicines can be refilled on time.  Do not use any forms of alcohol.     Please Note:  If you have any questions at anytime after you are discharged please call [facility] detox unit 3AW at [phone].  [facility], Behavioral Intake [phone]  Medical Records call [phone]  Outpatient Behavioral Intake call [phone]  LP+ Wait List/Bed Availability call [phone]    Please remember to take all of your behavioral discharge planning and lab paperwork to any follow up appointments, it contains your lab results, diagnosis, medication list and discharge recommendations.     Facts about COVID19 at www.cdc.gov/COVID19 and www.[place].gov/covid19     Keeping hands clean is one of the most  important steps we can take to avoid getting sick and spreading germs to others.  Please wash your hands frequently and lather with soap for at least 20 seconds!     THANK YOU FOR CHOOSING [facility]         Attestation:  The patient has been seen and evaluated by me,  Debra A. Naegele, APRN CNS on 8/1/2021  Discharge summary time > 30 minutes

## 2021-08-01 NOTE — DISCHARGE INSTRUCTIONS
Behavioral Discharge Planning and Instructions  THANK YOU FOR CHOOSING [facility]  3AW  [phone]    Summary: You were admitted to [facility] on 7/30/21 for detoxification from alcohol.  A medical exam was performed that included lab work. You have met with a  and opted to follow-up with outpatient treatment at [facility] in [place].  Please take care and make your recovery a daily priority, Durga!  It was a pleasure working with you and the entire treatment team here wishes you the very best in your recovery!     Recommendation:  Complete outpatient treatment    Main Diagnoses:  Per Dr. Yen Doshi MD;  303.90 (F10.20) Alcohol Use Disorder Severe    Major Treatments, Procedures and Findings:  You were treated for alcohol detoxification using [facility] protocol. You had a chemical dependency assessment. You had labs drawn and those results were reviewed with you. Please take a copy of your lab work with you to your next primary care provider appointment.    Symptoms to Report:  If you experience more anxiety, confusion, sleeplessness, deep sadness or thoughts of suicide, notify your treatment team or notify your primary care provider. IF ANY OF THE SYMPTOMS YOU ARE EXPERIENCING ARE A MEDICAL EMERGENCY CALL 911 IMMEDIATELY.     Lifestyle Adjustment: Adjust your lifestyle to get enough sleep, relaxation, exercise and good nutrition. Continue to develop healthy coping skills to decrease stress and promote a sober living environment. Do not use mood altering substances including alcohol, illegal drugs or addictive medications other than what is currently prescribed.     Disposition: Home    Medical Follow-Up:  PCP: Rohit Solitario  Phone: [phone]  Address [address] [place] [address]    Blood pressure continues to be elevated since being out of detox.  Please follow up with your primary care provider within one week.     Treatment Follow-Up:    -CD Assessment was  completed and will be reviewed by [facility]/ML IOP, client will start as soon as possible.     - Utilize www.psychologyACell.Youjia to find a therapist.     Recovery apps for your phone to locate current in person and zoom recovery meetings  [employer] [place] - meeting milena  AA  - meeting milena  Meeting guide - meeting milena  Quick NA meeting - meeting milena  Edda- has various apps    Resources:  *due to covid-19 most AA/NA meetings are being held online*  AA meetings online search for them at: https://aa-intergroup.org (worldwide meeting listings)  AA meetings for [place] area can be found online at: https://aamin[place].org (click local online meetings listings)  NA meetings for [place] area can be found online at: https://www.naminn[place].org  (click find a meeting)  AA and NA Sponsors are excellent resources for support and you can find one at any support group meeting.   Alcoholics Anonymous (https://aa.org/): for information 24 hours/day  AA Intergroup service office in [place] (http://www.aastpaul.org/) 808.776.7998  AA Intergroup service office in [facility]: [phone]. (http://www.aaFairphone.org/)  Narcotics Anonymous (www.nam[place].org) (945) 938-4756  https://aafairviewriverside.org/meetings  SMART Recovery - self management for addiction recovery:  www.smartrecovery.org  Pathways ~ A Health Crisis Resource & Support Center:  302.531.1218.  https://prescribetoprevent.org/patient-education/videos/  http://www.harmreduction.org  [facility] 217-161-3107  Support Group:  AA/NA and Sponsor/support.  National [place] on Mental Illness (www.[place].dionne.org): 332.885.2566 or 029-563-0259.  Alcoholics Anonymous (www.alcoholics-anonymous.org): Check your phone book for your local chapter.  Suicide Awareness Voices of Education (SAVE) (www.save.org): 062-836-VTHB (7734)  National Suicide Prevention Line (www.mentalhealthmn.org): 140-785-LUEP (8986)  Mental Health Consumer/Survivor  Network of [place] (www.[facility].net): 646-361-0087 or 245-420-6356  Mental Health Association of [place] (www.mentalhealth.org): 785.431.5194 or 647-679-0285   Substance Abuse and Mental Health Services (www.samhsa.gov)  [place] Opioid Prevention Coalition: www.opioidcoalition.org    [place] Recovery Connection ([facility])  [facility] connects people seeking recovery to resources that help foster and sustain long-term recovery.  Whether you are seeking resources for treatment, transportation, housing, job training, education, health care or other pathways to recovery, [facility] is a great place to start.  945.704.8338.  www.minnesotarecInvestorio.dey.org    Great Pod casts for nutrition and wellness  Listen on Apple Podcasts  Dishing Up Nutrition   [employer]   Nutrition       Understand the connection between what you eat and how you feel. Hosted by licensed nutritionists and dietitians from [employer] we share practical, real-life solutions for healthier living through nutrition.     General Medication Instructions:   See your medication sheet(s) for instructions.   Take all medications as prescribed.  Make no changes unless your primary care provider suggests them.   Go to all your primary care provider visits.  Be sure to have all your required lab tests. This way, your medicines can be refilled on time.  Do not use any forms of alcohol.    Please Note:  If you have any questions at anytime after you are discharged please call [facility] detox unit 3AW at [phone].  [facility], Behavioral Intake [phone]  Medical Records call [phone]  Outpatient Behavioral Intake call [phone]  LP+ Wait List/Bed Availability call [phone]    Please remember to take all of your behavioral discharge planning and lab paperwork to any follow up appointments, it contains your lab results, diagnosis, medication list and discharge recommendations.    Facts about COVID19 at www.cdc.gov/COVID19  and www.[place].gov/covid19    Keeping hands clean is one of the most important steps we can take to avoid getting sick and spreading germs to others.  Please wash your hands frequently and lather with soap for at least 20 seconds!    THANK YOU FOR CHOOSING  [facility]

## 2021-08-01 NOTE — PLAN OF CARE
Problem: Substance Withdrawal  Goal: Substance Withdrawal  Description: Signs and symptoms of listed problems will be absent or manageable.  Outcome: Improving  Patient observed to be asleep for about 6.75 hours during the night. He is out of detox and no problem was identified or reported.

## 2021-08-23 ENCOUNTER — OFFICE VISIT (OUTPATIENT)
Dept: INTERNAL MEDICINE | Facility: CLINIC | Age: 61
End: 2021-08-23
Payer: COMMERCIAL

## 2021-08-23 VITALS
SYSTOLIC BLOOD PRESSURE: 166 MMHG | HEIGHT: 71 IN | DIASTOLIC BLOOD PRESSURE: 82 MMHG | TEMPERATURE: 96.4 F | BODY MASS INDEX: 30.23 KG/M2 | HEART RATE: 63 BPM | WEIGHT: 215.9 LBS | RESPIRATION RATE: 20 BRPM | OXYGEN SATURATION: 98 %

## 2021-08-23 DIAGNOSIS — I10 ESSENTIAL HYPERTENSION: ICD-10-CM

## 2021-08-23 DIAGNOSIS — F32.9 MAJOR DEPRESSIVE DISORDER WITH SINGLE EPISODE, REMISSION STATUS UNSPECIFIED: ICD-10-CM

## 2021-08-23 DIAGNOSIS — F41.1 GENERALIZED ANXIETY DISORDER: ICD-10-CM

## 2021-08-23 DIAGNOSIS — F10.288 ALCOHOL DEPENDENCE WITH OTHER ALCOHOL-INDUCED DISORDER (H): Primary | ICD-10-CM

## 2021-08-23 PROCEDURE — 99214 OFFICE O/P EST MOD 30 MIN: CPT | Performed by: INTERNAL MEDICINE

## 2021-08-23 RX ORDER — HYDROXYZINE PAMOATE 50 MG/1
50-100 CAPSULE ORAL 3 TIMES DAILY PRN
Qty: 180 CAPSULE | Refills: 11 | Status: SHIPPED | OUTPATIENT
Start: 2021-08-23 | End: 2021-11-15

## 2021-08-23 RX ORDER — BUSPIRONE HYDROCHLORIDE 15 MG/1
15 TABLET ORAL 2 TIMES DAILY
Qty: 60 TABLET | Refills: 2 | Status: SHIPPED | OUTPATIENT
Start: 2021-08-23 | End: 2022-02-18

## 2021-08-23 RX ORDER — CITALOPRAM HYDROBROMIDE 20 MG/1
20 TABLET ORAL EVERY MORNING
Qty: 90 TABLET | Refills: 0 | Status: SHIPPED | OUTPATIENT
Start: 2021-08-23 | End: 2021-11-15

## 2021-08-23 RX ORDER — LISINOPRIL 20 MG/1
20 TABLET ORAL DAILY
Qty: 90 TABLET | Refills: 0 | Status: SHIPPED | OUTPATIENT
Start: 2021-08-23 | End: 2021-11-15

## 2021-08-23 ASSESSMENT — MIFFLIN-ST. JEOR: SCORE: 1796.45

## 2021-08-23 NOTE — PROGRESS NOTES
"    Assessment & Plan     Alcohol dependence with other alcohol-induced disorder (H)  Generalized anxiety disorder  Major depressive disorder with single episode, remission status unspecified  - citalopram (CELEXA) 20 MG tablet; Take 1 tablet (20 mg) by mouth every morning  **  - MENTAL HEALTH REFERRAL  - Adult; Psychiatry, Addiction Medicine Provider; Psychiatry; Collaborative Care Psychiatry Service/Bridge to Long-Term Psychiatry as indicated ([phone]); Yes; Chronic Mental Health without improvement; Yes; Addictio...; Future  - busPIRone (BUSPAR) 15 MG tablet; Take 1 tablet (15 mg) by mouth 2 times daily    Essential hypertension  - lisinopril (ZESTRIL) 20 MG tablet; Take 1 tablet (20 mg) by mouth daily               Tobacco Cessation:   reports that he has been smoking cigarettes. He has a 5.00 pack-year smoking history. He has never used smokeless tobacco.  Tobacco Cessation Action Plan: Information offered: Patient not interested at this time    BMI:   Estimated body mass index is 30.11 kg/m  as calculated from the following:    Height as of this encounter: 1.803 m (5' 11\").    Weight as of this encounter: 97.9 kg (215 lb 14.4 oz).       See Patient Instructions    No follow-ups on file.    Rohit Solitario MD  [facility]    Kathy Calixto is a 61 year old who presents for the following health issues     HPI       Hospital Follow-up Visit:    Hospital/Nursing Home/IP Rehab Facility: [facility]  Date of Admission: 07/29/2021  Date of Discharge: 08/01/2021  Reason(s) for Admission: alcohol dependence      Was your hospitalization related to COVID-19? No   Problems taking medications regularly:  None  Medication changes since discharge: None  Problems adhering to non-medication therapy:  None    Summary of hospitalization:  [employer] discharge summary reviewed  Diagnostic Tests/Treatments reviewed.  Follow " "up needed: none  Other Healthcare Providers Involved in Patient s Care:         None  Update since discharge: improved.       Post Discharge Medication Reconciliation: discharge medications reconciled and changed, per note/orders.  Plan of care communicated with patient                  Review of Systems         Objective    BP (!) 166/82   Pulse 63   Temp (!) 96.4  F (35.8  C) (Temporal)   Resp 20   Ht 1.803 m (5' 11\")   Wt 97.9 kg (215 lb 14.4 oz)   SpO2 98%   BMI 30.11 kg/m    Body mass index is 30.11 kg/m .  Physical Exam   GENERAL APPEARANCE: alert and no distress  HENT: normal cephalic/atraumatic  CV: regular rates and rhythm, normal S1 S2, no S3 or S4 and no murmur, click or rub  PSYCH: mentation appears normal. anxious                  "

## 2021-09-29 DIAGNOSIS — I10 ESSENTIAL HYPERTENSION: ICD-10-CM

## 2021-09-30 RX ORDER — LISINOPRIL 10 MG/1
TABLET ORAL
Qty: 90 TABLET | Refills: 0 | OUTPATIENT
Start: 2021-09-30

## 2021-10-01 ENCOUNTER — TRANSFERRED RECORDS (OUTPATIENT)
Dept: HEALTH INFORMATION MANAGEMENT | Facility: CLINIC | Age: 61
End: 2021-10-01
Payer: COMMERCIAL

## 2021-11-12 NOTE — PROGRESS NOTES
Assessment & Plan     Substance abuse (H)  Doing well at in patient.  Planning on leaving in 1-2 weeks.  Feels confident in his recovery    Essential hypertension  Doing well, at goal.  Will have him follow up with PCP when he has been discharged    Hyperlipidemia LDL goal <130  As above                   No follow-ups on file.    KURT [facility]    Kathy Calixto is a 61 year old who presents for the following health issues     History of Present Illness       Hypertension: He presents for follow up of hypertension.  He does check blood pressure  regularly outside of the clinic. Outside blood pressures have been over 140/90. He follows a low salt diet.     He eats 2-3 servings of fruits and vegetables daily.He consumes 0 sweetened beverage(s) daily.He exercises with enough effort to increase his heart rate 10 to 19 minutes per day.  He exercises with enough effort to increase his heart rate 3 or less days per week.   He is taking medications regularly.       Hypertension Follow-up      Do you check your blood pressure regularly outside of the clinic? Yes     Are you following a low salt diet? Yes    Are your blood pressures ever more than 140 on the top number (systolic) OR more   than 90 on the bottom number (diastolic), for example 140/90? Yes      How many servings of fruits and vegetables do you eat daily?  2-3    On average, how many sweetened beverages do you drink each day (Examples: soda, juice, sweet tea, etc.  Do NOT count diet or artificially sweetened beverages)?   0    How many days per week do you exercise enough to make your heart beat faster? 3 or less    How many minutes a day do you exercise enough to make your heart beat faster? 10 - 19    How many days per week do you miss taking your medication? 0        Review of Systems   Constitutional, HEENT, cardiovascular, pulmonary, gi and gu systems are negative, except as otherwise noted.     "  Objective    /73   Pulse 59   Temp 97.5  F (36.4  C) (Skin)   Resp 17   Ht 1.803 m (5' 11\")   Wt 103.4 kg (228 lb)   SpO2 95%   BMI 31.80 kg/m    Body mass index is 31.8 kg/m .  Physical Exam   GENERAL: alert and no distress  EYES: Eyes grossly normal to inspection  RESP: lungs clear to auscultation - no rales, rhonchi or wheezes  CV: regular rate and rhythm, normal S1 S2, no S3 or S4, no murmur, click or rub, no peripheral edema and peripheral pulses strong                "

## 2021-11-15 ENCOUNTER — OFFICE VISIT (OUTPATIENT)
Dept: FAMILY MEDICINE | Facility: CLINIC | Age: 61
End: 2021-11-15
Payer: COMMERCIAL

## 2021-11-15 VITALS
WEIGHT: 228 LBS | HEIGHT: 71 IN | BODY MASS INDEX: 31.92 KG/M2 | SYSTOLIC BLOOD PRESSURE: 114 MMHG | TEMPERATURE: 97.5 F | HEART RATE: 59 BPM | DIASTOLIC BLOOD PRESSURE: 73 MMHG | OXYGEN SATURATION: 95 % | RESPIRATION RATE: 17 BRPM

## 2021-11-15 DIAGNOSIS — F19.10 SUBSTANCE ABUSE (H): Primary | ICD-10-CM

## 2021-11-15 DIAGNOSIS — E78.5 HYPERLIPIDEMIA LDL GOAL <130: ICD-10-CM

## 2021-11-15 DIAGNOSIS — I10 ESSENTIAL HYPERTENSION: Chronic | ICD-10-CM

## 2021-11-15 PROCEDURE — 99213 OFFICE O/P EST LOW 20 MIN: CPT | Performed by: PHYSICIAN ASSISTANT

## 2021-11-15 RX ORDER — LISINOPRIL 40 MG/1
TABLET ORAL
COMMUNITY
Start: 2021-11-01 | End: 2022-02-18

## 2021-11-15 RX ORDER — FOLIC ACID 1 MG/1
TABLET ORAL
COMMUNITY
Start: 2021-10-25 | End: 2022-02-18

## 2021-11-15 RX ORDER — LANOLIN ALCOHOL/MO/W.PET/CERES
CREAM (GRAM) TOPICAL
COMMUNITY
Start: 2021-10-25 | End: 2022-02-18

## 2021-11-15 RX ORDER — GABAPENTIN 300 MG/1
CAPSULE ORAL
COMMUNITY
Start: 2021-11-08 | End: 2022-02-18

## 2021-11-15 RX ORDER — MULTIVITAMIN WITH FOLIC ACID 400 MCG
TABLET ORAL
COMMUNITY
Start: 2021-10-25 | End: 2022-03-23

## 2021-11-15 RX ORDER — METOPROLOL SUCCINATE 50 MG/1
TABLET, EXTENDED RELEASE ORAL
COMMUNITY
Start: 2021-11-08 | End: 2022-02-18

## 2021-11-15 RX ORDER — NICOTINE 21 MG/24HR
PATCH, TRANSDERMAL 24 HOURS TRANSDERMAL
COMMUNITY
Start: 2021-11-08 | End: 2022-03-23

## 2021-11-15 RX ORDER — TRAZODONE HYDROCHLORIDE 50 MG/1
TABLET, FILM COATED ORAL
COMMUNITY
Start: 2021-10-25 | End: 2022-03-23

## 2021-11-15 ASSESSMENT — MIFFLIN-ST. JEOR: SCORE: 1851.33

## 2021-11-16 ASSESSMENT — PATIENT HEALTH QUESTIONNAIRE - PHQ9: SUM OF ALL RESPONSES TO PHQ QUESTIONS 1-9: 0

## 2021-12-05 DIAGNOSIS — F10.288 ALCOHOL DEPENDENCE WITH OTHER ALCOHOL-INDUCED DISORDER (H): ICD-10-CM

## 2021-12-07 RX ORDER — HYDROXYZINE PAMOATE 50 MG/1
50-100 CAPSULE ORAL 3 TIMES DAILY PRN
Qty: 60 CAPSULE | Refills: 0 | Status: SHIPPED | OUTPATIENT
Start: 2021-12-07 | End: 2022-02-15

## 2021-12-07 NOTE — TELEPHONE ENCOUNTER
Routing refill request to provider for review/approval because:  Drug not active on patient's medication list    Adalid Whitehead RN  [facility] Triage Nurse

## 2021-12-30 ENCOUNTER — TRANSFERRED RECORDS (OUTPATIENT)
Dept: HEALTH INFORMATION MANAGEMENT | Facility: CLINIC | Age: 61
End: 2021-12-30
Payer: COMMERCIAL

## 2022-02-05 ENCOUNTER — DOCUMENTATION ONLY (OUTPATIENT)
Dept: LAB | Facility: CLINIC | Age: 62
End: 2022-02-05
Payer: COMMERCIAL

## 2022-02-05 DIAGNOSIS — E78.1 HYPERTRIGLYCERIDEMIA: ICD-10-CM

## 2022-02-05 DIAGNOSIS — F19.20 CHEMICAL DEPENDENCY (H): ICD-10-CM

## 2022-02-05 DIAGNOSIS — F10.20 UNCOMPLICATED ALCOHOL DEPENDENCE (H): ICD-10-CM

## 2022-02-05 DIAGNOSIS — I10 ESSENTIAL HYPERTENSION: Primary | ICD-10-CM

## 2022-02-14 ENCOUNTER — LAB (OUTPATIENT)
Dept: LAB | Facility: CLINIC | Age: 62
End: 2022-02-14
Payer: COMMERCIAL

## 2022-02-14 DIAGNOSIS — F10.288 ALCOHOL DEPENDENCE WITH OTHER ALCOHOL-INDUCED DISORDER (H): ICD-10-CM

## 2022-02-14 DIAGNOSIS — E78.1 HYPERTRIGLYCERIDEMIA: ICD-10-CM

## 2022-02-14 DIAGNOSIS — I10 ESSENTIAL HYPERTENSION: ICD-10-CM

## 2022-02-14 DIAGNOSIS — R73.9 HYPERGLYCEMIA: ICD-10-CM

## 2022-02-14 DIAGNOSIS — F19.20 CHEMICAL DEPENDENCY (H): ICD-10-CM

## 2022-02-14 LAB
ALBUMIN SERPL-MCNC: 3.3 G/DL (ref 3.4–5)
ALP SERPL-CCNC: 117 U/L (ref 40–150)
ALT SERPL W P-5'-P-CCNC: 46 U/L (ref 0–70)
ANION GAP SERPL CALCULATED.3IONS-SCNC: 4 MMOL/L (ref 3–14)
AST SERPL W P-5'-P-CCNC: 28 U/L (ref 0–45)
BILIRUB SERPL-MCNC: 0.8 MG/DL (ref 0.2–1.3)
BUN SERPL-MCNC: 10 MG/DL (ref 7–30)
CALCIUM SERPL-MCNC: 8.5 MG/DL (ref 8.5–10.1)
CHLORIDE BLD-SCNC: 102 MMOL/L (ref 94–109)
CHOLEST SERPL-MCNC: 336 MG/DL
CO2 SERPL-SCNC: 28 MMOL/L (ref 20–32)
CREAT SERPL-MCNC: 0.95 MG/DL (ref 0.66–1.25)
ERYTHROCYTE [DISTWIDTH] IN BLOOD BY AUTOMATED COUNT: 13.1 % (ref 10–15)
FASTING STATUS PATIENT QL REPORTED: YES
GFR SERPL CREATININE-BSD FRML MDRD: 89 ML/MIN/1.73M2
GLUCOSE BLD-MCNC: 171 MG/DL (ref 70–99)
HCT VFR BLD AUTO: 47 % (ref 40–53)
HDLC SERPL-MCNC: 34 MG/DL
HGB BLD-MCNC: 15.2 G/DL (ref 13.3–17.7)
LDLC SERPL CALC-MCNC: ABNORMAL MG/DL
MCH RBC QN AUTO: 30.4 PG (ref 26.5–33)
MCHC RBC AUTO-ENTMCNC: 32.3 G/DL (ref 31.5–36.5)
MCV RBC AUTO: 94 FL (ref 78–100)
NONHDLC SERPL-MCNC: 302 MG/DL
PLATELET # BLD AUTO: 183 10E3/UL (ref 150–450)
POTASSIUM BLD-SCNC: 4 MMOL/L (ref 3.4–5.3)
PROT SERPL-MCNC: 7.2 G/DL (ref 6.8–8.8)
RBC # BLD AUTO: 5 10E6/UL (ref 4.4–5.9)
SODIUM SERPL-SCNC: 134 MMOL/L (ref 133–144)
TRIGL SERPL-MCNC: 1373 MG/DL
WBC # BLD AUTO: 6.5 10E3/UL (ref 4–11)

## 2022-02-14 PROCEDURE — 85027 COMPLETE CBC AUTOMATED: CPT

## 2022-02-14 PROCEDURE — 80053 COMPREHEN METABOLIC PANEL: CPT

## 2022-02-14 PROCEDURE — 36415 COLL VENOUS BLD VENIPUNCTURE: CPT

## 2022-02-14 PROCEDURE — 83036 HEMOGLOBIN GLYCOSYLATED A1C: CPT

## 2022-02-14 PROCEDURE — 80061 LIPID PANEL: CPT

## 2022-02-14 NOTE — TELEPHONE ENCOUNTER
Patient came down looking for a refill as he only has 3 capsules left. Was hoping to get a refill today.

## 2022-02-15 RX ORDER — HYDROXYZINE PAMOATE 50 MG/1
50-100 CAPSULE ORAL 3 TIMES DAILY PRN
Qty: 60 CAPSULE | Refills: 0 | Status: SHIPPED | OUTPATIENT
Start: 2022-02-15 | End: 2022-03-23

## 2022-02-15 NOTE — TELEPHONE ENCOUNTER
Prescription approved per [facility] Refill Protocol.  Rosa Chavez, RN  [facility] Triage Nurse

## 2022-02-18 ENCOUNTER — OFFICE VISIT (OUTPATIENT)
Dept: INTERNAL MEDICINE | Facility: CLINIC | Age: 62
End: 2022-02-18
Payer: COMMERCIAL

## 2022-02-18 VITALS
TEMPERATURE: 97.1 F | BODY MASS INDEX: 30.77 KG/M2 | SYSTOLIC BLOOD PRESSURE: 130 MMHG | HEIGHT: 71 IN | OXYGEN SATURATION: 96 % | WEIGHT: 219.8 LBS | HEART RATE: 105 BPM | DIASTOLIC BLOOD PRESSURE: 90 MMHG

## 2022-02-18 DIAGNOSIS — E78.1 HYPERTRIGLYCERIDEMIA: ICD-10-CM

## 2022-02-18 DIAGNOSIS — R73.9 HYPERGLYCEMIA: ICD-10-CM

## 2022-02-18 DIAGNOSIS — F10.288 ALCOHOL DEPENDENCE WITH OTHER ALCOHOL-INDUCED DISORDER (H): ICD-10-CM

## 2022-02-18 DIAGNOSIS — Z00.00 ROUTINE GENERAL MEDICAL EXAMINATION AT A HEALTH CARE FACILITY: Primary | ICD-10-CM

## 2022-02-18 DIAGNOSIS — F32.9 MAJOR DEPRESSIVE DISORDER WITH SINGLE EPISODE, REMISSION STATUS UNSPECIFIED: ICD-10-CM

## 2022-02-18 DIAGNOSIS — I10 ESSENTIAL HYPERTENSION: ICD-10-CM

## 2022-02-18 DIAGNOSIS — F41.1 GENERALIZED ANXIETY DISORDER: ICD-10-CM

## 2022-02-18 PROBLEM — Z86.0100 HISTORY OF COLONIC POLYPS: Status: ACTIVE | Noted: 2018-11-29

## 2022-02-18 LAB — HBA1C MFR BLD: 6.5 % (ref 0–5.6)

## 2022-02-18 PROCEDURE — 99214 OFFICE O/P EST MOD 30 MIN: CPT | Mod: 25 | Performed by: INTERNAL MEDICINE

## 2022-02-18 PROCEDURE — 99396 PREV VISIT EST AGE 40-64: CPT | Performed by: INTERNAL MEDICINE

## 2022-02-18 RX ORDER — LISINOPRIL 40 MG/1
20 TABLET ORAL DAILY
COMMUNITY
Start: 2022-02-18 | End: 2022-02-18

## 2022-02-18 RX ORDER — CITALOPRAM HYDROBROMIDE 20 MG/1
TABLET ORAL
Qty: 90 TABLET | Refills: 1 | Status: ON HOLD | OUTPATIENT
Start: 2022-02-18 | End: 2022-03-25

## 2022-02-18 RX ORDER — LISINOPRIL 20 MG/1
20 TABLET ORAL DAILY
Qty: 90 TABLET | Refills: 0 | Status: ON HOLD | OUTPATIENT
Start: 2022-02-18 | End: 2022-03-25

## 2022-02-18 ASSESSMENT — ENCOUNTER SYMPTOMS
DIZZINESS: 0
PALPITATIONS: 0
JOINT SWELLING: 0
CONSTIPATION: 0
ARTHRALGIAS: 1
NAUSEA: 0
MYALGIAS: 0
HEMATOCHEZIA: 0
WEAKNESS: 0
FREQUENCY: 0
PARESTHESIAS: 0
FEVER: 0
DYSURIA: 0
HEARTBURN: 1
SHORTNESS OF BREATH: 0
EYE PAIN: 0
HEMATURIA: 0
DIARRHEA: 0
HEADACHES: 0
ABDOMINAL PAIN: 0
CHILLS: 0
COUGH: 0
NERVOUS/ANXIOUS: 1
SORE THROAT: 0

## 2022-02-18 NOTE — PROGRESS NOTES
SUBJECTIVE:   CC: Durga Aguirre is an 62 year old male who presents for preventative health visit.       Patient has been advised of split billing requirements and indicates understanding: Yes  Healthy Habits:     Getting at least 3 servings of Calcium per day:  Yes    Bi-annual eye exam:  Yes    Dental care twice a year:  Yes    Sleep apnea or symptoms of sleep apnea:  None    Diet:  Low salt, Low fat/cholesterol and Breakfast skipped    Frequency of exercise:  2-3 days/week    Duration of exercise:  15-30 minutes    Taking medications regularly:  Yes    Medication side effects:  None    PHQ-2 Total Score: 0    Additional concerns today:  No      Unfortunately Durga has started drinking again since his inpatient teen [facility] rehab stent in the fall.  He does not believe he can become completely sober.  He is also stopped taking most if not all of his antidepressants and other meds.  The only thing he currently taking is his antihypertensive        Today's PHQ-2 Score:   PHQ-2 ( 1999 [employer]) 2/18/2022   Q1: Little interest or pleasure in doing things 0   Q2: Feeling down, depressed or hopeless 0   PHQ-2 Score 0   PHQ-2 Total Score (12-17 Years)- Positive if 3 or more points; Administer PHQ-A if positive -   Q1: Little interest or pleasure in doing things Not at all   Q2: Feeling down, depressed or hopeless Not at all   PHQ-2 Score 0       Abuse: Current or Past(Physical, Sexual or Emotional)- NO  Do you feel safe in your environment? YES    Have you ever done Advance Care Planning? (For example, a Health Directive, POLST, or a discussion with a medical provider or your loved ones about your wishes): No, advance care planning information given to patient to review.  Patient plans to discuss their wishes with loved ones or provider.      Social History     Tobacco Use     Smoking status: Current Every Day Smoker     Packs/day: 0.25     Years: 20.00     Pack years: 5.00     Types: Cigarettes     Smokeless tobacco:  Never Used     Tobacco comment: 2-3 cigarettes per day    Substance Use Topics     Alcohol use: Not Currently         Alcohol Use 2/18/2022   Prescreen: >3 drinks/day or >7 drinks/week? Yes   Prescreen: >3 drinks/day or >7 drinks/week? -   AUDIT SCORE  11     AUDIT - Alcohol Use Disorders Identification Test - Reproduced from the World Health Organization Audit 2001 (Second Edition) 2/18/2022   1.  How often do you have a drink containing alcohol? 4 or more times a week   2.  How many drinks containing alcohol do you have on a typical day when you are drinking? 5 or 6   3.  How often do you have five or more drinks on one occasion? Daily or almost daily   4.  How often during the last year have you found that you were not able to stop drinking once you had started? Never   5.  How often during the last year have you failed to do what was normally expected of you because of drinking? Never   6.  How often during the last year have you needed a first drink in the morning to get yourself going after a heavy drinking session? Less than monthly   7.  How often during the last year have you had a feeling of guilt or remorse after drinking? Never   8.  How often during the last year have you been unable to remember what happened the night before because of your drinking? Never   9.  Have you or someone else been injured because of your drinking? No   10. Has a relative, friend, doctor or other health care worker been concerned about your drinking or suggested you cut down? No   TOTAL SCORE 11       Last PSA:   PSA   Date Value Ref Range Status   02/17/2016 1.09 0 - 4 ug/L Final       Reviewed orders with patient. Reviewed health maintenance and updated orders accordingly - Yes  Lab work is in process  Labs reviewed in EPIC    Reviewed and updated as needed this visit by clinical staff   Tobacco  Allergies  Meds   Med Hx  Surg Hx  Fam Hx  Soc Hx        Reviewed and updated as needed this visit by Provider               "       Review of Systems   Constitutional: Negative for chills and fever.   HENT: Negative for congestion, ear pain, hearing loss and sore throat.    Eyes: Negative for pain and visual disturbance.   Respiratory: Negative for cough and shortness of breath.    Cardiovascular: Negative for chest pain, palpitations and peripheral edema.   Gastrointestinal: Positive for heartburn. Negative for abdominal pain, constipation, diarrhea, hematochezia and nausea.   Genitourinary: Negative for dysuria, frequency, genital sores, hematuria, impotence, penile discharge and urgency.   Musculoskeletal: Positive for arthralgias. Negative for joint swelling and myalgias.   Skin: Negative for rash.   Neurological: Negative for dizziness, weakness, headaches and paresthesias.   Psychiatric/Behavioral: Negative for mood changes. The patient is nervous/anxious.          OBJECTIVE:   BP (!) 130/90   Pulse 105   Temp 97.1  F (36.2  C) (Temporal)   Ht 1.803 m (5' 11\")   Wt 99.7 kg (219 lb 12.8 oz)   SpO2 96%   BMI 30.66 kg/m      Physical Exam  GENERAL: alert, no distress and over weight  EYES: Eyes grossly normal to inspection, PERRL and conjunctivae and sclerae normal  HENT: ear canals and TM's normal, nose and mouth without ulcers or lesions  NECK: no adenopathy, no asymmetry, masses, or scars and thyroid normal to palpation  RESP: lungs clear to auscultation - no rales, rhonchi or wheezes  CV: regular rate and rhythm, normal S1 S2, no S3 or S4, no murmur, click or rub, no peripheral edema and peripheral pulses strong  ABDOMEN: soft, nontender, no hepatosplenomegaly, no masses and bowel sounds normal  MS: no gross musculoskeletal defects noted, no edema  SKIN: no suspicious lesions or rashes  NEURO: Normal strength and tone, mentation intact and speech normal  PSYCH: mentation appears normal, affect normal/bright, tearful, anxious and judgement and insight intact    Labs reviewed in Epic  No results found for this or any previous " visit (from the past 24 hour(s)).    ASSESSMENT/PLAN:   (Z00.00) Routine general medical examination at a health care facility  (primary encounter diagnosis)  Plan: Updated screening, immunizations, prevention.  Please see health maintenance list, care gaps     (E78.1) Hypertriglyceridemia  Comment: TG>1000  Plan: needs to significantly cut back on alcohol intake, low fat and sugar intake.  Await a1c level.    (F10.288) Alcohol dependence with other alcohol-induced disorder (H)  Focus on obtaining sobriety- needs involvement of mental health.  Seems that he does well when he is enrolled in an inpatient program and then is discharged in does not follow-up as an outpatient or does not have outpatient follow-up arranged for him.  He really needs a good addiction medicine team involvement -physician lead.  We discussed several meds today including naltrexone which she is used previously, Antabuse which she currently is not interested in.  Involvement of addiction medicine would be helpful  Plan: Adult Mental Health  Referral, Adult         Mental Health  Referral            (R73.9) Hyperglycemia  Plan: Hemoglobin A1c            (F32.9) Major depressive disorder with single episode, remission status unspecified  Restart his SSRI  Plan: Adult Mental Health  Referral,         citalopram (CELEXA) 20 MG tablet           (F41.1) Generalized anxiety disorder  Plan: Adult Mental Health  Referral,         citalopram (CELEXA) 20 MG tablet            (I10) Essential hypertension  Not controlled but given his alcohol intake I think if we can have him focus on this and come back and will help his BP.  Continue his ACE inhibitor  Plan: lisinopril (ZESTRIL) 20 MG tablet              Patient has been advised of split billing requirements and indicates understanding: Yes    COUNSELING:   Reviewed preventive health counseling, as reflected in patient instructions    Estimated body mass index is 30.66  "kg/m  as calculated from the following:    Height as of this encounter: 1.803 m (5' 11\").    Weight as of this encounter: 99.7 kg (219 lb 12.8 oz).     Weight management plan: Discussed healthy diet and exercise guidelines    He reports that he has been smoking cigarettes. He has a 5.00 pack-year smoking history. He has never used smokeless tobacco.  Tobacco Cessation Action Plan:   Information offered: Patient not interested at this time      Counseling Resources:  ATP IV Guidelines  Pooled Cohorts Equation Calculator  FRAX Risk Assessment  ICSI Preventive Guidelines  Dietary Guidelines for Americans, 2010  USDA's MyPlate  ASA Prophylaxis  Lung CA Screening    Rohit Solitario MD  [facility]  "

## 2022-02-18 NOTE — PATIENT INSTRUCTIONS
Preventive Health Recommendations  Male Ages 50 - 64       it is crucial to eliminate added sugars, restrict fat to ?10 to 15% (preferably <5%) of total calories, and abstain from alcohol.       Yearly exam:             See your health care provider every year in order to  o   Review health changes.   o   Discuss preventive care.    o   Review your medicines if your doctor has prescribed any.     Have a cholesterol test every 5 years, or more frequently if you are at risk for high cholesterol/heart disease.     Have a diabetes test (fasting glucose) every three years. If you are at risk for diabetes, you should have this test more often.     Have a colonoscopy at age 50, or have a yearly FIT test (stool test). These exams will check for colon cancer.      Talk with your health care provider about whether or not a prostate cancer screening test (PSA) is right for you.    You should be tested each year for STDs (sexually transmitted diseases), if you re at risk.     Shots: Get a flu shot each year. Get a tetanus shot every 10 years.     Nutrition:    Eat at least 5 servings of fruits and vegetables daily.     Eat whole-grain bread, whole-wheat pasta and brown rice instead of white grains and rice.     Get adequate Calcium and Vitamin D.     Lifestyle    Exercise for at least 150 minutes a week (30 minutes a day, 5 days a week). This will help you control your weight and prevent disease.     Limit alcohol to one drink per day.     No smoking.     Wear sunscreen to prevent skin cancer.     See your dentist every six months for an exam and cleaning.     See your eye doctor every 1 to 2 years.

## 2022-03-21 DIAGNOSIS — F10.288 ALCOHOL DEPENDENCE WITH OTHER ALCOHOL-INDUCED DISORDER (H): ICD-10-CM

## 2022-03-23 ENCOUNTER — HOSPITAL ENCOUNTER (INPATIENT)
Facility: CLINIC | Age: 62
LOS: 5 days | Discharge: HOME | End: 2022-03-28
Attending: FAMILY MEDICINE | Admitting: PSYCHIATRY & NEUROLOGY
Payer: COMMERCIAL

## 2022-03-23 ENCOUNTER — TELEPHONE (OUTPATIENT)
Dept: BEHAVIORAL HEALTH | Facility: CLINIC | Age: 62
End: 2022-03-23
Payer: COMMERCIAL

## 2022-03-23 ENCOUNTER — TELEPHONE (OUTPATIENT)
Dept: BEHAVIORAL HEALTH | Facility: CLINIC | Age: 62
End: 2022-03-23

## 2022-03-23 DIAGNOSIS — F10.939 ALCOHOL WITHDRAWAL WITH COMPLICATION WITH INPATIENT TREATMENT, WITH UNSPECIFIED COMPLICATION (H): Primary | ICD-10-CM

## 2022-03-23 DIAGNOSIS — Z11.52 ENCOUNTER FOR SCREENING LABORATORY TESTING FOR SEVERE ACUTE RESPIRATORY SYNDROME CORONAVIRUS 2 (SARS-COV-2): ICD-10-CM

## 2022-03-23 DIAGNOSIS — I10 ESSENTIAL HYPERTENSION: ICD-10-CM

## 2022-03-23 DIAGNOSIS — R79.89 HYPOURICEMIA: ICD-10-CM

## 2022-03-23 DIAGNOSIS — F10.229 ALCOHOL DEPENDENCE WITH INTOXICATION WITH COMPLICATION (H): ICD-10-CM

## 2022-03-23 DIAGNOSIS — R74.8 ELEVATED CREATINE KINASE: ICD-10-CM

## 2022-03-23 DIAGNOSIS — E87.6 HYPOKALEMIA: ICD-10-CM

## 2022-03-23 DIAGNOSIS — E87.6 HYPOPOTASSEMIA: ICD-10-CM

## 2022-03-23 LAB
ALBUMIN SERPL-MCNC: 3.7 G/DL (ref 3.4–5)
ALBUMIN UR-MCNC: 20 MG/DL
ALCOHOL BREATH TEST: 0.17 (ref 0–0.01)
ALP SERPL-CCNC: 85 U/L (ref 40–150)
ALT SERPL W P-5'-P-CCNC: 90 U/L (ref 0–70)
ANION GAP SERPL CALCULATED.3IONS-SCNC: 13 MMOL/L (ref 3–14)
APPEARANCE UR: CLEAR
APTT PPP: 24 SECONDS (ref 22–38)
AST SERPL W P-5'-P-CCNC: 96 U/L (ref 0–45)
BACTERIA #/AREA URNS HPF: ABNORMAL /HPF
BASOPHILS # BLD AUTO: 0.1 10E3/UL (ref 0–0.2)
BASOPHILS NFR BLD AUTO: 2 %
BILIRUB SERPL-MCNC: 1 MG/DL (ref 0.2–1.3)
BILIRUB UR QL STRIP: NEGATIVE
BUN SERPL-MCNC: 12 MG/DL (ref 7–30)
CALCIUM SERPL-MCNC: 8.8 MG/DL (ref 8.5–10.1)
CHLORIDE BLD-SCNC: 102 MMOL/L (ref 94–109)
CO2 SERPL-SCNC: 27 MMOL/L (ref 20–32)
COLOR UR AUTO: ABNORMAL
CREAT SERPL-MCNC: 1.42 MG/DL (ref 0.66–1.25)
EOSINOPHIL # BLD AUTO: 0.1 10E3/UL (ref 0–0.7)
EOSINOPHIL NFR BLD AUTO: 3 %
ERYTHROCYTE [DISTWIDTH] IN BLOOD BY AUTOMATED COUNT: 13.7 % (ref 10–15)
GFR SERPL CREATININE-BSD FRML MDRD: 55 ML/MIN/1.73M2
GLUCOSE BLD-MCNC: 210 MG/DL (ref 70–99)
GLUCOSE UR STRIP-MCNC: >=1000 MG/DL
GRANULAR CAST: 1 /LPF
HCT VFR BLD AUTO: 42.2 % (ref 40–53)
HGB BLD-MCNC: 14.4 G/DL (ref 13.3–17.7)
HGB UR QL STRIP: NEGATIVE
HOLD SPECIMEN: NORMAL
HYALINE CASTS: 19 /LPF
IMM GRANULOCYTES # BLD: 0.1 10E3/UL
IMM GRANULOCYTES NFR BLD: 1 %
INR PPP: 0.97 (ref 0.86–1.14)
KETONES UR STRIP-MCNC: ABNORMAL MG/DL
LEUKOCYTE ESTERASE UR QL STRIP: NEGATIVE
LYMPHOCYTES # BLD AUTO: 1.1 10E3/UL (ref 0.8–5.3)
LYMPHOCYTES NFR BLD AUTO: 20 %
MAGNESIUM SERPL-MCNC: 2.1 MG/DL (ref 1.6–2.3)
MCH RBC QN AUTO: 31.6 PG (ref 26.5–33)
MCHC RBC AUTO-ENTMCNC: 34.1 G/DL (ref 31.5–36.5)
MCV RBC AUTO: 93 FL (ref 78–100)
MONOCYTES # BLD AUTO: 0.4 10E3/UL (ref 0–1.3)
MONOCYTES NFR BLD AUTO: 7 %
MUCOUS THREADS #/AREA URNS LPF: PRESENT /LPF
NEUTROPHILS # BLD AUTO: 3.7 10E3/UL (ref 1.6–8.3)
NEUTROPHILS NFR BLD AUTO: 67 %
NITRATE UR QL: NEGATIVE
NRBC # BLD AUTO: 0 10E3/UL
NRBC BLD AUTO-RTO: 0 /100
PH UR STRIP: 6 [PH] (ref 5–7)
PLATELET # BLD AUTO: 157 10E3/UL (ref 150–450)
POTASSIUM BLD-SCNC: 3.1 MMOL/L (ref 3.4–5.3)
PROT SERPL-MCNC: 7.1 G/DL (ref 6.8–8.8)
RBC # BLD AUTO: 4.56 10E6/UL (ref 4.4–5.9)
RBC URINE: 1 /HPF
SARS-COV-2 RNA RESP QL NAA+PROBE: NEGATIVE
SODIUM SERPL-SCNC: 142 MMOL/L (ref 133–144)
SP GR UR STRIP: 1.01 (ref 1–1.03)
SQUAMOUS EPITHELIAL: 1 /HPF
UROBILINOGEN UR STRIP-MCNC: NORMAL MG/DL
WBC # BLD AUTO: 5.4 10E3/UL (ref 4–11)
WBC URINE: 6 /HPF

## 2022-03-23 PROCEDURE — C9803 HOPD COVID-19 SPEC COLLECT: HCPCS | Performed by: FAMILY MEDICINE

## 2022-03-23 PROCEDURE — 99285 EMERGENCY DEPT VISIT HI MDM: CPT | Performed by: FAMILY MEDICINE

## 2022-03-23 PROCEDURE — 250N000013 HC RX MED GY IP 250 OP 250 PS 637: Performed by: PSYCHIATRY & NEUROLOGY

## 2022-03-23 PROCEDURE — 36415 COLL VENOUS BLD VENIPUNCTURE: CPT | Performed by: FAMILY MEDICINE

## 2022-03-23 PROCEDURE — 250N000009 HC RX 250: Performed by: FAMILY MEDICINE

## 2022-03-23 PROCEDURE — 85730 THROMBOPLASTIN TIME PARTIAL: CPT | Performed by: FAMILY MEDICINE

## 2022-03-23 PROCEDURE — 85610 PROTHROMBIN TIME: CPT | Performed by: FAMILY MEDICINE

## 2022-03-23 PROCEDURE — 85025 COMPLETE CBC W/AUTO DIFF WBC: CPT | Performed by: FAMILY MEDICINE

## 2022-03-23 PROCEDURE — 83036 HEMOGLOBIN GLYCOSYLATED A1C: CPT | Performed by: PHYSICIAN ASSISTANT

## 2022-03-23 PROCEDURE — 250N000013 HC RX MED GY IP 250 OP 250 PS 637: Performed by: FAMILY MEDICINE

## 2022-03-23 PROCEDURE — 258N000001 HC RX 258: Performed by: FAMILY MEDICINE

## 2022-03-23 PROCEDURE — 80053 COMPREHEN METABOLIC PANEL: CPT | Performed by: FAMILY MEDICINE

## 2022-03-23 PROCEDURE — 96361 HYDRATE IV INFUSION ADD-ON: CPT | Performed by: FAMILY MEDICINE

## 2022-03-23 PROCEDURE — 128N000004 HC R&B CD ADULT

## 2022-03-23 PROCEDURE — 82040 ASSAY OF SERUM ALBUMIN: CPT | Performed by: FAMILY MEDICINE

## 2022-03-23 PROCEDURE — U0005 INFEC AGEN DETEC AMPLI PROBE: HCPCS | Performed by: FAMILY MEDICINE

## 2022-03-23 PROCEDURE — 81001 URINALYSIS AUTO W/SCOPE: CPT | Performed by: FAMILY MEDICINE

## 2022-03-23 PROCEDURE — HZ2ZZZZ DETOXIFICATION SERVICES FOR SUBSTANCE ABUSE TREATMENT: ICD-10-PCS | Performed by: FAMILY MEDICINE

## 2022-03-23 PROCEDURE — 258N000003 HC RX IP 258 OP 636: Performed by: FAMILY MEDICINE

## 2022-03-23 PROCEDURE — 250N000011 HC RX IP 250 OP 636: Performed by: FAMILY MEDICINE

## 2022-03-23 PROCEDURE — 96365 THER/PROPH/DIAG IV INF INIT: CPT | Performed by: FAMILY MEDICINE

## 2022-03-23 PROCEDURE — 99285 EMERGENCY DEPT VISIT HI MDM: CPT | Mod: 25 | Performed by: FAMILY MEDICINE

## 2022-03-23 PROCEDURE — 83735 ASSAY OF MAGNESIUM: CPT | Performed by: FAMILY MEDICINE

## 2022-03-23 PROCEDURE — 96366 THER/PROPH/DIAG IV INF ADDON: CPT | Performed by: FAMILY MEDICINE

## 2022-03-23 RX ORDER — BUSPIRONE HYDROCHLORIDE 15 MG/1
15 TABLET ORAL 2 TIMES DAILY
Status: DISCONTINUED | OUTPATIENT
Start: 2022-03-23 | End: 2022-03-28 | Stop reason: HOSPADM

## 2022-03-23 RX ORDER — HYDROXYZINE PAMOATE 50 MG/1
50-100 CAPSULE ORAL 3 TIMES DAILY PRN
Qty: 60 CAPSULE | Refills: 0 | Status: ON HOLD | OUTPATIENT
Start: 2022-03-23 | End: 2022-03-25

## 2022-03-23 RX ORDER — NICOTINE 21 MG/24HR
1 PATCH, TRANSDERMAL 24 HOURS TRANSDERMAL DAILY
Status: DISCONTINUED | OUTPATIENT
Start: 2022-03-24 | End: 2022-03-28 | Stop reason: HOSPADM

## 2022-03-23 RX ORDER — FOLIC ACID 1 MG/1
1 TABLET ORAL DAILY
Status: DISCONTINUED | OUTPATIENT
Start: 2022-03-24 | End: 2022-03-28 | Stop reason: HOSPADM

## 2022-03-23 RX ORDER — ONDANSETRON 4 MG/1
4 TABLET, ORALLY DISINTEGRATING ORAL EVERY 6 HOURS PRN
Status: DISCONTINUED | OUTPATIENT
Start: 2022-03-23 | End: 2022-03-28 | Stop reason: HOSPADM

## 2022-03-23 RX ORDER — POTASSIUM CHLORIDE 1.5 G/1.58G
40 POWDER, FOR SOLUTION ORAL ONCE
Status: COMPLETED | OUTPATIENT
Start: 2022-03-23 | End: 2022-03-23

## 2022-03-23 RX ORDER — LORAZEPAM 1 MG/1
1-4 TABLET ORAL EVERY 30 MIN PRN
Status: DISCONTINUED | OUTPATIENT
Start: 2022-03-23 | End: 2022-03-23

## 2022-03-23 RX ORDER — ACETAMINOPHEN 325 MG/1
650 TABLET ORAL EVERY 4 HOURS PRN
Status: DISCONTINUED | OUTPATIENT
Start: 2022-03-23 | End: 2022-03-28 | Stop reason: HOSPADM

## 2022-03-23 RX ORDER — ATENOLOL 50 MG/1
50 TABLET ORAL DAILY PRN
Status: DISCONTINUED | OUTPATIENT
Start: 2022-03-23 | End: 2022-03-28 | Stop reason: HOSPADM

## 2022-03-23 RX ORDER — TRAZODONE HYDROCHLORIDE 50 MG/1
50 TABLET, FILM COATED ORAL
Status: DISCONTINUED | OUTPATIENT
Start: 2022-03-23 | End: 2022-03-28 | Stop reason: HOSPADM

## 2022-03-23 RX ORDER — MULTIPLE VITAMINS W/ MINERALS TAB 9MG-400MCG
1 TAB ORAL DAILY
Status: DISCONTINUED | OUTPATIENT
Start: 2022-03-24 | End: 2022-03-28 | Stop reason: HOSPADM

## 2022-03-23 RX ORDER — LIDOCAINE 40 MG/G
CREAM TOPICAL
Status: DISCONTINUED | OUTPATIENT
Start: 2022-03-23 | End: 2022-03-28 | Stop reason: HOSPADM

## 2022-03-23 RX ORDER — HYDROXYZINE HYDROCHLORIDE 50 MG/1
50-100 TABLET, FILM COATED ORAL 3 TIMES DAILY PRN
Status: DISCONTINUED | OUTPATIENT
Start: 2022-03-23 | End: 2022-03-28 | Stop reason: HOSPADM

## 2022-03-23 RX ORDER — LISINOPRIL 20 MG/1
20 TABLET ORAL DAILY
Status: DISCONTINUED | OUTPATIENT
Start: 2022-03-24 | End: 2022-03-28 | Stop reason: HOSPADM

## 2022-03-23 RX ORDER — CITALOPRAM HYDROBROMIDE 10 MG/1
10 TABLET ORAL DAILY
Status: DISCONTINUED | OUTPATIENT
Start: 2022-03-24 | End: 2022-03-25

## 2022-03-23 RX ORDER — LORAZEPAM 1 MG/1
1-4 TABLET ORAL EVERY 30 MIN PRN
Status: DISCONTINUED | OUTPATIENT
Start: 2022-03-23 | End: 2022-03-28 | Stop reason: HOSPADM

## 2022-03-23 RX ORDER — MAGNESIUM HYDROXIDE/ALUMINUM HYDROXICE/SIMETHICONE 120; 1200; 1200 MG/30ML; MG/30ML; MG/30ML
30 SUSPENSION ORAL EVERY 4 HOURS PRN
Status: DISCONTINUED | OUTPATIENT
Start: 2022-03-23 | End: 2022-03-28 | Stop reason: HOSPADM

## 2022-03-23 RX ORDER — AMOXICILLIN 250 MG
1 CAPSULE ORAL 2 TIMES DAILY PRN
Status: DISCONTINUED | OUTPATIENT
Start: 2022-03-23 | End: 2022-03-28 | Stop reason: HOSPADM

## 2022-03-23 RX ORDER — LOPERAMIDE HCL 2 MG
2 CAPSULE ORAL 4 TIMES DAILY PRN
Status: DISCONTINUED | OUTPATIENT
Start: 2022-03-23 | End: 2022-03-28 | Stop reason: HOSPADM

## 2022-03-23 RX ADMIN — LORAZEPAM 2 MG: 1 TABLET ORAL at 14:53

## 2022-03-23 RX ADMIN — LORAZEPAM 2 MG: 1 TABLET ORAL at 22:07

## 2022-03-23 RX ADMIN — THIAMINE HYDROCHLORIDE: 100 INJECTION, SOLUTION INTRAMUSCULAR; INTRAVENOUS at 14:53

## 2022-03-23 RX ADMIN — BUSPIRONE HYDROCHLORIDE 15 MG: 15 TABLET ORAL at 21:29

## 2022-03-23 RX ADMIN — ATENOLOL 50 MG: 50 TABLET ORAL at 21:29

## 2022-03-23 RX ADMIN — LORAZEPAM 2 MG: 1 TABLET ORAL at 20:01

## 2022-03-23 RX ADMIN — SODIUM CHLORIDE 1000 ML: 9 INJECTION, SOLUTION INTRAVENOUS at 16:31

## 2022-03-23 RX ADMIN — TRAZODONE HYDROCHLORIDE 50 MG: 50 TABLET ORAL at 21:29

## 2022-03-23 RX ADMIN — LORAZEPAM 2 MG: 1 TABLET ORAL at 19:11

## 2022-03-23 RX ADMIN — POTASSIUM CHLORIDE 40 MEQ: 1.5 POWDER, FOR SOLUTION ORAL at 16:32

## 2022-03-23 RX ADMIN — LORAZEPAM 2 MG: 1 TABLET ORAL at 16:45

## 2022-03-23 ASSESSMENT — ACTIVITIES OF DAILY LIVING (ADL)
VISION_MANAGEMENT: NOT AWARE
WEAR_GLASSES_OR_BLIND: YES
WALKING_OR_CLIMBING_STAIRS_DIFFICULTY: NO
LAUNDRY: WITH SUPERVISION
DIFFICULTY_COMMUNICATING: NO
CHANGE_IN_FUNCTIONAL_STATUS_SINCE_ONSET_OF_CURRENT_ILLNESS/INJURY: NO
CONCENTRATING,_REMEMBERING_OR_MAKING_DECISIONS_DIFFICULTY: NO
FALL_HISTORY_WITHIN_LAST_SIX_MONTHS: NO
DRESS: INDEPENDENT
HYGIENE/GROOMING: INDEPENDENT
DIFFICULTY_EATING/SWALLOWING: NO
TOILETING_ISSUES: NO
DRESSING/BATHING_DIFFICULTY: NO
ORAL_HYGIENE: INDEPENDENT
DOING_ERRANDS_INDEPENDENTLY_DIFFICULTY: NO

## 2022-03-23 ASSESSMENT — ENCOUNTER SYMPTOMS
BRUISES/BLEEDS EASILY: 0
FATIGUE: 0
FLANK PAIN: 0
NUMBNESS: 0
HEMATURIA: 0
BACK PAIN: 0
VOMITING: 0
NAUSEA: 0
HEADACHES: 0
FACIAL SWELLING: 0
AGITATION: 0
SPEECH DIFFICULTY: 0
APPETITE CHANGE: 0
SEIZURES: 0
DYSPHORIC MOOD: 0
HALLUCINATIONS: 0
CONFUSION: 0
TROUBLE SWALLOWING: 0
TREMORS: 1
FEVER: 0
WEAKNESS: 0
DECREASED CONCENTRATION: 1
LIGHT-HEADEDNESS: 0
DIAPHORESIS: 0
NERVOUS/ANXIOUS: 1
SHORTNESS OF BREATH: 0
ABDOMINAL PAIN: 0
ACTIVITY CHANGE: 1
COLOR CHANGE: 0

## 2022-03-23 NOTE — ED PROVIDER NOTES
ED Provider Note  [facility]      History     Chief Complaint   Patient presents with     Alcohol Problem     Pt is seeking detox from alcohol     HPI  Durga Aguirre is a 62 year old male who presents emergency room seeking detox from alcohol.  Patient history of alcohol abuse in the past has had some hallucinations etc. hallucinating seeing a spider yesterday.  Patient basically had been through teen challenge for 50 some days and was out around Thanksgiving was sober for a month and then started drinking daily a traveler size vodka.  Last drink earlier today.  Patient feeling somewhat shaky tremulous also.  Patient denies any other drug use.  Patient recently had a physical felt that his liver was fine by his primary physician denies abdominal pain coughing chest pain shortness of breath no known history of bleeding disorder etc.  Patient is not suicidal he is here for help.  Denies any current hallucinations but is shaky tremulous.    Past Medical History  Past Medical History:   Diagnosis Date     Alcohol abuse      Anxiety and depression      Arthritis      Hyperlipidemia      Hypertension      Mass of left chest wall      Other spontaneous pneumothorax 1997     Past Surgical History:   Procedure Laterality Date     COLONOSCOPY       HC EXCISION PARTIAL TALUS OR CALCANEUS, BONE      fx calcaneus- 9 screws in foot     B Complex-C (VITAMIN B + C COMPLEX PO)  citalopram (CELEXA) 20 MG tablet  lisinopril (ZESTRIL) 20 MG tablet  hydrOXYzine (VISTARIL) 50 MG capsule      No Known Allergies  Family History  Family History   Problem Relation Age of Onset     No Known Problems Mother      Lymphoma Father      No Known Problems Maternal Grandmother      No Known Problems Maternal Grandfather      No Known Problems Paternal Grandmother      No Known Problems Paternal Grandfather      No Known Problems Sister      No Known Problems Son      No Known Problems Brother      No Known Problems  Daughter      No Known Problems Other      Unknown/Adopted No family hx of      Depression No family hx of      Anxiety Disorder No family hx of      Schizophrenia No family hx of      Bipolar Disorder No family hx of      Suicide No family hx of      Substance Abuse No family hx of      Dementia No family hx of      Edinburgh Disease No family hx of      Parkinsonism No family hx of      Autism Spectrum Disorder No family hx of      Intellectual Disability (Mental Retardation) No family hx of      Mental Illness No family hx of      Social History   Social History     Tobacco Use     Smoking status: Current Every Day Smoker     Packs/day: 0.25     Years: 20.00     Pack years: 5.00     Types: Cigarettes     Smokeless tobacco: Never Used     Tobacco comment: 2-3 cigarettes per day    Substance Use Topics     Alcohol use: Yes     Comment: daily use 1 L vodka     Drug use: Not Currently      Past medical history, past surgical history, medications, allergies, family history, and social history were reviewed with the patient. No additional pertinent items.       Review of Systems   Constitutional: Positive for activity change. Negative for appetite change, diaphoresis, fatigue and fever.   HENT: Negative for facial swelling, nosebleeds and trouble swallowing.    Respiratory: Negative for shortness of breath.    Cardiovascular: Negative for chest pain and leg swelling.   Gastrointestinal: Negative for abdominal pain, nausea and vomiting.   Genitourinary: Negative for flank pain and hematuria.   Musculoskeletal: Negative for back pain and gait problem.   Skin: Negative for color change and rash.   Allergic/Immunologic: Negative for immunocompromised state.   Neurological: Positive for tremors. Negative for seizures, syncope, speech difficulty, weakness, light-headedness, numbness and headaches.   Hematological: Does not bruise/bleed easily.   Psychiatric/Behavioral: Positive for decreased concentration. Negative for  agitation, confusion, dysphoric mood, hallucinations (not today ? yesterday), self-injury and suicidal ideas. The patient is nervous/anxious.    All other systems reviewed and are negative.    A complete review of systems was performed with pertinent positives and negatives noted in the HPI, and all other systems negative.    Physical Exam   BP: 93/59  Pulse: 97  Temp: 98.4  F (36.9  C)  Resp: 16  Weight: 105.2 kg (232 lb)  SpO2: 94 %  Physical Exam  Vitals and nursing note reviewed.   Constitutional:       General: He is in acute distress.      Appearance: He is well-developed. He is not toxic-appearing or diaphoretic.      Comments: Patient nontoxic here alert and oriented   HENT:      Head: Normocephalic and atraumatic.      Mouth/Throat:      Mouth: Mucous membranes are moist.      Pharynx: Oropharynx is clear.   Eyes:      General: No scleral icterus.     Extraocular Movements: Extraocular movements intact.      Conjunctiva/sclera: Conjunctivae normal.      Pupils: Pupils are equal, round, and reactive to light.   Cardiovascular:      Rate and Rhythm: Normal rate and regular rhythm.   Pulmonary:      Effort: No respiratory distress.      Breath sounds: No stridor.   Abdominal:      General: There is distension.      Palpations: Abdomen is soft. There is no mass.      Tenderness: There is no abdominal tenderness. There is no guarding.   Musculoskeletal:         General: No swelling or tenderness.      Cervical back: Normal range of motion and neck supple. No rigidity or tenderness.   Skin:     General: Skin is warm and dry.      Capillary Refill: Capillary refill takes less than 2 seconds.      Coloration: Skin is not jaundiced or pale.      Findings: No bruising or rash.   Neurological:      General: No focal deficit present.      Mental Status: He is alert and oriented to person, place, and time. Mental status is at baseline.   Psychiatric:      Comments: Patient with some reports of some hallucination  yesterday nervousness today without suicidal ideations no homicidal ideations no current hallucinations today.  Patient is voluntary         ED Course         Patient valuate in the ER is voluntary.  IV established labs drawn banana bag given.  Also liter of saline.  Patient's lab revealed potassium 3.1 given K-Marisol 40 mEq orally.  Patient also because creatinine noted to be slightly elevated at but also 1.47 did receive that banana bag and then a liter of saline also.  Repeat blood pressure is 140 systolic.  Patient's sodium was 142 was noted potassium 3.1.  Creatinine 1.42.  BUN is 12 bicarb 27 gap is 13.  Glucose 210 AST is 96 ALT is 90 total bilirubin is 1 INR 0.97.  Urinalysis 6 white cells 1 red cell.  Covid testing negative.  Breathalyzer 0.169.    As noted patient received an extra liter of fluid along with K-Marisol orally to treat the metabolic changes at this point otherwise he is stable voluntary planning to admit to 3 a detox patient currently under the [facility] protocol with Ativan reassessed patient patient feeling better after initiation of treatment in the ER.    Procedures                     Results for orders placed or performed during the hospital encounter of 03/23/22   Partial thromboplastin time     Status: Normal   Result Value Ref Range    aPTT 24 22 - 38 Seconds   INR     Status: Normal   Result Value Ref Range    INR 0.97 0.86 - 1.14   Comprehensive metabolic panel     Status: Abnormal   Result Value Ref Range    Sodium 142 133 - 144 mmol/L    Potassium 3.1 (L) 3.4 - 5.3 mmol/L    Chloride 102 94 - 109 mmol/L    Carbon Dioxide (CO2) 27 20 - 32 mmol/L    Anion Gap 13 3 - 14 mmol/L    Urea Nitrogen 12 7 - 30 mg/dL    Creatinine 1.42 (H) 0.66 - 1.25 mg/dL    Calcium 8.8 8.5 - 10.1 mg/dL    Glucose 210 (H) 70 - 99 mg/dL    Alkaline Phosphatase 85 40 - 150 U/L    AST 96 (H) 0 - 45 U/L    ALT 90 (H) 0 - 70 U/L    Protein Total 7.1 6.8 - 8.8 g/dL    Albumin 3.7 3.4 - 5.0 g/dL    Bilirubin Total 1.0 0.2 -  1.3 mg/dL    GFR Estimate 55 (L) >60 mL/min/1.73m2   Magnesium     Status: Normal   Result Value Ref Range    Magnesium 2.1 1.6 - 2.3 mg/dL   UA with Microscopic reflex to Culture     Status: Abnormal    Specimen: Urine, Clean Catch   Result Value Ref Range    Color Urine Light Yellow Colorless, Straw, Light Yellow, Yellow    Appearance Urine Clear Clear    Glucose Urine >=1000 (A) Negative mg/dL    Bilirubin Urine Negative Negative    Ketones Urine Trace (A) Negative mg/dL    Specific Gravity Urine 1.014 1.003 - 1.035    Blood Urine Negative Negative    pH Urine 6.0 5.0 - 7.0    Protein Albumin Urine 20  (A) Negative mg/dL    Urobilinogen Urine Normal Normal, 2.0 mg/dL    Nitrite Urine Negative Negative    Leukocyte Esterase Urine Negative Negative    Bacteria Urine Few (A) None Seen /HPF    Mucus Urine Present (A) None Seen /LPF    RBC Urine 1 <=2 /HPF    WBC Urine 6 (H) <=5 /HPF    Squamous Epithelials Urine 1 <=1 /HPF    Hyaline Casts Urine 19 (H) <=2 /LPF    Granular Casts Urine 1 (H) None Seen /LPF    Narrative    Urine Culture not indicated   Asymptomatic COVID-19 Virus (Coronavirus) by PCR Nasopharyngeal     Status: Normal    Specimen: Nasopharyngeal; Swab   Result Value Ref Range    SARS CoV2 PCR Negative Negative    Narrative    Testing was performed using the aníbal  SARS-CoV-2 & Influenza A/B Assay on the aníbal  [employer]ona  System.  This test should be ordered for the detection of SARS-COV-2 in individuals who meet SARS-CoV-2 clinical and/or epidemiological criteria. Test performance is unknown in asymptomatic patients.  This test is for in vitro diagnostic use under the FDA EUA for laboratories certified under CLIA to perform moderate and/or high complexity testing. This test has not been FDA cleared or approved.  A negative test does not rule out the presence of PCR inhibitors in the specimen or target RNA in concentration below the limit of detection for the assay. The possibility of a false negative should  be considered if the patient's recent exposure or clinical presentation suggests COVID-19.  [facility] are certified under the Clinical Laboratory Improvement Amendments of 1988 (CLIA-88) as qualified to perform moderate and/or high complexity laboratory testing.   CBC with platelets and differential     Status: None   Result Value Ref Range    WBC Count 5.4 4.0 - 11.0 10e3/uL    RBC Count 4.56 4.40 - 5.90 10e6/uL    Hemoglobin 14.4 13.3 - 17.7 g/dL    Hematocrit 42.2 40.0 - 53.0 %    MCV 93 78 - 100 fL    MCH 31.6 26.5 - 33.0 pg    MCHC 34.1 31.5 - 36.5 g/dL    RDW 13.7 10.0 - 15.0 %    Platelet Count 157 150 - 450 10e3/uL    % Neutrophils 67 %    % Lymphocytes 20 %    % Monocytes 7 %    % Eosinophils 3 %    % Basophils 2 %    % Immature Granulocytes 1 %    NRBCs per 100 WBC 0 <1 /100    Absolute Neutrophils 3.7 1.6 - 8.3 10e3/uL    Absolute Lymphocytes 1.1 0.8 - 5.3 10e3/uL    Absolute Monocytes 0.4 0.0 - 1.3 10e3/uL    Absolute Eosinophils 0.1 0.0 - 0.7 10e3/uL    Absolute Basophils 0.1 0.0 - 0.2 10e3/uL    Absolute Immature Granulocytes 0.1 <=0.4 10e3/uL    Absolute NRBCs 0.0 10e3/uL   [place] Draw     Status: None    Narrative    The following orders were created for panel order [place] Draw.  Procedure                               Abnormality         Status                     ---------                               -----------         ------                     Extra Red Top Tube[[id number]]                               Final result                 Please view results for these tests on the individual orders.   Extra Red Top Tube     Status: None   Result Value Ref Range    Hold Specimen [facility]    Alcohol breath test POCT     Status: Abnormal   Result Value Ref Range    Alcohol Breath Test 0.169 (A) 0.00 - 0.01   CBC with platelets differential     Status: None    Narrative    The following orders were created for panel order CBC with platelets differential.  Procedure                                Abnormality         Status                     ---------                               -----------         ------                     CBC with platelets and d...[[id number]]                      Final result                 Please view results for these tests on the individual orders.     Medications   lidocaine 1 % 0.1-1 mL (has no administration in time range)   lidocaine (LMX4) cream (has no administration in time range)   sodium chloride (PF) 0.9% PF flush 3 mL (has no administration in time range)   sodium chloride (PF) 0.9% PF flush 3 mL (has no administration in time range)   LORazepam (ATIVAN) tablet 1-4 mg (2 mg Oral Given 3/23/22 1645)   dextrose 5% and 0.45% NaCl 1,000 mL with Infuvite Adult 10 mL, thiamine 100 mg, folic acid 1 mg infusion ( Intravenous Stopped 3/23/22 1630)   0.9% sodium chloride BOLUS (1,000 mLs Intravenous New Bag 3/23/22 1631)   potassium chloride (KLOR-CON) Packet 40 mEq (40 mEq Oral Given 3/23/22 1632)        Assessments & Plan (with Medical Decision Making)  H 62-year-old male history of alcohol abuse presents ER with 3 months of continued drinking vodka.  Breathalyzer 0.169.  Mild tremors some questional hallucinations yesterday no history of seizures but may be DTs.  Patient given IV fluids banana bag here in the ER.  Creatinine slightly elevated 1.47.  Potassium 3.1 given K-Marisol 40 mEq orally also.  [facility] withdrawal protocol with Ativan.  Patient voluntary otherwise to be admitted to 3 a detox for alcohol withdrawal patient agrees with plan stable.  Signed out to evening physician.       I have reviewed the nursing notes. I have reviewed the findings, diagnosis, plan and need for follow up with the patient.    New Prescriptions    No medications on file       Final diagnoses:   Alcohol dependence with intoxication with complication (H)   Hypokalemia   Elevated creatine kinase       --  Bashir Tadeo    [facility] EMERGENCY DEPARTMENT  3/23/2022    This note  was created at least in part by the use of dragon voice dictation system. Inadvertent typographical errors may still exist.  Bashir Tadeo MD.    Patient evaluated in the emergency department during the COVID-19 pandemic period. Careful attention to patients safety was addressed throughout the evaluation. Evaluation and treatment management was initiated with disposition made efficiently and appropriate as possible to minimize any risk of potential exposure to patient during this evaluation.       Bashir Tadeo MD  03/23/22 [id number]

## 2022-03-23 NOTE — TELEPHONE ENCOUNTER
R: 4:19PM- Pt is med cleared by JOHNATHON HANNON.. He will give oral replacements for low potassium level and saline.      4:32PM- Dr. Aj accepts for himself on 3A.  CD admit.

## 2022-03-23 NOTE — TELEPHONE ENCOUNTER
S:Pt presents in [place] ED seeking detox.  B:Pt was in treatment up until November. Relapsed in December and is drinking Vodka daily since.  Last use earlier today.  Alcohol level was 0.169. Denies any acute MH symptoms.  No reports of seizures.  Possible DTs as pt states seeing spiders yesterday.    A:Alcohol detox, ambulating, cooperating medical cleared.  Voluntary  R: Patient cleared and ready for behavioral bed placement: Yes

## 2022-03-24 LAB
ALBUMIN SERPL-MCNC: 2.8 G/DL (ref 3.4–5)
ALP SERPL-CCNC: 68 U/L (ref 40–150)
ALT SERPL W P-5'-P-CCNC: 62 U/L (ref 0–70)
ANION GAP SERPL CALCULATED.3IONS-SCNC: 5 MMOL/L (ref 3–14)
AST SERPL W P-5'-P-CCNC: 43 U/L (ref 0–45)
BILIRUB SERPL-MCNC: 1 MG/DL (ref 0.2–1.3)
BUN SERPL-MCNC: 18 MG/DL (ref 7–30)
CALCIUM SERPL-MCNC: 8.3 MG/DL (ref 8.5–10.1)
CHLORIDE BLD-SCNC: 103 MMOL/L (ref 94–109)
CO2 SERPL-SCNC: 31 MMOL/L (ref 20–32)
CREAT SERPL-MCNC: 1.06 MG/DL (ref 0.66–1.25)
GFR SERPL CREATININE-BSD FRML MDRD: 78 ML/MIN/1.73M2
GLUCOSE BLD-MCNC: 189 MG/DL (ref 70–99)
HBA1C MFR BLD: 5.9 % (ref 0–5.6)
POTASSIUM BLD-SCNC: 3.6 MMOL/L (ref 3.4–5.3)
PROT SERPL-MCNC: 5.7 G/DL (ref 6.8–8.8)
SODIUM SERPL-SCNC: 139 MMOL/L (ref 133–144)

## 2022-03-24 PROCEDURE — 99232 SBSQ HOSP IP/OBS MODERATE 35: CPT | Performed by: PHYSICIAN ASSISTANT

## 2022-03-24 PROCEDURE — H2032 ACTIVITY THERAPY, PER 15 MIN: HCPCS

## 2022-03-24 PROCEDURE — 99223 1ST HOSP IP/OBS HIGH 75: CPT | Mod: AI | Performed by: PSYCHIATRY & NEUROLOGY

## 2022-03-24 PROCEDURE — 84155 ASSAY OF PROTEIN SERUM: CPT | Performed by: PHYSICIAN ASSISTANT

## 2022-03-24 PROCEDURE — 93005 ELECTROCARDIOGRAM TRACING: CPT

## 2022-03-24 PROCEDURE — 36415 COLL VENOUS BLD VENIPUNCTURE: CPT | Performed by: PHYSICIAN ASSISTANT

## 2022-03-24 PROCEDURE — 128N000004 HC R&B CD ADULT

## 2022-03-24 PROCEDURE — 250N000013 HC RX MED GY IP 250 OP 250 PS 637: Performed by: PSYCHIATRY & NEUROLOGY

## 2022-03-24 PROCEDURE — 93010 ELECTROCARDIOGRAM REPORT: CPT | Performed by: INTERNAL MEDICINE

## 2022-03-24 RX ADMIN — THIAMINE HCL TAB 100 MG 100 MG: 100 TAB at 09:14

## 2022-03-24 RX ADMIN — LORAZEPAM 2 MG: 1 TABLET ORAL at 16:33

## 2022-03-24 RX ADMIN — HYDROXYZINE HYDROCHLORIDE 100 MG: 50 TABLET, FILM COATED ORAL at 18:02

## 2022-03-24 RX ADMIN — FOLIC ACID 1 MG: 1 TABLET ORAL at 09:14

## 2022-03-24 RX ADMIN — MULTIPLE VITAMINS W/ MINERALS TAB 1 TABLET: TAB at 09:15

## 2022-03-24 RX ADMIN — LORAZEPAM 2 MG: 1 TABLET ORAL at 20:18

## 2022-03-24 RX ADMIN — BUSPIRONE HYDROCHLORIDE 15 MG: 15 TABLET ORAL at 09:15

## 2022-03-24 RX ADMIN — CITALOPRAM HYDROBROMIDE 10 MG: 10 TABLET ORAL at 09:14

## 2022-03-24 RX ADMIN — LISINOPRIL 20 MG: 20 TABLET ORAL at 12:43

## 2022-03-24 RX ADMIN — TRAZODONE HYDROCHLORIDE 50 MG: 50 TABLET ORAL at 20:18

## 2022-03-24 RX ADMIN — LORAZEPAM 2 MG: 1 TABLET ORAL at 14:20

## 2022-03-24 RX ADMIN — B-COMPLEX W/ C & FOLIC ACID TAB 1 TABLET: TAB at 09:14

## 2022-03-24 RX ADMIN — NICOTINE 1 PATCH: 14 PATCH, EXTENDED RELEASE TRANSDERMAL at 09:16

## 2022-03-24 RX ADMIN — TRAZODONE HYDROCHLORIDE 50 MG: 50 TABLET ORAL at 21:53

## 2022-03-24 RX ADMIN — HYDROXYZINE HYDROCHLORIDE 100 MG: 50 TABLET, FILM COATED ORAL at 12:14

## 2022-03-24 RX ADMIN — BUSPIRONE HYDROCHLORIDE 15 MG: 15 TABLET ORAL at 20:18

## 2022-03-24 NOTE — PLAN OF CARE
Problem: Alcohol Withdrawal  Goal: Alcohol Withdrawal Symptom Control  Outcome: Ongoing, Progressing     Problem: Acute Neurologic Deterioration (Alcohol Withdrawal)  Goal: Optimal Neurologic Function  Outcome: Ongoing, Progressing     Behavioral  Pt appeared sleeping comfortably overnight; breathing was even and unlabored.      Medical  Pt continues in alcohol withdrawal; MSSA 5 and 3; no ativan given this shift; Pt has received 10 mg of ativan since admission; pt last  Ativan on 3/23 at 2207.     No new medical concerns noted.

## 2022-03-24 NOTE — PROGRESS NOTES
03/23/22 2001   Patient Belongings   Disposition of meds  Sent to security/pharmacy per site process   Patient Belongings other (see comments)   Patient Belongings Remaining with Patient other (see comments)   Patient Belongings Put in Hospital Secure Location (Security or Locker, etc.) other (see comments)   Belongings Search Yes   Clothing Search Yes   Second Staff Julian TERAN     Knee brace, shoes, coat, bag in storage  Cell, wallet in med room  Lighter, keys in sharps  Meds, $31.00 cash, [place], medical, [employer], 2 Visa, [employer] in security    A               Admission:  I am responsible for any personal items that are not sent to the safe or pharmacy.  [facility] is not responsible for loss, theft or damage of any property in my possession.    Signature:  _________________________________ Date: _______  Time: _____                                              Staff Signature:  ____________________________ Date: ________  Time: _____      2nd Staff person, if patient is unable/unwilling to sign:    Signature: ________________________________ Date: ________  Time: _____     Discharge:  [facility] has returned all of my personal belongings:    Signature: _________________________________ Date: ________  Time: _____                                          Staff Signature:  ____________________________ Date: ________  Time: _____

## 2022-03-24 NOTE — H&P
"[facility], [place]   Psychiatric History and Physical  Admission date: 3/23/2022        Chief Complaint:   \"Alcohol\"        HPI:     The patient is a 63yo male with a history of alcohol use disorder and YUSUF who was admitted to detoxify from alcohol. Reports that he is feeling \"a little uncomfortable.\" Did have some nausea and vomiting last night. Was able to get some sleep and eat some this morning. Mood is \"relaxed.\" Denies SI. Some recent hallucinations but denies any current AH or VH. Considering IOP. Would be open to being on Naltrexone again. Says that he takes \"whatever dose is on the bottle\" in terms of his Celexa.      Per ER:  Durga Aguirre is a 62 year old male who presents emergency room seeking detox from alcohol.  Patient history of alcohol abuse in the past has had some hallucinations etc. hallucinating seeing a spider yesterday.  Patient basically had been through teen challenge for 50 some days and was out around Thanksgiving was sober for a month and then started drinking daily a traveler size vodka.  Last drink earlier today.  Patient feeling somewhat shaky tremulous also.  Patient denies any other drug use.  Patient recently had a physical felt that his liver was fine by his primary physician denies abdominal pain coughing chest pain shortness of breath no known history of bleeding disorder etc.  Patient is not suicidal he is here for help.  Denies any current hallucinations but is shaky tremulous.        Past Psychiatric History:     History of depression and anxiety. Has been on Celexa and Buspar..   Denies history of suicide attempts.         Substance Use and History:     Alcohol use disorder. 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 Denies withdrawal seizures. Was on Naltrexone in the past.         Past Medical History:   PAST MEDICAL HISTORY:   Past Medical History:   Diagnosis Date     Alcohol abuse      Anxiety and depression      Arthritis  "     Hyperlipidemia      Hypertension      Mass of left chest wall      Other spontaneous pneumothorax 1997       PAST SURGICAL HISTORY:   Past Surgical History:   Procedure Laterality Date     COLONOSCOPY       HC EXCISION PARTIAL TALUS OR CALCANEUS, BONE      fx calcaneus- 9 screws in foot             Family History:   FAMILY HISTORY:   Family History   Problem Relation Age of Onset     No Known Problems Mother      Lymphoma Father      No Known Problems Maternal Grandmother      No Known Problems Maternal Grandfather      No Known Problems Paternal Grandmother      No Known Problems Paternal Grandfather      No Known Problems Sister      No Known Problems Son      No Known Problems Brother      No Known Problems Daughter      No Known Problems Other      Unknown/Adopted No family hx of      Depression No family hx of      Anxiety Disorder No family hx of      Schizophrenia No family hx of      Bipolar Disorder No family hx of      Suicide No family hx of      Substance Abuse No family hx of      Dementia No family hx of      Shannon Disease No family hx of      Parkinsonism No family hx of      Autism Spectrum Disorder No family hx of      Intellectual Disability (Mental Retardation) No family hx of      Mental Illness No family hx of            Social History:   Please see the full psychosocial profile from the clinical treatment coordinator.   SOCIAL HISTORY:   Social History     Tobacco Use     Smoking status: Current Every Day Smoker     Packs/day: 0.25     Years: 20.00     Pack years: 5.00     Types: Cigarettes     Smokeless tobacco: Never Used     Tobacco comment: 2-3 cigarettes per day    Substance Use Topics     Alcohol use: Yes     Comment: daily use 1 L vodka            Physical ROS:   The patient endorsed nausea. The remainder of 10-point review of systems was negative except as noted in HPI.         PTA Medications:     Medications Prior to Admission   Medication Sig Dispense Refill Last Dose     B  Complex-C (VITAMIN B + C COMPLEX PO) Take 1 tablet by mouth daily   3/22/2022 at Unknown time     citalopram (CELEXA) 20 MG tablet Take 0.5 tablets (10 mg) by mouth daily for 6 days, THEN 1 tablet (20 mg) daily. 90 tablet 1 Past Month at Unknown time     lisinopril (ZESTRIL) 20 MG tablet Take 1 tablet (20 mg) by mouth daily 90 tablet 0 3/22/2022 at Unknown time     hydrOXYzine (VISTARIL) 50 MG capsule Take 1-2 capsules ( mg) by mouth 3 times daily as needed for itching or anxiety 60 capsule 0           Allergies:   No Known Allergies       Labs:     Recent Results (from the past 48 hour(s))   Alcohol breath test POCT    Collection Time: 03/23/22  1:48 PM   Result Value Ref Range    Alcohol Breath Test 0.169 (A) 0.00 - 0.01   Asymptomatic COVID-19 Virus (Coronavirus) by PCR Nasopharyngeal    Collection Time: 03/23/22  2:29 PM    Specimen: Nasopharyngeal; Swab   Result Value Ref Range    SARS CoV2 PCR Negative Negative   Partial thromboplastin time    Collection Time: 03/23/22  2:37 PM   Result Value Ref Range    aPTT 24 22 - 38 Seconds   INR    Collection Time: 03/23/22  2:37 PM   Result Value Ref Range    INR 0.97 0.86 - 1.14   Comprehensive metabolic panel    Collection Time: 03/23/22  2:37 PM   Result Value Ref Range    Sodium 142 133 - 144 mmol/L    Potassium 3.1 (L) 3.4 - 5.3 mmol/L    Chloride 102 94 - 109 mmol/L    Carbon Dioxide (CO2) 27 20 - 32 mmol/L    Anion Gap 13 3 - 14 mmol/L    Urea Nitrogen 12 7 - 30 mg/dL    Creatinine 1.42 (H) 0.66 - 1.25 mg/dL    Calcium 8.8 8.5 - 10.1 mg/dL    Glucose 210 (H) 70 - 99 mg/dL    Alkaline Phosphatase 85 40 - 150 U/L    AST 96 (H) 0 - 45 U/L    ALT 90 (H) 0 - 70 U/L    Protein Total 7.1 6.8 - 8.8 g/dL    Albumin 3.7 3.4 - 5.0 g/dL    Bilirubin Total 1.0 0.2 - 1.3 mg/dL    GFR Estimate 55 (L) >60 mL/min/1.73m2   Magnesium    Collection Time: 03/23/22  2:37 PM   Result Value Ref Range    Magnesium 2.1 1.6 - 2.3 mg/dL   CBC with platelets and differential     Collection Time: 03/23/22  2:37 PM   Result Value Ref Range    WBC Count 5.4 4.0 - 11.0 10e3/uL    RBC Count 4.56 4.40 - 5.90 10e6/uL    Hemoglobin 14.4 13.3 - 17.7 g/dL    Hematocrit 42.2 40.0 - 53.0 %    MCV 93 78 - 100 fL    MCH 31.6 26.5 - 33.0 pg    MCHC 34.1 31.5 - 36.5 g/dL    RDW 13.7 10.0 - 15.0 %    Platelet Count 157 150 - 450 10e3/uL    % Neutrophils 67 %    % Lymphocytes 20 %    % Monocytes 7 %    % Eosinophils 3 %    % Basophils 2 %    % Immature Granulocytes 1 %    NRBCs per 100 WBC 0 <1 /100    Absolute Neutrophils 3.7 1.6 - 8.3 10e3/uL    Absolute Lymphocytes 1.1 0.8 - 5.3 10e3/uL    Absolute Monocytes 0.4 0.0 - 1.3 10e3/uL    Absolute Eosinophils 0.1 0.0 - 0.7 10e3/uL    Absolute Basophils 0.1 0.0 - 0.2 10e3/uL    Absolute Immature Granulocytes 0.1 <=0.4 10e3/uL    Absolute NRBCs 0.0 10e3/uL   Extra Red Top Tube    Collection Time: 03/23/22  2:38 PM   Result Value Ref Range    Hold Specimen JI    UA with Microscopic reflex to Culture    Collection Time: 03/23/22  6:18 PM    Specimen: Urine, Clean Catch   Result Value Ref Range    Color Urine Light Yellow Colorless, Straw, Light Yellow, Yellow    Appearance Urine Clear Clear    Glucose Urine >=1000 (A) Negative mg/dL    Bilirubin Urine Negative Negative    Ketones Urine Trace (A) Negative mg/dL    Specific Gravity Urine 1.014 1.003 - 1.035    Blood Urine Negative Negative    pH Urine 6.0 5.0 - 7.0    Protein Albumin Urine 20  (A) Negative mg/dL    Urobilinogen Urine Normal Normal, 2.0 mg/dL    Nitrite Urine Negative Negative    Leukocyte Esterase Urine Negative Negative    Bacteria Urine Few (A) None Seen /HPF    Mucus Urine Present (A) None Seen /LPF    RBC Urine 1 <=2 /HPF    WBC Urine 6 (H) <=5 /HPF    Squamous Epithelials Urine 1 <=1 /HPF    Hyaline Casts Urine 19 (H) <=2 /LPF    Granular Casts Urine 1 (H) None Seen /LPF          Physical and Psychiatric Examination:     /82 (BP Location: Right arm)   Pulse 57   Temp 97.3  F (36.3  C)  "(Oral)   Resp 16   Ht 1.803 m (5' 11\")   Wt 101.2 kg (223 lb)   SpO2 93%   BMI 31.10 kg/m    Weight is 223 lbs 0 oz  Body mass index is 31.1 kg/m .    Physical Exam:  I have reviewed the physical exam as documented by the medical team and agree with findings and assessment and have no additional findings to add at this time.    Mental Status Exam:  Appearance: awake, alert and adequately groomed  Attitude:  cooperative  Eye Contact:  good  Mood:  good  Affect:  mood congruent  Speech:  clear, coherent  Language: fluent and intact in English  Psychomotor, Gait, Musculoskeletal:  no evidence of tardive dyskinesia, dystonia, or tics  Thought Process:  goal oriented  Associations:  no loose associations  Thought Content:  no evidence of suicidal ideation or homicidal ideation and no evidence of psychotic thought  Insight:  fair  Judgement:  intact  Oriented to:  time, person, and place  Attention Span and Concentration:  intact  Recent and Remote Memory:  fair  Fund of Knowledge:  appropriate         Admission Diagnoses:      Alcohol dependence with intoxication with complication (H)  YUSUF   MDD, recurrent, moderate         Assessment & Plan:     1) MSSA with Ativan.   2) Continue Celexa and Buspar. Will obtain EKG due to history of prolonged QTc.   3) Patient open to starting Naltrexone after detox.   4) Patient likely to IOP.     Disposition Plan   Reason for ongoing admission: requires detoxification from substance that poses a risk of bodily harm during withdrawal period  Discharge location: [facility] treatment facility  Discharge Medications: not ordered  Follow-up Appointments: not scheduled  Legal Status: voluntary    Entered by: Wally Aj on 3/24/2022 at 7:53 AM           "

## 2022-03-24 NOTE — PROGRESS NOTES
PMPD Note-         Narx Scores  Narcotic  050  Sedative  080  Stimulant  000  Explanation and Guidance  Overdose Risk Score  240  (Range 000-999)  Explanation and Guidance  State Indicators (0)  Details  RX Graph  Rx Graph cannot be viewed on a small window.  Morphine Milligram Equivalent Prescribed Over Time  Last 30 Days  Last 60 Days  Last 90 Days  Last 1 Year  Last 2 Years  MME  Timeframe  0  1  2  2/23/22  3/10/22  3/24/22  0  MME per Day Avg.  0  MME per RX  Disclaimer  Lorazepam MgEq (LME) Prescribed Over Time  Last 30 Days  Last 60 Days  Last 90 Days  Last 1 Year  Last 2 Years  LME  Timeframe  0  1  2  2/23/22  3/10/22  3/24/22  0  LME Per Day Avg.  0  LME mg Per Rx  Disclaimer  Buprenorphine (mg) Prescribed Over Time  Last 30 Days  Last 60 Days  Last 90 Days  Last 1 Year  Last 2 Years  mg  Timeframe  0  1  2  2/23/22  3/10/22  3/24/22  0  mg Per Day Avg.  0  Avg mg Per Rx  Disclaimer  RX Summary  Summary  Total Prescriptions 6  Total Private Pay 0  Total Prescribers 2  Total Pharmacies 3  Opioids* (excluding Buprenorphine)  Current Qty 0  Current MME/day 0.00  30 Day Avg MME/day 0.00  Buprenorphine*  Current Qty 0  Current mg/day 0.00  30 Day Avg mg/day 0.00  RX Summary Expanded  Narcotics (excluding Buprenorphine)  30 Day Avg. MME 0.00  90 Day Avg. MME 0.00  Rx Count/12 Months 0  Prescriber #/6 Months 0  Pharmacy #/6 Months 0  Current Quantity 0  Buprenorphine  30 Day Avg. mg/day 0.00  90 Day Avg. mg/day 0.00  Rx Count/12 Months 0  Prescriber #/6 Months 0  Pharmacy #/6 Months 0  Current Quantity 0  Sedatives  30 Day Avg. LME 0.00  90 Day Avg. LME 0.00  Rx Count/12 Months 2  Prescriber #/6 Months 1  Pharmacy #/6 Months 2  Current Quantity 0  Stimulants  30 Day Avg. mg/day 0.00  90 Day Avg. mg/day 0.00  Rx Count/12 Months 0  Prescriber #/6 Months 0  Pharmacy #/6 Months 0  Current Quantity 0  Prescriptions  Total: 6  Private Pay: 0  Showing 1-6 of 6 Items  1 of 1   Filled  Drug    11/08/2021   Gabapentin 300  Mg Capsule   10/25/2021   Gabapentin 300 Mg Capsule   10/11/2021   Gabapentin 300 Mg Capsule   10/06/2021   Gabapentin 300 Mg Capsule   10/01/2021   Diazepam 10 Mg Tablet   09/30/2021   Diazepam 10 Mg Tablet  Disclaimer  Showing 1-6 of 6 Items  1 of 1   Providers  Total: 2  Showing 1-2 of 2 Items  1 of 1   Name  Phone    Lobito Trotter [phone]   Nickie Holder [phone]  Showing 1-2 of 2 Items  1 of 1   Pharmacies  Total: 3  Showing 1-3 of 3 Items  1 of 1   Name  Phone    [facility] ([id number]) [phone]   [facility] ([id number]) [phone]   [facility] ([id number]) [phone]  Showing 1-3 of 3 Items  1 of 1   The report provided is based upon the search criteria entered and the corresponding data as it has been reported by dispenser(s). If erroneous information is identified or additional information is needed, please contact the dispenser or the prescriber provided on the report. Date Sold signifies the date the prescription was sold (left the pharmacy). The absence of Date Sold does not necessarily indicate the prescription was not dispensed. Fill Date represents the date the medication was filled or prepared by the pharmacy. Note, federal regulation (CFR Title 42: Part 2) requires patient consent prior to releasing certain patient data from federally funded opioid treatment programs (OTPs). As such, controlled substances dispensed from OTPs for medication-assisted treatment may not appear in the [place]  report. Morphine milligram equivalent (MME) conversion factors published by the CDC are used in the MME calculation. Per the CDC, the MME conversion factor is intended only for analytic purposes where prescription data are used to retrospectively calculate daily MME to inform analyses of risks associated with opioid prescribing. This value does not constitute clinical guidance or recommendations for converting patients from one form of opioid analgesic to another. Per the  CDC, the conversion factors for drugs prescribed or provided, as part of medication-assisted treatment for opioid use disorder should not be used to benchmark against MME dosage thresholds meant for opioids prescribed for pain. Buprenorphine products listed in the CDC s MME file do not have an associated conversion factor. Lastly, the [facility] notes, in clinical practice, calculating MME for methadone often involves a sliding-scale approach, whereby the conversion factor increases with increasing dose. The conversion factor of 3 for methadone presented in this file could underestimate MME for a given patient. This report contains confidential information, including patient identifiers, and is not a public record. The information on this report must be treated as protected health information and is only to be disclosed to others as authorized by applicable state and Federal regulations.
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
## 2022-03-24 NOTE — PROGRESS NOTES
Met with pt to discuss aftercare plans. Pt states he would like to attend the outpatient program at [facility] with Graeme Chavez. Pt states he had been in this program previously and enjoyed his experience. Pt reports he has not had a recent CD assessment. Pt was shown paperwork to complete for assessment and referral to program, reports he will begin working on paperwork when he is feeling better. Pt has Mita TILLMAN for insurance, AVS initiated.  CM to follow-up with paperwork on 3/25 if not completed by end of workday.

## 2022-03-24 NOTE — CONSULTS
Consult Date: 03/24/2022    HISTORY OF PRESENT ILLNESS:  The patient is a pleasant 62-year-old gentleman admitted to station 3A for alcohol abuse and detoxification.  The patient has been drinking a heavy amount of vodka daily since his relapse in December.  Breathalyzer on admission 0.169.  An Internal Medicine consultation was requested by Dr. Aj to assess medical problems including hypertension.  At this time, Durga feels much better since admission.  Denies fever, chills, chest pain, shortness of breath, abdominal pain, nausea, bowel or bladder concerns.    PAST MEDICAL HISTORY:     1.  Alcohol use disorder and other psychiatric history per Dr. Aj.  2.  Hypertension.  3.  History of hyperlipidemia, no longer on any medication prior to admission.  4.  Chronic right knee pain.  5.  Status post left foot surgery.  6.  Denies history of other chronic medical problems and surgeries including cardiopulmonary disease and diabetes.    ADMISSION MEDICATIONS:  Reviewed and listed in medication reconciliation list.    ALLERGIES:  NO KNOWN DRUG ALLERGIES.    SOCIAL HISTORY:  Single.  Has one grown son.  Lives in [place].  Currently unemployed.  States he smokes 1-2 cigarettes daily.    Family History   Problem Relation Age of Onset     No Known Problems Mother      Lymphoma Father      No Known Problems Maternal Grandmother      No Known Problems Maternal Grandfather      No Known Problems Paternal Grandmother      No Known Problems Paternal Grandfather      No Known Problems Sister      No Known Problems Son      No Known Problems Brother      No Known Problems Daughter      No Known Problems Other      Unknown/Adopted No family hx of      Depression No family hx of      Anxiety Disorder No family hx of      Schizophrenia No family hx of      Bipolar Disorder No family hx of      Suicide No family hx of      Substance Abuse No family hx of      Dementia No family hx of      Shani Disease No family hx of       Parkinsonism No family hx of      Autism Spectrum Disorder No family hx of      Intellectual Disability (Mental Retardation) No family hx of      Mental Illness No family hx of         REVIEW OF SYSTEMS:  A 10-point review of systems is negative except as stated above in history of present illness.    PHYSICAL EXAMINATION:    GENERAL:  Pleasant gentleman in no acute distress.  VITAL SIGNS:  Stable, temperature afebrile, pulse in the 60s, blood pressure 153/101.  LUNGS:  Clear.  CARDIOVASCULAR:  Regular rate and rhythm.  ABDOMEN:  Soft, nontender.  EXTREMITIES:  No edema.  SKIN:  No rash on exposed areas.  NEUROLOGIC:  He is awake, alert and oriented x3.  Cranial nerves grossly intact.  Motor strength is symmetric.  He is not tremulous.    LABORATORY DATA:  Breathalyzer on admission 0.169.  CBC normal.  Initial potassium level 3.1.  Creatinine initially 1.42.  ALT 90, AST 96.  Glucose nonfasting was 210 and hemoglobin A1c was 5.9%.  Urinalysis:  6 white blood cells, few bacteria, greater than 1000 glucose, 19 hyaline casts, otherwise unremarkable.  INR within normal limits.  COVID testing negative.    ASSESSMENT:  1.  Alcohol abuse and withdrawal per Dr. Aj.  2.  Hyperglycemia on admission labs.  Hemoglobin A1c indicates patient now has prediabetes.  3.  Acute kidney injury on admission labs, likely prerenal and due to decreased p.o. intake prior to admission.  Repeat creatinine ordered and in process.  4.  Hypokalemia on admission labs yesterday, again most likely due to decreased p.o. intake prior to admission.  5.  Mild transaminitis, most likely secondary to his heavy alcohol abuse prior to admission and expect to resolve as patient continues to detoxify and abstain from future alcohol use.  Repeat hepatic panel ordered and in process.  6.  Hypertension, stable with current blood pressures elevated, most likely secondary to alcohol withdrawal and anxiety.  7.  Chronic knee pain, stable.    PLAN:  Will  follow up on results of repeat comprehensive metabolic panel this morning.  Informed the patient he has prediabetes, of which he was instructed to work on his diet and weight loss for resolution, and he was told to follow up with his family physician after discharge in three months for repeat hemoglobin A1c.  Blood pressures will be monitored closely.  No further medical intervention indicated at this time.  The patient is medically stable and if repeat comprehensive medical panel reveals a normal potassium and improved creatinine, Medicine signing off.  Please feel free to call if questions.    Thank you for this consultation.    Rohit Priest PA-C        D: 2022   T: 2022   MT: GRAY    Name:     JOANNA BANUELOS  MRN:      [id number]        Account:      [id number]   :      1960           Consult Date: 2022     Document: [id number]

## 2022-03-24 NOTE — PLAN OF CARE
Problem: Alcohol Withdrawal  Goal: Alcohol Withdrawal Symptom Control  Outcome: Ongoing, Progressing   Goal Outcome Evaluation:      MSSA was 9 for which pt received ativan 2 mg po. P 75 /98  K+ 3.6 & WDL  Will continue to assess.

## 2022-03-24 NOTE — PLAN OF CARE
"  Problem: Alcohol Withdrawal  Goal: Alcohol Withdrawal Symptom Control  Outcome: Ongoing, Progressing   Goal Outcome Evaluation:           Situation: Patient presented self voluntary to ER for alcohol Detox. Arrived to 3 A at 7:40 pm.    Background:Durga Aguirre is a 62 year old male who presents ER seeking detox from alcohol.History of alcohol abuse.   Pt reported  hallucinating seeing a spider yesterday.  Patient attended [employer] for 50 some days and was out around Thanksgiving was sober for a month in November.  Pt relapsed in December,  started drinking daily a traveler size vodka.  Patient is shaky  and tremulous also.  Patient denies any other drug use.   Patient denies suicidal Ideation,hallucination but is shaky tremulous.    Assessments:    BP (!) 146/89 (BP Location: Left arm, Patient Position: Chair, Cuff Size: Adult Large)   Pulse (!) 121   Temp 97.2  F (36.2  C) (Temporal)   Resp 18   Ht 1.803 m (5' 11\")   Wt 101.2 kg (223 lb)   SpO2 93%   BMI 31.10 kg/m    Pt is A&O x 4, tremulous and shaky. MSSA 12, Ativan 2 mg given.Pt belonging Checks  Completed by two tech's.   Covid negative.Pt wears a Rt knee hinged brace as needed.  K+ was 3.1, replaced with 40 meq potassium, will recheck it in am.    Recommendation: Continue to monitor and treat as needed.      "

## 2022-03-24 NOTE — PLAN OF CARE
Behavioral Team Discussion: (3/24/2022)    Continued Stay Criteria/Rationale: Patient admitted for Chemical Use Issues.  Plan: The following services will be provided to the patient; psychiatric assessment, medication management, therapeutic milieu, individual and group support, and skills groups.   Participants: 3A Provider: Dr. Wally Aj MD; 3A RN: Marcela Ramirez, FAN; 3A CM's: Deepti Adams  and Rosalia Love.  Summary/Recommendation: Providers will assess today for treatment recommendations, discharge planning, and aftercare plans. CM will meet with pt for discharge planning.   Medical/Physical: Patient will receive IM Consult on unit, abnormal lab findings in ED. Reports experiencing VH while withdrawing.   Precautions:   Behavioral Orders   Procedures     Code 1 - Restrict to Unit     Routine Programming     As clinically indicated     Status 15     Every 15 minutes.     Withdrawal precautions     Rationale for change in precautions or plan: N/A  Progress: Initial.

## 2022-03-24 NOTE — PLAN OF CARE
"  Problem: Acute Neurologic Deterioration (Alcohol Withdrawal)  Goal: Optimal Neurologic Function  Outcome: Ongoing, Progressing   Goal Outcome Evaluation:    Plan of Care Reviewed With: patient      Patient is tolerating intakes with good appetite. Ate 75% of his meals  K+ rechecked is 3.6. EKG ordered and its done.  Medicine here to assess patient.  MSSA 2 and 4, no Ativan given this shift.  Endorsed Anxiety at 5/10 and depression 4/10. PRN  Atarax 100 mg given   Pt denies SI, HI, SIB.  BP (!) 155/92   Pulse 76   Temp 97.3  F (36.3  C) (Temporal)   Resp 16   Ht 1.803 m (5' 11\")   Wt 101.2 kg (223 lb)   SpO2 95%   BMI 31.10 kg/m    but BP's 153/101, 155/92. Lisinopril not given d/t pt's high creatine. MD aware.  Patient seen in milieu watching movies, engaging with peers.           "

## 2022-03-24 NOTE — ED NOTES
ED to Behavioral Floor Handoff    SITUATION  Durga Aguirre is a 62 year old male who speaks English and lives in a home with family members The patient arrived in the ED by private car from home with a complaint of Alcohol Problem (Pt is seeking detox from alcohol)  .The patient's current symptoms started/worsened 3 month(s) ago and during this time the symptoms have increased.   In the ED, pt was diagnosed with   Final diagnoses:   Alcohol dependence with intoxication with complication (H)   Hypokalemia   Elevated creatine kinase        Initial vitals were: BP: 93/59  Pulse: 97  Temp: 98.4  F (36.9  C)  Resp: 16  Weight: 105.2 kg (232 lb)  SpO2: 94 %   --------  Is the patient diabetic? No   If yes, last blood glucose? --     If yes, was this treated in the ED? --  --------  Is the patient inebriated (ETOH) Yes or Impaired on other substances? No  MSSA done? Yes  Last MSSA score: 9    Were withdrawal symptoms treated? Yes  Does the patient have a seizure history? No. If yes, date of most recent seizure--  --------  Is the patient patient experiencing suicidal ideation? denies current or recent suicidal ideation     Homicidal ideation? denies current or recent homicidal ideation or behaviors.    Self-injurious behavior/urges? denies current or recent self injurious behavior or ideation.  ------  Was pt aggressive in the ED No  Was a code called No  Is the pt now cooperative? Yes  -------  Meds given in ED:   Medications   lidocaine 1 % 0.1-1 mL (has no administration in time range)   lidocaine (LMX4) cream (has no administration in time range)   sodium chloride (PF) 0.9% PF flush 3 mL (has no administration in time range)   sodium chloride (PF) 0.9% PF flush 3 mL (has no administration in time range)   LORazepam (ATIVAN) tablet 1-4 mg (2 mg Oral Given 3/23/22 1645)   dextrose 5% and 0.45% NaCl 1,000 mL with Infuvite Adult 10 mL, thiamine 100 mg, folic acid 1 mg infusion ( Intravenous Stopped 3/23/22 1630)   0.9%  sodium chloride BOLUS (1,000 mLs Intravenous New Bag 3/23/22 1631)   potassium chloride (KLOR-CON) Packet 40 mEq (40 mEq Oral Given 3/23/22 1632)      Family present during ED course? No  Family currently present? No    BACKGROUND  Does the patient have a cognitive impairment or developmental disability? No  Allergies: No Known Allergies.   Social demographics are   Social History     Socioeconomic History     Marital status: Single     Spouse name: None     Number of children: 1     Years of education: None     Highest education level: None   Occupational History     Occupation: unemployed     Employer: SELF     Comment: contractor     Occupation: auto body work   Tobacco Use     Smoking status: Current Every Day Smoker     Packs/day: 0.25     Years: 20.00     Pack years: 5.00     Types: Cigarettes     Smokeless tobacco: Never Used     Tobacco comment: 2-3 cigarettes per day    Substance and Sexual Activity     Alcohol use: Yes     Comment: daily use 1 L vodka     Drug use: Not Currently     Sexual activity: Not Currently   Other Topics Concern     Parent/sibling w/ CABG, MI or angioplasty before 65F 55M? Not Asked   Social History Narrative     None     Social Determinants of Health     Financial Resource Strain: Not on file   Food Insecurity: Not on file   Transportation Needs: Not on file   Physical Activity: Not on file   Stress: Not on file   Social Connections: Not on file   Intimate Partner Violence: Not on file   Housing Stability: Not on file        ASSESSMENT  Labs results   Labs Ordered and Resulted from Time of ED Arrival to Time of ED Departure   COMPREHENSIVE METABOLIC PANEL - Abnormal       Result Value    Sodium 142      Potassium 3.1 (*)     Chloride 102      Carbon Dioxide (CO2) 27      Anion Gap 13      Urea Nitrogen 12      Creatinine 1.42 (*)     Calcium 8.8      Glucose 210 (*)     Alkaline Phosphatase 85      AST 96 (*)     ALT 90 (*)     Protein Total 7.1      Albumin 3.7      Bilirubin  Total 1.0      GFR Estimate 55 (*)    ROUTINE UA WITH MICROSCOPIC REFLEX TO CULTURE - Abnormal    Color Urine Light Yellow      Appearance Urine Clear      Glucose Urine >=1000 (*)     Bilirubin Urine Negative      Ketones Urine Trace (*)     Specific Gravity Urine 1.014      Blood Urine Negative      pH Urine 6.0      Protein Albumin Urine 20  (*)     Urobilinogen Urine Normal      Nitrite Urine Negative      Leukocyte Esterase Urine Negative      Bacteria Urine Few (*)     Mucus Urine Present (*)     RBC Urine 1      WBC Urine 6 (*)     Squamous Epithelials Urine 1      Hyaline Casts Urine 19 (*)     Granular Casts Urine 1 (*)    ALCOHOL BREATH TEST POCT - Abnormal    Alcohol Breath Test 0.169 (*)    PARTIAL THROMBOPLASTIN TIME - Normal    aPTT 24     INR - Normal    INR 0.97     MAGNESIUM - Normal    Magnesium 2.1     COVID-19 VIRUS (CORONAVIRUS) BY PCR - Normal    SARS CoV2 PCR Negative     CBC WITH PLATELETS AND DIFFERENTIAL    WBC Count 5.4      RBC Count 4.56      Hemoglobin 14.4      Hematocrit 42.2      MCV 93      MCH 31.6      MCHC 34.1      RDW 13.7      Platelet Count 157      % Neutrophils 67      % Lymphocytes 20      % Monocytes 7      % Eosinophils 3      % Basophils 2      % Immature Granulocytes 1      NRBCs per 100 WBC 0      Absolute Neutrophils 3.7      Absolute Lymphocytes 1.1      Absolute Monocytes 0.4      Absolute Eosinophils 0.1      Absolute Basophils 0.1      Absolute Immature Granulocytes 0.1      Absolute NRBCs 0.0        Imaging Studies: No results found for this or any previous visit (from the past 24 hour(s)).   Most recent vital signs BP (!) 159/99   Pulse 94   Temp 98.6  F (37  C) (Oral)   Resp 15   Wt 105.2 kg (232 lb)   SpO2 93%   BMI 32.36 kg/m     Abnormal labs/tests/findings requiring intervention:---   Pain control: pt had none  Nausea control: pt had none    RECOMMENDATION  Are any infection precautions needed (MRSA, VRE, etc.)? No If yes, what infection?  --  ---  Does the patient have mobility issues? independently. If yes, what device does the pt use? ---  ---  Is patient on 72 hour hold or commitment? No If on 72 hour hold, have hold and rights been given to patient? N/A  Are admitting orders written if after 10 p.m. ?N/A  Tasks needing to be completed: see orders    Valentina Lopez RN   [phone] [place] [facility]   [phone] [facility]

## 2022-03-24 NOTE — PROGRESS NOTES
03/24/22 1800   Group Therapy Session   Group Attendance attended group session   Total Time patient participated (minutes) 45   Total # Attendees 4   Group Type expressive therapy   Group Topic Covered emotions/expression   Patient Response/Contribution cooperative with task     Art Therapy directive is to create a visual image portraying an experience using one of the non visual senses (smell, taste, touch, hear) using art materials of pts choice.  Goals of directive: mindfulness, emotional regulation     Pt was a positive participant, focused on task for the full duration of group. Pt finished artwork and briefly shared with group. Pt created a drawing with a tree and woodpecker and described hearing the sounds of a woodpecker near his home recently. Pt also georges a palm tree and described the smell of the ocean as a pleasant past experience that came to mind.  Pts mood was calm, pleasant participant.

## 2022-03-25 LAB
ATRIAL RATE - MUSE: 69 BPM
DIASTOLIC BLOOD PRESSURE - MUSE: NORMAL MMHG
INTERPRETATION ECG - MUSE: NORMAL
P AXIS - MUSE: 44 DEGREES
PR INTERVAL - MUSE: 156 MS
QRS DURATION - MUSE: 140 MS
QT - MUSE: 438 MS
QTC - MUSE: 469 MS
R AXIS - MUSE: 5 DEGREES
SYSTOLIC BLOOD PRESSURE - MUSE: NORMAL MMHG
T AXIS - MUSE: 208 DEGREES
VENTRICULAR RATE- MUSE: 69 BPM

## 2022-03-25 PROCEDURE — 128N000004 HC R&B CD ADULT

## 2022-03-25 PROCEDURE — 99233 SBSQ HOSP IP/OBS HIGH 50: CPT | Performed by: PSYCHIATRY & NEUROLOGY

## 2022-03-25 PROCEDURE — 250N000013 HC RX MED GY IP 250 OP 250 PS 637: Performed by: PSYCHIATRY & NEUROLOGY

## 2022-03-25 PROCEDURE — H2032 ACTIVITY THERAPY, PER 15 MIN: HCPCS

## 2022-03-25 RX ORDER — NALTREXONE HYDROCHLORIDE 50 MG/1
50 TABLET, FILM COATED ORAL DAILY
Qty: 30 TABLET | Refills: 0 | Status: SHIPPED | OUTPATIENT
Start: 2022-03-25 | End: 2022-04-27

## 2022-03-25 RX ORDER — CITALOPRAM HYDROBROMIDE 20 MG/1
20 TABLET ORAL DAILY
Status: DISCONTINUED | OUTPATIENT
Start: 2022-03-26 | End: 2022-03-28 | Stop reason: HOSPADM

## 2022-03-25 RX ORDER — MULTIPLE VITAMINS W/ MINERALS TAB 9MG-400MCG
1 TAB ORAL DAILY
Qty: 30 TABLET | Refills: 0 | Status: SHIPPED | OUTPATIENT
Start: 2022-03-26 | End: 2022-08-26

## 2022-03-25 RX ORDER — CITALOPRAM HYDROBROMIDE 20 MG/1
20 TABLET ORAL DAILY
Qty: 30 TABLET | Refills: 0 | Status: SHIPPED | OUTPATIENT
Start: 2022-03-26 | End: 2022-04-13

## 2022-03-25 RX ORDER — LISINOPRIL 20 MG/1
20 TABLET ORAL DAILY
Qty: 90 TABLET | Refills: 0 | Status: SHIPPED | OUTPATIENT
Start: 2022-03-25 | End: 2022-04-27

## 2022-03-25 RX ORDER — NICOTINE 21 MG/24HR
1 PATCH, TRANSDERMAL 24 HOURS TRANSDERMAL DAILY
Qty: 30 PATCH | Refills: 0 | Status: SHIPPED | OUTPATIENT
Start: 2022-03-26 | End: 2022-04-27

## 2022-03-25 RX ORDER — NALTREXONE HYDROCHLORIDE 50 MG/1
50 TABLET, FILM COATED ORAL DAILY
Status: DISCONTINUED | OUTPATIENT
Start: 2022-03-25 | End: 2022-03-28 | Stop reason: HOSPADM

## 2022-03-25 RX ORDER — TRAZODONE HYDROCHLORIDE 50 MG/1
50 TABLET, FILM COATED ORAL
Qty: 30 TABLET | Refills: 0 | Status: SHIPPED | OUTPATIENT
Start: 2022-03-25 | End: 2022-04-19

## 2022-03-25 RX ORDER — LANOLIN ALCOHOL/MO/W.PET/CERES
100 CREAM (GRAM) TOPICAL DAILY
Qty: 30 TABLET | Refills: 0 | Status: SHIPPED | OUTPATIENT
Start: 2022-03-26 | End: 2022-08-26

## 2022-03-25 RX ORDER — HYDROXYZINE HYDROCHLORIDE 50 MG/1
50-100 TABLET, FILM COATED ORAL 3 TIMES DAILY PRN
Qty: 60 TABLET | Refills: 0 | Status: SHIPPED | OUTPATIENT
Start: 2022-03-25 | End: 2022-04-14

## 2022-03-25 RX ORDER — FOLIC ACID 1 MG/1
1 TABLET ORAL DAILY
Qty: 30 TABLET | Refills: 0 | Status: SHIPPED | OUTPATIENT
Start: 2022-03-26 | End: 2022-08-26

## 2022-03-25 RX ORDER — CITALOPRAM HYDROBROMIDE 10 MG/1
10 TABLET ORAL DAILY
Status: COMPLETED | OUTPATIENT
Start: 2022-03-25 | End: 2022-03-25

## 2022-03-25 RX ORDER — BUSPIRONE HYDROCHLORIDE 15 MG/1
15 TABLET ORAL 2 TIMES DAILY
Qty: 60 TABLET | Refills: 0 | Status: SHIPPED | OUTPATIENT
Start: 2022-03-25 | End: 2022-04-13

## 2022-03-25 RX ADMIN — B-COMPLEX W/ C & FOLIC ACID TAB 1 TABLET: TAB at 08:46

## 2022-03-25 RX ADMIN — BUSPIRONE HYDROCHLORIDE 15 MG: 15 TABLET ORAL at 08:47

## 2022-03-25 RX ADMIN — FOLIC ACID 1 MG: 1 TABLET ORAL at 08:46

## 2022-03-25 RX ADMIN — NICOTINE POLACRILEX 2 MG: 2 GUM, CHEWING BUCCAL at 14:55

## 2022-03-25 RX ADMIN — BUSPIRONE HYDROCHLORIDE 15 MG: 15 TABLET ORAL at 21:05

## 2022-03-25 RX ADMIN — TRAZODONE HYDROCHLORIDE 50 MG: 50 TABLET ORAL at 23:04

## 2022-03-25 RX ADMIN — LISINOPRIL 20 MG: 20 TABLET ORAL at 08:47

## 2022-03-25 RX ADMIN — CITALOPRAM HYDROBROMIDE 10 MG: 10 TABLET ORAL at 11:59

## 2022-03-25 RX ADMIN — NICOTINE 1 PATCH: 14 PATCH, EXTENDED RELEASE TRANSDERMAL at 08:45

## 2022-03-25 RX ADMIN — TRAZODONE HYDROCHLORIDE 50 MG: 50 TABLET ORAL at 21:05

## 2022-03-25 RX ADMIN — HYDROXYZINE HYDROCHLORIDE 100 MG: 50 TABLET, FILM COATED ORAL at 11:59

## 2022-03-25 RX ADMIN — NICOTINE 1 PATCH: 14 PATCH, EXTENDED RELEASE TRANSDERMAL at 18:42

## 2022-03-25 RX ADMIN — NALTREXONE HYDROCHLORIDE 50 MG: 50 TABLET, FILM COATED ORAL at 11:59

## 2022-03-25 RX ADMIN — HYDROXYZINE HYDROCHLORIDE 100 MG: 50 TABLET, FILM COATED ORAL at 21:05

## 2022-03-25 RX ADMIN — THIAMINE HCL TAB 100 MG 100 MG: 100 TAB at 08:47

## 2022-03-25 RX ADMIN — MULTIPLE VITAMINS W/ MINERALS TAB 1 TABLET: TAB at 08:46

## 2022-03-25 RX ADMIN — CITALOPRAM HYDROBROMIDE 10 MG: 10 TABLET ORAL at 08:47

## 2022-03-25 ASSESSMENT — ACTIVITIES OF DAILY LIVING (ADL)
DRESS: INDEPENDENT
LAUNDRY: WITH SUPERVISION
ORAL_HYGIENE: INDEPENDENT
HYGIENE/GROOMING: INDEPENDENT

## 2022-03-25 NOTE — PROGRESS NOTES
Pt completed CD paperwork and paperwork was faxed to remote  for assessment to [facility] outpatient program. CM to follow-up after complete for referral.    Update: Pt completed his CD assessment and was added to wait list for [facility] outpatient treatment program. Pt is aware he will need to follow-up with admission from home. AVS updated.

## 2022-03-25 NOTE — PLAN OF CARE
Goal Outcome Evaluation:    Plan of Care Reviewed With: patient        Problem: Acute Neurologic Deterioration (Alcohol Withdrawal)  Goal: Optimal Neurologic Function  Outcome: Ongoing, Progressing        Patient is tolerating intakes with good appetite.   MSSA 4 and 5, no Ativan given this shift.  Patient's VS'S but BP's 161/105 and 161/104, on scheduled lisinopril 20 mg.  Endorsed Anxiety at 6/10, PRN Atarax 100 mg given, Nicotine gum 2 mg given.  Denies SI, HI, or SIB.Patient engages with peers and attends programs.  Continue with POC.

## 2022-03-25 NOTE — PLAN OF CARE
Problem: Plan of Care - These are the overarching goals to be used throughout the patient stay.    Goal: Optimal Comfort and Wellbeing  Outcome: Ongoing, Progressing     Problem: Behavioral Health Plan of Care  Goal: Optimal Comfort and Wellbeing  Outcome: Ongoing, Progressing    Behavioral  Pt appeared sleeping comfortably overnight; breathing was even and unlabored.      Medical  Pt continues in alcohol withdrawal; MSSA 6 & 2; no ativan given this shift; Pt last ativan on 3/24 @ 2018; pt has received a total of 16 mg of ativan since admission.     No new medical concerns noted.

## 2022-03-25 NOTE — PROGRESS NOTES
[facility], [place]   Psychiatric Progress Note        Interim history   This is a 62 year old male with   Alcohol dependence with intoxication with complication (H)  YUSUF   MDD, recurrent, moderate.Pt seen in rounds.   The patient's care was discussed with the treatment team during the daily team meeting and/or staff's chart notes were reviewed.  Staff report patient has been visible in the milieu,  no acute eventsovernight.     Patient's mood is glum  Energy Level:low   Sleep:off and on   Appetite:improving , improving  motivation interest   Denied any Suicidal/homicidal ideation/plan intent.  Denied any psychosis  No prior suicde attempts  No access to gun  Pt is in alcohol withdrawal still being monitered every 4 hrs for it,   Pt mssa score are monitered  Tolerating meds and has no side effects.              Medications:     Current Facility-Administered Medications   Medication     acetaminophen (TYLENOL) tablet 650 mg     alum & mag hydroxide-simethicone (MAALOX) suspension 30 mL     atenolol (TENORMIN) tablet 50 mg     busPIRone (BUSPAR) tablet 15 mg     citalopram (celeXA) tablet 10 mg     folic acid (FOLVITE) tablet 1 mg     hydrOXYzine (ATARAX) tablet  mg     lidocaine (LMX4) cream     lidocaine 1 % 0.1-1 mL     lisinopril (ZESTRIL) tablet 20 mg     loperamide (IMODIUM) capsule 2 mg     LORazepam (ATIVAN) tablet 1-4 mg     multivitamin w/minerals (THERA-VIT-M) tablet 1 tablet     nicotine (NICODERM CQ) 14 MG/24HR 24 hr patch 1 patch     nicotine Patch in Place     ondansetron (ZOFRAN-ODT) ODT tab 4 mg     senna-docusate (SENOKOT-S/PERICOLACE) 8.6-50 MG per tablet 1 tablet     sodium chloride (PF) 0.9% PF flush 3 mL     thiamine (B-1) tablet 100 mg     traZODone (DESYREL) tablet 50 mg     vitamin B complex with vitamin C (STRESS TAB) tablet 1 tablet     Facility-Administered Medications Ordered in Other Encounters   Medication     Self Administer Medications: Behavioral  "Services             Allergies:   No Known Allergies         Psychiatric Examination:   Blood pressure (!) 161/105, pulse 89, temperature 97.1  F (36.2  C), temperature source Temporal, resp. rate 16, height 1.803 m (5' 11\"), weight 101.2 kg (223 lb), SpO2 96 %.  Weight is 223 lbs 0 oz  Body mass index is 31.1 kg/m .    Appearance:  awake, alert and adequately groomed  Attitude:  cooperative  Eye Contact:  good  Mood:  glum  Affect:  appropriate and in normal range and mood congruent  Speech:  clear, coherent rate /rhythm are good  Psychomotor Behavior:  no evidence of tardive dyskinesia, dystonia, or tics and intact station, gait and muscle tone  Throught Process:  logical  Associations:  no loose associations  Thought Content:  no evidence of suicidal ideation or homicidal ideation, no evidence of psychotic thought, no auditory hallucinations present and no visual hallucinations present  Insight:  good  Judgement:  intact  Oriented to:  time, person, and place  Attention Span and Concentration:  intact  Recent and Remote Memory:  intact  Language fund of knowledge are adequate         Labs:     Recent Results (from the past 24 hour(s))   EKG 12-lead, complete    Collection Time: 03/24/22 12:40 PM   Result Value Ref Range    Systolic Blood Pressure  mmHg    Diastolic Blood Pressure  mmHg    Ventricular Rate 69 BPM    Atrial Rate 69 BPM    NE Interval 156 ms    QRS Duration 140 ms     ms    QTc 469 ms    P Axis 44 degrees    R AXIS 5 degrees    T Axis 208 degrees    Interpretation ECG       Sinus rhythm  Non-specific intra-ventricular conduction block  T wave abnormality, consider inferolateral ischemia  Abnormal ECG  When compared with ECG of 30-JUL-2021 09:02,  T wave inversion now evident in Inferior leads           DX   Alcohol dependence with intoxication with complication (H)  YUSUF   MDD, recurrent, moderate     PLAN   Alcohol intoxication/withdrawal, presently is on MSSA protocol with Valium. Continue " the same MSSA protocol as ordered. Continue thiamine 100 mg p.o. daily, M.V.I. one p.o. daily and folate 1 mg p.o. Daily  Will continue mssa protocal to detox off alcohol on valium,  Pt is c/o of termor , agitation poor sleep and poor appetite, he has sweats, feels shakey  On mssa client scored scored4 today and  needed needed  0mg po as of yet , total dose since admission was 16mg    MSSA    Eating Disturbances: ate and enjoyed all of it or not applicable  Tremor: 0 - no tremor  Sleep Disturbance: slept through the night or not applicable  Clouding of Sensorium: no evidence  Hallucinations: 1 - auditory  Quality of Contact: 0 - awareness of examiner and people around him/her  Agitation: 0 - normal activity  Paroxysmal Sweats: 0 - no observed sweating  Temperature: 99.5 or below  Pulse: 2 - 80 to 89  Total MSSA Score: 4      Increase  Celexa 20 mg po every day  Will add naltrexone 50 mg po qd  and Buspar.   Laboratory/Imaging: reviewed with patient   Consults: internal medicine consult reviewed  Patient will be treated in therapeutic milieu with appropriate individual and group therapies as described.  PDMP CHECKED     Supportive psychotheraoy provided, chance talked about recovery enviroment, relapse prevention, triggers to use.  Discussed with patient many issues of addiction,triggers, relapse, and establishing a solid recovery program.  Asked pt to be med complinat   Medical diagnoses to be addressed this admission:    Plan:     LABORATORY DATA:  Breathalyzer on admission 0.169.  CBC normal.  Initial potassium level 3.1.  Creatinine initially 1.42.  ALT 90, AST 96.  Glucose nonfasting was 210 and hemoglobin A1c was 5.9%.  Urinalysis:  6 white blood cells, few bacteria, greater than 1000 glucose, 19 hyaline casts, otherwise unremarkable.  INR within normal limits.  COVID testing negative.     ASSESSMENT:  1.  Alcohol abuse and withdrawal per Dr. Aj.  2.  Hyperglycemia on admission labs.  Hemoglobin A1c indicates  patient now has prediabetes.  3.  Acute kidney injury on admission labs, likely prerenal and due to decreased p.o. intake prior to admission.  Repeat creatinine ordered and in process.  4.  Hypokalemia on admission labs yesterday, again most likely due to decreased p.o. intake prior to admission.  5.  Mild transaminitis, most likely secondary to his heavy alcohol abuse prior to admission and expect to resolve as patient continues to detoxify and abstain from future alcohol use.  Repeat hepatic panel ordered and in process.  6.  Hypertension, stable with current blood pressures elevated, most likely secondary to alcohol withdrawal and anxiety.  7.  Chronic knee pain, stable.     PLAN:  Will follow up on results of repeat comprehensive metabolic panel this morning.  Informed the patient he has prediabetes, of which he was instructed to work on his diet and weight loss for resolution, and he was told to follow up with his family physician after discharge in three months for repeat hemoglobin A1c.  Blood pressures will be monitored closely.  No further medical intervention indicated at this time.  The patient is medically stable and if repeat comprehensive medical panel reveals a normal potassium and improved creatinine, Medicine signing off.  Please feel free to call if questions.    Legal Status: voluntary    Safety Assessment:   Checks:  15 min  Precautions: withdrawal precautions  Pt has not required locked seclusion or restraints in the past 24 hours to maintain safety, please refer to RN documentation for further details.  Discussed with patient many issues of addiction,triggers, relapse, and establishing a solid recovery program.  Able to give informed consent:  YES   Discussed Risks/Benefits/Side Effects/Alternatives: YES    After discussion of the indications, risks, benefits, alternatives and consequences of no treatment, the patient elects to compete detox and do trt.

## 2022-03-26 PROCEDURE — 250N000013 HC RX MED GY IP 250 OP 250 PS 637: Performed by: PHYSICIAN ASSISTANT

## 2022-03-26 PROCEDURE — H2032 ACTIVITY THERAPY, PER 15 MIN: HCPCS

## 2022-03-26 PROCEDURE — 250N000013 HC RX MED GY IP 250 OP 250 PS 637: Performed by: PSYCHIATRY & NEUROLOGY

## 2022-03-26 PROCEDURE — 128N000004 HC R&B CD ADULT

## 2022-03-26 RX ORDER — LISINOPRIL 10 MG/1
10 TABLET ORAL ONCE
Status: COMPLETED | OUTPATIENT
Start: 2022-03-26 | End: 2022-03-26

## 2022-03-26 RX ADMIN — LISINOPRIL 20 MG: 20 TABLET ORAL at 08:47

## 2022-03-26 RX ADMIN — HYDROXYZINE HYDROCHLORIDE 100 MG: 50 TABLET, FILM COATED ORAL at 19:12

## 2022-03-26 RX ADMIN — CITALOPRAM HYDROBROMIDE 20 MG: 20 TABLET ORAL at 08:48

## 2022-03-26 RX ADMIN — FOLIC ACID 1 MG: 1 TABLET ORAL at 08:47

## 2022-03-26 RX ADMIN — HYDROXYZINE HYDROCHLORIDE 100 MG: 50 TABLET, FILM COATED ORAL at 21:50

## 2022-03-26 RX ADMIN — B-COMPLEX W/ C & FOLIC ACID TAB 1 TABLET: TAB at 08:47

## 2022-03-26 RX ADMIN — NICOTINE 1 PATCH: 14 PATCH, EXTENDED RELEASE TRANSDERMAL at 12:58

## 2022-03-26 RX ADMIN — NALTREXONE HYDROCHLORIDE 50 MG: 50 TABLET, FILM COATED ORAL at 08:47

## 2022-03-26 RX ADMIN — BUSPIRONE HYDROCHLORIDE 15 MG: 15 TABLET ORAL at 08:48

## 2022-03-26 RX ADMIN — THIAMINE HCL TAB 100 MG 100 MG: 100 TAB at 08:47

## 2022-03-26 RX ADMIN — ATENOLOL 50 MG: 50 TABLET ORAL at 16:17

## 2022-03-26 RX ADMIN — HYDROXYZINE HYDROCHLORIDE 50 MG: 50 TABLET, FILM COATED ORAL at 12:36

## 2022-03-26 RX ADMIN — MULTIPLE VITAMINS W/ MINERALS TAB 1 TABLET: TAB at 08:48

## 2022-03-26 RX ADMIN — LISINOPRIL 10 MG: 10 TABLET ORAL at 11:43

## 2022-03-26 RX ADMIN — TRAZODONE HYDROCHLORIDE 50 MG: 50 TABLET ORAL at 21:50

## 2022-03-26 RX ADMIN — BUSPIRONE HYDROCHLORIDE 15 MG: 15 TABLET ORAL at 21:50

## 2022-03-26 ASSESSMENT — ACTIVITIES OF DAILY LIVING (ADL)
DRESS: INDEPENDENT
ORAL_HYGIENE: INDEPENDENT
HYGIENE/GROOMING: INDEPENDENT

## 2022-03-26 NOTE — CONSULTS
BRIEF MEDICINE NOTE    Contacted by RN because patient possibly being discharged home today but blood pressure elevated. Patient is asymptomatic.Patient reported blood pressure well controlled prior to admission.   Chart reviewed. Patient with labile blood pressure readings in last 72 hours likely associated with alcohol detox. BP ranges from 108/72 to 172/102.     Plan:  Cont PTA lisinopril 20 mg daily. Give additional 10mg today due to recheck SBP still over 170. Do not want to increase PTA lisinopril  dose since blood pressure reportedly well controlled at home and blood pressure readings lower yesterday. Also likely elevated in the setting of recent alcohol detox. Added discharge instructions to have patient monitor blood pressure at home and follow up with PCP in next week for continued monitoring.     Orquidea De Leon PA-C  Internal Medicine [facility] Hospitalist  [facility]  Pager [phone]

## 2022-03-26 NOTE — PLAN OF CARE
"   03/26/22 1700   Group Therapy Session   Group Attendance attended group session   Time Session Began 1645   Time Session Ended 1745   Total Time patient participated (minutes) 55   Total # Attendees 7   Group Type expressive therapy  (art therapy)   Group Topic Covered disease of addiction/choices in recovery   Group Session Detail What do you want to reach for and let go of?   Patient Response/Contribution cooperative with task;discussed personal experience with topic;organized   Patient Response Detail Durga presented as calm and pleasant. He engaged in group discussion about goals, stating he wants to \"have a good life without alcohol.\" He participated in making a drawing depicting his goals. He chose to draw a picture of a car and appeared calm and focused while drawing. He shared his drawing with the group and talked about how he used to enjoy drawing and would like to start practicing again. He also shared with the group that he wants to forgive himself. He interacted appropriately with peers, making supportive comments, and was a positive participant.    Goal Outcome Evaluation:                      "

## 2022-03-26 NOTE — PROGRESS NOTES
"  03/25/22 2200   Group Therapy Session   Time Session Began 1649   Time Session Ended 1730   Total # Attendees 2   Group Type expressive therapy   Group Topic Covered disease of addiction/choices in recovery      Participated in Music Therapy group with focus on Change and Surrender.  Music Therapist shared a live, original song \"Starting Over/[place]\" which addressed the topic of staying with oneself amidst the change and surrender process.  Patients then wrote reflectively after listening to the song, and shared their insights with the group.    Durga shared about how he had had recovery previously than lost it as the result of grief and loss.  He described a time in his life when he was sober and going to meetings and helping others, staying connected.  He is hopeful to change again and get back to recovery.  Affect was open, earnest and engaged.  Positive participation.     41 minutes participation     "

## 2022-03-26 NOTE — PLAN OF CARE
Problem: Alcohol Withdrawal  Goal: Alcohol Withdrawal Symptom Control  Outcome: Met     Problem: Acute Neurologic Deterioration (Alcohol Withdrawal)  Goal: Optimal Neurologic Function  Outcome: Met     Problem: Substance Misuse (Alcohol Withdrawal)  Goal: Readiness for Change Identified  Outcome: Met     Behavioral  Pt appeared sleeping comfortably overnight; breathing was even and unlabored.      Medical  Pt out of detox from alcohol.   No new medical concerns noted.

## 2022-03-26 NOTE — PLAN OF CARE
Problem: Alcohol Withdrawal  Goal: Alcohol Withdrawal Symptom Control  Outcome: Met     Pt has not required medication for withdrawal symptoms for 24 hours. Per unit policy, Pt is now Out Of Detox.     Pt states he would like to discharge on Saturday 3/26.    Pt visible in milieu and social with peers. Endorsed anxiety, denied SI

## 2022-03-26 NOTE — PLAN OF CARE
Problem: Hypertension Comorbidity  Goal: Blood Pressure in Desired Range  Outcome: Ongoing, Not Progressing   Goal Outcome Evaluation:      B/P 208/134, 179/100 ad 173/102 this AM. Appetite poor. Patient reports feeling nauseated and light headed.Medical notified. Medicated with Lisinopril 10 mg. p.o per order.Patient reports feeling anxious. Medicated with Hydroxyzine 50 mg. Spent most of afternoon in TV lounge.

## 2022-03-27 PROCEDURE — 99232 SBSQ HOSP IP/OBS MODERATE 35: CPT | Performed by: PSYCHIATRY & NEUROLOGY

## 2022-03-27 PROCEDURE — 128N000004 HC R&B CD ADULT

## 2022-03-27 PROCEDURE — 250N000013 HC RX MED GY IP 250 OP 250 PS 637: Performed by: PSYCHIATRY & NEUROLOGY

## 2022-03-27 PROCEDURE — H0001 ALCOHOL AND/OR DRUG ASSESS: HCPCS | Performed by: COUNSELOR

## 2022-03-27 PROCEDURE — H2032 ACTIVITY THERAPY, PER 15 MIN: HCPCS

## 2022-03-27 RX ADMIN — MULTIPLE VITAMINS W/ MINERALS TAB 1 TABLET: TAB at 08:30

## 2022-03-27 RX ADMIN — TRAZODONE HYDROCHLORIDE 50 MG: 50 TABLET ORAL at 22:53

## 2022-03-27 RX ADMIN — HYDROXYZINE HYDROCHLORIDE 100 MG: 50 TABLET, FILM COATED ORAL at 21:28

## 2022-03-27 RX ADMIN — TRAZODONE HYDROCHLORIDE 50 MG: 50 TABLET ORAL at 21:27

## 2022-03-27 RX ADMIN — NICOTINE 1 PATCH: 14 PATCH, EXTENDED RELEASE TRANSDERMAL at 08:33

## 2022-03-27 RX ADMIN — NICOTINE 1 PATCH: 14 PATCH, EXTENDED RELEASE TRANSDERMAL at 21:28

## 2022-03-27 RX ADMIN — LISINOPRIL 20 MG: 20 TABLET ORAL at 08:30

## 2022-03-27 RX ADMIN — HYDROXYZINE HYDROCHLORIDE 50 MG: 50 TABLET, FILM COATED ORAL at 10:53

## 2022-03-27 RX ADMIN — CITALOPRAM HYDROBROMIDE 20 MG: 20 TABLET ORAL at 08:30

## 2022-03-27 RX ADMIN — THIAMINE HCL TAB 100 MG 100 MG: 100 TAB at 08:30

## 2022-03-27 RX ADMIN — BUSPIRONE HYDROCHLORIDE 15 MG: 15 TABLET ORAL at 08:30

## 2022-03-27 RX ADMIN — BUSPIRONE HYDROCHLORIDE 15 MG: 15 TABLET ORAL at 21:27

## 2022-03-27 RX ADMIN — HYDROXYZINE HYDROCHLORIDE 50 MG: 50 TABLET, FILM COATED ORAL at 14:29

## 2022-03-27 RX ADMIN — B-COMPLEX W/ C & FOLIC ACID TAB 1 TABLET: TAB at 08:30

## 2022-03-27 RX ADMIN — FOLIC ACID 1 MG: 1 TABLET ORAL at 08:30

## 2022-03-27 RX ADMIN — NALTREXONE HYDROCHLORIDE 50 MG: 50 TABLET, FILM COATED ORAL at 08:30

## 2022-03-27 ASSESSMENT — ACTIVITIES OF DAILY LIVING (ADL)
DRESS: INDEPENDENT
ORAL_HYGIENE: INDEPENDENT
HYGIENE/GROOMING: INDEPENDENT

## 2022-03-27 NOTE — PLAN OF CARE
Problem: Hypertension Comorbidity  Goal: Blood Pressure in Desired Range  Outcome: Ongoing, Not Progressing   Goal Outcome Evaluation:  B/P remains elevated. 166/106 and 147/98. HR 58-61. Patient continues to complain of nausea and anxiety.Plans on discharge tomorrow with a follow-up with primary care physician.Attending scheduled activities on unit. Denies depression and thoughts of self harm.Medicated x1 with Hydroxyzine for complaints of anxiety.

## 2022-03-27 NOTE — PLAN OF CARE
Problem: Plan of Care - These are the overarching goals to be used throughout the patient stay.    Goal: Optimal Comfort and Wellbeing  Outcome: Ongoing, Progressing    Behavioral  Pt appeared sleeping comfortably overnight; breathing was even and unlabored.      Medical  Pt OOD from alcohol.  No new medical concerns noted.

## 2022-03-27 NOTE — PROGRESS NOTES
03/27/22 1800   Group Therapy Session   Group Attendance attended group session   Time Session Began 1645   Time Session Ended 1735   Total Time patient participated (minutes) 50   Total # Attendees 11   Group Type recreation   Group Topic Covered coping skills/lifestyle management   Group Session Detail Leisure Inventory   Patient Response/Contribution cooperative with task   Patient Response Detail Pt participated in Therapeutic Recreation group with focus on leisure education and acquisition of knowledge and skills. Pt was fully engaged and cooperative in group recreational intervention; leisure inventory. Pt was focused on the written portion for the first part of group and then contributed to the group discussion following. Pt discussed several recreational interests and positive coping skills that are healthy outlets. Pt mood was calm and was appropriate with interactions.

## 2022-03-27 NOTE — PROGRESS NOTES
"[facility], [place]   Psychiatric Progress Note        Interim History:   The patient's care was discussed with the treatment team during the daily team meeting and/or staff's chart notes were reviewed.  Staff report patient is asking to leave but BP has been elevated.     The patient reports that he almost vomited after breakfast. Says that his BP has been elevated and was low when he came in. Sleeping better. Still having some sweats but otherwise denies withdrawal symptoms. Denies SI.          Medications:       busPIRone  15 mg Oral BID     citalopram  20 mg Oral Daily     folic acid  1 mg Oral Daily     lisinopril  20 mg Oral Daily     multivitamin w/minerals  1 tablet Oral Daily     naltrexone  50 mg Oral Daily     nicotine  1 patch Transdermal Daily     nicotine   Transdermal Q8H     thiamine  100 mg Oral Daily     vitamin B complex with vitamin C  1 tablet Oral Daily          Allergies:   No Known Allergies       Labs:   No results found for this or any previous visit (from the past 24 hour(s)).       Psychiatric Examination:     BP (!) 147/98   Pulse 58   Temp 97.2  F (36.2  C) (Temporal)   Resp 16   Ht 1.803 m (5' 11\")   Wt 101.2 kg (223 lb)   SpO2 97%   BMI 31.10 kg/m    Weight is 223 lbs 0 oz  Body mass index is 31.1 kg/m .  Orthostatic Vitals  Report    None            Appearance: awake, alert and adequately groomed  Attitude:  cooperative  Eye Contact:  fair  Mood:  better  Affect:  mood congruent  Speech:  clear, coherent  Psychomotor Behavior:  no evidence of tardive dyskinesia, dystonia, or tics  Thought Process:  goal oriented  Associations:  no loose associations  Thought Content:  no evidence of suicidal ideation or homicidal ideation and no evidence of psychotic thought  Insight:  fair  Judgement:  intact  Oriented to:  time, person, and place  Attention Span and Concentration:  intact  Recent and Remote Memory:  fair    Clinical Global " Impressions  First:  Considering your total clinical experience with this particular patient population, how severe are the patient's symptoms at this time?: 7 (03/24/22 0752)  Compared to the patient's condition at the START of treatment, this patient's condition is: 4 (03/24/22 0752)  Most recent:  Considering your total clinical experience with this particular patient population, how severe are the patient's symptoms at this time?: 7 (03/24/22 0752)  Compared to the patient's condition at the START of treatment, this patient's condition is: 4 (03/24/22 0752)         Precautions:     Behavioral Orders   Procedures     Code 1 - Restrict to Unit     Routine Programming     As clinically indicated     Status 15     Every 15 minutes.     Withdrawal precautions          Diagnoses:     Alcohol dependence with intoxication with complication (H)  YUSUF   MDD, recurrent, moderate         Plan:     1) Patient out of detox.   2) Originally wanted to leave but would like to stay due to fluctuating blood pressures.       Disposition Plan   Reason for ongoing admission: requires detoxification from substance that poses a risk of bodily harm during withdrawal period  Discharge location: [facility] dependency treatment facility  Discharge Medications: not ordered  Follow-up Appointments: not scheduled  Legal Status: voluntary    Entered by: Wally Aj on March 27, 2022 at 3:06 PM

## 2022-03-27 NOTE — PLAN OF CARE
Problem: Hypertension Comorbidity  Goal: Blood Pressure in Desired Range  Outcome: Ongoing, Progressing     Pt has completed alcohol detox.     Pt experiencing hypertension on Saturday 3/26. Internal Medicine evaluated during day shift.     16:00 /133, pulse 102; administered atenolol 50 mg  20:00 /95, pulse 61    Pt visible in milieu, attended afternoon art therapy programming

## 2022-03-28 VITALS
OXYGEN SATURATION: 96 % | HEIGHT: 71 IN | BODY MASS INDEX: 31.22 KG/M2 | WEIGHT: 223 LBS | SYSTOLIC BLOOD PRESSURE: 111 MMHG | RESPIRATION RATE: 16 BRPM | HEART RATE: 92 BPM | DIASTOLIC BLOOD PRESSURE: 82 MMHG | TEMPERATURE: 97.5 F

## 2022-03-28 PROCEDURE — 250N000013 HC RX MED GY IP 250 OP 250 PS 637: Performed by: PSYCHIATRY & NEUROLOGY

## 2022-03-28 PROCEDURE — 99239 HOSP IP/OBS DSCHRG MGMT >30: CPT | Performed by: PSYCHIATRY & NEUROLOGY

## 2022-03-28 RX ADMIN — THIAMINE HCL TAB 100 MG 100 MG: 100 TAB at 08:37

## 2022-03-28 RX ADMIN — MULTIPLE VITAMINS W/ MINERALS TAB 1 TABLET: TAB at 08:37

## 2022-03-28 RX ADMIN — BUSPIRONE HYDROCHLORIDE 15 MG: 15 TABLET ORAL at 08:37

## 2022-03-28 RX ADMIN — HYDROXYZINE HYDROCHLORIDE 100 MG: 50 TABLET, FILM COATED ORAL at 08:41

## 2022-03-28 RX ADMIN — CITALOPRAM HYDROBROMIDE 20 MG: 20 TABLET ORAL at 08:37

## 2022-03-28 RX ADMIN — LISINOPRIL 20 MG: 20 TABLET ORAL at 08:37

## 2022-03-28 RX ADMIN — FOLIC ACID 1 MG: 1 TABLET ORAL at 08:37

## 2022-03-28 RX ADMIN — NALTREXONE HYDROCHLORIDE 50 MG: 50 TABLET, FILM COATED ORAL at 08:37

## 2022-03-28 RX ADMIN — B-COMPLEX W/ C & FOLIC ACID TAB 1 TABLET: TAB at 08:37

## 2022-03-28 ASSESSMENT — ACTIVITIES OF DAILY LIVING (ADL)
ORAL_HYGIENE: INDEPENDENT
DRESS: INDEPENDENT
HYGIENE/GROOMING: INDEPENDENT
LAUNDRY: WITH SUPERVISION

## 2022-03-28 NOTE — DISCHARGE INSTRUCTIONS
Behavioral Discharge Planning and Instructions  [facility]  [phone]    Summary: You were admitted to [facility] on 3/23/22 for detoxification from alcohol.  A medical exam was performed that included lab work. You have met with a  and opted to follow-up with evening outpatient treatment with Lakisha Tran.  Please take care and make your recovery a daily priority, Durga!  It was a pleasure working with you and the entire treatment team here wishes you the very best in your recovery!     Recommendation:  Outpatient Treatment, Individual Therapy    Main Diagnoses:  Mame Frederick MD  Alcohol dependence with intoxication with complication (H)  General Anxiety Disorder  Major Depression Disorder, recurrent, moderate    Major Treatments, Procedures and Findings: Your alcohol withdrawal was treated with ativan using the Modified Selective Severity Assessment (MSSA) protocol   You had a chemical dependency assessment. You had labs drawn and those results were reviewed with you. Please take a copy of your lab work with you to your next primary care provider appointment.    Symptoms to Report:  If you experience more anxiety, confusion, sleeplessness, deep sadness or thoughts of suicide, notify your treatment team or notify your primary care provider. IF ANY OF THE SYMPTOMS YOU ARE EXPERIENCING ARE A MEDICAL EMERGENCY CALL 911 IMMEDIATELY.     Lifestyle Adjustment: Health Action Plan:  1.Create a daily schedule  2. Eat Healthy  3. Plan Enjoyable Sober Activities  4. Use Problem Solving Skills and Deal with Issues as they Arise.   5. Be Physically Active  6. Take your medications as prescribed  7. Get enough restful sleep  8. Practice Relaxation  9. Spend time with Supportive People  10. No use of alcohol, illegal drugs or addictive medications other than what is currently prescribed.   11.AA, NA Sponsor are excellent resources for support  12. Explore how  you can  utilize spirituality in your recovery        Please check your blood pressure at home twice daily, document and bring to your follow up with your primary care provider. Please contact your provider immediately if your blood pressure result is higher than 180 (top number) or higher than 120 (bottom number) or seek immediate medical attention if you also have symptoms of chest pain, shortness of breath, change in vision, numbness or weakness or difficulty speaking.     Please make a follow up appointment with your primary care provider within one week regarding your high blood pressure.     Disposition: Home    Facts about COVID19 at www.cdc.gov/COVID19 and www.[place].gov/covid19    Keeping hands clean is one of the most important steps we can take to avoid getting sick and spreading germs to others.  Please wash your hands frequently and lather with soap for at least 20 seconds!    Medical Follow-Up: with Dr. Bennett on Wednesday, April 13, 2022 @ 9:15   [facility]  [address] [id number] [phone]      You are aware you should make a follow up appointment with your primary care doctor for medical and medication management    Treatment Follow-Up:  [facility]  Susanna Osborne: [phone]  Please call Susanna at the above number to get scheduled for intake to program    Recovery apps for your phone to locate current in person and zoom recovery meetings  [place] - meeting milena  AA  - meeting milena  Meeting guide - meeting milena  Quick NA meeting - meeting milena  Edda- has various apps         Great Pod casts for nutrition and wellness  Listen on Apple Podcasts  Dishing Up Nutrition   [employer]   Nutrition       Understand the connection between what you eat and how you feel. Hosted by licensed nutritionists and dietitians from [employer] we share practical, real-life solutions for healthier  living through nutrition.                 Resources:  *due to covid-19 most AA/NA meetings are being held online however some are in-person or a hybrid combination please check online to verify*  AA meetings search for them at: https://aa-intergroup.org (worldwide meeting listings)  AA meetings for [place] area can be found online at: https://[place].org (click local online meetings listings)  NA meetings for [place] area can be found online at: https://www.nam[place].org  (click find a meeting)  AA and NA Sponsors are excellent resources for support and you can find one at any support group meeting.   Alcoholics Anonymous (https://aa.org/): for information 24 hours/day  AA Intergroup service office in [place] (http://www.aastpaul.org/) [phone]  AA Intergroup service office in [facility]: [phone]. (http://www.aaXTWIP.org/)  Narcotics Anonymous (www.[employer].org) (260) 820-8902  https://aafairviewriverside.org/meetings  SMART Recovery - self management for addiction recovery:  www.smartrecovery.org  Pathways ~ A Health Crisis Resource & Support Center:  148.840.6764.  https://prescribetoprevent.org/patient-education/videos/  http://www.harmreduction.org  [facility] [phone]  Support Group:  AA/NA and Sponsor/support.  National [place] on Mental Illness (www.[place].dionne.org): 334.911.1243 or 155-130-0990.  Alcoholics Anonymous (www.alcoholics-anonymous.org): Check your phone book for your local chapter.  Suicide Awareness Voices of Education (SAVE) (www.save.org): 272-565-YYXQ (5668)  National Suicide Prevention Line (www.mentalhealthmn.org): 351-455-BXCV (8074)  Mental Health Consumer/Survivor Network of [place] (www.mhcsn.net): 226.521.7587 or 397-166-6404  Mental Health Association of [place] (www.mentalhealth.org): 537.981.7734 or 903-087-0009   Substance Abuse and Mental Health Services (www.sama.gov)  [place] Opioid Prevention Coalition: www.opioidcoalition.org    [place]  Recovery Connection ([facility])  [facility] connects people seeking recovery to resources that help foster and sustain long-term recovery.  Whether you are seeking resources for treatment, transportation, housing, job training, education, health care or other pathways to recovery, [facility] is a great place to start.  [phone].  www.[facility].org    Great Pod casts for nutrition and wellness  Listen on Apple Podcasts  Dishing Up Nutrition   [employer]   Nutrition       Understand the connection between what you eat and how you feel. Hosted by licensed nutritionists and dietitians from [employer] we share practical, real-life solutions for healthier living through nutrition.     General Medication Instructions:   See your medication sheet(s) for instructions.   Take all medications as prescribed.  Make no changes unless your primary care provider suggests them.   Go to all your primary care provider visits.  Be sure to have all your required lab tests. This way, your medicines can be refilled on time.  Do not use any forms of alcohol.    Please Note:  If you have any questions at anytime after you are discharged please call [facility] detox unit 3[facility] at [phone].  [facility], Behavioral Intake [phone]  Medical Records call [phone]  Outpatient Behavioral Intake call [phone]  LP+ Wait List/Bed Availability call [phone]    Please remember to take all of your behavioral discharge planning and lab paperwork to any follow up appointments, it contains your lab results, diagnosis, medication list and discharge recommendations.      THANK YOU FOR CHOOSING [facility]

## 2022-03-28 NOTE — PLAN OF CARE
Problem: Alcohol Withdrawal  Goal: Alcohol Withdrawal Symptom Control  Outcome: Met     Pt out of detox for alcohol withdrawal.  Monitored for hypertension. At 1600 /100, P 70. At 2000 /87, P 83.  Visible on unit. Pleasant and cooperative upon approach.

## 2022-03-28 NOTE — PLAN OF CARE
"Goal Outcome Evaluation:    Plan of Care Reviewed With: patient     Overall Patient Progress: improving    Pt being discharged to home today and plans to attend outpt CD program.     Pt out on unit.     BP has stabilized.     Pt taking medical cab home.     Pt states his mood is \"I'm not happy yet but content.\"  Denies SI.   Pt talked of re framing his thinking as a coping skill.     Pt attends aa meetings and has a sponsor.     Pt has support of sister and some who lives next door to him.    Sleep and appetite have been good.     Pt enjoys pickle ball.     Discussed coping skills to add structure to his day.     Reviewed discharge instructions.     Pt verbalized understanding.     Pt has supply of some home medications and others ordered for discharge.     Alcohol withdrawal is complete.     See discharge instructions.            "

## 2022-03-28 NOTE — DISCHARGE SUMMARY
Durga Aguirre MRN# [id number]   Age: 62 year old YOB: 1960     Date of Admission:  3/23/2022  Date of Discharge:  3/28/2022  Admitting Physician:  Wally Aj MD  Discharge Physician:  Mame June MD      DISCHARGE  DX    Alcohol dependence   YUSUF   MDD, recurrent, moderate         Event Leading to Hospitalization:     See Admission note by admitting provider for patient encounter. for additional details.          Hospital Course:   PATIENT was admitted to [facility] attending  under DR aj  please review the detailed admit note on 3/24/22   The patient was placed under status 15 (15 minute checks) to ensure patient safety.   MSSA protocol was initiated due to the patient's history of alcohol abuse and concern for withdrawal symptoms.  CBC, BMP and utox obtained.    All outpatient medications were continued    PATIENTdid participate in groups and was visible in the milieu.     The patient's symptoms of alcohol withdrawal improved.     Patients energy motivation , sleep appetite improved.  Pt completed detox . It was un eventful.      Discussed with patient medications for craving.  Spoke with patient about triggers coping skills relapse prevention.    CONSULTS DONE DURING PATIENTS HOSPITALIZATION.  Patient was seen by medicine on date3/24/22    This as per their medical consult       LABORATORY DATA:  Breathalyzer on admission 0.169.  CBC normal.  Initial potassium level 3.1.  Creatinine initially 1.42.  ALT 90, AST 96.  Glucose nonfasting was 210 and hemoglobin A1c was 5.9%.  Urinalysis:  6 white blood cells, few bacteria, greater than 1000 glucose, 19 hyaline casts, otherwise unremarkable.  INR within normal limits.  COVID testing negative.     ASSESSMENT:  1.  Alcohol abuse and withdrawal per Dr. Aj.  2.  Hyperglycemia on admission labs.  Hemoglobin A1c indicates patient now has prediabetes.  3.  Acute kidney injury on admission labs, likely prerenal and due to  decreased p.o. intake prior to admission.  Repeat creatinine ordered and in process.  4.  Hypokalemia on admission labs yesterday, again most likely due to decreased p.o. intake prior to admission.  5.  Mild transaminitis, most likely secondary to his heavy alcohol abuse prior to admission and expect to resolve as patient continues to detoxify and abstain from future alcohol use.  Repeat hepatic panel ordered and in process.  6.  Hypertension, stable with current blood pressures elevated, most likely secondary to alcohol withdrawal and anxiety.  7.  Chronic knee pain, stable.     PLAN:  Will follow up on results of repeat comprehensive metabolic panel this morning.  Informed the patient he has prediabetes, of which he was instructed to work on his diet and weight loss for resolution, and he was told to follow up with his family physician after discharge in three months for repeat hemoglobin A1c.  Blood pressures will be monitored closely.  No further medical intervention indicated at this time.  The patient is medically stable and if repeat comprehensive medical panel reveals a normal potassium and improved creatinine, Medicine signing off.  Please feel free to call if questions.     Thank you for this consultation.    Contacted by RN because patient possibly being discharged home today but blood pressure elevated. Patient is asymptomatic.Patient reported blood pressure well controlled prior to admission.   Chart reviewed. Patient with labile blood pressure readings in last 72 hours likely associated with alcohol detox. BP ranges from 108/72 to 172/102.      Plan:  Cont PTA lisinopril 20 mg daily. Give additional 10mg today due to recheck SBP still over 170. Do not want to increase PTA lisinopril  dose since blood pressure reportedly well controlled at home and blood pressure readings lower yesterday. Also likely elevated in the setting of recent alcohol detox. Added discharge instructions to have patient monitor      Pt was seen by cm  As per recommendations from cm             Labs:reviewed with patient     No results found for this or any previous visit (from the past 48 hour(s)).      Recent Results (from the past 240 hour(s))   Alcohol breath test POCT    Collection Time: 03/23/22  1:48 PM   Result Value Ref Range    Alcohol Breath Test 0.169 (A) 0.00 - 0.01   Asymptomatic COVID-19 Virus (Coronavirus) by PCR Nasopharyngeal    Collection Time: 03/23/22  2:29 PM    Specimen: Nasopharyngeal; Swab   Result Value Ref Range    SARS CoV2 PCR Negative Negative   Partial thromboplastin time    Collection Time: 03/23/22  2:37 PM   Result Value Ref Range    aPTT 24 22 - 38 Seconds   INR    Collection Time: 03/23/22  2:37 PM   Result Value Ref Range    INR 0.97 0.86 - 1.14   Comprehensive metabolic panel    Collection Time: 03/23/22  2:37 PM   Result Value Ref Range    Sodium 142 133 - 144 mmol/L    Potassium 3.1 (L) 3.4 - 5.3 mmol/L    Chloride 102 94 - 109 mmol/L    Carbon Dioxide (CO2) 27 20 - 32 mmol/L    Anion Gap 13 3 - 14 mmol/L    Urea Nitrogen 12 7 - 30 mg/dL    Creatinine 1.42 (H) 0.66 - 1.25 mg/dL    Calcium 8.8 8.5 - 10.1 mg/dL    Glucose 210 (H) 70 - 99 mg/dL    Alkaline Phosphatase 85 40 - 150 U/L    AST 96 (H) 0 - 45 U/L    ALT 90 (H) 0 - 70 U/L    Protein Total 7.1 6.8 - 8.8 g/dL    Albumin 3.7 3.4 - 5.0 g/dL    Bilirubin Total 1.0 0.2 - 1.3 mg/dL    GFR Estimate 55 (L) >60 mL/min/1.73m2   Magnesium    Collection Time: 03/23/22  2:37 PM   Result Value Ref Range    Magnesium 2.1 1.6 - 2.3 mg/dL   CBC with platelets and differential    Collection Time: 03/23/22  2:37 PM   Result Value Ref Range    WBC Count 5.4 4.0 - 11.0 10e3/uL    RBC Count 4.56 4.40 - 5.90 10e6/uL    Hemoglobin 14.4 13.3 - 17.7 g/dL    Hematocrit 42.2 40.0 - 53.0 %    MCV 93 78 - 100 fL    MCH 31.6 26.5 - 33.0 pg    MCHC 34.1 31.5 - 36.5 g/dL    RDW 13.7 10.0 - 15.0 %    Platelet Count 157 150 - 450 10e3/uL    % Neutrophils 67 %    % Lymphocytes 20  %    % Monocytes 7 %    % Eosinophils 3 %    % Basophils 2 %    % Immature Granulocytes 1 %    NRBCs per 100 WBC 0 <1 /100    Absolute Neutrophils 3.7 1.6 - 8.3 10e3/uL    Absolute Lymphocytes 1.1 0.8 - 5.3 10e3/uL    Absolute Monocytes 0.4 0.0 - 1.3 10e3/uL    Absolute Eosinophils 0.1 0.0 - 0.7 10e3/uL    Absolute Basophils 0.1 0.0 - 0.2 10e3/uL    Absolute Immature Granulocytes 0.1 <=0.4 10e3/uL    Absolute NRBCs 0.0 10e3/uL   Hemoglobin A1c    Collection Time: 03/23/22  2:37 PM   Result Value Ref Range    Hemoglobin A1C 5.9 (H) 0.0 - 5.6 %   Extra Red Top Tube    Collection Time: 03/23/22  2:38 PM   Result Value Ref Range    Hold Specimen JI    UA with Microscopic reflex to Culture    Collection Time: 03/23/22  6:18 PM    Specimen: Urine, Clean Catch   Result Value Ref Range    Color Urine Light Yellow Colorless, Straw, Light Yellow, Yellow    Appearance Urine Clear Clear    Glucose Urine >=1000 (A) Negative mg/dL    Bilirubin Urine Negative Negative    Ketones Urine Trace (A) Negative mg/dL    Specific Gravity Urine 1.014 1.003 - 1.035    Blood Urine Negative Negative    pH Urine 6.0 5.0 - 7.0    Protein Albumin Urine 20  (A) Negative mg/dL    Urobilinogen Urine Normal Normal, 2.0 mg/dL    Nitrite Urine Negative Negative    Leukocyte Esterase Urine Negative Negative    Bacteria Urine Few (A) None Seen /HPF    Mucus Urine Present (A) None Seen /LPF    RBC Urine 1 <=2 /HPF    WBC Urine 6 (H) <=5 /HPF    Squamous Epithelials Urine 1 <=1 /HPF    Hyaline Casts Urine 19 (H) <=2 /LPF    Granular Casts Urine 1 (H) None Seen /LPF   Comprehensive metabolic panel    Collection Time: 03/24/22 10:48 AM   Result Value Ref Range    Sodium 139 133 - 144 mmol/L    Potassium 3.6 3.4 - 5.3 mmol/L    Chloride 103 94 - 109 mmol/L    Carbon Dioxide (CO2) 31 20 - 32 mmol/L    Anion Gap 5 3 - 14 mmol/L    Urea Nitrogen 18 7 - 30 mg/dL    Creatinine 1.06 0.66 - 1.25 mg/dL    Calcium 8.3 (L) 8.5 - 10.1 mg/dL    Glucose 189 (H) 70 - 99  mg/dL    Alkaline Phosphatase 68 40 - 150 U/L    AST 43 0 - 45 U/L    ALT 62 0 - 70 U/L    Protein Total 5.7 (L) 6.8 - 8.8 g/dL    Albumin 2.8 (L) 3.4 - 5.0 g/dL    Bilirubin Total 1.0 0.2 - 1.3 mg/dL    GFR Estimate 78 >60 mL/min/1.73m2   EKG 12-lead, complete    Collection Time: 03/24/22 12:40 PM   Result Value Ref Range    Systolic Blood Pressure  mmHg    Diastolic Blood Pressure  mmHg    Ventricular Rate 69 BPM    Atrial Rate 69 BPM    UT Interval 156 ms    QRS Duration 140 ms     ms    QTc 469 ms    P Axis 44 degrees    R AXIS 5 degrees    T Axis 208 degrees    Interpretation ECG       Sinus rhythm  Non-specific intra-ventricular conduction block  T wave abnormality, consider inferolateral ischemia  Abnormal ECG  When compared with ECG of 30-JUL-2021 09:02,  T wave inversion now evident in Inferior leads  Confirmed by Sundar Dao ([id number]) on 3/25/2022 3:30:33 PM              Because this patient meets criteria for an Alcohol Use Disorder, I performed the following brief intervention on the date of this note:              1) Expressed concern that the patient is drinking at unhealthy levels known to increase their risk of alcohol related problems              2) Gave feedback linking alcohol use and health, including personalized feedback explaining how alcohol use can interact with their medical and/or psychiatric problems, and with prescribed medications.              3) Advised patient to abstain.    PT counseled on nicotine cessation and nicotine replacement provided    Discussed with patient many issues of addiction,triggers, relapse, and establishing a solid recovery program.    DISCHARGE MENTAL STATUS EXAMINATION:  The patient is alert, oriented x3.  Good fund of knowledge.  Good use of language.  Recent and remote memory, language, fund of knowledge are all adequate.  Euthymic mood congruent affect  Speech normal rate/rhythm linear tp no loose asso,The patient does not have any active suicidal or  homicidal ideation.  Does not have any auditory or visual hallucination.  Fair insight/judgment At this time, the patient was stable to be discharged.        Pt was not determined to not be a danger to himself or others. At the current time of discharge, the patient does not meet criteria for involuntary hospitalization. On the day of discharge, the patient reports that they do not have suicidal or homicidal ideation and would never hurt themselves or others. Steps taken to minimize risk include: assessing patient s behavior and thought process daily during hospital stay, discharging patient with adequate plan for follow up for mental and physical health and discussing safety plan of returning to the hospital should the patient ever have thoughts of harming themselves or others. Therefore, based on all available evidence including the factors cited above, the patient does not appear to be at imminent risk for self-harm, and is appropriate for outpatient level of care.     Educated about side effects/risk vs benefits /alternative including non treatment.Pt consented to be on medication.     .Total time spent on discharge summary more than 35 min  More than  20 min  planning, coordination of care, medication reconciliation and performance of physical exam on day of discharge.Care was coordinated with unit RN and unit therapist         Review of your medicines      START taking      Dose / Directions   busPIRone 15 MG tablet  Commonly known as: BUSPAR  Used for: Alcohol withdrawal with complication with inpatient treatment, with unspecified complication (H)      Dose: 15 mg  Take 1 tablet (15 mg) by mouth 2 times daily  Quantity: 60 tablet  Refills: 0     folic acid 1 MG tablet  Commonly known as: FOLVITE  Used for: Alcohol withdrawal with complication with inpatient treatment, with unspecified complication (H)      Dose: 1 mg  Take 1 tablet (1 mg) by mouth daily  Quantity: 30 tablet  Refills: 0     hydrOXYzine 50 MG  tablet  Commonly known as: ATARAX  Used for: Alcohol withdrawal with complication with inpatient treatment, with unspecified complication (H)  Replaces: hydrOXYzine 50 MG capsule      Dose:  mg  Take 1-2 tablets ( mg) by mouth 3 times daily as needed for itching or anxiety  Quantity: 60 tablet  Refills: 0     multivitamin w/minerals tablet  Used for: Alcohol withdrawal with complication with inpatient treatment, with unspecified complication (H)      Dose: 1 tablet  Take 1 tablet by mouth daily  Quantity: 30 tablet  Refills: 0     naltrexone 50 MG tablet  Commonly known as: DEPADE/REVIA  Used for: Alcohol withdrawal with complication with inpatient treatment, with unspecified complication (H)      Dose: 50 mg  Take 1 tablet (50 mg) by mouth daily  Quantity: 30 tablet  Refills: 0     nicotine 14 MG/24HR 24 hr patch  Commonly known as: NICODERM CQ  Used for: Alcohol withdrawal with complication with inpatient treatment, with unspecified complication (H)      Dose: 1 patch  Place 1 patch onto the skin daily  Quantity: 30 patch  Refills: 0     nicotine 2 MG gum  Commonly known as: NICORETTE  Used for: Alcohol withdrawal with complication with inpatient treatment, with unspecified complication (H)      Dose: 2 mg  Place 1 each (2 mg) inside cheek every hour as needed for smoking cessation  Quantity: 30 each  Refills: 0     thiamine 100 MG tablet  Commonly known as: B-1  Used for: Alcohol withdrawal with complication with inpatient treatment, with unspecified complication (H)      Dose: 100 mg  Take 1 tablet (100 mg) by mouth daily  Quantity: 30 tablet  Refills: 0     traZODone 50 MG tablet  Commonly known as: DESYREL  Used for: Alcohol withdrawal with complication with inpatient treatment, with unspecified complication (H)      Dose: 50 mg  Take 1 tablet (50 mg) by mouth nightly as needed for sleep (may repeat after 60 minutes)  Quantity: 30 tablet  Refills: 0        CONTINUE these medicines which may have  CHANGED, or have new prescriptions. If we are uncertain of the size of tablets/capsules you have at home, strength may be listed as something that might have changed.      Dose / Directions   citalopram 20 MG tablet  Commonly known as: celeXA  This may have changed: See the new instructions.  Used for: Alcohol withdrawal with complication with inpatient treatment, with unspecified complication (H)      Dose: 20 mg  Take 1 tablet (20 mg) by mouth daily  Quantity: 30 tablet  Refills: 0        CONTINUE these medicines which have NOT CHANGED      Dose / Directions   lisinopril 20 MG tablet  Commonly known as: ZESTRIL  Used for: Essential hypertension      Dose: 20 mg  Take 1 tablet (20 mg) by mouth daily  Quantity: 90 tablet  Refills: 0     VITAMIN B + C COMPLEX PO      Dose: 1 tablet  Take 1 tablet by mouth daily  Refills: 0        STOP taking    hydrOXYzine 50 MG capsule  Commonly known as: VISTARIL  Replaced by: hydrOXYzine 50 MG tablet              Where to get your medicines      These medications were sent to [facility] [address]  [address] [address]    Phone: [phone]     busPIRone 15 MG tablet    citalopram 20 MG tablet    folic acid 1 MG tablet    hydrOXYzine 50 MG tablet    lisinopril 20 MG tablet    multivitamin w/minerals tablet    naltrexone 50 MG tablet    nicotine 14 MG/24HR 24 hr patch    nicotine 2 MG gum    thiamine 100 MG tablet    traZODone 50 MG tablet          Disposition: Patient going home     Facts about COVID19 at www.cdc.gov/COVID19 and www.[place].gov/covid19     Keeping hands clean is one of the most important steps we can take to avoid getting sick and spreading germs to others.  Please wash your hands frequently and lather with soap for at least 20 seconds!     Medical Follow-Up:Go    Apr13 ED/Hospital Follow Up 9:15 AM   Rohit Solitario MD  [address]  "[place] [address]   [phone]  Please plan to arrive at the clinic at your \"Arrive By\" time for your appointment. Our late policy will be enforced based on this time.       What's Next    What's Next   Apr13 ED/Hospital Follow Up with Rohit Solitario MD  Wednesday Apr 13, 2022 9:15 AM  Please plan to arrive at the clinic at your \"Arrive By\" time for your appointment. Our late policy will be enforced based on this time.   [address] [address]  [phone]                Treatment Follow-Up:Outpatient Treatment, Individual Therapy  .       Treatment Follow-Up:  [facility]  Susanna Osborne: [phone]  Please call Susanna at the above number to get scheduled for intake to program      \"Much or all of the text in this note was generated through the use of Dragon Dictate voice to text software. Errors in spelling or words which appear to be out of contact are unintentional, may be present due having escaped editing\"     "

## 2022-03-28 NOTE — PLAN OF CARE
Problem: Plan of Care - These are the overarching goals to be used throughout the patient stay.    Goal: Optimal Comfort and Wellbeing  Outcome: Ongoing, Progressing     Behavioral  Pt appeared sleeping comfortably overnight; breathing was even and unlabored.      Medical  Pt out of detox from alcohol.   No new medical concerns noted.

## 2022-03-29 ENCOUNTER — TELEPHONE (OUTPATIENT)
Dept: BEHAVIORAL HEALTH | Facility: CLINIC | Age: 62
End: 2022-03-29
Payer: COMMERCIAL

## 2022-04-11 DIAGNOSIS — F10.939 ALCOHOL WITHDRAWAL WITH COMPLICATION WITH INPATIENT TREATMENT, WITH UNSPECIFIED COMPLICATION (H): ICD-10-CM

## 2022-04-11 DIAGNOSIS — F10.288 ALCOHOL DEPENDENCE WITH OTHER ALCOHOL-INDUCED DISORDER (H): ICD-10-CM

## 2022-04-13 ENCOUNTER — OFFICE VISIT (OUTPATIENT)
Dept: INTERNAL MEDICINE | Facility: CLINIC | Age: 62
End: 2022-04-13
Payer: COMMERCIAL

## 2022-04-13 VITALS
TEMPERATURE: 97.5 F | BODY MASS INDEX: 30.17 KG/M2 | WEIGHT: 215.5 LBS | DIASTOLIC BLOOD PRESSURE: 78 MMHG | SYSTOLIC BLOOD PRESSURE: 114 MMHG | HEIGHT: 71 IN | OXYGEN SATURATION: 97 % | HEART RATE: 73 BPM

## 2022-04-13 DIAGNOSIS — E87.6 HYPOKALEMIA: ICD-10-CM

## 2022-04-13 DIAGNOSIS — I10 ESSENTIAL HYPERTENSION: Primary | ICD-10-CM

## 2022-04-13 DIAGNOSIS — R79.89 ABNORMAL LFTS: ICD-10-CM

## 2022-04-13 DIAGNOSIS — F41.1 GENERALIZED ANXIETY DISORDER: ICD-10-CM

## 2022-04-13 DIAGNOSIS — F10.21 ALCOHOL DEPENDENCE IN REMISSION (H): ICD-10-CM

## 2022-04-13 LAB
ALBUMIN SERPL-MCNC: 3.8 G/DL (ref 3.4–5)
ALP SERPL-CCNC: 115 U/L (ref 40–150)
ALT SERPL W P-5'-P-CCNC: 65 U/L (ref 0–70)
ANION GAP SERPL CALCULATED.3IONS-SCNC: 3 MMOL/L (ref 3–14)
AST SERPL W P-5'-P-CCNC: 30 U/L (ref 0–45)
BILIRUB SERPL-MCNC: 0.5 MG/DL (ref 0.2–1.3)
BUN SERPL-MCNC: 10 MG/DL (ref 7–30)
CALCIUM SERPL-MCNC: 9.4 MG/DL (ref 8.5–10.1)
CHLORIDE BLD-SCNC: 109 MMOL/L (ref 94–109)
CO2 SERPL-SCNC: 27 MMOL/L (ref 20–32)
CREAT SERPL-MCNC: 0.95 MG/DL (ref 0.66–1.25)
GFR SERPL CREATININE-BSD FRML MDRD: 89 ML/MIN/1.73M2
GLUCOSE BLD-MCNC: 136 MG/DL (ref 70–99)
POTASSIUM BLD-SCNC: 4.3 MMOL/L (ref 3.4–5.3)
PROT SERPL-MCNC: 7.4 G/DL (ref 6.8–8.8)
SODIUM SERPL-SCNC: 139 MMOL/L (ref 133–144)

## 2022-04-13 PROCEDURE — 99214 OFFICE O/P EST MOD 30 MIN: CPT | Performed by: INTERNAL MEDICINE

## 2022-04-13 PROCEDURE — 80053 COMPREHEN METABOLIC PANEL: CPT | Performed by: INTERNAL MEDICINE

## 2022-04-13 PROCEDURE — 36415 COLL VENOUS BLD VENIPUNCTURE: CPT | Performed by: INTERNAL MEDICINE

## 2022-04-13 RX ORDER — NALTREXONE HYDROCHLORIDE 50 MG/1
50 TABLET, FILM COATED ORAL DAILY
Qty: 90 TABLET | Refills: 0 | Status: CANCELLED | OUTPATIENT
Start: 2022-04-13

## 2022-04-13 NOTE — PROGRESS NOTES
"  Assessment & Plan     Essential hypertension  Hypokalemia  Alcohol dependence in remission (H)  Abnormal LFTs  -continue meds. Recheck labs  -rec'd cont outpt sobriety support program.  Pt not interested,   F/u BP , mental health 3 mo   - Comprehensive metabolic panel (BMP + Alb, Alk Phos, ALT, AST, Total. Bili, TP); Future  - Comprehensive metabolic panel (BMP + Alb, Alk Phos, ALT, AST, Total. Bili, TP)    Review of the result(s) of each unique test - labs  Ordering of each unique test  Prescription drug management  I spent a total of 35 minutes on the day of the visit.   Time spent doing chart review, history and exam, documentation and further activities per the note       See Patient Instructions    Return in about 3 months (around 7/13/2022) for Follow up visit.    Rohit Solitario MD  [facility] ELMER Calixto is a 62 year old who presents for the following health issues     HPI     Hospital Follow-up Visit:    Hospital/Nursing Home/ Rehab Facility: [facility]  Date of Admission: 03/23/2022  Date of Discharge: 03/28/2022  Reason(s) for Admission: alcohol dependence      Was your hospitalization related to COVID-19? No   Problems taking medications regularly:  None  Medication changes since discharge: None  Problems adhering to non-medication therapy:  None    Summary of hospitalization:  [facility] discharge summary reviewed  Diagnostic Tests/Treatments reviewed.  Follow up needed: none  Other Healthcare Providers Involved in Patient s Care:         None  Update since discharge: improved.       Post Discharge Medication Reconciliation: discharge medications reconciled, continue medications without change.  Plan of care communicated with patient                  Review of Systems         Objective    /78   Pulse 73   Temp 97.5  F (36.4  C) (Temporal)   Ht 1.803 m (5' 11\")   Wt 97.8 kg (215 lb 8 " oz)   SpO2 97%   BMI 30.06 kg/m    Body mass index is 30.06 kg/m .  Physical Exam   GENERAL: alert, no distress and over weight  CV: regular rate and rhythm, normal S1 S2, no S3 or S4, no murmur, click or rub, no peripheral edema and peripheral pulses strong  PSYCH: mentation appears normal, affect normal/bright    Results for orders placed or performed in visit on 04/13/22   Comprehensive metabolic panel (BMP + Alb, Alk Phos, ALT, AST, Total. Bili, TP)     Status: Abnormal   Result Value Ref Range    Sodium 139 133 - 144 mmol/L    Potassium 4.3 3.4 - 5.3 mmol/L    Chloride 109 94 - 109 mmol/L    Carbon Dioxide (CO2) 27 20 - 32 mmol/L    Anion Gap 3 3 - 14 mmol/L    Urea Nitrogen 10 7 - 30 mg/dL    Creatinine 0.95 0.66 - 1.25 mg/dL    Calcium 9.4 8.5 - 10.1 mg/dL    Glucose 136 (H) 70 - 99 mg/dL    Alkaline Phosphatase 115 40 - 150 U/L    AST 30 0 - 45 U/L    ALT 65 0 - 70 U/L    Protein Total 7.4 6.8 - 8.8 g/dL    Albumin 3.8 3.4 - 5.0 g/dL    Bilirubin Total 0.5 0.2 - 1.3 mg/dL    GFR Estimate 89 >60 mL/min/1.73m2

## 2022-04-13 NOTE — NURSING NOTE
"Chief Complaint   Patient presents with     Hospital F/U     /78   Pulse 73   Temp 97.5  F (36.4  C) (Temporal)   Ht 1.803 m (5' 11\")   Wt 97.8 kg (215 lb 8 oz)   SpO2 97%   BMI 30.06 kg/m   Estimated body mass index is 30.06 kg/m  as calculated from the following:    Height as of this encounter: 1.803 m (5' 11\").    Weight as of this encounter: 97.8 kg (215 lb 8 oz).        Health Maintenance due pending provider review:  vaccines    Pt declined today    Sulma Santos CMA  "

## 2022-04-13 NOTE — TELEPHONE ENCOUNTER
Routing refill request to provider for review/approval because:  Drug not active on patient's medication list    Adalid Whitehead RN  [facility] Triage Nurse

## 2022-04-14 RX ORDER — HYDROXYZINE PAMOATE 50 MG/1
CAPSULE ORAL
Qty: 60 CAPSULE | Refills: 0 | OUTPATIENT
Start: 2022-04-14

## 2022-04-14 RX ORDER — HYDROXYZINE HYDROCHLORIDE 50 MG/1
50-100 TABLET, FILM COATED ORAL 3 TIMES DAILY PRN
Qty: 60 TABLET | Refills: 5 | Status: SHIPPED | OUTPATIENT
Start: 2022-04-14 | End: 2022-07-28

## 2022-04-18 ENCOUNTER — TELEPHONE (OUTPATIENT)
Dept: BEHAVIORAL HEALTH | Facility: CLINIC | Age: 62
End: 2022-04-18
Payer: COMMERCIAL

## 2022-04-19 DIAGNOSIS — F10.939 ALCOHOL WITHDRAWAL WITH COMPLICATION WITH INPATIENT TREATMENT, WITH UNSPECIFIED COMPLICATION (H): ICD-10-CM

## 2022-04-19 RX ORDER — TRAZODONE HYDROCHLORIDE 50 MG/1
50 TABLET, FILM COATED ORAL
Qty: 30 TABLET | Refills: 0 | Status: SHIPPED | OUTPATIENT
Start: 2022-04-19 | End: 2022-05-19

## 2022-04-27 DIAGNOSIS — F10.939 ALCOHOL WITHDRAWAL WITH COMPLICATION WITH INPATIENT TREATMENT, WITH UNSPECIFIED COMPLICATION (H): ICD-10-CM

## 2022-04-27 RX ORDER — LANOLIN ALCOHOL/MO/W.PET/CERES
100 CREAM (GRAM) TOPICAL DAILY
Qty: 90 TABLET | Refills: 0 | OUTPATIENT
Start: 2022-04-27

## 2022-04-27 RX ORDER — BUSPIRONE HYDROCHLORIDE 15 MG/1
15 TABLET ORAL 2 TIMES DAILY
Qty: 180 TABLET | Refills: 0 | Status: ON HOLD | OUTPATIENT
Start: 2022-04-27 | End: 2022-08-29

## 2022-04-27 RX ORDER — NICOTINE 21 MG/24HR
1 PATCH, TRANSDERMAL 24 HOURS TRANSDERMAL DAILY
Qty: 30 PATCH | Refills: 1 | Status: ON HOLD | OUTPATIENT
Start: 2022-04-27 | End: 2022-08-29

## 2022-04-27 RX ORDER — CITALOPRAM HYDROBROMIDE 20 MG/1
20 TABLET ORAL DAILY
Qty: 90 TABLET | Refills: 0 | Status: ON HOLD | OUTPATIENT
Start: 2022-04-27 | End: 2022-08-29

## 2022-04-27 RX ORDER — FOLIC ACID 1 MG/1
1 TABLET ORAL DAILY
Qty: 90 TABLET | Refills: 0 | OUTPATIENT
Start: 2022-04-27

## 2022-04-27 RX ORDER — LISINOPRIL 20 MG/1
20 TABLET ORAL DAILY
Qty: 90 TABLET | Refills: 0 | Status: ON HOLD | OUTPATIENT
Start: 2022-04-27 | End: 2022-08-29

## 2022-04-27 RX ORDER — NALTREXONE HYDROCHLORIDE 50 MG/1
50 TABLET, FILM COATED ORAL DAILY
Qty: 90 TABLET | Refills: 0 | Status: ON HOLD | OUTPATIENT
Start: 2022-04-27 | End: 2022-08-29

## 2022-04-27 RX ORDER — MULTIPLE VITAMINS W/ MINERALS TAB 9MG-400MCG
1 TAB ORAL DAILY
Qty: 90 TABLET | Refills: 0 | OUTPATIENT
Start: 2022-04-27

## 2022-04-27 NOTE — TELEPHONE ENCOUNTER
Routing refill request to provider for review/approval because:  Previously prescribed by MD in hospital routing to provider for review.

## 2022-05-16 DIAGNOSIS — F10.939 ALCOHOL WITHDRAWAL WITH COMPLICATION WITH INPATIENT TREATMENT, WITH UNSPECIFIED COMPLICATION (H): ICD-10-CM

## 2022-05-18 NOTE — TELEPHONE ENCOUNTER
Routing refill request to provider for review/approval because:  Drug interaction warning    Adalid Whitehead RN  [facility] Triage Nurse

## 2022-05-19 RX ORDER — TRAZODONE HYDROCHLORIDE 50 MG/1
50 TABLET, FILM COATED ORAL
Qty: 30 TABLET | Refills: 0 | Status: SHIPPED | OUTPATIENT
Start: 2022-05-19 | End: 2022-06-24

## 2022-06-22 DIAGNOSIS — F10.939 ALCOHOL WITHDRAWAL WITH COMPLICATION WITH INPATIENT TREATMENT, WITH UNSPECIFIED COMPLICATION (H): ICD-10-CM

## 2022-06-23 NOTE — TELEPHONE ENCOUNTER
Routing refill request to provider for review/approval because:  Drug not active on patient's medication list    Adalid Whitehead RN  [facility] [place] [facility] Triage Nurse

## 2022-06-24 RX ORDER — TRAZODONE HYDROCHLORIDE 50 MG/1
50-100 TABLET, FILM COATED ORAL
Qty: 60 TABLET | Refills: 0 | Status: ON HOLD | OUTPATIENT
Start: 2022-06-24 | End: 2022-08-29

## 2022-08-26 ENCOUNTER — HOSPITAL ENCOUNTER (INPATIENT)
Facility: CLINIC | Age: 62
LOS: 2 days | Discharge: HOME | End: 2022-08-29
Attending: EMERGENCY MEDICINE | Admitting: PSYCHIATRY & NEUROLOGY
Payer: COMMERCIAL

## 2022-08-26 DIAGNOSIS — F10.939 ALCOHOL WITHDRAWAL WITH COMPLICATION WITH INPATIENT TREATMENT, WITH UNSPECIFIED COMPLICATION (H): ICD-10-CM

## 2022-08-26 DIAGNOSIS — I10 ESSENTIAL HYPERTENSION: ICD-10-CM

## 2022-08-26 DIAGNOSIS — F10.229 ALCOHOL DEPENDENCE WITH INTOXICATION WITH COMPLICATION (H): ICD-10-CM

## 2022-08-26 DIAGNOSIS — F10.20 UNCOMPLICATED ALCOHOL DEPENDENCE (H): Primary | ICD-10-CM

## 2022-08-26 DIAGNOSIS — Z20.822 COVID-19 RULED OUT BY LABORATORY TESTING: ICD-10-CM

## 2022-08-26 LAB
ALBUMIN SERPL-MCNC: 3.7 G/DL (ref 3.4–5)
ALCOHOL BREATH TEST: 0.28 (ref 0–0.01)
ALP SERPL-CCNC: 77 U/L (ref 40–150)
ALT SERPL W P-5'-P-CCNC: 84 U/L (ref 0–70)
AMPHETAMINES UR QL SCN: NORMAL
ANION GAP SERPL CALCULATED.3IONS-SCNC: 10 MMOL/L (ref 3–14)
AST SERPL W P-5'-P-CCNC: 72 U/L (ref 0–45)
BARBITURATES UR QL: NORMAL
BASOPHILS # BLD AUTO: 0.1 10E3/UL (ref 0–0.2)
BASOPHILS NFR BLD AUTO: 2 %
BENZODIAZ UR QL: NORMAL
BILIRUB SERPL-MCNC: 0.6 MG/DL (ref 0.2–1.3)
BUN SERPL-MCNC: 18 MG/DL (ref 7–30)
CALCIUM SERPL-MCNC: 8.7 MG/DL (ref 8.5–10.1)
CANNABINOIDS UR QL SCN: NORMAL
CHLORIDE BLD-SCNC: 106 MMOL/L (ref 94–109)
CO2 SERPL-SCNC: 26 MMOL/L (ref 20–32)
COCAINE UR QL: NORMAL
CREAT SERPL-MCNC: 1.29 MG/DL (ref 0.66–1.25)
EOSINOPHIL # BLD AUTO: 0.2 10E3/UL (ref 0–0.7)
EOSINOPHIL NFR BLD AUTO: 5 %
ERYTHROCYTE [DISTWIDTH] IN BLOOD BY AUTOMATED COUNT: 12.9 % (ref 10–15)
GFR SERPL CREATININE-BSD FRML MDRD: 62 ML/MIN/1.73M2
GLUCOSE BLD-MCNC: 176 MG/DL (ref 70–99)
HCT VFR BLD AUTO: 34.5 % (ref 40–53)
HGB BLD-MCNC: 11.4 G/DL (ref 13.3–17.7)
IMM GRANULOCYTES # BLD: 0 10E3/UL
IMM GRANULOCYTES NFR BLD: 1 %
LYMPHOCYTES # BLD AUTO: 1.4 10E3/UL (ref 0.8–5.3)
LYMPHOCYTES NFR BLD AUTO: 32 %
MCH RBC QN AUTO: 28.5 PG (ref 26.5–33)
MCHC RBC AUTO-ENTMCNC: 33 G/DL (ref 31.5–36.5)
MCV RBC AUTO: 86 FL (ref 78–100)
MONOCYTES # BLD AUTO: 0.4 10E3/UL (ref 0–1.3)
MONOCYTES NFR BLD AUTO: 8 %
NEUTROPHILS # BLD AUTO: 2.2 10E3/UL (ref 1.6–8.3)
NEUTROPHILS NFR BLD AUTO: 52 %
NRBC # BLD AUTO: 0 10E3/UL
NRBC BLD AUTO-RTO: 0 /100
OPIATES UR QL SCN: NORMAL
PLATELET # BLD AUTO: 149 10E3/UL (ref 150–450)
POTASSIUM BLD-SCNC: 3.6 MMOL/L (ref 3.4–5.3)
PROT SERPL-MCNC: 6.9 G/DL (ref 6.8–8.8)
RBC # BLD AUTO: 4 10E6/UL (ref 4.4–5.9)
SARS-COV-2 RNA RESP QL NAA+PROBE: NEGATIVE
SODIUM SERPL-SCNC: 142 MMOL/L (ref 133–144)
WBC # BLD AUTO: 4.2 10E3/UL (ref 4–11)

## 2022-08-26 PROCEDURE — 250N000013 HC RX MED GY IP 250 OP 250 PS 637: Performed by: EMERGENCY MEDICINE

## 2022-08-26 PROCEDURE — 85025 COMPLETE CBC W/AUTO DIFF WBC: CPT | Performed by: EMERGENCY MEDICINE

## 2022-08-26 PROCEDURE — 99285 EMERGENCY DEPT VISIT HI MDM: CPT | Mod: 25 | Performed by: EMERGENCY MEDICINE

## 2022-08-26 PROCEDURE — 99285 EMERGENCY DEPT VISIT HI MDM: CPT | Performed by: EMERGENCY MEDICINE

## 2022-08-26 PROCEDURE — C9803 HOPD COVID-19 SPEC COLLECT: HCPCS | Performed by: EMERGENCY MEDICINE

## 2022-08-26 PROCEDURE — U0003 INFECTIOUS AGENT DETECTION BY NUCLEIC ACID (DNA OR RNA); SEVERE ACUTE RESPIRATORY SYNDROME CORONAVIRUS 2 (SARS-COV-2) (CORONAVIRUS DISEASE [COVID-19]), AMPLIFIED PROBE TECHNIQUE, MAKING USE OF HIGH THROUGHPUT TECHNOLOGIES AS DESCRIBED BY CMS-2020-01-R: HCPCS | Performed by: EMERGENCY MEDICINE

## 2022-08-26 PROCEDURE — 82075 ASSAY OF BREATH ETHANOL: CPT | Performed by: EMERGENCY MEDICINE

## 2022-08-26 PROCEDURE — 36415 COLL VENOUS BLD VENIPUNCTURE: CPT | Performed by: EMERGENCY MEDICINE

## 2022-08-26 PROCEDURE — 80307 DRUG TEST PRSMV CHEM ANLYZR: CPT | Performed by: EMERGENCY MEDICINE

## 2022-08-26 PROCEDURE — 80051 ELECTROLYTE PANEL: CPT | Performed by: EMERGENCY MEDICINE

## 2022-08-26 RX ORDER — DIAZEPAM 5 MG
5-20 TABLET ORAL EVERY 30 MIN PRN
Status: DISCONTINUED | OUTPATIENT
Start: 2022-08-26 | End: 2022-08-27

## 2022-08-26 RX ORDER — MULTIPLE VITAMINS W/ MINERALS TAB 9MG-400MCG
1 TAB ORAL DAILY
Status: DISCONTINUED | OUTPATIENT
Start: 2022-08-26 | End: 2022-08-27

## 2022-08-26 RX ORDER — FOLIC ACID 1 MG/1
1 TABLET ORAL DAILY
Status: DISCONTINUED | OUTPATIENT
Start: 2022-08-26 | End: 2022-08-27

## 2022-08-26 RX ADMIN — MULTIPLE VITAMINS W/ MINERALS TAB 1 TABLET: TAB at 20:22

## 2022-08-26 RX ADMIN — DIAZEPAM 10 MG: 5 TABLET ORAL at 23:46

## 2022-08-26 RX ADMIN — THIAMINE HCL TAB 100 MG 100 MG: 100 TAB at 20:22

## 2022-08-26 RX ADMIN — FOLIC ACID 1 MG: 1 TABLET ORAL at 20:21

## 2022-08-26 ASSESSMENT — ACTIVITIES OF DAILY LIVING (ADL)
ADLS_ACUITY_SCORE: 35
ADLS_ACUITY_SCORE: 33
ADLS_ACUITY_SCORE: 35

## 2022-08-26 ASSESSMENT — ENCOUNTER SYMPTOMS
FEVER: 0
SHORTNESS OF BREATH: 0
COUGH: 0
ABDOMINAL PAIN: 0

## 2022-08-26 NOTE — ED PROVIDER NOTES
[facility] [place] ([facility])     August 26, 2022     History     Chief Complaint   Patient presents with     Alcohol Intoxication     Detox: etoh: 1 quart daily: last drink an hour ago: no hx of seizure     HPI  Durga Aguirre is a 62 year old male with a past medical history including alcohol dependence, alcohol withdrawal with complication, essential HTN, hypertriglyceridemia who presents to the Emergency Department for detox.  The patient states that he is here today because he wants to get sober.  He reports drinking a quart of EtOH a day since March when he was last here.  He states that he lost a friend which caused him to start drinking again, and that he cannot sustain a normal morning without drinking.  He states that he does get withdrawal symptoms of sweats, shakiness, and heightened anxiety, but denies a history of withdrawal seizure or DTs.  Reports drinking a quart a day.  No other drug use.  He states that he takes blood pressure medications.  Denies chest pain, shortness of breath, coughing, fever, or abdominal pain.  Last drink was about an hour ago.      Past Medical History  Past Medical History:   Diagnosis Date     Alcohol abuse      Anxiety and depression      Arthritis      Hyperlipidemia      Hypertension      Mass of left chest wall      Other spontaneous pneumothorax 1997     Past Surgical History:   Procedure Laterality Date     COLONOSCOPY       HC EXCISION PARTIAL TALUS OR CALCANEUS, BONE      fx calcaneus- 9 screws in foot     B Complex-C (VITAMIN B + C COMPLEX PO)  busPIRone (BUSPAR) 15 MG tablet  citalopram (CELEXA) 20 MG tablet  hydrOXYzine (ATARAX) 50 MG tablet  lisinopril (ZESTRIL) 20 MG tablet  nicotine (NICODERM CQ) 14 MG/24HR 24 hr patch  nicotine (NICORETTE) 2 MG gum  traZODone (DESYREL) 50 MG tablet  naltrexone (DEPADE/REVIA) 50 MG tablet      No Known Allergies  Family History  Family History   Problem Relation Age of Onset     No Known Problems  Mother      Lymphoma Father      No Known Problems Maternal Grandmother      No Known Problems Maternal Grandfather      No Known Problems Paternal Grandmother      No Known Problems Paternal Grandfather      No Known Problems Sister      No Known Problems Son      No Known Problems Brother      No Known Problems Daughter      No Known Problems Other      Unknown/Adopted No family hx of      Depression No family hx of      Anxiety Disorder No family hx of      Schizophrenia No family hx of      Bipolar Disorder No family hx of      Suicide No family hx of      Substance Abuse No family hx of      Dementia No family hx of      Nichols Disease No family hx of      Parkinsonism No family hx of      Autism Spectrum Disorder No family hx of      Intellectual Disability (Mental Retardation) No family hx of      Mental Illness No family hx of      Social History   Social History     Tobacco Use     Smoking status: Current Every Day Smoker     Packs/day: 0.25     Years: 20.00     Pack years: 5.00     Types: Cigarettes     Smokeless tobacco: Never Used     Tobacco comment: 2-3 cigarettes per day    Substance Use Topics     Alcohol use: Yes     Comment: daily use 1 L vodka     Drug use: Not Currently      Past medical history, past surgical history, medications, allergies, family history, and social history were reviewed with the patient. No additional pertinent items.       Review of Systems   Constitutional: Negative for fever.   Respiratory: Negative for cough and shortness of breath.    Cardiovascular: Negative for chest pain.   Gastrointestinal: Negative for abdominal pain.   All other systems reviewed and are negative.    A complete review of systems was performed with pertinent positives and negatives noted in the HPI, and all other systems negative.    Physical Exam   BP: 91/59  Pulse: 85  Temp: 99.9  F (37.7  C)  Resp: 16  SpO2: 98 %  Physical Exam  General: awake, alert, NAD  Head: normal cephalic  HEENT: pupils  equal, conjugate gaze in tact  Neck: Supple  CV: regular rate and rhythm without murmur  Lungs: clear to ascultation  Abd: soft, non-tender, no guarding, no peritoneal signs  EXT: lower extremities without swelling or edema  Neuro: awake, answers questions appropriately. No focal deficits noted       ED Course     6:39 PM  The patient was seen and examined by Wilfred Traore MD in Room [facility].    Procedures       The medical record was reviewed and interpreted.  Current labs reviewed and interpreted.  Previous labs reviewed and interpreted.              Results for orders placed or performed during the hospital encounter of 08/26/22   Comprehensive metabolic panel     Status: Abnormal   Result Value Ref Range    Sodium 142 133 - 144 mmol/L    Potassium 3.6 3.4 - 5.3 mmol/L    Chloride 106 94 - 109 mmol/L    Carbon Dioxide (CO2) 26 20 - 32 mmol/L    Anion Gap 10 3 - 14 mmol/L    Urea Nitrogen 18 7 - 30 mg/dL    Creatinine 1.29 (H) 0.66 - 1.25 mg/dL    Calcium 8.7 8.5 - 10.1 mg/dL    Glucose 176 (H) 70 - 99 mg/dL    Alkaline Phosphatase 77 40 - 150 U/L    AST 72 (H) 0 - 45 U/L    ALT 84 (H) 0 - 70 U/L    Protein Total 6.9 6.8 - 8.8 g/dL    Albumin 3.7 3.4 - 5.0 g/dL    Bilirubin Total 0.6 0.2 - 1.3 mg/dL    GFR Estimate 62 >60 mL/min/1.73m2   Asymptomatic COVID-19 Virus (Coronavirus) by PCR Nasopharyngeal     Status: Normal    Specimen: Nasopharyngeal; Swab   Result Value Ref Range    SARS CoV2 PCR Negative Negative    Narrative    Testing was performed using the Xpert Xpress SARS-CoV-2 Assay on the   Cepheid Gene-Xpert Instrument Systems. Additional information about   this Emergency Use Authorization (EUA) assay can be found via the Lab   Guide. This test should be ordered for the detection of SARS-CoV-2 in   individuals who meet SARS-CoV-2 clinical and/or epidemiological   criteria. Test performance is unknown in asymptomatic patients. This   test is for in vitro diagnostic use under the FDA EUA for   laboratories  certified under CLIA to perform high complexity testing.   This test has not been FDA cleared or approved. A negative result   does not rule out the presence of PCR inhibitors in the specimen or   target RNA in concentration below the limit of detection for the   assay. The possibility of a false negative should be considered if   the patient's recent exposure or clinical presentation suggests   COVID-19. This test was validated by the [facility]. This laboratory is certified under the Clinical Laboratory Improvement Amendments of 1988 (CLIA-88) as qualified to perform high complexity laboratory testing.     CBC with platelets and differential     Status: Abnormal   Result Value Ref Range    WBC Count 4.2 4.0 - 11.0 10e3/uL    RBC Count 4.00 (L) 4.40 - 5.90 10e6/uL    Hemoglobin 11.4 (L) 13.3 - 17.7 g/dL    Hematocrit 34.5 (L) 40.0 - 53.0 %    MCV 86 78 - 100 fL    MCH 28.5 26.5 - 33.0 pg    MCHC 33.0 31.5 - 36.5 g/dL    RDW 12.9 10.0 - 15.0 %    Platelet Count 149 (L) 150 - 450 10e3/uL    % Neutrophils 52 %    % Lymphocytes 32 %    % Monocytes 8 %    % Eosinophils 5 %    % Basophils 2 %    % Immature Granulocytes 1 %    NRBCs per 100 WBC 0 <1 /100    Absolute Neutrophils 2.2 1.6 - 8.3 10e3/uL    Absolute Lymphocytes 1.4 0.8 - 5.3 10e3/uL    Absolute Monocytes 0.4 0.0 - 1.3 10e3/uL    Absolute Eosinophils 0.2 0.0 - 0.7 10e3/uL    Absolute Basophils 0.1 0.0 - 0.2 10e3/uL    Absolute Immature Granulocytes 0.0 <=0.4 10e3/uL    Absolute NRBCs 0.0 10e3/uL   Alcohol breath test POCT     Status: Abnormal   Result Value Ref Range    Alcohol Breath Test 0.280 (A) 0.00 - 0.01   CBC with platelets differential     Status: Abnormal    Narrative    The following orders were created for panel order CBC with platelets differential.  Procedure                               Abnormality         Status                     ---------                               -----------         ------                      CBC with platelets and d...[[id number]]  Abnormal            Final result                 Please view results for these tests on the individual orders.     Medications   diazepam (VALIUM) tablet 5-20 mg (has no administration in time range)   thiamine (B-1) tablet 100 mg (100 mg Oral Given 8/26/22 2022)   folic acid (FOLVITE) tablet 1 mg (1 mg Oral Given 8/26/22 2021)   multivitamin w/minerals (THERA-VIT-M) tablet 1 tablet (1 tablet Oral Given 8/26/22 2022)        Assessments & Plan (with Medical Decision Making)   Durga Aguirre is a 62 year old male who presents to the emergency department seeking detox from alcohol.  Patient denies any other acute medical complaints currently.  Has an unremarkable physical exam.    Here patient denies any new or acute complaints such as URI symptoms, chest pain, or shortness of breath.  Patient also denies any acute alcohol withdrawal symptoms but last used earlier today.    Plan is to monitor for alcohol withdrawal symptoms, will place on the [facility] protocol.  We will also give thiamine, folate, multivitamin.    Patient was monitored with serial exams  per [facility] protocol.    Medical screening labs were obtained to facilitate admission to the detox unit.  This includes CBC, BMP, covid and drug screen. Patient was noted to have a drop in his hemoglobin from last March, patient denies any signs or symptoms of acute bleeding associated with this.  He was told should follow-up with PCP about this after discharge.      I have reviewed the nursing notes. I have reviewed the findings, diagnosis, plan and need for follow up with the patient.    New Prescriptions    No medications on file       Final diagnoses:   Alcohol dependence with intoxication with complication (H)     IDeepti, am serving as a trained medical scribe to document services personally performed by Wilfred Traore MD, based on the provider's statements to me.   IWilfred MD, was physically present and  have reviewed and verified the accuracy of this note documented by Deepti Mathews.   --    [facility] EMERGENCY DEPARTMENT  8/26/2022     Wilfred Traore MD  08/26/22 2045

## 2022-08-26 NOTE — ED TRIAGE NOTES
Triage Assessment     Row Name 08/26/22 1828       Triage Assessment (Adult)    Airway WDL WDL       Respiratory WDL    Respiratory WDL WDL       Skin Circulation/Temperature WDL    Skin Circulation/Temperature WDL WDL       Cardiac WDL    Cardiac WDL WDL       Peripheral/Neurovascular WDL    Peripheral Neurovascular WDL WDL       Cognitive/Neuro/Behavioral WDL    Cognitive/Neuro/Behavioral WDL WDL

## 2022-08-27 LAB
CHOLEST SERPL-MCNC: 352 MG/DL
FOLATE SERPL-MCNC: 26 NG/ML (ref 4.6–34.8)
GGT SERPL-CCNC: 137 U/L (ref 0–75)
HBA1C MFR BLD: 5.7 % (ref 0–5.6)
HDLC SERPL-MCNC: 70 MG/DL
HOLD SPECIMEN: NORMAL
LDLC SERPL CALC-MCNC: 227 MG/DL
LIPASE SERPL-CCNC: 200 U/L (ref 73–393)
NONHDLC SERPL-MCNC: 282 MG/DL
TRIGL SERPL-MCNC: 276 MG/DL
TSH SERPL DL<=0.005 MIU/L-ACNC: 0.69 MU/L (ref 0.4–4)
VIT B12 SERPL-MCNC: 419 PG/ML (ref 232–1245)

## 2022-08-27 PROCEDURE — 99232 SBSQ HOSP IP/OBS MODERATE 35: CPT

## 2022-08-27 PROCEDURE — 99222 1ST HOSP IP/OBS MODERATE 55: CPT | Mod: AI | Performed by: PSYCHIATRY & NEUROLOGY

## 2022-08-27 PROCEDURE — 80061 LIPID PANEL: CPT | Performed by: PSYCHIATRY & NEUROLOGY

## 2022-08-27 PROCEDURE — 128N000004 HC R&B CD ADULT

## 2022-08-27 PROCEDURE — 83036 HEMOGLOBIN GLYCOSYLATED A1C: CPT

## 2022-08-27 PROCEDURE — 36415 COLL VENOUS BLD VENIPUNCTURE: CPT | Performed by: PSYCHIATRY & NEUROLOGY

## 2022-08-27 PROCEDURE — 82977 ASSAY OF GGT: CPT | Performed by: PSYCHIATRY & NEUROLOGY

## 2022-08-27 PROCEDURE — HZ2ZZZZ DETOXIFICATION SERVICES FOR SUBSTANCE ABUSE TREATMENT: ICD-10-PCS | Performed by: PSYCHIATRY & NEUROLOGY

## 2022-08-27 PROCEDURE — 83690 ASSAY OF LIPASE: CPT

## 2022-08-27 PROCEDURE — 82607 VITAMIN B-12: CPT | Performed by: PSYCHIATRY & NEUROLOGY

## 2022-08-27 PROCEDURE — 250N000013 HC RX MED GY IP 250 OP 250 PS 637: Performed by: PSYCHIATRY & NEUROLOGY

## 2022-08-27 PROCEDURE — 82746 ASSAY OF FOLIC ACID SERUM: CPT | Performed by: PSYCHIATRY & NEUROLOGY

## 2022-08-27 PROCEDURE — 250N000013 HC RX MED GY IP 250 OP 250 PS 637

## 2022-08-27 PROCEDURE — H2032 ACTIVITY THERAPY, PER 15 MIN: HCPCS

## 2022-08-27 PROCEDURE — 250N000011 HC RX IP 250 OP 636: Performed by: PSYCHIATRY & NEUROLOGY

## 2022-08-27 PROCEDURE — 250N000013 HC RX MED GY IP 250 OP 250 PS 637: Performed by: PHYSICIAN ASSISTANT

## 2022-08-27 PROCEDURE — 84443 ASSAY THYROID STIM HORMONE: CPT | Performed by: PSYCHIATRY & NEUROLOGY

## 2022-08-27 RX ORDER — CITALOPRAM HYDROBROMIDE 20 MG/1
20 TABLET ORAL DAILY
Status: DISCONTINUED | OUTPATIENT
Start: 2022-08-27 | End: 2022-08-29 | Stop reason: HOSPADM

## 2022-08-27 RX ORDER — HYDRALAZINE HYDROCHLORIDE 25 MG/1
25 TABLET, FILM COATED ORAL EVERY 6 HOURS PRN
Status: DISCONTINUED | OUTPATIENT
Start: 2022-08-27 | End: 2022-08-29 | Stop reason: HOSPADM

## 2022-08-27 RX ORDER — MAGNESIUM HYDROXIDE/ALUMINUM HYDROXICE/SIMETHICONE 120; 1200; 1200 MG/30ML; MG/30ML; MG/30ML
30 SUSPENSION ORAL EVERY 4 HOURS PRN
Status: DISCONTINUED | OUTPATIENT
Start: 2022-08-27 | End: 2022-08-29 | Stop reason: HOSPADM

## 2022-08-27 RX ORDER — OLANZAPINE 10 MG/2ML
10 INJECTION, POWDER, FOR SOLUTION INTRAMUSCULAR 3 TIMES DAILY PRN
Status: DISCONTINUED | OUTPATIENT
Start: 2022-08-27 | End: 2022-08-29 | Stop reason: HOSPADM

## 2022-08-27 RX ORDER — MULTIPLE VITAMINS W/ MINERALS TAB 9MG-400MCG
1 TAB ORAL DAILY
Status: DISCONTINUED | OUTPATIENT
Start: 2022-08-27 | End: 2022-08-29 | Stop reason: HOSPADM

## 2022-08-27 RX ORDER — ATENOLOL 50 MG/1
50 TABLET ORAL DAILY PRN
Status: DISCONTINUED | OUTPATIENT
Start: 2022-08-27 | End: 2022-08-29 | Stop reason: HOSPADM

## 2022-08-27 RX ORDER — BUSPIRONE HYDROCHLORIDE 15 MG/1
15 TABLET ORAL DAILY
Status: DISCONTINUED | OUTPATIENT
Start: 2022-08-27 | End: 2022-08-29 | Stop reason: HOSPADM

## 2022-08-27 RX ORDER — FOLIC ACID 1 MG/1
1 TABLET ORAL DAILY
Status: DISCONTINUED | OUTPATIENT
Start: 2022-08-27 | End: 2022-08-29 | Stop reason: HOSPADM

## 2022-08-27 RX ORDER — ONDANSETRON 4 MG/1
4 TABLET, ORALLY DISINTEGRATING ORAL EVERY 6 HOURS PRN
Status: DISCONTINUED | OUTPATIENT
Start: 2022-08-27 | End: 2022-08-29 | Stop reason: HOSPADM

## 2022-08-27 RX ORDER — HYDROCHLOROTHIAZIDE 12.5 MG/1
12.5 TABLET ORAL DAILY
Status: DISCONTINUED | OUTPATIENT
Start: 2022-08-27 | End: 2022-08-29 | Stop reason: HOSPADM

## 2022-08-27 RX ORDER — OLANZAPINE 10 MG/1
10 TABLET ORAL 3 TIMES DAILY PRN
Status: DISCONTINUED | OUTPATIENT
Start: 2022-08-27 | End: 2022-08-29 | Stop reason: HOSPADM

## 2022-08-27 RX ORDER — TRAZODONE HYDROCHLORIDE 50 MG/1
50-100 TABLET, FILM COATED ORAL
Status: DISCONTINUED | OUTPATIENT
Start: 2022-08-27 | End: 2022-08-29 | Stop reason: HOSPADM

## 2022-08-27 RX ORDER — LANOLIN ALCOHOL/MO/W.PET/CERES
3 CREAM (GRAM) TOPICAL
Status: DISCONTINUED | OUTPATIENT
Start: 2022-08-27 | End: 2022-08-29 | Stop reason: HOSPADM

## 2022-08-27 RX ORDER — NICOTINE 21 MG/24HR
1 PATCH, TRANSDERMAL 24 HOURS TRANSDERMAL DAILY
Status: DISCONTINUED | OUTPATIENT
Start: 2022-08-27 | End: 2022-08-29 | Stop reason: HOSPADM

## 2022-08-27 RX ORDER — LISINOPRIL 20 MG/1
20 TABLET ORAL DAILY
Status: DISCONTINUED | OUTPATIENT
Start: 2022-08-27 | End: 2022-08-27

## 2022-08-27 RX ORDER — HYDROXYZINE HYDROCHLORIDE 50 MG/1
50-100 TABLET, FILM COATED ORAL 3 TIMES DAILY PRN
Status: DISCONTINUED | OUTPATIENT
Start: 2022-08-27 | End: 2022-08-29 | Stop reason: HOSPADM

## 2022-08-27 RX ORDER — SIMVASTATIN 10 MG
40 TABLET ORAL EVERY EVENING
Status: DISCONTINUED | OUTPATIENT
Start: 2022-08-27 | End: 2022-08-29 | Stop reason: HOSPADM

## 2022-08-27 RX ORDER — LISINOPRIL 10 MG/1
10 TABLET ORAL ONCE
Status: COMPLETED | OUTPATIENT
Start: 2022-08-27 | End: 2022-08-27

## 2022-08-27 RX ORDER — ACETAMINOPHEN 325 MG/1
650 TABLET ORAL EVERY 4 HOURS PRN
Status: DISCONTINUED | OUTPATIENT
Start: 2022-08-27 | End: 2022-08-29 | Stop reason: HOSPADM

## 2022-08-27 RX ORDER — DIAZEPAM 5 MG
5-20 TABLET ORAL EVERY 30 MIN PRN
Status: DISCONTINUED | OUTPATIENT
Start: 2022-08-27 | End: 2022-08-29 | Stop reason: HOSPADM

## 2022-08-27 RX ORDER — PANTOPRAZOLE SODIUM 40 MG/1
40 TABLET, DELAYED RELEASE ORAL
Status: DISCONTINUED | OUTPATIENT
Start: 2022-08-28 | End: 2022-08-29 | Stop reason: HOSPADM

## 2022-08-27 RX ORDER — AMOXICILLIN 250 MG
1 CAPSULE ORAL 2 TIMES DAILY PRN
Status: DISCONTINUED | OUTPATIENT
Start: 2022-08-27 | End: 2022-08-29 | Stop reason: HOSPADM

## 2022-08-27 RX ADMIN — ATENOLOL 50 MG: 50 TABLET ORAL at 16:28

## 2022-08-27 RX ADMIN — DIAZEPAM 10 MG: 5 TABLET ORAL at 10:26

## 2022-08-27 RX ADMIN — TRAZODONE HYDROCHLORIDE 100 MG: 50 TABLET ORAL at 01:05

## 2022-08-27 RX ADMIN — DIAZEPAM 10 MG: 5 TABLET ORAL at 07:54

## 2022-08-27 RX ADMIN — ONDANSETRON 4 MG: 4 TABLET, ORALLY DISINTEGRATING ORAL at 08:46

## 2022-08-27 RX ADMIN — NICOTINE POLACRILEX 2 MG: 2 GUM, CHEWING BUCCAL at 19:10

## 2022-08-27 RX ADMIN — BUSPIRONE HYDROCHLORIDE 15 MG: 15 TABLET ORAL at 07:54

## 2022-08-27 RX ADMIN — HYDRALAZINE HYDROCHLORIDE 25 MG: 25 TABLET ORAL at 17:38

## 2022-08-27 RX ADMIN — HYDROXYZINE HYDROCHLORIDE 100 MG: 50 TABLET, FILM COATED ORAL at 13:16

## 2022-08-27 RX ADMIN — LISINOPRIL 20 MG: 20 TABLET ORAL at 08:14

## 2022-08-27 RX ADMIN — MELATONIN TAB 3 MG 3 MG: 3 TAB at 22:15

## 2022-08-27 RX ADMIN — DIAZEPAM 10 MG: 5 TABLET ORAL at 20:18

## 2022-08-27 RX ADMIN — DIAZEPAM 10 MG: 5 TABLET ORAL at 12:04

## 2022-08-27 RX ADMIN — Medication 12.5 MG: at 08:14

## 2022-08-27 RX ADMIN — SIMVASTATIN 40 MG: 10 TABLET, FILM COATED ORAL at 21:28

## 2022-08-27 RX ADMIN — TRAZODONE HYDROCHLORIDE 100 MG: 50 TABLET ORAL at 21:29

## 2022-08-27 RX ADMIN — NICOTINE 1 PATCH: 14 PATCH, EXTENDED RELEASE TRANSDERMAL at 08:47

## 2022-08-27 RX ADMIN — HYDROXYZINE HYDROCHLORIDE 100 MG: 50 TABLET, FILM COATED ORAL at 17:41

## 2022-08-27 RX ADMIN — HYDROXYZINE HYDROCHLORIDE 100 MG: 50 TABLET, FILM COATED ORAL at 22:15

## 2022-08-27 RX ADMIN — CITALOPRAM HYDROBROMIDE 20 MG: 20 TABLET ORAL at 07:54

## 2022-08-27 RX ADMIN — LISINOPRIL 10 MG: 10 TABLET ORAL at 11:03

## 2022-08-27 RX ADMIN — DIAZEPAM 10 MG: 5 TABLET ORAL at 16:28

## 2022-08-27 RX ADMIN — DIAZEPAM 10 MG: 5 TABLET ORAL at 14:12

## 2022-08-27 RX ADMIN — HYDRALAZINE HYDROCHLORIDE 25 MG: 25 TABLET ORAL at 11:03

## 2022-08-27 RX ADMIN — HYDROCHLOROTHIAZIDE 12.5 MG: 12.5 TABLET ORAL at 12:03

## 2022-08-27 ASSESSMENT — ACTIVITIES OF DAILY LIVING (ADL)
WEAR_GLASSES_OR_BLIND: YES
ORAL_HYGIENE: INDEPENDENT
DIFFICULTY_EATING/SWALLOWING: NO
ADLS_ACUITY_SCORE: 31
CONCENTRATING,_REMEMBERING_OR_MAKING_DECISIONS_DIFFICULTY: OTHER (SEE COMMENTS)
HYGIENE/GROOMING: INDEPENDENT
ADLS_ACUITY_SCORE: 31
ADLS_ACUITY_SCORE: 31
TOILETING: 0-->INDEPENDENT
ADLS_ACUITY_SCORE: 31
ADLS_ACUITY_SCORE: 31
FALL_HISTORY_WITHIN_LAST_SIX_MONTHS: NO
ADLS_ACUITY_SCORE: 31
HYGIENE/GROOMING: INDEPENDENT
TOILETING_ISSUES: YES;OTHER (SEE COMMENTS)
ADLS_ACUITY_SCORE: 31
WALKING_OR_CLIMBING_STAIRS_DIFFICULTY: NO
ADLS_ACUITY_SCORE: 31
TOILETING: 0-->INDEPENDENT
DOING_ERRANDS_INDEPENDENTLY_DIFFICULTY: NO
ADLS_ACUITY_SCORE: 31
CHANGE_IN_FUNCTIONAL_STATUS_SINCE_ONSET_OF_CURRENT_ILLNESS/INJURY: NO
DRESS: INDEPENDENT
LAUNDRY: WITH SUPERVISION
DRESSING/BATHING_DIFFICULTY: NO
ADLS_ACUITY_SCORE: 31
ADLS_ACUITY_SCORE: 31
ADLS_ACUITY_SCORE: 45

## 2022-08-27 NOTE — PROGRESS NOTES
"SBAR    S = Situation:   Pt is a 63 yo male with c/o alcohol intoxication who care to the [facility] ED seeking detox.       B  = Background:   Pt reports drinking a quart of vodka daily for the past 4 1/2 months. States the loss of  A friend caused him to start drinking again.  Pt denies withdrawal seizures or DTs states he's ashamed of himself for drinking again. \" I've been here before, I've been to [facility], I've done everything that's why I'm ashamed of myself.\"  Pt denies SI, depression or anxiety.         A  =  Assessment:   Pt was medicated in the ED right before coming up to the unit shortly after 0030. MSSA on admission was 5, at 0400 4.   Pt declined any prn meds stating he's fine, just wants to sleep. Prn trazodone given. Reports mild sweats, no nausea or vomiting, no tremors noted.    R =   Request or Recommendation:   Continue to monitor for withdrawal symptoms on MSSA with prn valium.     "

## 2022-08-27 NOTE — CONSULTS
"  [facility]  Internal Medicine Consult     Durga Aguirre MRN# [id number]   Age: 62 year old YOB: 1960     Date of Admission: 8/26/2022  Date of Consult: 8/27/2022    Primary Care Provider: Rohit Solitario    Requesting Service: Behavioral Health - Eugene Nolan MD  Reason for Consult: General Medical Evaluation      SUBJECTIVE   CC:   \"I feel terrible\"   Assessment and Plan/Recommendations:     Durga Aguirre is a 62 year old male with a PMHx of alcohol dependence, alcohol withdrawal with complication, hypertension, hypertriglyceridemia, anxiety, and depression. He was admitted to [facility] on 8/26/2022 for medical management of withdrawal from alcohol.     # Alcohol withdrawal, hx of alcohol use disorder   Reports he drinks a quart of alcohol a day since March when he was last here. Withdrawal s/s include sweats, tremors and high anxiety.   - MSSA 12 this shift.  No hx of withdrawal seizures. Not currently on AEDs.    - Continue MSSA   - Folvite, multi-vites, thiamine supplementation   - Further management per Psychiatry     # Hypertension  # Hyperlipidemia   Hypertensive 185/128. Denies headache, blurry vision, chest pain, palpitations. Cholesterol 352, . States he used to be on a statin and is not sure why he is   not taking it. Found dose in chart review. Hgb A1C 5.7  - increase PTA lisinopril from 20 mg to 30 mg with parameters  - hydralazine 25 mg PO every 6 hours PRN SBP > 170 and/or DBP > 100  - add hydrochlorothiazide 12.5 mg daily   - resume simvastatin 40 mg daily  - follow up with primary care     # Alcoholic gastritis  C/o burning in chest and nausea, no longer vomiting  - protonix 40 mg daily while inpatient    # Transaminitis  Slightly elevated with acute alcohol use. Expect will trend down. Pt denies abdominal pain. He is nauseous but more likely from alcohol withdrawal.   - strongly recommend cessation of alcohol    # Hypertriglyceridemia "   Triglycerides as high as 1373 in 2/2022. Was advised to significantly cut back on alcohol and sugar intake and to follow on a low fat diet. Not on fish oil or fibrate.   - lipase level normal 200  - continue to encourage cessation of alcohol and limit fat intake to < 15% of diet    # Anxiety   # Depression  - management per psychiatry      Medicine will continue to follow along.  Recommendations relayed to primary team via this progress note.  Thank you for the opportunity to be involved in this patient's care.      RONAL Badillo CNP  Internal Medicine [facility] Hospitalist  Page job code [phone] (3B), [phone] (3A), or [phone] ([facility] and )  Text paging via [employer] is appreciated  August 27, 2022         HPI:     Durga Aguirre is a 62 year old male with a PMHx of alcohol dependence, alcohol withdrawal with complication, hypertension, hypertriglyceridemia, anxiety, and depression. He was admitted to [facility] on 8/26/2022 for medical management of withdrawal from alcohol. Reports he drinks a quart of alcohol a day since March when he was last here. Withdrawal s/s include sweats, tremors and high anxiety.     Pt is moderately sweating and tremulous. Blood pressures very high despite PRN and home medication. States he has been on the same dose of lisinopril for years. He knows he was on a statin at some point and does not know why he stopped taking it. He doesn't take his blood pressure at home when he is drinking alcohol because he knows it would be high.     Burning chest pain and nausea.     Will follow for better blood pressure control      Past Medical History:     Past Medical History:   Diagnosis Date     Alcohol abuse      Anxiety and depression      Arthritis      Hyperlipidemia      Hypertension      Mass of left chest wall      Other spontaneous pneumothorax 1997        Reviewed and updated in [employer].     Past Surgical History:      Past Surgical History:   Procedure Laterality Date     COLONOSCOPY       HC  EXCISION PARTIAL TALUS OR CALCANEUS, BONE      fx calcaneus- 9 screws in foot         Social History:     Social History     Tobacco Use     Smoking status: Current Every Day Smoker     Packs/day: 0.25     Years: 20.00     Pack years: 5.00     Types: Cigarettes     Smokeless tobacco: Never Used     Tobacco comment: 2-3 cigarettes per day    Substance Use Topics     Alcohol use: Yes     Comment: daily use 1 L vodka     Drug use: Not Currently        Family History:     Family History   Problem Relation Age of Onset     No Known Problems Mother      Lymphoma Father      No Known Problems Maternal Grandmother      No Known Problems Maternal Grandfather      No Known Problems Paternal Grandmother      No Known Problems Paternal Grandfather      No Known Problems Sister      No Known Problems Son      No Known Problems Brother      No Known Problems Daughter      No Known Problems Other      Unknown/Adopted No family hx of      Depression No family hx of      Anxiety Disorder No family hx of      Schizophrenia No family hx of      Bipolar Disorder No family hx of      Suicide No family hx of      Substance Abuse No family hx of      Dementia No family hx of      Shani Disease No family hx of      Parkinsonism No family hx of      Autism Spectrum Disorder No family hx of      Intellectual Disability (Mental Retardation) No family hx of      Mental Illness No family hx of          Allergies:   No Known Allergies      Medications:   Reviewed. Please see MAR     Review of Systems:   10 point ROS of systems including Constitutional, Eyes, Respiratory, Cardiovascular, Gastroenterology, Genitourinary, Integumentary, Muscularskeletal, Psychiatric were all negative except for pertinent positives noted in my HPI.    OBJECTIVE   Physical Exam:   Vitals were reviewed  Blood pressure (!) 149/90, pulse 79, temperature 97.7  F (36.5  C), temperature source Temporal, resp. rate 16, SpO2 92 %.  General: Alert and oriented x3,  moderate distress  HEENT: Anicteric sclera, MMM  Cardiovascular: RRR, S1S2. No murmur noted  Lungs: CTAB without wheezing or crackles   GI: Abdomen soft, non-tender with bowel sounds present. No guarding or rebound   Vascular: No peripheral edema, distal pulses palpable  Neurologic: No focal deficits, CN II-XII grossly intact  Skin: No jaundice, rashes, or lesions        Data:        Lab Results   Component Value Date     08/26/2022     03/12/2021    Lab Results   Component Value Date    CHLORIDE 106 08/26/2022    CHLORIDE 102 03/12/2021    Lab Results   Component Value Date    BUN 18 08/26/2022    BUN 23 03/12/2021      Lab Results   Component Value Date    POTASSIUM 3.6 08/26/2022    POTASSIUM 3.6 03/12/2021    Lab Results   Component Value Date    CO2 26 08/26/2022    CO2 23 03/12/2021    Lab Results   Component Value Date    CR 1.29 08/26/2022    CR 1.07 03/13/2021        Lab Results   Component Value Date    WBC 4.2 08/26/2022    HGB 11.4 (L) 08/26/2022    HCT 34.5 (L) 08/26/2022    MCV 86 08/26/2022     (L) 08/26/2022     Lab Results   Component Value Date    WBC 4.2 08/26/2022

## 2022-08-27 NOTE — PROGRESS NOTES
08/27/22 0104   Patient Belongings   Did you bring any home meds/supplements to the hospital?  Yes   Disposition of meds  Sent to security/pharmacy per site process   Patient Belongings remains with patient;locker   Patient Belongings Remaining with Patient glasses;other (see comments)   Patient Belongings Put in Hospital Secure Location (Security or Locker, etc.) cash/credit card;clothing;wallet;shoes   Belongings Search Yes   Clothing Search Yes   Second Staff Conrad LOZANO   Comment All of patient's belongings are in a bin, his discharge bin and in his room. His important cards are in a security envelop.       BIN:  Shorts, shirt, cap, shoes and socks    DISCHARGE BIN:  Sunglasses and a wallet    ROOM:  Sunglasses and a bracelet that cannot be detach.    SECURITY ENV # [id number] - [place] 's License, [facility] Card,  2 VISA cards, MasterCard and [place] EBT Card.    A               Admission:  I am responsible for any personal items that are not sent to the safe or pharmacy.  [facility] is not responsible for loss, theft or damage of any property in my possession.    Signature:  _________________________________ Date: _______  Time: _____                                              Staff Signature:  ____________________________ Date: ________  Time: _____      2nd Staff person, if patient is unable/unwilling to sign:    Signature: ________________________________ Date: ________  Time: _____     Discharge:  [facility] has returned all of my personal belongings:    Signature: _________________________________ Date: ________  Time: _____                                          Staff Signature:  ____________________________ Date: ________  Time: _____

## 2022-08-27 NOTE — H&P
Admitted: 08/26/2022    The patient was seen for 51 minutes on 08/27/2022.  Greater than 50% of the time was spent on counseling and coordinating care, clarifying diagnostic and prognostic issues, presence of support in community.    CHIEF COMPLAINT AND REASON FOR ADMISSION:  The patient is a 62-year-old gentleman, who was self-admitted for detoxification from alcohol.    HISTORY OF PRESENT ILLNESS:  The patient reports that he relapsed a few months ago.  Has been drinking, averaging 1.75 liter of vodka in 2 days.  Reports history of shakes, sweats, nausea when he tries to quit drinking.  Reports classical symptoms of alcohol use disorder, such as developing tolerance, withdrawal, spending more time while using, then recovering after prolonged use, unsuccessful attempts to quit drinking, and also impairment in major spheres of daily functioning because of his alcohol use.    PAST PSYCHIATRIC HISTORY:  The patient, as above, reports significant history of alcohol use for many years.  He reports history of delirium tremens, but denies any history of seizures.  Said that he has a history of 3 DWIs.  He has a driving license, but also reports having an Interlock in his car, so he cannot drive intoxicated.  Reported he went through 3 chemical dependency treatment programs; the latest was 5 years ago.  He occasionally goes to AA, but not very often.  Says he does not find it very helpful.  Stated that he suffers from mild depression and anxiety and is being treated with BuSpar and Celexa.  Stated that he was treated for alcohol use with naltrexone, but it made him even more anxious, so he stopped taking it.    PAST MEDICAL HISTORY:  Significant for arthritis, hyperlipidemia, hypertension, history of spontaneous pneumothorax in 1997, and mass of left chest wall.  Also, alcohol abuse, anxiety, depression.    HOME MEDICATIONS:  Outpatient home medications were reviewed by pharmacy.  They are:  1.  Vitamin B and C complex 1  tablet by mouth daily.  2.  Buspirone 15 mg two times a day.  Patient takes 15 mg daily.  3.  Citalopram 20 mg daily.  4.  Hydroxyzine  mg 2 times a day p.r.n. for itching or anxiety.  5.  Lisinopril 20 mg daily.  6.  NicoDerm 14 mg patch onto skin daily.  7.  Nicorette 2 mg inside of cheek every hour as needed for smoking cessation.  8.  Trazodone  mg nightly as needed for sleep.   9.  The patient said that he does not take naltrexone, which was prescribed to him at 50 mg a day.     ALLERGIES:  HE DENIED ANY KNOWN MEDICAL ALLERGIES.    For physical examination and 12-point review of system, please refer to the note by Dr. Traore from 08/26/2022.  I reviewed his note and agree with it.    FAMILY HISTORY AND SOCIAL HISTORY:  The patient reports that he is single, never .  He has a son, who is 33 years old.  Has no grandchildren.  The patient lives with his mother, who is now 90 years old.  He is unemployed.  He applied for Social Security this week.  He has 1 sister.  He is unaware about any family history of mental or chemical addiction problems.    VITAL SIGNS:  Temperature 97.5.  Respirations 16.  Heart rate 109.  Blood pressure 176/110.    MENTAL STATUS EXAMINATION:  The patient is a  gentleman, who looks according to stated age.  He is overall cooperative.  Maintains fair eye contact.  Speech is spontaneous, normal rate, normal volume.  The patient denies suicidal or homicidal thoughts.  He reports that he does have some anxiety with depression though.  There is no evidence of psychosis.  Thought processes are linear, logical, goal directed.  Associations tight.  He is alert, oriented x 3.  Fund of knowledge appears to be average with proper usage of vocabulary.  Ability to focus, concentrate, immediate short and long-term memory are intact.  Gait and posture all within normal limits.  He has a visible tremor in the upper extremities.  Insight and judgment moderately  impaired.    IMPRESSION:    1.  Alcohol use disorder, severe, with alcohol withdrawal.  2.  Generalized anxiety disorder, unspecified.  3.  Depressive disorder, rule out major depressive disorder, recurrent, moderate severity.    TREATMENT PLAN:  The patient was put on alcohol withdrawal protocol with Valium.  He was continued on buspirone, citalopram.  He made it clear that he was not interested in making any medication changes.  Also stated that he does not want chemical dependency treatment program because of his past history of staying sober for a long period of time on his own.  His plan is to stay at this hospital for detoxification from alcohol only.  He is aware that his care will be taken over on Monday by his primary care team.    Omer Lopez MD        D: 2022   T: 2022   MT: RON    Name:     JOANNA BANUELOS  MRN:      [id number]        Account:     [id number]   :      1960           Admitted:    2022       Document: [id number]

## 2022-08-27 NOTE — PHARMACY-ADMISSION MEDICATION HISTORY
Admission Medication History Completed by Pharmacy    See [facility] Admission Navigator for allergy information, preferred outpatient pharmacy, prior to admission medications and immunization status.     Medication History Sources:     Patient, dispense report    Changes made to PTA medication list (reason):    Added: None    Deleted:   o Folic acid 1 mg tablet, Take 1 mg tablet by mouth daily (patient reported no longer taking)  o Multivitamin w/ minerals tablet, Take 1 tablet by mouth daily (patient reported no longer taking)  o Thiamine 100 mg tablet, Take 1 tablet by mouth daily    Changed:   o Buspirone 15 mg tablet, Take 1 tablet by mouth twice daily ->Take 1 tablet by mouth once daily (patient reported changed)    Additional Information:    Patient also reported taking melatonin nightly for sleep, dose unknown so medication was not added to pta med list.    Patient is prescribed naltrexone 50 mg tablet, Take 1 tablet by mouth daily. Patient is not taking this medication    Prior to Admission medications    Medication Sig Last Dose Taking? Auth Provider Long Term End Date   B Complex-C (VITAMIN B + C COMPLEX PO) Take 1 tablet by mouth daily 8/26/2022 at AM Yes Reported, Patient     busPIRone (BUSPAR) 15 MG tablet Take 1 tablet (15 mg) by mouth 2 times daily  Patient taking differently: Take 15 mg by mouth daily 8/26/2022 at AM Yes Rohit Solitario MD Yes    citalopram (CELEXA) 20 MG tablet Take 1 tablet (20 mg) by mouth daily 8/26/2022 at AM Yes Rohit Solitario MD Yes    hydrOXYzine (ATARAX) 50 MG tablet Take 1-2 tablets ( mg) by mouth 3 times daily as needed for itching or anxiety 8/26/2022 at AM Yes Rohit Solitario MD     lisinopril (ZESTRIL) 20 MG tablet Take 1 tablet (20 mg) by mouth daily 8/26/2022 at AM Yes Rohit Solitario MD Yes    nicotine (NICODERM CQ) 14 MG/24HR 24 hr patch Place 1 patch onto the skin daily 8/26/2022 at PM Yes Rohit Solitario MD     nicotine (NICORETTE) 2 MG gum  Place 1 each (2 mg) inside cheek every hour as needed for smoking cessation 8/25/2022 at Unknown time Yes Rohit Solitario MD     traZODone (DESYREL) 50 MG tablet Take 1-2 tablets ( mg) by mouth nightly as needed for sleep (may repeat after 60 minutes) 8/25/2022 at PM Yes Everton Levin MD Yes    naltrexone (DEPADE/REVIA) 50 MG tablet Take 1 tablet (50 mg) by mouth daily   Rohit Solitario MD Yes        Date completed: 08/26/22    Medication history completed by: Franklin Mejia

## 2022-08-27 NOTE — PLAN OF CARE
Goal Outcome Evaluation:    Plan of Care Reviewed With: patient     Overall Patient Progress: improving         Pt continues to be monitored for alcohol withdrawal. MSSA scores this shift of 12, 9 and 13. Pt also hypertensive, PRN hydralyzine administered, lisinopril increased and hydrochlorothiazide ordered and given, some improvement. Pt received PRN zofran for nausea with good effect. Pt is very upset about his relapse and states he has psychological pain and shame.

## 2022-08-28 PROCEDURE — 128N000004 HC R&B CD ADULT

## 2022-08-28 PROCEDURE — H2035 A/D TX PROGRAM, PER HOUR: HCPCS | Mod: HQ

## 2022-08-28 PROCEDURE — 250N000013 HC RX MED GY IP 250 OP 250 PS 637

## 2022-08-28 PROCEDURE — H2032 ACTIVITY THERAPY, PER 15 MIN: HCPCS

## 2022-08-28 PROCEDURE — 250N000013 HC RX MED GY IP 250 OP 250 PS 637: Performed by: PSYCHIATRY & NEUROLOGY

## 2022-08-28 PROCEDURE — 99207 PR NO BILLABLE SERVICE THIS VISIT: CPT

## 2022-08-28 PROCEDURE — 250N000013 HC RX MED GY IP 250 OP 250 PS 637: Performed by: PHYSICIAN ASSISTANT

## 2022-08-28 RX ADMIN — MULTIPLE VITAMINS W/ MINERALS TAB 1 TABLET: TAB at 08:52

## 2022-08-28 RX ADMIN — BUSPIRONE HYDROCHLORIDE 15 MG: 15 TABLET ORAL at 08:51

## 2022-08-28 RX ADMIN — NICOTINE POLACRILEX 2 MG: 2 GUM, CHEWING BUCCAL at 14:29

## 2022-08-28 RX ADMIN — NICOTINE POLACRILEX 2 MG: 2 GUM, CHEWING BUCCAL at 20:04

## 2022-08-28 RX ADMIN — CITALOPRAM HYDROBROMIDE 20 MG: 20 TABLET ORAL at 08:52

## 2022-08-28 RX ADMIN — PANTOPRAZOLE SODIUM 40 MG: 40 TABLET, DELAYED RELEASE ORAL at 08:51

## 2022-08-28 RX ADMIN — HYDROXYZINE HYDROCHLORIDE 100 MG: 50 TABLET, FILM COATED ORAL at 20:16

## 2022-08-28 RX ADMIN — NICOTINE POLACRILEX 2 MG: 2 GUM, CHEWING BUCCAL at 18:19

## 2022-08-28 RX ADMIN — SIMVASTATIN 40 MG: 10 TABLET, FILM COATED ORAL at 20:16

## 2022-08-28 RX ADMIN — FOLIC ACID 1 MG: 1 TABLET ORAL at 08:51

## 2022-08-28 RX ADMIN — LISINOPRIL 30 MG: 20 TABLET ORAL at 08:51

## 2022-08-28 RX ADMIN — NICOTINE 1 PATCH: 14 PATCH, EXTENDED RELEASE TRANSDERMAL at 08:54

## 2022-08-28 RX ADMIN — NICOTINE POLACRILEX 2 MG: 2 GUM, CHEWING BUCCAL at 16:18

## 2022-08-28 RX ADMIN — THIAMINE HCL TAB 100 MG 100 MG: 100 TAB at 08:51

## 2022-08-28 RX ADMIN — HYDROCHLOROTHIAZIDE 12.5 MG: 12.5 TABLET ORAL at 08:51

## 2022-08-28 RX ADMIN — NICOTINE POLACRILEX 2 MG: 2 GUM, CHEWING BUCCAL at 12:36

## 2022-08-28 RX ADMIN — DIAZEPAM 10 MG: 5 TABLET ORAL at 08:51

## 2022-08-28 RX ADMIN — DIAZEPAM 10 MG: 5 TABLET ORAL at 12:33

## 2022-08-28 RX ADMIN — HYDROXYZINE HYDROCHLORIDE 100 MG: 50 TABLET, FILM COATED ORAL at 16:18

## 2022-08-28 RX ADMIN — MELATONIN TAB 3 MG 3 MG: 3 TAB at 20:17

## 2022-08-28 RX ADMIN — TRAZODONE HYDROCHLORIDE 100 MG: 50 TABLET ORAL at 20:16

## 2022-08-28 RX ADMIN — B-COMPLEX W/ C & FOLIC ACID TAB 1 TABLET: TAB at 08:51

## 2022-08-28 ASSESSMENT — ACTIVITIES OF DAILY LIVING (ADL)
ADLS_ACUITY_SCORE: 31
ADLS_ACUITY_SCORE: 31
DRESS: INDEPENDENT
ADLS_ACUITY_SCORE: 31
ADLS_ACUITY_SCORE: 31
HYGIENE/GROOMING: INDEPENDENT
ADLS_ACUITY_SCORE: 31
ADLS_ACUITY_SCORE: 31
ORAL_HYGIENE: INDEPENDENT
HYGIENE/GROOMING: INDEPENDENT
ADLS_ACUITY_SCORE: 31
LAUNDRY: WITH SUPERVISION
ADLS_ACUITY_SCORE: 31
ORAL_HYGIENE: INDEPENDENT
ADLS_ACUITY_SCORE: 31

## 2022-08-28 NOTE — PROGRESS NOTES
08/28/22 1800   Group Therapy Session   Group Attendance attended group session   Time Session Began 1645   Time Session Ended 1740   Total Time patient participated (minutes) 50   Total # Attendees 7   Group Type recreation   Group Topic Covered leisure exploration/use of leisure time   Group Session Detail TR leisure group   Patient Response/Contribution cooperative with task   Patient Response Detail Pt attended the structured Therapeutic Recreation group, participating in a group activity. Pt participated in group discussion and leisure participation as a healthy outlet to gain self-esteem, manage behaviors, improve social skills, and decrease isolation.  Pt remained focused and engaged throughout group activity.  Pt participated in the group discussion about healthy outlets and participated in the group activity, contributing to the clues and descriptions for the game. Pt was thankful for the group and stated he hasn't laughed in a long time.

## 2022-08-28 NOTE — PLAN OF CARE
08/28/22 1415   Group Therapy Session   Group Attendance attended group session   Time Session Began 13:15   Time Session Ended 14:05   Total Time patient participated (minutes) 50   Total # Attendees 7   Group Type psychotherapy   Group Topic Covered Positive reflections and ways to prevent relapse by identifying triggers, processing them and using help to address them   Group Session Detail Psychotherapy group to facilitate coping skills to help patients explore effective ways to prevent relapse by identifying and paying attention to triggering circumstances/events. Reviewed environmental triggers as well as historical triggers. Pts identified areas of struggle. Used group support and feedback to provide re-direction, corrective interpretations/thoughts- aimed at maintaining positive and healthy response to a triggering event. Identified scenarios involving self-medication and provided redirections and emphasis on need to always follow the doctor's prescription. Emphasized ways to build positive relationships and to establish appropriate boundaries with friends or social networks that may be triggering.   Patient Response/Contribution Participated fully   Patient Response Detail Pt discussed motivations for wanting to explore ways to avoid relapse.

## 2022-08-28 NOTE — PROGRESS NOTES
Brief Internal Med Note    HTN  Blood pressures closer to goal. Continue medication changes at discharge and patient should follow up with primary within a week.     Currently, medically stable and internal medicine will sign off. Please contact if future questions or concerns arise. Thank you for the opportunity to be a part of this patient's care.         Maribel Gandhi CNP, APRN  Hospitalist Service  [facility]  Pager [phone]

## 2022-08-28 NOTE — PLAN OF CARE
Problem: Behavioral Health Plan of Care  Goal: Optimal Comfort and Wellbeing  Outcome: Ongoing, Progressing    Behavioral  Pt appeared sleeping comfortably overnight; no behavioral concerns noted;     Medical  Pt continues in alcohol withdrawal; MSSA 45 and 4; no valium given this shift; Pt has received a total 70 mg of valium since admission; pt last required valium on 8/27 @ 2018.  No new medical concerns noted.

## 2022-08-28 NOTE — PLAN OF CARE
Problem: Alcohol Withdrawal  Goal: Alcohol Withdrawal Symptom Control  Outcome: Ongoing, Progressing   Goal Outcome Evaluation:    Plan of Care Reviewed With: patient      Pt's BP and Heart rate were elevated at start of shift. Improved after prn valium and atenolol given at 1630.   172/123, 108 pre med.  1730 rechecked 165/110, 71, anxiety reported as very high. Prn hydralazine 25mg and hydroxyzine 100mg given with some improvement noted.     Pt scored 11 and 8 and received valium 10mg x2. Pt reports symptoms were better after meds, though he still had visible tremors.  Ate 100% dinner and snack, no nausea or vomiting. Spent majority of time in the lounge.   Prn  trazodone 100mg at bedtime.     Prn melatonin and hydroxyzine at 2215 for anxiety and sleep.

## 2022-08-28 NOTE — PLAN OF CARE
Goal Outcome Evaluation:    Plan of Care Reviewed With: patient     Overall Patient Progress: improving         Pt continues to be monitored for alcohol withdrawal. MSSA scores of 8 and 8 this shift. Pt has improved appetite this morning. States that he is starting to feel better, tremor improved. Pt endorses significant anxiety, discussed coping skills and role of alcohol withdrawal in increasing anxiety. Pt states that he is interested in attending outpatient CD treatment on discharge.

## 2022-08-29 VITALS
RESPIRATION RATE: 16 BRPM | HEART RATE: 99 BPM | SYSTOLIC BLOOD PRESSURE: 143 MMHG | OXYGEN SATURATION: 95 % | DIASTOLIC BLOOD PRESSURE: 97 MMHG | TEMPERATURE: 97.2 F

## 2022-08-29 PROCEDURE — 99239 HOSP IP/OBS DSCHRG MGMT >30: CPT | Performed by: PSYCHIATRY & NEUROLOGY

## 2022-08-29 PROCEDURE — H2035 A/D TX PROGRAM, PER HOUR: HCPCS | Mod: HQ

## 2022-08-29 PROCEDURE — 250N000013 HC RX MED GY IP 250 OP 250 PS 637: Performed by: PSYCHIATRY & NEUROLOGY

## 2022-08-29 PROCEDURE — 250N000013 HC RX MED GY IP 250 OP 250 PS 637

## 2022-08-29 RX ORDER — CITALOPRAM HYDROBROMIDE 20 MG/1
20 TABLET ORAL DAILY
Qty: 30 TABLET | Refills: 0 | Status: ON HOLD | OUTPATIENT
Start: 2022-08-29 | End: 2022-11-15

## 2022-08-29 RX ORDER — LISINOPRIL 30 MG/1
30 TABLET ORAL DAILY
Qty: 30 TABLET | Refills: 0 | Status: SHIPPED | OUTPATIENT
Start: 2022-08-30 | End: 2022-09-30

## 2022-08-29 RX ORDER — PANTOPRAZOLE SODIUM 40 MG/1
40 TABLET, DELAYED RELEASE ORAL
Qty: 30 TABLET | Refills: 0 | Status: ON HOLD | OUTPATIENT
Start: 2022-08-30 | End: 2022-11-15

## 2022-08-29 RX ORDER — BUSPIRONE HYDROCHLORIDE 15 MG/1
15 TABLET ORAL DAILY
Qty: 60 TABLET | Refills: 0 | Status: SHIPPED | OUTPATIENT
Start: 2022-08-30 | End: 2022-08-29

## 2022-08-29 RX ORDER — BUSPIRONE HYDROCHLORIDE 15 MG/1
15 TABLET ORAL 2 TIMES DAILY
Qty: 60 TABLET | Refills: 0 | Status: SHIPPED | OUTPATIENT
Start: 2022-08-29 | End: 2022-09-30

## 2022-08-29 RX ORDER — MULTIPLE VITAMINS W/ MINERALS TAB 9MG-400MCG
1 TAB ORAL DAILY
Qty: 30 TABLET | Refills: 0 | Status: SHIPPED | OUTPATIENT
Start: 2022-08-30 | End: 2022-09-30

## 2022-08-29 RX ORDER — HYDROCHLOROTHIAZIDE 12.5 MG/1
12.5 TABLET ORAL DAILY
Qty: 30 TABLET | Refills: 0 | Status: SHIPPED | OUTPATIENT
Start: 2022-08-30 | End: 2022-09-30

## 2022-08-29 RX ORDER — HYDROXYZINE HYDROCHLORIDE 50 MG/1
50-100 TABLET, FILM COATED ORAL 3 TIMES DAILY PRN
Qty: 60 TABLET | Refills: 0 | Status: SHIPPED | OUTPATIENT
Start: 2022-08-29 | End: 2022-09-30

## 2022-08-29 RX ORDER — LANOLIN ALCOHOL/MO/W.PET/CERES
3 CREAM (GRAM) TOPICAL
Qty: 30 TABLET | Refills: 0 | Status: ON HOLD | OUTPATIENT
Start: 2022-08-29 | End: 2023-04-14

## 2022-08-29 RX ORDER — TRAZODONE HYDROCHLORIDE 50 MG/1
50-100 TABLET, FILM COATED ORAL
Qty: 60 TABLET | Refills: 0 | Status: SHIPPED | OUTPATIENT
Start: 2022-08-29 | End: 2022-11-01

## 2022-08-29 RX ORDER — NICOTINE 21 MG/24HR
1 PATCH, TRANSDERMAL 24 HOURS TRANSDERMAL DAILY
Qty: 30 PATCH | Refills: 0 | Status: ON HOLD | OUTPATIENT
Start: 2022-08-29 | End: 2022-11-15

## 2022-08-29 RX ORDER — LANOLIN ALCOHOL/MO/W.PET/CERES
100 CREAM (GRAM) TOPICAL DAILY
Qty: 30 TABLET | Refills: 0 | Status: SHIPPED | OUTPATIENT
Start: 2022-08-30 | End: 2022-09-30

## 2022-08-29 RX ORDER — NALTREXONE HYDROCHLORIDE 50 MG/1
50 TABLET, FILM COATED ORAL DAILY
Qty: 30 TABLET | Refills: 0 | Status: SHIPPED | OUTPATIENT
Start: 2022-08-29 | End: 2022-09-30

## 2022-08-29 RX ORDER — SIMVASTATIN 40 MG
40 TABLET ORAL EVERY EVENING
Qty: 30 TABLET | Refills: 0 | Status: SHIPPED | OUTPATIENT
Start: 2022-08-29 | End: 2022-09-30

## 2022-08-29 RX ADMIN — NICOTINE POLACRILEX 2 MG: 2 GUM, CHEWING BUCCAL at 14:54

## 2022-08-29 RX ADMIN — NICOTINE 1 PATCH: 14 PATCH, EXTENDED RELEASE TRANSDERMAL at 08:29

## 2022-08-29 RX ADMIN — MULTIPLE VITAMINS W/ MINERALS TAB 1 TABLET: TAB at 08:29

## 2022-08-29 RX ADMIN — NICOTINE POLACRILEX 2 MG: 2 GUM, CHEWING BUCCAL at 12:54

## 2022-08-29 RX ADMIN — HYDROXYZINE HYDROCHLORIDE 100 MG: 50 TABLET, FILM COATED ORAL at 10:42

## 2022-08-29 RX ADMIN — HYDROXYZINE HYDROCHLORIDE 100 MG: 50 TABLET, FILM COATED ORAL at 14:54

## 2022-08-29 RX ADMIN — BUSPIRONE HYDROCHLORIDE 15 MG: 15 TABLET ORAL at 08:29

## 2022-08-29 RX ADMIN — THIAMINE HCL TAB 100 MG 100 MG: 100 TAB at 08:29

## 2022-08-29 RX ADMIN — LISINOPRIL 30 MG: 20 TABLET ORAL at 08:29

## 2022-08-29 RX ADMIN — FOLIC ACID 1 MG: 1 TABLET ORAL at 08:29

## 2022-08-29 RX ADMIN — HYDROCHLOROTHIAZIDE 12.5 MG: 12.5 TABLET ORAL at 08:29

## 2022-08-29 RX ADMIN — CITALOPRAM HYDROBROMIDE 20 MG: 20 TABLET ORAL at 08:29

## 2022-08-29 RX ADMIN — B-COMPLEX W/ C & FOLIC ACID TAB 1 TABLET: TAB at 08:29

## 2022-08-29 RX ADMIN — PANTOPRAZOLE SODIUM 40 MG: 40 TABLET, DELAYED RELEASE ORAL at 08:29

## 2022-08-29 ASSESSMENT — ACTIVITIES OF DAILY LIVING (ADL)
ADLS_ACUITY_SCORE: 31

## 2022-08-29 NOTE — PLAN OF CARE
08/29/22 1817   Group Therapy Session   Group Attendance attended group session   Time Session Began 1645   Time Session Ended 1740   Total Time patient participated (minutes) 55   Total # Attendees 8   Group Type psychotherapeutic   Group Topic Covered coping skills/lifestyle management   Group Session Detail The group answered random questions. Themes included loss and regrets, trauma history, personal fears, who has been impactful in their lives and what motivates them.   Patient Response/Contribution confused;discussed personal experience with topic   Patient Response Detail Pt shared personal experiences, shared particularly his sense of shame and having disappointed others

## 2022-08-29 NOTE — PLAN OF CARE
Problem: Alcohol Withdrawal  Goal: Alcohol Withdrawal Symptom Control  Outcome: Ongoing, Progressing   Goal Outcome Evaluation:    Plan of Care Reviewed With: patient      Pt scored 3 and 4 on MSSA, states he feels better today.   Vitals have improved compared to yesterday. No withdrawal symptoms reported. Appetite good. However, pt continues to    endorse high anxiety requesting hydroxyzine 100mg x2 this shift.   Requested and got prn trazodone and melatonin for sleep.

## 2022-08-29 NOTE — PLAN OF CARE
Problem: Alcohol Withdrawal  Goal: Alcohol Withdrawal Symptom Control  8/29/2022 0519 by Albert Ayers, RN  Outcome: Ongoing, Progressing  8/28/2022 2049 by Albert Ayers RN  Outcome: Ongoing, Progressing   Goal Outcome Evaluation:    Plan of Care Reviewed With: patient        Pt slept well, his withdrawal symptoms continue to improve. He scored a 1 and did not receive any prn valium.

## 2022-08-29 NOTE — PROGRESS NOTES
Type Of Assessment: Comprehensive Assessment Update    Referral Source:  3A/patient  MRN: [id number]    DATE OF SERVICE: 2022   Date of previous ROYA Assessment: 3/24/22  STEPHANY Burnett, ELANA  Patient confirmed identity through two factor verification: Full Legal Name and     PATIENT'S NAME: Durga Aguirre  Age: 62 year old  Last 4 SSN: [id number]  Sex: male   Gender Identity: male  Sexual Orientation: Heterosexual  Cultural Background: No, Denies any cultural influences or concerns that need to be considered for treatment  YOB: 1960  Current Address:   [address] [place] [id number]  Patient Phone Number:  [phone]   Patient's E-Mail Contact:  [email or web address]  Funding: [employer]  PMI: [id number]  Emergency Contact: Patients sister Sacha Galindo [phone]  DAANES information was provided to patient and patient does not want a copy.     Telemedicine Visit: The patient's condition can be safely assessed and treated via synchronous audio and visual telemedicine encounter.    Reason for Telemedicine Visit: Services only offered telehealth  Originating Site (Patient Location): [facility] - [address] [place] [id number] [id number]  Distant Site (Provider Location): Provider Remote Setting- Home Office  Consent:  The patient/guardian has verbally consented to: the potential risks and benefits of telemedicine (video visit) versus in person care; bill my insurance or make self-payment for services provided; and responsibility for payment of non-covered services.   Mode of Communication:  Video Conference via [email or web address]    START TIME: 1000  END TIME: 1030    As the provider I attest to compliance with applicable laws and regulations related to telemedicine.   Durga Aguirre was seen for a substance use disorder consult on 2022 by STEPHANY Lindsey LADC    Reason for Substance Use Disorder Consult:  To enter into  "outpatient programming    Are you currently having severe withdrawal symptoms that are putting yourself or others in danger? No  Are you currently having severe medical problems that require immediate attention? No  Are you currently having severe emotional or behavioral problems that are putting yourself or others at risk of harm? No    Have you participated in prior substance use disorder evaluations? Yes. When, Where, and What circumstances: 3/24/22  Annemarie Correa, MACRINAC, ELANA while on 3A/Detox   Have you ever been to detox, inpatient or outpatient treatment for substance related use? List previous treatment: Yes. When, Where, and What circumstances: March 2022, current admission   Have you ever had a gambling problem or had treatment for compulsive gambling? No  Patient is in active withdrawal, but is currently admitted to [facility] for medical detoxification and withdrawal monitoring and is not an imminent safety risk to self or others, and may proceed with the assessment interview    Comprehensive Substance Use History   X X = Primary Drug Used Age of First Use    Pattern of Substance Use   (heaviest use in life and a use history within the past year if applicable) (DSM-5: Sx #3) Date /  Quantity of last use if within the past 30 days Withdrawal Potential?   Method of use  (Oral, smoked, snorted, IV, etc)   X Alcohol   10 Since 3/25/22:  Patient reports sobriety for the first 3-4 months since last detox admission.  Patient reports the last one to two months \"I have been putting it down quite heavily - every day, 24 hours a day about one quart per day\" 8/26/22 Yes - Patient is currently on detox/3A Oral    Marijuana/Hashish   No use        Cocaine/Crack No use        Meth/Amphetamines   No use        Heroin   No use        Other Opiates/Synthetics   No use        Inhalants  No use        Benzodiazepines   No use        Hallucinogens   No use        Barbiturates/Sedatives/Hypnotics   No use        " Over-the-Counter Drugs   No use        Other   No use        Nicotine   10 Patient reports smoking about 3-4 cigarettes per day.        Withdrawal symptoms: Have you had any of the following withdrawal symptoms?  Diminished Appetite  Anxiety / Worried    Have you experienced any cravings?  No    Have you had periods of abstinence?  Yes   What was your longest period? March 2022-July 2022    Any circumstances that lead to relapse? Patient is not sure.    What activities have you engaged in when using alcohol/other drugs that could be hazardous to you or others?  The patient denied engaging in any of the above dangerous activities when using alcohol and/or drugs.    A description of any risk-taking behavior, including behavior that puts the client at risk of exposure to blood-borne or sexually transmitted diseases: Patient denies.    Arrests and legal interventions related to substance use: Patient denies.    A description of how the patient's use affected their ability to function appropriately in a work setting: Patient reports being in [place] and is able to take time off to get better and return to work.    A description of how the patient's use affected their ability to function appropriately in an educational setting: Patient denies.    Leisure time activities that are associated with substance use: Patient reports watching TV and walking the dog.    Do you think your substance use has become a problem for you? He agrees he has a substance abuse problem.    MEDICAL HISTORY  Physical or medical concerns or diagnoses: Patient denies.    Do you have any current medical treatment needs not being addressed by inpatient treatment?  Patient denies.    Do you need a referral for a medical provider? Patient denies.  Patient reports having an appointment with [facility] on 9/17/22.    Current medications:   Patient reports current meds as:   Outpatient Medications Marked as Taking for the 8/26/22 encounter  (Hospital Encounter)   Medication Sig     B Complex-C (VITAMIN B + C COMPLEX PO) Take 1 tablet by mouth daily     busPIRone (BUSPAR) 15 MG tablet Take 1 tablet (15 mg) by mouth 2 times daily (Patient taking differently: Take 15 mg by mouth daily)     citalopram (CELEXA) 20 MG tablet Take 1 tablet (20 mg) by mouth daily     hydrOXYzine (ATARAX) 50 MG tablet Take 1-2 tablets ( mg) by mouth 3 times daily as needed for itching or anxiety     lisinopril (ZESTRIL) 20 MG tablet Take 1 tablet (20 mg) by mouth daily     naltrexone (DEPADE/REVIA) 50 MG tablet Take 1 tablet (50 mg) by mouth daily     nicotine (NICODERM CQ) 14 MG/24HR 24 hr patch Place 1 patch onto the skin daily     nicotine (NICORETTE) 2 MG gum Place 1 each (2 mg) inside cheek every hour as needed for smoking cessation     traZODone (DESYREL) 50 MG tablet Take 1-2 tablets ( mg) by mouth nightly as needed for sleep (may repeat after 60 minutes)         Are you pregnant? NA, Male    Do you have any specific physical needs/accommodations? Yes, Patient reports needing to wear a knee brace due to injury in 1997.    MENTAL HEALTH HISTORY:  Have you ever had  hospitalizations or treatment for mental health illness: No    Mental health history, including diagnosis and symptoms, and the effect on the client's ability to function: Patient denies.    Current mental health treatment including psychotropic medication needed to maintain stability: (Note: The assessment must utilize screening tools approved by the commissioner pursuant to section 245.4863 to identify whether the client screens positive for co-occurring disorders): Patient denies.    GAIN-SS Tool:  When was the last time that you had significant problems... 8/29/2022   with feeling very trapped, lonely, sad, blue, depressed or hopeless about the future? 1+ years ago   with sleep trouble, such as bad dreams, sleeping restlessly, or falling asleep during the day? Never   with feeling very  anxious, nervous, tense, scared, panicked or like something bad was going to happen? Past month   with becoming very distressed & upset when something reminded you of the past? 1+ years ago   with thinking about ending your life or committing suicide? Never     When was the last time that you did the following things 2 or more times? 8/29/2022   Lied or conned to get things you wanted or to avoid having to do something? Past month   Had a hard time paying attention at school, work or home? Never   Had a hard time listening to instructions at school, work or home? Past month   Were a bully or threatened other people? Never   Started physical fights with other people? Never       Have you ever been verbally, emotionally, physically or sexually abused?   No    Family history of substance use and misuse: Patient reports alcohol with mom.    The patient's desire for family involvement in the treatment program: Patient reports that they could ask their sister.  Level of family support: Patient is not sure.    Social network in relation to expected support for recovery: Patient reports scaring the hell out of himself and wants to work on this.    Are you currently in a significant relationship? No    Do you have any children (include living arrangements/custody/contact)?:  Yes - patient reports children are living with their mother.    What is your current living situation? Patient reports living with mom and just got a dog.    Are you employed/attending school? Patient reports working with employer to take time off.    SUMMARY:  Ability to understand written treatment materials: Yes  Ability to understand patient rules and patient rights: Yes  Does the patient recognize needs related to substance use and is willing to follow treatment recommendations: Yes  Does the patient have an opioid use disorder:  does not have a history of opiate use.    ASAM Dimension Scale Ratings:  Dimension 1: 1 Client can tolerate and cope with  withdrawal discomfort. The client displays mild to moderate intoxication or signs and symptoms interfering with daily functioning but does not immediately endanger self or others. Client poses minimal risk of severe withdrawal.  Dimension 2: 1 Client tolerates and ck with physical discomfort and is able to get the services that the client needs.  Dimension 3: 2 Client has difficulty with impulse control and lacks coping skills. Client has thoughts of suicide or harm to others without means; however, the thoughts may interfere with participation in some treatment activities. Client has difficulty functioning in significant life areas. Client has moderate symptoms of emotional, behavioral, or cognitive problems. Client is able to participate in most treatment activities.  Dimension 4: 2 Client displays verbal compliance, but lacks consistent behaviors; has low motivation for change; and is passively involved in treatment.  Dimension 5: 3 Client has poor recognition and understanding of relapse and recidivism issues and displays moderately high vulnerability for further substance use or mental health problems. Client has few coping skills and rarely applies coping skills.  Dimension 6: 2 Client is engaged in structured, meaningful activity, but peers, family, significant other, and living environment are unsupportive, or there is criminal justice involvement by the client or among the client's peers, significant others, or in the client's living environment.    Category of Substance Severity (ICD-10 Code / DSM 5 Code)     Alcohol Use Disorder Severe  (10.20) (303.90)   Cannabis Use Disorder The patient does not meet the criteria for a Cannabis use disorder.   Hallucinogen Use Disorder The patient does not meet the criteria for a Hallucinogen use disorder.   Inhalant Use Disorder The patient does not meet the criteria for an Inhalant use disorder.   Opioid Use Disorder The patient does not meet the criteria for an  Opioid use disorder.   Sedative, Hypnotic, or Anxiolytic Use Disorder The patient does not meet the criteria for a Sedative/Hypnotic use disorder.   Stimulant Related Disorder The patient does not meet the criteria for a Stimulant use disorder.   Tobacco Use Disorder Mild    (Z72.0) (305.1)   Other (or unknown) Substance Use Disorder The patient does not meet the criteria for a Other (or unknown) Substance use disorder.     A problematic pattern of alcohol/drug use leading to clinically significant impairment or distress, as manifested by at least two of the following, occurring within a 12-month period:    1.) Alcohol/drug is often taken in larger amounts or over a longer period than was intended.  3.) A great deal of time is spent in activities necessary to obtain alcohol, use alcohol, or recover from its effects.  5.) Recurrent alcohol/drug use resulting in a failure to fulfill major role obligations at work, school or home.  6.) Continued alcohol use despite having persistent or recurrent social or interpersonal problems caused or exacerbated by the effects of alcohol/drug.  7.) Important social, occupational, or recreational activities are given up or reduced because of alcohol/drug use.  9.) Alcohol/drug use is continued despite knowledge of having a persistent or recurrent physical or psychological problem that is likely to have been caused or exacerbated by alcohol.  11.) Withdrawal, as manifested by either of the following: Alcohol/drug (or a closely related substance, such as a benzodiazepine) is taken to relieve or avoid withdrawal symptoms.    Specify if: In early remission:  After full criteria for alcohol/drug use disorder were previously met, none of the criteria for alcohol/drug use disorder have been met for at least 3 months but for less than 12 months (with the exception that Criterion A4,  Craving or a strong desire or urge to use alcohol/drug  may be met).     In sustained remission:   After  full criteria for alcohol use disorder were previously met, non of the criteria for alcohol/drug use disorder have been met at any time during a period of 12 months or longer (with the exception that Criterion A4,  Craving or strong desire or urge to use alcohol/drug  may be met).     Specify if:   This additional specifier is used if the individual is in an environment where access to alcohol is restricted.    Mild: Presence of 2-3 symptoms  Moderate: Presence of 4-5 symptoms  Severe: Presence of 6 or more symptoms    Collateral information: ROYA Collateral Info: Sufficient information is obtained from the patient to support diagnosis and recommendations. Contact with a collateral sources is not required.    Recommendations: To enter into CD outpatient program with patients preference for [facility] in [place].    Clinical Substantiation:  Patient would benefit from the structure and support of CD outpatient programming to establish and maintain long term sobriety.    Referrals/ Alternatives:  An intensive outpatient program for CD such as [facility].     ROYA consult completed by: Annemarie Tolbert [facility].  Phone Number: [phone]  E-mail Address: [email or web address]  [address] [place] [address]     *Due to regulation of Title 42 of the Code of Federal Regulations (CFR) Part 2: Confidentiality laws apply to this note and the information wherein.  Thus, this note cannot be copy and pasted into any other health care staff's note nor can it be included in general medical records sent to ANY outside agency without the patient's written consent.

## 2022-08-29 NOTE — PROGRESS NOTES
Met with pt to discuss aftercare plan. Pt reports he would like a referral to [facility] [place]. Pt states he had previously been in contact with the counselor and was told he could attend this group. Pt will need CD assessment update for referral, original assessment was completed on 3/24/22 by Annemarie Correa [facility] and is located in Epic chart review. Pt was provided with assessment paperwork for update, will work on this and turn into writer or RN when complete. Pt has Mita TILLMAN for insurance, AVS initiated.   CM continues    Update: Pt completed CD assessment update with Annemarie Tolbert [facility] for referral to [facility] in [place].  Susanna Osborne was notified so pt can be reached to schedule intake after discharge. AVS complete

## 2022-08-29 NOTE — PLAN OF CARE
Behavioral Team Discussion: (8/29/2022)    Continued Stay Criteria/Rationale: Patient admitted for Chemical Use Issues.  Plan: The following services will be provided to the patient; psychiatric assessment, medication management, therapeutic milieu, individual and group support, and skills groups.   Participants: 3A Provider: Dr. Mame June MD; 3A RN: Donna Mccabe RN; 3A CM's: Rosalia Love.  Summary/Recommendation: Providers will assess today for treatment recommendations, discharge planning, and aftercare plans. CM will meet with pt for discharge planning.   Medical/Physical: Per ED note:  Past Medical History:   Diagnosis Date     Alcohol abuse       Anxiety and depression       Arthritis       Hyperlipidemia       Hypertension       Mass of left chest wall       Other spontaneous pneumothorax 1997     Precautions:   Behavioral Orders   Procedures     Code 1 - Restrict to Unit     Routine Programming     As clinically indicated     Status 15     Every 15 minutes.     Withdrawal precautions     Rationale for change in precautions or plan: N/A  Progress: Initial.    ASAM Dimension Scale Ratings:  Dimension 1: 4 Client is incapacitated with severe signs and symptoms. Client displays severe withdrawal and is a danger to self or others.  Dimension 2: 2 Client has difficulty tolerating and coping with physical problems or has other biomedical problems that interfere with recovery and treatment. Client neglects or does not seek care for serious biomedical problems.  Dimension 3: 2 Client has difficulty with impulse control and lacks coping skills. Client has thoughts of suicide or harm to others without means; however, the thoughts may interfere with participation in some treatment activities. Client has difficulty functioning in significant life areas. Client has moderate symptoms of emotional, behavioral, or cognitive problems. Client is able to participate in most treatment activities.  Dimension 4: 2  Client displays verbal compliance, but lacks consistent behaviors; has low motivation for change; and is passively involved in treatment.  Dimension 5: 4 No awareness of the negative impact of mental health problems or substance abuse. No coping skills to arrest mental health or addiction illnesses, or prevent relapse.  Dimension 6: 4 Client has (A) Chronically antagonistic significant other, living environment, family, peer group or long-term criminal justice involvement that is harmful to recovery or treatment progress, or (B) Client has an actively antagonistic significant other, family, work, or living environment with immediate threat to the client's safety and well-being.

## 2022-08-29 NOTE — DISCHARGE SUMMARY
Durga Aguirre MRN# [id number]   Age: 62 year old YOB: 1960     Date of Admission:  8/26/2022  Date of Discharge:  8/29/2022  Admitting Physician:  Eugene Nolan MD  Discharge Physician:  Mame June MD      DISCHARGE  DX    .  Alcohol use disorder, severe,   2.  Generalized anxiety disorder, unspecified.  3.  Depressive disorder, rule out major depressive disorder, recurrent, moderate severity.         Event Leading to Hospitalization:     See Admission note by admitting provider for patient encounter. for additional details.          Hospital Course:   PATIENT was admitted to [facility]with attending  under Omer Cast MD  please review the detailed admit note on 8/27/22   The patient was placed under status 15 (15 minute checks) to ensure patient safety.   MSSA protocol was initiated due to the patient's history of alcohol abuse and concern for withdrawal symptoms.  CBC, BMP and utox obtained.    All outpatient medications were continued    PATIENTdid participate in groups and was visible in the milieu.     The patient's symptoms of alcohol withdrawl improved.     Patients energy motivation , sleep appetite improved.  Pt completed detox . It was un eventful.      Discussed with patient medications for craving.  Spoke with patient about triggers coping skills relapse prevention.    CONSULTS DONE DURING PATIENTS HOSPITALIZATION.  Patient was seen by medicine on date8/27/22    This as per their medical consult    Assessment and Plan/Recommendations:      Durga Aguirre is a 62 year old male with a PMHx of alcohol dependence, alcohol withdrawal with complication, hypertension, hypertriglyceridemia, anxiety, and depression. He was admitted to [facility] on 8/26/2022 for medical management of withdrawal from alcohol.      # Alcohol withdrawal, hx of alcohol use disorder   Reports he drinks a quart of alcohol a day since March when he was last here. Withdrawal s/s include  sweats, tremors and high anxiety.   - MSSA 12 this shift.  No hx of withdrawal seizures. Not currently on AEDs.    - Continue MSSA   - Folvite, multi-vites, thiamine supplementation   - Further management per Psychiatry      # Hypertension  # Hyperlipidemia   Hypertensive 185/128. Denies headache, blurry vision, chest pain, palpitations. Cholesterol 352, . States he used to be on a statin and is not sure why he is   not taking it. Found dose in chart review. Hgb A1C 5.7  - increase PTA lisinopril from 20 mg to 30 mg with parameters  - hydralazine 25 mg PO every 6 hours PRN SBP > 170 and/or DBP > 100  - add hydrochlorothiazide 12.5 mg daily   - resume simvastatin 40 mg daily  - follow up with primary care      # Alcoholic gastritis  C/o burning in chest and nausea, no longer vomiting  - protonix 40 mg daily while inpatient     # Transaminitis  Slightly elevated with acute alcohol use. Expect will trend down. Pt denies abdominal pain. He is nauseous but more likely from alcohol withdrawal.   - strongly recommend cessation of alcohol     # Hypertriglyceridemia   Triglycerides as high as 1373 in 2/2022. Was advised to significantly cut back on alcohol and sugar intake and to follow on a low fat diet. Not on fish oil or fibrate.   - lipase level normal 200  - continue to encourage cessation of alcohol and limit fat intake to < 15% of diet     # Anxiety   # Depression  - management per psychiatry              Pt was seen by cm  As per recommendations from cm  Met with pt to discuss aftercare plan. Pt reports he would like a referral to [facility] outpatient program in [place]. Pt states he had previously been in contact with the counselor and was told he could attend this group. Pt will need CD assessment update for referral, original assessment was completed on 3/24/22 by Annemarie Correa [facility] and is located in [facility] chart review. Pt was provided with assessment paperwork for update, will work on this and turn into  writer or RN when complete. Pt has Mita TILLMAN for insurance, AVS initiated.   CM continues     Update: Pt completed CD assessment update with Annemarie Tolbert [facility] for referral to [facility] outpatient in [place].  Susanna Osborne was notified so pt can be reached to   Treatment Follow-Up:  [facility]patient Treatment  Please call Susanna Osborne at [phone] to schedule intakeschedule intake after discharge. AVS complete           Labs:reviewed with patient     No results found for this or any previous visit (from the past 48 hour(s)).      Recent Results (from the past 240 hour(s))   Alcohol breath test POCT    Collection Time: 08/26/22  6:30 PM   Result Value Ref Range    Alcohol Breath Test 0.280 (A) 0.00 - 0.01   Asymptomatic COVID-19 Virus (Coronavirus) by PCR Nasopharyngeal    Collection Time: 08/26/22  7:05 PM    Specimen: Nasopharyngeal; Swab   Result Value Ref Range    SARS CoV2 PCR Negative Negative   Comprehensive metabolic panel    Collection Time: 08/26/22  7:10 PM   Result Value Ref Range    Sodium 142 133 - 144 mmol/L    Potassium 3.6 3.4 - 5.3 mmol/L    Chloride 106 94 - 109 mmol/L    Carbon Dioxide (CO2) 26 20 - 32 mmol/L    Anion Gap 10 3 - 14 mmol/L    Urea Nitrogen 18 7 - 30 mg/dL    Creatinine 1.29 (H) 0.66 - 1.25 mg/dL    Calcium 8.7 8.5 - 10.1 mg/dL    Glucose 176 (H) 70 - 99 mg/dL    Alkaline Phosphatase 77 40 - 150 U/L    AST 72 (H) 0 - 45 U/L    ALT 84 (H) 0 - 70 U/L    Protein Total 6.9 6.8 - 8.8 g/dL    Albumin 3.7 3.4 - 5.0 g/dL    Bilirubin Total 0.6 0.2 - 1.3 mg/dL    GFR Estimate 62 >60 mL/min/1.73m2   CBC with platelets and differential    Collection Time: 08/26/22  7:10 PM   Result Value Ref Range    WBC Count 4.2 4.0 - 11.0 10e3/uL    RBC Count 4.00 (L) 4.40 - 5.90 10e6/uL    Hemoglobin 11.4 (L) 13.3 - 17.7 g/dL    Hematocrit 34.5 (L) 40.0 - 53.0 %    MCV 86 78 - 100 fL    MCH 28.5 26.5 - 33.0 pg    MCHC 33.0 31.5 - 36.5 g/dL    RDW 12.9 10.0 -  15.0 %    Platelet Count 149 (L) 150 - 450 10e3/uL    % Neutrophils 52 %    % Lymphocytes 32 %    % Monocytes 8 %    % Eosinophils 5 %    % Basophils 2 %    % Immature Granulocytes 1 %    NRBCs per 100 WBC 0 <1 /100    Absolute Neutrophils 2.2 1.6 - 8.3 10e3/uL    Absolute Lymphocytes 1.4 0.8 - 5.3 10e3/uL    Absolute Monocytes 0.4 0.0 - 1.3 10e3/uL    Absolute Eosinophils 0.2 0.0 - 0.7 10e3/uL    Absolute Basophils 0.1 0.0 - 0.2 10e3/uL    Absolute Immature Granulocytes 0.0 <=0.4 10e3/uL    Absolute NRBCs 0.0 10e3/uL   Drug abuse screen 1 urine (ED)    Collection Time: 08/26/22  9:22 PM   Result Value Ref Range    Amphetamines Urine Screen Negative Screen Negative    Barbiturates Urine Screen Negative Screen Negative    Benzodiazepines Urine Screen Negative Screen Negative    Cannabinoids Urine Screen Negative Screen Negative    Cocaine Urine Screen Negative Screen Negative    Opiates Urine Screen Negative Screen Negative   GGT    Collection Time: 08/27/22  7:27 AM   Result Value Ref Range     (H) 0 - 75 U/L   Lipid panel    Collection Time: 08/27/22  7:27 AM   Result Value Ref Range    Cholesterol 352 (H) <200 mg/dL    Triglycerides 276 (H) <150 mg/dL    Direct Measure HDL 70 >=40 mg/dL    LDL Cholesterol Calculated 227 (H) <=100 mg/dL    Non HDL Cholesterol 282 (H) <130 mg/dL   TSH with free T4 reflex and/or T3 as indicated    Collection Time: 08/27/22  7:27 AM   Result Value Ref Range    TSH 0.69 0.40 - 4.00 mU/L   Vitamin B12    Collection Time: 08/27/22  7:27 AM   Result Value Ref Range    Vitamin B12 419 232 - 1,245 pg/mL   Folate    Collection Time: 08/27/22  7:27 AM   Result Value Ref Range    Folic Acid 26.0 4.6 - 34.8 ng/mL   Extra Purple Top Tube    Collection Time: 08/27/22  7:27 AM   Result Value Ref Range    Hold Specimen JIC    Lipase    Collection Time: 08/27/22  7:27 AM   Result Value Ref Range    Lipase 200 73 - 393 U/L   Hemoglobin A1c    Collection Time: 08/27/22  7:27 AM   Result Value  Ref Range    Hemoglobin A1C 5.7 (H) 0.0 - 5.6 %            Because this patient meets criteria for an Alcohol Use Disorder, I performed the following brief intervention on the date of this note:              1) Expressed concern that the patient is drinking at unhealthy levels known to increase their risk of alcohol related problems              2) Gave feedback linking alcohol use and health, including personalized feedback explaining how alcohol use can interact with their medical and/or psychiatric problems, and with prescribed medications.              3) Advised patient to abstain.    PT counseled on nicotine cessation and nicotine replacement provided    Discussed with patient many issues of addiction,triggers, relapse, and establishing a solid recovery program.    DISCHARGE MENTAL STATUS EXAMINATION:  The patient is alert, oriented x3.  Good fund of knowledge.  Good use of language.  Recent and remote memory, language, fund of knowledge are all adequate.  Euthymic mood congruent affect  Speech normal rate/rhythm linear tp no loose asso,The patient does not have any active suicidal or homicidal ideation.  Does not have any auditory or visual hallucination.  Fair insight/judgment At this time, the patient was stable to be discharged.        Pt was not determined to not be a danger to himself or others. At the current time of discharge, the patient does not meet criteria for involuntary hospitalization. On the day of discharge, the patient reports that they do not have suicidal or homicidal ideation and would never hurt themselves or others. Steps taken to minimize risk include: assessing patient s behavior and thought process daily during hospital stay, discharging patient with adequate plan for follow up for mental and physical health and discussing safety plan of returning to the hospital should the patient ever have thoughts of harming themselves or others. Therefore, based on all available evidence  including the factors cited above, the patient does not appear to be at imminent risk for self-harm, and is appropriate for outpatient level of care.     Educated about side effects/risk vs benefits /alternative including non treatment.Pt consented to be on medication.     .Total time spent on discharge summary more than 35 min  More than  20 min  planning, coordination of care, medication reconciliation and performance of physical exam on day of discharge.Care was coordinated with unit RN and unit therapist         Review of your medicines      UNREVIEWED medicines. Ask your doctor about these medicines      Dose / Directions   busPIRone 15 MG tablet  Commonly known as: BUSPAR  Used for: Generalized anxiety disorder      Dose: 15 mg  Take 1 tablet (15 mg) by mouth 2 times daily  Quantity: 180 tablet  Refills: 0     citalopram 20 MG tablet  Commonly known as: celeXA  Used for: Essential hypertension      Dose: 20 mg  Take 1 tablet (20 mg) by mouth daily  Quantity: 90 tablet  Refills: 0     hydrOXYzine 50 MG tablet  Commonly known as: ATARAX  Used for: Alcohol withdrawal with complication with inpatient treatment, with unspecified complication (H)      Dose:  mg  Take 1-2 tablets ( mg) by mouth 3 times daily as needed for itching or anxiety  Quantity: 60 tablet  Refills: 5     lisinopril 20 MG tablet  Commonly known as: ZESTRIL  Used for: Essential hypertension      Dose: 20 mg  Take 1 tablet (20 mg) by mouth daily  Quantity: 90 tablet  Refills: 0     naltrexone 50 MG tablet  Commonly known as: DEPADE/REVIA  Used for: Alcohol withdrawal with complication with inpatient treatment, with unspecified complication (H)      Dose: 50 mg  Take 1 tablet (50 mg) by mouth daily  Quantity: 90 tablet  Refills: 0     nicotine 14 MG/24HR 24 hr patch  Commonly known as: NICODERM CQ  Used for: Alcohol withdrawal with complication with inpatient treatment, with unspecified complication (H)      Dose: 1 patch  Place 1 patch  "onto the skin daily  Quantity: 30 patch  Refills: 1     nicotine 2 MG gum  Commonly known as: NICORETTE  Used for: Alcohol withdrawal with complication with inpatient treatment, with unspecified complication (H)      Dose: 2 mg  Place 1 each (2 mg) inside cheek every hour as needed for smoking cessation  Quantity: 100 each  Refills: 1     traZODone 50 MG tablet  Commonly known as: DESYREL  Used for: Alcohol withdrawal with complication with inpatient treatment, with unspecified complication (H)      Dose:  mg  Take 1-2 tablets ( mg) by mouth nightly as needed for sleep (may repeat after 60 minutes)  Quantity: 60 tablet  Refills: 0     VITAMIN B + C COMPLEX PO      Dose: 1 tablet  Take 1 tablet by mouth daily  Refills: 0             Disposition: home     Facts about COVID19 at www.cdc.gov/COVID19 and www.[place].gov/covid19     Keeping hands clean is one of the most important steps we can take to avoid getting sick and spreading germs to others.  Please wash your hands frequently and lather with soap for at least 20 seconds!     Medical Follow-Up:pcp 9/17/22       Treatment Follow-Up:  Treatment Follow-Up:  [facility] Outpatient Treatment  Please call Susanna Osborne at [phone] to schedule intake  .        \"Much or all of the text in this note was generated through the use of Dragon Dictate voice to text software. Errors in spelling or words which appear to be out of contact are unintentional, may be present due having escaped editing\"     "

## 2022-08-29 NOTE — DISCHARGE INSTRUCTIONS
Behavioral Discharge Planning and Instructions  THANK YOU FOR CHOOSING [facility]  3A  [phone]    Summary: You were admitted to Station 3A on 8/27/22 for detoxification from alcohol.  A medical exam was performed that included lab work. You have met with a  and opted to follow-up with outpatient treatment at [facility].  Please take care and make your recovery a daily priority, Durga!  It was a pleasure working with you and the entire treatment team here wishes you the very best in your recovery!     Recommendation:  Outpatient Treatment    Main Diagnoses:  Per Dr. Mame June MD:  303.90 (F10.20) Alcohol Use Disorder Severe    Major Treatments, Procedures and Findings:  You were treated for alcohol detoxification using [facility] protocol. You had a chemical dependency assessment update. You had labs drawn and those results were reviewed with you. Please take a copy of your lab work with you to your next primary care provider appointment.    Symptoms to Report:  If you experience more anxiety, confusion, sleeplessness, deep sadness or thoughts of suicide, notify your treatment team or notify your primary care provider. IF ANY OF THE SYMPTOMS YOU ARE EXPERIENCING ARE A MEDICAL EMERGENCY CALL 911 IMMEDIATELY.     Lifestyle Adjustment: Adjust your lifestyle to get enough sleep, relaxation, exercise and good nutrition. Continue to develop healthy coping skills to decrease stress and promote a sober living environment. Do not use mood altering substances including alcohol, illegal drugs or addictive medications other than what is currently prescribed.     Disposition: Home    Facts about COVID19 at www.cdc.gov/COVID19 and [place].gov/covid19    Keeping hands clean is one of the most important steps we can take to avoid getting sick and spreading germs to others.  Please wash your hands frequently and lather with soap for at least 20 seconds!    Medical Follow-Up:  Please follow up with your  primary care provider    Treatment Follow-Up:  [facility] Outpatient Treatment  Please call Susanna Dylon at [phone] to schedule intake    Recovery apps for your phone to locate current in person and zoom recovery meetings  [place] - meeting milena  AA  - meeting milena  Meeting guide - meeting milena  Quick NA meeting - meeting milena  [place]da- has various apps    Resources:  *due to covid-19 most AA/NA meetings are being held online however some are in-person or a hybrid combination please check online to verify*  Need Support Now? If you or someone you know is struggling or in crisis, help is available. Call or text 476 or chat [place]  AA meetings search for them at: https://aa-intergroup.org (worldwide meeting listings)  AA meetings for [place] area can be found online at: https://a[place].org (click local online meetings listings)  NA meetings for [place] area can be found online at: https://www.nam[place].org  (click find a meeting)  AA and NA Sponsors are excellent resources for support and you can find one at any support group meeting.   Alcoholics Anonymous (https://aa.org/): for information 24 hours/day  AA Intergroup service office in [place] (http://www.aastpa[place].org/) 843.198.1515  AA Intergroup service office in [facility]: [phone]. (http://www.aamin[place].org/)  Narcotics Anonymous (www.nam[place].org) (418) 170-2446  https://aafairviewriverside.org/meetings  SMART Recovery - self management for addiction recovery:  www.smartrecovery.org  Pathways ~ A Health Crisis Resource & Support Center:  388.380.6732.  https://prescribetoprevent.org/patient-education/videos/  http://www.harmreduction.org  [facility] 381-195-0834  Support Group:  AA/NA and Sponsor/support.  National [place] on Mental Illness (www.[place].dionne.org): 646.110.9080 or 771-441-4417.  Alcoholics Anonymous (www.alcoholics-anonymous.org): Check your phone book for your local  chapter.  Suicide Awareness Voices of Education (SAVE) (www.save.org): 541-605-SQWU (7283)  National Suicide Prevention Line (www.mentalhealthmn.org): 307-819-TPMC (2785)  Mental Health Consumer/Survivor Network of [place] (www.mhcsn.net): 826.355.3293 or 560-938-1100  Mental Health Association of [place] (www.mentalhealth.org): 923.917.9180 or 182-563-4944   Substance Abuse and Mental Health Services (www.samhsa.gov)  [place] Opioid Prevention Coalition: www.opioidcoalition.org    [place] Recovery Connection (MRC)  [facility] connects people seeking recovery to resources that help foster and sustain long-term recovery.  Whether you are seeking resources for treatment, transportation, housing, job training, education, health care or other pathways to recovery, [facility] is a great place to start.  [phone].  www.[employer]    Great Pod casts for nutrition and wellness  Listen on Apple Podcasts  [facility] [facility]   Nutrition       Understand the connection between what you eat and how you feel. Hosted by licensed nutritionists and dietitians from [employer] [facility] we share practical, real-life solutions for healthier living through nutrition.     General Medication Instructions:   See your medication sheet(s) for instructions.   Take all medications as prescribed.  Make no changes unless your primary care provider suggests them.   Go to all your primary care provider visits.  Be sure to have all your required lab tests. This way, your medicines can be refilled on time.  Do not use any forms of alcohol.    Please Note:  If you have any questions at anytime after you are discharged please call [facility] detox unit 3AW at [phone].  [facility], Behavioral Intake [phone]  Medical Records call [phone]  Outpatient Behavioral Intake call [phone]  LP+ Wait List/Bed Availability call [phone]    Please remember to take all of your  behavioral discharge planning and lab paperwork to any follow up appointments, it contains your lab results, diagnosis, medication list and discharge recommendations.      THANK YOU FOR CHOOSING [email or web address]

## 2022-08-30 ENCOUNTER — TELEPHONE (OUTPATIENT)
Dept: BEHAVIORAL HEALTH | Facility: CLINIC | Age: 62
End: 2022-08-30

## 2022-09-13 ENCOUNTER — OFFICE VISIT (OUTPATIENT)
Dept: INTERNAL MEDICINE | Facility: CLINIC | Age: 62
End: 2022-09-13
Payer: COMMERCIAL

## 2022-09-13 VITALS
BODY MASS INDEX: 30.1 KG/M2 | HEART RATE: 98 BPM | SYSTOLIC BLOOD PRESSURE: 148 MMHG | TEMPERATURE: 98.3 F | OXYGEN SATURATION: 97 % | WEIGHT: 215 LBS | HEIGHT: 71 IN | DIASTOLIC BLOOD PRESSURE: 92 MMHG

## 2022-09-13 DIAGNOSIS — F41.1 GENERALIZED ANXIETY DISORDER: ICD-10-CM

## 2022-09-13 DIAGNOSIS — F10.188 ALCOHOL ABUSE WITH ALCOHOL-INDUCED MENTAL DISORDER (H): Primary | ICD-10-CM

## 2022-09-13 DIAGNOSIS — I10 ESSENTIAL HYPERTENSION: Chronic | ICD-10-CM

## 2022-09-13 DIAGNOSIS — S51.811A LACERATION OF RIGHT FOREARM, INITIAL ENCOUNTER: ICD-10-CM

## 2022-09-13 PROCEDURE — 90750 HZV VACC RECOMBINANT IM: CPT | Performed by: INTERNAL MEDICINE

## 2022-09-13 PROCEDURE — 99214 OFFICE O/P EST MOD 30 MIN: CPT | Mod: 25 | Performed by: INTERNAL MEDICINE

## 2022-09-13 PROCEDURE — 90471 IMMUNIZATION ADMIN: CPT | Performed by: INTERNAL MEDICINE

## 2022-09-13 PROCEDURE — 96127 BRIEF EMOTIONAL/BEHAV ASSMT: CPT | Performed by: INTERNAL MEDICINE

## 2022-09-13 RX ORDER — GABAPENTIN 300 MG/1
CAPSULE ORAL
Qty: 60 CAPSULE | Refills: 1 | Status: ON HOLD | OUTPATIENT
Start: 2022-09-13 | End: 2022-11-15

## 2022-09-13 ASSESSMENT — PATIENT HEALTH QUESTIONNAIRE - PHQ9
SUM OF ALL RESPONSES TO PHQ QUESTIONS 1-9: 3
SUM OF ALL RESPONSES TO PHQ QUESTIONS 1-9: 3
10. IF YOU CHECKED OFF ANY PROBLEMS, HOW DIFFICULT HAVE THESE PROBLEMS MADE IT FOR YOU TO DO YOUR WORK, TAKE CARE OF THINGS AT HOME, OR GET ALONG WITH OTHER PEOPLE: NOT DIFFICULT AT ALL

## 2022-09-13 NOTE — PATIENT INSTRUCTIONS
Today you received the SHINGLES vaccine.    Please return between 11/12/2022 and 3/12/2023 for your 2nd and final dose of the vaccine.  **This return visit may be made as a NURSE ONLY visit.

## 2022-09-13 NOTE — PROGRESS NOTES
"  Assessment & Plan     Encounter Diagnoses   Name Primary?     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  Feel he really needs inpatient program  -BP elevated- some of this is related to his alcohol, some due to anxiety.  -add gabapentin for anxiety. Should be f/u with psych  -cont current anti-htns  -keep lac covered with ointment             BMI:   Estimated body mass index is 29.99 kg/m  as calculated from the following:    Height as of this encounter: 1.803 m (5' 11\").    Weight as of this encounter: 97.5 kg (215 lb).   Weight management plan: Discussed healthy diet and exercise guidelines    See Patient Instructions    Return in about 2 months (around 11/13/2022).    Rohit Solitario MD  [facility]    Kathy Calixto is a 62 year old, presenting for the following health issues:  Recheck Medication and Hypertension      History of Present Illness       Hypertension: He presents for follow up of hypertension.  He does not check blood pressure  regularly outside of the clinic. Outside blood pressures have been over 140/90. He follows a low salt diet.      Today's PHQ-9         PHQ-9 Total Score: 3    PHQ-9 Q9 Thoughts of better off dead/self-harm past 2 weeks :   Not at all    How difficult have these problems made it for you to do your work, take care of things at home, or get along with other people: Not difficult at all             Review of Systems         Objective    BP (!) 148/92   Pulse 98   Temp 98.3  F (36.8  C)   Ht 1.803 m (5' 11\")   Wt 97.5 kg (215 lb)   SpO2 97%   BMI 29.99 kg/m    Body mass index is 29.99 kg/m .  Physical Exam   GENERAL: alert and anxious  CV: regular rates and rhythm  PSYCH: anxious                    "

## 2022-09-21 NOTE — GROUP NOTE
"Group Therapy Documentation    PATIENT'S NAME: Durga Aguirre  MRN:   [id number]  :   1960  ACCT. NUMBER: [id number]  DATE OF SERVICE: 20  START TIME:  5:30 PM  END TIME:  7:30 PM  FACILITATOR(S): Marquis Quintero LADC  TOPIC: BEH Group Therapy  Number of patients attending the group:  10  Group Length:  2 Hours    Group Therapy Type: Addiction    Summary of Group / Topics Discussed:    Co-occurring Illness, Choices in Recovery, and Reviewed Previous Skills Group Topic (PAWS)  Client participated with the check- in process.  Post Acute Withdrawal Syndrome was reviewed with Q and A.  Anti-craving medications were discussed including benefits of use as well as side effects.  Group members presented their  10 Worst Consequences , and \"Anxiety and Recovery  assignments with discussion.       Group Attendance:  Attended group session    Patient's response to the group topic/interactions:  cooperative with task, discussed personal experience with topic and listened actively    Patient appeared to be Attentive.        Client specific details:  Client participated, provided supportive feedback, and spoke about how he had gained understanding of co-occurring disorders and the role substance use had in exacerbating mental health issues as presented by group peers in their assignments.  Client also related to behaviors, thoughts, and feelings described in peer    10 Worst Consequences  assignments.  Client expressed reluctance to complete his own Anxiety and Recovery assignment as it reminded him of painful memories.  " Awake/Alert/Cooperative

## 2022-09-22 ENCOUNTER — NURSE TRIAGE (OUTPATIENT)
Dept: NURSING | Facility: CLINIC | Age: 62
End: 2022-09-22

## 2022-09-23 NOTE — TELEPHONE ENCOUNTER
"Patient is calling about low blood pressure.    Patient was having SBP in 70s, but he states he keeps taking his blood pressure and the systolic is slowly increasing. During call, BP 80/56, pulse 86. They increased lisinopril from 20 to 30 mg about a month ago. He states he \"tipped over\" yesterday at a coffee shop, lower part of body gave out and vision changed. An ambulance came and wanted to bring him to hospital but he refused at that time. 114/70 BP this morning. He states he took his dog for 2 walks today. If he sits a long time and stands up he gets dizzy.    Per protocol, recommend patient go to ED now. Patient states he is going to drink a few glasses of water and keep checking his blood pressure. If he decides to go to the ED, he states he will have another adult drive as he has had a few beers.    Purvi Ardon RN  [place] Nurse Advisor  7:14 PM  9/22/2022      Reason for Disposition    [1] Systolic BP < 80 AND [2] NOT dizzy, lightheaded or weak    Additional Information    Negative: Started suddenly after an allergic medicine, an allergic food, or bee sting    Negative: Shock suspected (e.g., cold/pale/clammy skin, too weak to stand, low BP, rapid pulse)    Negative: Difficult to awaken or acting confused (e.g., disoriented, slurred speech)    Negative: Fainted    Negative: [1] Systolic BP < 90 AND [2] dizzy, lightheaded, or weak    Negative: Chest pain    Negative: Bleeding (e.g., vomiting blood, rectal bleeding or tarry stools, severe vaginal bleeding)(Exception: fainted from sight of small amount of blood; small cut or abrasion)    Negative: Extra heart beats or heart is beating fast (i.e., \"palpitations\")    Negative: Sounds like a life-threatening emergency to the triager    Protocols used: BLOOD PRESSURE - LOW-A-AH      "

## 2022-09-28 DIAGNOSIS — F10.939 ALCOHOL WITHDRAWAL WITH COMPLICATION WITH INPATIENT TREATMENT, WITH UNSPECIFIED COMPLICATION (H): ICD-10-CM

## 2022-09-28 DIAGNOSIS — F10.20 UNCOMPLICATED ALCOHOL DEPENDENCE (H): ICD-10-CM

## 2022-09-30 RX ORDER — NALTREXONE HYDROCHLORIDE 50 MG/1
50 TABLET, FILM COATED ORAL DAILY
Qty: 90 TABLET | Refills: 0 | Status: ON HOLD | OUTPATIENT
Start: 2022-09-30 | End: 2022-11-15

## 2022-09-30 RX ORDER — LISINOPRIL 30 MG/1
30 TABLET ORAL DAILY
Qty: 90 TABLET | Refills: 0 | Status: ON HOLD | OUTPATIENT
Start: 2022-09-30 | End: 2022-11-15

## 2022-09-30 RX ORDER — HYDROCHLOROTHIAZIDE 12.5 MG/1
12.5 TABLET ORAL DAILY
Qty: 90 TABLET | Refills: 0 | Status: ON HOLD | OUTPATIENT
Start: 2022-09-30 | End: 2022-11-15

## 2022-09-30 RX ORDER — BUSPIRONE HYDROCHLORIDE 15 MG/1
15 TABLET ORAL 2 TIMES DAILY
Qty: 180 TABLET | Refills: 0 | Status: ON HOLD | OUTPATIENT
Start: 2022-09-30 | End: 2022-11-15

## 2022-09-30 RX ORDER — HYDROXYZINE HYDROCHLORIDE 50 MG/1
50-100 TABLET, FILM COATED ORAL 3 TIMES DAILY PRN
Qty: 180 TABLET | Refills: 0 | Status: ON HOLD | OUTPATIENT
Start: 2022-09-30 | End: 2022-11-15

## 2022-09-30 RX ORDER — SIMVASTATIN 40 MG
40 TABLET ORAL EVERY EVENING
Qty: 90 TABLET | Refills: 0 | Status: SHIPPED | OUTPATIENT
Start: 2022-09-30 | End: 2023-01-24

## 2022-09-30 RX ORDER — MULTIPLE VITAMINS W/ MINERALS TAB 9MG-400MCG
1 TAB ORAL DAILY
Qty: 90 TABLET | Refills: 0 | Status: SHIPPED | OUTPATIENT
Start: 2022-09-30 | End: 2023-01-24

## 2022-09-30 RX ORDER — LANOLIN ALCOHOL/MO/W.PET/CERES
100 CREAM (GRAM) TOPICAL DAILY
Qty: 90 TABLET | Refills: 0 | Status: ON HOLD | OUTPATIENT
Start: 2022-09-30 | End: 2022-11-15

## 2022-09-30 NOTE — TELEPHONE ENCOUNTER
Routing refill request to provider for review/approval because:  These were all last signed by Dr. June routing to pcp to review all pended for 90 days     Adalid Whitehead RN  [facility] Triage Nurse

## 2022-10-05 ENCOUNTER — TELEPHONE (OUTPATIENT)
Dept: BEHAVIORAL HEALTH | Facility: CLINIC | Age: 62
End: 2022-10-05

## 2022-10-09 ENCOUNTER — HEALTH MAINTENANCE LETTER (OUTPATIENT)
Age: 62
End: 2022-10-09

## 2022-10-19 ENCOUNTER — TELEPHONE (OUTPATIENT)
Dept: BEHAVIORAL HEALTH | Facility: CLINIC | Age: 62
End: 2022-10-19

## 2022-10-29 ENCOUNTER — OFFICE VISIT (OUTPATIENT)
Dept: URGENT CARE | Facility: URGENT CARE | Age: 62
End: 2022-10-29
Payer: COMMERCIAL

## 2022-10-29 VITALS
WEIGHT: 217.6 LBS | BODY MASS INDEX: 30.46 KG/M2 | TEMPERATURE: 98.5 F | HEIGHT: 71 IN | OXYGEN SATURATION: 96 % | DIASTOLIC BLOOD PRESSURE: 77 MMHG | RESPIRATION RATE: 18 BRPM | SYSTOLIC BLOOD PRESSURE: 119 MMHG | HEART RATE: 77 BPM

## 2022-10-29 DIAGNOSIS — M79.672 LEFT FOOT PAIN: Primary | ICD-10-CM

## 2022-10-29 DIAGNOSIS — F10.20 UNCOMPLICATED ALCOHOL DEPENDENCE (H): ICD-10-CM

## 2022-10-29 PROCEDURE — 99214 OFFICE O/P EST MOD 30 MIN: CPT | Performed by: NURSE PRACTITIONER

## 2022-10-29 NOTE — PROGRESS NOTES
"Assessment & Plan     Uncomplicated alcohol dependence (H)  Declined further assistance at this time - has a counselor from his PCP    Left foot pain  Declined XR today.    Will continue ibuprofen PRN pain and try to avoid further falls.         Return in about 1 week (around 11/5/2022) for with regular provider if symptoms persist.    RONAL Spencer CNP  [facility] ELMER Calixto is a 62 year old male who presents to clinic today for the following health issues:  Chief Complaint   Patient presents with     Foot Problems     Swelling and pain in both foot for the last few weeks. No evidence of trauma      HPI    MS Injury/Pain    Onset of symptoms was 1 week(s) ago.  Location: left foot  Context:       The injury happened while at home      Mechanism: fall       Patient experienced immediate pain, immediate swelling, was able to bear weight directly after injury, no deformity was noted by the patient  Course of symptoms is worsening.    Severity moderate  Current and Associated symptoms: Pain, Swelling and Stiffness  Denies  Bruising, Warmth and Redness  Aggravating Factors: walking, weight-bearing and stairs  Therapies to improve symptoms include: ice and ibuprofen  This is not the first time this type of problem has occurred for this patient.    Often falls when he is drinking in the evenings and is interested in quiting but is embarrassed by his drinking  Has been to detox several times  Thinks he needs a counselor to talk through why he is drinking.      Review of Systems  Constitutional, HEENT, cardiovascular, pulmonary, GI, , musculoskeletal, neuro, skin, endocrine and psych systems are negative, except as otherwise noted.      Objective    /77 (BP Location: Left arm, Patient Position: Sitting, Cuff Size: Adult Large)   Pulse 77   Temp 98.5  F (36.9  C) (Oral)   Resp 18   Ht 1.803 m (5' 11\")   Wt 98.7 kg (217 lb 9.6 oz)   SpO2 96%   BMI 30.35 kg/m  "   Physical Exam   GENERAL: healthy, alert and no distress  EYES: Eyes grossly normal to inspection, PERRL and conjunctivae and sclerae normal  HENT: ear canals and TM's normal, nose and mouth without ulcers or lesions  NECK: no adenopathy, no asymmetry, masses, or scars and thyroid normal to palpation  RESP: lungs clear to auscultation - no rales, rhonchi or wheezes  CV: regular rate and rhythm, normal S1 S2, no S3 or S4, no murmur, click or rub, no peripheral edema and peripheral pulses strong  MS: normal range of motion, 1+ edema to left ankle, peripheral pulses normal and LLE exam shows normal strength and muscle mass, no erythema, induration, or nodules and no evidence of joint effusion  SKIN: no suspicious lesions or rashes  PSYCH: concentration poor, inattentive and affect flat

## 2022-10-31 NOTE — PROGRESS NOTES
08/27/22 1800   Group Therapy Session   Time Session Began 1645   Time Session Ended 1730   Total Time patient participated (minutes) 45   Total # Attendees 4   Group Type life skill;expressive therapy   Group Topic Covered cognitive therapy techniques;coping skills/lifestyle management;relationship   Group Session Detail DBT FAST skill & Song examples   Patient Response/Contribution cooperative with task   Patient Response Detail Actively participated in discussion following 3 song examples where patients identified lyrics that resonated or did not match the FAST DBT skill for interpersonal effectiveness.  Shared from their experience and of how they interpreted the songwriters intentions and how healthy they were. Moderately engaged participation.   Stated was not feeling well.          Anesthesia Type: 1% lidocaine without epinephrine and a 1:10 solution of 8.4% sodium bicarbonate with 22mg clindamycin

## 2022-11-01 DIAGNOSIS — F10.939 ALCOHOL WITHDRAWAL WITH COMPLICATION WITH INPATIENT TREATMENT, WITH UNSPECIFIED COMPLICATION (H): ICD-10-CM

## 2022-11-01 RX ORDER — TRAZODONE HYDROCHLORIDE 50 MG/1
TABLET, FILM COATED ORAL
Qty: 30 TABLET | Refills: 0 | Status: ON HOLD | OUTPATIENT
Start: 2022-11-01 | End: 2022-11-15

## 2022-11-01 NOTE — TELEPHONE ENCOUNTER
Routing refill request to provider for review/approval because:  Drug interaction warning    Justice L. Phoenix, RN

## 2022-11-12 ENCOUNTER — TELEPHONE (OUTPATIENT)
Dept: BEHAVIORAL HEALTH | Facility: CLINIC | Age: 62
End: 2022-11-12

## 2022-11-12 ENCOUNTER — HOSPITAL ENCOUNTER (INPATIENT)
Facility: CLINIC | Age: 62
LOS: 1 days | Discharge: PSYCHIATRIC OR SUBSTANCE USE FACILITY | End: 2022-11-12
Attending: INTERNAL MEDICINE | Admitting: PSYCHIATRY & NEUROLOGY
Payer: COMMERCIAL

## 2022-11-12 ENCOUNTER — HOSPITAL ENCOUNTER (INPATIENT)
Facility: CLINIC | Age: 62
LOS: 3 days | Discharge: HOME | End: 2022-11-15
Attending: PSYCHIATRY & NEUROLOGY | Admitting: PSYCHIATRY & NEUROLOGY
Payer: COMMERCIAL

## 2022-11-12 VITALS
OXYGEN SATURATION: 98 % | SYSTOLIC BLOOD PRESSURE: 189 MMHG | DIASTOLIC BLOOD PRESSURE: 120 MMHG | TEMPERATURE: 97.8 F | RESPIRATION RATE: 17 BRPM | HEART RATE: 76 BPM

## 2022-11-12 DIAGNOSIS — Z20.822 LAB TEST NEGATIVE FOR COVID-19 VIRUS: ICD-10-CM

## 2022-11-12 DIAGNOSIS — F10.20 UNCOMPLICATED ALCOHOL DEPENDENCE (H): ICD-10-CM

## 2022-11-12 DIAGNOSIS — F10.229 ALCOHOL DEPENDENCE WITH INTOXICATION WITH COMPLICATION (H): Primary | ICD-10-CM

## 2022-11-12 DIAGNOSIS — F10.939 ALCOHOL WITHDRAWAL WITH COMPLICATION WITH INPATIENT TREATMENT, WITH UNSPECIFIED COMPLICATION (H): ICD-10-CM

## 2022-11-12 DIAGNOSIS — F10.220 ACUTE ALCOHOLIC INTOXICATION IN ALCOHOLISM WITHOUT COMPLICATION (H): ICD-10-CM

## 2022-11-12 DIAGNOSIS — F32.A DEPRESSION, UNSPECIFIED DEPRESSION TYPE: ICD-10-CM

## 2022-11-12 DIAGNOSIS — I10 BENIGN ESSENTIAL HYPERTENSION: ICD-10-CM

## 2022-11-12 DIAGNOSIS — F41.9 ANXIETY: ICD-10-CM

## 2022-11-12 DIAGNOSIS — I10 ESSENTIAL HYPERTENSION: ICD-10-CM

## 2022-11-12 LAB
ALBUMIN SERPL BCG-MCNC: 4.6 G/DL (ref 3.5–5.2)
ALBUMIN UR-MCNC: 70 MG/DL
ALP SERPL-CCNC: 111 U/L (ref 40–129)
ALT SERPL W P-5'-P-CCNC: 87 U/L (ref 10–50)
AMPHETAMINES UR QL SCN: NORMAL
ANION GAP SERPL CALCULATED.3IONS-SCNC: 15 MMOL/L (ref 7–15)
APPEARANCE UR: CLEAR
AST SERPL W P-5'-P-CCNC: 127 U/L (ref 10–50)
BARBITURATES UR QL SCN: NORMAL
BASOPHILS # BLD AUTO: 0.1 10E3/UL (ref 0–0.2)
BASOPHILS NFR BLD AUTO: 2 %
BENZODIAZ UR QL SCN: NORMAL
BILIRUB SERPL-MCNC: 0.6 MG/DL
BILIRUB UR QL STRIP: NEGATIVE
BUN SERPL-MCNC: 14.6 MG/DL (ref 8–23)
BZE UR QL SCN: NORMAL
CALCIUM SERPL-MCNC: 9.2 MG/DL (ref 8.8–10.2)
CANNABINOIDS UR QL SCN: NORMAL
CHLORIDE SERPL-SCNC: 106 MMOL/L (ref 98–107)
COLOR UR AUTO: YELLOW
CREAT SERPL-MCNC: 0.74 MG/DL (ref 0.67–1.17)
CRP SERPL-MCNC: <3 MG/L
DEPRECATED HCO3 PLAS-SCNC: 27 MMOL/L (ref 22–29)
EOSINOPHIL # BLD AUTO: 0.1 10E3/UL (ref 0–0.7)
EOSINOPHIL NFR BLD AUTO: 3 %
ERYTHROCYTE [DISTWIDTH] IN BLOOD BY AUTOMATED COUNT: 16.6 % (ref 10–15)
ETHANOL SERPL-MCNC: 0.39 G/DL
GFR SERPL CREATININE-BSD FRML MDRD: >90 ML/MIN/1.73M2
GLUCOSE SERPL-MCNC: 125 MG/DL (ref 70–99)
GLUCOSE UR STRIP-MCNC: NEGATIVE MG/DL
HCT VFR BLD AUTO: 38.5 % (ref 40–53)
HGB BLD-MCNC: 12.4 G/DL (ref 13.3–17.7)
HGB UR QL STRIP: ABNORMAL
IMM GRANULOCYTES # BLD: 0 10E3/UL
IMM GRANULOCYTES NFR BLD: 1 %
INR PPP: 0.91 (ref 0.85–1.15)
KETONES UR STRIP-MCNC: NEGATIVE MG/DL
LEUKOCYTE ESTERASE UR QL STRIP: NEGATIVE
LIPASE SERPL-CCNC: 94 U/L (ref 13–60)
LYMPHOCYTES # BLD AUTO: 1 10E3/UL (ref 0.8–5.3)
LYMPHOCYTES NFR BLD AUTO: 32 %
MAGNESIUM SERPL-MCNC: 2.1 MG/DL (ref 1.7–2.3)
MCH RBC QN AUTO: 29.6 PG (ref 26.5–33)
MCHC RBC AUTO-ENTMCNC: 32.2 G/DL (ref 31.5–36.5)
MCV RBC AUTO: 92 FL (ref 78–100)
MONOCYTES # BLD AUTO: 0.2 10E3/UL (ref 0–1.3)
MONOCYTES NFR BLD AUTO: 6 %
MUCOUS THREADS #/AREA URNS LPF: PRESENT /LPF
NEUTROPHILS # BLD AUTO: 1.8 10E3/UL (ref 1.6–8.3)
NEUTROPHILS NFR BLD AUTO: 56 %
NITRATE UR QL: NEGATIVE
NRBC # BLD AUTO: 0 10E3/UL
NRBC BLD AUTO-RTO: 0 /100
OPIATES UR QL SCN: NORMAL
PH UR STRIP: 5.5 [PH] (ref 5–7)
PHOSPHATE SERPL-MCNC: 3.7 MG/DL (ref 2.5–4.5)
PLATELET # BLD AUTO: 90 10E3/UL (ref 150–450)
POTASSIUM SERPL-SCNC: 3.5 MMOL/L (ref 3.4–5.3)
PROT SERPL-MCNC: 7.3 G/DL (ref 6.4–8.3)
RBC # BLD AUTO: 4.19 10E6/UL (ref 4.4–5.9)
RBC URINE: 3 /HPF
SARS-COV-2 RNA RESP QL NAA+PROBE: NEGATIVE
SODIUM SERPL-SCNC: 148 MMOL/L (ref 136–145)
SP GR UR STRIP: 1.03 (ref 1–1.03)
UROBILINOGEN UR STRIP-MCNC: NORMAL MG/DL
WBC # BLD AUTO: 3.2 10E3/UL (ref 4–11)
WBC URINE: 1 /HPF

## 2022-11-12 PROCEDURE — 250N000013 HC RX MED GY IP 250 OP 250 PS 637: Performed by: NURSE PRACTITIONER

## 2022-11-12 PROCEDURE — 80053 COMPREHEN METABOLIC PANEL: CPT | Performed by: INTERNAL MEDICINE

## 2022-11-12 PROCEDURE — 250N000013 HC RX MED GY IP 250 OP 250 PS 637: Performed by: CLINICAL NURSE SPECIALIST

## 2022-11-12 PROCEDURE — 83690 ASSAY OF LIPASE: CPT | Performed by: INTERNAL MEDICINE

## 2022-11-12 PROCEDURE — 99285 EMERGENCY DEPT VISIT HI MDM: CPT | Mod: 25 | Performed by: INTERNAL MEDICINE

## 2022-11-12 PROCEDURE — 120N000002 HC R&B MED SURG/OB UMMC

## 2022-11-12 PROCEDURE — 96361 HYDRATE IV INFUSION ADD-ON: CPT | Performed by: INTERNAL MEDICINE

## 2022-11-12 PROCEDURE — 83735 ASSAY OF MAGNESIUM: CPT | Performed by: INTERNAL MEDICINE

## 2022-11-12 PROCEDURE — 85025 COMPLETE CBC W/AUTO DIFF WBC: CPT | Performed by: INTERNAL MEDICINE

## 2022-11-12 PROCEDURE — 250N000013 HC RX MED GY IP 250 OP 250 PS 637: Performed by: PSYCHIATRY & NEUROLOGY

## 2022-11-12 PROCEDURE — 96360 HYDRATION IV INFUSION INIT: CPT | Performed by: INTERNAL MEDICINE

## 2022-11-12 PROCEDURE — 99285 EMERGENCY DEPT VISIT HI MDM: CPT | Performed by: INTERNAL MEDICINE

## 2022-11-12 PROCEDURE — U0005 INFEC AGEN DETEC AMPLI PROBE: HCPCS | Performed by: INTERNAL MEDICINE

## 2022-11-12 PROCEDURE — 85610 PROTHROMBIN TIME: CPT | Performed by: INTERNAL MEDICINE

## 2022-11-12 PROCEDURE — 81001 URINALYSIS AUTO W/SCOPE: CPT | Performed by: INTERNAL MEDICINE

## 2022-11-12 PROCEDURE — 258N000003 HC RX IP 258 OP 636: Performed by: INTERNAL MEDICINE

## 2022-11-12 PROCEDURE — HZ2ZZZZ DETOXIFICATION SERVICES FOR SUBSTANCE ABUSE TREATMENT: ICD-10-PCS | Performed by: PSYCHIATRY & NEUROLOGY

## 2022-11-12 PROCEDURE — 36415 COLL VENOUS BLD VENIPUNCTURE: CPT | Performed by: INTERNAL MEDICINE

## 2022-11-12 PROCEDURE — 128N000004 HC R&B CD ADULT

## 2022-11-12 PROCEDURE — 86140 C-REACTIVE PROTEIN: CPT | Performed by: INTERNAL MEDICINE

## 2022-11-12 PROCEDURE — 250N000013 HC RX MED GY IP 250 OP 250 PS 637: Performed by: INTERNAL MEDICINE

## 2022-11-12 PROCEDURE — C9803 HOPD COVID-19 SPEC COLLECT: HCPCS | Performed by: INTERNAL MEDICINE

## 2022-11-12 PROCEDURE — 84100 ASSAY OF PHOSPHORUS: CPT | Performed by: INTERNAL MEDICINE

## 2022-11-12 PROCEDURE — 80307 DRUG TEST PRSMV CHEM ANLYZR: CPT | Performed by: INTERNAL MEDICINE

## 2022-11-12 PROCEDURE — 82077 ASSAY SPEC XCP UR&BREATH IA: CPT | Performed by: INTERNAL MEDICINE

## 2022-11-12 RX ORDER — MULTIPLE VITAMINS W/ MINERALS TAB 9MG-400MCG
1 TAB ORAL DAILY
Status: DISCONTINUED | OUTPATIENT
Start: 2022-11-13 | End: 2022-11-15 | Stop reason: HOSPADM

## 2022-11-12 RX ORDER — SIMVASTATIN 40 MG
40 TABLET ORAL EVERY EVENING
Status: DISCONTINUED | OUTPATIENT
Start: 2022-11-12 | End: 2022-11-12 | Stop reason: HOSPADM

## 2022-11-12 RX ORDER — ONDANSETRON 4 MG/1
4 TABLET, ORALLY DISINTEGRATING ORAL EVERY 6 HOURS PRN
Status: DISCONTINUED | OUTPATIENT
Start: 2022-11-12 | End: 2022-11-15 | Stop reason: HOSPADM

## 2022-11-12 RX ORDER — HYDROXYZINE HYDROCHLORIDE 25 MG/1
25 TABLET, FILM COATED ORAL EVERY 4 HOURS PRN
Status: DISCONTINUED | OUTPATIENT
Start: 2022-11-12 | End: 2022-11-12 | Stop reason: HOSPADM

## 2022-11-12 RX ORDER — MAGNESIUM HYDROXIDE/ALUMINUM HYDROXICE/SIMETHICONE 120; 1200; 1200 MG/30ML; MG/30ML; MG/30ML
30 SUSPENSION ORAL EVERY 4 HOURS PRN
Status: DISCONTINUED | OUTPATIENT
Start: 2022-11-12 | End: 2022-11-12 | Stop reason: HOSPADM

## 2022-11-12 RX ORDER — OLANZAPINE 5 MG/1
5-10 TABLET, ORALLY DISINTEGRATING ORAL EVERY 6 HOURS PRN
Status: DISCONTINUED | OUTPATIENT
Start: 2022-11-12 | End: 2022-11-12 | Stop reason: HOSPADM

## 2022-11-12 RX ORDER — CLONIDINE HYDROCHLORIDE 0.1 MG/1
0.1 TABLET ORAL ONCE
Status: COMPLETED | OUTPATIENT
Start: 2022-11-12 | End: 2022-11-12

## 2022-11-12 RX ORDER — AMOXICILLIN 250 MG
1 CAPSULE ORAL 2 TIMES DAILY PRN
Status: DISCONTINUED | OUTPATIENT
Start: 2022-11-12 | End: 2022-11-12 | Stop reason: HOSPADM

## 2022-11-12 RX ORDER — MULTIPLE VITAMINS W/ MINERALS TAB 9MG-400MCG
1 TAB ORAL DAILY
Status: DISCONTINUED | OUTPATIENT
Start: 2022-11-12 | End: 2022-11-12 | Stop reason: HOSPADM

## 2022-11-12 RX ORDER — ACETAMINOPHEN 325 MG/1
650 TABLET ORAL EVERY 4 HOURS PRN
Status: DISCONTINUED | OUTPATIENT
Start: 2022-11-12 | End: 2022-11-15 | Stop reason: HOSPADM

## 2022-11-12 RX ORDER — FLUMAZENIL 0.1 MG/ML
0.2 INJECTION, SOLUTION INTRAVENOUS
Status: DISCONTINUED | OUTPATIENT
Start: 2022-11-12 | End: 2022-11-12 | Stop reason: HOSPADM

## 2022-11-12 RX ORDER — DIAZEPAM 10 MG/2ML
5-10 INJECTION, SOLUTION INTRAMUSCULAR; INTRAVENOUS EVERY 30 MIN PRN
Status: DISCONTINUED | OUTPATIENT
Start: 2022-11-12 | End: 2022-11-12 | Stop reason: HOSPADM

## 2022-11-12 RX ORDER — HALOPERIDOL 5 MG/ML
2.5-5 INJECTION INTRAMUSCULAR EVERY 6 HOURS PRN
Status: DISCONTINUED | OUTPATIENT
Start: 2022-11-12 | End: 2022-11-12 | Stop reason: HOSPADM

## 2022-11-12 RX ORDER — MAGNESIUM HYDROXIDE/ALUMINUM HYDROXICE/SIMETHICONE 120; 1200; 1200 MG/30ML; MG/30ML; MG/30ML
30 SUSPENSION ORAL EVERY 4 HOURS PRN
Status: DISCONTINUED | OUTPATIENT
Start: 2022-11-12 | End: 2022-11-15 | Stop reason: HOSPADM

## 2022-11-12 RX ORDER — HYDROXYZINE HYDROCHLORIDE 25 MG/1
25 TABLET, FILM COATED ORAL EVERY 4 HOURS PRN
Status: DISCONTINUED | OUTPATIENT
Start: 2022-11-12 | End: 2022-11-15 | Stop reason: HOSPADM

## 2022-11-12 RX ORDER — DIAZEPAM 5 MG
5-20 TABLET ORAL EVERY 30 MIN PRN
Status: DISCONTINUED | OUTPATIENT
Start: 2022-11-12 | End: 2022-11-15 | Stop reason: HOSPADM

## 2022-11-12 RX ORDER — ONDANSETRON 4 MG/1
4 TABLET, ORALLY DISINTEGRATING ORAL EVERY 6 HOURS PRN
Status: DISCONTINUED | OUTPATIENT
Start: 2022-11-12 | End: 2022-11-12 | Stop reason: HOSPADM

## 2022-11-12 RX ORDER — LOPERAMIDE HCL 2 MG
2 CAPSULE ORAL 4 TIMES DAILY PRN
Status: DISCONTINUED | OUTPATIENT
Start: 2022-11-12 | End: 2022-11-12 | Stop reason: HOSPADM

## 2022-11-12 RX ORDER — SODIUM CHLORIDE 9 MG/ML
INJECTION, SOLUTION INTRAVENOUS CONTINUOUS
Status: DISCONTINUED | OUTPATIENT
Start: 2022-11-12 | End: 2022-11-12 | Stop reason: HOSPADM

## 2022-11-12 RX ORDER — SIMVASTATIN 20 MG
40 TABLET ORAL EVERY EVENING
Status: DISCONTINUED | OUTPATIENT
Start: 2022-11-12 | End: 2022-11-15 | Stop reason: HOSPADM

## 2022-11-12 RX ORDER — TRAZODONE HYDROCHLORIDE 50 MG/1
50 TABLET, FILM COATED ORAL
Status: DISCONTINUED | OUTPATIENT
Start: 2022-11-12 | End: 2022-11-12 | Stop reason: HOSPADM

## 2022-11-12 RX ORDER — LOPERAMIDE HCL 2 MG
2 CAPSULE ORAL 4 TIMES DAILY PRN
Status: DISCONTINUED | OUTPATIENT
Start: 2022-11-12 | End: 2022-11-15 | Stop reason: HOSPADM

## 2022-11-12 RX ORDER — DIAZEPAM 5 MG
5 TABLET ORAL ONCE
Status: COMPLETED | OUTPATIENT
Start: 2022-11-12 | End: 2022-11-12

## 2022-11-12 RX ORDER — FOLIC ACID 1 MG/1
1 TABLET ORAL DAILY
Status: DISCONTINUED | OUTPATIENT
Start: 2022-11-12 | End: 2022-11-12

## 2022-11-12 RX ORDER — ACETAMINOPHEN 325 MG/1
650 TABLET ORAL EVERY 4 HOURS PRN
Status: DISCONTINUED | OUTPATIENT
Start: 2022-11-12 | End: 2022-11-12 | Stop reason: HOSPADM

## 2022-11-12 RX ORDER — DIAZEPAM 5 MG
10 TABLET ORAL EVERY 30 MIN PRN
Status: DISCONTINUED | OUTPATIENT
Start: 2022-11-12 | End: 2022-11-12 | Stop reason: HOSPADM

## 2022-11-12 RX ORDER — FOLIC ACID 1 MG/1
1 TABLET ORAL DAILY
Status: DISCONTINUED | OUTPATIENT
Start: 2022-11-13 | End: 2022-11-15 | Stop reason: HOSPADM

## 2022-11-12 RX ORDER — AMOXICILLIN 250 MG
1 CAPSULE ORAL 2 TIMES DAILY PRN
Status: DISCONTINUED | OUTPATIENT
Start: 2022-11-12 | End: 2022-11-15 | Stop reason: HOSPADM

## 2022-11-12 RX ORDER — TRAZODONE HYDROCHLORIDE 50 MG/1
50 TABLET, FILM COATED ORAL
Status: DISCONTINUED | OUTPATIENT
Start: 2022-11-12 | End: 2022-11-13

## 2022-11-12 RX ORDER — FOLIC ACID 1 MG/1
1 TABLET ORAL DAILY
Status: DISCONTINUED | OUTPATIENT
Start: 2022-11-13 | End: 2022-11-12 | Stop reason: HOSPADM

## 2022-11-12 RX ADMIN — CLONIDINE HYDROCHLORIDE 0.1 MG: 0.1 TABLET ORAL at 23:05

## 2022-11-12 RX ADMIN — DIAZEPAM 10 MG: 5 TABLET ORAL at 22:44

## 2022-11-12 RX ADMIN — SODIUM CHLORIDE: 9 INJECTION, SOLUTION INTRAVENOUS at 18:37

## 2022-11-12 RX ADMIN — Medication 1 TABLET: at 20:40

## 2022-11-12 RX ADMIN — DIAZEPAM 5 MG: 5 TABLET ORAL at 18:38

## 2022-11-12 RX ADMIN — LISINOPRIL 30 MG: 20 TABLET ORAL at 20:39

## 2022-11-12 RX ADMIN — DIAZEPAM 10 MG: 5 TABLET ORAL at 20:39

## 2022-11-12 RX ADMIN — SODIUM CHLORIDE 1000 ML: 9 INJECTION, SOLUTION INTRAVENOUS at 15:41

## 2022-11-12 RX ADMIN — LISINOPRIL 30 MG: 20 TABLET ORAL at 23:05

## 2022-11-12 RX ADMIN — SIMVASTATIN 40 MG: 20 TABLET, FILM COATED ORAL at 22:45

## 2022-11-12 RX ADMIN — FOLIC ACID 1 MG: 1 TABLET ORAL at 18:38

## 2022-11-12 RX ADMIN — THIAMINE HCL TAB 100 MG 100 MG: 100 TAB at 18:38

## 2022-11-12 RX ADMIN — HYDROXYZINE HYDROCHLORIDE 25 MG: 25 TABLET, FILM COATED ORAL at 22:45

## 2022-11-12 RX ADMIN — SIMVASTATIN 40 MG: 40 TABLET, FILM COATED ORAL at 20:40

## 2022-11-12 RX ADMIN — TRAZODONE HYDROCHLORIDE 50 MG: 50 TABLET ORAL at 22:45

## 2022-11-12 ASSESSMENT — ENCOUNTER SYMPTOMS
CHILLS: 0
DECREASED CONCENTRATION: 1
NAUSEA: 0
NECK PAIN: 0
SHORTNESS OF BREATH: 0
DIFFICULTY URINATING: 0
TROUBLE SWALLOWING: 0
ADENOPATHY: 0
VOMITING: 0
HEADACHES: 0
NUMBNESS: 0
SEIZURES: 0
ABDOMINAL PAIN: 0
HALLUCINATIONS: 0
FEVER: 0
TREMORS: 1
APPETITE CHANGE: 1
BACK PAIN: 0
WHEEZING: 0
WEAKNESS: 0
DYSPHORIC MOOD: 1
DIARRHEA: 0
COUGH: 0
CONFUSION: 0
LIGHT-HEADEDNESS: 0

## 2022-11-12 ASSESSMENT — ACTIVITIES OF DAILY LIVING (ADL)
WEAR_GLASSES_OR_BLIND: NO
CHANGE_IN_FUNCTIONAL_STATUS_SINCE_ONSET_OF_CURRENT_ILLNESS/INJURY: NO
ADLS_ACUITY_SCORE: 33
TOILETING_ISSUES: NO
DRESS: INDEPENDENT
ORAL_HYGIENE: INDEPENDENT
DIFFICULTY_EATING/SWALLOWING: NO
ADLS_ACUITY_SCORE: 28
DOING_ERRANDS_INDEPENDENTLY_DIFFICULTY: NO
HYGIENE/GROOMING: INDEPENDENT
FALL_HISTORY_WITHIN_LAST_SIX_MONTHS: NO
ADLS_ACUITY_SCORE: 35
LAUNDRY: WITH SUPERVISION
DRESSING/BATHING_DIFFICULTY: NO
WALKING_OR_CLIMBING_STAIRS_DIFFICULTY: NO
CONCENTRATING,_REMEMBERING_OR_MAKING_DECISIONS_DIFFICULTY: NO
ADLS_ACUITY_SCORE: 35

## 2022-11-12 NOTE — ED NOTES
ED Triage Provider Note  [facility]  Encounter Date: Nov 12, 2022    History:  No chief complaint on file.    Durga Aguirre is a 62 year old male who presents to the ED with alcohol intoxication seeking detox. He states he has been drinking about 1 liter of vodka daily. Last drink was last night. He states he has been drinking heavily for about 2 years. He has been through detox at [facility] in the past. He is seeking detox and aims for a long term treatment program. He denies other health problems. He denies allergies. He smokes occasionally. He denies other alcohol use. He feels some depression. He denies suicidal ideation or thoughts of harm to others..    Review of Systems:  Intoxication.  No fever. No chest pain.   No nausea or vomiting.  No abdominal pain.    Exam:  BP (!) 162/92   Pulse 73   Temp 97.8  F (36.6  C)   Resp 17   SpO2 95%   General: No acute distress. Appears stated age. Intoxicated.  Cardio: Regular rate, extremities well perfused  Resp: Normal work of breathing, grossly normal respiratory rate  Abdomen distended, non tender. No fluid wave.  Neuro: Alert. CN II-XII grossly intact. Grossly intact strength.       Medical Decision Making:  Patient arriving to the ED with problem as above. A medical screening exam was performed. Labs orders initiated from Triage. The patient is appropriate to wait in triage.      SENDY BLANK MD on 11/12/2022 at 3:27 PM     Sendy Blank MD  11/12/22 1532

## 2022-11-12 NOTE — ED TRIAGE NOTES
Presents with alcohol intoxication. Wanting to be detoxed with long term rehab.    Triage Assessment     Row Name 11/12/22 9214       Triage Assessment (Adult)    Airway WDL WDL       Respiratory WDL    Respiratory WDL WDL       Skin Circulation/Temperature WDL    Skin Circulation/Temperature WDL WDL       Cardiac WDL    Cardiac WDL WDL       Peripheral/Neurovascular WDL    Peripheral Neurovascular WDL WDL       Cognitive/Neuro/Behavioral WDL    Cognitive/Neuro/Behavioral WDL WDL

## 2022-11-13 LAB
CHOLEST SERPL-MCNC: 184 MG/DL
GGT SERPL-CCNC: 171 U/L (ref 0–75)
HBA1C MFR BLD: 5.4 % (ref 0–5.6)
HDLC SERPL-MCNC: 67 MG/DL
LDLC SERPL CALC-MCNC: 90 MG/DL
MAGNESIUM SERPL-MCNC: 1.5 MG/DL (ref 1.6–2.3)
NONHDLC SERPL-MCNC: 117 MG/DL
POTASSIUM BLD-SCNC: 3.1 MMOL/L (ref 3.4–5.3)
SODIUM SERPL-SCNC: 145 MMOL/L (ref 133–144)
TRIGL SERPL-MCNC: 133 MG/DL
TSH SERPL DL<=0.005 MIU/L-ACNC: 1.14 MU/L (ref 0.4–4)

## 2022-11-13 PROCEDURE — 82977 ASSAY OF GGT: CPT | Performed by: CLINICAL NURSE SPECIALIST

## 2022-11-13 PROCEDURE — 250N000013 HC RX MED GY IP 250 OP 250 PS 637

## 2022-11-13 PROCEDURE — 80061 LIPID PANEL: CPT | Performed by: CLINICAL NURSE SPECIALIST

## 2022-11-13 PROCEDURE — 84443 ASSAY THYROID STIM HORMONE: CPT | Performed by: CLINICAL NURSE SPECIALIST

## 2022-11-13 PROCEDURE — 84132 ASSAY OF SERUM POTASSIUM: CPT

## 2022-11-13 PROCEDURE — 250N000011 HC RX IP 250 OP 636: Performed by: CLINICAL NURSE SPECIALIST

## 2022-11-13 PROCEDURE — 36415 COLL VENOUS BLD VENIPUNCTURE: CPT | Performed by: CLINICAL NURSE SPECIALIST

## 2022-11-13 PROCEDURE — 99222 1ST HOSP IP/OBS MODERATE 55: CPT | Mod: AI | Performed by: CLINICAL NURSE SPECIALIST

## 2022-11-13 PROCEDURE — 250N000013 HC RX MED GY IP 250 OP 250 PS 637: Performed by: CLINICAL NURSE SPECIALIST

## 2022-11-13 PROCEDURE — 84295 ASSAY OF SERUM SODIUM: CPT

## 2022-11-13 PROCEDURE — 83735 ASSAY OF MAGNESIUM: CPT

## 2022-11-13 PROCEDURE — 128N000004 HC R&B CD ADULT

## 2022-11-13 PROCEDURE — 83036 HEMOGLOBIN GLYCOSYLATED A1C: CPT | Performed by: CLINICAL NURSE SPECIALIST

## 2022-11-13 PROCEDURE — 250N000013 HC RX MED GY IP 250 OP 250 PS 637: Performed by: PSYCHIATRY & NEUROLOGY

## 2022-11-13 PROCEDURE — 99233 SBSQ HOSP IP/OBS HIGH 50: CPT

## 2022-11-13 RX ORDER — HYDROCHLOROTHIAZIDE 12.5 MG/1
12.5 TABLET ORAL DAILY
Status: DISCONTINUED | OUTPATIENT
Start: 2022-11-13 | End: 2022-11-13

## 2022-11-13 RX ORDER — MAGNESIUM OXIDE 400 MG/1
400 TABLET ORAL 2 TIMES DAILY
Status: DISCONTINUED | OUTPATIENT
Start: 2022-11-13 | End: 2022-11-15 | Stop reason: HOSPADM

## 2022-11-13 RX ORDER — CLONIDINE HYDROCHLORIDE 0.1 MG/1
0.1 TABLET ORAL EVERY 6 HOURS PRN
Status: DISCONTINUED | OUTPATIENT
Start: 2022-11-13 | End: 2022-11-15 | Stop reason: HOSPADM

## 2022-11-13 RX ORDER — TRAZODONE HYDROCHLORIDE 50 MG/1
50 TABLET, FILM COATED ORAL AT BEDTIME
Status: DISCONTINUED | OUTPATIENT
Start: 2022-11-13 | End: 2022-11-15 | Stop reason: HOSPADM

## 2022-11-13 RX ORDER — POTASSIUM CHLORIDE 20MEQ/15ML
40 LIQUID (ML) ORAL DAILY
Status: DISCONTINUED | OUTPATIENT
Start: 2022-11-13 | End: 2022-11-15 | Stop reason: HOSPADM

## 2022-11-13 RX ORDER — SALIVA STIMULANT COMB. NO.3
1 SPRAY, NON-AEROSOL (ML) MUCOUS MEMBRANE
Status: DISCONTINUED | OUTPATIENT
Start: 2022-11-13 | End: 2022-11-15 | Stop reason: HOSPADM

## 2022-11-13 RX ORDER — CITALOPRAM HYDROBROMIDE 20 MG/1
20 TABLET ORAL DAILY
Status: DISCONTINUED | OUTPATIENT
Start: 2022-11-13 | End: 2022-11-15 | Stop reason: HOSPADM

## 2022-11-13 RX ORDER — GABAPENTIN 300 MG/1
300 CAPSULE ORAL 3 TIMES DAILY
Status: DISCONTINUED | OUTPATIENT
Start: 2022-11-13 | End: 2022-11-15 | Stop reason: HOSPADM

## 2022-11-13 RX ADMIN — MAGNESIUM OXIDE TAB 400 MG (241.3 MG ELEMENTAL MG) 400 MG: 400 (241.3 MG) TAB at 20:17

## 2022-11-13 RX ADMIN — CLONIDINE HYDROCHLORIDE 0.1 MG: 0.1 TABLET ORAL at 08:06

## 2022-11-13 RX ADMIN — DIAZEPAM 10 MG: 5 TABLET ORAL at 20:17

## 2022-11-13 RX ADMIN — SIMVASTATIN 40 MG: 20 TABLET, FILM COATED ORAL at 20:18

## 2022-11-13 RX ADMIN — DIAZEPAM 10 MG: 5 TABLET ORAL at 10:30

## 2022-11-13 RX ADMIN — Medication 1 TABLET: at 08:05

## 2022-11-13 RX ADMIN — ONDANSETRON 4 MG: 4 TABLET, ORALLY DISINTEGRATING ORAL at 07:23

## 2022-11-13 RX ADMIN — MAGNESIUM OXIDE TAB 400 MG (241.3 MG ELEMENTAL MG) 400 MG: 400 (241.3 MG) TAB at 09:57

## 2022-11-13 RX ADMIN — LISINOPRIL 30 MG: 20 TABLET ORAL at 08:06

## 2022-11-13 RX ADMIN — HYDROXYZINE HYDROCHLORIDE 25 MG: 25 TABLET, FILM COATED ORAL at 13:22

## 2022-11-13 RX ADMIN — POTASSIUM CHLORIDE 40 MEQ: 20 SOLUTION ORAL at 09:12

## 2022-11-13 RX ADMIN — DIAZEPAM 10 MG: 5 TABLET ORAL at 16:19

## 2022-11-13 RX ADMIN — THIAMINE HCL TAB 100 MG 100 MG: 100 TAB at 08:06

## 2022-11-13 RX ADMIN — TRAZODONE HYDROCHLORIDE 50 MG: 50 TABLET ORAL at 20:18

## 2022-11-13 RX ADMIN — FOLIC ACID 1 MG: 1 TABLET ORAL at 08:05

## 2022-11-13 RX ADMIN — CITALOPRAM HYDROBROMIDE 20 MG: 20 TABLET ORAL at 11:23

## 2022-11-13 RX ADMIN — GABAPENTIN 300 MG: 300 CAPSULE ORAL at 14:33

## 2022-11-13 RX ADMIN — DIAZEPAM 10 MG: 5 TABLET ORAL at 00:19

## 2022-11-13 RX ADMIN — HYDROXYZINE HYDROCHLORIDE 25 MG: 25 TABLET, FILM COATED ORAL at 08:18

## 2022-11-13 RX ADMIN — DIAZEPAM 10 MG: 5 TABLET ORAL at 04:35

## 2022-11-13 RX ADMIN — TRAZODONE HYDROCHLORIDE 50 MG: 50 TABLET ORAL at 03:21

## 2022-11-13 RX ADMIN — GABAPENTIN 300 MG: 300 CAPSULE ORAL at 20:17

## 2022-11-13 ASSESSMENT — ACTIVITIES OF DAILY LIVING (ADL)
ADLS_ACUITY_SCORE: 28
ADLS_ACUITY_SCORE: 28
HYGIENE/GROOMING: INDEPENDENT
ADLS_ACUITY_SCORE: 28
LAUNDRY: WITH SUPERVISION
ADLS_ACUITY_SCORE: 28
DRESS: INDEPENDENT
ADLS_ACUITY_SCORE: 28
ORAL_HYGIENE: INDEPENDENT
HYGIENE/GROOMING: INDEPENDENT
ADLS_ACUITY_SCORE: 28
ADLS_ACUITY_SCORE: 28
DRESS: INDEPENDENT
LAUNDRY: WITH SUPERVISION
ADLS_ACUITY_SCORE: 28
ORAL_HYGIENE: INDEPENDENT

## 2022-11-13 NOTE — PROGRESS NOTES
11/12/22 2229   Patient Belongings   Did you bring any home meds/supplements to the hospital?  No   Patient Belongings other (see comments)   Belongings Search Yes   Clothing Search Yes   Second Staff Leonard Jordan     STORAGE BIN:   Coat, 2 lighters    MED ROOM BIN:   Wallet    SECURITY:  [id number], id, ebt  A             Admission:  I am responsible for any personal items that are not sent to the safe or pharmacy.  [facility] is not responsible for loss, theft or damage of any property in my possession.    Signature:  _________________________________ Date: _______  Time: _____                                              Staff Signature:  ____________________________ Date: ________  Time: _____      2nd Staff person, if patient is unable/unwilling to sign:    Signature: ________________________________ Date: ________  Time: _____   Discharge:  [facility] has returned all of my personal belongings:    Signature: _________________________________ Date: ________  Time: _____                                          Staff Signature:  ____________________________ Date: ________  Time: _____

## 2022-11-13 NOTE — PLAN OF CARE
"  Problem: Alcohol Withdrawal  Goal: Alcohol Withdrawal Symptom Control  11/13/2022 1409 by Marcela Ramirez, RN  Outcome: Progressing  11/13/2022 0830 by Marcela Ramirez RN  Outcome: Progressing     Problem: Substance Withdrawal  Goal: Substance Withdrawal  Description: Signs and symptoms of listed problems will be absent or manageable.  11/13/2022 1409 by Marcela Ramirez, RN  Outcome: Progressing  11/13/2022 0830 by Marcela Ramirez, RN  Outcome: Progressing   Goal Outcome Evaluation:    Plan of Care Reviewed With: patient    Patient is A&O x 4, flat blunted affect. Pt did not eat breakfast C/O nausea, PRN Zofran given with relief, patient was able to eat 100% and tolerated well.     Pt's BP was  was 178/95 rechecked at 10:00 gyr026/93 .  Pt was reassessed scored a  8 for MSSA valium 10 mg given at 1000 am.   As noon BP was 176/97, MSSA was 5, no Valium given.  Rechecked at 1400 is 162/89 .    Pt had low K+ 3.1  and Mag+ 1.5. Medicine notified, ordered to replace them and are both replaced .    Pt is visible on unit, noticed in milieu, watching TV and interacting with peers,  Denies SI/HI/SIB, contracts for safety while on unit.    Staff will continue to monitor BP and withdrawals and update changes.     Patient Vitals for the past 24 hrs:   BP Temp Temp src Pulse Resp SpO2 Height Weight BMI (Calculated)   11/13/22 1408 (!) 162/97 -- -- 92 16 94 % -- -- --   11/13/22 1130 (!) 176/97 97.9  F (36.6  C) Temporal 83 16 -- -- -- --   11/13/22 1025 (!) 186/98 -- -- 73 16 -- -- -- --   11/13/22 1023 (!) 173/101 97.6  F (36.4  C) Temporal 72 16 93 % -- -- --   11/13/22 0817 (!) 187/93 -- -- 75 16 92 % -- -- --   11/13/22 0739 (!) 178/95 97.1  F (36.2  C) Temporal 86 16 93 % -- -- --   11/13/22 0426 (!) 174/91 97.5  F (36.4  C) Tympanic 75 16 93 % -- -- --   11/13/22 0014 (!) 162/78 97.7  F (36.5  C) Oral 73 16 92 % -- -- --   11/12/22 2205 (!) 200/106 98.9  F (37.2  C) Oral 87 16 -- 1.778 m (5' 10\") 98.9 kg (218 lb) 31.28          "

## 2022-11-13 NOTE — TELEPHONE ENCOUNTER
S: 2:13p, Dr. Lyle called w/ clinical on a 62/M present in the [facility] requesting detox.       B: The pt arrived at the [facility] ER requesting alcohol detox. The pt drinks up to 1 Litter of Vodka, daily, for years. Last known drink was prior to admission. BA was .39.     [facility] Protocol: Nothing yet     Pt denies any additional substance use.    The pt s does currently have withdrawal symptoms: Nothing at this time.    The pt does not have a concern/Hx for DT s, the pt does not have a Hx/concern for seizures.     The pt has no medical concerns.     The pt can ambulated independently.     There are no MH concerns w/ admission.    The pt has not been in detox before.    No concern for aggression or HI.     Covid - Negative  Utox has been collected.    CBC labs resulted- see Epic for results.    Comp labs are as follows: Sodium 148; Potassium 3.5; ; ALT 87. The pt denies any abdominal pain.  Lipase: 94.    Recent vital signs on 11/12: temp 97.8; Pulse 73; /92; Resp Rate 17, SpO2 95%.     A: Vol    R: Patient cleared and ready for behavioral bed placement: Yes    6:33pm Intake paged provider [place].     6:35pm Intake received a call from     6:43pm Intake called st 3A and provided disposition to the charge nurse. Nurse report: Charge will call ED.     6:47pm Intake called [facility]. Nurse is busy and will call intake back.     7:02pm Intake called [facility] and provided placement information to pt's nurse.

## 2022-11-13 NOTE — PLAN OF CARE
"  Problem: Alcohol Withdrawal  Goal: Alcohol Withdrawal Symptom Control  Outcome: Not Progressing         S = Situation:   Durga Aguirre is a 62 year old year old male with a chief complaint of Alcohol use disorder drinking about  a liter of vodka daily.Patient denies history of Alcohol withdrawal seizures or DTs  Voluntary admission to [facility] for Alcohol withdrawal and detox.    B  = Background:   Substance use history: Alcohol  Mental health history: YUSUF  Medical history: HTN  Legal history: Voluntary  Treatment history: Patient reports he has been in multiple in and out patient treatments  Living situation: Patient reports living with his mom  Recent life stressors:  Anxiety     A  =  Assessment:   During admission interview, pt affect was  flat and blunted. He was restless, agitated  and reports he feels miserable. Patient came in with a BP of 200/106, pulse of 87. Internal medicine was called and got a one time order of clonidine and since he denied taking his lisinopril in the morning, he got a dose tonight.  MSSA 13 10 mg of valium given,  BP (!) 200/106   Pulse 87   Temp 98.9  F (37.2  C) (Oral)   Resp 16   Ht 1.778 m (5' 10\")   Wt 98.9 kg (218 lb)   BMI 31.28 kg/m       R =   Request or Recommendation:   Patient's alcohol withdrawal will be monitored and treated using MSSA with valium  Pt will meet with psychiatry, internal medicine, and case management tomorrow.  At the time of admission, pt reports [facility].  "

## 2022-11-13 NOTE — PLAN OF CARE
Goal Outcome Evaluation:       Pt's K+ is low 3.1 and Magnesium 1.5, MD notified   /95 PRN Clonidine 0.1 mg  given  C/O nausea, sea bands given and PRN hydroxyzine 25 mg for anxiety.

## 2022-11-13 NOTE — CONSULTS
[facility]    Internal Medicine Initial Consult       Date of Admission: 11/12/2022  Consult Requested by: Raheem Moreira MD  Reason for Consult: Medical Comanagement    Assessment & Recommendations  Durga Aguirre is a 62 year old man with a history of HLD, HTN, depression, anxiety, alcohol use disorder who is admitted to station 3A after presenting to ED for evaluation of alcohol detox. Admitted for further psychiatric monitoring and management.       Alcohol Use Disorder  Acute Alcohol Withdrawal  PTA, patient was consuming ~one quart of vodka daily. Last drink was the day of admission. Withdrawal symptoms include anxiety, shakes. No history of withdrawal seizures, no seizure-like activity recently. No history of DT.    -Continue MSSA protocol  -Management per psychiatry team  -Agree with Multivitamin, Folate, and Thiamine    Transaminitis  On admission ALT 87 , slightly less than expected 2:1 ratio in alcohol consumption. However, suspect these are elevated d/t alcohol use as above, no risk factors for viral hepatitis.  -Recheck in 48 hrs to ensure downtrend    Pancytopenia  Leukopenia  Anemia, Chronic  Thrombocytopenia  On admission WBC 3.2, Hgb 12.4, Plts 90. However, otherwise HDS (aside from elevated BP's). Suspect these lab findings are d/t alcohol consumption as above causing marrow suppression resulting in low WBC, Hgb, and Plts. On review of previous lab findings, it appears that he develops pancytopenia w/ periods of heavy alcohol consumption. No s/sx of infection, acute blood loss.   -Continue to monitor  -Recheck CBC in AM    Hypokalemia  Hypomagnesemia  Hypernatremia, improving  Lytes recheck this AM showing K 3.1, Mag 1.5, Na 145 (148). Suspect these lyte derangements are d/t dehydration in the setting of alcohol consumption as above, poor PO intake while drinking (notes diet was mostly steak, crab legs, pizza).  -Starting on Mag  "Oxide 400mg BID while here  -Giving 40mEq K today  -Sodium is improving today  -Recheck lytes in AM    Depression  Anxiety  Insomnia  PTA on Celexa, Buspar, Atarax, Trazodone.  -Psychiatry managing    HTN  PTA on Lisinopril. BP's while here have been elevated, but suspect this is likely d/t acute alcohol withdrawal as above. Notes he was compliant with medications at home. Potassium low today (see below), so will hold off on restarting until this has normalized.  -Hold off on starting Lisinopril for now, consider restarting pending K recheck in AM     HLD, mixed  PTA on Simvastatin. Trigs, LDL elevated.   -Continue PTA Statin  -Follow-up with PCP regarding elevated lipids, and recheck    Medicine will continue to follow peripherally for CBC, lytes, LFT's, restarting Lisinopril, please page with any additional concerns.     Shubham Lau PA-C  Hospitalist Service  Contact information available via [facility] Paging/Directory  ______________________________________________________________________    Reason for Admission  Alcohol Use Disorder  Acute Alcohol Withdrawal    Chief Complaint   \"I just don't feel great\"    History of Present Illness   History is obtained from the patient and medical record.     Durga Aguirre is a 62 year old man with a history of HLD, HTN, depression, anxiety, alcohol use disorder who is admitted to station 3A after presenting to ED for evaluation of alcohol detox. Admitted for further psychiatric monitoring and management.    Internal Medicine service was asked to see patient for a general medical evaluation. Currently, patient is seen in the 3A exam room. Notes that he is experiencing his usual sx of withdrawal; shakiness, feeling unsteady/weak on feet, increased anxiety, feeling \"chilly\". Notes he was drinking approximately a quart of vodka daily since COVID began, with only a few days of sobriety since then \"here and there\". Used to be much more active he reports, and would bike from " [place] to [place] somewhat frequently, enjoyed cycling until he began drinking more. He feels quite deconditioned now, noting that he gets winded easier than he used to, but denies lower extremity swelling, cough. Further denies abdominal pain, nausea, vomiting, urinary or bowel changes, fevers, headaches, hallucinations, chest pain, shortness of breath.     Review of Systems   10 point ROS performed and negative unless otherwise noted in HPI     Past Medical History    I have reviewed this patient's medical history and updated it with pertinent information if needed.   Past Medical History:   Diagnosis Date     Alcohol abuse      Anxiety and depression      Arthritis      Hyperlipidemia      Hypertension      Mass of left chest wall      Other spontaneous pneumothorax 1997        Past Surgical History   I have reviewed this patient's surgical history and updated it with pertinent information if needed.  Past Surgical History:   Procedure Laterality Date     COLONOSCOPY       HC EXCISION PARTIAL TALUS OR CALCANEUS, BONE      fx calcaneus- 9 screws in foot        Social History   Social History     Tobacco Use     Smoking status: Every Day     Packs/day: 0.25     Years: 20.00     Pack years: 5.00     Types: Cigarettes     Smokeless tobacco: Never     Tobacco comments:     2-3 cigarettes per day    Vaping Use     Vaping Use: Never used   Substance Use Topics     Alcohol use: Yes     Comment: daily use 1 L vodka     Drug use: Not Currently       Family History   I have reviewed this patient's family history and updated it with pertinent information if needed.   Family History   Problem Relation Age of Onset     No Known Problems Mother      Lymphoma Father      No Known Problems Maternal Grandmother      No Known Problems Maternal Grandfather      No Known Problems Paternal Grandmother      No Known Problems Paternal Grandfather      No Known Problems Sister      No Known Problems Son      No Known Problems  Brother      No Known Problems Daughter      No Known Problems Other      Unknown/Adopted No family hx of      Depression No family hx of      Anxiety Disorder No family hx of      Schizophrenia No family hx of      Bipolar Disorder No family hx of      Suicide No family hx of      Substance Abuse No family hx of      Dementia No family hx of      Emmett Disease No family hx of      Parkinsonism No family hx of      Autism Spectrum Disorder No family hx of      Intellectual Disability (Mental Retardation) No family hx of      Mental Illness No family hx of        Medications   Medications Prior to Admission   Medication Sig Dispense Refill Last Dose     B Complex-C (VITAMIN B + C COMPLEX PO) Take 1 tablet by mouth daily        busPIRone (BUSPAR) 15 MG tablet Take 1 tablet (15 mg) by mouth 2 times daily 180 tablet 0      citalopram (CELEXA) 20 MG tablet Take 1 tablet (20 mg) by mouth daily 30 tablet 0      gabapentin (NEURONTIN) 300 MG capsule Take 1 capsule (300 mg) by mouth daily for 7 days, THEN 1 capsule (300 mg) 2 times daily. 60 capsule 1      hydrochlorothiazide (HYDRODIURIL) 12.5 MG tablet Take 1 tablet (12.5 mg) by mouth daily 90 tablet 0      hydrOXYzine (ATARAX) 50 MG tablet Take 1-2 tablets ( mg) by mouth 3 times daily as needed for itching or anxiety 180 tablet 0      lisinopril (ZESTRIL) 30 MG tablet Take 1 tablet (30 mg) by mouth daily 90 tablet 0      melatonin 3 MG tablet Take 1 tablet (3 mg) by mouth nightly as needed for sleep 30 tablet 0      multivitamin w/minerals (THERA-VIT-M) tablet Take 1 tablet by mouth daily 90 tablet 0      naltrexone (DEPADE/REVIA) 50 MG tablet Take 1 tablet (50 mg) by mouth daily 90 tablet 0      nicotine (NICODERM CQ) 14 MG/24HR 24 hr patch Place 1 patch onto the skin daily 30 patch 0      nicotine (NICORETTE) 2 MG gum Place 1 each (2 mg) inside cheek every hour as needed for smoking cessation 100 each 0      pantoprazole (PROTONIX) 40 MG EC tablet Take 1  "tablet (40 mg) by mouth every morning (before breakfast) 30 tablet 0      simvastatin (ZOCOR) 40 MG tablet Take 1 tablet (40 mg) by mouth every evening 90 tablet 0      thiamine (B-1) 100 MG tablet Take 1 tablet (100 mg) by mouth daily 90 tablet 0      traZODone (DESYREL) 50 MG tablet Take 1 tablet (50 mg) by mouth nightly as needed for sleep (may repeat after 60 minutes) 30 tablet 0        Allergies    No Known Allergies    Physical Exam   BP (!) 187/93 (BP Location: Right arm, Patient Position: Sitting)   Pulse 75   Temp 97.1  F (36.2  C) (Temporal)   Resp 16   Ht 1.778 m (5' 10\")   Wt 98.9 kg (218 lb)   SpO2 92%   BMI 31.28 kg/m     GENERAL: NAD, alert, awake, WDWN.  HEENT: Anicteric sclera. Mucous membranes moist.   CV: RRR. S1, S2. No murmurs appreciated.   RESPIRATORY: Effort normal on room air. Lungs CTAB with no wheezing, rales, rhonchi.   GI: Abdomen soft, non distended, non tender.   NEUROLOGICAL: No focal deficits. Moves all extremities.   EXTREMITIES: No peripheral edema. Warm and well perfused.   SKIN: No jaundice. No rashes.   PSYCH: anxious, fidgety, but good eye contact, cooperative and engaged, answering all questions appropriately with what appears to be good recall of events.     Data   Data reviewed today: I reviewed all medications, new labs and imaging results over the last 24 hours.     "

## 2022-11-13 NOTE — PHARMACY-ADMISSION MEDICATION HISTORY
Admission Medication History status for the 11/12/2022 admission is complete.  See EPIC admission navigator for Prior to Admission medications.    Medication history sources:  patient and dispense report     Medication history source reliability: Moderate    Medication adherence:  Moderate    Changes made to PTA medication list (reason)  Added: n/a  Deleted: n/a  Changed: n/a    Additional medication history information (including reliability of information, actions taken by pharmacist): None    Time spent in this activity: 15 minutes     Medication history completed by: Nova Jackson Pharm.D     Prior to Admission medications    Medication Sig Last Dose Taking? Auth Provider Long Term End Date   B Complex-C (VITAMIN B + C COMPLEX PO) Take 1 tablet by mouth daily Past Week Yes Reported, Patient     busPIRone (BUSPAR) 15 MG tablet Take 1 tablet (15 mg) by mouth 2 times daily 11/12/2022 Yes Rohit Solitario MD Yes    citalopram (CELEXA) 20 MG tablet Take 1 tablet (20 mg) by mouth daily 11/12/2022 Yes Mame June MD Yes    gabapentin (NEURONTIN) 300 MG capsule Take 1 capsule (300 mg) by mouth daily for 7 days, THEN 1 capsule (300 mg) 2 times daily. 11/12/2022 Yes Rohit Solitario MD Yes 5/12/24   hydrochlorothiazide (HYDRODIURIL) 12.5 MG tablet Take 1 tablet (12.5 mg) by mouth daily 11/12/2022 Yes Rohit Solitario MD Yes    hydrOXYzine (ATARAX) 50 MG tablet Take 1-2 tablets ( mg) by mouth 3 times daily as needed for itching or anxiety 11/12/2022 Yes Rohit Solitario MD     lisinopril (ZESTRIL) 30 MG tablet Take 1 tablet (30 mg) by mouth daily 11/12/2022 Yes Rohit Solitario MD Yes    multivitamin w/minerals (THERA-VIT-M) tablet Take 1 tablet by mouth daily Unknown Yes Rohit Solitario MD     naltrexone (DEPADE/REVIA) 50 MG tablet Take 1 tablet (50 mg) by mouth daily Past Week Yes Rohit Solitario MD Yes    nicotine (NICODERM CQ) 14 MG/24HR 24 hr patch Place 1 patch onto the skin  daily Unknown Yes Mame June MD     nicotine (NICORETTE) 2 MG gum Place 1 each (2 mg) inside cheek every hour as needed for smoking cessation Unknown Yes Mame June MD     pantoprazole (PROTONIX) 40 MG EC tablet Take 1 tablet (40 mg) by mouth every morning (before breakfast) Unknown Yes Mame June MD     thiamine (B-1) 100 MG tablet Take 1 tablet (100 mg) by mouth daily 11/12/2022 Yes Rohit Solitario MD No    traZODone (DESYREL) 50 MG tablet Take 1 tablet (50 mg) by mouth nightly as needed for sleep (may repeat after 60 minutes) 11/12/2022 Yes Rohit Solitario MD Yes    melatonin 3 MG tablet Take 1 tablet (3 mg) by mouth nightly as needed for sleep 11/11/2022  Mame June MD     simvastatin (ZOCOR) 40 MG tablet Take 1 tablet (40 mg) by mouth every evening 11/11/2022  Rohit Solitario MD Yes

## 2022-11-13 NOTE — ED PROVIDER NOTES
ED Provider Note  [facility]      History     Chief Complaint   Patient presents with     Alcohol Intoxication     HPI  Durga Aguirre is a 62 year old male who presents (driven by a neighbor) seeking detox and treatment for alcohol. He states he drinks 1 liter of vodka daily. Last intake this morning several hours prior to arrival. He denies other substance abuse. He has been through detox treatment on the [employer] in August of this year, in March and a couple of times last year. He is hoping to get into a long term treatment program. He lives alone. He recently applied for social security. He is not currently working. He denies general medical problems. He has some depression and anxiety, but denies suicidal ideation, homicidal ideation or hallucinations. He has shakes and severe anxiety with withdrawal, but denies seizures.    Past Medical History:   Diagnosis Date     Alcohol abuse      Anxiety and depression      Arthritis      Hyperlipidemia      Hypertension      Mass of left chest wall      Other spontaneous pneumothorax 1997       Past Surgical History:   Procedure Laterality Date     COLONOSCOPY       HC EXCISION PARTIAL TALUS OR CALCANEUS, BONE      fx calcaneus- 9 screws in foot       Family History   Problem Relation Age of Onset     No Known Problems Mother      Lymphoma Father      No Known Problems Maternal Grandmother      No Known Problems Maternal Grandfather      No Known Problems Paternal Grandmother      No Known Problems Paternal Grandfather      No Known Problems Sister      No Known Problems Son      No Known Problems Brother      No Known Problems Daughter      No Known Problems Other      Unknown/Adopted No family hx of      Depression No family hx of      Anxiety Disorder No family hx of      Schizophrenia No family hx of      Bipolar Disorder No family hx of      Suicide No family hx of      Substance Abuse No family hx of      Dementia No family hx of       [place] Disease No family hx of      Parkinsonism No family hx of      Autism Spectrum Disorder No family hx of      Intellectual Disability (Mental Retardation) No family hx of      Mental Illness No family hx of        Social History     Tobacco Use     Smoking status: Every Day     Packs/day: 0.25     Years: 20.00     Pack years: 5.00     Types: Cigarettes     Smokeless tobacco: Never     Tobacco comments:     2-3 cigarettes per day    Substance Use Topics     Alcohol use: Yes     Comment: daily use 1 L vodka          Review of Systems   Constitutional: Positive for appetite change. Negative for chills and fever.   HENT: Negative for congestion and trouble swallowing.    Eyes: Negative for visual disturbance.   Respiratory: Negative for cough, shortness of breath and wheezing.    Cardiovascular: Negative for chest pain.   Gastrointestinal: Negative for abdominal pain, diarrhea, nausea and vomiting.   Genitourinary: Negative for difficulty urinating.   Musculoskeletal: Negative for back pain and neck pain.   Skin: Negative for rash.   Neurological: Positive for tremors. Negative for seizures, weakness, light-headedness, numbness and headaches.   Hematological: Negative for adenopathy.   Psychiatric/Behavioral: Positive for decreased concentration and dysphoric mood. Negative for confusion, hallucinations and suicidal ideas.         Physical Exam   BP: (!) 162/92  Pulse: 73  Temp: 97.8  F (36.6  C)  Resp: 17  SpO2: 95 %  Physical Exam  Vitals and nursing note reviewed.   Constitutional:       General: He is not in acute distress.     Appearance: Normal appearance.   HENT:      Head: Normocephalic and atraumatic.      Right Ear: External ear normal.      Left Ear: External ear normal.      Nose: Nose normal.      Mouth/Throat:      Mouth: Mucous membranes are moist.   Eyes:      General: No scleral icterus.     Extraocular Movements: Extraocular movements intact.      Pupils: Pupils are equal, round, and reactive  to light.   Cardiovascular:      Rate and Rhythm: Normal rate and regular rhythm.      Heart sounds: No murmur heard.  Pulmonary:      Effort: Pulmonary effort is normal.      Breath sounds: Normal breath sounds. No wheezing or rales.   Abdominal:      General: There is distension.      Palpations: Abdomen is soft.      Tenderness: There is no abdominal tenderness. There is no guarding.   Musculoskeletal:      Cervical back: Normal range of motion.      Right lower leg: No edema.      Left lower leg: No edema.   Skin:     General: Skin is warm and dry.   Neurological:      General: No focal deficit present.      Mental Status: He is alert and oriented to person, place, and time.      Cranial Nerves: No cranial nerve deficit.      Motor: No weakness.      Gait: Gait abnormal (mildly unsteady).   Psychiatric:         Mood and Affect: Mood is anxious.         Behavior: Behavior normal.         Cognition and Memory: Cognition normal.         Judgment: Judgment is impulsive.      Comments: Intoxicated.         ED Course      Procedures                   Results for orders placed or performed during the hospital encounter of 11/12/22   INR     Status: Normal   Result Value Ref Range    INR 0.91 0.85 - 1.15   Comprehensive metabolic panel     Status: Abnormal   Result Value Ref Range    Sodium 148 (H) 136 - 145 mmol/L    Potassium 3.5 3.4 - 5.3 mmol/L    Chloride 106 98 - 107 mmol/L    Carbon Dioxide (CO2) 27 22 - 29 mmol/L    Anion Gap 15 7 - 15 mmol/L    Urea Nitrogen 14.6 8.0 - 23.0 mg/dL    Creatinine 0.74 0.67 - 1.17 mg/dL    Calcium 9.2 8.8 - 10.2 mg/dL    Glucose 125 (H) 70 - 99 mg/dL    Alkaline Phosphatase 111 40 - 129 U/L     (H) 10 - 50 U/L    ALT 87 (H) 10 - 50 U/L    Protein Total 7.3 6.4 - 8.3 g/dL    Albumin 4.6 3.5 - 5.2 g/dL    Bilirubin Total 0.6 <=1.2 mg/dL    GFR Estimate >90 >60 mL/min/1.73m2   Lipase     Status: Abnormal   Result Value Ref Range    Lipase 94 (H) 13 - 60 U/L   Ethyl Alcohol  Level     Status: Abnormal   Result Value Ref Range    Alcohol ethyl 0.39 (HH) <=0.01 g/dL   CRP inflammation     Status: Normal   Result Value Ref Range    CRP Inflammation <3.00 <5.00 mg/L   Magnesium     Status: Normal   Result Value Ref Range    Magnesium 2.1 1.7 - 2.3 mg/dL   Asymptomatic COVID-19 Virus (Coronavirus) by PCR Nasopharyngeal     Status: Normal    Specimen: Nasopharyngeal; Swab   Result Value Ref Range    SARS CoV2 PCR Negative Negative    Narrative    Testing was performed using the D[employer] Xpress SARS-CoV-2 Assay on the  [employer]. Additional information about  this Emergency Use Authorization (EUA) assay can be found via the Lab  Guide. This test should be ordered for the detection of SARS-CoV-2 in  individuals who meet SARS-CoV-2 clinical and/or epidemiological  criteria. Test performance is unknown in asymptomatic patients. This  test is for in vitro diagnostic use under the FDA EUA for  laboratories certified under CLIA to perform high complexity testing.  This test has not been FDA cleared or approved. A negative result  does not rule out the presence of PCR inhibitors in the specimen or  target RNA in concentration below the limit of detection for the  assay. The possibility of a false negative should be considered if  the patient's recent exposure or clinical presentation suggests  COVID-19. This test was validated by the [facility]. This laboratory is certified under  the Clinical Laboratory Improvement Amendments of 1988 (CLIA-88) as  qualified to perform high complexity laboratory testing.     CBC with platelets and differential     Status: Abnormal   Result Value Ref Range    WBC Count 3.2 (L) 4.0 - 11.0 10e3/uL    RBC Count 4.19 (L) 4.40 - 5.90 10e6/uL    Hemoglobin 12.4 (L) 13.3 - 17.7 g/dL    Hematocrit 38.5 (L) 40.0 - 53.0 %    MCV 92 78 - 100 fL    MCH 29.6 26.5 - 33.0 pg    MCHC 32.2 31.5 - 36.5 g/dL     RDW 16.6 (H) 10.0 - 15.0 %    Platelet Count 90 (L) 150 - 450 10e3/uL    % Neutrophils 56 %    % Lymphocytes 32 %    % Monocytes 6 %    % Eosinophils 3 %    % Basophils 2 %    % Immature Granulocytes 1 %    NRBCs per 100 WBC 0 <1 /100    Absolute Neutrophils 1.8 1.6 - 8.3 10e3/uL    Absolute Lymphocytes 1.0 0.8 - 5.3 10e3/uL    Absolute Monocytes 0.2 0.0 - 1.3 10e3/uL    Absolute Eosinophils 0.1 0.0 - 0.7 10e3/uL    Absolute Basophils 0.1 0.0 - 0.2 10e3/uL    Absolute Immature Granulocytes 0.0 <=0.4 10e3/uL    Absolute NRBCs 0.0 10e3/uL   CBC with platelets differential     Status: Abnormal    Narrative    The following orders were created for panel order CBC with platelets differential.  Procedure                               Abnormality         Status                     ---------                               -----------         ------                     CBC with platelets and d...[[id number]]  Abnormal            Final result                 Please view results for these tests on the individual orders.     Medications   0.9% sodium chloride BOLUS (0 mLs Intravenous Stopped 11/12/22 1754)     Followed by   sodium chloride 0.9% infusion ( Intravenous New Bag 11/12/22 1837)   folic acid (FOLVITE) tablet 1 mg (1 mg Oral Given 11/12/22 1838)   diazepam (VALIUM) tablet 5 mg (5 mg Oral Given 11/12/22 1838)   thiamine (B-1) tablet 100 mg (100 mg Oral Given 11/12/22 1838)        Assessments & Plan (with Medical Decision Making)   Impression:  Middle aged male with a long history of alcohol abuse presents with alcohol intoxication seeking detox. He is currently intoxicated with blood alcohol of 0.39. CBC, electrolytes are relatively normal. Sodium is mildly elevated. LFTs are moderately elevated. He was discussed with mental health intake and detox bed requested.    I have reviewed the nursing notes. I have reviewed the findings, diagnosis, plan and need for follow up with the patient.    New Prescriptions    No  medications on file       Final diagnoses:   Acute alcoholic intoxication in alcoholism without complication (H)   Anxiety   Depression, unspecified depression type       --  Rosalio MADISON [facility] EMERGENCY DEPARTMENT  11/12/2022     Rosalio Lyle MD  11/12/22 3005

## 2022-11-13 NOTE — H&P
"Admitted: 11/12/2022    CHIEF COMPLAINT:  Detox from alcohol.    IDENTIFYING INFORMATION:  Durga Aguirre is a 62-year-old  male seeking detox from alcohol.    HISTORY OF PRESENT ILLNESS:  Durga Aguirre is a 62-year-old single  male seeking detox from alcohol.  The patient has a history of depression and anxiety.  The patient reports he is drinking 1 liter of vodka per day for the last 2 years.  The patient was brought in by a neighbor.  The patient states \"I'm back again.\"  The patient was in detox in 08/2022, 03/2022 and 07/2021.    The patient has tolerance, withdrawal, progressive use, loss of control, spending more time using alcohol, spending more time than intended using alcohol.  The patient has made attempts to quit, experiencing cravings and negative consequences.    Goals for this hospitalization is detox from alcohol, stabilization with medications and finding chemical dependency treatment.    PSYCHIATRIC REVIEW OF SYSTEMS:  The patient reports he feels \"blank.\" The patient reports that he has been isolating.  He is fatigued, very poor appetite, very poor sleep.  The patient denies any suicidal thinking.  No active intent.  The patient reports \"I have plenty of anxiety,\" that is why drink.  The patient denies symptoms of PTSD, eating disorder or OCD.  The patient does not endorse symptoms of aleena.  Does not endorse symptoms of psychosis including auditory or visual hallucinations or feelings of paranoia.    PSYCHIATRIC HISTORY:  The patient has had multiple detoxes.  The patient denies any prior suicide attempts.  The patient reports he has been in therapy.  The patient currently is being treated with Celexa to address depressive and anxiety symptoms.    PAST MEDICAL HISTORY:  Hypertension, obesity.  Reviewed admission labs:  Sodium 145, elevated; potassium 3.1, low; magnesium 1.5, low; , elevated; TSH is 114, within normal limits.  WBC 3.2, low; hemoglobin 12.4, low; hematocrit " 38.5, low; platelet count 90, low.    ALLERGIES:  NO KNOWN ALLERGIES.    SUBSTANCE ABUSE HISTORY:  The patient denies abusing any substances other than alcohol.  Alcohol level 0.39.  Denies any seizure history.  The patient states that he has blacked out multiple times.    FAMILY HISTORY:  The patient is single, has one adult son 32 years old, no contact with his son.  The patient reports his father endorses alcohol use disorder, is .    SOCIAL HISTORY:  The patient lives with mother who is 90 years old.  The patient states he is not employed.  He signed up for social security.  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  The patient denies any trauma history.    MEDICAL REVIEW OF SYSTEMS:  Reviewed documentation for a 10-point systems review completed by Rosalio Larson MD dated 2022.  No changes noted.    PHYSICAL EXAMINATION:    VITAL SIGNS:  Blood pressure 162/92, pulse 73, temperature 97.8 Fahrenheit, respirations 17, SpO2 at 95%.  Reviewed documentation for physical examination completed by Rosalio Larson MD dated 2022.  No changes are noted.    MENTAL STATUS EXAMINATION:  The patient appears his stated age, dressed in scrubs, adequate hygiene.  The patient was lying in bed, cooperative in meeting with provider in the interview room.  The patient was unsteady on his feet.  He was calm, cooperative with the interview.  Eye contact adequate.  The patient appeared shaky and gait was unsteady.  Speech was very soft, somewhat latent.  The patient was appropriate.  Elaborated appropriately.  Mood is described as depressed.  Affect blunted, congruent.  Thought process linear and logical.  Associations intact.  Thought content did not display evidence of psychosis.  He denies passive suicidal thoughts.  Denies active intent.  Denies homicidal thinking.  Insight and judgment appear to be fair.  Cognition appears  intact to interviewing including orientation to person, place, time, and situation, use language and fund of knowledge.  Recent and memory are grossly intact.  Muscle strength, tone, and gait appear within normal limits upon observation.    ASSESSMENT:     1.  Alcohol use disorder, severe.  2.  Alcohol withdrawal, severe, complicated with hypertension.  3.  Major depressive disorder, moderate.  4.  General anxiety disorder.    PLAN:     1.  The patient has been admitted to Detox Unit 3A on a voluntary basis.  2.  Discussed medications with the patient.  The patient will continue on Celexa 20 mg to address both depressive and anxiety symptoms.  The patient did not want to use hydroxyzine due to it causing dry mouth.  The patient was started on gabapentin 300 mg t.i.d. to address anxiety.  We discussed risks, benefits, and side effects of medication with the patient.  3.  MSSA protocol initiated with Valium to safely detox from alcohol.  4.  Internal medicine consult to address medical issues including hypertension, anemia and abnormal electrolytes.  5.  Psychosocial treatments to be addressed with CTC.  The patient wants long-term chemical dependency treatment.  6.  Estimated length of stay is 3-5 days.    Debra A. Naegele, APRN, CNS        D: 2022   T: 2022   MT: JARED    Name:     JOANNA BANUELOS  MRN:      [id number]        Account:     [id number]   :      1960           Admitted:    2022       Document: [id number]    cc:  Rohit Solitario MD

## 2022-11-13 NOTE — PLAN OF CARE
Problem: Alcohol Withdrawal  Goal: Alcohol Withdrawal Symptom Control       MSSA score over night was 10 & 8. Patient received 10 mg of valium  x 2. He was up once appearing to be restless and complained of having a dry mouth. He was given ice chips and redirected back to bed. Staff will continue to monitor and assist as needed.

## 2022-11-14 ENCOUNTER — TELEPHONE (OUTPATIENT)
Dept: BEHAVIORAL HEALTH | Facility: CLINIC | Age: 62
End: 2022-11-14

## 2022-11-14 LAB
ANION GAP SERPL CALCULATED.3IONS-SCNC: 6 MMOL/L (ref 3–14)
BASOPHILS # BLD AUTO: 0 10E3/UL (ref 0–0.2)
BASOPHILS NFR BLD AUTO: 1 %
BUN SERPL-MCNC: 8 MG/DL (ref 7–30)
CALCIUM SERPL-MCNC: 8.4 MG/DL (ref 8.5–10.1)
CHLORIDE BLD-SCNC: 106 MMOL/L (ref 94–109)
CO2 SERPL-SCNC: 29 MMOL/L (ref 20–32)
CREAT SERPL-MCNC: 0.8 MG/DL (ref 0.66–1.25)
EOSINOPHIL # BLD AUTO: 0.3 10E3/UL (ref 0–0.7)
EOSINOPHIL NFR BLD AUTO: 6 %
ERYTHROCYTE [DISTWIDTH] IN BLOOD BY AUTOMATED COUNT: 15.1 % (ref 10–15)
GFR SERPL CREATININE-BSD FRML MDRD: >90 ML/MIN/1.73M2
GLUCOSE BLD-MCNC: 130 MG/DL (ref 70–99)
HCT VFR BLD AUTO: 31.7 % (ref 40–53)
HGB BLD-MCNC: 10.3 G/DL (ref 13.3–17.7)
IMM GRANULOCYTES # BLD: 0 10E3/UL
IMM GRANULOCYTES NFR BLD: 1 %
LYMPHOCYTES # BLD AUTO: 0.9 10E3/UL (ref 0.8–5.3)
LYMPHOCYTES NFR BLD AUTO: 19 %
MAGNESIUM SERPL-MCNC: 1.7 MG/DL (ref 1.6–2.3)
MCH RBC QN AUTO: 29.7 PG (ref 26.5–33)
MCHC RBC AUTO-ENTMCNC: 32.5 G/DL (ref 31.5–36.5)
MCV RBC AUTO: 91 FL (ref 78–100)
MONOCYTES # BLD AUTO: 0.4 10E3/UL (ref 0–1.3)
MONOCYTES NFR BLD AUTO: 9 %
NEUTROPHILS # BLD AUTO: 3 10E3/UL (ref 1.6–8.3)
NEUTROPHILS NFR BLD AUTO: 64 %
NRBC # BLD AUTO: 0 10E3/UL
NRBC BLD AUTO-RTO: 0 /100
PLATELET # BLD AUTO: 66 10E3/UL (ref 150–450)
POTASSIUM BLD-SCNC: 3.4 MMOL/L (ref 3.4–5.3)
RBC # BLD AUTO: 3.47 10E6/UL (ref 4.4–5.9)
SARS-COV-2 RNA RESP QL NAA+PROBE: NEGATIVE
SODIUM SERPL-SCNC: 141 MMOL/L (ref 133–144)
WBC # BLD AUTO: 4.7 10E3/UL (ref 4–11)

## 2022-11-14 PROCEDURE — 85025 COMPLETE CBC W/AUTO DIFF WBC: CPT

## 2022-11-14 PROCEDURE — 128N000004 HC R&B CD ADULT

## 2022-11-14 PROCEDURE — 250N000013 HC RX MED GY IP 250 OP 250 PS 637: Performed by: CLINICAL NURSE SPECIALIST

## 2022-11-14 PROCEDURE — H2035 A/D TX PROGRAM, PER HOUR: HCPCS | Mod: HQ

## 2022-11-14 PROCEDURE — 83735 ASSAY OF MAGNESIUM: CPT

## 2022-11-14 PROCEDURE — 250N000013 HC RX MED GY IP 250 OP 250 PS 637

## 2022-11-14 PROCEDURE — 36415 COLL VENOUS BLD VENIPUNCTURE: CPT

## 2022-11-14 PROCEDURE — U0005 INFEC AGEN DETEC AMPLI PROBE: HCPCS | Performed by: PSYCHIATRY & NEUROLOGY

## 2022-11-14 PROCEDURE — H0001 ALCOHOL AND/OR DRUG ASSESS: HCPCS

## 2022-11-14 PROCEDURE — 250N000013 HC RX MED GY IP 250 OP 250 PS 637: Performed by: PSYCHIATRY & NEUROLOGY

## 2022-11-14 PROCEDURE — 80048 BASIC METABOLIC PNL TOTAL CA: CPT

## 2022-11-14 PROCEDURE — 99232 SBSQ HOSP IP/OBS MODERATE 35: CPT | Performed by: PSYCHIATRY & NEUROLOGY

## 2022-11-14 RX ADMIN — FOLIC ACID 1 MG: 1 TABLET ORAL at 08:48

## 2022-11-14 RX ADMIN — HYDROXYZINE HYDROCHLORIDE 25 MG: 25 TABLET, FILM COATED ORAL at 20:10

## 2022-11-14 RX ADMIN — CLONIDINE HYDROCHLORIDE 0.1 MG: 0.1 TABLET ORAL at 00:23

## 2022-11-14 RX ADMIN — DIAZEPAM 10 MG: 5 TABLET ORAL at 04:51

## 2022-11-14 RX ADMIN — Medication 1 TABLET: at 08:48

## 2022-11-14 RX ADMIN — HYDROXYZINE HYDROCHLORIDE 25 MG: 25 TABLET, FILM COATED ORAL at 08:50

## 2022-11-14 RX ADMIN — GABAPENTIN 300 MG: 300 CAPSULE ORAL at 08:48

## 2022-11-14 RX ADMIN — GABAPENTIN 300 MG: 300 CAPSULE ORAL at 14:29

## 2022-11-14 RX ADMIN — THIAMINE HCL TAB 100 MG 100 MG: 100 TAB at 08:48

## 2022-11-14 RX ADMIN — LISINOPRIL 30 MG: 20 TABLET ORAL at 08:48

## 2022-11-14 RX ADMIN — GABAPENTIN 300 MG: 300 CAPSULE ORAL at 20:10

## 2022-11-14 RX ADMIN — CLONIDINE HYDROCHLORIDE 0.1 MG: 0.1 TABLET ORAL at 08:50

## 2022-11-14 RX ADMIN — MAGNESIUM OXIDE TAB 400 MG (241.3 MG ELEMENTAL MG) 400 MG: 400 (241.3 MG) TAB at 08:48

## 2022-11-14 RX ADMIN — HYDROXYZINE HYDROCHLORIDE 25 MG: 25 TABLET, FILM COATED ORAL at 12:49

## 2022-11-14 RX ADMIN — SIMVASTATIN 40 MG: 20 TABLET, FILM COATED ORAL at 20:10

## 2022-11-14 RX ADMIN — TRAZODONE HYDROCHLORIDE 50 MG: 50 TABLET ORAL at 20:10

## 2022-11-14 RX ADMIN — DIAZEPAM 5 MG: 5 TABLET ORAL at 00:23

## 2022-11-14 RX ADMIN — CITALOPRAM HYDROBROMIDE 20 MG: 20 TABLET ORAL at 08:48

## 2022-11-14 RX ADMIN — MAGNESIUM OXIDE TAB 400 MG (241.3 MG ELEMENTAL MG) 400 MG: 400 (241.3 MG) TAB at 20:10

## 2022-11-14 RX ADMIN — POTASSIUM CHLORIDE 40 MEQ: 20 SOLUTION ORAL at 08:48

## 2022-11-14 ASSESSMENT — ACTIVITIES OF DAILY LIVING (ADL)
ADLS_ACUITY_SCORE: 28
ORAL_HYGIENE: INDEPENDENT
HYGIENE/GROOMING: INDEPENDENT
ADLS_ACUITY_SCORE: 28
ADLS_ACUITY_SCORE: 28
DRESS: INDEPENDENT
ADLS_ACUITY_SCORE: 28
LAUNDRY: WITH SUPERVISION
ADLS_ACUITY_SCORE: 28

## 2022-11-14 NOTE — PLAN OF CARE
Problem: Alcohol Withdrawal  Goal: Alcohol Withdrawal Symptom Control  Outcome: Progressing   Goal Outcome Evaluation:         Pt continues on detox status for alcohol on MSSA with valium.  Pt scored 8 and 8 this shift. /102 at midnight. Prn clonidine 0.1mg and valium 5mg given.   On recheck at 0400 MSSA 8, /89. Pt pt reports mild sweats, tremors and could not go back to sleep.

## 2022-11-14 NOTE — PLAN OF CARE
Behavioral Team Discussion: (11/14/2022)    Continued Stay Criteria/Rationale: Patient admitted for alcohol withdrawal, complicated.  Plan: The following services will be provided to the patient; psychiatric assessment, medication management, therapeutic milieu, individual and group support, and skills groups.   Participants: 3A Provider: Dr. Mame June MD; 3A RN: Marcela Ramirez RN; 3A CM's: Qian Hernandez.  Summary/Recommendation: Providers will assess today for treatment recommendations, discharge planning, and aftercare plans. CM will meet with pt for discharge planning.   Medical/Physical: Internal medicine consult completed 11/13/22.  Precautions:   Behavioral Orders   Procedures     Code 1 - Restrict to Unit     Fall precautions     Unsteady gait     Routine Programming     As clinically indicated     Status 15     Every 15 minutes.     Withdrawal precautions     Rationale for change in precautions or plan: N/A  Progress: No Change.    ASAM Dimension Scale Ratings:  Dimension 1: 3 Client tolerates and ck with withdrawal discomfort poorly. Client has severe intoxication, such that the client endangers self or others, or intoxication has not abated with less intensive levels of services. Client displays severe signs and symptoms; or risk of severe, but manageable withdrawal; or withdrawal worsening despite detox at less intensive level.  Dimension 2: 1 Client tolerates and ck with physical discomfort and is able to get the services that the client needs.  Dimension 3: 2 Client has difficulty with impulse control and lacks coping skills. Client has thoughts of suicide or harm to others without means; however, the thoughts may interfere with participation in some treatment activities. Client has difficulty functioning in significant life areas. Client has moderate symptoms of emotional, behavioral, or cognitive problems. Client is able to participate in most treatment activities.  Dimension 4: 2  Client displays verbal compliance, but lacks consistent behaviors; has low motivation for change; and is passively involved in treatment.  Dimension 5: 4 No awareness of the negative impact of mental health problems or substance abuse. No coping skills to arrest mental health or addiction illnesses, or prevent relapse.  Dimension 6: 2 Client is engaged in structured, meaningful activity, but peers, family, significant other, and living environment are unsupportive, or there is criminal justice involvement by the client or among the client's peers, significant others, or in the client's living environment.

## 2022-11-14 NOTE — DISCHARGE INSTRUCTIONS
"Behavioral Discharge Planning and Instructions  THANK YOU FOR CHOOSING THE [address]  [phone]    Summary: You were admitted to [facility] on 11/12/2022 for detoxification from alcohol.  A medical exam was performed that included lab work. You have met with a  and opted to pursue IOP at [facility].  Please take care and make your recovery a priority!    Main Diagnosis: Mame Frederick MD      1.  Alcohol use disorder, severe.  2.  Alcohol withdrawal, severe, complicated with hypertension.  3.  Major depressive disorder, moderate.  4.  General anxiety disorder.    Recommendation:  Enter and complete IOP treatment at [facility] and follow all recommendations of your treatment providers.      Disposition: Home    Treatment Follow-Up:  [facility]  Start date/time: Not yet scheduled. Stay in contact with Estefani Osborne ([phone]) to get started. You are on [facility] waitlist  About: \"All of our programs are designed to help patients understand the negative effects of alcohol and/or drugs on their lives and relationships. Patients will work with their counselor to develop a personalized plan that outlines treatment goals. By working towards these goals, individuals will develop a positive self-image and learn the coping skills necessary to achieve and maintain recovery.\"    Primary Provider: Follow up appointment PCP Dr. Blood     Within 2 weeks    [address] [place] [address]    Phone number [phone]                Major Treatments, Procedures and Findings:  You have withdrawn from alcohol using the MSSA protocol with Valium.  You have met with a  to develop a treatment plan for discharge.  You have had labs drawn and those results have been reviewed with you. Please take a copy of your lab work with you to your next primary care physician appointment.    Symptoms to Report:  If you experience more anxiety, confusion, sleeplessness, deep sadness " or thoughts of suicide, notify your treatment team or notify your primary care physician. IF ANY OF THE SYMPTOMS YOU ARE EXPERIENCING ARE A MEDICAL EMERGENCY CALL 911 IMMEDIATELY.     If you or someone you know is struggling or in crisis, help is available.  Call or text 528 or chat  at [email or web address]    Lifestyle Adjustment:   Health Action Plan:  1.Create a daily schedule  2. Eat Healthy  3. Plan Enjoyable Sober Activities  4. Use Problem Solving Skills and Deal with Issues as they Arise.   5. Be Physically Active  6. Take your medications as prescribed  7. Get enough restful sleep  8. Practice Relaxation  9. Spend time with Supportive People  10. No use of alcohol, illegal drugs or addictive medications other than what is currently prescribed.   11.AA, NA Sponsor are excellent resources for support  12. Explore how  you can utilize spirituality in your recovery            General Medication Instructions:   See your medication sheet(s) for instructions.   Take all medicines as directed.  Make no changes unless your doctor suggests them.       DISCHARGE RESOURCES:  Facts about COVID19 at www.cdc.gov/COVID19 and www.[place].gov/covid19    Keeping hands clean is one of the most important steps we can take to avoid getting sick and spreading germs to others.  Please wash your hands frequently and lather with soap for at least 20 seconds!    Recovery apps for your phone to locate current in person and zoom recovery meetings  [employer] [place] - meeting milena  AA  - meeting milena  Meeting guide - meeting milena  Quick NA meeting - meeting milena  Edda- has various apps    Great Pod casts for nutrition and wellness  Listen on Apple Podcasts  Dishing Up Nutrition   [employer]   Nutrition       Understand the connection between what you eat and how you feel. Hosted by licensed nutritionists and dietitians from [employer] we share practical, real-life solutions for healthier living  "through nutrition.     Resources:   Resources for on line recovery meetings:  due to covid-19 AA/NA meetings are being held online*  AA meetings can be found online; search for them at: http://aa-intergroup.org/directory.php  AA meetings via [place] for [place] area can be found online at: https://a[place].org/find-a-meeting/holiday-closings/  NA meetings via ZOOM for [place] area can be found online at: https://sites.F[place]ont.com/view/[place]regionofnarcoticsanonymous/home?authuser=2  Www.[employer]  has online resources for meeting and recovery care including Podcast \"Let's Talk:Addiction & Recovery Podcasts  Www.[employer].org   -SMART Recovery - self management for addiction recovery:  www.smartrecBib + Tuck.org    -Pathways ~ A Health Crisis Resource & Support Center: 970.592.2312.  -[facility] [phone]   -[facility] Behavioral Intake 312-235-1238 or 269-310-2713.  -Suicide Awareness Voices of Education (SAVE) (www.save.org): 431-885-IIXO (0406)  -National Suicide Prevention Line (www.mentalhealthmn.org): 394-612-OLHJ (3080)  -National [place] on Mental Illness (www.[place].dionne.org): 199.653.7336 or 311-559-9536.  -Qbrd5lxgs: text the word LIFE to 19546 for immediate support and crisis intervention  -Mental Health Consumer/Survivor Network of [place] (www.mhcsn.net): 637.386.7341 or 837-437-6230  -Mental Health Association of [place] (www.mentalhealth.org): 528.696.2319 or 012-635-4775     -Substance Abuse and Mental Health Services (www.samhsa.gov)  -Harm Reduction Coalition (www. Harmreduction.org)  -www.prescribetoprevent.org or http://prescribetoprevent.org/video  -Poison control 6-795-986-5159   **[place] Opioid Prevention Coalition: www.opioidcoalition.org    Sober Support Group Information:  AA/NA & Sponsor/Support  -Alcoholics Anonymous (www.alcoholics-anonymous.org): for local information 24 hours/day  -AA Intergroup service office in [place] (http://www.aastpaul.org/) 969.853.3224  -AA " Intergroup service office in [facility]: 975.745.4842. (http://www.Glamour.com.ng.org/)  -Narcotics Anonymous (www.nam[place].org) (542) 862-4257   Sober Fun Activities: www.sober-activities.Trell[employer]ence/[facility]//[facility] Recovery Connection ([facility])  [facility] connects people seeking recovery to resources that help foster and sustain long-term recovery.  Whether you are seeking resources for treatment, transportation, housing, job training, education, health care or other pathways to recovery, [facility] is a great place to start.    Phone: [phone].  www.[place]D.[employer] (Great listing of all types of recovery and non-recovery related resources)    Any follow up concerns:  Nursing questions call the Unit -[facility] [phone]  Medical Record call [phone]  Outpatient Behavioral Intake call [phone]  LP+ Wait List/Bed Availability call [phone]    The entire treatment team has appreciated the opportunity to work with you.  We wish you the best in the future and with your lifelong recovery goals. Please bring this discharge folder with you to all follow up appointments.  It contains your lab results, diagnosis, medication list and discharge recommendations.    THANK YOU FOR CHOOSING THE [facility]

## 2022-11-14 NOTE — PLAN OF CARE
"Goal Outcome Evaluation:    Plan of Care Reviewed With: patient        Patient is A&O x 4, flat blunted affect.    Pt 's appetite is improving, no C/O nausea.  Pt's K+ today is at  borderline 3.4 .  Pt C/O cough with chest pain while coughing,  COVID swab sent , is back negative.   MSSA 7 and 6, no withdrawal medications given this shift.  Denies SI/HI/SIB. Endorses anxiety 6/10, no depression.   PRN Hydroxyzine 25 mg given twice this shift.  Patient is med compliant, contracts for safety while on unit.  Pt is visible on unit, noticed in milieu, watching TV and interacting with peers.  Pt's BP's still elevated but not met the PRN  clonidine parameters.   Staff will continue to monitor for BP and withdrawals and medicate a needed.    BP (!) 150/95 (BP Location: Right arm, Patient Position: Chair, Cuff Size: Adult Large)   Pulse 86   Temp 98.1  F (36.7  C) (Oral)   Resp 16   Ht 1.778 m (5' 10\")   Wt 98.9 kg (218 lb)   SpO2 95%   BMI 31.28 kg/m             "

## 2022-11-14 NOTE — PROGRESS NOTES
Met with pt to discuss discharge planning. Pt reports a goal to attend IOP treatment. Pt reports he lives at home with his 90 y.o. mother and has a dog, and if he goes to residential his sister will give his dog away. Pt reports he likes [facility] in [place], but didn't make it in back in August 22 when he was referred by [facility] because they had a waitlist.     Pt had original assessment 3/24/22, update 8/29/22, needs update for referral to treatment. Given paperwork to complete.     Qian Hernandez, LPCC, LADC

## 2022-11-14 NOTE — PROGRESS NOTES
Type Of Assessment: Comprehensive Assessment Update    Referral Source:  3A/detox  MRN: [id number]    DATE OF SERVICE: 2022  Date of previous ROYA Assessment: Original: 2022 by STEPHANY Burnett LADC          Update:  2022 By STEPHANY Carson, ELANA  Patient confirmed identity through two factor verification: Full Legal Name and     PATIENT'S NAME: Durga Aguirre  Age: 62 year old  Last 4 SSN: [id number]  Sex: male   Gender Identity: male  Sexual Orientation: Heterosexual  Cultural Background: No, Denies any cultural influences or concerns that need to be considered for treatment  YOB: 1960  Current Address:   [address] [place] [id number]  Patient Phone Number:  [phone]   Patient's E-Mail Contact:  [email or web address]  Funding: [employer]  PMI: [id number]  Emergency Contact: Cate Galindo (sister): [phone]  DAANES information was provided to patient and patient does not want a copy.     Telemedicine Visit: The patient's condition can be safely assessed and treated via synchronous audio and visual telemedicine encounter.    Reason for Telemedicine Visit: Services only offered telehealth  Originating Site (Patient Location): [address] [address]   Distant Site (Provider Location): Provider Remote Setting- Home Office  Consent:  The patient/guardian has verbally consented to: the potential risks and benefits of telemedicine (video visit) versus in person care; bill my insurance or make self-payment for services provided; and responsibility for payment of non-covered services.   Mode of Communication:  Phone Visit     START TIME: 1:05PM  END TIME: 1:15PM    As the provider I attest to compliance with applicable laws and regulations related to telemedicine.   Durga Aguirre was seen for a substance use disorder consult on 2022 by ELANA Yo.    Reason for  "Substance Use Disorder Consult:  Per H&P:  HISTORY OF PRESENT ILLNESS:  Durga Aguirre is a 62-year-old single  male seeking detox from alcohol.  The patient has a history of depression and anxiety.  The patient reports he is drinking 1 liter of vodka per day for the last 2 years.  The patient was brought in by a neighbor.  The patient states \"I'm back again.\"  The patient was in detox in 08/2022, 03/2022 and 07/2021.    Per patient: \"I still want to get set up with IOP treatment [facility]\"     Are you currently having severe withdrawal symptoms that are putting yourself or others in danger? Yes, explain: Pt currently admitted to 3A/detox for withdrawal management.   Are you currently having severe medical problems that require immediate attention? No  Are you currently having severe emotional or behavioral problems that are putting yourself or others at risk of harm? No    Have you participated in prior substance use disorder evaluations? Yes. When, Where, and What circumstances: 03/2022 and 08/2022 while admitted to 3A/detox   Have you ever been to detox, inpatient or outpatient treatment for substance related use? List previous treatment: Yes. When, Where, and What circumstances: Has been to [facility], [place], [facility] IOP   Have you ever had a gambling problem or had treatment for compulsive gambling? No  Patient is in active withdrawal, but is currently admitted to [facility] for medical detoxification and withdrawal monitoring and is not an imminent safety risk to self or others, and may proceed with the assessment interview    Comprehensive Substance Use History   X X = Primary Drug Used Age of First Use    Pattern of Substance Use   (heaviest use in life and a use history within the past year if applicable) (DSM-5: Sx #3) Date /  Quantity of last use if within the past 30 days Withdrawal Potential?   Method of use  (Oral, smoked, snorted, IV, etc)   x Alcohol   " "12 Current:  Pt reports he would wake up at 12am and have a drink, go to sleep and wake up and drink. Pt reports it was around the clock drinking.     Per 08/29/2022 eval:  Since 3/25/22:  Patient reports sobriety for the first 3-4 months since last detox admission.  Patient reports the last one to two months \"I have been putting it down quite heavily - every day, 24 hours a day about one quart per day\" 11/12/2022  CACHORRO 0.39 upon admission Yes - being monitored while on 3A Oral    Marijuana/Hashish   12 1980 No Smoke    Cocaine/Crack No use        Meth/Amphetamines   No use        Heroin   No use        Other Opiates/Synthetics   No use        Inhalants  No use        Benzodiazepines   No use        Hallucinogens   No use        Barbiturates/Sedatives/Hypnotics   No use        Over-the-Counter Drugs   No use        Other   No use        Nicotine   10 Pt reports minimal cigarette use, rolls his own.  11/12/2022 No Smoke     Withdrawal symptoms: Have you had any of the following withdrawal symptoms?    Shaky / Jittery / Tremors  Fatigue / Extremely Tired  Muscle Aches  High Blood Pressure  Nausea / Vomiting  Diminished Appetite  Anxiety / Worried    Have you experienced any cravings?  Patient denies.     Have you had periods of abstinence?  Yes   What was your longest period? 03/2022-07/2022    Any circumstances that lead to relapse? Pt did not report.     What activities have you engaged in when using alcohol/other drugs that could be hazardous to you or others?  The patient reported having a history of driving while under the influence of alcohol or drugs.    A description of any risk-taking behavior, including behavior that puts the client at risk of exposure to blood-borne or sexually transmitted diseases: Pt denies.     Arrests and legal interventions related to substance use: Pt has history of 3 DWIs (7484-2993). Pt denies any current legal concerns.     A description of how the patient's use affected the their " ability to function appropriately in a work setting: The patient reported his substance use has negatively impacted his ability to function in a work setting.  The patient reported having decreased performance at work due to his substance use.   Patient is not currently working due to his alcohol use.      A description of how the patient's use affected the their ability to function appropriately in an educational setting: The patient reported his substance use has not negatively impacted his ability to function in a school setting within the past year.       Leisure time activities that are associated with substance use: Pt reports drinking around the clock.     Do you think your substance use has become a problem for you? He agrees he has a substance abuse problem.    MEDICAL HISTORY  Physical or medical concerns or diagnoses:   Past Medical History:   Diagnosis Date     Alcohol abuse      Anxiety and depression      Arthritis      Hyperlipidemia      Hypertension      Mass of left chest wall      Other spontaneous pneumothorax 1997     Patient Active Problem List   Diagnosis Code     CARDIOVASCULAR SCREENING; LDL GOAL LESS THAN 130 Z13.6     Generalized anxiety disorder F41.1     Advanced directives, counseling/discussion Z71.89     Essential hypertension I10     Hypertriglyceridemia E78.1     Dupuytren's contracture of hand M72.0     Uncomplicated alcohol dependence (H) F10.20     Substance use disorder F19.90     Chemical dependency (H) F19.20     Alcohol withdrawal with complication with inpatient treatment, with unspecified complication (H) F10.939     Alcohol dependence with intoxication with complication (H) F10.229     Hypokalemia E87.6     Vasovagal syncope R55     History of colonic polyps Z86.010     Elevated creatine kinase R74.8     Do you have any current medical treatment needs not being addressed by inpatient treatment?  Pt denies.     Do you need a referral for a medical provider? Pt's PCP is  Rohit Solitario MD with [facility].     Current medications:   Patient reports current meds as:   Outpatient Medications Marked as Taking for the 11/12/22 encounter (Hospital Encounter)   Medication Sig     B Complex-C (VITAMIN B + C COMPLEX PO) Take 1 tablet by mouth daily     busPIRone (BUSPAR) 15 MG tablet Take 1 tablet (15 mg) by mouth 2 times daily     citalopram (CELEXA) 20 MG tablet Take 1 tablet (20 mg) by mouth daily     gabapentin (NEURONTIN) 300 MG capsule Take 1 capsule (300 mg) by mouth daily for 7 days, THEN 1 capsule (300 mg) 2 times daily.     hydrochlorothiazide (HYDRODIURIL) 12.5 MG tablet Take 1 tablet (12.5 mg) by mouth daily     hydrOXYzine (ATARAX) 50 MG tablet Take 1-2 tablets ( mg) by mouth 3 times daily as needed for itching or anxiety     lisinopril (ZESTRIL) 30 MG tablet Take 1 tablet (30 mg) by mouth daily     multivitamin w/minerals (THERA-VIT-M) tablet Take 1 tablet by mouth daily     naltrexone (DEPADE/REVIA) 50 MG tablet Take 1 tablet (50 mg) by mouth daily     nicotine (NICODERM CQ) 14 MG/24HR 24 hr patch Place 1 patch onto the skin daily     nicotine (NICORETTE) 2 MG gum Place 1 each (2 mg) inside cheek every hour as needed for smoking cessation     pantoprazole (PROTONIX) 40 MG EC tablet Take 1 tablet (40 mg) by mouth every morning (before breakfast)     thiamine (B-1) 100 MG tablet Take 1 tablet (100 mg) by mouth daily     traZODone (DESYREL) 50 MG tablet Take 1 tablet (50 mg) by mouth nightly as needed for sleep (may repeat after 60 minutes)     Current Facility-Administered Medications   Medication     acetaminophen (TYLENOL) tablet 650 mg     alum & mag hydroxide-simethicone (MAALOX) suspension 30 mL     artificial saliva (BIOTENE MT) solution 1 spray     citalopram (celeXA) tablet 20 mg     cloNIDine (CATAPRES) tablet 0.1 mg     diazepam (VALIUM) tablet 5-20 mg     folic acid (FOLVITE) tablet 1 mg     gabapentin (NEURONTIN) capsule 300 mg      "hydrOXYzine (ATARAX) tablet 25 mg     lisinopril (ZESTRIL) tablet 30 mg     loperamide (IMODIUM) capsule 2 mg     magnesium oxide (MAG-OX) tablet 400 mg     multivitamin w/minerals (THERA-VIT-M) tablet 1 tablet     ondansetron (ZOFRAN ODT) ODT tab 4 mg     potassium chloride (KAYCIEL) solution 40 mEq     senna-docusate (SENOKOT-S/PERICOLACE) 8.6-50 MG per tablet 1 tablet     simvastatin (ZOCOR) tablet 40 mg     thiamine (B-1) tablet 100 mg     traZODone (DESYREL) tablet 50 mg     Facility-Administered Medications Ordered in Other Encounters   Medication     Self Administer Medications: Behavioral Services      Are you pregnant? NA, Male    Do you have any specific physical needs/accommodations? Yes, Patient reports needing to wear a knee brace due to injury in 1997.    MENTAL HEALTH HISTORY:  Have you ever had  hospitalizations or treatment for mental health illness: No    Mental health history, including diagnosis and symptoms, and the effect on the client's ability to function:   Pt denies any current mental health providers.     Per H&P:   PSYCHIATRIC REVIEW OF SYSTEMS:  The patient reports he feels \"blank.\" The patient reports that he has been isolating.  He is fatigued, very poor appetite, very poor sleep.  The patient denies any suicidal thinking.  No active intent.  The patient reports \"I have plenty of anxiety,\" that is why drink.  The patient denies symptoms of PTSD, eating disorder or OCD.  The patient does not endorse symptoms of aleena.  Does not endorse symptoms of psychosis including auditory or visual hallucinations or feelings of paranoia.     PSYCHIATRIC HISTORY:  The patient has had multiple detoxes.  The patient denies any prior suicide attempts.  The patient reports he has been in therapy.  The patient currently is being treated with Celexa to address depressive and anxiety symptoms.     ASSESSMENT:     1.  Alcohol use disorder, severe.  2.  Alcohol withdrawal, severe, complicated with " hypertension.  3.  Major depressive disorder, moderate.  4.  General anxiety disorder.    Current mental health treatment including psychotropic medication needed to maintain stability: (Note: The assessment must utilize screening tools approved by the commissioner pursuant to section 245.4863 to identify whether the client screens positive for co-occurring disorders): See above.     GAIN-SS Tool:  When was the last time that you had significant problems... 2022   with feeling very trapped, lonely, sad, blue, depressed or hopeless about the future? 1+ years ago Past month   with sleep trouble, such as bad dreams, sleeping restlessly, or falling asleep during the day? Never 1+ years ago   with feeling very anxious, nervous, tense, scared, panicked or like something bad was going to happen? Past month Past month   with becoming very distressed & upset when something reminded you of the past? 1+ years ago 2 to 12 months ago   with thinking about ending your life or committing suicide? Never Never     When was the last time that you did the following things 2 or more times? 2022   Lied or conned to get things you wanted or to avoid having to do something? Past month 2 to 12 months ago   Had a hard time paying attention at school, work or home? Never 1+ years ago   Had a hard time listening to instructions at school, work or home? Past month Never   Were a bully or threatened other people? Never 1+ years ago   Started physical fights with other people? Never Never     Have you ever been verbally, emotionally, physically or sexually abused?   The patient denied having any history of being verbally, emotionally, physically or sexually abused.    Family history of substance use and misuse: Patient reports alcohol use with his mom. Patient reports father used alcohol, is .     The patient's desire for family involvement in the treatment program: Pt not sure.   Level of family support:  Pt not sure.     Social network in relation to expected support for recovery: Pt denies a current sober support network.     Are you currently in a significant relationship? No    Do you have any children (include living arrangements/custody/contact)?:  One adult son, doesn't have contact with him.    What is your current living situation? Pt lives with his mom and dog.     Are you employed/attending school? Pt reports he hasn't worked for the past few months due to his alcohol use. Pt reports he would like to return to work eventually. Pt reports he is in the [place] still.     SUMMARY:  Ability to understand written treatment materials: Yes  Ability to understand patient rules and patient rights: Yes  Does the patient recognize needs related to substance use and is willing to follow treatment recommendations: Yes  Does the patient have an opioid use disorder:  does not have a history of opiate use.    ASAM Dimension Scale Ratings:  Dimension 1: 1 Client can tolerate and cope with withdrawal discomfort. The client displays mild to moderate intoxication or signs and symptoms interfering with daily functioning but does not immediately endanger self or others. Client poses minimal risk of severe withdrawal.  Dimension 2: 1 Client tolerates and ck with physical discomfort and is able to get the services that the client needs.  Dimension 3: 2 Client has difficulty with impulse control and lacks coping skills. Client has thoughts of suicide or harm to others without means; however, the thoughts may interfere with participation in some treatment activities. Client has difficulty functioning in significant life areas. Client has moderate symptoms of emotional, behavioral, or cognitive problems. Client is able to participate in most treatment activities.  Dimension 4: 2 Client displays verbal compliance, but lacks consistent behaviors; has low motivation for change; and is passively involved in treatment.  Dimension 5: 3  Client has poor recognition and understanding of relapse and recidivism issues and displays moderately high vulnerability for further substance use or mental health problems. Client has few coping skills and rarely applies coping skills.  Dimension 6: 3 Client is not engaged in structured, meaningful activity and the client's peers, family, significant other, and living environment are unsupportive, or there is significant criminal justice system involvement.    Category of Substance Severity (ICD-10 Code / DSM 5 Code)     Alcohol Use Disorder Severe  (10.20) (303.90)   Cannabis Use Disorder The patient does not meet the criteria for a Cannabis use disorder.   Hallucinogen Use Disorder The patient does not meet the criteria for a Hallucinogen use disorder.   Inhalant Use Disorder The patient does not meet the criteria for an Inhalant use disorder.   Opioid Use Disorder The patient does not meet the criteria for an Opioid use disorder.   Sedative, Hypnotic, or Anxiolytic Use Disorder The patient does not meet the criteria for a Sedative/Hypnotic use disorder.   Stimulant Related Disorder The patient does not meet the criteria for a Stimulant use disorder.   Tobacco Use Disorder Mild    (Z72.0) (305.1)   Other (or unknown) Substance Use Disorder The patient does not meet the criteria for a Other (or unknown) Substance use disorder.     A problematic pattern of alcohol/drug use leading to clinically significant impairment or distress, as manifested by at least two of the following, occurring within a 12-month period:    1.) Alcohol/drug is often taken in larger amounts or over a longer period than was intended.  2.) There is a persistent desire or unsuccessful efforts to cut down or control alcohol/drug use  3.) A great deal of time is spent in activities necessary to obtain alcohol, use alcohol, or recover from its effects.  5.) Recurrent alcohol/drug use resulting in a failure to fulfill major role obligations at  work, school or home.  6.) Continued alcohol use despite having persistent or recurrent social or interpersonal problems caused or exacerbated by the effects of alcohol/drug.  7.) Important social, occupational, or recreational activities are given up or reduced because of alcohol/drug use.  9.) Alcohol/drug use is continued despite knowledge of having a persistent or recurrent physical or psychological problem that is likely to have been caused or exacerbated by alcohol.  11.) Withdrawal, as manifested by either of the following: The characteristic withdrawal syndrome for alcohol/drug (refer to Criteria A and B of the criteria set for alcohol/drug withdrawal). and Alcohol/drug (or a closely related substance, such as a benzodiazepine) is taken to relieve or avoid withdrawal symptoms.    Specify if: In early remission:  After full criteria for alcohol/drug use disorder were previously met, none of the criteria for alcohol/drug use disorder have been met for at least 3 months but for less than 12 months (with the exception that Criterion A4,  Craving or a strong desire or urge to use alcohol/drug  may be met).     In sustained remission:   After full criteria for alcohol use disorder were previously met, non of the criteria for alcohol/drug use disorder have been met at any time during a period of 12 months or longer (with the exception that Criterion A4,  Craving or strong desire or urge to use alcohol/drug  may be met).     Specify if:   This additional specifier is used if the individual is in an environment where access to alcohol is restricted.    Mild: Presence of 2-3 symptoms  Moderate: Presence of 4-5 symptoms  Severe: Presence of 6 or more symptoms    Collateral information: [facility] EMR  The patient's medical record at [facility] was reviewed and the information contained in the medical record supported the patient's account of his chemical use history and chemical use  "consequences.    Recommendations:   1. Abstain from all non-prescribed mood-altering substances  2. Take all medications as prescribed  3. Enter and complete a IOP program such as [facility] treatment program  4. Follow all recommendations upon discharge from treatment. Recommendations may include, but are not limited to: extended treatment, outpatient treatment and/or sober housing.  5. Follow all recommendations of your medical providers  6. Establish sober support by attending at least 1 sober support meeting of your choice in the community     Clinical Substantiation:  Patient has been unable to maintain abstinence from alcohol while living at his current home environment. Patient lacks long-term sober maintenance skills, lacks sober coping skills and lacks a sober peer support network. Patient would benefit from having a sober support network. Patient has not followed through on previous outpatient recommendations, pt verbalized motivation to attend outpatient therapy as he reports \"I am getting sick and tired of this\". If pt does not follow through on outpatient treatment, pt should highly consider residential placement based on this year's history.     Referrals/Alternatives:  Referral made to [facility] [place] [facility]      ROYA consult completed by: ELANA Yo.  Phone Number: [phone]  E-mail Address: [email or web address] and Addiction Services Evaluation Department  [address] [place] [address]     *Due to regulation of Title 42 of the Code of Federal Regulations (CFR) Part 2: Confidentiality laws apply to this note and the information wherein.  Thus, this note cannot be copy and pasted into any other health care staff's note nor can it be included in general medical records sent to ANY outside agency without the patient's written " "consent.    -------------------------------------------------------------------------------------------------------------     [facility]  UNIVERSAL ADULT DIAGNOSTIC ASSESSMENT - Substance Use Disorder     Provider Name and Credentials: Annemarie Correa, [facility], LADC     PATIENT'S NAME:    Durga Aguirre  PREFERRED NAME: Durga   PRONOUNS: he/him/his     MRN:   [id number]  :   1960   Last 4 SSN: [id number]  ACCT. NUMBER:  [id number]  DATE OF SERVICE:  3/24/2022   START TIME: 4:49 PM  END TIME:   PREFERRED PHONE: [phone]   May we leave a program related message: Yes  SERVICE MODALITY:  Phone Visit:      Provider verified identity through the following two step process.  Patient provided:  Patient  and Patient's last 4 digits of SSN     The patient has been notified of the following:      \"We have found that certain health care needs can be provided without the need for a face to face visit.  This service lets us provide the care you need with a phone conversation.       I will have full access to your [facility] medical record during this entire phone call.   I will be taking notes for your medical record.      Since this is like an office visit, we will bill your insurance company for this service.       There are potential benefits and risks of telephone visits (e.g. limits to patient confidentiality) that differ from in-person visits.?Confidentiality still applies for telephone services, and nobody will record the visit.  It is important to be in a quiet, private space that is free of distractions (including cell phone or other devices) during the visit.??      If during the course of the call I believe a telephone visit is not appropriate, you will not be charged for this service\"     Consent has been obtained for this service by care team member: Yes      Identifying Information:  Patient is a 62 year old,  male who was referred for an assessment by [facility] and [facility] " "[facility]. The pronoun use throughout this assessment reflects the patient's chosen pronoun. Patient attended the session alone.      Chief Complaint:   The reason for seeking services at this time is: \"Alcohol\"  The problem(s) began at age 12. Patient has attempted to resolve these concerns in the past through detox episodes, Residential/Outpatient Treatment.  Patient is in active withdrawal, but is currently admitted to [facility] 3[facility] for medical detoxification and withdrawal monitoring and is not an imminent safety risk to self or others, and may proceed with the assessment interview     Social/Family History:  Patient reported he grew up in [place], [place]. Patient was raised by biological parents. Patient reported that his childhood was \"very good\".  Patient describes current relationships with family of origin as positive.       The patient describes his cultural background as Hoahaoism.  Cultural influences and impact on patient's life structure, values, norms, and healthcare: Racial or Ethnic Self-Identification  and Spiritual Beliefs: Hoahaoism.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

## 2022-11-14 NOTE — PROGRESS NOTES
[facility], [place]   Psychiatric Progress Note        Interim history   This is a 62 year old male with ASSESSMENT:     1.  Alcohol use disorder, severe.  2.  Alcohol withdrawal, severe, complicated with hypertension.  3.  Major depressive disorder, moderate.  4.  General anxiety disorder..Pt seen in rounds.   The patient's care was discussed with the treatment team during the daily team meeting and/or staff's chart notes were reviewed.  Staff report patient has been visible in the milieu,  no acute eventsovernight.     Patient's mood is anxious   C/o Cough chest pain   Energy Level:improving   Sleep:No concerns, sleeps well through night  Appetite:fair motivation interest   Suicidal/homicidal ideation/plan intent.psychosis  No prior suicde attempts  No access to gun  Pt is in alcohol withdrawal still being monitered every 4 hrs for it,   Pt mssa score are monitered  Tolerating meds and has no side effects.              Medications:     Current Facility-Administered Medications   Medication     acetaminophen (TYLENOL) tablet 650 mg     alum & mag hydroxide-simethicone (MAALOX) suspension 30 mL     artificial saliva (BIOTENE MT) solution 1 spray     citalopram (celeXA) tablet 20 mg     cloNIDine (CATAPRES) tablet 0.1 mg     diazepam (VALIUM) tablet 5-20 mg     folic acid (FOLVITE) tablet 1 mg     gabapentin (NEURONTIN) capsule 300 mg     hydrOXYzine (ATARAX) tablet 25 mg     lisinopril (ZESTRIL) tablet 30 mg     loperamide (IMODIUM) capsule 2 mg     magnesium oxide (MAG-OX) tablet 400 mg     multivitamin w/minerals (THERA-VIT-M) tablet 1 tablet     ondansetron (ZOFRAN ODT) ODT tab 4 mg     potassium chloride (KAYCIEL) solution 40 mEq     senna-docusate (SENOKOT-S/PERICOLACE) 8.6-50 MG per tablet 1 tablet     simvastatin (ZOCOR) tablet 40 mg     thiamine (B-1) tablet 100 mg     traZODone (DESYREL) tablet 50 mg     Facility-Administered Medications Ordered in Other Encounters   Medication  "    Self Administer Medications: Behavioral Services             Allergies:   No Known Allergies         Psychiatric Examination:   Blood pressure (!) 164/107, pulse 99, temperature 96.9  F (36.1  C), temperature source Temporal, resp. rate 16, height 1.778 m (5' 10\"), weight 98.9 kg (218 lb), SpO2 95 %.  Weight is 218 lbs 0 oz  Body mass index is 31.28 kg/m .    Appearance:  awake, alert and adequately groomed  Attitude:  cooperative  Eye Contact:  good  Mood:  better  Affect:  appropriate and in normal range and mood congruent  Speech:  clear, coherent rate /rhythm are good  Psychomotor Behavior:  no evidence of tardive dyskinesia, dystonia, or tics and intact station, gait and muscle tone  Throught Process:  logical  Associations:  no loose associations  Thought Content:  no evidence of suicidal ideation or homicidal ideation, no evidence of psychotic thought, no auditory hallucinations present and no visual hallucinations present  Insight:  limited  Judgement:  intact  Oriented to:  time, person, and place  Attention Span and Concentration:  intact  Recent and Remote Memory:  intact  Language fund of knowledge are adequate         Labs:     Recent Results (from the past 24 hour(s))   Basic metabolic panel    Collection Time: 11/14/22  7:56 AM   Result Value Ref Range    Sodium 141 133 - 144 mmol/L    Potassium 3.4 3.4 - 5.3 mmol/L    Chloride 106 94 - 109 mmol/L    Carbon Dioxide (CO2)      Anion Gap      Urea Nitrogen      Creatinine      Calcium      Glucose      GFR Estimate     CBC with platelets and differential    Collection Time: 11/14/22  7:56 AM   Result Value Ref Range    WBC Count 4.7 4.0 - 11.0 10e3/uL    RBC Count 3.47 (L) 4.40 - 5.90 10e6/uL    Hemoglobin 10.3 (L) 13.3 - 17.7 g/dL    Hematocrit 31.7 (L) 40.0 - 53.0 %    MCV 91 78 - 100 fL    MCH 29.7 26.5 - 33.0 pg    MCHC 32.5 31.5 - 36.5 g/dL    RDW 15.1 (H) 10.0 - 15.0 %    Platelet Count 66 (L) 150 - 450 10e3/uL    % Neutrophils 64 %    % " Lymphocytes 19 %    % Monocytes 9 %    % Eosinophils 6 %    % Basophils 1 %    % Immature Granulocytes 1 %    NRBCs per 100 WBC 0 <1 /100    Absolute Neutrophils 3.0 1.6 - 8.3 10e3/uL    Absolute Lymphocytes 0.9 0.8 - 5.3 10e3/uL    Absolute Monocytes 0.4 0.0 - 1.3 10e3/uL    Absolute Eosinophils 0.3 0.0 - 0.7 10e3/uL    Absolute Basophils 0.0 0.0 - 0.2 10e3/uL    Absolute Immature Granulocytes 0.0 <=0.4 10e3/uL    Absolute NRBCs 0.0 10e3/uL         DX   ASSESSMENT:     1.  Alcohol use disorder, severe.  2.  Alcohol withdrawal, severe, complicated with hypertension.  3.  Major depressive disorder, moderate.  4.  General anxiety disorder.   PLAN   Alcohol intoxication/withdrawal, presently is on MSSA protocol with Valium. Continue the same MSSA protocol as ordered. Continue thiamine 100 mg p.o. daily, M.V.I. one p.o. daily and folate 1 mg p.o. Daily  Will continue mssa protocal to detox off alcohol on valium,  Pt is c/o of termor , agitation poor sleep and poor appetite, he has sweats, feels shakey  On mssa client scored scored7 today and  needed needed 0 mg po as of yet , total dose since admission was 90mg     MSSA    Eating Disturbances: ate and enjoyed all of it or not applicable  Tremor: 1 - not visibly apparent but can be felt by the examiner placing his fingertip slightly against the patient's fingertips  Sleep Disturbance: slept through the night or not applicable  Clouding of Sensorium: no evidence  Hallucinations: 0 - none  Quality of Contact: 0 - awareness of examiner and people around him/her  Agitation: 2  Paroxysmal Sweats: 0 - no observed sweating  Temperature: 99.5 or below  Pulse: 3 - 90 to 99  Total MSSA Score: 7  Patient's potassium and sodium normalized hypokalemia hypernatremia or normalized  Will order covid test for cough  Will be seen by medicne -cough chest pain sob?  Laboratory/Imaging: reviewed with patient   Consults: internal medicine consult reviewed  Patient will be treated in  therapeutic milieu with appropriate individual and group therapies as described.  PDMP CHECKED     Supportive psychotheraoy provided, chance talked about recovery enviroment, relapse prevention, triggers to use.  Discussed with patient many issues of addiction,triggers, relapse, and establishing a solid recovery program.  Asked pt to be med complinat   Medical diagnoses to be addressed this admission:    Plan:     Assessment & Recommendations  Durga Aguirre is a 62 year old man with a history of HLD, HTN, depression, anxiety, alcohol use disorder who is admitted to station 3A after presenting to ED for evaluation of alcohol detox. Admitted for further psychiatric monitoring and management.       Alcohol Use Disorder  Acute Alcohol Withdrawal  PTA, patient was consuming ~one quart of vodka daily. Last drink was the day of admission. Withdrawal symptoms include anxiety, shakes. No history of withdrawal seizures, no seizure-like activity recently. No history of DT.    -Continue MSSA protocol  -Management per psychiatry team  -Agree with Multivitamin, Folate, and Thiamine     Transaminitis  On admission ALT 87 , slightly less than expected 2:1 ratio in alcohol consumption. However, suspect these are elevated d/t alcohol use as above, no risk factors for viral hepatitis.  -Recheck in 48 hrs to ensure downtrend     Pancytopenia  Leukopenia  Anemia, Chronic  Thrombocytopenia  On admission WBC 3.2, Hgb 12.4, Plts 90. However, otherwise HDS (aside from elevated BP's). Suspect these lab findings are d/t alcohol consumption as above causing marrow suppression resulting in low WBC, Hgb, and Plts. On review of previous lab findings, it appears that he develops pancytopenia w/ periods of heavy alcohol consumption. No s/sx of infection, acute blood loss.   -Continue to monitor  -Recheck CBC in AM     Hypokalemia  Hypomagnesemia  Hypernatremia, improving  Lytes recheck this AM showing K 3.1, Mag 1.5, Na 145 (148). Suspect  these lyte derangements are d/t dehydration in the setting of alcohol consumption as above, poor PO intake while drinking (notes diet was mostly steak, crab legs, pizza).  -Starting on Mag Oxide 400mg BID while here  -Giving 40mEq K today  -Sodium is improving today  -Recheck lytes in AM     Depression  Anxiety  Insomnia  PTA on Celexa, Buspar, Atarax, Trazodone.  -Psychiatry managing     HTN  PTA on Lisinopril. BP's while here have been elevated, but suspect this is likely d/t acute alcohol withdrawal as above. Notes he was compliant with medications at home. Potassium low today (see below), so will hold off on restarting until this has normalized.  -Hold off on starting Lisinopril for now, consider restarting pending K recheck in AM      HLD, mixed  PTA on Simvastatin. Trigs, LDL elevated.   -Continue PTA Statin  -Follow-up with PCP regarding elevated lipids, and recheck     Medicine will continue to follow peripherally for CBC, lytes, LFT's, restarting Lisinopril, please page with any additional concerns.        Legal Status: voluntary    Safety Assessment:   Checks:  15 min  Precautions: withdrawal precautions  Pt has not required locked seclusion or restraints in the past 24 hours to maintain safety, please refer to RN documentation for further details.  Discussed with patient many issues of addiction,triggers, relapse, and establishing a solid recovery program.  Able to give informed consent:  YES   Discussed Risks/Benefits/Side Effects/Alternatives: YES    After discussion of the indications, risks, benefits, alternatives and consequences of no treatment, the patient elects to complete detox and do trt,

## 2022-11-14 NOTE — PLAN OF CARE
Problem: Alcohol Withdrawal  Goal: Alcohol Withdrawal Symptom Control  Outcome: Progressing   Patient was up for assessment and medications. Patient's affect was flat and blunted. He reported anxiety 7/10, depression 2/10 and contracted for safety. Patient is continuously been monitored and treated for alcohol withdrawal and elevated BP.  MSSA score this evening was 8 & 8 and he was given 10 mg of valium x 2. His BP was high but not high enough to be treated with clonidine or call internal medicine. Patient was up and visible in the milieu watching games on TV with select peers. Staff will continue to monitor and assist as needed.

## 2022-11-14 NOTE — TELEPHONE ENCOUNTER
The below telephone note was routed to the [facility] UR department at: [facility] [[id number]] and to the [facility] IOP Admissions Department at: [facility] ADULT IOP INTAKE POOL [[id number]] on 11/14/2022 as a referral for IOP treatment at [facility].    A.)  Name: Durga Aguirre Tel #: [phone]  B.)  MRN: [id number]  C.)  Referral is for: a virtual Mixed Primary Evening Outpatient program at [facility] in [place], [place] for substance use disorder treatment.  D.)  Notes: Pt is back in detox, requesting referral for [facility]. I know Qian talked to Estefani about this referral as well.     Thanks,    ELANA Yo

## 2022-11-15 ENCOUNTER — TELEPHONE (OUTPATIENT)
Dept: BEHAVIORAL HEALTH | Facility: CLINIC | Age: 62
End: 2022-11-15

## 2022-11-15 VITALS
WEIGHT: 218 LBS | HEIGHT: 70 IN | OXYGEN SATURATION: 96 % | DIASTOLIC BLOOD PRESSURE: 92 MMHG | RESPIRATION RATE: 16 BRPM | HEART RATE: 77 BPM | BODY MASS INDEX: 31.21 KG/M2 | TEMPERATURE: 97.4 F | SYSTOLIC BLOOD PRESSURE: 158 MMHG

## 2022-11-15 LAB
ALBUMIN SERPL-MCNC: 3 G/DL (ref 3.4–5)
ALP SERPL-CCNC: 79 U/L (ref 40–150)
ALT SERPL W P-5'-P-CCNC: 56 U/L (ref 0–70)
ANION GAP SERPL CALCULATED.3IONS-SCNC: 2 MMOL/L (ref 3–14)
AST SERPL W P-5'-P-CCNC: 52 U/L (ref 0–45)
BILIRUB SERPL-MCNC: 1.1 MG/DL (ref 0.2–1.3)
BUN SERPL-MCNC: 7 MG/DL (ref 7–30)
CALCIUM SERPL-MCNC: 8.8 MG/DL (ref 8.5–10.1)
CHLORIDE BLD-SCNC: 107 MMOL/L (ref 94–109)
CO2 SERPL-SCNC: 30 MMOL/L (ref 20–32)
CREAT SERPL-MCNC: 0.68 MG/DL (ref 0.66–1.25)
GFR SERPL CREATININE-BSD FRML MDRD: >90 ML/MIN/1.73M2
GLUCOSE BLD-MCNC: 147 MG/DL (ref 70–99)
POTASSIUM BLD-SCNC: 3.6 MMOL/L (ref 3.4–5.3)
PROT SERPL-MCNC: 5.9 G/DL (ref 6.8–8.8)
SODIUM SERPL-SCNC: 139 MMOL/L (ref 133–144)

## 2022-11-15 PROCEDURE — 250N000013 HC RX MED GY IP 250 OP 250 PS 637: Performed by: PSYCHIATRY & NEUROLOGY

## 2022-11-15 PROCEDURE — 250N000013 HC RX MED GY IP 250 OP 250 PS 637: Performed by: CLINICAL NURSE SPECIALIST

## 2022-11-15 PROCEDURE — 250N000013 HC RX MED GY IP 250 OP 250 PS 637

## 2022-11-15 PROCEDURE — 99207 PR NO CHARGE LOS: CPT

## 2022-11-15 PROCEDURE — 80053 COMPREHEN METABOLIC PANEL: CPT

## 2022-11-15 PROCEDURE — 99239 HOSP IP/OBS DSCHRG MGMT >30: CPT | Performed by: PSYCHIATRY & NEUROLOGY

## 2022-11-15 PROCEDURE — 36415 COLL VENOUS BLD VENIPUNCTURE: CPT

## 2022-11-15 RX ORDER — LISINOPRIL 40 MG/1
40 TABLET ORAL DAILY
Qty: 60 TABLET | Refills: 0 | Status: SHIPPED | OUTPATIENT
Start: 2022-11-16 | End: 2022-12-08

## 2022-11-15 RX ORDER — MAGNESIUM OXIDE 400 MG/1
400 TABLET ORAL 2 TIMES DAILY
Qty: 60 TABLET | Refills: 0 | Status: SHIPPED | OUTPATIENT
Start: 2022-11-15 | End: 2022-12-08

## 2022-11-15 RX ORDER — HYDROCHLOROTHIAZIDE 12.5 MG/1
12.5 TABLET ORAL DAILY
Qty: 30 TABLET | Refills: 0 | Status: SHIPPED | OUTPATIENT
Start: 2022-11-15 | End: 2023-01-16

## 2022-11-15 RX ORDER — LANOLIN ALCOHOL/MO/W.PET/CERES
100 CREAM (GRAM) TOPICAL DAILY
Qty: 90 TABLET | Refills: 0 | Status: SHIPPED | OUTPATIENT
Start: 2022-11-15 | End: 2023-01-24

## 2022-11-15 RX ORDER — HYDROCHLOROTHIAZIDE 12.5 MG/1
12.5 TABLET ORAL DAILY
Qty: 30 TABLET | Refills: 0 | Status: SHIPPED | OUTPATIENT
Start: 2022-11-15 | End: 2022-11-15

## 2022-11-15 RX ORDER — SALIVA STIMULANT COMB. NO.3
1 SPRAY, NON-AEROSOL (ML) MUCOUS MEMBRANE
Qty: 44.3 ML | Refills: 0 | Status: ON HOLD | OUTPATIENT
Start: 2022-11-15 | End: 2023-04-14

## 2022-11-15 RX ORDER — HYDROCHLOROTHIAZIDE 12.5 MG/1
12.5 TABLET ORAL DAILY
Status: DISCONTINUED | OUTPATIENT
Start: 2022-11-15 | End: 2022-11-15 | Stop reason: HOSPADM

## 2022-11-15 RX ORDER — GABAPENTIN 300 MG/1
300 CAPSULE ORAL 3 TIMES DAILY
Qty: 90 CAPSULE | Refills: 0 | Status: ON HOLD | OUTPATIENT
Start: 2022-11-15 | End: 2023-04-14

## 2022-11-15 RX ORDER — LISINOPRIL 10 MG/1
10 TABLET ORAL ONCE
Status: COMPLETED | OUTPATIENT
Start: 2022-11-15 | End: 2022-11-15

## 2022-11-15 RX ORDER — LISINOPRIL 30 MG/1
30 TABLET ORAL DAILY
Qty: 30 TABLET | Refills: 0 | Status: SHIPPED | OUTPATIENT
Start: 2022-11-16 | End: 2022-11-15

## 2022-11-15 RX ORDER — CITALOPRAM HYDROBROMIDE 20 MG/1
20 TABLET ORAL DAILY
Qty: 30 TABLET | Refills: 0 | Status: SHIPPED | OUTPATIENT
Start: 2022-11-15 | End: 2023-01-24

## 2022-11-15 RX ORDER — TRAZODONE HYDROCHLORIDE 50 MG/1
50 TABLET, FILM COATED ORAL
Qty: 30 TABLET | Refills: 0 | Status: SHIPPED | OUTPATIENT
Start: 2022-11-15 | End: 2022-12-08

## 2022-11-15 RX ADMIN — GABAPENTIN 300 MG: 300 CAPSULE ORAL at 14:23

## 2022-11-15 RX ADMIN — HYDROCHLOROTHIAZIDE 12.5 MG: 12.5 TABLET ORAL at 11:20

## 2022-11-15 RX ADMIN — LISINOPRIL 10 MG: 10 TABLET ORAL at 14:24

## 2022-11-15 RX ADMIN — MAGNESIUM OXIDE TAB 400 MG (241.3 MG ELEMENTAL MG) 400 MG: 400 (241.3 MG) TAB at 08:05

## 2022-11-15 RX ADMIN — CITALOPRAM HYDROBROMIDE 20 MG: 20 TABLET ORAL at 08:10

## 2022-11-15 RX ADMIN — THIAMINE HCL TAB 100 MG 100 MG: 100 TAB at 08:05

## 2022-11-15 RX ADMIN — POTASSIUM CHLORIDE 40 MEQ: 20 SOLUTION ORAL at 08:05

## 2022-11-15 RX ADMIN — LISINOPRIL 30 MG: 20 TABLET ORAL at 08:05

## 2022-11-15 RX ADMIN — FOLIC ACID 1 MG: 1 TABLET ORAL at 08:05

## 2022-11-15 RX ADMIN — CLONIDINE HYDROCHLORIDE 0.1 MG: 0.1 TABLET ORAL at 09:09

## 2022-11-15 RX ADMIN — GABAPENTIN 300 MG: 300 CAPSULE ORAL at 08:05

## 2022-11-15 RX ADMIN — Medication 1 TABLET: at 08:05

## 2022-11-15 ASSESSMENT — ACTIVITIES OF DAILY LIVING (ADL)
ADLS_ACUITY_SCORE: 28
ADLS_ACUITY_SCORE: 28
HYGIENE/GROOMING: INDEPENDENT
ADLS_ACUITY_SCORE: 28
ORAL_HYGIENE: INDEPENDENT
DRESS: INDEPENDENT
ADLS_ACUITY_SCORE: 28
LAUNDRY: WITH SUPERVISION

## 2022-11-15 NOTE — PLAN OF CARE
"  Problem: Substance Withdrawal  Goal: Substance Withdrawal  Description: Signs and symptoms of listed problems will be absent or manageable.  Outcome: Progressing     Problem: Substance Withdrawal  Intervention: Social and Therapeutic Interv (Substance Withdrawal)  Recent Flowsheet Documentation  Taken 11/14/2022 2100 by Lenora Pereira RN  Social and Therapeutic Interventions (Substance Withdrawal):   encourage socialization with peers   encourage effective boundaries with peers   encourage participation in therapeutic groups and milieu activities     Problem: Alcohol Withdrawal  Goal: Alcohol Withdrawal Symptom Control  Outcome: Progressing     Problem: Suicidal Behavior  Goal: Suicidal Behavior is Absent or Managed  Outcome: Progressing   Goal Outcome Evaluation:    Patient visible in the milieu, attended offered group. Affect full range, states mood is anxious. Rated anxiety 8/10. Denies SI, SIB, HI, or hallucinations.  Continues to be monitored for alcohol withdrawal. MSSA scores 3 and 6. Last Valium was 11/14/22 at 0451.   VSS, except elevated BP. Appetite good, denies pain.  Patient endorses restlessness and is hoping to discharge home tomorrow. Plans to attend an IOP after discharge.  PRNS: Hydroxyzine 25 mg times one for anxiety.  BP (!) 160/96 (BP Location: Right arm, Patient Position: Sitting, Cuff Size: Adult Large)   Pulse 87   Temp 98  F (36.7  C) (Oral)   Resp 18   Ht 1.778 m (5' 10\")   Wt 98.9 kg (218 lb)   SpO2 94%   BMI 31.28 kg/m                           "

## 2022-11-15 NOTE — DISCHARGE SUMMARY
Durga Aguirre MRN# [id number]   Age: 62 year old YOB: 1960     Date of Admission:  11/12/2022  Date of Discharge:  11/15/2022  Admitting Physician:  Raheem Moreira MD  Discharge Physician:  Mame Capps MD      DISCHARGE  DX  1.  Alcohol use disorder, severe.    3.  Major depressive disorder, moderate.  4.  General anxiety disorder           Event Leading to Hospitalization:     See Admission note by admitting provider for patient encounter. for additional details.          Hospital Course:   PATIENT was admitted to [facility] attending  underNaegele, Debra Ann, APRN CNS   DR capps, please review the detailed admit note on 11/13/22   The patient was placed under status 15 (15 minute checks) to ensure patient safety.   MSSA protocol was initiated due to the patient's history of alcohol abuse and concern for withdrawal symptoms.  CBC, BMP and utox obtained.    All outpatient medications were continued    PATIENTdid participate in groups and was visible in the milieu.     The patient's symptoms of alcohol withdrawal improved.     Patients energy motivation , sleep appetite improved.  Pt completed detox . It was un eventful.    On the day of discharge patient was seen by medicine medicine had restarted him on his medications for his blood pressure hydrochlorothiazide lisinopril were both restarted  Discussed with patient medications for craving.  Spoke with patient about triggers coping skills relapse prevention.    CONSULTS DONE DURING PATIENTS HOSPITALIZATION.  Patient was seen by medicine on date11/13/22    This as per their medical consult       Assessment & Recommendations  Durga Aguirre is a 62 year old man with a history of HLD, HTN, depression, anxiety, alcohol use disorder who is admitted to station 3[facility] after presenting to ED for evaluation of alcohol detox. Admitted for further psychiatric monitoring and management.       Alcohol Use Disorder  Acute Alcohol  Withdrawal  PTA, patient was consuming ~one quart of vodka daily. Last drink was the day of admission. Withdrawal symptoms include anxiety, shakes. No history of withdrawal seizures, no seizure-like activity recently. No history of DT.    -Continue [facility] protocol  -Management per psychiatry team  -Agree with Multivitamin, Folate, and Thiamine     Transaminitis  On admission ALT 87 , slightly less than expected 2:1 ratio in alcohol consumption. However, suspect these are elevated d/t alcohol use as above, no risk factors for viral hepatitis.  -Recheck in 48 hrs to ensure downtrend     Pancytopenia  Leukopenia  Anemia, Chronic  Thrombocytopenia  On admission WBC 3.2, Hgb 12.4, Plts 90. However, otherwise HDS (aside from elevated BP's). Suspect these lab findings are d/t alcohol consumption as above causing marrow suppression resulting in low WBC, Hgb, and Plts. On review of previous lab findings, it appears that he develops pancytopenia w/ periods of heavy alcohol consumption. No s/sx of infection, acute blood loss.   -Continue to monitor  -Recheck CBC in AM     Hypokalemia  Hypomagnesemia  Hypernatremia, improving  Lytes recheck this AM showing K 3.1, Mag 1.5, Na 145 (148). Suspect these lyte derangements are d/t dehydration in the setting of alcohol consumption as above, poor PO intake while drinking (notes diet was mostly steak, crab legs, pizza).  -Starting on Mag Oxide 400mg BID while here  -Giving 40mEq K today  -Sodium is improving today  -Recheck lytes in AM     Depression  Anxiety  Insomnia  PTA on Celexa, Buspar, Atarax, Trazodone.  -Psychiatry managing     HTN  PTA on Lisinopril. BP's while here have been elevated, but suspect this is likely d/t acute alcohol withdrawal as above. Notes he was compliant with medications at home. Potassium low today (see below), so will hold off on restarting until this has normalized.  -Hold off on starting Lisinopril for now, consider restarting pending K recheck in  "AM      HLD, mixed  PTA on Simvastatin. Trigs, LDL elevated.   -Continue PTA Statin  -Follow-up with PCP regarding elevated lipids, and recheck     Medicine will continue to follow peripherally for CBC, lytes, LFT's, restarting Lisinopril, please page with any additional concerns.      Shubham Lau PA-C            Pt was seen by cm  As per recommendations from cm    Met with pt to discuss discharge planning. Pt reports a goal to attend IOP treatment. Pt reports he lives at home with his 90 y.o. mother and has a dog, and if he goes to residential his sister will give his dog away. Pt reports he likes [facility] in [place], but didn't make it in back in August 22 when he was referred by [facility] because they had a waitlist.      Pt had original assessment 3/24/22, update 8/29/22, needs update for referral to treatment. Given paperwork to complete.     [facility] evening group, pt's first choice, has waitlist. Pt unwilling to pursue other programs. Pt didn't make it into [facility] in August when he was referred last time. Pt states things will be different this time \"because I have a dog, I have things to do\". Writer expressed concern for this plan but pt unwilling to consider other options.      Pt willing to accept referral to [facility] and this was placed. Pt provided with info on 3 therapists in his area as well who take his insurance. AVS updated.          Labs:reviewed with patient       Recent Results (from the past 48 hour(s))   Magnesium    Collection Time: 11/14/22  7:56 AM   Result Value Ref Range    Magnesium 1.7 1.6 - 2.3 mg/dL   Basic metabolic panel    Collection Time: 11/14/22  7:56 AM   Result Value Ref Range    Sodium 141 133 - 144 mmol/L    Potassium 3.4 3.4 - 5.3 mmol/L    Chloride 106 94 - 109 mmol/L    Carbon Dioxide (CO2) 29 20 - 32 mmol/L    Anion Gap 6 3 - 14 mmol/L    Urea Nitrogen 8 7 - 30 mg/dL    Creatinine 0.80 0.66 - 1.25 mg/dL    Calcium 8.4 (L) 8.5 - 10.1 " mg/dL    Glucose 130 (H) 70 - 99 mg/dL    GFR Estimate >90 >60 mL/min/1.73m2   CBC with platelets and differential    Collection Time: 11/14/22  7:56 AM   Result Value Ref Range    WBC Count 4.7 4.0 - 11.0 10e3/uL    RBC Count 3.47 (L) 4.40 - 5.90 10e6/uL    Hemoglobin 10.3 (L) 13.3 - 17.7 g/dL    Hematocrit 31.7 (L) 40.0 - 53.0 %    MCV 91 78 - 100 fL    MCH 29.7 26.5 - 33.0 pg    MCHC 32.5 31.5 - 36.5 g/dL    RDW 15.1 (H) 10.0 - 15.0 %    Platelet Count 66 (L) 150 - 450 10e3/uL    % Neutrophils 64 %    % Lymphocytes 19 %    % Monocytes 9 %    % Eosinophils 6 %    % Basophils 1 %    % Immature Granulocytes 1 %    NRBCs per 100 WBC 0 <1 /100    Absolute Neutrophils 3.0 1.6 - 8.3 10e3/uL    Absolute Lymphocytes 0.9 0.8 - 5.3 10e3/uL    Absolute Monocytes 0.4 0.0 - 1.3 10e3/uL    Absolute Eosinophils 0.3 0.0 - 0.7 10e3/uL    Absolute Basophils 0.0 0.0 - 0.2 10e3/uL    Absolute Immature Granulocytes 0.0 <=0.4 10e3/uL    Absolute NRBCs 0.0 10e3/uL   Asymptomatic COVID-19 Virus (Coronavirus) by PCR Nasopharyngeal    Collection Time: 11/14/22 11:04 AM    Specimen: Nasopharyngeal; Swab   Result Value Ref Range    SARS CoV2 PCR Negative Negative   Comprehensive metabolic panel    Collection Time: 11/15/22  6:51 AM   Result Value Ref Range    Sodium 139 133 - 144 mmol/L    Potassium 3.6 3.4 - 5.3 mmol/L    Chloride 107 94 - 109 mmol/L    Carbon Dioxide (CO2) 30 20 - 32 mmol/L    Anion Gap 2 (L) 3 - 14 mmol/L    Urea Nitrogen 7 7 - 30 mg/dL    Creatinine 0.68 0.66 - 1.25 mg/dL    Calcium 8.8 8.5 - 10.1 mg/dL    Glucose 147 (H) 70 - 99 mg/dL    Alkaline Phosphatase 79 40 - 150 U/L    AST 52 (H) 0 - 45 U/L    ALT 56 0 - 70 U/L    Protein Total 5.9 (L) 6.8 - 8.8 g/dL    Albumin 3.0 (L) 3.4 - 5.0 g/dL    Bilirubin Total 1.1 0.2 - 1.3 mg/dL    GFR Estimate >90 >60 mL/min/1.73m2         Recent Results (from the past 240 hour(s))   INR    Collection Time: 11/12/22  3:40 PM   Result Value Ref Range    INR 0.91 0.85 - 1.15    Comprehensive metabolic panel    Collection Time: 11/12/22  3:40 PM   Result Value Ref Range    Sodium 148 (H) 136 - 145 mmol/L    Potassium 3.5 3.4 - 5.3 mmol/L    Chloride 106 98 - 107 mmol/L    Carbon Dioxide (CO2) 27 22 - 29 mmol/L    Anion Gap 15 7 - 15 mmol/L    Urea Nitrogen 14.6 8.0 - 23.0 mg/dL    Creatinine 0.74 0.67 - 1.17 mg/dL    Calcium 9.2 8.8 - 10.2 mg/dL    Glucose 125 (H) 70 - 99 mg/dL    Alkaline Phosphatase 111 40 - 129 U/L     (H) 10 - 50 U/L    ALT 87 (H) 10 - 50 U/L    Protein Total 7.3 6.4 - 8.3 g/dL    Albumin 4.6 3.5 - 5.2 g/dL    Bilirubin Total 0.6 <=1.2 mg/dL    GFR Estimate >90 >60 mL/min/1.73m2   Lipase    Collection Time: 11/12/22  3:40 PM   Result Value Ref Range    Lipase 94 (H) 13 - 60 U/L   Ethyl Alcohol Level    Collection Time: 11/12/22  3:40 PM   Result Value Ref Range    Alcohol ethyl 0.39 (HH) <=0.01 g/dL   CRP inflammation    Collection Time: 11/12/22  3:40 PM   Result Value Ref Range    CRP Inflammation <3.00 <5.00 mg/L   Magnesium    Collection Time: 11/12/22  3:40 PM   Result Value Ref Range    Magnesium 2.1 1.7 - 2.3 mg/dL   CBC with platelets and differential    Collection Time: 11/12/22  3:40 PM   Result Value Ref Range    WBC Count 3.2 (L) 4.0 - 11.0 10e3/uL    RBC Count 4.19 (L) 4.40 - 5.90 10e6/uL    Hemoglobin 12.4 (L) 13.3 - 17.7 g/dL    Hematocrit 38.5 (L) 40.0 - 53.0 %    MCV 92 78 - 100 fL    MCH 29.6 26.5 - 33.0 pg    MCHC 32.2 31.5 - 36.5 g/dL    RDW 16.6 (H) 10.0 - 15.0 %    Platelet Count 90 (L) 150 - 450 10e3/uL    % Neutrophils 56 %    % Lymphocytes 32 %    % Monocytes 6 %    % Eosinophils 3 %    % Basophils 2 %    % Immature Granulocytes 1 %    NRBCs per 100 WBC 0 <1 /100    Absolute Neutrophils 1.8 1.6 - 8.3 10e3/uL    Absolute Lymphocytes 1.0 0.8 - 5.3 10e3/uL    Absolute Monocytes 0.2 0.0 - 1.3 10e3/uL    Absolute Eosinophils 0.1 0.0 - 0.7 10e3/uL    Absolute Basophils 0.1 0.0 - 0.2 10e3/uL    Absolute Immature Granulocytes 0.0 <=0.4 10e3/uL     Absolute NRBCs 0.0 10e3/uL   Phosphorus    Collection Time: 11/12/22  3:40 PM   Result Value Ref Range    Phosphorus 3.7 2.5 - 4.5 mg/dL   Asymptomatic COVID-19 Virus (Coronavirus) by PCR Nasopharyngeal    Collection Time: 11/12/22  3:41 PM    Specimen: Nasopharyngeal; Swab   Result Value Ref Range    SARS CoV2 PCR Negative Negative   UA with Microscopic reflex to Culture    Collection Time: 11/12/22  7:45 PM    Specimen: Urine, Midstream   Result Value Ref Range    Color Urine Yellow Colorless, Straw, Light Yellow, Yellow    Appearance Urine Clear Clear    Glucose Urine Negative Negative mg/dL    Bilirubin Urine Negative Negative    Ketones Urine Negative Negative mg/dL    Specific Gravity Urine 1.030 1.003 - 1.035    Blood Urine Trace (A) Negative    pH Urine 5.5 5.0 - 7.0    Protein Albumin Urine 70 (A) Negative mg/dL    Urobilinogen Urine Normal Normal, 2.0 mg/dL    Nitrite Urine Negative Negative    Leukocyte Esterase Urine Negative Negative    Mucus Urine Present (A) None Seen /LPF    RBC Urine 3 (H) <=2 /HPF    WBC Urine 1 <=5 /HPF   Drug abuse screen 1 urine (ED)    Collection Time: 11/12/22  7:45 PM   Result Value Ref Range    Amphetamines Urine Screen Negative Screen Negative    Barbituates Urine Screen Negative Screen Negative    Benzodiazepine Urine Screen Negative Screen Negative    Cannabinoids Urine Screen Negative Screen Negative    Cocaine Urine Screen Negative Screen Negative    Opiates Urine Screen Negative Screen Negative   GGT    Collection Time: 11/13/22  6:14 AM   Result Value Ref Range     (H) 0 - 75 U/L   Hemoglobin A1c    Collection Time: 11/13/22  6:14 AM   Result Value Ref Range    Hemoglobin A1C 5.4 0.0 - 5.6 %   Lipid panel    Collection Time: 11/13/22  6:14 AM   Result Value Ref Range    Cholesterol 184 <200 mg/dL    Triglycerides 133 <150 mg/dL    Direct Measure HDL 67 >=40 mg/dL    LDL Cholesterol Calculated 90 <=100 mg/dL    Non HDL Cholesterol 117 <130 mg/dL   TSH with  free T4 reflex and/or T3 as indicated    Collection Time: 11/13/22  6:14 AM   Result Value Ref Range    TSH 1.14 0.40 - 4.00 mU/L   Sodium    Collection Time: 11/13/22  6:14 AM   Result Value Ref Range    Sodium 145 (H) 133 - 144 mmol/L   Potassium    Collection Time: 11/13/22  6:14 AM   Result Value Ref Range    Potassium 3.1 (L) 3.4 - 5.3 mmol/L   Magnesium    Collection Time: 11/13/22  6:14 AM   Result Value Ref Range    Magnesium 1.5 (L) 1.6 - 2.3 mg/dL   Magnesium    Collection Time: 11/14/22  7:56 AM   Result Value Ref Range    Magnesium 1.7 1.6 - 2.3 mg/dL   Basic metabolic panel    Collection Time: 11/14/22  7:56 AM   Result Value Ref Range    Sodium 141 133 - 144 mmol/L    Potassium 3.4 3.4 - 5.3 mmol/L    Chloride 106 94 - 109 mmol/L    Carbon Dioxide (CO2) 29 20 - 32 mmol/L    Anion Gap 6 3 - 14 mmol/L    Urea Nitrogen 8 7 - 30 mg/dL    Creatinine 0.80 0.66 - 1.25 mg/dL    Calcium 8.4 (L) 8.5 - 10.1 mg/dL    Glucose 130 (H) 70 - 99 mg/dL    GFR Estimate >90 >60 mL/min/1.73m2   CBC with platelets and differential    Collection Time: 11/14/22  7:56 AM   Result Value Ref Range    WBC Count 4.7 4.0 - 11.0 10e3/uL    RBC Count 3.47 (L) 4.40 - 5.90 10e6/uL    Hemoglobin 10.3 (L) 13.3 - 17.7 g/dL    Hematocrit 31.7 (L) 40.0 - 53.0 %    MCV 91 78 - 100 fL    MCH 29.7 26.5 - 33.0 pg    MCHC 32.5 31.5 - 36.5 g/dL    RDW 15.1 (H) 10.0 - 15.0 %    Platelet Count 66 (L) 150 - 450 10e3/uL    % Neutrophils 64 %    % Lymphocytes 19 %    % Monocytes 9 %    % Eosinophils 6 %    % Basophils 1 %    % Immature Granulocytes 1 %    NRBCs per 100 WBC 0 <1 /100    Absolute Neutrophils 3.0 1.6 - 8.3 10e3/uL    Absolute Lymphocytes 0.9 0.8 - 5.3 10e3/uL    Absolute Monocytes 0.4 0.0 - 1.3 10e3/uL    Absolute Eosinophils 0.3 0.0 - 0.7 10e3/uL    Absolute Basophils 0.0 0.0 - 0.2 10e3/uL    Absolute Immature Granulocytes 0.0 <=0.4 10e3/uL    Absolute NRBCs 0.0 10e3/uL   Asymptomatic COVID-19 Virus (Coronavirus) by PCR Nasopharyngeal     Collection Time: 11/14/22 11:04 AM    Specimen: Nasopharyngeal; Swab   Result Value Ref Range    SARS CoV2 PCR Negative Negative   Comprehensive metabolic panel    Collection Time: 11/15/22  6:51 AM   Result Value Ref Range    Sodium 139 133 - 144 mmol/L    Potassium 3.6 3.4 - 5.3 mmol/L    Chloride 107 94 - 109 mmol/L    Carbon Dioxide (CO2) 30 20 - 32 mmol/L    Anion Gap 2 (L) 3 - 14 mmol/L    Urea Nitrogen 7 7 - 30 mg/dL    Creatinine 0.68 0.66 - 1.25 mg/dL    Calcium 8.8 8.5 - 10.1 mg/dL    Glucose 147 (H) 70 - 99 mg/dL    Alkaline Phosphatase 79 40 - 150 U/L    AST 52 (H) 0 - 45 U/L    ALT 56 0 - 70 U/L    Protein Total 5.9 (L) 6.8 - 8.8 g/dL    Albumin 3.0 (L) 3.4 - 5.0 g/dL    Bilirubin Total 1.1 0.2 - 1.3 mg/dL    GFR Estimate >90 >60 mL/min/1.73m2            Because this patient meets criteria for an Alcohol Use Disorder, I performed the following brief intervention on the date of this note:              1) Expressed concern that the patient is drinking at unhealthy levels known to increase their risk of alcohol related problems              2) Gave feedback linking alcohol use and health, including personalized feedback explaining how alcohol use can interact with their medical and/or psychiatric problems, and with prescribed medications.              3) Advised patient to abstain.    PT counseled on nicotine cessation and nicotine replacement provided    Discussed with patient many issues of addiction,triggers, relapse, and establishing a solid recovery program.    DISCHARGE MENTAL STATUS EXAMINATION:  The patient is alert, oriented x3.  Good fund of knowledge.  Good use of language.  Recent and remote memory, language, fund of knowledge are all adequate.  Euthymic mood congruent affect  Speech normal rate/rhythm linear tp no loose asso,The patient does not have any active suicidal or homicidal ideation.  Does not have any auditory or visual hallucination.  Fair insight/judgment At this time, the  patient was stable to be discharged.        Pt was not determined to not be a danger to himself or others. At the current time of discharge, the patient does not meet criteria for involuntary hospitalization. On the day of discharge, the patient reports that they do not have suicidal or homicidal ideation and would never hurt themselves or others. Steps taken to minimize risk include: assessing patient s behavior and thought process daily during hospital stay, discharging patient with adequate plan for follow up for mental and physical health and discussing safety plan of returning to the hospital should the patient ever have thoughts of harming themselves or others. Therefore, based on all available evidence including the factors cited above, the patient does not appear to be at imminent risk for self-harm, and is appropriate for outpatient level of care.     Educated about side effects/risk vs benefits /alternative including non treatment.Pt consented to be on medication.     .Total time spent on discharge summary more than 35 min  More than  20 min  planning, coordination of care, medication reconciliation and performance of physical exam on day of discharge.Care was coordinated with unit RN and unit therapist         Review of your medicines      UNREVIEWED medicines. Ask your doctor about these medicines      Dose / Directions   naltrexone 50 MG tablet  Commonly known as: DEPADE/REVIA  Used for: Alcohol withdrawal with complication with inpatient treatment, with unspecified complication (H)      Dose: 50 mg  Take 1 tablet (50 mg) by mouth daily  Quantity: 90 tablet  Refills: 0     nicotine 14 MG/24HR 24 hr patch  Commonly known as: NICODERM CQ  Used for: Alcohol withdrawal with complication with inpatient treatment, with unspecified complication (H)      Dose: 1 patch  Place 1 patch onto the skin daily  Quantity: 30 patch  Refills: 0     nicotine 2 MG gum  Commonly known as: NICORETTE  Used for: Alcohol  withdrawal with complication with inpatient treatment, with unspecified complication (H)      Dose: 2 mg  Place 1 each (2 mg) inside cheek every hour as needed for smoking cessation  Quantity: 100 each  Refills: 0        CONTINUE these medicines which may have CHANGED, or have new prescriptions. If we are uncertain of the size of tablets/capsules you have at home, strength may be listed as something that might have changed.      Dose / Directions   gabapentin 300 MG capsule  Commonly known as: NEURONTIN  This may have changed: See the new instructions.  Used for: Alcohol dependence with intoxication with complication (H)      Dose: 300 mg  Take 1 capsule (300 mg) by mouth 3 times daily  Quantity: 90 capsule  Refills: 0     traZODone 50 MG tablet  Commonly known as: DESYREL  This may have changed: See the new instructions.  Used for: Alcohol withdrawal with complication with inpatient treatment, with unspecified complication (H)      Dose: 50 mg  Take 1 tablet (50 mg) by mouth nightly as needed for sleep  Quantity: 30 tablet  Refills: 0        CONTINUE these medicines which have NOT CHANGED      Dose / Directions   citalopram 20 MG tablet  Commonly known as: celeXA  Used for: Essential hypertension      Dose: 20 mg  Take 1 tablet (20 mg) by mouth daily  Quantity: 30 tablet  Refills: 0     lisinopril 30 MG tablet  Commonly known as: ZESTRIL  Used for: Alcohol dependence with intoxication with complication (H)      Dose: 30 mg  Start taking on: November 16, 2022  Take 1 tablet (30 mg) by mouth daily  Quantity: 30 tablet  Refills: 0     melatonin 3 MG tablet  Used for: Uncomplicated alcohol dependence (H)      Dose: 3 mg  Take 1 tablet (3 mg) by mouth nightly as needed for sleep  Quantity: 30 tablet  Refills: 0     multivitamin w/minerals tablet  Used for: Uncomplicated alcohol dependence (H)      Dose: 1 tablet  Take 1 tablet by mouth daily  Quantity: 90 tablet  Refills: 0     simvastatin 40 MG tablet  Commonly known  "as: ZOCOR  Used for: Uncomplicated alcohol dependence (H)      Dose: 40 mg  Take 1 tablet (40 mg) by mouth every evening  Quantity: 90 tablet  Refills: 0     thiamine 100 MG tablet  Commonly known as: B-1  Used for: Uncomplicated alcohol dependence (H)      Dose: 100 mg  Take 1 tablet (100 mg) by mouth daily  Quantity: 90 tablet  Refills: 0        STOP taking    busPIRone 15 MG tablet  Commonly known as: BUSPAR        hydrochlorothiazide 12.5 MG tablet  Commonly known as: HYDRODIURIL        hydrOXYzine 50 MG tablet  Commonly known as: ATARAX        pantoprazole 40 MG EC tablet  Commonly known as: PROTONIX        VITAMIN B + C COMPLEX PO              Where to get your medicines      These medications were sent to [facility] [address]  [address] [address]    Phone: [phone]     citalopram 20 MG tablet    gabapentin 300 MG capsule    lisinopril 30 MG tablet    thiamine 100 MG tablet    traZODone 50 MG tablet          Disposition: home     Facts about COVID19 at www.cdc.gov/COVID19 and www.[place].gov/covid19     Keeping hands clean is one of the most important steps we can take to avoid getting sick and spreading germs to others.  Please wash your hands frequently and lather with soap for at least 20 seconds!     Medical Follow-Up:What's Next    What's Next   Dec15 Nurse Only  Thursday Dec 15, 2022 10:00 AM [address] [address]  [phone]   Jan122023 Provider Visit with Rohit Solitario MD  Thursday Jan 12, 2023 7:10 AM  Please plan to arrive at the clinic at your \"Arrive By\" time for your appointment. Our late policy will be enforced based on this time. [address] [address]  [phone]            Treatment Follow-Up: You have met with a  and opted to pursue IOP at [facility]  .        \"Much or all of the text in this note was generated through the use of Dragon Dictate voice to text software. Errors in spelling or words which appear to be out of contact are unintentional, may be present due having escaped editing\"     "

## 2022-11-15 NOTE — PROGRESS NOTES
MSSA score of 2,patient did not require medication for alcohol withdrawal and is observed to sleep throughout the noc.

## 2022-11-15 NOTE — PROGRESS NOTES
11/15/22 1700   Group Therapy Session   Group Attendance attended group session   Time Session Began 1645   Time Session Ended 1730   Total Time (minutes) 20   Total # Attendees 5   Group Type recreation   Group Topic Covered coping skills/lifestyle management   Group Session Detail healthy coping skills   Patient Response/Contribution cooperative with task   Patient Participation Detail Pt participated in Therapeutic Recreation group with focus on leisure education and acquisition of knowledge and skills. Pt was fully engaged and cooperative in group recreational intervention; leisure inventory. Pt was focused on the written portion for the first part of group, but then was pulled from group for discharge.

## 2022-11-15 NOTE — TELEPHONE ENCOUNTER
Received EOP referral. Sent email to client to discuss IOP options since the EOP group he requested is currently full with a wait list.

## 2022-11-15 NOTE — PROGRESS NOTES
Brief Medicine Note:     Medicine is following peripherally for BP. Pt will discharge today.    Discharge recommendations, discharged with Dr. June.    Discharge recommendations:  - Restart PTA BP medication-lisinopril and hydrochlorothiazide.Ok to discharge if BP trending down after administration.   - Stop K replacement, but continue Mg Oxide 400 mg BID at discharge  - WBC improving, Hgb and Platelets slowly trending down. Agree that this is likely d/t heavy alcohol consumption.  - Recheck BMP, Mg, CBC within 1-2 weeks with PCP.    Medicine will sign off, please contact us for any acute changes of if BP does not improved prior to discharge.       Marlene Escobedo, CNP, APRN  Internal Medicine [facility] Hospitalist  [facility]

## 2022-11-15 NOTE — PROGRESS NOTES
11/14/22 2200   Group Therapy Session   Group Attendance attended group session   Time Session Began 1645   Time Session Ended 1735   Total Time (minutes) 55   Total # Attendees 6   Group Type psychotherapeutic   Group Topic Covered disease of addiction/choices in recovery   Group Session Detail DBTand addiction/ process   Patient Response/Contribution cooperative with task;discussed personal experience with topic;unable to interrupt patient   Reported feeling anxious and sober. He was a positive participant and quite vocal, at times needing gentle reminders not to interrupt  others speaking. He spoke about a lot of grief and loss in his life.

## 2022-11-15 NOTE — PROGRESS NOTES
"[facility] evening group, pt's first choice, has waitlist. Pt unwilling to pursue other programs. Pt didn't make it into [facility] in August when he was referred last time. Pt states things will be different this time \"because I have a dog, I have things to do\". Writer expressed concern for this plan but pt unwilling to consider other options.     Pt willing to accept referral to [facility] and this was placed. Pt provided with info on 3 therapists in his area as well who take his insurance. AVS updated.     Qian Hernandez, LPCC, LADC   "

## 2022-11-15 NOTE — PLAN OF CARE
Problem: Alcohol Withdrawal  Goal: Alcohol Withdrawal Symptom Control  Outcome: Progressing   Goal Outcome Evaluation:    Plan of Care Reviewed With: patient      The Pt was up and observed in the lounge all morning. He has verbalized that he wants to leave or he will blow out. He will leave if his blood pressure is WNL. His repeat BP was 180/106, and he received prn Clonidine; he will still got his hydrochlorothiazide. His MSSA score was 4 and 2. He received an additional dose of Lisinopril and his BP was 146/86 and HR at 69 at 1505. He has not received Valium for at least 24 hours. Per policy, he is out of detox at 12noon. Pt denied SI, AVHs, and all other mental health SxS. He is eating, sleeping, and using the restroom. He was medication compliant, and he had no adverse reactions. He also made his F/U appointment after discharge for 1/12/2023 at 0710.

## 2022-11-16 ENCOUNTER — TELEPHONE (OUTPATIENT)
Dept: BEHAVIORAL HEALTH | Facility: CLINIC | Age: 62
End: 2022-11-16

## 2022-11-16 NOTE — TELEPHONE ENCOUNTER
First attempt to contact pt.  left a VM with TC contact info and encouraged a phone call back to schedule initial therapy appointment. Jethror will postpone for tomorrow.    Candie Reyes  11/16/22  959    ----- Message from Rossy Mcmillan sent at 11/15/2022  2:43 PM CST -----  Regarding: FW: Transition referral  See below on transition clinic referral.    Thank you!    ----- Message -----  From: Qian Hernandez, STEPHANY, [facility]  Sent: 11/15/2022   2:05 PM CST  To: Rossy Mcmillan  Subject: Transition referral                              Transition Clinic Referral   [place] (Limited)        Please Check Type of Referral Requested:       X THERAPY: The Transition clinic is able to schedule patients without current medical insurance; these patient will be referred to our Social Work Care Coordinator for Medical Insurance              Assistance. We are open for referral for psychotherapy. Patient is referred from:  Other Unit [address]      ____MEDICATION:  Referrals for Medication are ONLY accepted from the following areas (select): NA                                    Suboxone and Opioid Management Referrals are automatically denied. TC Psychiatry cannot see patient without active medical insurance.         Referring Provider Contact Name: Qian Hernandez, STEPHANY, [facility]  ; Phone Number: [phone]    Reason for Transition Clinic Referral: Waitlist to get into [facility]     Next Level of Care Patient Will Be Transitioned To: [facility] with Marquis Villara  Provider(s)Marquis Quintero   Location Rule  Date/Time Unknown, waitlist    What Would Be Helpful from the Transition Clinic: support, check in, either therapy or assistance with getting therapy established.      Needs: NO    Does Patient Have Access to Technology: yes    Patient E-mail Address: [email or web address]    Current Patient Phone Number: [phone]    Clinician Gender Preference (if applicable):  NO    Patient location preference: In person    Qian Hernandez, [facility], LADC

## 2022-11-16 NOTE — PLAN OF CARE
Goal Outcome Evaluation:  Patient was discharged to home via cab at 1745. Discharge instructions, medications, and follow up appointments reviewed with patient. Patient verbalized understanding of discharge instructions. Belongings returned upon discharge. Patient denies SI, SIB, HI, or hallucinations.

## 2022-11-17 ENCOUNTER — TELEPHONE (OUTPATIENT)
Dept: INTERNAL MEDICINE | Facility: CLINIC | Age: 62
End: 2022-11-17

## 2022-11-17 ENCOUNTER — PATIENT OUTREACH (OUTPATIENT)
Dept: CARE COORDINATION | Facility: CLINIC | Age: 62
End: 2022-11-17

## 2022-11-17 NOTE — TELEPHONE ENCOUNTER
Reached patient who stated he'd rather not drive to [facility] for in person and prefers in person visit. Said he has a list of providers closer to him he will call.    Gisele Galvan  Transition Clinic Coordinator  Date and Time: 11/17/22 1:53 PM

## 2022-11-17 NOTE — PROGRESS NOTES
Clinic Care Coordination Contact  [facility]: Post-Discharge Note  SITUATION                                                      SW explained and offered Care Coordination during initial part of conversation. Patient thinks this may be useful in the future, but declines for now. Patient is aware that this available to him at anytime.    Admission:    Admission Date: 11/12/22   Reason for Admission: Alcohol use disorder, severe.     3.  Major depressive disorder, moderate.  4.  General anxiety disorder  Discharge:   Discharge Date: 11/15/22  Discharge Diagnosis: Alcohol use disorder, severe.     3.  Major depressive disorder, moderate.  4.  General anxiety disorder    BACKGROUND                                                    PATIENT was admitted to Station 3Awith attending  underNaegele, Debra Ann, APRN CNS   DR capps, please review the detailed admit note on 11/13/22   The patient was placed under status 15 (15 minute checks) to ensure patient safety.   MSSA protocol was initiated due to the patient's history of alcohol abuse and concern for withdrawal symptoms.  CBC, BMP and utox obtained.     All outpatient medications were continued     PATIENTdid participate in groups and was visible in the milieu.      The patient's symptoms of alcohol withdrawal improved.     Patients energy motivation , sleep appetite improved.  Pt completed detox . It was un eventful.     On the day of discharge patient was seen by medicine medicine had restarted him on his medications for his blood pressure hydrochlorothiazide lisinopril were both restarted  Discussed with patient medications for craving.  Spoke with patient about triggers coping skills relapse prevention.     CONSULTS DONE DURING PATIENTS HOSPITALIZATION.  Patient was seen by medicine on date11/13/22     This as per their medical consult        Assessment & Recommendations  Durga Aguirre is a 62 year old man with a history of HLD, HTN, depression, anxiety,  alcohol use disorder who is admitted to station 3A after presenting to ED for evaluation of alcohol detox. Admitted for further psychiatric monitoring and management.       Alcohol Use Disorder  Acute Alcohol Withdrawal  PTA, patient was consuming ~one quart of vodka daily. Last drink was the day of admission. Withdrawal symptoms include anxiety, shakes. No history of withdrawal seizures, no seizure-like activity recently. No history of DT.    -Continue MSSA protocol  -Management per psychiatry team  -Agree with Multivitamin, Folate, and Thiamine     Transaminitis  On admission ALT 87 , slightly less than expected 2:1 ratio in alcohol consumption. However, suspect these are elevated d/t alcohol use as above, no risk factors for viral hepatitis.  -Recheck in 48 hrs to ensure downtrend     Pancytopenia  Leukopenia  Anemia, Chronic  Thrombocytopenia  On admission WBC 3.2, Hgb 12.4, Plts 90. However, otherwise HDS (aside from elevated BP's). Suspect these lab findings are d/t alcohol consumption as above causing marrow suppression resulting in low WBC, Hgb, and Plts. On review of previous lab findings, it appears that he develops pancytopenia w/ periods of heavy alcohol consumption. No s/sx of infection, acute blood loss.   -Continue to monitor  -Recheck CBC in AM     Hypokalemia  Hypomagnesemia  Hypernatremia, improving  Lytes recheck this AM showing K 3.1, Mag 1.5, Na 145 (148). Suspect these lyte derangements are d/t dehydration in the setting of alcohol consumption as above, poor PO intake while drinking (notes diet was mostly steak, crab legs, pizza).  -Starting on Mag Oxide 400mg BID while here  -Giving 40mEq K today  -Sodium is improving today  -Recheck lytes in AM     Depression  Anxiety  Insomnia  PTA on Celexa, Buspar, Atarax, Trazodone.  -Psychiatry managing     HTN  PTA on Lisinopril. BP's while here have been elevated, but suspect this is likely d/t acute alcohol withdrawal as above. Notes he was  "compliant with medications at home. Potassium low today (see below), so will hold off on restarting until this has normalized.  -Hold off on starting Lisinopril for now, consider restarting pending K recheck in AM      HLD, mixed  PTA on Simvastatin. Trigs, LDL elevated.   -Continue PTA Statin  -Follow-up with PCP regarding elevated lipids, and recheck     Medicine will continue to follow peripherally for CBC, lytes, LFT's, restarting Lisinopril, please page with any additional concerns.      Shubham Lau PA-C          ASSESSMENT           Discharge Assessment  How are you doing now that you are home?: Patient shares that he is doing well and has just been getting caught up on sleep and taking his dog for walks. Patient shares that he plans to connect with a therapist while waiting to get in to outpatient treatment. Mariya plans to lay low and doesn't need \"excitiement\" right now, just needs to focus on taking care of himself. SW validates patient's feelings and his good plan. SW offers to send referrals to some therapy clinics but pt states he has some resources provided and will call on those first. Patient thanks writer for the call and shares that having someone check in means alot.  How are your symptoms? (Red Flag symptoms escalate to triage hotline per guidelines): Improved  Do you feel your condition is stable enough to be safe at home until your provider visit?: Yes  Does the patient have their discharge instructions? : Yes  Does the patient have questions regarding their discharge instructions? : No  Were you started on any new medications or were there changes to any of your previous medications? : Yes  Does the patient have all of their medications?: Yes  Do you have questions regarding any of your medications? : No  Do you have all of your needed medical supplies or equipment (DME)?  (i.e. oxygen tank, CPAP, cane, etc.): Yes  Discharge follow-up appointment scheduled within 14 calendar days? : " "Yes  Discharge Follow Up Appointment Date: 12/15/22  Discharge Follow Up Appointment Scheduled with?: Primary Care Provider (Clinic is working on getting him in earlier, he is calling them back today)    Post-op ([facility] CTA Only)  If the patient had a surgery or procedure, do they have any questions for a nurse?: No    Post-op (Clinicians Only)  Did the patient have surgery or a procedure: No  Fever: No  Chills: No        PLAN                                                      Outpatient Plan:  What's Next   Dec15 Nurse Only  Thursday Dec 15, 2022 10:00 AM [address] [address]  [phone]   Jan122023 Provider Visit with Rohit Solitario MD  Thursday Jan 12, 2023 7:10 AM  Please plan to arrive at the clinic at your \"Arrive By\" time for your appointment. Our late policy will be enforced based on this time. [address] [address]  [phone]               Treatment Follow-Up: You have met with a  and opted to pursue IOP at [facility]        Future Appointments   Date Time Provider Department Center   12/15/2022 10:00 AM OX IM MA/LPN [facility]   1/12/2023  7:30 AM Rohit Solitario MD [facility]         For any urgent concerns, please contact our 24 hour nurse triage line: [phone] ([phone])         AMMON Whitney                "

## 2022-11-17 NOTE — TELEPHONE ENCOUNTER
Reason for Call:  Appointment Request    Patient requesting this type of appt:  Hospital/ED Follow-Up     Requested provider: Rohit Solitario    Reason patient unable to be scheduled: Not within requested timeframe    When does patient want to be seen/preferred time: any day after today 10 am or after     Comments: patient wondering if he could be worked in [facility]    Could we send this information to you in [facility] or would you prefer to receive a phone call?:   Patient would prefer a phone call   Okay to leave a detailed message?: Yes at Home number on file [phone] (home)    Call taken on 11/17/2022 at 9:55 AM by Sallie Mae

## 2022-11-18 NOTE — TELEPHONE ENCOUNTER
Patient called today looking for an update on possibility for getting worked in for a hosp follow up.

## 2022-11-18 NOTE — TELEPHONE ENCOUNTER
ED / Discharge Outreach Protocol    Patient Contact    Attempt # 1    Was call answered?  No.  Left message on voicemail with information to call me back.    Upon call back: please complete hospital follow-up questions and assist patient in getting scheduled for an appointment.     Rosa Chavez RN

## 2022-11-21 NOTE — TELEPHONE ENCOUNTER
"  ED for acute condition Discharge Protocol    \"Hi, my name is Bisi Vega RN, a registered nurse, and I am calling from [facility].  I am calling to follow up and see how things are going for you after your recent emergency visit.\"    Tell me how you are doing now that you are home?\" I\"m doing fine      Discharge Instructions    \"Let's review your discharge instructions.  What is/are the follow-up recommendations?  Pt. Response:    \"Has an appointment with your primary care provider been scheduled?\"  No (needed - schedule appointment and remind to bring meds)   Appt scheduled 12/9 with PCP    Medications    \"Tell me what changed about your medicines when you discharged?\"    Lisinopril dosage was increased to 40 mg daily    \"What questions do you have about your medications?\"   Patient was taking Pantoprazole for upset stomach while inpatient. Patient is not sure if he needs this refilled, states he does not have symptoms currently but will discuss a possible refill of this medication at upcoming appointment.  Patient will also discuss refill of Trazodone at appointment.        Call Summary    \"What questions or concerns do you have about your recent visit and your follow-up care?\"     none    \"If you have questions or things don't continue to improve, we encourage you contact us through the main clinic number (give number).  Even if the clinic is not open, triage nurses are available 24/7 to help you.     We would like you to know that our clinic has extended hours (provide information).  We also have urgent care (provide details on closest location and hours/contact info)\"    \"Thank you for your time and take care!\"                "

## 2022-12-02 ENCOUNTER — TELEPHONE (OUTPATIENT)
Dept: BEHAVIORAL HEALTH | Facility: CLINIC | Age: 62
End: 2022-12-02

## 2022-12-07 DIAGNOSIS — F10.229 ALCOHOL DEPENDENCE WITH INTOXICATION WITH COMPLICATION (H): ICD-10-CM

## 2022-12-07 DIAGNOSIS — F10.939 ALCOHOL WITHDRAWAL WITH COMPLICATION WITH INPATIENT TREATMENT, WITH UNSPECIFIED COMPLICATION (H): ICD-10-CM

## 2022-12-07 NOTE — TELEPHONE ENCOUNTER
Routing refill request to provider for review/approval because:  Last signed by Behavioral Hospitalist     Bisi Vega, RN

## 2022-12-08 RX ORDER — TRAZODONE HYDROCHLORIDE 50 MG/1
50 TABLET, FILM COATED ORAL
Qty: 30 TABLET | Refills: 1 | Status: SHIPPED | OUTPATIENT
Start: 2022-12-08 | End: 2023-01-23

## 2022-12-08 RX ORDER — LISINOPRIL 40 MG/1
40 TABLET ORAL DAILY
Qty: 30 TABLET | Refills: 1 | Status: SHIPPED | OUTPATIENT
Start: 2022-12-08 | End: 2023-04-05

## 2022-12-08 RX ORDER — MAGNESIUM OXIDE 400 MG/1
400 TABLET ORAL 2 TIMES DAILY
Qty: 60 TABLET | Refills: 1 | Status: SHIPPED | OUTPATIENT
Start: 2022-12-08 | End: 2023-03-03

## 2023-01-12 ENCOUNTER — HOSPITAL ENCOUNTER (EMERGENCY)
Facility: CLINIC | Age: 63
Discharge: OTHER INSTITUTION | End: 2023-01-12
Attending: EMERGENCY MEDICINE | Admitting: EMERGENCY MEDICINE
Payer: COMMERCIAL

## 2023-01-12 VITALS
WEIGHT: 220 LBS | OXYGEN SATURATION: 97 % | DIASTOLIC BLOOD PRESSURE: 108 MMHG | RESPIRATION RATE: 31 BRPM | HEART RATE: 115 BPM | BODY MASS INDEX: 31.57 KG/M2 | SYSTOLIC BLOOD PRESSURE: 166 MMHG | TEMPERATURE: 97.6 F

## 2023-01-12 DIAGNOSIS — F10.920 ALCOHOLIC INTOXICATION WITHOUT COMPLICATION (H): ICD-10-CM

## 2023-01-12 LAB
ATRIAL RATE - MUSE: 81 BPM
DIASTOLIC BLOOD PRESSURE - MUSE: NORMAL MMHG
INTERPRETATION ECG - MUSE: NORMAL
P AXIS - MUSE: 64 DEGREES
PR INTERVAL - MUSE: 186 MS
QRS DURATION - MUSE: 156 MS
QT - MUSE: 438 MS
QTC - MUSE: 508 MS
R AXIS - MUSE: 9 DEGREES
SARS-COV-2 RNA RESP QL NAA+PROBE: NEGATIVE
SYSTOLIC BLOOD PRESSURE - MUSE: NORMAL MMHG
T AXIS - MUSE: 159 DEGREES
VENTRICULAR RATE- MUSE: 81 BPM

## 2023-01-12 PROCEDURE — 93005 ELECTROCARDIOGRAM TRACING: CPT | Mod: RTG

## 2023-01-12 PROCEDURE — 99285 EMERGENCY DEPT VISIT HI MDM: CPT | Mod: 25

## 2023-01-12 PROCEDURE — 250N000013 HC RX MED GY IP 250 OP 250 PS 637: Performed by: EMERGENCY MEDICINE

## 2023-01-12 PROCEDURE — C9803 HOPD COVID-19 SPEC COLLECT: HCPCS

## 2023-01-12 PROCEDURE — U0003 INFECTIOUS AGENT DETECTION BY NUCLEIC ACID (DNA OR RNA); SEVERE ACUTE RESPIRATORY SYNDROME CORONAVIRUS 2 (SARS-COV-2) (CORONAVIRUS DISEASE [COVID-19]), AMPLIFIED PROBE TECHNIQUE, MAKING USE OF HIGH THROUGHPUT TECHNOLOGIES AS DESCRIBED BY CMS-2020-01-R: HCPCS | Performed by: EMERGENCY MEDICINE

## 2023-01-12 RX ORDER — LORAZEPAM 0.5 MG/1
1 TABLET ORAL EVERY 8 HOURS PRN
Status: DISCONTINUED | OUTPATIENT
Start: 2023-01-12 | End: 2023-01-12 | Stop reason: HOSPADM

## 2023-01-12 RX ORDER — FOLIC ACID 1 MG/1
1 TABLET ORAL ONCE
Status: COMPLETED | OUTPATIENT
Start: 2023-01-12 | End: 2023-01-12

## 2023-01-12 RX ORDER — MULTIPLE VITAMINS W/ MINERALS TAB 9MG-400MCG
1 TAB ORAL ONCE
Status: COMPLETED | OUTPATIENT
Start: 2023-01-12 | End: 2023-01-12

## 2023-01-12 RX ADMIN — MULTIPLE VITAMINS W/ MINERALS TAB 1 TABLET: TAB at 08:55

## 2023-01-12 RX ADMIN — LORAZEPAM 1 MG: 0.5 TABLET ORAL at 10:36

## 2023-01-12 RX ADMIN — FOLIC ACID 1 MG: 1 TABLET ORAL at 08:55

## 2023-01-12 RX ADMIN — THIAMINE HCL TAB 100 MG 100 MG: 100 TAB at 08:56

## 2023-01-12 ASSESSMENT — ENCOUNTER SYMPTOMS
DIARRHEA: 0
CHILLS: 0
CONFUSION: 0
FEVER: 0
ABDOMINAL PAIN: 0
HEADACHES: 0
VOMITING: 0

## 2023-01-12 ASSESSMENT — ACTIVITIES OF DAILY LIVING (ADL)
ADLS_ACUITY_SCORE: 35
ADLS_ACUITY_SCORE: 35

## 2023-01-12 NOTE — ED TRIAGE NOTES
"Pt arrives to triage via EMS. Pt reports alcoholism and requesting help to stop drinking, \"binge has been for a few weeks.\"    Pt states he has been drinking daily since start of covid. Last drink a few hrs ago    Pt worried he is killing himself by drinking, slurring speech, rambling     Triage Assessment     Row Name 01/12/23 0705       Triage Assessment (Adult)    Airway WDL WDL       Respiratory WDL    Respiratory WDL WDL       Skin Circulation/Temperature WDL    Skin Circulation/Temperature WDL WDL       Cardiac WDL    Cardiac WDL WDL       Peripheral/Neurovascular WDL    Peripheral Neurovascular WDL WDL       Cognitive/Neuro/Behavioral WDL    Cognitive/Neuro/Behavioral WDL WDL              "

## 2023-01-12 NOTE — ED PROVIDER NOTES
"  History     Chief Complaint:  Alcohol Problem and Alcohol Intoxication       HPI   Durga Aguirre is a 62 year old male with history of hypertension, hyperlipidemia, alcohol abuse who presents with alcohol intoxication.  Patient since states that he has been drinking more since about March, secondary to COVID, and being out of work.  He states that he drinks about a quart a day.  He came in last night because he wants us to \"save my life.\"  He states that he \"played too hard last night.\"  He is planning to [facility] into [facility] tomorrow, and stay there for a year with plans to achieve sobriety.  He denies suicidal ideation, recent falls, or other ingestions.  He denies any other substance abuse.  He lives with his 91-year-old mother right now, and she is able to care for herself independently.  He has multiple abrasions to his bilateral arms.       Independent Historian: yes     Review of External Notes: Recent medical records were reviewed.    ROS:  Review of Systems   Constitutional: Negative for chills and fever.   Gastrointestinal: Negative for abdominal pain, diarrhea and vomiting.   Neurological: Negative for syncope and headaches.   Psychiatric/Behavioral: Negative for confusion, self-injury and suicidal ideas.   All other systems reviewed and are negative.      Allergies:  No Known Allergies     Medications:    artificial saliva (BIOTENE MT) SOLN solution  citalopram (CELEXA) 20 MG tablet  gabapentin (NEURONTIN) 300 MG capsule  hydrochlorothiazide (HYDRODIURIL) 12.5 MG tablet  lisinopril (ZESTRIL) 40 MG tablet  magnesium oxide (MAG-OX) 400 MG tablet  melatonin 3 MG tablet  multivitamin w/minerals (THERA-VIT-M) tablet  simvastatin (ZOCOR) 40 MG tablet  thiamine (B-1) 100 MG tablet  traZODone (DESYREL) 50 MG tablet        Past Medical History:    Past Medical History:   Diagnosis Date     Alcohol abuse      Anxiety and depression      Arthritis      Hyperlipidemia      Hypertension      Mass of " left chest wall      Other spontaneous pneumothorax 1997       Past Surgical History:    Past Surgical History:   Procedure Laterality Date     COLONOSCOPY       HC EXCISION PARTIAL TALUS OR CALCANEUS, BONE      fx calcaneus- 9 screws in foot        Family History:    family history includes Lymphoma in his father; No Known Problems in his brother, daughter, maternal grandfather, maternal grandmother, mother, paternal grandfather, paternal grandmother, sister, son, and another family member.    Social History:   reports that he has been smoking cigarettes. He has a 5.00 pack-year smoking history. He has never used smokeless tobacco. He reports current alcohol use. He reports that he does not currently use drugs.  PCP: Rohit Solitario     Physical Exam     Patient Vitals for the past 24 hrs:   BP Temp Pulse Resp SpO2 Weight   01/12/23 0716 (!) 144/94 97.6  F (36.4  C) 90 20 96 % 99.8 kg (220 lb)        Physical Exam  General: alert, slurred speech.  HENT: mucous membranes moist, no tongue fasciculations.  CV: regular rate, regular rhythm  Resp: normal effort, clear throughout, no crackles or wheezing  GI: abdomen soft and nontender, no guarding  MSK: no bony tenderness  Skin: appropriately warm and dry, healing abrasions to bilateral arms.  Extremities: no edema, calves non-tender  Neuro: alert, slurred speech, moving all extremities equally.  Psych: normal mood and affect    Emergency Department Course     Laboratory:  Labs Ordered and Resulted from Time of ED Arrival to Time of ED Departure   COVID-19 VIRUS (CORONAVIRUS) BY PCR - Normal       Result Value    SARS CoV2 PCR Negative          Emergency Department Course & Assessments:             Interventions:  Medications   LORazepam (ATIVAN) tablet 1 mg (has no administration in time range)   thiamine (B-1) tablet 100 mg (100 mg Oral Given 1/12/23 0856)   folic acid (FOLVITE) tablet 1 mg (1 mg Oral Given 1/12/23 0855)   multivitamin w/minerals (THERA-VIT-M)  tablet 1 tablet (1 tablet Oral Given 1/12/23 0855)      Social Determinants of Health affecting care:  Chronic alcohol abuse.    Disposition:  The patient was transferred to [facility].     Impression & Plan        Medical Decision Making:  Durga Aguirre is a 62 year old male with history of alcohol abuse, who presents with alcohol intoxication.  On exam, the patient has normal vitals.  He has no symptoms of alcohol withdrawal.  He denies coingestion, or history of self-harm.  No recent history of trauma.  He does have multiple abrasions across his forearms from his puppy.  At this point, he is sobering appropriately.  He has plans to pursue sobriety treatment in [facility].  In the meantime, he is agreeable to going to [facility] detox.  COVID-19 testing is negative.      Diagnosis:    ICD-10-CM    1. Alcoholic intoxication without complication (H)  F10.920            1/12/2023   Mackenzie Kong MD Pepper, Tracy Lynn, MD  01/12/23 0946

## 2023-01-12 NOTE — ED NOTES
I spoke with Garrett at [facility] and told her the covid test was negative and that wheelchair transportation is scheduled for patient  at 1100.

## 2023-01-12 NOTE — ED NOTES
I spoke with [facility]. They have a bed available. Pt is able to ambulate in the room. Covid test sent. I will call for transport when negative covid results are back.

## 2023-01-15 DIAGNOSIS — F10.229 ALCOHOL DEPENDENCE WITH INTOXICATION WITH COMPLICATION (H): ICD-10-CM

## 2023-01-15 NOTE — TELEPHONE ENCOUNTER
Pending Prescriptions:                       Disp   Refills    gabapentin (NEURONTIN) 300 MG capsule     90 cap*0            Sig: Take 1 capsule (300 mg) by mouth 3 times daily    hydrochlorothiazide (HYDRODIURIL) 12.5 MG*30 tab*0            Sig: Take 1 tablet (12.5 mg) by mouth daily Hold for a           systolic blood pressure of 110  Patient has a weeks worth of his hydrochlorothiazide but is out of the gabapentin.  Routing to the refill pool    Christina Porter RN   [facility] Nurse Advisor  10:58 AM 1/15/2023

## 2023-01-16 RX ORDER — HYDROCHLOROTHIAZIDE 12.5 MG/1
12.5 TABLET ORAL DAILY
Qty: 90 TABLET | Refills: 0 | Status: SHIPPED | OUTPATIENT
Start: 2023-01-16 | End: 2023-03-03

## 2023-01-16 RX ORDER — GABAPENTIN 300 MG/1
300 CAPSULE ORAL 3 TIMES DAILY
Qty: 90 CAPSULE | Refills: 0 | OUTPATIENT
Start: 2023-01-16

## 2023-01-17 NOTE — TELEPHONE ENCOUNTER
Pt called back and message from Dr. Solitario was relayed, see below. He states he got out of detox on Monday and he planned to go into [facility] February 2nd or 3rd. Pt stated he wanted to come home and see his dog. However, his sister has given his dog away and he is having a difficult time with this. He states he has been on [facility] program for 5 years and has been doing well with it. Nurse again relayed message that provider does not think the amount of Gabapentin is safe d/t drinking and recommends to go into [facility] to be admitted. He requests asking the provider if he would advise a smaller dose of Gabapentin for now. He states he helps his mother who is 91 years old at home and she helps him as well. He states he is looking forward to [facility] but he just wanted to see his dog first and now is trying to deal with the fact that he no longer has his dog.    Nikole LENTZ RN  [facility]

## 2023-01-17 NOTE — TELEPHONE ENCOUNTER
hes still drinking - not safe, in my opinion, to be taking this amt of gabapentin.    Per 1/13 ER note- was going to [facility] to be [facility]. They can ok his meds- should be per psych     Ok for BP med

## 2023-01-21 DIAGNOSIS — F10.939 ALCOHOL WITHDRAWAL WITH COMPLICATION WITH INPATIENT TREATMENT, WITH UNSPECIFIED COMPLICATION (H): ICD-10-CM

## 2023-01-23 DIAGNOSIS — I10 ESSENTIAL HYPERTENSION: ICD-10-CM

## 2023-01-23 DIAGNOSIS — F10.20 UNCOMPLICATED ALCOHOL DEPENDENCE (H): ICD-10-CM

## 2023-01-23 DIAGNOSIS — F41.1 GENERALIZED ANXIETY DISORDER: ICD-10-CM

## 2023-01-23 RX ORDER — TRAZODONE HYDROCHLORIDE 50 MG/1
50 TABLET, FILM COATED ORAL
Qty: 30 TABLET | Refills: 11 | Status: ON HOLD | OUTPATIENT
Start: 2023-01-23 | End: 2023-04-14

## 2023-01-24 RX ORDER — SIMVASTATIN 40 MG
40 TABLET ORAL EVERY EVENING
Qty: 90 TABLET | Refills: 2 | Status: ON HOLD | OUTPATIENT
Start: 2023-01-24 | End: 2023-04-14

## 2023-01-24 RX ORDER — LANOLIN ALCOHOL/MO/W.PET/CERES
100 CREAM (GRAM) TOPICAL DAILY
Qty: 90 TABLET | Refills: 0 | Status: ON HOLD | OUTPATIENT
Start: 2023-01-24 | End: 2023-04-14

## 2023-01-24 RX ORDER — MULTIPLE VITAMINS W/ MINERALS TAB 9MG-400MCG
1 TAB ORAL DAILY
Qty: 90 TABLET | Refills: 2 | Status: ON HOLD | OUTPATIENT
Start: 2023-01-24 | End: 2023-04-14

## 2023-01-24 RX ORDER — CITALOPRAM HYDROBROMIDE 20 MG/1
TABLET ORAL
Qty: 90 TABLET | Refills: 3 | Status: ON HOLD | OUTPATIENT
Start: 2023-01-24 | End: 2023-04-14

## 2023-01-24 RX ORDER — BUSPIRONE HYDROCHLORIDE 5 MG/1
TABLET ORAL
Qty: 120 TABLET | Refills: 0 | OUTPATIENT
Start: 2023-01-24

## 2023-02-05 ENCOUNTER — NURSE TRIAGE (OUTPATIENT)
Dept: NURSING | Facility: CLINIC | Age: 63
End: 2023-02-05
Payer: MEDICARE

## 2023-02-05 DIAGNOSIS — F10.229 ALCOHOL DEPENDENCE WITH INTOXICATION WITH COMPLICATION (H): ICD-10-CM

## 2023-02-06 NOTE — TELEPHONE ENCOUNTER
"Spoke with patient to relay PCP questions, patient stated \"I'm gonna have a good friend drive me, I want to walk in and not feel anxious walking in tomorrow. My last drink was 2 minutes ago and I'm very worried about alcohol withdrawal while I'm there\".    Routing to PCP for update.   "

## 2023-02-06 NOTE — TELEPHONE ENCOUNTER
Good.  1 or 2 doses of a benzo is not going to reduce the chance of withdrawal. Once he is admitted and under treatment if they (staff ) feel he would benefit from benzos I could rx a course but this would need to be done in an environment where we know his access to other drugs/alcohol is restricted and he is monitored.

## 2023-02-06 NOTE — TELEPHONE ENCOUNTER
Spoke with patient to relay PCP recommendations. Patient verbalized understanding and will continue with plan to go to treatment facility tomorrow.

## 2023-02-06 NOTE — TELEPHONE ENCOUNTER
Patient called the clinic stating he has made up his mind to go to [facility] tomorrow and he has made arrangements with them. The program will take 4 months. He will only go if he can get an Rx for 4 tablets of Valium so he can walk in feeling comfortable. Planning to start Valium q 4 hours tonight. He said he will not go without Valium. He did another rehab program in August and took 4 tablets of Valium before then which helped.     Pharmacy:[id number] - [place], [place] - [address].

## 2023-02-06 NOTE — TELEPHONE ENCOUNTER
Patient is following up on his request. States that today is his birthday. He is thinking about starting his new year.  Please see message below. Marcela Larson RN

## 2023-02-06 NOTE — TELEPHONE ENCOUNTER
Is he concerned about alcohol withdrawal?  When was his last drink?   otherwise how is he getting there?

## 2023-02-08 RX ORDER — GABAPENTIN 300 MG/1
300 CAPSULE ORAL 3 TIMES DAILY
Qty: 90 CAPSULE | Refills: 0 | Status: CANCELLED
Start: 2023-02-08

## 2023-02-08 NOTE — TELEPHONE ENCOUNTER
Received a call from the patient. States that he admitted himself to [facility]. He is requesting Rx of gabapentin sent to [facility].     Pt has been taking hydroxyzine for anxiety and is wondering if provider has additional or alternative recommendations--hydroxyzine makes his mouth very dry and he feels his anxiety is not being diminished.     Pt would like Rx to be picked up by Sister.     Routing to PCP for advise.

## 2023-02-08 NOTE — TELEPHONE ENCOUNTER
They offer onsite provider visits at this site- it is best to have an on-site provider manage symptoms

## 2023-02-08 NOTE — TELEPHONE ENCOUNTER
Patient Contact    Attempt # 1    Was call answered?  No.  Left message on voicemail with information to call me back.    Upon call back, please relay provider message.

## 2023-02-09 NOTE — TELEPHONE ENCOUNTER
Patient Contact    Attempt # 1. Attempted to call number provided by patient [phone] 2x.    Was call answered?  No.  Unable to leave message.      Will postpone encounter and attempt to call at a later time.

## 2023-02-10 NOTE — TELEPHONE ENCOUNTER
Called and spoke to the patient. Relayed message from the provider. Patient states he is getting released from the detox center today. Patient expressed understanding and had no additional questions at this time.     Rosa Chavez RN

## 2023-03-02 DIAGNOSIS — F10.229 ALCOHOL DEPENDENCE WITH INTOXICATION WITH COMPLICATION (H): ICD-10-CM

## 2023-03-03 RX ORDER — HYDROCHLOROTHIAZIDE 12.5 MG/1
12.5 TABLET ORAL DAILY
Qty: 90 TABLET | Refills: 0 | Status: ON HOLD | OUTPATIENT
Start: 2023-03-03 | End: 2023-04-14

## 2023-03-03 RX ORDER — MAGNESIUM OXIDE 400 MG/1
TABLET ORAL
Qty: 60 TABLET | Refills: 0 | Status: SHIPPED | OUTPATIENT
Start: 2023-03-03 | End: 2023-03-16

## 2023-03-13 ENCOUNTER — PATIENT OUTREACH (OUTPATIENT)
Dept: GERIATRIC MEDICINE | Facility: CLINIC | Age: 63
End: 2023-03-13
Payer: MEDICARE

## 2023-03-13 NOTE — LETTER
March 13, 2023    JOANNA LARALIANE  [id number]  [place] [address]    Dear  Joanna,    Welcome to [facility] MSC+ health program. My name is Varsha Stewart RN, PHN. I am your MSC+ care coordinator. You are eligible for Care Coordination through [facility] MSC+ plan.    As your care coordinator, we ll:    Meet to go over your care coordination benefits    Talk about your physical and mental health care needs     Review your preventative care needs    Create a plan that meets your needs with the services you choose    What happens next?  I ll call you soon to introduce myself and tell you more about my role. We ll then plan time to go over your health and safety needs. Our goal is to keep you as healthy and independent as possible.    Soon, you will receive a new MSC+ member identification (ID) card from [facility]. When you receive it, please use this card along with your [place] [facility] card and Prescription Drug Coverage Program card. When you receive, it please use this card where you get your health services. If you have Medicare, you will need to show your Medicare card when you get health services.    The MSC+ care coordination program is voluntary and offered to you at no cost. If you wish to stop being in the care coordination program or have questions, call me at [phone]. If you reach my voicemail, leave a message and your phone number. TTY users, call the [place] [employer] at 799 or [phone] (zntgyh-mo-hrmwyd relay service).    Sincerely,        Varsha Stewart RN, PHN    E-mail: [email or web address]  Phone: [phone]      [facility]      [id number] accepted   [id number]       [id number] (07/2022)

## 2023-03-13 NOTE — PROGRESS NOTES
[facility] Care Coordination Contact    Member became effective with  Khalida on 3/1/2023 with [employer] MSC+.  Previous Health Plan: [place]/Fee For Service  Previous Care System: [place] [facility]  Previous care coordinators name and number: NA  Waiver Type: N/A  Last MMIS Entry: Date NA and Type NA  MMIS visit date (and type) if different from above: NA  Services Listed in MMIS:   UTF received: No UTF to request  Address/Phone discrepancy: PATRICE Lau  Care Management Specialist  [facility]  [phone]

## 2023-03-14 DIAGNOSIS — F10.229 ALCOHOL DEPENDENCE WITH INTOXICATION WITH COMPLICATION (H): ICD-10-CM

## 2023-03-16 ENCOUNTER — PATIENT OUTREACH (OUTPATIENT)
Dept: GERIATRIC MEDICINE | Facility: CLINIC | Age: 63
End: 2023-03-16
Payer: MEDICARE

## 2023-03-16 RX ORDER — MAGNESIUM OXIDE 400 MG/1
TABLET ORAL
Qty: 60 TABLET | Refills: 0 | Status: ON HOLD | OUTPATIENT
Start: 2023-03-16 | End: 2023-04-14

## 2023-03-16 NOTE — PROGRESS NOTES
[facility] Care Coordination Contact      [facility] Health Plan or Product Change    CC received notification that member's health plan or health plan product changed from MA/Fee For Service to [employer] MSC+ effective 3/1/23.  CC contacted member and discussed change and face-to-face visit was offered. Member declined need for home visit.    Member states he lives with his 91 year old mother and they share house hold responsibility.  States he has a car and drives where he needs to go.  Reviewed med list with member.  Member states he is independent with his personal cares. Member states he became an alcohol during COVID.  States he did a treatment program about a year and a half ago.  Has had varying levels of success with sobriety.  Does see a therapist who he states has been helpful.  Today was his 4th session.  I huge source of stress in his life is that he is being sued for 2000 dollars from when his dog bit his neighbors dog 1.5 years ago.  Has to go to court tomorrow.  Member is thankful for the call and saves CC number.  Declines his annual home visit at this time.     Writer reviewed the following with member:  ER visits: Last 6mo: Yes: 11/12/22 [facility] Acute alcohol intoxication. 1/12/23 [facility] Alcoholic intoxication.   Hospitalizations: Last 6mo: Yes: 8/26/22- 8/29/22 [facility] Alcohol dependence with intoxication. 11/12/22  TCU stays: No  Significant health status changes: Denies  Falls/Injuries: No  ADL/IADL changes: No  Changes in services: No    Follow-Up Plan: Member informed of future contact, plan to f/u with member with at next regularly scheduled contact.  Contact information shared with member and family, encouraged member to call with any questions or concerns.    Varsha Stewart RN, BSN, PHN  [facility]  [phone]  Fax: [phone]

## 2023-03-16 NOTE — LETTER
March 20, 2023    JOANNA LARALIANE  [id number]  [place] [address]        Dear Joanna:    As a member of [facility]'s MSC+ program, we offer a health risk assessment at no cost to you. I know you don't want to have the assessment right now. If you change your mind, please call me at the number below.    Who performs the health risk assessment?  A [facility] Care Coordinator performs the assessment. Our Care Coordinators can also help you understand your benefits. They can tell you about services to aid you at home, such as managing your care with your doctors if your health worsens.    Our Care Coordinators are here for you if you need:    Support for activities you used to do by yourself (including making meals, bathing and paying bills)    Equipment for bathroom or home safety    Help finding a new place to live    Information on staying healthy, preventing falls and immunizations    Questions?  If you have questions, or you would like to do he assessment, call me at [phone]. TTY users call [phone]. I'm here from 8am to 5pm. I may reach out to you again soon.       Sincerely,           Varsha Stewart RN, PHN    E-mail: [email or web address]  Phone: [phone]      [facility]        \<[id number] accepted  [id number]>    [id number] (21/2021)

## 2023-03-20 NOTE — PROGRESS NOTES
"[facility] Care Coordination Contact    Per CC, mailed client a \"Refusal of Home Visit\" letter.    Kacy Lau  Care Management Specialist  [facility]  [phone]      "

## 2023-03-25 ENCOUNTER — HEALTH MAINTENANCE LETTER (OUTPATIENT)
Age: 63
End: 2023-03-25

## 2023-03-29 DIAGNOSIS — F10.229 ALCOHOL DEPENDENCE WITH INTOXICATION WITH COMPLICATION (H): ICD-10-CM

## 2023-03-29 RX ORDER — MAGNESIUM OXIDE 400 MG/1
TABLET ORAL
Qty: 60 TABLET | Refills: 0 | OUTPATIENT
Start: 2023-03-29

## 2023-04-02 DIAGNOSIS — F10.229 ALCOHOL DEPENDENCE WITH INTOXICATION WITH COMPLICATION (H): ICD-10-CM

## 2023-04-05 DIAGNOSIS — F10.939 ALCOHOL WITHDRAWAL WITH COMPLICATION WITH INPATIENT TREATMENT, WITH UNSPECIFIED COMPLICATION (H): ICD-10-CM

## 2023-04-05 DIAGNOSIS — F41.1 GENERALIZED ANXIETY DISORDER: ICD-10-CM

## 2023-04-05 RX ORDER — LISINOPRIL 40 MG/1
40 TABLET ORAL DAILY
Qty: 30 TABLET | Refills: 0 | Status: ON HOLD | OUTPATIENT
Start: 2023-04-05 | End: 2023-04-14

## 2023-04-06 ENCOUNTER — NURSE TRIAGE (OUTPATIENT)
Dept: INTERNAL MEDICINE | Facility: CLINIC | Age: 63
End: 2023-04-06
Payer: MEDICARE

## 2023-04-06 NOTE — TELEPHONE ENCOUNTER
"Pt called the clinic requesting new medication.   Pt is thinking about going through detox again, and would like to discuss with his provider starting a new medication in addition to hydroxyzine for anxiety before restarting detox. Pt had an appointment with his psychiatrist this morning and was encouraged to contact his PCP with med request.  Pt was triaged; See in office within 3 days for new med. Pt declined seeing a different provider for this request, requesting Dr. Solitario only. Hold was placed on soonest opening with Dr. Solitario 0830 4/12/2023. Routing request to provider for review. If approved, please route to team to contact pt and confirm appointment.     Pt requesting callback from the clinic.     1. NAME of MEDICATION: \"What medicine are you calling about?\"      Gabapentin or an anti anxiety medication.   2. QUESTION: \"What is your question?\" (e.g., double dose of medicine, side effect)      Pt is wondering if Dr. Whittaker would be willing to prescribe a medication for his anxiety before entering detox.   3. PRESCRIBING HCP: \"Who prescribed it?\" Reason: if prescribed by specialist, call should be referred to that group.      Requesting Dr. Whittaker  4. SYMPTOMS: \"Do you have any symptoms?\"      Anxiety reporting his body is tightening up thinking about going through detox again with additional medication to hydroxyzine to enter the process of detox.  5. SEVERITY: If symptoms are present, ask \"Are they mild, moderate or severe?\"      hydroxyzine helps but his mouth is dry. He is requesting a medication to help him through detox. When pt was discussing with his psychiatrist about entering detox he referred pt to discuss with PCP.     Can we leave a detailed message on this number? YES  Phone number patient can be reached at: Home number on file [phone] (home)      Zaina Guallpa RN  [facility] Triage        Reason for Disposition    Prescription request for new medicine (not a " refill)    Additional Information    Negative: Intentional drug overdose and suicidal thoughts or ideas    Negative: Drug overdose and triager unable to answer question    Negative: Caller requesting a renewal or refill of a medicine patient is currently taking    Negative: Caller requesting information unrelated to medicine    Negative: Caller requesting information about COVID-19 Vaccine    Negative: Caller requesting information about Emergency Contraception    Negative: Caller requesting information about Combined Birth Control Pills    Negative: Caller requesting information about Progestin Birth Control Pills    Negative: Caller requesting information about Post-Op pain or medicines    Negative: Caller requesting a prescription antibiotic (such as penicillin) for Strep throat and has a positive culture result    Negative: Caller requesting a prescription anti-viral med (such as Tamiflu) and has influenza (flu) symptoms    Negative: Immunization reaction suspected    Negative: Rash while taking a medicine or within 3 days of stopping it    Negative: Asthma and having symptoms of asthma (cough, wheezing, etc.)    Negative: Symptom of illness (e.g., headache, abdominal pain, earache, vomiting) that are more than mild    Negative: Breastfeeding questions about mother's medicines and diet    Negative: MORE THAN A DOUBLE DOSE of a prescription or over-the-counter (OTC) drug    Negative: DOUBLE DOSE (an extra dose or lesser amount) of prescription drug and any symptoms (e.g., dizziness, nausea, pain, sleepiness)    Negative: DOUBLE DOSE (an extra dose or lesser amount) of over-the-counter (OTC) drug and any symptoms (e.g., dizziness, nausea, pain, sleepiness)    Negative: Took another person's prescription drug    Negative: DOUBLE DOSE (an extra dose or lesser amount) of prescription drug and NO symptoms  (Exception: A double dose of antibiotics.)    Negative: Diabetes drug error or overdose (e.g., took wrong type of  insulin or took extra dose)    Negative: Caller has NON-URGENT medicine question about med that PCP or specialist prescribed and triager unable to answer question    Negative: Caller wants to use a complementary or alternative medicine    Negative: Medicine patch causing local rash or itching    Negative: Prescription not at pharmacy and was prescribed by PCP recently  (Exception: triager has access to EMR and prescription is recorded there. Go to Home Care and confirm for pharmacy.)    Negative: Pharmacy calling with prescription question and triager unable to answer question    Negative: Caller has URGENT medicine question about med that PCP or specialist prescribed and triager unable to answer question    Negative: Caller has medication question about med NOT prescribed by PCP and triager unable to answer question (e.g., compatibility with other med, storage)    Protocols used: MEDICATION QUESTION CALL-A-OH

## 2023-04-06 NOTE — TELEPHONE ENCOUNTER
Dr. Solitario is out of the office this week.  We partners are helping answer his messages and patient issues in his absence.      There is no chance that we are going to start any new medications for acute alcohol withdrawal, alcohol cravings, or acute anxiety at this time in acute alcohol withdrawal.  We will not manage acute alcohol withdrawal remotely by phone or MyChart.    His psychiatrist can feel free to prescribe any medication that they feel would be indicated, however their request to defer anxiety medications to the primary MD means that they do not feel comfortable prescribing anything for him at this time either.     If he feels that he is having significant alcohol withdrawl, then go to detox or the ER.     He should follow up with Dr. Solitario as planned next week if he has questions.

## 2023-04-06 NOTE — TELEPHONE ENCOUNTER
Spoke to patient. Patient agrees to see PCP 4/12 to discuss medication options for anxiety.   Hold removed from appointment slot and appt scheduled.     Future Appointments 4/6/2023 - 10/3/2023      Date Visit Type Length Department Provider     4/12/2023  8:30 AM OFFICE VISIT 30 min  INTERNAL MEDICINE Rohit Solitario MD    Location Instructions:     [facility] is in the [facility] at [address] in [place]. This just east of the [address] exit off of [address]. Free parking is available; access the lot from [address] [place] [address] or [facility].

## 2023-04-06 NOTE — TELEPHONE ENCOUNTER
Will file in chart as: hydrOXYzine (ATARAX) 50 MG tablet   The original prescription was discontinued on 8/29/2022 by Mame June MD for the following reason: Reorder (No AVS / No eCancel). Renewing this prescription may not be appropriate.     Will file in chart as: busPIRone (BUSPAR) 5 MG tablet   The original prescription was discontinued on 3/13/2021 by Susy Nava for the following reason: Med Rec(No AVS / No eCancel). Renewing this prescription may not be appropriate.     Will file in chart as: naltrexone (DEPADE/REVIA) 50 MG tablet   The original prescription was discontinued on 8/29/2022 by Mame June MD for the following reason: Reorder (No AVS / No eCancel). Renewing this prescription may not be appropriate.     Per pharmacy dispense patient last filled     Hydroxzyine 50mg #60 on 3/15/23     Buspirone 15mg #180 on 10/2/22    Naltrexone 50mg #30 on 3/3/23     Rxs previously removed from list but per pharmacy dispense records pt has been filling/taking     Ann Marie Diaz RN  [facility]

## 2023-04-07 RX ORDER — BUSPIRONE HYDROCHLORIDE 5 MG/1
TABLET ORAL
Qty: 120 TABLET | Refills: 0 | OUTPATIENT
Start: 2023-04-07

## 2023-04-07 RX ORDER — NALTREXONE HYDROCHLORIDE 50 MG/1
TABLET, FILM COATED ORAL
Qty: 90 TABLET | Refills: 0 | OUTPATIENT
Start: 2023-04-07

## 2023-04-07 RX ORDER — HYDROXYZINE HYDROCHLORIDE 50 MG/1
25-50 TABLET, FILM COATED ORAL 3 TIMES DAILY PRN
Qty: 60 TABLET | Refills: 0 | Status: ON HOLD | OUTPATIENT
Start: 2023-04-07 | End: 2023-04-14

## 2023-04-07 NOTE — CONFIDENTIAL NOTE
Refill request for hydroxyzine, buspirone, and naltrexone.     I am only willing to refill the Hydroxyzine.     The naltrexone and buspirone came from another provider.     The other medications will need to come from the primary MD after discussion in his appointment next week.

## 2023-04-10 ENCOUNTER — TELEPHONE (OUTPATIENT)
Dept: INTERNAL MEDICINE | Facility: CLINIC | Age: 63
End: 2023-04-10

## 2023-04-10 NOTE — TELEPHONE ENCOUNTER
General Call    Contacts       Type Contact Phone/Fax    04/10/2023 02:21 PM CDT Phone (Incoming) Durga Aguirre (Self) [phone] (H)        Reason for Call:  Patient is needing to be seen sooner/rather then later/patient is seriously suffering with anxiety and he is drinking more per patient/patient was in complete tears on the phone with me. Please contact patient for a sooner appointment if available. Patient is wanting to get into treatment/but is needing something to help him get through it.     What are your questions or concerns:  Needing appoibntment     Date of last appointment with provider: N/A    Could we send this information to you in [place] or would you prefer to receive a phone call?:   Patient would prefer a phone call   Okay to leave a detailed message?: No at Home number on file [phone] ([place])

## 2023-04-11 NOTE — TELEPHONE ENCOUNTER
"Patient Contact    Attempt # 1    Was call answered?  Yes.  \"May I please speak with <patient name>\"  Is patient available?   Yes     Pt declined triage for anxiety, he does not want to answer questions.     Pt requesting to change OV to telephone visit on 4/17. Routing request to provider. Please review and send recommendation to team to follow up with pt on his request. Pt stating it is okay to keep appointment for the 17th.     Reinforced information from the provider instructing pt to go to ER for anxiety/panick attacks/alcohol withdrawal. Pt reporting he will not go to rehab unless he has something else prescribed for anxiety first. Pt declined additional resources for mental health (I.e. [facility])       "

## 2023-04-12 ENCOUNTER — APPOINTMENT (OUTPATIENT)
Dept: CT IMAGING | Facility: CLINIC | Age: 63
DRG: 897 | End: 2023-04-12
Attending: FAMILY MEDICINE
Payer: MEDICARE

## 2023-04-12 ENCOUNTER — HOSPITAL ENCOUNTER (INPATIENT)
Facility: CLINIC | Age: 63
LOS: 2 days | Discharge: HOME | DRG: 897 | End: 2023-04-14
Attending: FAMILY MEDICINE | Admitting: PSYCHIATRY & NEUROLOGY
Payer: MEDICARE

## 2023-04-12 ENCOUNTER — TELEPHONE (OUTPATIENT)
Dept: BEHAVIORAL HEALTH | Facility: CLINIC | Age: 63
End: 2023-04-12

## 2023-04-12 DIAGNOSIS — F10.939 ALCOHOL WITHDRAWAL WITH COMPLICATION WITH INPATIENT TREATMENT, WITH UNSPECIFIED COMPLICATION (H): ICD-10-CM

## 2023-04-12 DIAGNOSIS — S01.01XA LACERATION OF SCALP, INITIAL ENCOUNTER: ICD-10-CM

## 2023-04-12 DIAGNOSIS — I10 ESSENTIAL HYPERTENSION: ICD-10-CM

## 2023-04-12 DIAGNOSIS — F10.229 ALCOHOL DEPENDENCE WITH INTOXICATION WITH COMPLICATION (H): ICD-10-CM

## 2023-04-12 DIAGNOSIS — I44.7 LBBB (LEFT BUNDLE BRANCH BLOCK): ICD-10-CM
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
## 2023-04-12 DIAGNOSIS — F10.20 UNCOMPLICATED ALCOHOL DEPENDENCE (H): ICD-10-CM

## 2023-04-12 DIAGNOSIS — Z20.822 LAB TEST NEGATIVE FOR COVID-19 VIRUS: ICD-10-CM

## 2023-04-12 PROBLEM — F10.10 ALCOHOL ABUSE: Status: ACTIVE | Noted: 2023-04-12

## 2023-04-12 LAB
ALBUMIN SERPL BCG-MCNC: 4.4 G/DL (ref 3.5–5.2)
ALCOHOL BREATH TEST: 0.31 (ref 0–0.01)
ALP SERPL-CCNC: 85 U/L (ref 40–129)
ALT SERPL W P-5'-P-CCNC: 92 U/L (ref 10–50)
AMPHETAMINES UR QL SCN: NORMAL
ANION GAP SERPL CALCULATED.3IONS-SCNC: 21 MMOL/L (ref 7–15)
AST SERPL W P-5'-P-CCNC: 93 U/L (ref 10–50)
ATRIAL RATE - MUSE: 64 BPM
BARBITURATES UR QL SCN: NORMAL
BASOPHILS # BLD AUTO: 0.1 10E3/UL (ref 0–0.2)
BASOPHILS NFR BLD AUTO: 2 %
BENZODIAZ UR QL SCN: NORMAL
BILIRUB SERPL-MCNC: 0.3 MG/DL
BUN SERPL-MCNC: 14.8 MG/DL (ref 8–23)
BZE UR QL SCN: NORMAL
CALCIUM SERPL-MCNC: 8.9 MG/DL (ref 8.8–10.2)
CANNABINOIDS UR QL SCN: NORMAL
CHLORIDE SERPL-SCNC: 98 MMOL/L (ref 98–107)
CREAT SERPL-MCNC: 0.83 MG/DL (ref 0.67–1.17)
DEPRECATED HCO3 PLAS-SCNC: 22 MMOL/L (ref 22–29)
DIASTOLIC BLOOD PRESSURE - MUSE: NORMAL MMHG
EOSINOPHIL # BLD AUTO: 0.2 10E3/UL (ref 0–0.7)
EOSINOPHIL NFR BLD AUTO: 4 %
ERYTHROCYTE [DISTWIDTH] IN BLOOD BY AUTOMATED COUNT: 11.5 % (ref 10–15)
GFR SERPL CREATININE-BSD FRML MDRD: >90 ML/MIN/1.73M2
GLUCOSE SERPL-MCNC: 124 MG/DL (ref 70–99)
HCT VFR BLD AUTO: 37.4 % (ref 40–53)
HGB BLD-MCNC: 12.4 G/DL (ref 13.3–17.7)
IMM GRANULOCYTES # BLD: 0 10E3/UL
IMM GRANULOCYTES NFR BLD: 1 %
INTERPRETATION ECG - MUSE: NORMAL
LYMPHOCYTES # BLD AUTO: 1.2 10E3/UL (ref 0.8–5.3)
LYMPHOCYTES NFR BLD AUTO: 26 %
MAGNESIUM SERPL-MCNC: 2 MG/DL (ref 1.7–2.3)
MCH RBC QN AUTO: 28.8 PG (ref 26.5–33)
MCHC RBC AUTO-ENTMCNC: 33.2 G/DL (ref 31.5–36.5)
MCV RBC AUTO: 87 FL (ref 78–100)
MONOCYTES # BLD AUTO: 0.4 10E3/UL (ref 0–1.3)
MONOCYTES NFR BLD AUTO: 7 %
NEUTROPHILS # BLD AUTO: 2.9 10E3/UL (ref 1.6–8.3)
NEUTROPHILS NFR BLD AUTO: 60 %
NRBC # BLD AUTO: 0 10E3/UL
NRBC BLD AUTO-RTO: 0 /100
OPIATES UR QL SCN: NORMAL
P AXIS - MUSE: 49 DEGREES
PLATELET # BLD AUTO: 136 10E3/UL (ref 150–450)
POTASSIUM SERPL-SCNC: 4.2 MMOL/L (ref 3.4–5.3)
PR INTERVAL - MUSE: 178 MS
PROT SERPL-MCNC: 6.7 G/DL (ref 6.4–8.3)
QRS DURATION - MUSE: 158 MS
QT - MUSE: 468 MS
QTC - MUSE: 482 MS
R AXIS - MUSE: -5 DEGREES
RBC # BLD AUTO: 4.3 10E6/UL (ref 4.4–5.9)
SARS-COV-2 RNA RESP QL NAA+PROBE: NEGATIVE
SODIUM SERPL-SCNC: 141 MMOL/L (ref 136–145)
SYSTOLIC BLOOD PRESSURE - MUSE: NORMAL MMHG
T AXIS - MUSE: 151 DEGREES
TROPONIN T SERPL HS-MCNC: 22 NG/L
VENTRICULAR RATE- MUSE: 64 BPM
WBC # BLD AUTO: 4.8 10E3/UL (ref 4–11)

## 2023-04-12 PROCEDURE — 83735 ASSAY OF MAGNESIUM: CPT | Performed by: FAMILY MEDICINE

## 2023-04-12 PROCEDURE — 80307 DRUG TEST PRSMV CHEM ANLYZR: CPT | Performed by: FAMILY MEDICINE

## 2023-04-12 PROCEDURE — U0005 INFEC AGEN DETEC AMPLI PROBE: HCPCS | Performed by: FAMILY MEDICINE

## 2023-04-12 PROCEDURE — 96366 THER/PROPH/DIAG IV INF ADDON: CPT | Performed by: FAMILY MEDICINE

## 2023-04-12 PROCEDURE — H2032 ACTIVITY THERAPY, PER 15 MIN: HCPCS

## 2023-04-12 PROCEDURE — 999N000040 HC STATISTIC CONSULT NO CHARGE VASC ACCESS

## 2023-04-12 PROCEDURE — 82075 ASSAY OF BREATH ETHANOL: CPT | Performed by: FAMILY MEDICINE

## 2023-04-12 PROCEDURE — 72125 CT NECK SPINE W/O DYE: CPT

## 2023-04-12 PROCEDURE — C9803 HOPD COVID-19 SPEC COLLECT: HCPCS | Performed by: FAMILY MEDICINE

## 2023-04-12 PROCEDURE — 93010 ELECTROCARDIOGRAM REPORT: CPT | Performed by: FAMILY MEDICINE

## 2023-04-12 PROCEDURE — 93005 ELECTROCARDIOGRAM TRACING: CPT | Performed by: FAMILY MEDICINE

## 2023-04-12 PROCEDURE — 999N000127 HC STATISTIC PERIPHERAL IV START W US GUIDANCE

## 2023-04-12 PROCEDURE — 999N000285 HC STATISTIC VASC ACCESS LAB DRAW WITH PIV START

## 2023-04-12 PROCEDURE — 80053 COMPREHEN METABOLIC PANEL: CPT | Performed by: FAMILY MEDICINE

## 2023-04-12 PROCEDURE — 96365 THER/PROPH/DIAG IV INF INIT: CPT | Performed by: FAMILY MEDICINE

## 2023-04-12 PROCEDURE — 99285 EMERGENCY DEPT VISIT HI MDM: CPT | Mod: 25 | Performed by: FAMILY MEDICINE

## 2023-04-12 PROCEDURE — 250N000013 HC RX MED GY IP 250 OP 250 PS 637: Performed by: FAMILY MEDICINE

## 2023-04-12 PROCEDURE — 84484 ASSAY OF TROPONIN QUANT: CPT | Performed by: FAMILY MEDICINE

## 2023-04-12 PROCEDURE — 36415 COLL VENOUS BLD VENIPUNCTURE: CPT | Performed by: FAMILY MEDICINE

## 2023-04-12 PROCEDURE — 250N000013 HC RX MED GY IP 250 OP 250 PS 637: Performed by: PSYCHIATRY & NEUROLOGY

## 2023-04-12 PROCEDURE — 128N000004 HC R&B CD ADULT

## 2023-04-12 PROCEDURE — 250N000011 HC RX IP 250 OP 636: Performed by: FAMILY MEDICINE

## 2023-04-12 PROCEDURE — 250N000011 HC RX IP 250 OP 636: Performed by: CLINICAL NURSE SPECIALIST

## 2023-04-12 PROCEDURE — 70450 CT HEAD/BRAIN W/O DYE: CPT

## 2023-04-12 PROCEDURE — 85025 COMPLETE CBC W/AUTO DIFF WBC: CPT | Performed by: FAMILY MEDICINE

## 2023-04-12 RX ORDER — HYDROXYZINE HYDROCHLORIDE 25 MG/1
25 TABLET, FILM COATED ORAL ONCE
Status: COMPLETED | OUTPATIENT
Start: 2023-04-12 | End: 2023-04-12

## 2023-04-12 RX ORDER — TRAZODONE HYDROCHLORIDE 50 MG/1
50 TABLET, FILM COATED ORAL
Status: DISCONTINUED | OUTPATIENT
Start: 2023-04-12 | End: 2023-04-12

## 2023-04-12 RX ORDER — MULTIPLE VITAMINS W/ MINERALS TAB 9MG-400MCG
1 TAB ORAL DAILY
Status: DISCONTINUED | OUTPATIENT
Start: 2023-04-12 | End: 2023-04-14 | Stop reason: HOSPADM

## 2023-04-12 RX ORDER — CITALOPRAM HYDROBROMIDE 20 MG/1
20 TABLET ORAL DAILY
Status: DISCONTINUED | OUTPATIENT
Start: 2023-04-12 | End: 2023-04-14 | Stop reason: HOSPADM

## 2023-04-12 RX ORDER — TRAZODONE HYDROCHLORIDE 50 MG/1
50 TABLET, FILM COATED ORAL
Status: DISCONTINUED | OUTPATIENT
Start: 2023-04-12 | End: 2023-04-14 | Stop reason: HOSPADM

## 2023-04-12 RX ORDER — MULTIPLE VITAMINS W/ MINERALS TAB 9MG-400MCG
1 TAB ORAL DAILY
Status: DISCONTINUED | OUTPATIENT
Start: 2023-04-12 | End: 2023-04-12

## 2023-04-12 RX ORDER — AMOXICILLIN 250 MG
1 CAPSULE ORAL 2 TIMES DAILY PRN
Status: DISCONTINUED | OUTPATIENT
Start: 2023-04-12 | End: 2023-04-14 | Stop reason: HOSPADM

## 2023-04-12 RX ORDER — ONDANSETRON 4 MG/1
4 TABLET, ORALLY DISINTEGRATING ORAL EVERY 6 HOURS PRN
Status: DISCONTINUED | OUTPATIENT
Start: 2023-04-12 | End: 2023-04-14 | Stop reason: HOSPADM

## 2023-04-12 RX ORDER — GABAPENTIN 300 MG/1
300 CAPSULE ORAL 3 TIMES DAILY
Status: DISCONTINUED | OUTPATIENT
Start: 2023-04-12 | End: 2023-04-14 | Stop reason: HOSPADM

## 2023-04-12 RX ORDER — SALIVA STIMULANT COMB. NO.3
1 SPRAY, NON-AEROSOL (ML) MUCOUS MEMBRANE
Status: DISCONTINUED | OUTPATIENT
Start: 2023-04-12 | End: 2023-04-14 | Stop reason: HOSPADM

## 2023-04-12 RX ORDER — HYDROXYZINE HYDROCHLORIDE 25 MG/1
25-50 TABLET, FILM COATED ORAL 3 TIMES DAILY PRN
Status: DISCONTINUED | OUTPATIENT
Start: 2023-04-12 | End: 2023-04-14 | Stop reason: HOSPADM

## 2023-04-12 RX ORDER — DIAZEPAM 5 MG
5-20 TABLET ORAL EVERY 30 MIN PRN
Status: DISCONTINUED | OUTPATIENT
Start: 2023-04-12 | End: 2023-04-14 | Stop reason: HOSPADM

## 2023-04-12 RX ORDER — MULTIVITAMIN,THERAPEUTIC
1 TABLET ORAL ONCE
Status: COMPLETED | OUTPATIENT
Start: 2023-04-12 | End: 2023-04-12

## 2023-04-12 RX ORDER — MAGNESIUM HYDROXIDE/ALUMINUM HYDROXICE/SIMETHICONE 120; 1200; 1200 MG/30ML; MG/30ML; MG/30ML
30 SUSPENSION ORAL EVERY 4 HOURS PRN
Status: DISCONTINUED | OUTPATIENT
Start: 2023-04-12 | End: 2023-04-14 | Stop reason: HOSPADM

## 2023-04-12 RX ORDER — MAGNESIUM OXIDE 400 MG/1
400 TABLET ORAL 2 TIMES DAILY
Status: DISCONTINUED | OUTPATIENT
Start: 2023-04-12 | End: 2023-04-14 | Stop reason: HOSPADM

## 2023-04-12 RX ORDER — DIAZEPAM 5 MG
5-20 TABLET ORAL EVERY 30 MIN PRN
Status: DISCONTINUED | OUTPATIENT
Start: 2023-04-12 | End: 2023-04-12

## 2023-04-12 RX ORDER — LOPERAMIDE HCL 2 MG
2 CAPSULE ORAL 4 TIMES DAILY PRN
Status: DISCONTINUED | OUTPATIENT
Start: 2023-04-12 | End: 2023-04-14 | Stop reason: HOSPADM

## 2023-04-12 RX ORDER — FOLIC ACID 1 MG/1
1 TABLET ORAL DAILY
Status: DISCONTINUED | OUTPATIENT
Start: 2023-04-12 | End: 2023-04-14 | Stop reason: HOSPADM

## 2023-04-12 RX ORDER — HYDROCHLOROTHIAZIDE 12.5 MG/1
12.5 TABLET ORAL DAILY
Status: DISCONTINUED | OUTPATIENT
Start: 2023-04-12 | End: 2023-04-14 | Stop reason: HOSPADM

## 2023-04-12 RX ORDER — SIMVASTATIN 20 MG
40 TABLET ORAL EVERY EVENING
Status: DISCONTINUED | OUTPATIENT
Start: 2023-04-12 | End: 2023-04-14 | Stop reason: HOSPADM

## 2023-04-12 RX ORDER — LISINOPRIL 40 MG/1
40 TABLET ORAL DAILY
Status: DISCONTINUED | OUTPATIENT
Start: 2023-04-12 | End: 2023-04-14 | Stop reason: HOSPADM

## 2023-04-12 RX ORDER — HYDROXYZINE HYDROCHLORIDE 25 MG/1
25 TABLET, FILM COATED ORAL EVERY 4 HOURS PRN
Status: DISCONTINUED | OUTPATIENT
Start: 2023-04-12 | End: 2023-04-12

## 2023-04-12 RX ORDER — CALCIUM CARBONATE 500 MG/1
500 TABLET, CHEWABLE ORAL DAILY PRN
Status: DISCONTINUED | OUTPATIENT
Start: 2023-04-12 | End: 2023-04-14 | Stop reason: HOSPADM

## 2023-04-12 RX ORDER — ATENOLOL 50 MG/1
50 TABLET ORAL DAILY PRN
Status: DISCONTINUED | OUTPATIENT
Start: 2023-04-12 | End: 2023-04-14 | Stop reason: HOSPADM

## 2023-04-12 RX ADMIN — HYDROXYZINE HYDROCHLORIDE 50 MG: 25 TABLET, FILM COATED ORAL at 22:12

## 2023-04-12 RX ADMIN — ONDANSETRON 4 MG: 4 TABLET, ORALLY DISINTEGRATING ORAL at 21:12

## 2023-04-12 RX ADMIN — GABAPENTIN 300 MG: 300 CAPSULE ORAL at 21:12

## 2023-04-12 RX ADMIN — HYDROCHLOROTHIAZIDE 12.5 MG: 12.5 TABLET ORAL at 15:21

## 2023-04-12 RX ADMIN — TRAZODONE HYDROCHLORIDE 50 MG: 50 TABLET ORAL at 22:12

## 2023-04-12 RX ADMIN — SIMVASTATIN 40 MG: 20 TABLET, FILM COATED ORAL at 21:12

## 2023-04-12 RX ADMIN — HYDROXYZINE HYDROCHLORIDE 25 MG: 25 TABLET, FILM COATED ORAL at 11:32

## 2023-04-12 RX ADMIN — DIAZEPAM 10 MG: 5 TABLET ORAL at 19:07

## 2023-04-12 RX ADMIN — FOLIC ACID: 5 INJECTION, SOLUTION INTRAMUSCULAR; INTRAVENOUS; SUBCUTANEOUS at 11:32

## 2023-04-12 RX ADMIN — GABAPENTIN 300 MG: 300 CAPSULE ORAL at 15:22

## 2023-04-12 RX ADMIN — DIAZEPAM 5 MG: 5 TABLET ORAL at 16:32

## 2023-04-12 RX ADMIN — DIAZEPAM 10 MG: 5 TABLET ORAL at 15:22

## 2023-04-12 RX ADMIN — THIAMINE HCL TAB 100 MG 100 MG: 100 TAB at 15:22

## 2023-04-12 RX ADMIN — MAGNESIUM OXIDE TAB 400 MG (241.3 MG ELEMENTAL MG) 400 MG: 400 (241.3 MG) TAB at 21:12

## 2023-04-12 RX ADMIN — MULTIPLE VITAMINS W/ MINERALS TAB 1 TABLET: TAB at 15:22

## 2023-04-12 RX ADMIN — DIAZEPAM 10 MG: 5 TABLET ORAL at 13:40

## 2023-04-12 RX ADMIN — CITALOPRAM HYDROBROMIDE 20 MG: 20 TABLET ORAL at 15:21

## 2023-04-12 RX ADMIN — CALCIUM CARBONATE (ANTACID) CHEW TAB 500 MG 500 MG: 500 CHEW TAB at 14:04

## 2023-04-12 RX ADMIN — FOLIC ACID 1 MG: 1 TABLET ORAL at 15:21

## 2023-04-12 RX ADMIN — LISINOPRIL 40 MG: 40 TABLET ORAL at 15:21

## 2023-04-12 RX ADMIN — ALCOHOL 1 TABLET: 70.47 GEL TOPICAL at 11:32

## 2023-04-12 ASSESSMENT — ACTIVITIES OF DAILY LIVING (ADL)
LAUNDRY: WITH SUPERVISION
NUMBER_OF_TIMES_PATIENT_HAS_FALLEN_WITHIN_LAST_SIX_MONTHS: 1
FALL_HISTORY_WITHIN_LAST_SIX_MONTHS: YES
HEARING_DIFFICULTY_OR_DEAF: NO
ORAL_HYGIENE: INDEPENDENT
ADLS_ACUITY_SCORE: 30
ADLS_ACUITY_SCORE: 35
ADLS_ACUITY_SCORE: 30
WALKING_OR_CLIMBING_STAIRS_DIFFICULTY: NO
DRESS: INDEPENDENT
ADLS_ACUITY_SCORE: 30
DIFFICULTY_COMMUNICATING: NO
DIFFICULTY_EATING/SWALLOWING: NO
WEAR_GLASSES_OR_BLIND: YES
CONCENTRATING,_REMEMBERING_OR_MAKING_DECISIONS_DIFFICULTY: NO
VISION_MANAGEMENT: READING
DRESSING/BATHING_DIFFICULTY: NO
TOILETING_ISSUES: NO
ADLS_ACUITY_SCORE: 30
ADLS_ACUITY_SCORE: 35
HYGIENE/GROOMING: INDEPENDENT
CHANGE_IN_FUNCTIONAL_STATUS_SINCE_ONSET_OF_CURRENT_ILLNESS/INJURY: NO
ADLS_ACUITY_SCORE: 30
DOING_ERRANDS_INDEPENDENTLY_DIFFICULTY: NO

## 2023-04-12 NOTE — ED TRIAGE NOTES
Pt seeking detox from home. Pt reports drinking daily for his anxiety. Last drink PTA. Fall last night and abrasion to top of head. Denies seizure history, just gets anxious with withdrawals     Triage Assessment     Row Name 04/12/23 1040       Triage Assessment (Adult)    Airway WDL WDL       Respiratory WDL    Respiratory WDL WDL       Skin Circulation/Temperature WDL    Skin Circulation/Temperature WDL WDL       Cardiac WDL    Cardiac WDL WDL       Peripheral/Neurovascular WDL    Peripheral Neurovascular WDL WDL       Cognitive/Neuro/Behavioral WDL    Cognitive/Neuro/Behavioral WDL WDL       Sb Coma Scale    Best Eye Response 4-->(E4) spontaneous    Best Motor Response 6-->(M6) obeys commands    Best Verbal Response 5-->(V5) oriented    [place]can Coma Scale Score 15

## 2023-04-12 NOTE — PROGRESS NOTES
04/12/23 1522   Patient Belongings   Did you bring any home meds/supplements to the hospital?  No   Patient Belongings other (see comments)   Patient Belongings Put in Hospital Secure Location (Security or Locker, etc.) other (see comments)   Belongings Search Yes   Clothing Search Yes   Second Staff PARUL Edward   Comment Items placed in storage and sent to security per unit process     Med Room: wallet    Storage Bin: [employer]    Security #[id number]: 1 [place], 1 [place], 3 [employer] ins cards, 3 credit/debit cards, $71.00 CASH    A               Admission:  I am responsible for any personal items that are not sent to the safe or pharmacy.  [facility] is not responsible for loss, theft or damage of any property in my possession.    Signature:  _________________________________ Date: _______  Time: _____                                              Staff Signature:  ____________________________ Date: ________  Time: _____      2nd Staff person, if patient is unable/unwilling to sign:    Signature: ________________________________ Date: ________  Time: _____     Discharge:  [facility] has returned all of my personal belongings:    Signature: _________________________________ Date: ________  Time: _____                                          Staff Signature:  ____________________________ Date: ________  Time: _____

## 2023-04-12 NOTE — TELEPHONE ENCOUNTER
S: [facility] , Provider Bisi calling at 12:37 PM with clinical on a 63 year old/Male presenting for alcohol detox.     B: Pt presents for ETOH detox.   Currently reports drinking 1 quart for many months. Patient previously admitted for detox here in 11/2022. Patient did fall recently and hit head, head CT was negative, no medical concerns and received fluids and banana bag in the ED.   Patient reports last use was shortly before arrival  Pt CACHORRO: 0.314 10:57am 4/12/2023   Pt  denies hx of DT  Pt  denies hx of seizures. Last seizure: N/A  Pt endorsing the following symptoms of withdrawal: none  MSSA Score: none    Pt denies acute mental health or medical concerns.   Pt denies other drug use: None Amount/frequency: N/A    Does Pt have a detox care plan in [place]? no  Does pt present with specific needs, assistive devices, or exclusionary criteria? None  Is the patient ambulating, eating and drinking in the ED? yes    A: Pt meets criteria to be presented for IP detox admission. Patient is voluntary  COVID: Negative  Utox: Ordered, not yet collected  CMP: Abnormalities: Anion gap 21, glucose 124, AST 93, ALT 92  CBC: Abnormalities: RBC 4.3, Hemoglobin 12.4, Hematocrit 37.4, platelet 136     R: Patient cleared and ready for behavioral bed placement: Yes    Presenting for admission to 3A/CD    12:41 PM Intake paged Dr. June for presentation onto station 3A CD.     1:01 PM Intake received call from Dr. June that patient has been accepted for 3A CD.     1:08 PM Intake placed patient in queue and completed indicia. Intake called Vicki ZHOU charge RN regarding patient disposition. Charge on 3A will call for report and patient will admit after 2pm as Dr. June is at cap for patients today. Intake called [place] [facility] with update on patient disposition.

## 2023-04-12 NOTE — ED PROVIDER NOTES
"    [facility] EMERGENCY DEPARTMENT ([facility])     April 12, 2023     History     Chief Complaint   Patient presents with     Alcohol Problem     Fall     HPI  Durga Aguirre is a 63 year old male with a past medical history which includes alcohol dependence and withdrawal, YUSUF, depression, hypokalemia, HLD, HTN who presents to the Emergency Department seeking detox from alcohol as well as evaluation for a fall. Patient reports he has been drinking a quart of alcohol a day for \"a while\". Last drink was this morning. States that he drinks due to anxiety. States that he has a history of alcohol withdrawal, typically gets anxious shaky and sweaty with withdrawal. Denies history of seizure. States that he asked his sister for a ride here and she called the ambulance for him. Patient requests detox.    In addition, the patient appears to have a small abrasion on the left superior forehead with a small amount of swelling. He states that he had a fall last night, but is unsure what happened and doesn't know what he hit when he fell. He does not think he was unconscious after the fall last night, as he remembers holding a paper towel to his head at some point. No chest pain, abdominal pain, or neck or back pain.      Past Medical History  Past Medical History:   Diagnosis Date     Alcohol abuse      Anxiety and depression      Arthritis      Hyperlipidemia      Hypertension      Mass of left chest wall      Other spontaneous pneumothorax 1997     Past Surgical History:   Procedure Laterality Date     COLONOSCOPY       HC EXCISION PARTIAL TALUS OR CALCANEUS, BONE      fx calcaneus- 9 screws in foot     artificial saliva (BIOTENE MT) SOLN solution  citalopram (CELEXA) 20 MG tablet  gabapentin (NEURONTIN) 300 MG capsule  hydrochlorothiazide (HYDRODIURIL) 12.5 MG tablet  hydrOXYzine (ATARAX) 50 MG tablet  lisinopril (ZESTRIL) 40 MG tablet  magnesium oxide (MAG-OX) 400 MG tablet  melatonin 3 MG tablet  multivitamin " w/minerals (THERA-VIT-M) tablet  simvastatin (ZOCOR) 40 MG tablet  thiamine (B-1) 100 MG tablet  traZODone (DESYREL) 50 MG tablet      No Known Allergies  Family History  Family History   Problem Relation Age of Onset     No Known Problems Mother      Lymphoma Father      No Known Problems Maternal Grandmother      No Known Problems Maternal Grandfather      No Known Problems Paternal Grandmother      No Known Problems Paternal Grandfather      No Known Problems Sister      No Known Problems Son      No Known Problems Brother      No Known Problems Daughter      No Known Problems Other      Unknown/Adopted No family hx of      Depression No family hx of      Anxiety Disorder No family hx of      Schizophrenia No family hx of      Bipolar Disorder No family hx of      Suicide No family hx of      Substance Abuse No family hx of      Dementia No family hx of      [place] Disease No family hx of      Parkinsonism No family hx of      Autism Spectrum Disorder No family hx of      Intellectual Disability (Mental Retardation) No family hx of      Mental Illness No family hx of      Social History   Social History     Tobacco Use     Smoking status: Every Day     Packs/day: 0.25     Years: 20.00     Pack years: 5.00     Types: Cigarettes     Smokeless tobacco: Never     Tobacco comments:     2-3 cigarettes per day    Vaping Use     Vaping status: Never Used     Passive vaping exposure: Yes   Substance Use Topics     Alcohol use: Yes     Comment: daily use 1 L vodka     Drug use: Not Currently      Past medical history, past surgical history, medications, allergies, family history, and social history were reviewed with the patient. No additional pertinent items.      A medically appropriate review of systems was performed with pertinent positives and negatives noted in the HPI, and all other systems negative.    Physical Exam   BP: (!) 171/101  Pulse: 75  Temp: 98.3  F (36.8  C)  Resp: 16  SpO2: 96 %  Physical  Exam  Vitals and nursing note reviewed.   Constitutional:       General: He is not in acute distress.     Appearance: Normal appearance. He is not toxic-appearing.   HENT:      Head:        Nose: No congestion.   Eyes:      General: No scleral icterus.     Conjunctiva/sclera: Conjunctivae normal.   Cardiovascular:      Rate and Rhythm: Normal rate.   Pulmonary:      Effort: Pulmonary effort is normal. No respiratory distress.   Abdominal:      General: Abdomen is flat.      Palpations: Abdomen is soft.   Musculoskeletal:         General: No swelling.      Cervical back: Neck supple. Tenderness (He has diffuse tenderness, no point tenderness) present.   Lymphadenopathy:      Cervical: No cervical adenopathy.   Skin:     General: Skin is warm.      Capillary Refill: Capillary refill takes less than 2 seconds.      Findings: No rash.   Neurological:      General: No focal deficit present.      Mental Status: He is alert and oriented to person, place, and time.      GCS: GCS eye subscore is 4. GCS verbal subscore is 5. GCS motor subscore is 6.      Cranial Nerves: Cranial nerves 2-12 are intact.      Sensory: Sensation is intact.      Motor: Motor function is intact.   Psychiatric:         Attention and Perception: Attention normal.         Mood and Affect: Mood is anxious.         Speech: Speech is slurred (Consistent with markedly elevated alcohol level).         Behavior: Behavior is cooperative.         Thought Content: Thought content normal.           ED Course, Procedures, & Data     10:29 AM  The patient was seen and examined by Miguel Mathews MD in Room [facility].    Procedures       ED Course Selections:        EKG Interpretation:      Interpreted by Miguel Mathews MD  Time reviewed: 1050  Symptoms at time of EKG: Fall, intoxicated  Rhythm: normal sinus   Rate: Normal  Axis: Normal  Ectopy: none  Conduction: left bundle branch block (complete)  ST Segments/ T Waves: No ST-T wave changes and No acute ischemic  changes  Q Waves: nonspecific  Comparison to prior: Unchanged from 1/12/2023    Clinical Impression: Normal sinus rhythm with left bundle branch block, otherwise normal EKG                           Results for orders placed or performed during the hospital encounter of 04/12/23   Head CT w/o contrast     Status: None (Preliminary result)    Narrative    CT OF THE HEAD WITHOUT CONTRAST April 12, 2023 12:12 PM     HISTORY: Trauma, intoxicated.    TECHNIQUE: Axial CT images of the head from the skull base to the  vertex were acquired without IV contrast. Radiation dose for this scan  was reduced using automated exposure control, adjustment of the mA  and/or kV according to patient size, or iterative reconstruction  technique.    COMPARISON: None.    FINDINGS: The study is mildly limited by patient motion. The  ventricles and basal cisterns are within normal limits in  configuration. There is no midline shift. There are no extra-axial  fluid collections. Gray-white differentiation is well maintained.     No intracranial hemorrhage, mass or recent infarct.    The visualized paranasal sinuses are well-aerated. There is no  mastoiditis. There are no fractures of the visualized bones. There is  a small left forehead scalp hematoma.      Impression    IMPRESSION: Mildly limited study due to patient motion. Grossly normal  head CT demonstrating only a small left forehead scalp hematoma.         Asymptomatic COVID-19 Virus (Coronavirus) by PCR Nasopharyngeal     Status: Normal    Specimen: Nasopharyngeal; Swab   Result Value Ref Range    SARS CoV2 PCR Negative Negative    Narrative    Testing was performed using the Xpert Xpress SARS-CoV-2 Assay on the Cepheid Gene-Xpert Instrument Systems. Additional information about this Emergency Use Authorization (EUA) assay can be found via the Lab Guide. This test should be ordered for the detection of SARS-CoV-2 in individuals who meet SARS-CoV-2 clinical and/or epidemiological  criteria as well as from individuals without symptoms or other reasons to suspect COVID-19. Test performance for asymptomatic patients has only been established in anterior nasal swab specimens. This test is for in vitro diagnostic use under the FDA EUA for laboratories certified under CLIA to perform high complexity testing. This test has not been FDA cleared or approved. A negative result does not rule out the presence of PCR inhibitors in the specimen or target RNA concentration below the limit of detection for the assay. The possibility of a false negative should be considered if the patient's recent exposure or clinical presentation suggests COVID-19. This test was validated by the [facility]. This laboratory is certified under the Clinical Laboratory Improvement Amendments (CLIA) as qualified to perform high complexity laboratory testing.     Comprehensive metabolic panel     Status: Abnormal   Result Value Ref Range    Sodium 141 136 - 145 mmol/L    Potassium 4.2 3.4 - 5.3 mmol/L    Chloride 98 98 - 107 mmol/L    Carbon Dioxide (CO2) 22 22 - 29 mmol/L    Anion Gap 21 (H) 7 - 15 mmol/L    Urea Nitrogen 14.8 8.0 - 23.0 mg/dL    Creatinine 0.83 0.67 - 1.17 mg/dL    Calcium 8.9 8.8 - 10.2 mg/dL    Glucose 124 (H) 70 - 99 mg/dL    Alkaline Phosphatase 85 40 - 129 U/L    AST 93 (H) 10 - 50 U/L    ALT 92 (H) 10 - 50 U/L    Protein Total 6.7 6.4 - 8.3 g/dL    Albumin 4.4 3.5 - 5.2 g/dL    Bilirubin Total 0.3 <=1.2 mg/dL    GFR Estimate >90 >60 mL/min/1.73m2   Magnesium     Status: Normal   Result Value Ref Range    Magnesium 2.0 1.7 - 2.3 mg/dL   Troponin T, High Sensitivity     Status: Normal   Result Value Ref Range    Troponin T, High Sensitivity 22 <=22 ng/L   CBC with platelets and differential     Status: Abnormal   Result Value Ref Range    WBC Count 4.8 4.0 - 11.0 10e3/uL    RBC Count 4.30 (L) 4.40 - 5.90 10e6/uL    Hemoglobin 12.4 (L) 13.3 - 17.7 g/dL    Hematocrit 37.4  (L) 40.0 - 53.0 %    MCV 87 78 - 100 fL    MCH 28.8 26.5 - 33.0 pg    MCHC 33.2 31.5 - 36.5 g/dL    RDW 11.5 10.0 - 15.0 %    Platelet Count 136 (L) 150 - 450 10e3/uL    % Neutrophils 60 %    % Lymphocytes 26 %    % Monocytes 7 %    % Eosinophils 4 %    % Basophils 2 %    % Immature Granulocytes 1 %    NRBCs per 100 WBC 0 <1 /100    Absolute Neutrophils 2.9 1.6 - 8.3 10e3/uL    Absolute Lymphocytes 1.2 0.8 - 5.3 10e3/uL    Absolute Monocytes 0.4 0.0 - 1.3 10e3/uL    Absolute Eosinophils 0.2 0.0 - 0.7 10e3/uL    Absolute Basophils 0.1 0.0 - 0.2 10e3/uL    Absolute Immature Granulocytes 0.0 <=0.4 10e3/uL    Absolute NRBCs 0.0 10e3/uL   EKG 12-lead, tracing only     Status: None (Preliminary result)   Result Value Ref Range    Systolic Blood Pressure  mmHg    Diastolic Blood Pressure  mmHg    Ventricular Rate 64 BPM    Atrial Rate 64 BPM    TX Interval 178 ms    QRS Duration 158 ms     ms    QTc 482 ms    P Axis 49 degrees    R AXIS -5 degrees    T Axis 151 degrees    Interpretation ECG       Sinus rhythm  Left bundle branch block  Abnormal ECG     Alcohol breath test POCT     Status: Abnormal   Result Value Ref Range    Alcohol Breath Test 0.314 (A) 0.00 - 0.01   CBC with platelets differential     Status: Abnormal    Narrative    The following orders were created for panel order CBC with platelets differential.  Procedure                               Abnormality         Status                     ---------                               -----------         ------                     CBC with platelets and d...[[id number]]  Abnormal            Final result                 Please view results for these tests on the individual orders.     Medications   diazepam (VALIUM) tablet 5-20 mg (has no administration in time range)   dextrose 5% and 0.45% NaCl 1,000 mL with thiamine 100 mg, folic acid 1 mg infusion ( Intravenous $New Bag 4/12/23 9849)   multivitamin, therapeutic (THERA-VIT) tablet 1 tablet (1 tablet Oral  $Given 4/12/23 1132)   hydrOXYzine (ATARAX) tablet 25 mg (25 mg Oral $Given 4/12/23 1132)     Labs Ordered and Resulted from Time of ED Arrival to Time of ED Departure   COMPREHENSIVE METABOLIC PANEL - Abnormal       Result Value    Sodium 141      Potassium 4.2      Chloride 98      Carbon Dioxide (CO2) 22      Anion Gap 21 (*)     Urea Nitrogen 14.8      Creatinine 0.83      Calcium 8.9      Glucose 124 (*)     Alkaline Phosphatase 85      AST 93 (*)     ALT 92 (*)     Protein Total 6.7      Albumin 4.4      Bilirubin Total 0.3      GFR Estimate >90     CBC WITH PLATELETS AND DIFFERENTIAL - Abnormal    WBC Count 4.8      RBC Count 4.30 (*)     Hemoglobin 12.4 (*)     Hematocrit 37.4 (*)     MCV 87      MCH 28.8      MCHC 33.2      RDW 11.5      Platelet Count 136 (*)     % Neutrophils 60      % Lymphocytes 26      % Monocytes 7      % Eosinophils 4      % Basophils 2      % Immature Granulocytes 1      NRBCs per 100 WBC 0      Absolute Neutrophils 2.9      Absolute Lymphocytes 1.2      Absolute Monocytes 0.4      Absolute Eosinophils 0.2      Absolute Basophils 0.1      Absolute Immature Granulocytes 0.0      Absolute NRBCs 0.0     ALCOHOL BREATH TEST POCT - Abnormal    Alcohol Breath Test 0.314 (*)    COVID-19 VIRUS (CORONAVIRUS) BY PCR - Normal    SARS CoV2 PCR Negative     MAGNESIUM - Normal    Magnesium 2.0     TROPONIN T, HIGH SENSITIVITY - Normal    Troponin T, High Sensitivity 22       Head CT w/o contrast   Preliminary Result   IMPRESSION: Mildly limited study due to patient motion. Grossly normal   head CT demonstrating only a small left forehead scalp hematoma.               Cervical spine CT w/o contrast    (Results Pending)          Critical care was not performed.     Medical Decision Making  The patient's presentation was of high complexity (a chronic illness severe exacerbation, progression, or side effect of treatment).    The patient's evaluation involved:  review of external note(s) from 1  sources (Previous visit for alcoholism at [facility] January 2023, previous admitted our facility November 2022 for detox)  ordering and/or review of 3+ test(s) in this encounter (see separate area of note for details)    The patient's management necessitated moderate risk (prescription drug management including medications given in the ED) and high risk (a decision regarding hospitalization).      Assessment & Plan    A 63-year-old male with a history of alcohol dependence, who presents seeking detox from alcohol.  Reports he has been drinking up to a quart per day.  Had a mechanical fall with a minor head injury yesterday.  Today his breath alcohol is 0.314.  His labs show sequelae of chronic alcoholism.  He has a small scalp laceration and hematoma but no other signs of significant trauma, imaging of his head and C-spine are negative.  His EKG shows a left bundle branch block which has been present previously and he has no chest pain, his troponin is normal.  He received a banana bag and is being monitored with MSSA protocol but has not required Valium as yet due to his level of intoxication.  Patient appears to be an appropriate candidate for voluntary detox admission.    I have reviewed the nursing notes. I have reviewed the findings, diagnosis, plan and need for follow up with the patient.    New Prescriptions    No medications on file       Final diagnoses:   Alcohol dependence with intoxication with complication (H)   Fall, initial encounter     I, Deepti Mathews, am serving as a trained medical scribe to document services personally performed by Miguel Mathews MD, based on the provider's statements to me.   IMiguel MD, was physically present and have reviewed and verified the accuracy of this note documented by Deepti Mathews.    Miguel Mathews MD  [facility] EMERGENCY DEPARTMENT  4/12/2023     Miguel Mathews MD  04/12/23 5606

## 2023-04-12 NOTE — PLAN OF CARE
"  Problem: Alcohol Withdrawal  Goal: Alcohol Withdrawal Symptom Control  Outcome: Progressing  Goal: Optimal Neurologic Function  Outcome: Progressing  Goal: Readiness for Change Identified  Outcome: Progressing   Goal Outcome Evaluation:  Data: Patient admission to [facility] MI/CD for detoxification. Patient presented intoxicated. Pleasant and cordial during course of interview, however the interview proved exhausting for the patient. Writer assessed MSSA as 12. Valium 10 mg po administered per protocol. Writer administered other scheduled medications that included hypertensive medication and Gabapentin  BP (!) 162/117   Pulse 119   Temp 97.5  F (36.4  C) (Temporal)   Resp 16   Ht 1.778 m (5' 10\")   Wt 94 kg (207 lb 3.7 oz)   SpO2 93%   BMI 29.73 kg/m   MSSA 13.  Patient reports lives with his mother who is 91 years old \"she is sharp as a whip.\" Pt states has a sister named Cate. His sister is aware of patient's presence at the hospital. The sister is the person who notified EMS to take patient to the Emergency department. Pt reports has been drinking excessively of late. Pt states has increased use of Vodka to one liter a day for quite awhile. \"I drink straight out of the bottle.\" Pt states wants the help and wishes to attend treatment after detox. \"[facility].\"   Patient suzy has participated in programs at [facility] in 2000. [facility] states [facility] also [facility].   Patient experienced a fall at home yesterday. Pt states does not recall too much about it. CT scan was performed in the ED today. No trauma. An abrasion to forehead.   Withdrawal symptoms: tremor, Elevated vital signs, anxiety. Pt denies a/v hallucinations. Pt denies history of seizure activity. Patient denies suicidal ideation, pt denies SIB and HI. Pt denies a/v hallucinations.     Action: Development of a therapeutic relationship based on trust. Active listening, supportive " presence, validation, assessments, medication review and administration. Orientation to [facility]. Introductions to unit staff. Transferred care of patient to oncoming RN provided verbal report and progress of admission process.   Response: Pt verbalized understanding of admission process. Admission packet reviewed with the patient. As mentioned, interview process exhausting for the patient. Writer offered and administered ordered medications, Valium 10 and patient retired to bed. Fluids encouraged.

## 2023-04-12 NOTE — PLAN OF CARE
Goal Outcome Evaluation:  Plan of Care Reviewed With: patient  Overall Patient Progress: no change    Patient continues in alcohol detox.    He has been visible in the milieu for most of the evening.  He participated in group and attended the virtual AA meeting.    He ate 100% of his dinner. He reports drinking plenty of fluids. He reported one episode of emesis, zofran ordered and administered with relief.    He rated anxiety 4/10 and depression 4/10. He denied SI/SIB/HI.    He denied pain.    1600 Assessment  Vitals: /86  Pulse 115  Temp 97.4  F (36.3  C) (Oral)  Resp 16  SpO2 97%  MSSA: 9    1900 Assessment  Vitals: BP (!) 138/93  Pulse 117  Temp 98  F (36.7  C) (Oral)  Resp 16  SpO2 95%  MSSA: 10    2000 Assessment  Vitals: /84  Pulse 76  Temp 97.3  F (36.3  C) (Oral)  Resp 16  SpO2 95%  MSSA: 4    PRNs administered this shift: valium 5 mg, valium 10 mg, zofran 4 mg

## 2023-04-12 NOTE — ED NOTES
Bed: [facility]  Expected date: 4/12/23  Expected time: 10:12 AM  Means of arrival:   Comments:  Wendy 522 53M ETOH

## 2023-04-12 NOTE — PROGRESS NOTES
04/12/23 1800   Group Therapy Session   Group Attendance attended group session   Time Session Began 1645   Time Session Ended 1730   Total Time (minutes) 45   Total # Attendees 6   Group Type recreation   Group Topic Covered leisure exploration/use of leisure time   Group Session Detail TR leisure group   Patient Response/Contribution cooperative with task   Patient Participation Detail Pt attended the structured Therapeutic Recreation group, participating in a group activity. Pt participated in group discussion and leisure participation as a healthy outlet to gain self-esteem, manage behaviors, improve social skills, and decrease isolation.  Pt remained focused and engaged throughout group activity.  Pt participated in the group discussion about healthy outlets and participated in the group activity, contributing to the clues and descriptions for the game.

## 2023-04-13 ENCOUNTER — PATIENT OUTREACH (OUTPATIENT)
Dept: GERIATRIC MEDICINE | Facility: CLINIC | Age: 63
End: 2023-04-13
Payer: MEDICARE

## 2023-04-13 LAB
CHOLEST SERPL-MCNC: 247 MG/DL
GGT SERPL-CCNC: 176 U/L (ref 8–61)
HBA1C MFR BLD: 5.5 %
HDLC SERPL-MCNC: 67 MG/DL
LDLC SERPL CALC-MCNC: 139 MG/DL
NONHDLC SERPL-MCNC: 180 MG/DL
TRIGL SERPL-MCNC: 205 MG/DL
TSH SERPL DL<=0.005 MIU/L-ACNC: 1.11 UIU/ML (ref 0.3–4.2)

## 2023-04-13 PROCEDURE — HZ2ZZZZ DETOXIFICATION SERVICES FOR SUBSTANCE ABUSE TREATMENT: ICD-10-PCS | Performed by: PSYCHIATRY & NEUROLOGY

## 2023-04-13 PROCEDURE — 250N000013 HC RX MED GY IP 250 OP 250 PS 637: Performed by: PSYCHIATRY & NEUROLOGY

## 2023-04-13 PROCEDURE — 83036 HEMOGLOBIN GLYCOSYLATED A1C: CPT | Performed by: PSYCHIATRY & NEUROLOGY

## 2023-04-13 PROCEDURE — 250N000013 HC RX MED GY IP 250 OP 250 PS 637

## 2023-04-13 PROCEDURE — 99223 1ST HOSP IP/OBS HIGH 75: CPT | Mod: AI | Performed by: PSYCHIATRY & NEUROLOGY

## 2023-04-13 PROCEDURE — 82977 ASSAY OF GGT: CPT | Performed by: PSYCHIATRY & NEUROLOGY

## 2023-04-13 PROCEDURE — 80061 LIPID PANEL: CPT | Performed by: PSYCHIATRY & NEUROLOGY

## 2023-04-13 PROCEDURE — 99222 1ST HOSP IP/OBS MODERATE 55: CPT

## 2023-04-13 PROCEDURE — 128N000004 HC R&B CD ADULT

## 2023-04-13 PROCEDURE — 36415 COLL VENOUS BLD VENIPUNCTURE: CPT | Performed by: PSYCHIATRY & NEUROLOGY

## 2023-04-13 PROCEDURE — 84443 ASSAY THYROID STIM HORMONE: CPT | Performed by: PSYCHIATRY & NEUROLOGY

## 2023-04-13 RX ORDER — ACETAMINOPHEN 325 MG/1
325 TABLET ORAL EVERY 4 HOURS PRN
Status: DISCONTINUED | OUTPATIENT
Start: 2023-04-13 | End: 2023-04-14 | Stop reason: HOSPADM

## 2023-04-13 RX ADMIN — THIAMINE HCL TAB 100 MG 100 MG: 100 TAB at 08:49

## 2023-04-13 RX ADMIN — DIAZEPAM 10 MG: 5 TABLET ORAL at 12:48

## 2023-04-13 RX ADMIN — DIAZEPAM 10 MG: 5 TABLET ORAL at 09:04

## 2023-04-13 RX ADMIN — SIMVASTATIN 40 MG: 20 TABLET, FILM COATED ORAL at 21:30

## 2023-04-13 RX ADMIN — MULTIPLE VITAMINS W/ MINERALS TAB 1 TABLET: TAB at 08:49

## 2023-04-13 RX ADMIN — ATENOLOL 50 MG: 50 TABLET ORAL at 08:52

## 2023-04-13 RX ADMIN — MAGNESIUM OXIDE TAB 400 MG (241.3 MG ELEMENTAL MG) 400 MG: 400 (241.3 MG) TAB at 21:32

## 2023-04-13 RX ADMIN — MAGNESIUM OXIDE TAB 400 MG (241.3 MG ELEMENTAL MG) 400 MG: 400 (241.3 MG) TAB at 08:49

## 2023-04-13 RX ADMIN — HYDROCHLOROTHIAZIDE 12.5 MG: 12.5 TABLET ORAL at 08:48

## 2023-04-13 RX ADMIN — GABAPENTIN 300 MG: 300 CAPSULE ORAL at 08:49

## 2023-04-13 RX ADMIN — HYDROXYZINE HYDROCHLORIDE 50 MG: 25 TABLET, FILM COATED ORAL at 16:19

## 2023-04-13 RX ADMIN — HYDROXYZINE HYDROCHLORIDE 50 MG: 25 TABLET, FILM COATED ORAL at 08:52

## 2023-04-13 RX ADMIN — GABAPENTIN 300 MG: 300 CAPSULE ORAL at 21:30

## 2023-04-13 RX ADMIN — FOLIC ACID 1 MG: 1 TABLET ORAL at 08:49

## 2023-04-13 RX ADMIN — CITALOPRAM HYDROBROMIDE 20 MG: 20 TABLET ORAL at 08:49

## 2023-04-13 RX ADMIN — GABAPENTIN 300 MG: 300 CAPSULE ORAL at 13:02

## 2023-04-13 RX ADMIN — HYDROXYZINE HYDROCHLORIDE 25 MG: 25 TABLET, FILM COATED ORAL at 21:30

## 2023-04-13 RX ADMIN — LISINOPRIL 40 MG: 40 TABLET ORAL at 08:49

## 2023-04-13 RX ADMIN — DIAZEPAM 10 MG: 5 TABLET ORAL at 00:38

## 2023-04-13 RX ADMIN — TRAZODONE HYDROCHLORIDE 50 MG: 50 TABLET ORAL at 21:30

## 2023-04-13 ASSESSMENT — ACTIVITIES OF DAILY LIVING (ADL)
LAUNDRY: WITH SUPERVISION
ADLS_ACUITY_SCORE: 30
ORAL_HYGIENE: INDEPENDENT
HYGIENE/GROOMING: INDEPENDENT
DRESS: SCRUBS (BEHAVIORAL HEALTH)
ADLS_ACUITY_SCORE: 30

## 2023-04-13 NOTE — PLAN OF CARE
Problem: Adult Behavioral Health Plan of Care  Goal: Plan of Care Review  Outcome: Progressing     Problem: Alcohol Withdrawal  Goal: Alcohol Withdrawal Symptom Control  Outcome: Progressing   Goal Outcome Evaluation:       Pt is observed sleeping comfortably the majority of the shift. MSSA scores were 8 and 5 respectively. Treated with Diazepam with some relief. Pt slept for a total of 6.5 hours. No other concerns noted or verbalized. Will continue to monitor and treat as ordered.

## 2023-04-13 NOTE — PLAN OF CARE
"Goal Outcome Evaluation:    Plan of Care Reviewed With: patient Plan of Care Reviewed With: patient    Overall Patient Progress: improvingOverall Patient Progress: improving    Outcome Evaluation: Pt remains in alcohol withdrawal monitoring requiring medication to treat his withdrawal.    MSSA scores today are 14 and 8, 10mg po valium administered x 2. Pt is mildly tremulous and sweaty. Pt denies any current hallucinations but does report he was hallucinating \"last night in the dark, just shit moving around\" and \"little black spots\". During the morning assessment pt off on the day/date believing today to be Tuesday \"the 9th\". Pt aware in the afternoon today was Thursday the 13th. Pt reports a fair appetite. Denies nausea, no diarrhea reported.     Pt endorses anxiety  Rated 6 of 10 in severity. States has a lot of things to take care of at home. Denies depression stating \"I don't know what depression feels like\". Denies suicidal ideation plans or intent. Denies any history of suicide attempts.     Pt is on fall precautions, reports feeling \"pretty good\" during ambulation. No apparent mobility issues witnessed. Pt is slow and steady with walking.     States plan as to go [facility] after [facility] is complete. Updated pt that our team can help arrange the intake for him. He reports \"I've got their number at home\".     Pt does have a small scabbed over abrasion to his left temple. Denies any headache or visual disturbances.    Will continue to monitor and intervene as warranted.   "

## 2023-04-13 NOTE — DISCHARGE INSTRUCTIONS
Behavioral Discharge Planning and Instructions  THANK YOU FOR CHOOSING [address]  [phone]    Summary: You were admitted to [facility] on 4/12/2023 for detoxification from alcohol.  A medical exam was performed that included lab work. You have met with a  and opted to returning home and completing outpatient treatment on your own at [facility].  Please take care and make your recovery a daily priority, Durga!  It was a pleasure working with you and the entire treatment team here wishes you the very best in your recovery!     Recommendation:  Please seek CD treatment and assessment as needed. Please follow and any all recommendations as needed and requested. Below you will find the general number for [facility].     [facility]    [address] [place] [address]  [phone]    Disposition: Return Home     Main Diagnoses:  Per Mame June  303.90 (F10.20) Alcohol Use Disorder Severe   Alcohol use disorder, severe.   Major depressive disorder, moderate.  General anxiety disorder.    Major Treatments, Procedures and Findings:  You were treated for alcohol detoxification using valium administered based on the [facility] protocol. You did not complete a chemical dependency assessment. You had labs drawn and those results were reviewed with you. Please take a copy of your lab work with you to your next primary care provider appointment.    Symptoms to Report:  If you experience more anxiety, confusion, sleeplessness, deep sadness or thoughts of suicide, notify your treatment team or notify your primary care provider. IF ANY OF THE SYMPTOMS YOU ARE EXPERIENCING ARE A MEDICAL EMERGENCY CALL 911 IMMEDIATELY.     Lifestyle Adjustment:   Health Action Plan:  1.Create a daily schedule  2. Eat Healthy  3. Plan Enjoyable Sober Activities  4. Use Problem Solving Skills and Deal with Issues as they Arise.   5. Be Physically Active  6. Take your medications as prescribed  7. Get  enough restful sleep  8. Practice Relaxation  9. Spend time with Supportive People  10. No use of alcohol, illegal drugs or addictive medications other than what is currently prescribed.   11.AA, NA Sponsor are excellent resources for support  12. Explore how  you can utilize spirituality in your recovery                Facts about COVID19 at www.cdc.gov/COVID19 and www.[place].gov/covid19    Keeping hands clean is one of the most important steps we can take to avoid getting sick and spreading germs to others.  Please wash your hands frequently and lather with soap for at least 20 seconds!    Follow-up Appointment:   Appointment Date/Time: Monday April 17 th 2023   at 2:10 pm with  Primary Care Giver: Dr. Greer    Address:  [facility] in [place], [place]  Address: [address] [place] [address]  Map of [facility]    Phone Number: [phone]     Recovery apps for your phone to locate current in person and zoom recovery meetings  [place] - meeting milena  AA  - meeting milena  Meeting guide - meeting milena  Quick NA meeting - meeting milena  Edda- has various apps    Resources:  *due to covid-19 most AA/NA meetings are being held online however some are in-person or a hybrid combination please check online to verify*  Need Support Now? If you or someone you know is struggling or in crisis, help is available. Call or text 988 or chat 98CloudDock.org  AA meetings search for them at: https://aa-intergroup.org (worldwide meeting listings)  AA meetings for [place] area can be found online at: https://a[place].org (click local online meetings listings)  NA meetings for [place] area can be found online at: https://www.nam[place].org  (click find a meeting)  AA and NA Sponsors are excellent resources for support and you can find one at any support group meeting.   Alcoholics Anonymous (https://aa.org/): for information 24 hours/day  AA Intergroup  service office in [place] (http://www.aastpaul.org/) 219.804.2924  AA Intergroup service office in [facility]: [phone]. (http://www.aamin[place].org/)  Narcotics Anonymous (www.nam[place].org) (346) 541-2232  https://aafairviewriverside.org/meetings  SMART Recovery - self management for addiction recovery:  www.smartrecKingman Community Hospitaly.org  Pathways ~ A Health Crisis Resource & Support Center:  390.228.8965.  https://prescribetoprevent.org/patient-education/videos/  http://www.harmreduction.org  [facility] 221-343-0837  Support Group:  MAKAYLA/PATRICE and Sponsor/support.  National Stella on Mental Illness (www.[place].dionne.org): 961.259.1859 or 446-977-2776.  Alcoholics Anonymous (www.alcoholics-anonymous.org): Check your phone book for your local chapter.  Suicide Awareness Voices of Education (SAVE) (www.save.org): 943-676-IECA (8683)  National Suicide Prevention Line (www.mentalhealthmn.org): 189-858-JNYR (0616)  Mental Health Consumer/Survivor Network of [place] (www.mhcsn.net): 633.842.1365 or 859-846-8153  Mental Health Association of [place] (www.mentalhealth.org): 527.843.1145 or 959-099-6591   Substance Abuse and Mental Health Services (www.samhsa.gov)  [place] Opioid Prevention Coalition: www.opioidcoalition.org    [place] Recovery Connection (MRC)  [facility] connects people seeking recovery to resources that help foster and sustain long-term recovery.  Whether you are seeking resources for treatment, transportation, housing, job training, education, health care or other pathways to recovery, [facility] is a great place to start.  436.979.3388.  www.Pull.org    Great Pod casts for nutrition and wellness  Listen on Apple Podcasts  Dishing Up Nutrition   [employer]   Nutrition       Understand the connection between what you eat and how you feel. Hosted by licensed nutritionists and dietitians from [employer] we share practical, real-life solutions for  healthier living through nutrition.     General Medication Instructions:   See your medication sheet(s) for instructions.   Take all medications as prescribed.  Make no changes unless your primary care provider suggests them.   Go to all your primary care provider visits.  Be sure to have all your required lab tests. This way, your medicines can be refilled on time.  Do not use any forms of alcohol.    Please Note:  If you have any questions at anytime after you are discharged please call [facility] detox unit 3[facility] at [phone].  [facility], Behavioral Intake [phone]  Medical Records call [phone]  Outpatient Behavioral Intake call [phone]  LP+ Wait List/Bed Availability call [phone]    Please remember to take all of your behavioral discharge planning and lab paperwork to any follow up appointments, it contains your lab results, diagnosis, medication list and discharge recommendations.      THANK YOU FOR CHOOSING [facility]

## 2023-04-13 NOTE — PROGRESS NOTES
Writer spoke with patient regarding discharge planning and after care plans, patient declined any services from case management and stated he spoke with [facility] intake team before admitting into detox and plans to touch base with [facility] own his own when he is OOD and discharged. Writer expressed to patient if there is anything he needs feel free to reach out.

## 2023-04-13 NOTE — PROGRESS NOTES
[facility] Care Coordination Contact    [facility]  Ambulatory Care Coordination to Inpatient Care Management   Hand-In Communication    Date:  April 13, 2023  Name: Durga Aguirre is enrolled in [facility] Care Coordination program and I am the Lead Care Coordinator.  CC Contact Information:.   Payor Source: Payor: [facility] / Plan: [facility] PMAP / Product Type: HMO /   Current services in place:     Please see the CC Snaphot and Care Management Flowsheets for specific  details of this Durga Aguirre care plan.   Additional details/specific concerns r/t this admission:    No additional concerns at this time      I will follow this admission in Epic. Please feel free to contact me with questions or for further collaboration in discharge planning.    Varsha Stewart RN, BSN, PHN  [facility]  [phone]  Fax: [phone]

## 2023-04-13 NOTE — PLAN OF CARE
Behavioral Team Discussion: (4/13/2023)    Continued Stay Criteria/Rationale: Patient admitted for Alcohol  Use Disorder.  Plan: The following services will be provided to the patient; psychiatric assessment, medication management, therapeutic milieu, individual and group support, and skills groups.   Participants: 3A Provider: Dr. Mame June MD; 3A RN: Yaniv Arriaga, RN; 3A CM's: Lauro Lopez.  Summary/Recommendation: Providers will assess today for treatment recommendations, discharge planning, and aftercare plans. CM will meet with pt for discharge planning.   Medical/Physical: Patient reports a small abrasion on the left superior forehead. Patient denies any other medical or physical concerns at this time.   Precautions:   Behavioral Orders   Procedures     Code 1 - Restrict to Unit     Fall precautions     Routine Programming     As clinically indicated     Seizure precautions     Status 15     Every 15 minutes.     Withdrawal precautions     Rationale for change in precautions or plan: N/A  Progress: Initial.    ASAM Dimension Scale Ratings:  Dimension 1: 3 Client tolerates and ck with withdrawal discomfort poorly. Client has severe intoxication, such that the client endangers self or others, or intoxication has not abated with less intensive levels of services. Client displays severe signs and symptoms; or risk of severe, but manageable withdrawal; or withdrawal worsening despite detox at less intensive level.  Dimension 2: 1 Client tolerates and ck with physical discomfort and is able to get the services that the client needs.  Dimension 3: 2 Client has difficulty with impulse control and lacks coping skills. Client has thoughts of suicide or harm to others without means; however, the thoughts may interfere with participation in some treatment activities. Client has difficulty functioning in significant life areas. Client has moderate symptoms of emotional, behavioral, or cognitive problems.  Client is able to participate in most treatment activities.  Dimension 4: 3 Client displays inconsistent compliance, minimal awareness of either the client's addiction or mental disorder, and is minimally cooperative.  Dimension 5: 3 Client has poor recognition and understanding of relapse and recidivism issues and displays moderately high vulnerability for further substance use or mental health problems. Client has few coping skills and rarely applies coping skills.  Dimension 6: 3 Client is not engaged in structured, meaningful activity and the client's peers, family, significant other, and living environment are unsupportive, or there is significant criminal justice system involvement.

## 2023-04-13 NOTE — PROGRESS NOTES
[facility] Care Coordination Contact    TRANSITIONS OF CARE (DENISHA) LOG   DENISHA tasks should be completed by the CC within one (1) business day of notification of each transition. Follow up contact with member is required after return to their usual care setting.  Note:  If CC finds out about the transitions fifteen (15) days or more after the member has returned to their usual care setting, no DENISHA log is needed. However, the CC should check in with the member to discuss the transition process, any changes needed to the care plan and document it in a case note.    Member Name:  Durga Aguirre O Name:  Mita O/Health Plan Member ID#: [id number]   Product: MSC+ Care Coordinator Contact:  Varsha Stewart RN, PHN Agency/[facility]/Care System: [facility]   Transition Communication Actions from Care Management Contact   Transition #1   Notification Date: 4/12/23 Transition Date:   4/12/23 Transition From: Home     Is this the member s usual care setting?               yes Transition To: [facility]   Transition Type:  Unplanned  Reason for Admission/Comments:  Acute etoh intoxication  Contact member/responsible party to offer assistance with transition Date completed: NA    Notes from conversation with the member/responsible party, provider, discharging and receiving facility (as applicable):   Date 4/13/23:CC contacted Hospital /discharge planner via the [facility] Transitional Care Hand-In Process, with community care plan included.  CC reached out to NA regarding transition.  Member is acutely ill requiring hospitalization.  CC to follow course of hospitalization in epic.   Reviewed and update care plan as needed.  Notified community service providers and placed services None on hold as needed.  Transition log initiated.   PCP, Rohit Solitario, notified of hospitalization via EMR.       Shared CC contact info, care plan/services with receiving  setting--Date completed: 4/13/23   Name & Title of receiving setting contact: [facility]   Notified PCP of transition--Date completed:  4/12/23     via  EMR  Name of PCP: Rohit Solitario     Transition #2   Notification Date: 4/17/23 Transition Date:   4/14/23 Transition From: Hospital, [facility]     Is this the member s usual care setting?               no Transition To: Home   Transition Type:  Planned  Reason for Admission/Comments:  Etoh detox  Contact member/responsible party to offer assistance with transition Date completed: 4/17/23    Notes from conversation with the member/responsible party, provider, discharging and receiving facility (as applicable):   Date 4/17/23:CC contacted member and reviewed discharge summary.  Member has a follow-up appointment with PCP in 7 days: Yes: scheduled on 4/21/23   Member has had a change in condition: No  Home visit needed: No  Care plan reviewed and updated.  The following home based services None were resumed.  New referrals placed: No  Transition log completed.   PCP, Rohit Solitario, notified of transition back to home via EMR.       Shared CC contact info, care plan/services with receiving setting--Date completed: NA   Name & Title of receiving setting contact: NA   Notified PCP of transition--Date completed:  4/14/23     via  EMR  Name of PCP: Rohit Solitario      *RETURN TO USUAL CARE SETTING: *Complete tasks below when the member is discharging TO their usual care setting within one (1) business day of notification..      For situations where the Care Coordinator is notified of the discharge prior to the date of discharge, the Care Coordinator must follow up with the member or designated representative to confirm that discharge actually occurred and discuss required DENISHA tasks as outlined in the DENISHA Instructions.  (This includes situations where it may be a  new   usual care setting for the member. (i.e., a community member who decides upon permanent nursing home placement following hospitalization and rehab).    Discuss with Member/Responsible Party:    Check  Yes  - if the member, family member and/or SNF/facility staff manages the following:    If  No  provide explanation in the comments section.          Date completed: 4/17/23 Communicated with member or their designated representative about the following:  care transition process; about changes to the member s health status; plan of care updates; education about transitions and how to prevent unplanned transitions/readmissions    Four Pillars for Optimal Transition:    Check  Yes  - if the member, family member and/or SNF/facility staff manages the following:    If  No  provide explanation in the comments section.          [x]  Yes     []  No Does the member have a follow-up appointment scheduled with primary care or specialist? (Mental health hospitalizations--the appt. should be w/in 7 days)              For mental health hospitalizations:  []  Yes     [x]  No     Does the member have a follow-up appointment scheduled with a mental health practitioner within 7 days of discharge?  Call to psychiatry this morning for scheduling   [x]  Yes     []  No     Has a medication review been completed with member? If no, refer to PCP, home care nurse, MTM, pharmacist  [x]  Yes     []  No     Can the member manage their medications or is there a system in place to manage medications (e.g. home care set-up)?         [x]  Yes     []  No     Can the member verbalize warning signs and symptoms to watch for and how to respond?  [x]  Yes     []  No     Does the member have a copy of and understand their discharge instructions?  If no, assist to obtain copy of discharge instructions, review discharge instructions, and assist to contact PCP to discuss questions about their recent hospitalization.  [x]  Yes     []  No     Does the member  have adequate food, housing and transportation?  If no, add goal and discuss additional supports available to the member                                                                                                                                                                                 [x]  Yes     []  No     Is the member safe in their home?  If no, document needs and support provided                                                                                                                                                                          []  Yes     [x]  No     Are there any concerns of vulnerability, abuse, or neglect?  If yes, document concerns and actions taken by Care Coordinator as a mandated                                                                                                                                                                              [x]  Yes     []  No     Does the member use a Personal Health Care Record?  Check  Yes  if visit summary, discharge summary, and/or healthcare summary are being used as a PHR.                                                                                                                                                                                  [x]  Yes     []  No     Have you reviewed the discharge summary with the member? If  No  provide explanation in comments.  [x]  Yes     []  No     Have you updated the member s care plan/support plan? Add new diagnosis, medications, treatments, goals & interventions, as applicable. If No, provide explanation in comments.    Comments:           Notes from conversation with the member/responsible party, provider, discharging and receiving facility (as applicable): Member states is drinking again.  States he discharged from [facility] on Friday and his mother  on Saturday morning. This is very difficult.  They lived together.  Has already contacted his mental health provider for  scheduling. Sees Toy Vela at [facility].             Varsha Stewart RN, BSN, PHN  [facility]  [phone]  Fax: [phone]

## 2023-04-13 NOTE — H&P
"Durga Aguirre is a 63 year old male    Chief complaint alcohol    History of present illness  HPI  Durga Aguirre is a 63 year old male with a past medical history which includes alcohol dependence and withdrawal, YUSUF, depression, hypokalemia, HLD, HTN who presents to the Emergency Department seeking detox from alcohol as well as evaluation for a fall. Patient reports he has been drinking a quart of alcohol a day for \"a while\". Last drink was this morning. States that he drinks due to anxiety. States that he has a history of alcohol withdrawal, typically gets anxious shaky and sweaty with withdrawal. Denies history of seizure. States that he asked his sister for a ride here and she called the ambulance for him. Patient requests detox.     In addition, the patient appears to have a small abrasion on the left superior forehead with a small amount of swelling. He states that he had a fall last night, but is unsure what happened and doesn't know what he hit when he fell. He does not think he was unconscious after the fall last night, as he remembers holding a paper towel to his head at some point. No chest pain, abdominal pain, or neck or back pain.        During my interview with the patient  Patient reports he has been drinking for several months.    He started drinking alcohol at the age of 10.  He has 50 years of addiction to alcohol.     Currently reports drinking 1 quart for many months.    Patient has tolerance, withdrawal, progressive use, loss of control, spending more time and more amount than intended. Patient has made attempts to quit, is experiencing cravings, and reports negative consequences.           alcohol          USE DISORDER - CRITERIA  +admits to taking larger amounts than initially intended  +admits to unsuccessful efforts to cut down or control use  +admits to spending a great deal of time in activities necessary to obtain, use and recover from  +admits to cravings or a strong desire to use   + " "admits to failure to fulfill obligations at work, school or home  + admits to continued use despite negative consequences  + admits to giving up important activities to use  +admits to use in situations in which it is physically dangerous    Pt CACHORRO: 0.314 10:57am 4/12/2023   Pt  denies hx of DT  Pt  denies hx of seizures.    However last night patient felt he saw stuff crawling on the ceiling           Denies thoughts of suicide or harming others.      Denies auditory or visual hallucinations.     Patient smokes nicotine-smokes 2 cigarettes a day    Patient denied any gambling    Substance Age first use First became regular or problematic Most recent use   Alcohol         Cannabis none     Cocaine NONE       Stimulants NONE       Opioids NONE  Previous methadone therapy:  No  Previous buprenorphine therapy:  No  Previous naltrexone therapy: No     Sedatives NONE       Hallucinogens NONE       Inhalants NONE       Other         OTC drugs NONE       Nicotine               PSYCHIATRIC REVIEW OF SYSTEMS:         Psychiatric Review of Systems:   Depression:      Denies: depressed mood, suicidal ideation, decreased interest, changes in sleep, changes in appetite, guilt, hopelessness, helplessness, impaired concentration, decreased energy, irritability.  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        PTSD:   Denies: re-experiencing past trauma, nightmares, increased arousal, avoidance of traumatic stimuli, impaired function.  OCD: "   denies obsessions, patient has compulsions checking, counting symmetry, cleaning, skin picking.    ED:     Denies: restriction, binging, purging.                    PSYCHIATRIC HISTORY             Past court commitments: none  SIB /SUICIDE ATTEMPTS NONE  Psych Hosp :0  Outpatient Programs  0  Inpatient cd trt 3  Out pt cd trt 2  Detoxes 4-5  PAST PSYCH MED TRIALS          SOCIAL HISTORY                                                                             Patient is single  Patient has 1  children  He gets social security.  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  Patient's housing isThe patient lives with mother who is 90 years old.   Patient has the following stressors: money job, mothers age      Family History:   FAMILY HISTORY:   Family History   Problem Relation Age of Onset     No Known Problems Mother      Lymphoma Father      No Known Problems Maternal Grandmother      No Known Problems Maternal Grandfather      No Known Problems Paternal Grandmother      No Known Problems Paternal Grandfather      No Known Problems Sister      No Known Problems Son      No Known Problems Brother      No Known Problems Daughter      No Known Problems Other      Unknown/Adopted No family hx of      Depression No family hx of      Anxiety Disorder No family hx of      Schizophrenia No family hx of      Bipolar Disorder No family hx of      Suicide No family hx of      Substance Abuse No family hx of      Dementia No family hx of      Ranger Disease No family hx of      Parkinsonism No family hx of      Autism Spectrum Disorder No family hx of      Intellectual Disability (Mental Retardation) No family hx of      Mental Illness No family hx of      Family Mental Health History-  none    Substance Use Problems - present for his father endorses alcohol use disorder,             PTA Medications:     Medications Prior to Admission    Medication Sig Dispense Refill Last Dose     artificial saliva (BIOTENE MT) SOLN solution Swish and spit 1 mL (1 spray) in mouth every hour as needed for dry mouth 44.3 mL 0 Unknown     citalopram (CELEXA) 20 MG tablet TAKE 1 TABLET BY MOUTH ONE TIME DAILY 90 tablet 3 4/11/2023     gabapentin (NEURONTIN) 300 MG capsule Take 1 capsule (300 mg) by mouth 3 times daily 90 capsule 0      hydrochlorothiazide (HYDRODIURIL) 12.5 MG tablet Take 1 tablet (12.5 mg) by mouth daily 90 tablet 0      hydrOXYzine (ATARAX) 50 MG tablet Take 0.5-1 tablets (25-50 mg) by mouth 3 times daily as needed for anxiety 60 tablet 0      lisinopril (ZESTRIL) 40 MG tablet Take 1 tablet (40 mg) by mouth daily Hold for a systolic blood pressure of 110 30 tablet 0      magnesium oxide (MAG-OX) 400 MG tablet Take 1 tablet (400 mg) by mouth 2 times daily 60 tablet 0      melatonin 3 MG tablet Take 1 tablet (3 mg) by mouth nightly as needed for sleep 30 tablet 0      multivitamin w/minerals (THERA-VIT-M) tablet Take 1 tablet by mouth daily 90 tablet 2      simvastatin (ZOCOR) 40 MG tablet Take 1 tablet (40 mg) by mouth every evening 90 tablet 2      thiamine (B-1) 100 MG tablet TAKE 1 TABLET (100 MG) BY MOUTH DAILY 90 tablet 0      traZODone (DESYREL) 50 MG tablet Take 1 tablet (50 mg) by mouth nightly as needed for sleep 30 tablet 11           Allergies:   No Known Allergies       Labs:     Recent Results (from the past 48 hour(s))   EKG 12-lead, tracing only    Collection Time: 04/12/23 10:49 AM   Result Value Ref Range    Systolic Blood Pressure  mmHg    Diastolic Blood Pressure  mmHg    Ventricular Rate 64 BPM    Atrial Rate 64 BPM    PA Interval 178 ms    QRS Duration 158 ms     ms    QTc 482 ms    P Axis 49 degrees    R AXIS -5 degrees    T Axis 151 degrees    Interpretation ECG       Sinus rhythm  Left bundle branch block  Abnormal ECG  Unconfirmed report - interpretation of this ECG is computer generated - see medical record for final  interpretation    Confirmed by - EMERGENCY ROOM, PHYSICIAN ([id number]),  ELY LAMBERT ([id number]) on 4/12/2023 1:40:52 PM     Comprehensive metabolic panel    Collection Time: 04/12/23 10:54 AM   Result Value Ref Range    Sodium 141 136 - 145 mmol/L    Potassium 4.2 3.4 - 5.3 mmol/L    Chloride 98 98 - 107 mmol/L    Carbon Dioxide (CO2) 22 22 - 29 mmol/L    Anion Gap 21 (H) 7 - 15 mmol/L    Urea Nitrogen 14.8 8.0 - 23.0 mg/dL    Creatinine 0.83 0.67 - 1.17 mg/dL    Calcium 8.9 8.8 - 10.2 mg/dL    Glucose 124 (H) 70 - 99 mg/dL    Alkaline Phosphatase 85 40 - 129 U/L    AST 93 (H) 10 - 50 U/L    ALT 92 (H) 10 - 50 U/L    Protein Total 6.7 6.4 - 8.3 g/dL    Albumin 4.4 3.5 - 5.2 g/dL    Bilirubin Total 0.3 <=1.2 mg/dL    GFR Estimate >90 >60 mL/min/1.73m2   Magnesium    Collection Time: 04/12/23 10:54 AM   Result Value Ref Range    Magnesium 2.0 1.7 - 2.3 mg/dL   Troponin T, High Sensitivity    Collection Time: 04/12/23 10:54 AM   Result Value Ref Range    Troponin T, High Sensitivity 22 <=22 ng/L   CBC with platelets and differential    Collection Time: 04/12/23 10:54 AM   Result Value Ref Range    WBC Count 4.8 4.0 - 11.0 10e3/uL    RBC Count 4.30 (L) 4.40 - 5.90 10e6/uL    Hemoglobin 12.4 (L) 13.3 - 17.7 g/dL    Hematocrit 37.4 (L) 40.0 - 53.0 %    MCV 87 78 - 100 fL    MCH 28.8 26.5 - 33.0 pg    MCHC 33.2 31.5 - 36.5 g/dL    RDW 11.5 10.0 - 15.0 %    Platelet Count 136 (L) 150 - 450 10e3/uL    % Neutrophils 60 %    % Lymphocytes 26 %    % Monocytes 7 %    % Eosinophils 4 %    % Basophils 2 %    % Immature Granulocytes 1 %    NRBCs per 100 WBC 0 <1 /100    Absolute Neutrophils 2.9 1.6 - 8.3 10e3/uL    Absolute Lymphocytes 1.2 0.8 - 5.3 10e3/uL    Absolute Monocytes 0.4 0.0 - 1.3 10e3/uL    Absolute Eosinophils 0.2 0.0 - 0.7 10e3/uL    Absolute Basophils 0.1 0.0 - 0.2 10e3/uL    Absolute Immature Granulocytes 0.0 <=0.4 10e3/uL    Absolute NRBCs 0.0 10e3/uL   Asymptomatic COVID-19 Virus (Coronavirus) by PCR Nasopharyngeal  "   Collection Time: 04/12/23 10:55 AM    Specimen: Nasopharyngeal; Swab   Result Value Ref Range    SARS CoV2 PCR Negative Negative   Alcohol breath test POCT    Collection Time: 04/12/23 10:57 AM   Result Value Ref Range    Alcohol Breath Test 0.314 (A) 0.00 - 0.01   Drug abuse screen 1 urine (ED)    Collection Time: 04/12/23  1:45 PM   Result Value Ref Range    Amphetamines Urine Screen Negative Screen Negative    Barbituates Urine Screen Negative Screen Negative    Benzodiazepine Urine Screen Negative Screen Negative    Cannabinoids Urine Screen Negative Screen Negative    Cocaine Urine Screen Negative Screen Negative    Opiates Urine Screen Negative Screen Negative   GGT    Collection Time: 04/13/23  8:01 AM   Result Value Ref Range     (H) 8 - 61 U/L   Hemoglobin A1c    Collection Time: 04/13/23  8:01 AM   Result Value Ref Range    Hemoglobin A1C 5.5 <5.7 %   Lipid panel    Collection Time: 04/13/23  8:01 AM   Result Value Ref Range    Cholesterol 247 (H) <200 mg/dL    Triglycerides 205 (H) <150 mg/dL    Direct Measure HDL 67 >=40 mg/dL    LDL Cholesterol Calculated 139 (H) <=100 mg/dL    Non HDL Cholesterol 180 (H) <130 mg/dL   TSH with free T4 reflex and/or T3 as indicated    Collection Time: 04/13/23  8:01 AM   Result Value Ref Range    TSH 1.11 0.30 - 4.20 uIU/mL         /78   Pulse 58   Temp 97.7  F (36.5  C) (Temporal)   Resp 16   Ht 1.778 m (5' 10\")   Wt 94 kg (207 lb 3.7 oz)   SpO2 95%   BMI 29.73 kg/m    Weight is 207 lbs 3.72 oz  Body mass index is 29.73 kg/m .    Physical Exam:     ROS: 10 point ROS neg other than the symptoms noted above in the HPI.            Past Medical History:   PAST MEDICAL HISTORY:   Past Medical History:   Diagnosis Date     Alcohol abuse      Anxiety and depression      Arthritis      Hyperlipidemia      Hypertension      Mass of left chest wall      Other spontaneous pneumothorax 1997       PAST SURGICAL HISTORY:   Past Surgical History:   Procedure " Laterality Date     COLONOSCOPY       HC EXCISION PARTIAL TALUS OR CALCANEUS, BONE      fx calcaneus- 9 screws in foot       -    -           MENTAL STATUS EXAM:      Constitutional: General appearance of patient:  Appearance:  awake, alert, appeared as age stated, adequate groomed and slightly unkempt  Attitude:  cooperative  Eye Contact:  good  Mood:  down  Affect:  congruent   Speech:  clear, coherent normal rate   Psychomotor Behavior:  no evidence of tardive dyskinesia, dystonia, or tics  Thought Process:  logical, linear and goal oriented  Associations:  no loose associations  Thought Content:  no evidence of psychotic thought and active suicidal ideation present  Denied any active suicidal /homicidation ideation plan intent   Insight:  fair  Judgment:  fair  Oriented to:  time, person, and place  Attention Span and Concentration:  intact  Recent and Remote Memory:  intact  Language:  english with appropriate syntax and vocabulary  Fund of Knowledge: appropriate  Muscle Strength and Tone: normal  Gait and Station: Normal     There are no abnormal or psychotic thoughts, no preoccupations, no overvalued ideas, no rumination, no obsessions, no compulsions, no somatic concerns, no hypochrondriasis, no ideas of reference, and no delusions.  Patient denies homicidal thoughts.   Patient denies suicidal thoughts.  Patient appears to have good judgment and good insight.     Musculoskeletal: Patient shows no abnormalities of motor activity: there is no tremor, no tic, and no dystonia.  There is no apparent muscle atrophy, strength and tone appear normal, and there are no abnormal movements.  Patient has normal gait and stance.    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     Patient has been unable to stop using drugs in the community due to both physical and psychological symptoms.  Continued use will put the patient at risk for medical and/or psychiatric complications.      Inpatient psychiatric hospitalization is warranted at this time for safety, stabilization, and possible adjustment in medications.       Diagnoses:    1.  Alcohol use disorder, severe.  2.  Alcohol withdrawal, severe, complicated with hypertension.  3.  Major depressive disorder, moderate.  4.  General anxiety disorder.          Plan:   Problem list  1#1.  Alcohol use disorder, severe.  2.  Alcohol withdrawal, severe,     - MSSA protocol using Valium for management of alcohol withdrawal    - Continue thiamine, folate, and multivitamin daily    MSSA    Eating Disturbances: 3  Tremor: 2  Sleep Disturbance: slept through the night or not applicable  Clouding of Sensorium: no evidence  Hallucinations: 0 - none  Quality of Contact: 0 - awareness of examiner and people around him/her  Agitation: 0 - normal activity  Paroxysmal Sweats: 1 - barely perceptible sweating  Temperature: 99.5 or below  Pulse: 6 - 120 to 129  Total MSSA Score: 13  Received 10 mg of diazepam since morning since admission 55 mg  2#we will continue Celexa for his depression and anxiety    3#patient has GGT elevated 176 most likely from alcoholism nonviral suspected  4#patient has elevated liver enzymes AST 93 ALT 92 most likely from alcoholism nonviral suspected  5#patient has thrombocytopenia platelets are low at 136 most likely from alcoholism  Patient also has anemia hemoglobin is 12.2 hematocrit is 37.4 and RBC count is 4.30 we will put internal medicine consult      - Consider anti-craving medications prior to discharge. Pt willing to review additional information about both naltrexone and Antabuse.  -Alcohol withdrawal nausea prn Zofran as needed for nausea     hydroxyzine 25 mg q4h prn for acute  anxiety  Trazodone 50 mg at bedtime prn for sleep disturbances       Patient has been unable to stop using drugs in the community due to both physical and psychological symptoms.  Continued use will put the patient at risk for medical and/or psychiatric complications.    I HAVE REVIEWED LABS WITH PT AND TALKED ABOUT RESULTS WITH PT  I HAVE REVIEWED AND SUMMARIZED OLD RECORDS including his medication reconcilation of his home medications  and PDMP was reviewed  I HAVE SPOKEN WITH RN ABOUT MEDICATIONS AND DETOX SCORES  I HAVE SPOKEN WITH CM ABOUT PTS TREATMENT OPTIONS     Discussed in detail about patient's smoking patient was advised to quit patient was told about the impact of smoking.  Patient's willingness to quit was assessed.  I provided methods and skills for cessation including medication management nicotine gum patch.  Patient did not set a quit date.  Patient is interested in quitting .we discussed pharmacotherapy options .patient agreed to take nicotine gum patch lozenge.  We discussed behavioral change techniques when craving nicotine including deep breathing drinking glass of water, taking a walk.            Laboratory/Imaging:    Liver Function Studies -   Recent Labs   Lab Test 04/12/23  1054   PROTTOTAL 6.7   ALBUMIN 4.4   BILITOTAL 0.3   ALKPHOS 85   AST 93*   ALT 92*      Last Comprehensive Metabolic Panel:  Sodium   Date Value Ref Range Status   04/12/2023 141 136 - 145 mmol/L Final   03/12/2021 137 133 - 144 mmol/L Final     Potassium   Date Value Ref Range Status   04/12/2023 4.2 3.4 - 5.3 mmol/L Final   11/15/2022 3.6 3.4 - 5.3 mmol/L Final   03/12/2021 3.6 3.4 - 5.3 mmol/L Final     Chloride   Date Value Ref Range Status   04/12/2023 98 98 - 107 mmol/L Final   11/15/2022 107 94 - 109 mmol/L Final   03/12/2021 102 94 - 109 mmol/L Final     Carbon Dioxide   Date Value Ref Range Status   03/12/2021 23 20 - 32 mmol/L Final     Carbon Dioxide (CO2)   Date Value Ref Range Status   04/12/2023 22 22 -  29 mmol/L Final   11/15/2022 30 20 - 32 mmol/L Final     Anion Gap   Date Value Ref Range Status   04/12/2023 21 (H) 7 - 15 mmol/L Final   11/15/2022 2 (L) 3 - 14 mmol/L Final   03/12/2021 12 3 - 14 mmol/L Final     Glucose   Date Value Ref Range Status   04/12/2023 124 (H) 70 - 99 mg/dL Final   11/15/2022 147 (H) 70 - 99 mg/dL Final   03/12/2021 138 (H) 70 - 99 mg/dL Final     Urea Nitrogen   Date Value Ref Range Status   04/12/2023 14.8 8.0 - 23.0 mg/dL Final   11/15/2022 7 7 - 30 mg/dL Final   03/12/2021 23 7 - 30 mg/dL Final     Creatinine   Date Value Ref Range Status   04/12/2023 0.83 0.67 - 1.17 mg/dL Final   03/13/2021 1.07 0.66 - 1.25 mg/dL Final     GFR Estimate   Date Value Ref Range Status   04/12/2023 >90 >60 mL/min/1.73m2 Final     Comment:     eGFR calculated using 2021 CKD-EPI equation.   03/13/2021 73 >60 mL/min/[1.73_m2] Final     Comment:     Non  GFR Calc  Starting 12/18/2018, serum creatinine based estimated GFR (eGFR) will be   calculated using the Chronic Kidney Disease Epidemiology Collaboration   (CKD-EPI) equation.       Calcium   Date Value Ref Range Status   04/12/2023 8.9 8.8 - 10.2 mg/dL Final   03/12/2021 7.8 (L) 8.5 - 10.1 mg/dL Final     Bilirubin Total   Date Value Ref Range Status   04/12/2023 0.3 <=1.2 mg/dL Final   03/12/2021 0.8 0.2 - 1.3 mg/dL Final     Alkaline Phosphatase   Date Value Ref Range Status   04/12/2023 85 40 - 129 U/L Final   03/12/2021 126 40 - 150 U/L Final     ALT   Date Value Ref Range Status   04/12/2023 92 (H) 10 - 50 U/L Final   03/12/2021 131 (H) 0 - 70 U/L Final     AST   Date Value Ref Range Status   04/12/2023 93 (H) 10 - 50 U/L Final   03/12/2021 286 (H) 0 - 45 U/L Final                   Medical treatment/interventions:  Medical concerns: As above    - Consults: IM consult placed. Appreciate assistance.     Legal Status: Voluntary     Safety Assessment:   Checks: Status 15  Pt has not required locked seclusion or restraints in the past  "24 hours to maintain safety, please refer to RN documentation for further details.    The risks, benefits, alternatives and side effects have been discussed and are understood by the patient.       Patient will be treated in therapeutic milieu with appropriate individual and group therapies as described.      Clinical Global Impressions  First:7     Most recent:7     Disposition: Pending clinical stabilization. Pt does  appear interested in COMPLETE DETOX AND DO TRT  Length of stay 3-5 days    Attestation:  Patient has been seen and evaluated by me, Dr Mame June MD  The patient was counseled on nature of illness and treatment plan/options  Care was coordinated with treatment team          \"Much or all of the text in this note was generated through the use of Dragon Dictate voice to text software. Errors in spelling or words which appear to be out of contact are unintentional, may be present due having escaped editing\"     "

## 2023-04-13 NOTE — CONSULTS
[facility]  Consult Note - Hospitalist Service  Date of Admission:  4/12/2023  Consult Requested by: Dr. June  Reason for Consult: H&P, detox    Assessment & Plan   Durga Aguirre is a 63 year old male admitted on 4/12/2023 for alcohol withdrawal and fall. He has past medical history of HLD, HTN, depression, anxiety, arthritis, and alcohol use disorder.     Medicine will sign off as there are not acute issues.     Alcohol Use Disorder  Acute Alcohol Withdrawal  Transaminitis  Patient reports drinking a quart of alcohol per day for a while.  Last drink was morning on 4/12. No history of seizures or DT's.  Patient states that he gets anxious, shaky, and sweaty with withdrawal.  BAT of 0.314.Utox negative. Alk phos WNL.Total bili WNL. AST: ALT of 93: 92, in 2:1 pattern with alcohol use.   - Management per Psychiatry  - No need for BMP/hepatic panel unless clinically indicated   - Agree with MSSA protocol  - Agree with MVI, folate, thiamine     Anemia, Chronic  Thrombocytopenia, mild  See trends below. Plat 124, increased from prior readings. Suspect these lab findings are d/t alcohol consumption as above causing marrow suppression resulting in low WBC, Hgb, and Plts.  No s/sx of acute blood loss.   -Continue to monitor  -Recheck CBC OP    Hemoglobin   Date Value Ref Range Status   04/12/2023 12.4 (L) 13.3 - 17.7 g/dL Final   11/14/2022 10.3 (L) 13.3 - 17.7 g/dL Final   11/12/2022 12.4 (L) 13.3 - 17.7 g/dL Final   08/26/2022 11.4 (L) 13.3 - 17.7 g/dL Final   03/23/2022 14.4 13.3 - 17.7 g/dL Final   03/12/2021 13.5 13.3 - 17.7 g/dL Final   06/09/2020 15.3 13.3 - 17.7 g/dL Final   10/14/2019 15.1 13.3 - 17.7 g/dL Final     Depression  Anxiety  Insomnia  PTA on Celexa, Atarax, Trazodone  -Psychiatry managing     HTN  PTA meds include: Lisinopril and HCTZ. BP's while here have been elevated, but suspect this is likely d/t acute alcohol withdrawal  -Prior to admission  "lisinopril and hydrochlorothiazide    HLD, mixed  PTA on Simvastatin.See trends below.  -Continue PTA Statin  -Follow-up with PCP regarding elevated lipids, and recheck    Recent Labs   Lab Test 04/13/23  0801 11/13/22  0614   CHOL 247* 184   HDL 67 67   * 90   TRIG 205* 133       Fall  Abrasion on left superior forehead  Patient states that he possibly had a fall on 4/11, but is unsure what happened and does not know what he hit when he fell.  Patient does not think he was unconscious after the fall as he remembers holding a paper towel to his head at some point.  Appeared to have a small abrasion on the left superior forehead with a small amount of swelling in the ED.  Denies any neck or back pain, chest pain or abdominal pain. CT head and C-spine negative. Does not hurt anymore.   - Monitor  - Ice/heat  - PRN acetaminophen     Tobacco use disorder: Nicotine patch and gum/lozenges per psychiatry    HAGMA: 21 upon admission. Suspect d/t Etoh usage/poor PO intake.      The patient's care was discussed with the Bedside Nurse, Patient and Primary team.    Clinically Significant Risk Factors Present on Admission             # Anion Gap Metabolic Acidosis: Highest Anion Gap = 21 mmol/L in last 2 days, will monitor and treat as appropriate      # Hypertension: home medication list includes antihypertensive(s)      # Overweight: Estimated body mass index is 29.73 kg/m  as calculated from the following:    Height as of this encounter: 1.778 m (5' 10\").    Weight as of this encounter: 94 kg (207 lb 3.7 oz).           RONAL Driver [facility]  Hospitalist Service  Securely message with [employer] (more info)  Text page via [facility] Paging/Directory   ______________________________________________________________________    Chief Complaint   Cute alcohol withdrawal  Fall  Alcohol use disorder    History is obtained from the patient and per chart review.    History of Present Illness   Durga Aguirre is a 63 year old male who "  admitted on 4/12/2023 for alcohol withdrawal and fall. He has past medical history of HLD, HTN, depression, anxiety, arthritis, and alcohol use disorder.     Patient reports drinking a quart of alcohol per day for a while.  Last drink was morning on 4/12. No history of seizures or DT's.  Patient states that he gets anxious, shaky, and sweaty with withdrawal.    Patient states that he possibly had a fall on 4/11, but is unsure what happened and does not know what he hit when he fell.  Patient does not think he was unconscious after the fall as he remembers holding a paper towel to his head at some point. Today he was able to tell me that he hit his head on a chair when sleeping on the ground.  Appeared to have a small abrasion on the left superior forehead with a small amount of swelling in the ED.  Denies any neck or back pain, chest pain or abdominal pain. Denies any pain or swelling currently.     Upon interview and exam, pt calm. Denies pain in head. Denies any headaches, fevers, dysphagia, GERD s/s, SOB, chest pain, N/V, abdominal pain, dysuria, back pain or leg swelling.       Past Medical History    Past Medical History:   Diagnosis Date     Alcohol abuse      Anxiety and depression      Arthritis      Hyperlipidemia      Hypertension      Mass of left chest wall      Other spontaneous pneumothorax 1997       Past Surgical History   Past Surgical History:   Procedure Laterality Date     COLONOSCOPY       HC EXCISION PARTIAL TALUS OR CALCANEUS, BONE      fx calcaneus- 9 screws in foot       Medications   I have reviewed this patient's current medications  Medications Prior to Admission   Medication Sig Dispense Refill Last Dose     artificial saliva (BIOTENE MT) SOLN solution Swish and spit 1 mL (1 spray) in mouth every hour as needed for dry mouth 44.3 mL 0 Unknown     citalopram (CELEXA) 20 MG tablet TAKE 1 TABLET BY MOUTH ONE TIME DAILY 90 tablet 3 4/11/2023     gabapentin (NEURONTIN) 300 MG capsule Take 1  capsule (300 mg) by mouth 3 times daily 90 capsule 0      hydrochlorothiazide (HYDRODIURIL) 12.5 MG tablet Take 1 tablet (12.5 mg) by mouth daily 90 tablet 0      hydrOXYzine (ATARAX) 50 MG tablet Take 0.5-1 tablets (25-50 mg) by mouth 3 times daily as needed for anxiety 60 tablet 0      lisinopril (ZESTRIL) 40 MG tablet Take 1 tablet (40 mg) by mouth daily Hold for a systolic blood pressure of 110 30 tablet 0      magnesium oxide (MAG-OX) 400 MG tablet Take 1 tablet (400 mg) by mouth 2 times daily 60 tablet 0      melatonin 3 MG tablet Take 1 tablet (3 mg) by mouth nightly as needed for sleep 30 tablet 0      multivitamin w/minerals (THERA-VIT-M) tablet Take 1 tablet by mouth daily 90 tablet 2      simvastatin (ZOCOR) 40 MG tablet Take 1 tablet (40 mg) by mouth every evening 90 tablet 2      thiamine (B-1) 100 MG tablet TAKE 1 TABLET (100 MG) BY MOUTH DAILY 90 tablet 0      traZODone (DESYREL) 50 MG tablet Take 1 tablet (50 mg) by mouth nightly as needed for sleep 30 tablet 11           Review of Systems    The 10 point Review of Systems is negative other than noted in the HPI or here.     Social History   I have reviewed this patient's social history and updated it with pertinent information if needed.  Social History     Tobacco Use     Smoking status: Every Day     Packs/day: 0.25     Years: 20.00     Pack years: 5.00     Types: Cigarettes     Smokeless tobacco: Never     Tobacco comments:     2-3 cigarettes per day    Vaping Use     Vaping status: Never Used     Passive vaping exposure: Yes   Substance Use Topics     Alcohol use: Yes     Comment: daily use 1 L vodka     Drug use: Not Currently       Allergies   No Known Allergies     Physical Exam   Vital Signs: Temp: 98.4  F (36.9  C) Temp src: Oral BP: (!) 144/87 Pulse: 82   Resp: 16 SpO2: 92 % O2 Device: None (Room air)    Weight: 207 lbs 3.72 oz    General Appearance: In NAD, sitting in bed  HEENT: Small abrasion scabbed over on left front part of head, no  bruising noted  Respiratory: LS clear b/l, normal RR  Cardiovascular: S1, S2, no m/r/g  GI: BS+, all 4 quadrants, no masses, non-tender upon palpation  Skin: Intact on face, arms, legs. No wounds, bruising, or lesions noted.  Other: A&Ox4, moving all extremities      Medical Decision Making       45 MINUTES SPENT BY ME on the date of service doing chart review, history, exam, documentation & further activities per the note.      Data     I have personally reviewed the following data over the past 24 hrs:    4.8  \   12.4 (L)   / 136 (L)     141 98 14.8 /  124 (H)   4.2 22 0.83 \       ALT: 92 (H) AST: 93 (H) AP: 85 TBILI: 0.3   ALB: 4.4 TOT PROTEIN: 6.7 LIPASE: N/A       Trop: 22 BNP: N/A       TSH: 1.11 T4: N/A A1C: 5.5       Imaging results reviewed over the past 24 hrs:   Recent Results (from the past 24 hour(s))   Head CT w/o contrast    Narrative    CT OF THE HEAD WITHOUT CONTRAST April 12, 2023 12:12 PM     HISTORY: Trauma, intoxicated.    TECHNIQUE: Axial CT images of the head from the skull base to the  vertex were acquired without IV contrast. Radiation dose for this scan  was reduced using automated exposure control, adjustment of the mA  and/or kV according to patient size, or iterative reconstruction  technique.    COMPARISON: None.    FINDINGS: The study is mildly limited by patient motion. The  ventricles and basal cisterns are within normal limits in  configuration. There is no midline shift. There are no extra-axial  fluid collections. Gray-white differentiation is well maintained.     No intracranial hemorrhage, mass or recent infarct.    The visualized paranasal sinuses are well-aerated. There is no  mastoiditis. There are no fractures of the visualized bones. There is  a small left forehead scalp hematoma.      Impression    IMPRESSION: Mildly limited study due to patient motion. Grossly normal  head CT demonstrating only a small left forehead scalp hematoma.        MOLLY SALAZAR MD          SYSTEM ID:  [id number]   Cervical spine CT w/o contrast    Narrative    CT OF THE CERVICAL SPINE WITHOUT CONTRAST April 12, 2023 12:14 PM     HISTORY: Trauma, intoxicated,      TECHNIQUE: Axial images of the cervical spine were acquired without  intravenous contrast. Multiplanar reformations were created. Radiation  dose for this scan was reduced using automated exposure control,  adjustment of the mA and/or kV according to patient size, or iterative  reconstruction technique.    COMPARISON: None.    FINDINGS: There is normal alignment of the cervical vertebrae;  however, there is straightening of normal cervical lordosis. Vertebral  body heights of the cervical spine are normal. Craniocervical  alignment is normal. There is no evidence for fracture of the cervical  spine. No spinal canal stenosis. No prevertebral soft tissue swelling.  Loss of disc space height and degenerative endplate spurring at C4-C5,  C5-C6 and C6-C7.      MOLLY SALAZAR MD         SYSTEM ID:  [id number]

## 2023-04-13 NOTE — PLAN OF CARE
Goal Outcome Evaluation:  Plan of Care Reviewed With: patient  Overall Patient Progress: improving    Patient continues in alcohol detox.  He has received a total of 65 mg valium total; his last dose of valium was at 1245 this afternoon.    1600 Assessment  Vitals: /79  Pulse 63  Temp 97.8  F (36.6  C) (Temporal)  Resp 16  SpO2 96%   MSSA: 3    2000 Assessment  Vitals: /82  Pulse 66  Temp 97.2  F (36.2  C) (Temporal)  Resp 16  SpO2 96%  MSSA: 3    He has been visible in the milieu this evening. Pt pleasant with peers and staff; cooperative with cares.    He ate 100% of his dinner. He reports drinking plenty of fluids.    He rated anxiety 5/10 and depression 3/10. He denied SI/SIB/HI.    He denied pain.    PRNs administered this shift: hydroxyzine 50 mg

## 2023-04-14 VITALS
DIASTOLIC BLOOD PRESSURE: 79 MMHG | BODY MASS INDEX: 29.67 KG/M2 | RESPIRATION RATE: 16 BRPM | TEMPERATURE: 97.4 F | WEIGHT: 207.23 LBS | HEART RATE: 74 BPM | SYSTOLIC BLOOD PRESSURE: 115 MMHG | HEIGHT: 70 IN | OXYGEN SATURATION: 96 %

## 2023-04-14 PROCEDURE — 250N000013 HC RX MED GY IP 250 OP 250 PS 637: Performed by: PSYCHIATRY & NEUROLOGY

## 2023-04-14 PROCEDURE — 99239 HOSP IP/OBS DSCHRG MGMT >30: CPT | Performed by: PSYCHIATRY & NEUROLOGY

## 2023-04-14 PROCEDURE — 250N000013 HC RX MED GY IP 250 OP 250 PS 637

## 2023-04-14 RX ORDER — TRAZODONE HYDROCHLORIDE 50 MG/1
50 TABLET, FILM COATED ORAL
Qty: 30 TABLET | Refills: 0 | Status: SHIPPED | OUTPATIENT
Start: 2023-04-14 | End: 2023-04-14

## 2023-04-14 RX ORDER — HYDROCHLOROTHIAZIDE 12.5 MG/1
12.5 TABLET ORAL DAILY
Qty: 90 TABLET | Refills: 0 | Status: SHIPPED | OUTPATIENT
Start: 2023-04-14 | End: 2023-08-17

## 2023-04-14 RX ORDER — MAGNESIUM OXIDE 400 MG/1
400 TABLET ORAL 2 TIMES DAILY
Qty: 60 TABLET | Refills: 0 | Status: SHIPPED | OUTPATIENT
Start: 2023-04-14 | End: 2023-04-14

## 2023-04-14 RX ORDER — LISINOPRIL 40 MG/1
40 TABLET ORAL DAILY
Qty: 30 TABLET | Refills: 0 | Status: SHIPPED | OUTPATIENT
Start: 2023-04-14 | End: 2023-08-17

## 2023-04-14 RX ORDER — LANOLIN ALCOHOL/MO/W.PET/CERES
100 CREAM (GRAM) TOPICAL DAILY
Qty: 90 TABLET | Refills: 0 | Status: SHIPPED | OUTPATIENT
Start: 2023-04-14 | End: 2023-06-20

## 2023-04-14 RX ORDER — GABAPENTIN 300 MG/1
300 CAPSULE ORAL 3 TIMES DAILY
Qty: 90 CAPSULE | Refills: 0 | Status: SHIPPED | OUTPATIENT
Start: 2023-04-14 | End: 2023-04-14

## 2023-04-14 RX ORDER — SIMVASTATIN 40 MG
40 TABLET ORAL EVERY EVENING
Qty: 30 TABLET | Refills: 0 | Status: SHIPPED | OUTPATIENT
Start: 2023-04-14 | End: 2023-04-14

## 2023-04-14 RX ORDER — CITALOPRAM HYDROBROMIDE 20 MG/1
20 TABLET ORAL DAILY
Qty: 30 TABLET | Refills: 0 | Status: SHIPPED | OUTPATIENT
Start: 2023-04-14 | End: 2023-08-17

## 2023-04-14 RX ORDER — SIMVASTATIN 40 MG
40 TABLET ORAL EVERY EVENING
Qty: 30 TABLET | Refills: 0 | Status: SHIPPED | OUTPATIENT
Start: 2023-04-14 | End: 2023-08-17

## 2023-04-14 RX ORDER — HYDROCHLOROTHIAZIDE 12.5 MG/1
12.5 TABLET ORAL DAILY
Qty: 90 TABLET | Refills: 0 | Status: SHIPPED | OUTPATIENT
Start: 2023-04-14 | End: 2023-04-14

## 2023-04-14 RX ORDER — LANOLIN ALCOHOL/MO/W.PET/CERES
100 CREAM (GRAM) TOPICAL DAILY
Qty: 90 TABLET | Refills: 0 | Status: SHIPPED | OUTPATIENT
Start: 2023-04-14 | End: 2023-04-14

## 2023-04-14 RX ORDER — CITALOPRAM HYDROBROMIDE 20 MG/1
20 TABLET ORAL DAILY
Qty: 30 TABLET | Refills: 0 | Status: SHIPPED | OUTPATIENT
Start: 2023-04-14 | End: 2023-04-14

## 2023-04-14 RX ORDER — GABAPENTIN 300 MG/1
300 CAPSULE ORAL 3 TIMES DAILY
Qty: 90 CAPSULE | Refills: 0 | Status: SHIPPED | OUTPATIENT
Start: 2023-04-14 | End: 2023-05-23

## 2023-04-14 RX ORDER — MAGNESIUM OXIDE 400 MG/1
400 TABLET ORAL 2 TIMES DAILY
Qty: 60 TABLET | Refills: 0 | Status: SHIPPED | OUTPATIENT
Start: 2023-04-14 | End: 2023-06-20

## 2023-04-14 RX ORDER — LISINOPRIL 40 MG/1
40 TABLET ORAL DAILY
Qty: 30 TABLET | Refills: 0 | Status: SHIPPED | OUTPATIENT
Start: 2023-04-14 | End: 2023-04-14

## 2023-04-14 RX ORDER — TRAZODONE HYDROCHLORIDE 50 MG/1
50 TABLET, FILM COATED ORAL
Qty: 30 TABLET | Refills: 0 | Status: SHIPPED | OUTPATIENT
Start: 2023-04-14 | End: 2023-05-23

## 2023-04-14 RX ORDER — NICOTINE 21 MG/24HR
1 PATCH, TRANSDERMAL 24 HOURS TRANSDERMAL EVERY 24 HOURS
COMMUNITY
End: 2023-08-17

## 2023-04-14 RX ORDER — MULTIPLE VITAMINS W/ MINERALS TAB 9MG-400MCG
1 TAB ORAL DAILY
Qty: 90 TABLET | Refills: 0 | Status: SHIPPED | OUTPATIENT
Start: 2023-04-14

## 2023-04-14 RX ORDER — HYDROXYZINE HYDROCHLORIDE 50 MG/1
25-50 TABLET, FILM COATED ORAL 3 TIMES DAILY PRN
Qty: 60 TABLET | Refills: 0 | Status: SHIPPED | OUTPATIENT
Start: 2023-04-14 | End: 2023-04-14

## 2023-04-14 RX ORDER — NALTREXONE HYDROCHLORIDE 50 MG/1
50 TABLET, FILM COATED ORAL DAILY
COMMUNITY
End: 2023-04-21

## 2023-04-14 RX ORDER — PHENOL 1.4 %
10 AEROSOL, SPRAY (ML) MUCOUS MEMBRANE
COMMUNITY

## 2023-04-14 RX ORDER — MULTIPLE VITAMINS W/ MINERALS TAB 9MG-400MCG
1 TAB ORAL DAILY
Qty: 90 TABLET | Refills: 0 | Status: SHIPPED | OUTPATIENT
Start: 2023-04-14 | End: 2023-04-14

## 2023-04-14 RX ORDER — HYDROXYZINE HYDROCHLORIDE 50 MG/1
25-50 TABLET, FILM COATED ORAL 3 TIMES DAILY PRN
Qty: 60 TABLET | Refills: 0 | Status: SHIPPED | OUTPATIENT
Start: 2023-04-14 | End: 2023-06-05

## 2023-04-14 RX ADMIN — HYDROCHLOROTHIAZIDE 12.5 MG: 12.5 TABLET ORAL at 08:40

## 2023-04-14 RX ADMIN — MULTIPLE VITAMINS W/ MINERALS TAB 1 TABLET: TAB at 08:40

## 2023-04-14 RX ADMIN — GABAPENTIN 300 MG: 300 CAPSULE ORAL at 13:35

## 2023-04-14 RX ADMIN — GABAPENTIN 300 MG: 300 CAPSULE ORAL at 08:40

## 2023-04-14 RX ADMIN — CITALOPRAM HYDROBROMIDE 20 MG: 20 TABLET ORAL at 08:40

## 2023-04-14 RX ADMIN — HYDROXYZINE HYDROCHLORIDE 50 MG: 25 TABLET, FILM COATED ORAL at 08:40

## 2023-04-14 RX ADMIN — LISINOPRIL 40 MG: 40 TABLET ORAL at 08:41

## 2023-04-14 RX ADMIN — THIAMINE HCL TAB 100 MG 100 MG: 100 TAB at 08:41

## 2023-04-14 RX ADMIN — FOLIC ACID 1 MG: 1 TABLET ORAL at 08:41

## 2023-04-14 RX ADMIN — MAGNESIUM OXIDE TAB 400 MG (241.3 MG ELEMENTAL MG) 400 MG: 400 (241.3 MG) TAB at 08:42

## 2023-04-14 ASSESSMENT — ACTIVITIES OF DAILY LIVING (ADL)
ADLS_ACUITY_SCORE: 30
LAUNDRY: WITH SUPERVISION
ADLS_ACUITY_SCORE: 30
ADLS_ACUITY_SCORE: 30
ORAL_HYGIENE: INDEPENDENT
DRESS: SCRUBS (BEHAVIORAL HEALTH);INDEPENDENT
ADLS_ACUITY_SCORE: 30
HYGIENE/GROOMING: INDEPENDENT

## 2023-04-14 NOTE — PHARMACY-ADMISSION MEDICATION HISTORY
Pharmacist Admission Medication History    Admission medication history is complete. The information provided in this note is only as accurate as the sources available at the time of the update.    Medication reconciliation/reorder completed by provider prior to medication history? Yes    Information Source(s): Patient via in-person    Pertinent Information: none    Changes made to PTA medication list:    Added: naltrexone, nicotine patch/gum    Deleted: biotene spray PRN, gabapentin 300 mg TID     Changed: None    Allergies reviewed with patient and updates made in EHR: yes    Medication History Completed By: JEROME CAT [facility] 4/14/2023 7:49 AM    PTA Med List   Medication Sig Last Dose     citalopram (CELEXA) 20 MG tablet TAKE 1 TABLET BY MOUTH ONE TIME DAILY Past Week     hydrochlorothiazide (HYDRODIURIL) 12.5 MG tablet Take 1 tablet (12.5 mg) by mouth daily Past Week     hydrOXYzine (ATARAX) 50 MG tablet Take 0.5-1 tablets (25-50 mg) by mouth 3 times daily as needed for anxiety Past Week     lisinopril (ZESTRIL) 40 MG tablet Take 1 tablet (40 mg) by mouth daily Hold for a systolic blood pressure of 110 Past Week     magnesium oxide (MAG-OX) 400 MG tablet Take 1 tablet (400 mg) by mouth 2 times daily Past Week     Melatonin 10 MG TABS tablet Take 10 mg by mouth nightly as needed for sleep Past Week     multivitamin w/minerals (THERA-VIT-M) tablet Take 1 tablet by mouth daily Past Week     naltrexone (DEPADE/REVIA) 50 MG tablet Take 50 mg by mouth daily Past Month     nicotine (NICODERM CQ) 14 MG/24HR 24 hr patch Place 1 patch onto the skin every 24 hours Past Month     nicotine (NICORETTE) 2 MG gum Place 2 mg inside cheek every hour as needed for smoking cessation Past Month     simvastatin (ZOCOR) 40 MG tablet Take 1 tablet (40 mg) by mouth every evening Past Week     thiamine (B-1) 100 MG tablet TAKE 1 TABLET (100 MG) BY MOUTH DAILY Past Week     traZODone (DESYREL) 50 MG tablet Take 1 tablet (50 mg) by mouth  nightly as needed for sleep Past Week

## 2023-04-14 NOTE — DISCHARGE SUMMARY
Durga Aguirre MRN# [id number]   Age: 63 year old YOB: 1960     Date of Admission:  4/12/2023  Date of Discharge:  4/14/2023  Admitting Physician:  Mame June MD  Discharge Physician:  Mame June MD      DISCHARGE  DX    1.  Alcohol use disorder, severe.    3.  Major depressive disorder, moderate.  4.  General anxiety disorder.         Event Leading to Hospitalization:     See Admission note by admitting provider for patient encounter. for additional details.          Hospital Course:   PATIENT was admitted to [facility] attending  under DR june, please review the detailed admit note on 4/13/23   The patient was placed under status 15 (15 minute checks) to ensure patient safety.   MSSA protocol was initiated due to the patient's history of alcohol abuse and concern for withdrawal symptoms.  CBC, BMP and utox obtained.    All outpatient medications were continued    PATIENTdid participate in groups and was visible in the milieu.     The patient's symptoms of withdrawal improved.     Patients energy motivation , sleep appetite improved.  Pt completed detox . It was un eventful.      Discussed with patient medications for craving.  Pt is willing to take  naltrexone    Spoke with patient about triggers coping skills relapse prevention.    CONSULTS DONE DURING PATIENTS HOSPITALIZATION.  Patient was seen by medicine on date4/13/23    This as per their medical consult    Assessment & Plan  Durga Aguirre is a 63 year old male admitted on 4/12/2023 for alcohol withdrawal and fall. He has past medical history of HLD, HTN, depression, anxiety, arthritis, and alcohol use disorder.      Medicine will sign off as there are not acute issues.      Alcohol Use Disorder  Acute Alcohol Withdrawal  Transaminitis  Patient reports drinking a quart of alcohol per day for a while.  Last drink was morning on 4/12. No history of seizures or DT's.  Patient states that he gets anxious, shaky, and  sweaty with withdrawal.  BAT of 0.314.Utox negative. Alk phos WNL.Total bili WNL. AST: ALT of 93: 92, in 2:1 pattern with alcohol use.   - Management per Psychiatry  - No need for BMP/hepatic panel unless clinically indicated   - Agree with [facility] protocol  - Agree with MVI, folate, thiamine      Anemia, Chronic  Thrombocytopenia, mild  See trends below. Plat 124, increased from prior readings. Suspect these lab findings are d/t alcohol consumption as above causing marrow suppression resulting in low WBC, Hgb, and Plts.  No s/sx of acute blood loss.   -Continue to monitor  -Recheck CBC OP           Hemoglobin   Date Value Ref Range Status   04/12/2023 12.4 (L) 13.3 - 17.7 g/dL Final   11/14/2022 10.3 (L) 13.3 - 17.7 g/dL Final   11/12/2022 12.4 (L) 13.3 - 17.7 g/dL Final   08/26/2022 11.4 (L) 13.3 - 17.7 g/dL Final   03/23/2022 14.4 13.3 - 17.7 g/dL Final   03/12/2021 13.5 13.3 - 17.7 g/dL Final   06/09/2020 15.3 13.3 - 17.7 g/dL Final   10/14/2019 15.1 13.3 - 17.7 g/dL Final      Depression  Anxiety  Insomnia  PTA on Celexa, Atarax, Trazodone  -Psychiatry managing     HTN  PTA meds include: Lisinopril and HCTZ. BP's while here have been elevated, but suspect this is likely d/t acute alcohol withdrawal  -Prior to admission lisinopril and hydrochlorothiazide     HLD, mixed  PTA on Simvastatin.See trends below.  -Continue PTA Statin  -Follow-up with PCP regarding elevated lipids, and recheck          Recent Labs   Lab Test 04/13/23  0801 11/13/22  0614   CHOL 247* 184   HDL 67 67   * 90   TRIG 205* 133         Fall  Abrasion on left superior forehead  Patient states that he possibly had a fall on 4/11, but is unsure what happened and does not know what he hit when he fell.  Patient does not think he was unconscious after the fall as he remembers holding a paper towel to his head at some point.  Appeared to have a small abrasion on the left superior forehead with a small amount of swelling in the ED.  Denies any  neck or back pain, chest pain or abdominal pain. CT head and C-spine negative. Does not hurt anymore.   - Monitor  - Ice/heat  - PRN acetaminophen      Tobacco use disorder: Nicotine patch and gum/lozenges per psychiatry     HAGMA: 21 upon admission. Suspect d/t Etoh usage/poor PO intake.                     Pt was seen by cm  As per recommendations from cm    Writer spoke with patient regarding discharge planning and after care plans, patient declined any services from case management and stated he spoke with [facility] intake team before admitting into detox and plans to touch base with [facility] own his own when he is OOD and discharged. Writer expressed to patient if there is anything he needs feel free to reach out.          Labs:reviewed with patient       Recent Results (from the past 48 hour(s))   EKG 12-lead, tracing only    Collection Time: 04/12/23 10:49 AM   Result Value Ref Range    Systolic Blood Pressure  mmHg    Diastolic Blood Pressure  mmHg    Ventricular Rate 64 BPM    Atrial Rate 64 BPM    NJ Interval 178 ms    QRS Duration 158 ms     ms    QTc 482 ms    P Axis 49 degrees    R AXIS -5 degrees    T Axis 151 degrees    Interpretation ECG       Sinus rhythm  Left bundle branch block  Abnormal ECG  Unconfirmed report - interpretation of this ECG is computer generated - see medical record for final interpretation    Confirmed by - EMERGENCY ROOM, PHYSICIAN ([id number]),  ELY LAMBERT ([id number]) on 4/12/2023 1:40:52 PM     Comprehensive metabolic panel    Collection Time: 04/12/23 10:54 AM   Result Value Ref Range    Sodium 141 136 - 145 mmol/L    Potassium 4.2 3.4 - 5.3 mmol/L    Chloride 98 98 - 107 mmol/L    Carbon Dioxide (CO2) 22 22 - 29 mmol/L    Anion Gap 21 (H) 7 - 15 mmol/L    Urea Nitrogen 14.8 8.0 - 23.0 mg/dL    Creatinine 0.83 0.67 - 1.17 mg/dL    Calcium 8.9 8.8 - 10.2 mg/dL    Glucose 124 (H) 70 - 99 mg/dL    Alkaline Phosphatase 85 40 - 129 U/L    AST 93 (H) 10 - 50 U/L     ALT 92 (H) 10 - 50 U/L    Protein Total 6.7 6.4 - 8.3 g/dL    Albumin 4.4 3.5 - 5.2 g/dL    Bilirubin Total 0.3 <=1.2 mg/dL    GFR Estimate >90 >60 mL/min/1.73m2   Magnesium    Collection Time: 04/12/23 10:54 AM   Result Value Ref Range    Magnesium 2.0 1.7 - 2.3 mg/dL   Troponin T, High Sensitivity    Collection Time: 04/12/23 10:54 AM   Result Value Ref Range    Troponin T, High Sensitivity 22 <=22 ng/L   CBC with platelets and differential    Collection Time: 04/12/23 10:54 AM   Result Value Ref Range    WBC Count 4.8 4.0 - 11.0 10e3/uL    RBC Count 4.30 (L) 4.40 - 5.90 10e6/uL    Hemoglobin 12.4 (L) 13.3 - 17.7 g/dL    Hematocrit 37.4 (L) 40.0 - 53.0 %    MCV 87 78 - 100 fL    MCH 28.8 26.5 - 33.0 pg    MCHC 33.2 31.5 - 36.5 g/dL    RDW 11.5 10.0 - 15.0 %    Platelet Count 136 (L) 150 - 450 10e3/uL    % Neutrophils 60 %    % Lymphocytes 26 %    % Monocytes 7 %    % Eosinophils 4 %    % Basophils 2 %    % Immature Granulocytes 1 %    NRBCs per 100 WBC 0 <1 /100    Absolute Neutrophils 2.9 1.6 - 8.3 10e3/uL    Absolute Lymphocytes 1.2 0.8 - 5.3 10e3/uL    Absolute Monocytes 0.4 0.0 - 1.3 10e3/uL    Absolute Eosinophils 0.2 0.0 - 0.7 10e3/uL    Absolute Basophils 0.1 0.0 - 0.2 10e3/uL    Absolute Immature Granulocytes 0.0 <=0.4 10e3/uL    Absolute NRBCs 0.0 10e3/uL   Asymptomatic COVID-19 Virus (Coronavirus) by PCR Nasopharyngeal    Collection Time: 04/12/23 10:55 AM    Specimen: Nasopharyngeal; Swab   Result Value Ref Range    SARS CoV2 PCR Negative Negative   Alcohol breath test POCT    Collection Time: 04/12/23 10:57 AM   Result Value Ref Range    Alcohol Breath Test 0.314 (A) 0.00 - 0.01   Drug abuse screen 1 urine (ED)    Collection Time: 04/12/23  1:45 PM   Result Value Ref Range    Amphetamines Urine Screen Negative Screen Negative    Barbituates Urine Screen Negative Screen Negative    Benzodiazepine Urine Screen Negative Screen Negative    Cannabinoids Urine Screen Negative Screen Negative    Cocaine Urine  Screen Negative Screen Negative    Opiates Urine Screen Negative Screen Negative   GGT    Collection Time: 04/13/23  8:01 AM   Result Value Ref Range     (H) 8 - 61 U/L   Hemoglobin A1c    Collection Time: 04/13/23  8:01 AM   Result Value Ref Range    Hemoglobin A1C 5.5 <5.7 %   Lipid panel    Collection Time: 04/13/23  8:01 AM   Result Value Ref Range    Cholesterol 247 (H) <200 mg/dL    Triglycerides 205 (H) <150 mg/dL    Direct Measure HDL 67 >=40 mg/dL    LDL Cholesterol Calculated 139 (H) <=100 mg/dL    Non HDL Cholesterol 180 (H) <130 mg/dL   TSH with free T4 reflex and/or T3 as indicated    Collection Time: 04/13/23  8:01 AM   Result Value Ref Range    TSH 1.11 0.30 - 4.20 uIU/mL         Recent Results (from the past 240 hour(s))   EKG 12-lead, tracing only    Collection Time: 04/12/23 10:49 AM   Result Value Ref Range    Systolic Blood Pressure  mmHg    Diastolic Blood Pressure  mmHg    Ventricular Rate 64 BPM    Atrial Rate 64 BPM    CA Interval 178 ms    QRS Duration 158 ms     ms    QTc 482 ms    P Axis 49 degrees    R AXIS -5 degrees    T Axis 151 degrees    Interpretation ECG       Sinus rhythm  Left bundle branch block  Abnormal ECG  Unconfirmed report - interpretation of this ECG is computer generated - see medical record for final interpretation    Confirmed by - EMERGENCY ROOM, PHYSICIAN ([id number]),  ELY LAMBERT ([id number]) on 4/12/2023 1:40:52 PM     Comprehensive metabolic panel    Collection Time: 04/12/23 10:54 AM   Result Value Ref Range    Sodium 141 136 - 145 mmol/L    Potassium 4.2 3.4 - 5.3 mmol/L    Chloride 98 98 - 107 mmol/L    Carbon Dioxide (CO2) 22 22 - 29 mmol/L    Anion Gap 21 (H) 7 - 15 mmol/L    Urea Nitrogen 14.8 8.0 - 23.0 mg/dL    Creatinine 0.83 0.67 - 1.17 mg/dL    Calcium 8.9 8.8 - 10.2 mg/dL    Glucose 124 (H) 70 - 99 mg/dL    Alkaline Phosphatase 85 40 - 129 U/L    AST 93 (H) 10 - 50 U/L    ALT 92 (H) 10 - 50 U/L    Protein Total 6.7 6.4 - 8.3 g/dL    Albumin  4.4 3.5 - 5.2 g/dL    Bilirubin Total 0.3 <=1.2 mg/dL    GFR Estimate >90 >60 mL/min/1.73m2   Magnesium    Collection Time: 04/12/23 10:54 AM   Result Value Ref Range    Magnesium 2.0 1.7 - 2.3 mg/dL   Troponin T, High Sensitivity    Collection Time: 04/12/23 10:54 AM   Result Value Ref Range    Troponin T, High Sensitivity 22 <=22 ng/L   CBC with platelets and differential    Collection Time: 04/12/23 10:54 AM   Result Value Ref Range    WBC Count 4.8 4.0 - 11.0 10e3/uL    RBC Count 4.30 (L) 4.40 - 5.90 10e6/uL    Hemoglobin 12.4 (L) 13.3 - 17.7 g/dL    Hematocrit 37.4 (L) 40.0 - 53.0 %    MCV 87 78 - 100 fL    MCH 28.8 26.5 - 33.0 pg    MCHC 33.2 31.5 - 36.5 g/dL    RDW 11.5 10.0 - 15.0 %    Platelet Count 136 (L) 150 - 450 10e3/uL    % Neutrophils 60 %    % Lymphocytes 26 %    % Monocytes 7 %    % Eosinophils 4 %    % Basophils 2 %    % Immature Granulocytes 1 %    NRBCs per 100 WBC 0 <1 /100    Absolute Neutrophils 2.9 1.6 - 8.3 10e3/uL    Absolute Lymphocytes 1.2 0.8 - 5.3 10e3/uL    Absolute Monocytes 0.4 0.0 - 1.3 10e3/uL    Absolute Eosinophils 0.2 0.0 - 0.7 10e3/uL    Absolute Basophils 0.1 0.0 - 0.2 10e3/uL    Absolute Immature Granulocytes 0.0 <=0.4 10e3/uL    Absolute NRBCs 0.0 10e3/uL   Asymptomatic COVID-19 Virus (Coronavirus) by PCR Nasopharyngeal    Collection Time: 04/12/23 10:55 AM    Specimen: Nasopharyngeal; Swab   Result Value Ref Range    SARS CoV2 PCR Negative Negative   Alcohol breath test POCT    Collection Time: 04/12/23 10:57 AM   Result Value Ref Range    Alcohol Breath Test 0.314 (A) 0.00 - 0.01   Drug abuse screen 1 urine (ED)    Collection Time: 04/12/23  1:45 PM   Result Value Ref Range    Amphetamines Urine Screen Negative Screen Negative    Barbituates Urine Screen Negative Screen Negative    Benzodiazepine Urine Screen Negative Screen Negative    Cannabinoids Urine Screen Negative Screen Negative    Cocaine Urine Screen Negative Screen Negative    Opiates Urine Screen Negative  Screen Negative   GGT    Collection Time: 04/13/23  8:01 AM   Result Value Ref Range     (H) 8 - 61 U/L   Hemoglobin A1c    Collection Time: 04/13/23  8:01 AM   Result Value Ref Range    Hemoglobin A1C 5.5 <5.7 %   Lipid panel    Collection Time: 04/13/23  8:01 AM   Result Value Ref Range    Cholesterol 247 (H) <200 mg/dL    Triglycerides 205 (H) <150 mg/dL    Direct Measure HDL 67 >=40 mg/dL    LDL Cholesterol Calculated 139 (H) <=100 mg/dL    Non HDL Cholesterol 180 (H) <130 mg/dL   TSH with free T4 reflex and/or T3 as indicated    Collection Time: 04/13/23  8:01 AM   Result Value Ref Range    TSH 1.11 0.30 - 4.20 uIU/mL            Because this patient meets criteria for an Alcohol Use Disorder, I performed the following brief intervention on the date of this note:              1) Expressed concern that the patient is drinking at unhealthy levels known to increase their risk of alcohol related problems              2) Gave feedback linking alcohol use and health, including personalized feedback explaining how alcohol use can interact with their medical and/or psychiatric problems, and with prescribed medications.              3) Advised patient to abstain.    PT counseled on nicotine cessation and nicotine replacement provided    Counseled the patient on the importance of having a recovery program in addition to medication to manage recovery.  Components include avoiding isolating, having willingness to change, avoiding triggers and managing cravings. Encouraged having some type of sober network and practicing honesty with trusted support person(s).     Discussed with patient many issues of addiction,triggers, relapse, and establishing a solid recovery program.    DISCHARGE MENTAL STATUS EXAMINATION:  The patient is alert, oriented x3.  Good fund of knowledge.  Good use of language.  Recent and remote memory, language, fund of knowledge are all adequate.  Euthymic mood congruent affect  Speech normal  rate/rhythm linear tp no loose asso,The patient does not have any active suicidal or homicidal ideation.  Does not have any auditory or visual hallucination.  Fair insight/judgment At this time, the patient was stable to be discharged.        Pt was not determined to not be a danger to himself or others. At the current time of discharge, the patient does not meet criteria for involuntary hospitalization. On the day of discharge, the patient reports that they do not have suicidal or homicidal ideation and would never hurt themselves or others. Steps taken to minimize risk include: assessing patient s behavior and thought process daily during hospital stay, discharging patient with adequate plan for follow up for mental and physical health and discussing safety plan of returning to the hospital should the patient ever have thoughts of harming themselves or others. Therefore, based on all available evidence including the factors cited above, the patient does not appear to be at imminent risk for self-harm, and is appropriate for outpatient level of care.     Educated about side effects/risk vs benefits /alternative including non treatment.Pt consented to be on medication.     .Total time spent on discharge summary more than 35 min  More than  20 min  planning, coordination of care, medication reconciliation and performance of physical exam on day of discharge.Care was coordinated with unit RN and unit therapist         Review of your medicines      UNREVIEWED medicines. Ask your doctor about these medicines      Dose / Directions   citalopram 20 MG tablet  Commonly known as: celeXA  Used for: Essential hypertension      TAKE 1 TABLET BY MOUTH ONE TIME DAILY  Quantity: 90 tablet  Refills: 3     hydrochlorothiazide 12.5 MG tablet  Commonly known as: HYDRODIURIL  Used for: Alcohol dependence with intoxication with complication (H)      Dose: 12.5 mg  Take 1 tablet (12.5 mg) by mouth daily  Quantity: 90 tablet  Refills:  0     hydrOXYzine 50 MG tablet  Commonly known as: ATARAX  Used for: Alcohol withdrawal with complication with inpatient treatment, with unspecified complication (H)      Dose: 25-50 mg  Take 0.5-1 tablets (25-50 mg) by mouth 3 times daily as needed for anxiety  Quantity: 60 tablet  Refills: 0     lisinopril 40 MG tablet  Commonly known as: ZESTRIL  Used for: Alcohol dependence with intoxication with complication (H)      Dose: 40 mg  Take 1 tablet (40 mg) by mouth daily Hold for a systolic blood pressure of 110  Quantity: 30 tablet  Refills: 0     magnesium oxide 400 MG tablet  Commonly known as: MAG-OX  Used for: Alcohol dependence with intoxication with complication (H)      Take 1 tablet (400 mg) by mouth 2 times daily  Quantity: 60 tablet  Refills: 0     multivitamin w/minerals tablet  Used for: Uncomplicated alcohol dependence (H)      Dose: 1 tablet  Take 1 tablet by mouth daily  Quantity: 90 tablet  Refills: 2     naltrexone 50 MG tablet  Commonly known as: DEPADE/REVIA      Dose: 50 mg  Take 50 mg by mouth daily  Refills: 0     nicotine 14 MG/24HR 24 hr patch  Commonly known as: NICODERM CQ      Dose: 1 patch  Place 1 patch onto the skin every 24 hours  Refills: 0     nicotine 2 MG gum  Commonly known as: NICORETTE      Dose: 2 mg  Place 2 mg inside cheek every hour as needed for smoking cessation  Refills: 0     simvastatin 40 MG tablet  Commonly known as: ZOCOR  Used for: Uncomplicated alcohol dependence (H)      Dose: 40 mg  Take 1 tablet (40 mg) by mouth every evening  Quantity: 90 tablet  Refills: 2     thiamine 100 MG tablet  Commonly known as: B-1  Used for: Uncomplicated alcohol dependence (H)      Dose: 100 mg  TAKE 1 TABLET (100 MG) BY MOUTH DAILY  Quantity: 90 tablet  Refills: 0     traZODone 50 MG tablet  Commonly known as: DESYREL  Used for: Alcohol withdrawal with complication with inpatient treatment, with unspecified complication (H)      Dose: 50 mg  Take 1 tablet (50 mg) by mouth nightly  "as needed for sleep  Quantity: 30 tablet  Refills: 11        CONTINUE these medicines which have NOT CHANGED      Dose / Directions   Melatonin 10 MG Tabs tablet      Dose: 10 mg  Take 10 mg by mouth nightly as needed for sleep  Refills: 0             Disposition: home     Facts about COVID19 at www.cdc.gov/COVID19 and www.[place].gov/covid19     Keeping hands clean is one of the most important steps we can take to avoid getting sick and spreading germs to others.  Please wash your hands frequently and lather with soap for at least 20 seconds!     Medical Follow-Up:pcp in 3-4 weeks        Treatment Follow-Up:[facility]   .        \"Much or all of the text in this note was generated through the use of Dragon Dictate voice to text software. Errors in spelling or words which appear to be out of contact are unintentional, may be present due having escaped editing\"     "

## 2023-04-14 NOTE — PLAN OF CARE
Problem: Alcohol Withdrawal  Goal: Alcohol Withdrawal Symptom Control  Outcome: Progressing   Goal Outcome Evaluation:           Pt is in alcohol detox ,on MSSA with valium.  Pt's BP at mid night was 98/60, pulse of 54.

## 2023-04-14 NOTE — PLAN OF CARE
Goal Outcome Evaluation:    Plan of Care Reviewed With: patient        Discharge Note:  Patient alert and oriented x 3. Patient cleared to d/c home by  .  BS present and active, passing flatus LBM today. Patient voiding well without difficulty. Patient denies  pain  Discharge instructions and medication reviewed with patient and he verbalized understanding and signed AVS. All patient's belongings returned. Psych associate Jassi escorted patient. Left at  13:38.

## 2023-04-14 NOTE — PLAN OF CARE
Goal Outcome Evaluation:           Pt is in detox for alcohol. [facility] protocol with valium.  Pt scored 1 and 4. Not requiring any valium. Last valium 4/13 at 1248.   Pt has had a total of 65mg valium since admission.    BP 98/60   P 57 at midnight   /73  P 55 after po fluid encouraged and tolerated.     Pt denies any dizziness or lightheadedness. Up to the bathroom x1.

## 2023-04-21 ENCOUNTER — OFFICE VISIT (OUTPATIENT)
Dept: INTERNAL MEDICINE | Facility: CLINIC | Age: 63
End: 2023-04-21
Payer: MEDICARE

## 2023-04-21 VITALS
SYSTOLIC BLOOD PRESSURE: 138 MMHG | TEMPERATURE: 97.9 F | OXYGEN SATURATION: 96 % | HEIGHT: 70 IN | HEART RATE: 107 BPM | DIASTOLIC BLOOD PRESSURE: 86 MMHG | WEIGHT: 212 LBS | BODY MASS INDEX: 30.35 KG/M2

## 2023-04-21 DIAGNOSIS — F43.21 COMPLICATED GRIEVING: ICD-10-CM

## 2023-04-21 DIAGNOSIS — F41.1 GENERALIZED ANXIETY DISORDER: ICD-10-CM

## 2023-04-21 DIAGNOSIS — F32.9 REACTIVE DEPRESSION: ICD-10-CM

## 2023-04-21 DIAGNOSIS — F10.24 ALCOHOL DEPENDENCE WITH ALCOHOL-INDUCED MOOD DISORDER (H): ICD-10-CM

## 2023-04-21 DIAGNOSIS — F19.20 CHEMICAL DEPENDENCY (H): Primary | ICD-10-CM

## 2023-04-21 PROCEDURE — 99495 TRANSJ CARE MGMT MOD F2F 14D: CPT | Performed by: INTERNAL MEDICINE

## 2023-04-21 RX ORDER — ARIPIPRAZOLE 5 MG/1
5 TABLET ORAL DAILY
Qty: 30 TABLET | Refills: 2 | Status: SHIPPED | OUTPATIENT
Start: 2023-04-21 | End: 2023-05-25

## 2023-04-21 RX ORDER — NALTREXONE HYDROCHLORIDE 50 MG/1
100 TABLET, FILM COATED ORAL DAILY
Qty: 60 TABLET | Refills: 2 | Status: SHIPPED | OUTPATIENT
Start: 2023-04-21 | End: 2023-08-14

## 2023-04-21 ASSESSMENT — PATIENT HEALTH QUESTIONNAIRE - PHQ9
10. IF YOU CHECKED OFF ANY PROBLEMS, HOW DIFFICULT HAVE THESE PROBLEMS MADE IT FOR YOU TO DO YOUR WORK, TAKE CARE OF THINGS AT HOME, OR GET ALONG WITH OTHER PEOPLE: SOMEWHAT DIFFICULT
SUM OF ALL RESPONSES TO PHQ QUESTIONS 1-9: 7
SUM OF ALL RESPONSES TO PHQ QUESTIONS 1-9: 7

## 2023-04-21 ASSESSMENT — PAIN SCALES - GENERAL: PAINLEVEL: NO PAIN (0)

## 2023-04-21 NOTE — PROGRESS NOTES
{PROVIDER CHARTING PREFERENCE:187446}    Subjective   Durga is a 63 year old, presenting for the following health issues:  MOOD CHANGES         View : No data to display.              History of Present Illness       Reason for visit:  Meds    He eats 0-1 servings of fruits and vegetables daily.He consumes 0 sweetened beverage(s) daily.He exercises with enough effort to increase his heart rate 10 to 19 minutes per day.  He exercises with enough effort to increase his heart rate 3 or less days per week.   He is taking medications regularly.    Today's PHQ-9         PHQ-9 Total Score: 7    PHQ-9 Q9 Thoughts of better off dead/self-harm past 2 weeks :   Not at all    How difficult have these problems made it for you to do your work, take care of things at home, or get along with other people: Somewhat difficult       {additonal problems for provider to add (Optional):129220}      Review of Systems   {ROS COMP (Optional):364828}      Objective    There were no vitals taken for this visit.  There is no height or weight on file to calculate BMI.  Physical Exam   {Exam List (Optional):292534}    {Diagnostic Test Results (Optional):264475}    {AMBULATORY ATTESTATION (Optional):236513}

## 2023-04-23 NOTE — PROGRESS NOTES
"  Assessment & Plan     Chemical dependency (H)  Increase to 100 mg daily  - naltrexone (DEPADE/REVIA) 50 MG tablet; Take 2 tablets (100 mg) by mouth daily    Generalized anxiety disorder   Complicated grieving   Reactive depression  Add abilify- cont ssri. longterm really needs psychiatric care and inpyut. After last d/c - no real psych f/u was set up. He'll be enrolling in [facility] later this month. After that needs to get established with a psychiatric team.   - ARIPiprazole (ABILIFY) 5 MG tablet; Take 1 tablet (5 mg) by mouth daily    Alcohol dependence with alcohol-induced mood disorder (H)  - ARIPiprazole (ABILIFY) 5 MG tablet; Take 1 tablet (5 mg) by mouth daily  - naltrexone (DEPADE/REVIA) 50 MG tablet; Take 2 tablets (100 mg) by mouth daily    Prescription drug management       MED REC REQUIRED  Post Medication Reconciliation Status:  Discharge medications reconciled and changed, see notes/orders    Nicotine/Tobacco Cessation:  He reports that he has been smoking cigarettes. He has a 5.00 pack-year smoking history. He has never used smokeless tobacco.  Nicotine/Tobacco Cessation Plan:   Information offered: Patient not interested at this time      BMI:   Estimated body mass index is 30.42 kg/m  as calculated from the following:    Height as of this encounter: 1.778 m (5' 10\").    Weight as of this encounter: 96.2 kg (212 lb).   Weight management plan: Discussed healthy diet and exercise guidelines        Rohit Solitario MD  [facility]    Kathy Calixto is a 63 year old, presenting for the following health issues:        4/21/2023     1:58 PM   Additional Questions   Roomed by Keisha GARCIA CMA     [facility]       Hospital Follow-up Visit:    Hospital/Nursing Home/IP Rehab Facility: [facility]  Date of Admission: 4/12/23  Date of Discharge: 4/14/23  Reason(s) for Admission: alcohol abuse    Was your hospitalization related to " "COVID-19? No   Problems taking medications regularly:  None  Medication changes since discharge: None  Problems adhering to non-medication therapy:  None    Summary of hospitalization:  [facility] hospital discharge summary reviewed  Diagnostic Tests/Treatments reviewed.  Follow up needed: none  Other Healthcare Providers Involved in Patient s Care:         None  Update since discharge: fluctuating course.       History of Present Illness       Reason for visit:    He eats 0-1 servings of fruits and vegetables daily.He consumes 0 sweetened beverage(s) daily.He exercises with enough effort to increase his heart rate 10 to 19 minutes per day.  He exercises with enough effort to increase his heart rate 3 or less days per week.   He is taking medications regularly.    Today's PHQ-9         PHQ-9 Total Score: 7    PHQ-9 Q9 Thoughts of better off dead/self-harm past 2 weeks :   Not at all    How difficult have these problems made it for you to do your work, take care of things at home, or get along with other people: Somewhat difficult    Plan of care communicated with patient                   Review of Systems         Objective    /86   Pulse 107   Temp 97.9  F (36.6  C) (Oral)   Ht 1.778 m (5' 10\")   Wt 96.2 kg (212 lb)   SpO2 96%   BMI 30.42 kg/m    Body mass index is 30.42 kg/m .  Physical Exam   GENERAL: healthy, alert and no distress  RESP: lungs clear to auscultation - no rales, rhonchi or wheezes  CV: regular rate and rhythm, normal S1 S2, no S3 or S4, no murmur, click or rub, no peripheral edema and peripheral pulses strong  SKIN: no suspicious lesions or rashes  PSYCH: tearful and anxious                    "

## 2023-05-07 ENCOUNTER — HOSPITAL ENCOUNTER (EMERGENCY)
Facility: CLINIC | Age: 63
Discharge: HOME | End: 2023-05-07
Attending: EMERGENCY MEDICINE | Admitting: EMERGENCY MEDICINE
Payer: MEDICARE

## 2023-05-07 VITALS
RESPIRATION RATE: 14 BRPM | HEIGHT: 70 IN | BODY MASS INDEX: 30.35 KG/M2 | DIASTOLIC BLOOD PRESSURE: 78 MMHG | OXYGEN SATURATION: 95 % | WEIGHT: 212 LBS | SYSTOLIC BLOOD PRESSURE: 135 MMHG | HEART RATE: 95 BPM | TEMPERATURE: 99 F

## 2023-05-07 DIAGNOSIS — F10.920 ALCOHOLIC INTOXICATION WITHOUT COMPLICATION (H): ICD-10-CM

## 2023-05-07 PROCEDURE — 250N000009 HC RX 250

## 2023-05-07 PROCEDURE — 250N000011 HC RX IP 250 OP 636: Performed by: EMERGENCY MEDICINE

## 2023-05-07 PROCEDURE — 96372 THER/PROPH/DIAG INJ SC/IM: CPT | Performed by: EMERGENCY MEDICINE

## 2023-05-07 PROCEDURE — 99285 EMERGENCY DEPT VISIT HI MDM: CPT

## 2023-05-07 PROCEDURE — 250N000011 HC RX IP 250 OP 636

## 2023-05-07 RX ORDER — WATER 10 ML/10ML
INJECTION INTRAMUSCULAR; INTRAVENOUS; SUBCUTANEOUS
Status: COMPLETED
Start: 2023-05-07 | End: 2023-05-07

## 2023-05-07 RX ORDER — OLANZAPINE 10 MG/2ML
10 INJECTION, POWDER, FOR SOLUTION INTRAMUSCULAR ONCE
Status: COMPLETED | OUTPATIENT
Start: 2023-05-07 | End: 2023-05-07

## 2023-05-07 RX ORDER — OLANZAPINE 10 MG/2ML
INJECTION, POWDER, FOR SOLUTION INTRAMUSCULAR
Status: COMPLETED
Start: 2023-05-07 | End: 2023-05-07

## 2023-05-07 RX ADMIN — WATER 10 ML: 1 INJECTION INTRAMUSCULAR; INTRAVENOUS; SUBCUTANEOUS at 14:35

## 2023-05-07 RX ADMIN — OLANZAPINE 10 MG: 10 INJECTION, POWDER, FOR SOLUTION INTRAMUSCULAR at 14:35

## 2023-05-07 RX ADMIN — OLANZAPINE 10 MG: 10 INJECTION, POWDER, FOR SOLUTION INTRAMUSCULAR at 13:44

## 2023-05-07 RX ADMIN — WATER 10 ML: 1 INJECTION INTRAMUSCULAR; INTRAVENOUS; SUBCUTANEOUS at 13:44

## 2023-05-07 ASSESSMENT — ACTIVITIES OF DAILY LIVING (ADL)
ADLS_ACUITY_SCORE: 35

## 2023-05-07 NOTE — ED NOTES
Pt restraints removed. He is calm, pleasant, and cooperative. He acknowledges understanding the need to stay in bed at this time due to being unsteady. Pt requested and given drink of water. He repositioned self for comfort and resting quietly on cart.

## 2023-05-07 NOTE — ED PROVIDER NOTES
"    History     Chief Complaint:  Alcohol Intoxication       The history is provided by the patient. The history is limited by the condition of the patient.      Durga Aguirre is a 63 year old male with a history of anxiety, depression, and alcohol abuse who presents with alcohol intoxication. The patient arrived to the ED via EMS. He states that he feels as though he is dying. The patient states that he drinks to much alcohol but denies a history of alcohol withdrawal seizures. He also reports that his mother recently . He reports a history of anxiety and depression.      Independent Historian: No independent historian      Review of External Notes:none     ROS:  Review of Systems   Unable to perform ROS: Other (alcohol intoxication )       Allergies:  The patient has no active allergies.      Medications:    Abilify   Celexa  Gabapentin   hydrochlorothiazide   Atarax   Lisinopril   Zocor   Thiamine   Trazodone     Past Medical History:    YUSUF   Hypertension   Hypertriglyceridemia   Dypuythen's contracture of hand   Alcohol dependence   Alcohol withdrawal   Hypokalemia   Vagovagal syncope   Depression   Arthritis   Hyperlipidemia   Spontaneous pneumothorax     Past Surgical History:    Colonoscopy   Excision partial talus     Family History:    Lymphoma     Social History:  The patient arrived via EMS.   He has drank alcohol prior to arrival.   PCP: Rohit Solitario A     Physical Exam     Patient Vitals for the past 24 hrs:   BP Temp Temp src Pulse Resp SpO2 Height Weight   23 1730 -- -- -- -- -- 94 % -- --   23 1700 -- -- -- -- -- 97 % -- --   23 1630 -- -- -- -- -- 94 % -- --   23 1600 -- -- -- -- -- 97 % -- --   23 1545 135/78 -- -- -- -- 91 % -- --   23 1530 92/64 -- -- 95 -- 94 % -- --   23 1515 -- -- -- -- -- 94 % -- --   23 1500 -- -- -- -- -- 97 % -- --   23 1357 -- -- -- -- -- -- 1.778 m (5' 10\") 96.2 kg (212 lb)   23 1343 -- 99  F (37.2  C) " Temporal -- -- -- -- --   05/07/23 1337 (!) 128/96 -- -- 69 14 92 % -- --        Physical Exam  General/Appearance: appears stated age, crying  Eyes: EOMI, no scleral injection, no icterus  ENT: MMM  Neck: supple, nl ROM, no stiffness  Cardiovascular: well-perfused  Respiratory: no difficulty breathing  Back: no lesions  MSK: MERAZ, good tone, no bony abnormality  Skin: warm and well-perfused, no rash, no edema, no ecchymosis, nl turgor  Neuro: GCS 15, alert and oriented, no gross focal neuro deficits  Psych: tearful, denies SI  Heme: no petechia, no purpura, no active bleeding        Emergency Department Course     Imaging:  No orders to display     Report per radiology    Laboratory:  Labs Ordered and Resulted from Time of ED Arrival to Time of ED Departure - No data to display     Emergency Department Course & Assessments:       Interventions:  Medications   sterile water (preservative free) injection (10 mLs  $Given 5/7/23 1344)   OLANZapine (zyPREXA) injection 10 mg (10 mg Intramuscular $Given 5/7/23 1344)   OLANZapine (zyPREXA) injection 10 mg (10 mg Intramuscular $Given 5/7/23 1435)   sterile water (preservative free) injection (10 mLs  $Given 5/7/23 1435)        Independent Interpretation (X-rays, CTs, rhythm strip):  none    Consultations/Discussion of Management or Tests:  none       Social Determinants of Health affecting care:  Stress/Adjustment Disorders       Assessments:  1308 I obtained history and examined the patient as noted above.   1354 In person face to face assessment completed, including an evaluation of the patient's immediate reaction to the intervention, complete review of systems assessment, behavioral assessment and review/assessment of history, drugs and medications, recent labs, etc., and behavioral condition.  The patient experienced: No adverse physical outcome from seclusion/restraint initiation.  The intervention of restraint or seclusion needs to continue.  1545 I rechecked the  patient after restraints were discontinued. We discussed possibility of a sober ride and the patient mentioned calling his sister.     Disposition:  Care of the patient was transferred to my colleague Dr. Fleming pending sobriety or sober ride home.     Impression & Plan        Medical Decision Making:  This patient is a 63-year-old male who presents clinically intoxicated and initially aggressive, requiring IM Zyprexa.  He now is sleeping comfortably.  He is still too sedated to discharge him on his own.  Were he to have a sober ride home I would feel comfortable discharging him in their care.  We have not been able to reach anybody for him so at this point in time his care will be handed off to Dr. Fleming pending clinical sobriety or sober ride home.      Diagnosis:    ICD-10-CM    1. Alcoholic intoxication without complication (H)  F10.920            Discharge Medications:  New Prescriptions    No medications on file        Scribe Disclosure:  GEOVANNA, Yeni Robles, am serving as a scribe at 2:25 PM on 5/7/2023 to document services personally performed by Adelina Mejía MD based on my observations and the provider's statements to me.    5/7/2023   Adelina eMjía MD Mahoney, Katherine Collins, MD  05/07/23 2408

## 2023-05-07 NOTE — ED NOTES
Bed: [facility]  Expected date:   Expected time:   Means of arrival:   Comments:  523 - ETOH

## 2023-05-07 NOTE — ED TRIAGE NOTES
Pt came in with ems and intoxicated   Pt has hx of drinking   Pt became a threat to hurting him self and tried to bite staff and bed so he was given im medication and restrainted

## 2023-05-08 ENCOUNTER — PATIENT OUTREACH (OUTPATIENT)
Dept: GERIATRIC MEDICINE | Facility: CLINIC | Age: 63
End: 2023-05-08
Payer: MEDICARE

## 2023-05-08 NOTE — PROGRESS NOTES
[facility] Care Coordination Contact  CC received notification of Emergency Room visit.  ER visit occurred on 5/8/23 at [facility] with Dx of ETOH intoxication .    CC contacted member and reviewed discharge summary.  Member has a follow-up appointment with PCP: No: Offered Assistance with setting up a follow up appointment  Member has had a change in condition: No  New referrals placed: No  Home Visit Needed: No  Care plan reviewed and updated.  PCP notified of ED visit via EMR.    Discussed recommendations from PCP from office visit 4/21/23.  Member states that he has the business card from [employer] and states that he will call them this week.  Member states he has seen them in the past.   No questions or concerns at this time.  Varsha Stewart RN, BSN, PHN  [facility]  [phone]  Fax: [phone]

## 2023-05-08 NOTE — ED PROVIDER NOTES
ED course:  Patient received as a handoff from my partner Dr. Mejía.  On face-to-face evaluation patient reports no additional concerns.  Patient was offered but repeatedly deferred transfer to detox.  No evidence of severe withdrawal symptoms or indication for hospitalization.  Return precautions discussed and provided.  Patient discharged home    Impression:    ICD-10-CM    1. Alcoholic intoxication without complication (H)  F10.920             Disposition:  Discharge         Maldonado Fleming,   05/07/23 7828

## 2023-05-22 DIAGNOSIS — F41.1 GENERALIZED ANXIETY DISORDER: ICD-10-CM

## 2023-05-23 ENCOUNTER — TELEPHONE (OUTPATIENT)
Dept: INTERNAL MEDICINE | Facility: CLINIC | Age: 63
End: 2023-05-23
Payer: MEDICARE

## 2023-05-23 DIAGNOSIS — F10.229 ALCOHOL DEPENDENCE WITH INTOXICATION WITH COMPLICATION (H): ICD-10-CM

## 2023-05-23 DIAGNOSIS — F10.939 ALCOHOL WITHDRAWAL WITH COMPLICATION WITH INPATIENT TREATMENT, WITH UNSPECIFIED COMPLICATION (H): ICD-10-CM

## 2023-05-23 RX ORDER — BUSPIRONE HYDROCHLORIDE 15 MG/1
TABLET ORAL
Qty: 180 TABLET | Refills: 0 | OUTPATIENT
Start: 2023-05-23

## 2023-05-23 RX ORDER — TRAZODONE HYDROCHLORIDE 50 MG/1
50 TABLET, FILM COATED ORAL
Qty: 30 TABLET | Refills: 5 | Status: SHIPPED | OUTPATIENT
Start: 2023-05-23 | End: 2023-12-01

## 2023-05-23 RX ORDER — GABAPENTIN 300 MG/1
300 CAPSULE ORAL 3 TIMES DAILY
Qty: 90 CAPSULE | Refills: 5 | Status: SHIPPED | OUTPATIENT
Start: 2023-05-23 | End: 2023-12-18

## 2023-05-23 NOTE — TELEPHONE ENCOUNTER
Patient calling clinic requesting refills for Buspar, Trazodone, and Gabapentin. Per chart review, Buspar script already pended and other two scripts are managed by a different provider. RN relayed this to patient and advised patient contact that provider for additional refills. Patient verbalized understanding and did not have any additional questions.

## 2023-05-24 ENCOUNTER — NURSE TRIAGE (OUTPATIENT)
Dept: INTERNAL MEDICINE | Facility: CLINIC | Age: 63
End: 2023-05-24
Payer: MEDICARE

## 2023-05-24 NOTE — TELEPHONE ENCOUNTER
CC: Patient calling regarding refill for buspirone. States he cannot get this refilled and would like to restart medication due to depression. Per refill encounter on per Dr. Solitario - 23 - medication was stopped.     Patient would like to get back on this medication due to depression symptoms, patient's mother  recently and has upcoming  this week.     CONCERN: would like to restart on buspirone   DEPRESSION SYMPTOM SCREENING: increased sleeping, no ambition, feeling sad and down   RISK OF HARM - SUICIDAL IDEATION: denies   RISK OF HARM - HOMICIDAL IDEATION: denies   FUNCTIONAL IMPAIRMENT: harder to do normal activities   SUPPORT: sister and brother in law live close and are support system   THERAPIST: yes - previously saw a psychiatrist - does not feel this was worth his money   STRESSORS: recent death of mother   ALCOHOL USE SUBSTANCE USE: yes - hx of drinking - recently in ED on 23 for alcohol intoxication   OTHER SYMPTOMS: denies fever or any other symptoms     Triaged per [facility] protocol, patient to be seen in office within 3 days. Writer scheduled patient for VV with PCP to discuss depression/medications. Patient agreeable to discussing at appointment.     2023 11:30 AM (Arrive by 11:10 AM) Rohit Solitario MD [facility]     Writer advised if patient were to experience new or worsening symptoms to call back to triage. Advised if he were to experience thoughts of harming self or others or any safety concerns to reach out for help immediately, call 911, go to ED. Patient expressed verbal understanding of this recommendation and is agreeable.    Jude Bean RN  [facility]    Reason for Disposition    Symptoms interfere with work or school    Additional Information    Negative: Patient attempted suicide    Negative: Patient is threatening suicide now    Negative: Violent behavior, or threatening to physically hurt or kill  someone    Negative: Patient is very confused (disoriented, slurred speech) and no other adult (e.g., friend or family member) available    Negative: Difficult to awaken or acting very confused (disoriented, slurred speech) and new-onset    Negative: Sounds like a life-threatening emergency to the triager    Negative: Suicide thoughts, threats, attempts, or questions    Negative: Questions or concerns about alcohol use, unhealthy alcohol use, binge drinking, intoxication, or withdrawal    Negative: Questions or concerns about substance use (drug use), unhealthy drug use, intoxication, or withdrawal    Negative: Anxiety is main problem or symptom    Negative: Depression and unable to do any of normal activities (e.g., self care, school, work; in comparison to baseline).    Negative: Very strange or confused behavior    Negative: Patient sounds very sick or weak to the triager    Negative: Fever > 101 F  (38.3 C)    Negative: Sometimes has thoughts of suicide    Protocols used: DEPRESSION-A-OH

## 2023-05-25 ENCOUNTER — VIRTUAL VISIT (OUTPATIENT)
Dept: INTERNAL MEDICINE | Facility: CLINIC | Age: 63
End: 2023-05-25
Payer: MEDICARE

## 2023-05-25 ENCOUNTER — TELEPHONE (OUTPATIENT)
Dept: INTERNAL MEDICINE | Facility: CLINIC | Age: 63
End: 2023-05-25

## 2023-05-25 DIAGNOSIS — F10.24 ALCOHOL DEPENDENCE WITH ALCOHOL-INDUCED MOOD DISORDER (H): ICD-10-CM

## 2023-05-25 DIAGNOSIS — F41.1 GENERALIZED ANXIETY DISORDER: ICD-10-CM

## 2023-05-25 DIAGNOSIS — F32.9 REACTIVE DEPRESSION: Primary | ICD-10-CM

## 2023-05-25 DIAGNOSIS — F43.21 COMPLICATED GRIEVING: ICD-10-CM

## 2023-05-25 PROCEDURE — 99443 PR PHYSICIAN TELEPHONE EVALUATION 21-30 MIN: CPT | Performed by: INTERNAL MEDICINE

## 2023-05-25 RX ORDER — ARIPIPRAZOLE 5 MG/1
10 TABLET ORAL DAILY
Qty: 60 TABLET | Refills: 5 | Status: SHIPPED | OUTPATIENT
Start: 2023-05-25 | End: 2023-07-11

## 2023-05-25 ASSESSMENT — PATIENT HEALTH QUESTIONNAIRE - PHQ9: SUM OF ALL RESPONSES TO PHQ QUESTIONS 1-9: 5

## 2023-05-25 NOTE — PROGRESS NOTES
Durga is a 63 year old who is being evaluated via a billable telephone visit.      What phone number would you like to be contacted at? [phone]  How would you like to obtain your AVS? Tiffanyhart    Distant Location (provider location):  On-site    Assessment & Plan     Reactive depression  Complicated grieving  Generalized anxiety disorder  Increase abilify to 10 mg daily  - ARIPiprazole (ABILIFY) 5 MG tablet; Take 2 tablets (10 mg) by mouth daily    Alcohol dependence with alcohol-induced mood disorder (H)  -currently drinking- needs to focus on sobriety    Prescription drug management             Rohit Solitario MD  [facility]   Durga is a 63 year old, presenting for the following health issues:  Medication Request (buspirone)      HPI     Depression Followup    How are you doing with your depression since your last visit? Worsened     Are you having other symptoms that might be associated with depression? Yes:  .    Have you had a significant life event?  Grief or Loss     Social History     Tobacco Use     Smoking status: Every Day     Packs/day: 0.25     Years: 20.00     Pack years: 5.00     Types: Cigarettes     Smokeless tobacco: Never     Tobacco comments:     Smokes 2-3 1/2 cigs daily   Vaping Use     Vaping status: Never Used     Passive vaping exposure: Yes   Substance Use Topics     Alcohol use: Yes     Comment: daily use 1 L vodka     Drug use: Not Currently         9/13/2022    10:10 AM 4/21/2023     1:46 PM 5/25/2023    11:07 AM   PHQ   PHQ-9 Total Score 3 7 5   Q9: Thoughts of better off dead/self-harm past 2 weeks Not at all Not at all Not at all         10/28/2019     7:43 PM 12/10/2019     8:43 PM 12/30/2019    10:00 AM   YUSUF-7 SCORE   Total Score 1 0 2         5/25/2023    11:07 AM   Last PHQ-9   1.  Little interest or pleasure in doing things 1   2.  Feeling down, depressed, or hopeless 2   3.  Trouble falling or staying asleep, or sleeping  too much 2   4.  Feeling tired or having little energy 0   5.  Poor appetite or overeating 0   6.  Feeling bad about yourself 0   7.  Trouble concentrating 0   8.  Moving slowly or restless 0   Q9: Thoughts of better off dead/self-harm past 2 weeks 0   PHQ-9 Total Score 5   Difficulty at work, home, or with people Not difficult at all       Suicide Assessment Five-step Evaluation and Treatment (SAFE-T)            Review of Systems         Objective           Vitals:  No vitals were obtained today due to virtual visit.    Physical Exam   crying  PSYCH: Alert and oriented times 3; coherent speech, normal   rate and volume, able to articulate logical thoughts, able   to abstract reason, no tangential thoughts, no hallucinations   or delusions  His affect is tearful  RESP: No cough, no audible wheezing, able to talk in full sentences  Remainder of exam unable to be completed due to telephone visits                Phone call duration: 35 minutes

## 2023-05-25 NOTE — TELEPHONE ENCOUNTER
FYI - Status Update    Who is Calling: patient    Update: patient wants to let you know you were correct on his mothers date of death    Does caller want a call/response back: No

## 2023-06-04 DIAGNOSIS — F10.939 ALCOHOL WITHDRAWAL WITH COMPLICATION WITH INPATIENT TREATMENT, WITH UNSPECIFIED COMPLICATION (H): ICD-10-CM

## 2023-06-04 NOTE — TELEPHONE ENCOUNTER
..  Patient Returning Call    Reason for call:  Refill on anxiety meds    Information relayed to patient:  te    Patient has additional questions:  No      Could we send this information to you in [employer] or would you prefer to receive a phone call?:   Patient would prefer a phone call   Okay to leave a detailed message?: Yes at Cell number on file:    Telephone Information:   Mobile [phone]

## 2023-06-05 ENCOUNTER — TELEPHONE (OUTPATIENT)
Dept: BEHAVIORAL HEALTH | Facility: CLINIC | Age: 63
End: 2023-06-05

## 2023-06-05 RX ORDER — HYDROXYZINE HYDROCHLORIDE 50 MG/1
25-50 TABLET, FILM COATED ORAL 3 TIMES DAILY PRN
Qty: 60 TABLET | Refills: 0 | Status: SHIPPED | OUTPATIENT
Start: 2023-06-05 | End: 2023-06-20

## 2023-06-05 NOTE — TELEPHONE ENCOUNTER
Pt called [facility] RN line requesting treatment. Pt was recently on 3A on 4/14 and in the ED on 5/7, pt reports he is drinking daily. Pt was referred to ED to get eval for medical detox bed. Writer did tell pt she was unsure he would be able to come to [facility] due to having medicare.     Pt was also behaviorally discharged from the program in 2020. Writer asked [facility] admissions and  to review case

## 2023-06-06 ENCOUNTER — TELEPHONE (OUTPATIENT)
Dept: INTERNAL MEDICINE | Facility: CLINIC | Age: 63
End: 2023-06-06

## 2023-06-06 NOTE — TELEPHONE ENCOUNTER
Prior Authorization Retail Medication Request    Medication/Dose: hydrOXYzine (ATARAX) 50 MG tablet  ICD code (if different than what is on RX):  F10.939  Previously Tried and Failed:    Rationale:      Insurance Name:  [facility]  Insurance ID:  [id number]      Pharmacy Information (if different than what is on RX)  Name:  [facility]  Phone:  [phone]

## 2023-06-10 NOTE — TELEPHONE ENCOUNTER
Central Prior Authorization Team   Phone: [phone]      PA Initiation    Medication: HYDROXYZINE HCL 50 MG PO TABS  Insurance Company: [employer] - Phone [phone] Fax [phone]  Pharmacy Filling the Rx: [id number] - [facility] [address] [facility]  Filling Pharmacy Phone: [phone]  Filling Pharmacy Fax:    Start Date: 6/10/2023

## 2023-06-11 NOTE — TELEPHONE ENCOUNTER
Prior Authorization Approval    Medication: HYDROXYZINE HCL 50 MG PO TABS  Authorization Effective Date: 5/11/2023  Authorization Expiration Date: 6/9/2024  Approved Dose/Quantity:   Reference #:     Insurance Company: [employer] - Phone [phone] Fax [phone]  Expected CoPay:       CoPay Card Available:      Financial Assistance Needed:   Which Pharmacy is filling the prescription: [id number] - [facility] [address] [facility]  Pharmacy Notified: Yes  Patient Notified: No

## 2023-06-16 DIAGNOSIS — F10.229 ALCOHOL DEPENDENCE WITH INTOXICATION WITH COMPLICATION (H): ICD-10-CM

## 2023-06-16 DIAGNOSIS — F10.20 UNCOMPLICATED ALCOHOL DEPENDENCE (H): ICD-10-CM

## 2023-06-17 DIAGNOSIS — F10.939 ALCOHOL WITHDRAWAL WITH COMPLICATION WITH INPATIENT TREATMENT, WITH UNSPECIFIED COMPLICATION (H): ICD-10-CM

## 2023-06-20 RX ORDER — LANOLIN ALCOHOL/MO/W.PET/CERES
100 CREAM (GRAM) TOPICAL DAILY
Qty: 90 TABLET | Refills: 0 | Status: SHIPPED | OUTPATIENT
Start: 2023-06-20 | End: 2023-12-18

## 2023-06-20 RX ORDER — HYDROXYZINE HYDROCHLORIDE 50 MG/1
25-50 TABLET, FILM COATED ORAL 3 TIMES DAILY PRN
Qty: 60 TABLET | Refills: 0 | Status: SHIPPED | OUTPATIENT
Start: 2023-06-20 | End: 2023-07-28

## 2023-06-20 RX ORDER — MAGNESIUM OXIDE 400 MG/1
400 TABLET ORAL 2 TIMES DAILY
Qty: 60 TABLET | Refills: 0 | Status: SHIPPED | OUTPATIENT
Start: 2023-06-20 | End: 2023-08-14

## 2023-07-07 ENCOUNTER — TELEPHONE (OUTPATIENT)
Dept: INTERNAL MEDICINE | Facility: CLINIC | Age: 63
End: 2023-07-07
Payer: MEDICARE

## 2023-07-07 NOTE — TELEPHONE ENCOUNTER
FYI - Status Update    Who is Calling: patient    Update: He would like to restart a medicine he used to take has been  having anxiety    Buspirone 15mg    Does caller want a call/response back: Yes     Could we send this information to you in [employer] or would you prefer to receive a phone call?:   Patient would prefer a phone call   Okay to leave a detailed message?: Yes at Cell number on file:    Telephone Information:   Mobile [phone]

## 2023-07-09 DIAGNOSIS — F32.9 REACTIVE DEPRESSION: ICD-10-CM

## 2023-07-09 DIAGNOSIS — F43.21 COMPLICATED GRIEVING: ICD-10-CM

## 2023-07-09 DIAGNOSIS — F41.1 GENERALIZED ANXIETY DISORDER: ICD-10-CM

## 2023-07-09 DIAGNOSIS — F10.24 ALCOHOL DEPENDENCE WITH ALCOHOL-INDUCED MOOD DISORDER (H): ICD-10-CM

## 2023-07-10 NOTE — TELEPHONE ENCOUNTER
MR- see notes below.  Pt had VV 05/25/2023, Buspar was discontinued 11/2022    Please advise    Sulma Santos CMA

## 2023-07-11 DIAGNOSIS — F10.24 ALCOHOL DEPENDENCE WITH ALCOHOL-INDUCED MOOD DISORDER (H): ICD-10-CM

## 2023-07-11 DIAGNOSIS — F41.1 GENERALIZED ANXIETY DISORDER: ICD-10-CM

## 2023-07-11 DIAGNOSIS — F32.9 REACTIVE DEPRESSION: ICD-10-CM

## 2023-07-11 DIAGNOSIS — F43.21 COMPLICATED GRIEVING: ICD-10-CM

## 2023-07-11 RX ORDER — ARIPIPRAZOLE 5 MG/1
TABLET ORAL
Qty: 30 TABLET | Refills: 0 | Status: SHIPPED | OUTPATIENT
Start: 2023-07-11 | End: 2023-08-14

## 2023-07-12 RX ORDER — ARIPIPRAZOLE 5 MG/1
TABLET ORAL
Qty: 30 TABLET | Refills: 0 | OUTPATIENT
Start: 2023-07-12

## 2023-07-12 NOTE — TELEPHONE ENCOUNTER
Spoke to patient and gave Dr. Solitario's message below. Offered to schedule appointment for him but he did not know his work schedule yet so he stated he will have to call back to schedule. Keisha Stark, CMA

## 2023-07-12 NOTE — TELEPHONE ENCOUNTER
Upon chart review, script sent to the pharmacy yesterday (7/11/23).         Will jaya as duplicate to the pharmacy.     Rosa Chavez RN

## 2023-07-21 DIAGNOSIS — F10.229 ALCOHOL DEPENDENCE WITH INTOXICATION WITH COMPLICATION (H): ICD-10-CM

## 2023-07-23 RX ORDER — LISINOPRIL 40 MG/1
40 TABLET ORAL DAILY
Qty: 30 TABLET | Refills: 0 | OUTPATIENT
Start: 2023-07-23

## 2023-07-26 NOTE — PROGRESS NOTES
Progress Note    This patient had a Rule 25/31 Combined Assessment Form on 12/26/2019 completed by Marquis Quintero.  This patient was seen for a face to face update of the Assessment and Placement Summary Update on 12/30/2019 by ELANA Costa.  INSIDE: The patient's Assessment and Placement Summary Update completed on 12/26/2019 is in the patient's electronic medical record in Epic in the Chart Review section under the Notes/Trans Tab.    Alcohol/Drug use since the last CD evaluation (include date of last use):     Alcohol: Drank I x, 1/2 qt hard alcohol  AZUL:12/27/2019 1/2 qt hard alcohol     Please note any other clinical changes since the last CD evaluation (such as medication changes, additional legal charges, detoxification admissions, overdoses, etc.)     No significant changes since the last CD evaluation       ASAM Dimensions Original scores Current Scores   I.) Intoxication and Withdrawal: 0 0   II.) Biomedical:  1 1   III.) Emotional and Behavioral:  2 2   IV.) Readiness to Change:  2 2   V.) Relapse Potential: 4 4   VI.) Recovery Environmental: 3 3     Please list clinical justifications for the above ASAM score changes since the original comprehensive assessment:     None of the ASAM scores on the six dimensions had changed since the Assessment and Placement Summary Update was completed on 12/26/2019.       Current CACHORRO: Current UA:     0.000     Negative for all screened drugs.       PHQ-9, YUSUF-7   PHQ-9 on 12/30/2019 YUSUF-7 on 12/30/2019   The patient's PHQ-9 score was 1 out of 27, indicating minimal depression.   The patient's YUSUF-7 score was 2 out of 21, indicating minimal anxiety.       [place]-Suicide Severity Rating Scale Reassessment   Have you ever wished you were dead or that you could go to sleep and not wake up?  Past Month:  No     Have you actually had any thoughts of killing yourself?  Past Month:  No     Have you been thinking about how you might do this?     Past Month:  No    "Lifetime:  No   Have you had these thoughts and had some intention of acting on them?     Past Month:  No   Lifetime:  No   Have you started to work out the details of how to kill yourself?   Past Month:  No   Lifetime:  No   Do you intend to carry out this plan?   No     When you have the thoughts how long do they last?  The patient denied ever having any suicidal thoughts in life.     Are there things - anyone or anything (i.e. family, Alevism, pain of death) that stopped you from wanting to die or acting on thoughts of suicide?  Does not apply       2008  [employer] [facility] [employer]  Used with permission by Loree Pelayo, PhD.       Guide to C-SSRS Risk Ratings   NO IDEATION:  with no active thoughts IDEATION: with a wish to die. IDEATION: with active thoughts. Risk Ratings   If Yes No No 0 - Very Low Risk   If NA Yes No 1 - Low Risk   If NA Yes Yes 2 - Low/moderate risk   IDEATION: associated thoughts of methods without intent or plan INTENT: Intent to follow through on suicide PLAN: Plan to follow through on suicide Risk Ratings cont...   If Yes No No 3 - Moderate Risk   If Yes Yes No 4 - High Risk   If Yes Yes Yes 5 - High Risk   The patient's ADDITIONAL RISK FACTORS and lack of PROTECTIVE FACTORS may increase their overall suicide risk ratings.     Additional Risk Factors:    Significant history of having untreated or poorly treated mental health symptoms     Alcohol use   Protective Factors:    Having people in his/her life that would prevent the patient from considering a suicide attempt (i.e. young children, spouse, parents, etc.)     Having easy access to supportive family members     Risk Status   0. - Very Low Risk:  Evaluation Counselors:  Document in Epic / SBAR to counselor \"Very Low Risk\".      Treatment Counselors:  Reassess upon admission as applicable, assess weekly in progress notes under Dimension 3 and summarize in Discharge / Treatment summary under Dimension 3.   "   Additional information to support suicide risk rating: There was no additional information to provide at this time.      [FreeTextEntry1] : RTc 3 mos\par will call when work form completed

## 2023-07-27 ENCOUNTER — VIRTUAL VISIT (OUTPATIENT)
Dept: INTERNAL MEDICINE | Facility: CLINIC | Age: 63
End: 2023-07-27
Payer: MEDICARE

## 2023-07-27 DIAGNOSIS — F41.1 GENERALIZED ANXIETY DISORDER: ICD-10-CM

## 2023-07-27 DIAGNOSIS — F32.9 REACTIVE DEPRESSION: ICD-10-CM

## 2023-07-27 DIAGNOSIS — F43.21 COMPLICATED GRIEVING: ICD-10-CM

## 2023-07-27 DIAGNOSIS — F10.24 ALCOHOL DEPENDENCE WITH ALCOHOL-INDUCED MOOD DISORDER (H): ICD-10-CM

## 2023-07-27 DIAGNOSIS — I10 ESSENTIAL HYPERTENSION: ICD-10-CM

## 2023-07-27 DIAGNOSIS — F10.229 ALCOHOL DEPENDENCE WITH INTOXICATION WITH COMPLICATION (H): Primary | ICD-10-CM

## 2023-07-27 PROCEDURE — 99443 PR PHYSICIAN TELEPHONE EVALUATION 21-30 MIN: CPT | Mod: 95 | Performed by: INTERNAL MEDICINE

## 2023-07-27 NOTE — PROGRESS NOTES
Durga is a 63 year old who is being evaluated via a billable telephone visit.      What phone number would you like to be contacted at? [phone]   How would you like to obtain your AVS? Lev    Distant Location (provider location):  On-site    Assessment & Plan     Alcohol dependence with intoxication with complication (H)  Generalized anxiety disorder  Alcohol dependence with alcohol-induced mood disorder (H)  -I encouraged Durga to go to detox.  I referred him for residential substance abuse program.  He also needs his underlying mental health diagnoses addressed.  I have attempted to manage these due to inadequate and a lack of psychiatric follow-up.  Unfortunately we have not been able to keep him sober and we have not been successful at relieving his anxiety with multiple meds.  At this point I think he needs long-term psychiatric care for his substance abuse and anxiety.    - Adult Mental Health  Referral                 Rohit Solitario MD  [facility]    Subjective   Durga is a 63 year old, presenting for the following health issues:  No chief complaint on file.      HPI   Patient with longstanding history of alcohol abuse.  At present he is still drinking in fact appears intoxicated during our discussion.  He wants benzodiazepines to help with his anxiety when he goes to rehab.  He wants to go to detox and then rehab but is concerned that every time he does this no one addresses his anxiety and when he leaves his anxiety gets to the point where he starts drinking again.  We have him on several antianxiety meds including citalopram, gabapentin, as needed hydroxyzine and we have added adjunctive Abilify as multiple other meds have not worked.  He also is taking naltrexone but unknown how often he is doing so.            Review of Systems         Objective       Vitals:  No vitals were obtained today due to virtual visit.    Physical Exam   Gen: Seems possibly  intoxicated on the phone  PSYCH: Alert and oriented times 3; coherent speech, normal   rate and volume, able to articulate logical thoughts, able   to abstract reason, no tangential thoughts, no hallucinations   or delusions  His affect is anxious  RESP: No cough, no audible wheezing, able to talk in full sentences  Remainder of exam unable to be completed due to telephone visits                Phone call duration: 23 minutes

## 2023-07-28 DIAGNOSIS — F10.939 ALCOHOL WITHDRAWAL WITH COMPLICATION WITH INPATIENT TREATMENT, WITH UNSPECIFIED COMPLICATION (H): ICD-10-CM

## 2023-07-28 RX ORDER — HYDROXYZINE HYDROCHLORIDE 50 MG/1
25-50 TABLET, FILM COATED ORAL 3 TIMES DAILY PRN
Qty: 60 TABLET | Refills: 0 | Status: SHIPPED | OUTPATIENT
Start: 2023-07-28 | End: 2023-08-14

## 2023-08-02 ENCOUNTER — TELEPHONE (OUTPATIENT)
Dept: BEHAVIORAL HEALTH | Facility: CLINIC | Age: 63
End: 2023-08-02

## 2023-08-02 ENCOUNTER — HOSPITAL ENCOUNTER (INPATIENT)
Facility: CLINIC | Age: 63
LOS: 3 days | Discharge: PSYCHIATRIC OR SUBSTANCE USE FACILITY | DRG: 897 | End: 2023-08-06
Attending: EMERGENCY MEDICINE | Admitting: PSYCHIATRY & NEUROLOGY
Payer: MEDICARE

## 2023-08-02 DIAGNOSIS — F10.230 ALCOHOL DEPENDENCE WITH UNCOMPLICATED WITHDRAWAL (H): ICD-10-CM

## 2023-08-02 LAB
ALBUMIN SERPL BCG-MCNC: 3.6 G/DL (ref 3.5–5.2)
ALCOHOL BREATH TEST: 0.05 (ref 0–0.01)
ALP SERPL-CCNC: 112 U/L (ref 40–129)
ALT SERPL W P-5'-P-CCNC: 37 U/L (ref 0–70)
AMPHETAMINES UR QL SCN: ABNORMAL
ANION GAP SERPL CALCULATED.3IONS-SCNC: 12 MMOL/L (ref 7–15)
AST SERPL W P-5'-P-CCNC: 35 U/L (ref 0–45)
BARBITURATES UR QL SCN: ABNORMAL
BASOPHILS # BLD AUTO: 0.1 10E3/UL (ref 0–0.2)
BASOPHILS NFR BLD AUTO: 2 %
BENZODIAZ UR QL SCN: ABNORMAL
BILIRUB SERPL-MCNC: 0.2 MG/DL
BUN SERPL-MCNC: 9.1 MG/DL (ref 8–23)
BZE UR QL SCN: ABNORMAL
CALCIUM SERPL-MCNC: 8.5 MG/DL (ref 8.8–10.2)
CANNABINOIDS UR QL SCN: ABNORMAL
CHLORIDE SERPL-SCNC: 100 MMOL/L (ref 98–107)
CREAT SERPL-MCNC: 0.95 MG/DL (ref 0.67–1.17)
DEPRECATED HCO3 PLAS-SCNC: 28 MMOL/L (ref 22–29)
EOSINOPHIL # BLD AUTO: 0.5 10E3/UL (ref 0–0.7)
EOSINOPHIL NFR BLD AUTO: 8 %
ERYTHROCYTE [DISTWIDTH] IN BLOOD BY AUTOMATED COUNT: 12.8 % (ref 10–15)
GFR SERPL CREATININE-BSD FRML MDRD: 89 ML/MIN/1.73M2
GLUCOSE SERPL-MCNC: 139 MG/DL (ref 70–99)
HCT VFR BLD AUTO: 30.4 % (ref 40–53)
HGB BLD-MCNC: 10.2 G/DL (ref 13.3–17.7)
IMM GRANULOCYTES # BLD: 0 10E3/UL
IMM GRANULOCYTES NFR BLD: 0 %
LYMPHOCYTES # BLD AUTO: 1.3 10E3/UL (ref 0.8–5.3)
LYMPHOCYTES NFR BLD AUTO: 25 %
MAGNESIUM SERPL-MCNC: 1.8 MG/DL (ref 1.7–2.3)
MCH RBC QN AUTO: 29.2 PG (ref 26.5–33)
MCHC RBC AUTO-ENTMCNC: 33.6 G/DL (ref 31.5–36.5)
MCV RBC AUTO: 87 FL (ref 78–100)
MONOCYTES # BLD AUTO: 0.4 10E3/UL (ref 0–1.3)
MONOCYTES NFR BLD AUTO: 8 %
NEUTROPHILS # BLD AUTO: 3 10E3/UL (ref 1.6–8.3)
NEUTROPHILS NFR BLD AUTO: 57 %
NRBC # BLD AUTO: 0 10E3/UL
NRBC BLD AUTO-RTO: 0 /100
OPIATES UR QL SCN: ABNORMAL
PLATELET # BLD AUTO: 235 10E3/UL (ref 150–450)
POTASSIUM SERPL-SCNC: 3.8 MMOL/L (ref 3.4–5.3)
PROT SERPL-MCNC: 5.8 G/DL (ref 6.4–8.3)
RBC # BLD AUTO: 3.49 10E6/UL (ref 4.4–5.9)
SODIUM SERPL-SCNC: 140 MMOL/L (ref 136–145)
WBC # BLD AUTO: 5.3 10E3/UL (ref 4–11)

## 2023-08-02 PROCEDURE — 85025 COMPLETE CBC W/AUTO DIFF WBC: CPT | Performed by: EMERGENCY MEDICINE

## 2023-08-02 PROCEDURE — HZ2ZZZZ DETOXIFICATION SERVICES FOR SUBSTANCE ABUSE TREATMENT: ICD-10-PCS | Performed by: PSYCHIATRY & NEUROLOGY

## 2023-08-02 PROCEDURE — 83735 ASSAY OF MAGNESIUM: CPT | Performed by: EMERGENCY MEDICINE

## 2023-08-02 PROCEDURE — 258N000003 HC RX IP 258 OP 636: Performed by: EMERGENCY MEDICINE

## 2023-08-02 PROCEDURE — 250N000013 HC RX MED GY IP 250 OP 250 PS 637: Performed by: EMERGENCY MEDICINE

## 2023-08-02 PROCEDURE — 99285 EMERGENCY DEPT VISIT HI MDM: CPT | Performed by: EMERGENCY MEDICINE

## 2023-08-02 PROCEDURE — 80053 COMPREHEN METABOLIC PANEL: CPT | Performed by: EMERGENCY MEDICINE

## 2023-08-02 PROCEDURE — 80307 DRUG TEST PRSMV CHEM ANLYZR: CPT | Performed by: EMERGENCY MEDICINE

## 2023-08-02 PROCEDURE — 99285 EMERGENCY DEPT VISIT HI MDM: CPT | Mod: 25 | Performed by: EMERGENCY MEDICINE

## 2023-08-02 PROCEDURE — 36415 COLL VENOUS BLD VENIPUNCTURE: CPT | Performed by: EMERGENCY MEDICINE

## 2023-08-02 PROCEDURE — 82306 VITAMIN D 25 HYDROXY: CPT | Performed by: PSYCHIATRY & NEUROLOGY

## 2023-08-02 PROCEDURE — 96360 HYDRATION IV INFUSION INIT: CPT | Performed by: EMERGENCY MEDICINE

## 2023-08-02 PROCEDURE — 82075 ASSAY OF BREATH ETHANOL: CPT | Performed by: EMERGENCY MEDICINE

## 2023-08-02 RX ORDER — MULTIPLE VITAMINS W/ MINERALS TAB 9MG-400MCG
1 TAB ORAL ONCE
Status: COMPLETED | OUTPATIENT
Start: 2023-08-02 | End: 2023-08-02

## 2023-08-02 RX ORDER — FOLIC ACID 1 MG/1
1 TABLET ORAL ONCE
Status: COMPLETED | OUTPATIENT
Start: 2023-08-02 | End: 2023-08-02

## 2023-08-02 RX ORDER — DIAZEPAM 5 MG
5-20 TABLET ORAL EVERY 30 MIN PRN
Status: DISCONTINUED | OUTPATIENT
Start: 2023-08-02 | End: 2023-08-06 | Stop reason: HOSPADM

## 2023-08-02 RX ORDER — MAGNESIUM HYDROXIDE/ALUMINUM HYDROXICE/SIMETHICONE 120; 1200; 1200 MG/30ML; MG/30ML; MG/30ML
30 SUSPENSION ORAL ONCE
Status: COMPLETED | OUTPATIENT
Start: 2023-08-02 | End: 2023-08-02

## 2023-08-02 RX ORDER — HYDROXYZINE HYDROCHLORIDE 50 MG/1
50 TABLET, FILM COATED ORAL ONCE
Status: COMPLETED | OUTPATIENT
Start: 2023-08-02 | End: 2023-08-02

## 2023-08-02 RX ADMIN — MULTIPLE VITAMINS W/ MINERALS TAB 1 TABLET: TAB at 21:31

## 2023-08-02 RX ADMIN — HYDROXYZINE HYDROCHLORIDE 50 MG: 50 TABLET, FILM COATED ORAL at 22:37

## 2023-08-02 RX ADMIN — FOLIC ACID 1 MG: 1 TABLET ORAL at 21:31

## 2023-08-02 RX ADMIN — DIAZEPAM 10 MG: 5 TABLET ORAL at 22:08

## 2023-08-02 RX ADMIN — DIAZEPAM 10 MG: 5 TABLET ORAL at 20:40

## 2023-08-02 RX ADMIN — SODIUM CHLORIDE 1000 ML: 9 INJECTION, SOLUTION INTRAVENOUS at 19:46

## 2023-08-02 RX ADMIN — THIAMINE HCL TAB 100 MG 100 MG: 100 TAB at 21:31

## 2023-08-02 RX ADMIN — ALUMINUM HYDROXIDE, MAGNESIUM HYDROXIDE, AND SIMETHICONE 30 ML: 200; 200; 20 SUSPENSION ORAL at 21:46

## 2023-08-02 ASSESSMENT — ACTIVITIES OF DAILY LIVING (ADL)
ADLS_ACUITY_SCORE: 35

## 2023-08-02 NOTE — ED TRIAGE NOTES
Patient presents seeking detox from alcohol; last drink at 1700 today. Patient reports drinking up to 750 ml of vodka per day. Patient denies history of seizures. Patient denies street drug use. Patient smokes cigarettes on occasion.      Triage Assessment       Row Name 08/02/23 5579       Triage Assessment (Adult)    Airway WDL WDL       Respiratory WDL    Respiratory WDL WDL       Skin Circulation/Temperature WDL    Skin Circulation/Temperature WDL WDL       Cardiac WDL    Cardiac WDL WDL       Peripheral/Neurovascular WDL    Peripheral Neurovascular WDL WDL       Cognitive/Neuro/Behavioral WDL    Cognitive/Neuro/Behavioral WDL WDL

## 2023-08-03 ENCOUNTER — PATIENT OUTREACH (OUTPATIENT)
Dept: GERIATRIC MEDICINE | Facility: CLINIC | Age: 63
End: 2023-08-03
Payer: MEDICARE

## 2023-08-03 PROBLEM — F10.230 ALCOHOL DEPENDENCE WITH UNCOMPLICATED WITHDRAWAL (H): Status: ACTIVE | Noted: 2023-08-03

## 2023-08-03 LAB
CHOLEST SERPL-MCNC: 228 MG/DL
DEPRECATED CALCIDIOL+CALCIFEROL SERPL-MC: 29 UG/L (ref 20–75)
FOLATE SERPL-MCNC: 24.4 NG/ML (ref 4.6–34.8)
GGT SERPL-CCNC: 145 U/L (ref 8–61)
HBA1C MFR BLD: 5.9 %
HDLC SERPL-MCNC: 23 MG/DL
LDLC SERPL CALC-MCNC: ABNORMAL MG/DL
NONHDLC SERPL-MCNC: 205 MG/DL
TRIGL SERPL-MCNC: 1025 MG/DL
TSH SERPL DL<=0.005 MIU/L-ACNC: 0.8 UIU/ML (ref 0.3–4.2)
VIT B12 SERPL-MCNC: 287 PG/ML (ref 232–1245)

## 2023-08-03 PROCEDURE — 82607 VITAMIN B-12: CPT | Performed by: PSYCHIATRY & NEUROLOGY

## 2023-08-03 PROCEDURE — 99223 1ST HOSP IP/OBS HIGH 75: CPT | Mod: AI | Performed by: PSYCHIATRY & NEUROLOGY

## 2023-08-03 PROCEDURE — 80061 LIPID PANEL: CPT | Performed by: PSYCHIATRY & NEUROLOGY

## 2023-08-03 PROCEDURE — 84443 ASSAY THYROID STIM HORMONE: CPT | Performed by: PSYCHIATRY & NEUROLOGY

## 2023-08-03 PROCEDURE — 82746 ASSAY OF FOLIC ACID SERUM: CPT | Performed by: PSYCHIATRY & NEUROLOGY

## 2023-08-03 PROCEDURE — 250N000013 HC RX MED GY IP 250 OP 250 PS 637: Performed by: PSYCHIATRY & NEUROLOGY

## 2023-08-03 PROCEDURE — 99221 1ST HOSP IP/OBS SF/LOW 40: CPT | Mod: AI

## 2023-08-03 PROCEDURE — 128N000004 HC R&B CD ADULT

## 2023-08-03 PROCEDURE — 36415 COLL VENOUS BLD VENIPUNCTURE: CPT | Performed by: PSYCHIATRY & NEUROLOGY

## 2023-08-03 PROCEDURE — 82977 ASSAY OF GGT: CPT | Performed by: PSYCHIATRY & NEUROLOGY

## 2023-08-03 PROCEDURE — 83036 HEMOGLOBIN GLYCOSYLATED A1C: CPT | Performed by: PSYCHIATRY & NEUROLOGY

## 2023-08-03 PROCEDURE — 250N000013 HC RX MED GY IP 250 OP 250 PS 637: Performed by: EMERGENCY MEDICINE

## 2023-08-03 RX ORDER — MAGNESIUM HYDROXIDE/ALUMINUM HYDROXICE/SIMETHICONE 120; 1200; 1200 MG/30ML; MG/30ML; MG/30ML
30 SUSPENSION ORAL EVERY 4 HOURS PRN
Status: DISCONTINUED | OUTPATIENT
Start: 2023-08-03 | End: 2023-08-06 | Stop reason: HOSPADM

## 2023-08-03 RX ORDER — CITALOPRAM HYDROBROMIDE 20 MG/1
20 TABLET ORAL DAILY
Status: DISCONTINUED | OUTPATIENT
Start: 2023-08-03 | End: 2023-08-06 | Stop reason: HOSPADM

## 2023-08-03 RX ORDER — LISINOPRIL 20 MG/1
40 TABLET ORAL DAILY
Status: DISCONTINUED | OUTPATIENT
Start: 2023-08-03 | End: 2023-08-06 | Stop reason: HOSPADM

## 2023-08-03 RX ORDER — POLYETHYLENE GLYCOL 3350 17 G/17G
17 POWDER, FOR SOLUTION ORAL DAILY PRN
Status: DISCONTINUED | OUTPATIENT
Start: 2023-08-03 | End: 2023-08-06 | Stop reason: HOSPADM

## 2023-08-03 RX ORDER — TRAZODONE HYDROCHLORIDE 50 MG/1
50 TABLET, FILM COATED ORAL
Status: DISCONTINUED | OUTPATIENT
Start: 2023-08-03 | End: 2023-08-06 | Stop reason: HOSPADM

## 2023-08-03 RX ORDER — ACETAMINOPHEN 325 MG/1
650 TABLET ORAL EVERY 4 HOURS PRN
Status: DISCONTINUED | OUTPATIENT
Start: 2023-08-03 | End: 2023-08-06 | Stop reason: HOSPADM

## 2023-08-03 RX ORDER — HYDROCHLOROTHIAZIDE 12.5 MG/1
12.5 TABLET ORAL DAILY
Status: DISCONTINUED | OUTPATIENT
Start: 2023-08-03 | End: 2023-08-06 | Stop reason: HOSPADM

## 2023-08-03 RX ORDER — GABAPENTIN 300 MG/1
300 CAPSULE ORAL 3 TIMES DAILY
Status: DISCONTINUED | OUTPATIENT
Start: 2023-08-03 | End: 2023-08-06 | Stop reason: HOSPADM

## 2023-08-03 RX ORDER — HYDROXYZINE HYDROCHLORIDE 25 MG/1
25 TABLET, FILM COATED ORAL EVERY 4 HOURS PRN
Status: DISCONTINUED | OUTPATIENT
Start: 2023-08-03 | End: 2023-08-06 | Stop reason: HOSPADM

## 2023-08-03 RX ORDER — NICOTINE 21 MG/24HR
1 PATCH, TRANSDERMAL 24 HOURS TRANSDERMAL DAILY
Status: DISCONTINUED | OUTPATIENT
Start: 2023-08-04 | End: 2023-08-06 | Stop reason: HOSPADM

## 2023-08-03 RX ORDER — DONEPEZIL HYDROCHLORIDE 5 MG/1
10 TABLET, FILM COATED ORAL DAILY
COMMUNITY
End: 2023-08-17

## 2023-08-03 RX ORDER — ARIPIPRAZOLE 5 MG/1
5 TABLET ORAL DAILY
Status: DISCONTINUED | OUTPATIENT
Start: 2023-08-03 | End: 2023-08-06 | Stop reason: HOSPADM

## 2023-08-03 RX ORDER — DONEPEZIL HYDROCHLORIDE 10 MG/1
10 TABLET, FILM COATED ORAL DAILY
Status: DISCONTINUED | OUTPATIENT
Start: 2023-08-04 | End: 2023-08-06 | Stop reason: HOSPADM

## 2023-08-03 RX ORDER — SIMVASTATIN 20 MG
40 TABLET ORAL EVERY EVENING
Status: DISCONTINUED | OUTPATIENT
Start: 2023-08-03 | End: 2023-08-06 | Stop reason: HOSPADM

## 2023-08-03 RX ORDER — HYDROXYZINE HYDROCHLORIDE 25 MG/1
25-50 TABLET, FILM COATED ORAL 3 TIMES DAILY PRN
Status: DISCONTINUED | OUTPATIENT
Start: 2023-08-03 | End: 2023-08-03

## 2023-08-03 RX ADMIN — GABAPENTIN 300 MG: 300 CAPSULE ORAL at 20:56

## 2023-08-03 RX ADMIN — DIAZEPAM 10 MG: 5 TABLET ORAL at 01:19

## 2023-08-03 RX ADMIN — HYDROXYZINE HYDROCHLORIDE 25 MG: 25 TABLET, FILM COATED ORAL at 20:56

## 2023-08-03 RX ADMIN — DIAZEPAM 10 MG: 5 TABLET ORAL at 16:43

## 2023-08-03 RX ADMIN — DIAZEPAM 10 MG: 5 TABLET ORAL at 08:24

## 2023-08-03 RX ADMIN — HYDROXYZINE HYDROCHLORIDE 25 MG: 25 TABLET, FILM COATED ORAL at 16:43

## 2023-08-03 RX ADMIN — ARIPIPRAZOLE 5 MG: 5 TABLET ORAL at 08:23

## 2023-08-03 RX ADMIN — TRAZODONE HYDROCHLORIDE 50 MG: 50 TABLET ORAL at 20:56

## 2023-08-03 RX ADMIN — GABAPENTIN 300 MG: 300 CAPSULE ORAL at 14:43

## 2023-08-03 RX ADMIN — CITALOPRAM HYDROBROMIDE 20 MG: 20 TABLET ORAL at 08:23

## 2023-08-03 RX ADMIN — DIAZEPAM 10 MG: 5 TABLET ORAL at 12:56

## 2023-08-03 RX ADMIN — GABAPENTIN 300 MG: 300 CAPSULE ORAL at 10:54

## 2023-08-03 RX ADMIN — LISINOPRIL 40 MG: 20 TABLET ORAL at 08:24

## 2023-08-03 RX ADMIN — HYDROCHLOROTHIAZIDE 12.5 MG: 12.5 TABLET ORAL at 08:24

## 2023-08-03 ASSESSMENT — LIFESTYLE VARIABLES
SKIP TO QUESTIONS 9-10: 0
AUDIT-C TOTAL SCORE: 12

## 2023-08-03 ASSESSMENT — ACTIVITIES OF DAILY LIVING (ADL)
ADLS_ACUITY_SCORE: 32
DRESS: INDEPENDENT
ADLS_ACUITY_SCORE: 32
ORAL_HYGIENE: INDEPENDENT
DRESS: SCRUBS (BEHAVIORAL HEALTH)
HYGIENE/GROOMING: INDEPENDENT
ADLS_ACUITY_SCORE: 32
ADLS_ACUITY_SCORE: 32
LAUNDRY: WITH SUPERVISION
ADLS_ACUITY_SCORE: 32
HYGIENE/GROOMING: INDEPENDENT
ADLS_ACUITY_SCORE: 32
DRESS: INDEPENDENT
ADLS_ACUITY_SCORE: 32
ORAL_HYGIENE: INDEPENDENT
ADLS_ACUITY_SCORE: 32
ADLS_ACUITY_SCORE: 35
ORAL_HYGIENE: INDEPENDENT
HYGIENE/GROOMING: INDEPENDENT
ADLS_ACUITY_SCORE: 32
LAUNDRY: WITH SUPERVISION
LAUNDRY: WITH SUPERVISION
ADLS_ACUITY_SCORE: 32
ADLS_ACUITY_SCORE: 32

## 2023-08-03 NOTE — PROGRESS NOTES
[facility] Care Coordination Contact    [facility]  Ambulatory Care Coordination to Inpatient Care Management   Hand-In Communication    Date:  August 3, 2023  Name: Durga Aguirre is enrolled in [facility] Care Coordination program and I am the Lead Care Coordinator.  CC Contact Information:.   Payor Source: Payor: MEDICARE / Plan: MEDICARE / Product Type: Medicare /   Current services in place:     Please see the CC Snaphot and Care Management Flowsheets for specific  details of this Durga Aguirre care plan.   Additional details/specific concerns r/t this admission:    No additional concerns at this time      I will follow this admission in Epic. Please feel free to contact me with questions or for further collaboration in discharge planning.    Varsha Stewart RN, BSN, PHN  [facility]  [phone]  Fax: [phone]

## 2023-08-03 NOTE — TELEPHONE ENCOUNTER
S: [facility] , Provider Dr. Menjivar calling at 10:56pm with clinical on a 63 year old/Male presenting for alcohol detox. -     B: Pt presents for ETOH detox.   Currently reports drinking 1 L of vodka for since April.    Patient reports last use was today.  Pt CACHORRO: .049    Pt  denies hx of DT  Pt  denies hx of seizures. Last seizure: N/A  Pt endorsing the following symptoms of withdrawal: Tremulous and Nausea  MSSA Score: 9 Valium rec'd    Pt denies acute mental health or medical concerns.   Pt denies other drug use: None Amount/frequency: N/A    Does Pt have a detox care plan in Epic? none  Does pt present with specific needs, assistive devices, or exclusionary criteria? None  Is the patient ambulating, eating and drinking in the ED? Yes    A: Pt meets criteria to be presented for IP detox admission. Patient is voluntary    COVID Symptoms: No  If yes, COVID test required   Utox: Ordered, not yet collected  CMP: WNL  CBC: Abnormalities: RBC 3.49; Hemo 10.2; Hema 30.4  HCG: N/A     R: Patient cleared and ready for behavioral bed placement: Yes    Pt is meeting criteria for presentation to [facility]    23:06: Paged [facility]      23:13: Intake rec'd call from [facility] accepting this pt to station [facility]/Stephen    23:30: Intake called  and provided disposition to CRN. Nurse report Charge will call.     23:38 Intake called [place] [facility] and provided [place] [facility]

## 2023-08-03 NOTE — PLAN OF CARE
Behavioral Team Discussion: (8/3/2023)    Continued Stay Criteria/Rationale: Patient admitted for  Alcohol Use Disorder.  Plan: The following services will be provided to the patient; psychiatric assessment, medication management, therapeutic milieu, individual and group support, and skills groups.   Participants: 3A Provider: Dr. Wally Aj MD; 3A RN: Meagan Wood, RN; 3A CM's: Lauro Lopez.  Summary/Recommendation: Providers will assess today for treatment recommendations, discharge planning, and aftercare plans. CM will meet with pt for discharge planning.   Medical/Physical: Patient denies any medical or physical concerns at this time.  Precautions:   Behavioral Orders   Procedures    Code 1 - Restrict to Unit    Routine Programming     As clinically indicated    Status 15     Every 15 minutes.    Withdrawal precautions     Rationale for change in precautions or plan: N/A  Progress: Initial.    ASAM Dimension Scale Ratings:  Dimension 1: 3 Client tolerates and ck with withdrawal discomfort poorly. Client has severe intoxication, such that the client endangers self or others, or intoxication has not abated with less intensive levels of services. Client displays severe signs and symptoms; or risk of severe, but manageable withdrawal; or withdrawal worsening despite detox at less intensive level.  Dimension 2: 1 Client tolerates and ck with physical discomfort and is able to get the services that the client needs.  Dimension 3: 2 Client has difficulty with impulse control and lacks coping skills. Client has thoughts of suicide or harm to others without means; however, the thoughts may interfere with participation in some treatment activities. Client has difficulty functioning in significant life areas. Client has moderate symptoms of emotional, behavioral, or cognitive problems. Client is able to participate in most treatment activities.  Dimension 4: 3 Client displays inconsistent compliance, minimal  awareness of either the client's addiction or mental disorder, and is minimally cooperative.  Dimension 5: 3 Client has poor recognition and understanding of relapse and recidivism issues and displays moderately high vulnerability for further substance use or mental health problems. Client has few coping skills and rarely applies coping skills.  Dimension 6: 3 Client is not engaged in structured, meaningful activity and the client's peers, family, significant other, and living environment are unsupportive, or there is significant criminal justice system involvement.

## 2023-08-03 NOTE — PHARMACY-ADMISSION MEDICATION HISTORY
Pharmacist Admission Medication History    Admission medication history is complete. The information provided in this note is only as accurate as the sources available at the time of the update.    Medication reconciliation/reorder completed by provider prior to medication history? Yes    Information Source(s): Patient, Clinic records, and CareEverywhere/SureScripts via phone    Pertinent Information: Durga reported he does not know the names of his medications. He was able to confirm he takes a medication in which he take two tablets per day so it could be donepezil (filled at two 5 mg tablets for a total daily dose of 10 mg) or naltrexone (filled at two 50 mg tablets for a total daily dose of 100 mg). Unable to find documentation in the clinic records about donepezil but it was last filled on 06/17/2023 for quantity 60 for 30 and on 05/25/2023 for quantity 60 for 30. Fill records show it was prescribed by Dr. Solitario. Added donepezil to the PTA med list because patient thought he takes it but was not completely confident.    Changes made to PTA medication list:  Added: donepezil  Deleted: None  Changed: None    Medication History Completed By: Susanna Murray CAITLYN 8/3/2023 6:47 PM    Prior to Admission medications    Medication Sig Last Dose Taking? Auth Provider Long Term End Date   ARIPiprazole (ABILIFY) 5 MG tablet Take 1 tablet (5 mg) by mouth daily 8/1/2023 Yes Rohit Solitario MD Yes    citalopram (CELEXA) 20 MG tablet Take 1 tablet (20 mg) by mouth daily 8/1/2023 Yes Mame June MD Yes    donepezil (ARICEPT) 5 MG tablet Take 10 mg by mouth daily Unknown Yes Unknown, Entered By History     gabapentin (NEURONTIN) 300 MG capsule Take 1 capsule (300 mg) by mouth 3 times daily 8/1/2023 Yes Rohit Solitario MD Yes    hydrochlorothiazide (HYDRODIURIL) 12.5 MG tablet Take 1 tablet (12.5 mg) by mouth daily 8/2/2023 Yes Mame June MD Yes    hydrOXYzine (ATARAX) 50 MG tablet Take 0.5-1  tablets (25-50 mg) by mouth 3 times daily as needed for anxiety 8/2/2023 Yes Rohit Solitario MD No    lisinopril (ZESTRIL) 40 MG tablet Take 1 tablet (40 mg) by mouth daily Hold for a systolic blood pressure of 110 8/2/2023 Yes Mame June MD Yes    magnesium oxide (MAG-OX) 400 MG tablet Take 1 tablet (400 mg) by mouth 2 times daily 8/1/2023 Yes Rohit Solitario MD     Melatonin 10 MG TABS tablet Take 10 mg by mouth nightly as needed for sleep  Yes Unknown, Entered By History     multivitamin w/minerals (THERA-VIT-M) tablet Take 1 tablet by mouth daily 8/1/2023 Yes Mame June MD     naltrexone (DEPADE/REVIA) 50 MG tablet Take 2 tablets (100 mg) by mouth daily 8/1/2023 Yes Rohit Solitario MD Yes    nicotine (NICODERM CQ) 14 MG/24HR 24 hr patch Place 1 patch onto the skin every 24 hours Past Month Yes Unknown, Entered By History No    nicotine (NICORETTE) 2 MG gum Place 2 mg inside cheek every hour as needed for smoking cessation Past Week Yes Unknown, Entered By History No    simvastatin (ZOCOR) 40 MG tablet Take 1 tablet (40 mg) by mouth every evening 8/1/2023 Yes Mame June MD Yes    thiamine (B-1) 100 MG tablet Take 1 tablet (100 mg) by mouth daily 8/1/2023 Yes Rohit Solitario MD No    traZODone (DESYREL) 50 MG tablet Take 1 tablet (50 mg) by mouth nightly as needed for sleep  Yes Rohit Solitario MD Yes

## 2023-08-03 NOTE — PLAN OF CARE
Goal Outcome Evaluation:    Plan of Care Reviewed With: patient        Patient had a good shift, visible  in milieu; flat affect.  Denies SI/SIB, rated depression 8/10, anxiety 9/10.  MSSA this shift, 8 and 9, received 10 mg valium x2 per protocol.  Patient told staff he's feeling sad because he's not been able to talk to his mother and sister.  Also, patient would like to talk to a ; there is order for this.  Good appetite, medication compliant.  Patient currently in the [facility] area watching T.V with peers.  Will continue to monitor closely.

## 2023-08-03 NOTE — H&P
"[facility], [place]   Psychiatric History and Physical  Admission date: 8/2/2023        Chief Complaint:   \"Alcohol\"         HPI:     The patient is a 62yo male with a history of alcohol use disorder and depression who was admitted to detoxify from alcohol. He reports that he is feeling better. Denies nausea. Did get some good sleep last night. The patient relapsed after his mother passed away. Mood is anxious but denies any SI. Denies any history of withdrawal seizures or DTs. Has been taking his medications regularly. Was on Naltrexone and it was helpful until he stopped taking it. Open to CD treatment.      Per ER:  Durga Aguirre is a 63 year old male who presents to the emergency department seeking detox from alcohol.  Patient states has been drinking alcohol daily since he was released from detox in April of this year.  He states that he drinks up to 1 L of vodka per day.  He last drink 2 hours prior to coming to the emergency department.  He reports a history of tremulousness as well as nausea with attempted alcohol cessation.  No history of alcohol withdrawal seizures or DTs.  He denies any recent fall or injury.  He denies any depression or suicidal ideation.  He denies any recent illness or medical concerns.         Past Psychiatric History:     MDD. On Celexa, Abilify and gabapentin recently.   Denies any history of suicide attempts.         Substance Use and History:     Alcohol use disorder. Denies withdrawal seizures or DTs. Records indicate possible hallucinations in withdrawal. Has been on Naltrexone. 1/4 PPD smoker.         Past Medical History:   PAST MEDICAL HISTORY:   Past Medical History:   Diagnosis Date    Alcohol abuse     Anxiety and depression     Arthritis     Hyperlipidemia     Hypertension     Mass of left chest wall     Other spontaneous pneumothorax 1997       PAST SURGICAL HISTORY:   Past Surgical History:   Procedure Laterality Date    COLONOSCOPY       " EXCISION PARTIAL TALUS OR CALCANEUS, BONE      fx calcaneus- 9 screws in foot             Family History:   FAMILY HISTORY:   Family History   Problem Relation Age of Onset    No Known Problems Mother     Lymphoma Father     No Known Problems Maternal Grandmother     No Known Problems Maternal Grandfather     No Known Problems Paternal Grandmother     No Known Problems Paternal Grandfather     No Known Problems Sister     No Known Problems Son     No Known Problems Brother     No Known Problems Daughter     No Known Problems Other     Unknown/Adopted No family hx of     Depression No family hx of     Anxiety Disorder No family hx of     Schizophrenia No family hx of     Bipolar Disorder No family hx of     Suicide No family hx of     Substance Abuse No family hx of     Dementia No family hx of     Calhoun Disease No family hx of     Parkinsonism No family hx of     Autism Spectrum Disorder No family hx of     Intellectual Disability (Mental Retardation) No family hx of     Mental Illness No family hx of            Social History:   Please see the full psychosocial profile from the clinical treatment coordinator.   SOCIAL HISTORY:   Social History     Tobacco Use    Smoking status: Every Day     Packs/day: 0.25     Years: 20.00     Pack years: 5.00     Types: Cigarettes    Smokeless tobacco: Never    Tobacco comments:     Smokes 2-3 1/2 cigs daily   Substance Use Topics    Alcohol use: Yes     Comment: daily use 1 L vodka            Physical ROS:   The 10-point review of systems was negative except as noted in HPI.         PTA Medications:     Medications Prior to Admission   Medication Sig Dispense Refill Last Dose    ARIPiprazole (ABILIFY) 5 MG tablet Take 1 tablet (5 mg) by mouth daily 30 tablet 0 8/1/2023    citalopram (CELEXA) 20 MG tablet Take 1 tablet (20 mg) by mouth daily 30 tablet 0 8/1/2023    gabapentin (NEURONTIN) 300 MG capsule Take 1 capsule (300 mg) by mouth 3 times daily 90 capsule 5 8/1/2023     hydrochlorothiazide (HYDRODIURIL) 12.5 MG tablet Take 1 tablet (12.5 mg) by mouth daily 90 tablet 0 8/1/2023    hydrOXYzine (ATARAX) 50 MG tablet Take 0.5-1 tablets (25-50 mg) by mouth 3 times daily as needed for anxiety 60 tablet 0 8/2/2023    lisinopril (ZESTRIL) 40 MG tablet Take 1 tablet (40 mg) by mouth daily Hold for a systolic blood pressure of 110 30 tablet 0 8/2/2023    magnesium oxide (MAG-OX) 400 MG tablet Take 1 tablet (400 mg) by mouth 2 times daily 60 tablet 0 8/1/2023    Melatonin 10 MG TABS tablet Take 10 mg by mouth nightly as needed for sleep   8/1/2023    multivitamin w/minerals (THERA-VIT-M) tablet Take 1 tablet by mouth daily 90 tablet 0 8/1/2023    naltrexone (DEPADE/REVIA) 50 MG tablet Take 2 tablets (100 mg) by mouth daily 60 tablet 2 8/1/2023    nicotine (NICODERM CQ) 14 MG/24HR 24 hr patch Place 1 patch onto the skin every 24 hours   Past Month    nicotine (NICORETTE) 2 MG gum Place 2 mg inside cheek every hour as needed for smoking cessation   Past Week    simvastatin (ZOCOR) 40 MG tablet Take 1 tablet (40 mg) by mouth every evening 30 tablet 0 8/1/2023    thiamine (B-1) 100 MG tablet Take 1 tablet (100 mg) by mouth daily 90 tablet 0 8/1/2023    traZODone (DESYREL) 50 MG tablet Take 1 tablet (50 mg) by mouth nightly as needed for sleep 30 tablet 5 8/1/2023          Allergies:   No Known Allergies       Labs:     Recent Results (from the past 48 hour(s))   Alcohol breath test POCT    Collection Time: 08/02/23  7:37 PM   Result Value Ref Range    Alcohol Breath Test 0.049 (A) 0.00 - 0.01   Comprehensive metabolic panel    Collection Time: 08/02/23  7:45 PM   Result Value Ref Range    Sodium 140 136 - 145 mmol/L    Potassium 3.8 3.4 - 5.3 mmol/L    Chloride 100 98 - 107 mmol/L    Carbon Dioxide (CO2) 28 22 - 29 mmol/L    Anion Gap 12 7 - 15 mmol/L    Urea Nitrogen 9.1 8.0 - 23.0 mg/dL    Creatinine 0.95 0.67 - 1.17 mg/dL    Calcium 8.5 (L) 8.8 - 10.2 mg/dL    Glucose 139 (H) 70 - 99 mg/dL     "Alkaline Phosphatase 112 40 - 129 U/L    AST 35 0 - 45 U/L    ALT 37 0 - 70 U/L    Protein Total 5.8 (L) 6.4 - 8.3 g/dL    Albumin 3.6 3.5 - 5.2 g/dL    Bilirubin Total 0.2 <=1.2 mg/dL    GFR Estimate 89 >60 mL/min/1.73m2   Magnesium    Collection Time: 08/02/23  7:45 PM   Result Value Ref Range    Magnesium 1.8 1.7 - 2.3 mg/dL   CBC with platelets and differential    Collection Time: 08/02/23  7:45 PM   Result Value Ref Range    WBC Count 5.3 4.0 - 11.0 10e3/uL    RBC Count 3.49 (L) 4.40 - 5.90 10e6/uL    Hemoglobin 10.2 (L) 13.3 - 17.7 g/dL    Hematocrit 30.4 (L) 40.0 - 53.0 %    MCV 87 78 - 100 fL    MCH 29.2 26.5 - 33.0 pg    MCHC 33.6 31.5 - 36.5 g/dL    RDW 12.8 10.0 - 15.0 %    Platelet Count 235 150 - 450 10e3/uL    % Neutrophils 57 %    % Lymphocytes 25 %    % Monocytes 8 %    % Eosinophils 8 %    % Basophils 2 %    % Immature Granulocytes 0 %    NRBCs per 100 WBC 0 <1 /100    Absolute Neutrophils 3.0 1.6 - 8.3 10e3/uL    Absolute Lymphocytes 1.3 0.8 - 5.3 10e3/uL    Absolute Monocytes 0.4 0.0 - 1.3 10e3/uL    Absolute Eosinophils 0.5 0.0 - 0.7 10e3/uL    Absolute Basophils 0.1 0.0 - 0.2 10e3/uL    Absolute Immature Granulocytes 0.0 <=0.4 10e3/uL    Absolute NRBCs 0.0 10e3/uL   Drug abuse screen 1 urine (ED)    Collection Time: 08/02/23  9:05 PM   Result Value Ref Range    Amphetamines Urine Screen Negative Screen Negative    Barbituates Urine Screen Negative Screen Negative    Benzodiazepine Urine Screen Positive (A) Screen Negative    Cannabinoids Urine Screen Negative Screen Negative    Cocaine Urine Screen Negative Screen Negative    Opiates Urine Screen Negative Screen Negative          Physical and Psychiatric Examination:     BP (!) 154/89 (BP Location: Right arm, Patient Position: Supine, Cuff Size: Adult Large)   Pulse 76   Temp 97  F (36.1  C) (Temporal)   Resp 18   Ht 1.778 m (5' 10\")   Wt 100.7 kg (222 lb)   SpO2 96%   BMI 31.85 kg/m    Weight is 222 lbs 0 oz  Body mass index is 31.85 " kg/m .    Physical Exam:  I have reviewed the physical exam as documented by the medical team and agree with findings and assessment and have no additional findings to add at this time.    Mental Status Exam:  Appearance: awake, alert and adequately groomed  Attitude:  cooperative  Eye Contact:  good  Mood:  anxious  Affect:  mood congruent  Speech:  clear, coherent  Language: fluent and intact in English  Psychomotor, Gait, Musculoskeletal:  no evidence of tardive dyskinesia, dystonia, or tics  Thought Process:  goal oriented  Associations:  no loose associations  Thought Content:  no evidence of suicidal ideation or homicidal ideation and no evidence of psychotic thought  Insight:  fair  Judgement:  intact  Oriented to:  time, person, and place  Attention Span and Concentration:  intact  Recent and Remote Memory:  fair  Fund of Knowledge:  appropriate         Admission Diagnoses:     Alcohol use disorder, severe  Alcohol withdrawal with unspecified complication   MDD, recurrent, moderate  Nicotine dependence with current use          Assessment & Plan:     1) MSSA with Valium.   2) Continue Abilify, Celexa and gabapentin.   3) Nicotine replacement available.   4) Will resume Naltrexone after detox is completed.   5) Patient open to CD treatment, likely at [facility].     Disposition Plan   Reason for ongoing admission: requires detoxification from substance that poses a risk of bodily harm during withdrawal period  Discharge location: Chemical dependency treatment facility  Discharge Medications: not ordered  Follow-up Appointments: not scheduled  Legal Status: voluntary    Entered by: Wally Aj MD on 8/3/2023 at 5:09 AM

## 2023-08-03 NOTE — PROGRESS NOTES
SPIRITUAL HEALTH SERVICES  SPIRITUAL ASSESSMENT Progress Note  [facility] [place] [place]     REFERRAL SOURCE: consult request    Assessed with pt's nurse that Unit joshua Barrios would visit tomorrow.     PLAN: will communicate care request to Unit joshua Barrios. Spiritual health services remains available for any follow-up or requests     Tabby Lucia MTS  Associate   Pager: [phone]

## 2023-08-03 NOTE — PROGRESS NOTES
08/03/23 0156   Patient Belongings   Did you bring any home meds/supplements to the hospital?  Yes   Disposition of meds  Returned to patient   Patient Belongings Remaining with Patient other (see comments)   Patient Belongings Put in Hospital Secure Location (Security or Locker, etc.) other (see comments)   Belongings Search Yes   Clothing Search Yes   Second Staff Dilcia     Storage Bin: Nothing  Med-Room Bin: Wallet, mp3 player, headphones, toothpaste,  sun glasses, phone, phone , USB cord.   Security Envelope (#[id number]), sent to security: 120 dollars and 74 cents, Visa card-[id number], Master card -[id number], [employer] card, diver license.     ADMISSION:    I am responsible for any personal items that are not sent to the safe or pharmacy. [facility] is not responsible for loss, theft or damage of any property in my possession.    Patient Signature _________________________________________ Date/Time _____________________    Staff Signature ___________________________________________ Date/Time _____________________    2nd Staff person, if patient is unable/unwilling to sign    ________________________________________________________ Date/Time _____________________    DISCHARGE:    All personal items have been returned to me.    Patient Signature _________________________________________ Date/Time _____________________    Staff Signature ___________________________________________ Date/Time _____________________

## 2023-08-03 NOTE — ED PROVIDER NOTES
ED Provider Note  [facility]      History     Chief Complaint   Patient presents with    Drug / Alcohol Assessment     Patient presents seeking detox from alcohol; last drink at 1700 today. Patient reports drinking up to 750 ml of vodka per day. Patient denies history of seizures. Patient denies street drug use. Patient smokes cigarettes on occasion.      YUE Aguirre is a 63 year old male who presents to the emergency department seeking detox from alcohol.  Patient states has been drinking alcohol daily since he was released from detox in April of this year.  He states that he drinks up to 1 L of vodka per day.  He last drink 2 hours prior to coming to the emergency department.  He reports a history of tremulousness as well as nausea with attempted alcohol cessation.  No history of alcohol withdrawal seizures or DTs.  He denies any recent fall or injury.  He denies any depression or suicidal ideation.  He denies any recent illness or medical concerns.    Past Medical History  Past Medical History:   Diagnosis Date    Alcohol abuse     Anxiety and depression     Arthritis     Hyperlipidemia     Hypertension     Mass of left chest wall     Other spontaneous pneumothorax 1997     Past Surgical History:   Procedure Laterality Date    COLONOSCOPY      HC EXCISION PARTIAL TALUS OR CALCANEUS, BONE      fx calcaneus- 9 screws in foot     ARIPiprazole (ABILIFY) 5 MG tablet  citalopram (CELEXA) 20 MG tablet  gabapentin (NEURONTIN) 300 MG capsule  hydrochlorothiazide (HYDRODIURIL) 12.5 MG tablet  hydrOXYzine (ATARAX) 50 MG tablet  lisinopril (ZESTRIL) 40 MG tablet  magnesium oxide (MAG-OX) 400 MG tablet  Melatonin 10 MG TABS tablet  multivitamin w/minerals (THERA-VIT-M) tablet  naltrexone (DEPADE/REVIA) 50 MG tablet  nicotine (NICODERM CQ) 14 MG/24HR 24 hr patch  nicotine (NICORETTE) 2 MG gum  simvastatin (ZOCOR) 40 MG tablet  thiamine (B-1) 100 MG tablet  traZODone (DESYREL) 50 MG  "tablet      No Known Allergies  Family History  Family History   Problem Relation Age of Onset    No Known Problems Mother     Lymphoma Father     No Known Problems Maternal Grandmother     No Known Problems Maternal Grandfather     No Known Problems Paternal Grandmother     No Known Problems Paternal Grandfather     No Known Problems Sister     No Known Problems Son     No Known Problems Brother     No Known Problems Daughter     No Known Problems Other     Unknown/Adopted No family hx of     Depression No family hx of     Anxiety Disorder No family hx of     Schizophrenia No family hx of     Bipolar Disorder No family hx of     Suicide No family hx of     Substance Abuse No family hx of     Dementia No family hx of     [place] Disease No family hx of     Parkinsonism No family hx of     Autism Spectrum Disorder No family hx of     Intellectual Disability (Mental Retardation) No family hx of     Mental Illness No family hx of      Social History   Social History     Tobacco Use    Smoking status: Every Day     Packs/day: 0.25     Years: 20.00     Pack years: 5.00     Types: Cigarettes    Smokeless tobacco: Never    Tobacco comments:     Smokes 2-3 1/2 cigs daily   Vaping Use    Vaping Use: Never used   Substance Use Topics    Alcohol use: Yes     Comment: daily use 1 L vodka    Drug use: Not Currently         A medically appropriate review of systems was performed with pertinent positives and negatives noted in the HPI, and all other systems negative.    Physical Exam   BP: 125/85  Pulse: 81  Temp: 98.8  F (37.1  C)  Resp: 14  Height: 177.8 cm (5' 10\")  Weight: 99.8 kg (220 lb)  SpO2: 94 %  Physical Exam  Vitals and nursing note reviewed.   Constitutional:       General: He is not in acute distress.     Appearance: Normal appearance. He is not diaphoretic.   HENT:      Head: Normocephalic and atraumatic.   Eyes:      General: No scleral icterus.     Pupils: Pupils are equal, round, and reactive to light. "   Cardiovascular:      Rate and Rhythm: Normal rate and regular rhythm.      Heart sounds: Normal heart sounds.   Pulmonary:      Effort: No respiratory distress.      Breath sounds: Normal breath sounds.   Abdominal:      General: Bowel sounds are normal.      Palpations: Abdomen is soft.      Tenderness: There is no abdominal tenderness.   Musculoskeletal:         General: No tenderness. Normal range of motion.   Skin:     General: Skin is warm and dry.      Findings: No rash.   Neurological:      General: No focal deficit present.      Mental Status: He is alert.      Motor: No weakness.      Coordination: Coordination normal.   Psychiatric:         Mood and Affect: Mood normal.         Behavior: Behavior normal.           ED Course, Procedures, & Data      Procedures               Results for orders placed or performed during the hospital encounter of 08/02/23   Comprehensive metabolic panel     Status: Abnormal   Result Value Ref Range    Sodium 140 136 - 145 mmol/L    Potassium 3.8 3.4 - 5.3 mmol/L    Chloride 100 98 - 107 mmol/L    Carbon Dioxide (CO2) 28 22 - 29 mmol/L    Anion Gap 12 7 - 15 mmol/L    Urea Nitrogen 9.1 8.0 - 23.0 mg/dL    Creatinine 0.95 0.67 - 1.17 mg/dL    Calcium 8.5 (L) 8.8 - 10.2 mg/dL    Glucose 139 (H) 70 - 99 mg/dL    Alkaline Phosphatase 112 40 - 129 U/L    AST 35 0 - 45 U/L    ALT 37 0 - 70 U/L    Protein Total 5.8 (L) 6.4 - 8.3 g/dL    Albumin 3.6 3.5 - 5.2 g/dL    Bilirubin Total 0.2 <=1.2 mg/dL    GFR Estimate 89 >60 mL/min/1.73m2   Magnesium     Status: Normal   Result Value Ref Range    Magnesium 1.8 1.7 - 2.3 mg/dL   CBC with platelets and differential     Status: Abnormal   Result Value Ref Range    WBC Count 5.3 4.0 - 11.0 10e3/uL    RBC Count 3.49 (L) 4.40 - 5.90 10e6/uL    Hemoglobin 10.2 (L) 13.3 - 17.7 g/dL    Hematocrit 30.4 (L) 40.0 - 53.0 %    MCV 87 78 - 100 fL    MCH 29.2 26.5 - 33.0 pg    MCHC 33.6 31.5 - 36.5 g/dL    RDW 12.8 10.0 - 15.0 %    Platelet Count 235  150 - 450 10e3/uL    % Neutrophils 57 %    % Lymphocytes 25 %    % Monocytes 8 %    % Eosinophils 8 %    % Basophils 2 %    % Immature Granulocytes 0 %    NRBCs per 100 WBC 0 <1 /100    Absolute Neutrophils 3.0 1.6 - 8.3 10e3/uL    Absolute Lymphocytes 1.3 0.8 - 5.3 10e3/uL    Absolute Monocytes 0.4 0.0 - 1.3 10e3/uL    Absolute Eosinophils 0.5 0.0 - 0.7 10e3/uL    Absolute Basophils 0.1 0.0 - 0.2 10e3/uL    Absolute Immature Granulocytes 0.0 <=0.4 10e3/uL    Absolute NRBCs 0.0 10e3/uL   Drug abuse screen 1 urine (ED)     Status: Abnormal   Result Value Ref Range    Amphetamines Urine Screen Negative Screen Negative    Barbituates Urine Screen Negative Screen Negative    Benzodiazepine Urine Screen Positive (A) Screen Negative    Cannabinoids Urine Screen Negative Screen Negative    Cocaine Urine Screen Negative Screen Negative    Opiates Urine Screen Negative Screen Negative   Alcohol breath test POCT     Status: Abnormal   Result Value Ref Range    Alcohol Breath Test 0.049 (A) 0.00 - 0.01   Urine Drugs of Abuse Screen     Status: Abnormal    Narrative    The following orders were created for panel order Urine Drugs of Abuse Screen.  Procedure                               Abnormality         Status                     ---------                               -----------         ------                     Drug abuse screen 1 urin...[[id number]]  Abnormal            Final result                 Please view results for these tests on the individual orders.   CBC with platelets differential     Status: Abnormal    Narrative    The following orders were created for panel order CBC with platelets differential.  Procedure                               Abnormality         Status                     ---------                               -----------         ------                     CBC with platelets and d...[[id number]]  Abnormal            Final result                 Please view results for these tests on the  individual orders.           Results for orders placed or performed during the hospital encounter of 08/02/23   Comprehensive metabolic panel     Status: Abnormal   Result Value Ref Range    Sodium 140 136 - 145 mmol/L    Potassium 3.8 3.4 - 5.3 mmol/L    Chloride 100 98 - 107 mmol/L    Carbon Dioxide (CO2) 28 22 - 29 mmol/L    Anion Gap 12 7 - 15 mmol/L    Urea Nitrogen 9.1 8.0 - 23.0 mg/dL    Creatinine 0.95 0.67 - 1.17 mg/dL    Calcium 8.5 (L) 8.8 - 10.2 mg/dL    Glucose 139 (H) 70 - 99 mg/dL    Alkaline Phosphatase 112 40 - 129 U/L    AST 35 0 - 45 U/L    ALT 37 0 - 70 U/L    Protein Total 5.8 (L) 6.4 - 8.3 g/dL    Albumin 3.6 3.5 - 5.2 g/dL    Bilirubin Total 0.2 <=1.2 mg/dL    GFR Estimate 89 >60 mL/min/1.73m2   Magnesium     Status: Normal   Result Value Ref Range    Magnesium 1.8 1.7 - 2.3 mg/dL   CBC with platelets and differential     Status: Abnormal   Result Value Ref Range    WBC Count 5.3 4.0 - 11.0 10e3/uL    RBC Count 3.49 (L) 4.40 - 5.90 10e6/uL    Hemoglobin 10.2 (L) 13.3 - 17.7 g/dL    Hematocrit 30.4 (L) 40.0 - 53.0 %    MCV 87 78 - 100 fL    MCH 29.2 26.5 - 33.0 pg    MCHC 33.6 31.5 - 36.5 g/dL    RDW 12.8 10.0 - 15.0 %    Platelet Count 235 150 - 450 10e3/uL    % Neutrophils 57 %    % Lymphocytes 25 %    % Monocytes 8 %    % Eosinophils 8 %    % Basophils 2 %    % Immature Granulocytes 0 %    NRBCs per 100 WBC 0 <1 /100    Absolute Neutrophils 3.0 1.6 - 8.3 10e3/uL    Absolute Lymphocytes 1.3 0.8 - 5.3 10e3/uL    Absolute Monocytes 0.4 0.0 - 1.3 10e3/uL    Absolute Eosinophils 0.5 0.0 - 0.7 10e3/uL    Absolute Basophils 0.1 0.0 - 0.2 10e3/uL    Absolute Immature Granulocytes 0.0 <=0.4 10e3/uL    Absolute NRBCs 0.0 10e3/uL   Drug abuse screen 1 urine (ED)     Status: Abnormal   Result Value Ref Range    Amphetamines Urine Screen Negative Screen Negative    Barbituates Urine Screen Negative Screen Negative    Benzodiazepine Urine Screen Positive (A) Screen Negative    Cannabinoids Urine Screen  Negative Screen Negative    Cocaine Urine Screen Negative Screen Negative    Opiates Urine Screen Negative Screen Negative   Alcohol breath test POCT     Status: Abnormal   Result Value Ref Range    Alcohol Breath Test 0.049 (A) 0.00 - 0.01   Urine Drugs of Abuse Screen     Status: Abnormal    Narrative    The following orders were created for panel order Urine Drugs of Abuse Screen.  Procedure                               Abnormality         Status                     ---------                               -----------         ------                     Drug abuse screen 1 urin...[[id number]]  Abnormal            Final result                 Please view results for these tests on the individual orders.   CBC with platelets differential     Status: Abnormal    Narrative    The following orders were created for panel order CBC with platelets differential.  Procedure                               Abnormality         Status                     ---------                               -----------         ------                     CBC with platelets and d...[[id number]]  Abnormal            Final result                 Please view results for these tests on the individual orders.     Medications   diazepam (VALIUM) tablet 5-20 mg (10 mg Oral $Given 8/2/23 2208)   0.9% sodium chloride BOLUS (0 mLs Intravenous Stopped 8/2/23 2100)   thiamine (B-1) tablet 100 mg (100 mg Oral $Given 8/2/23 2131)   folic acid (FOLVITE) tablet 1 mg (1 mg Oral $Given 8/2/23 2131)   multivitamin w/minerals (THERA-VIT-M) tablet 1 tablet (1 tablet Oral $Given 8/2/23 2131)   alum & mag hydroxide-simethicone (MAALOX) suspension 30 mL (30 mLs Oral $Given 8/2/23 2146)   hydrOXYzine (ATARAX) tablet 50 mg (50 mg Oral $Given 8/2/23 2237)     Labs Ordered and Resulted from Time of ED Arrival to Time of ED Departure   COMPREHENSIVE METABOLIC PANEL - Abnormal       Result Value    Sodium 140      Potassium 3.8      Chloride 100      Carbon Dioxide (CO2) 28       Anion Gap 12      Urea Nitrogen 9.1      Creatinine 0.95      Calcium 8.5 (*)     Glucose 139 (*)     Alkaline Phosphatase 112      AST 35      ALT 37      Protein Total 5.8 (*)     Albumin 3.6      Bilirubin Total 0.2      GFR Estimate 89     CBC WITH PLATELETS AND DIFFERENTIAL - Abnormal    WBC Count 5.3      RBC Count 3.49 (*)     Hemoglobin 10.2 (*)     Hematocrit 30.4 (*)     MCV 87      MCH 29.2      MCHC 33.6      RDW 12.8      Platelet Count 235      % Neutrophils 57      % Lymphocytes 25      % Monocytes 8      % Eosinophils 8      % Basophils 2      % Immature Granulocytes 0      NRBCs per 100 WBC 0      Absolute Neutrophils 3.0      Absolute Lymphocytes 1.3      Absolute Monocytes 0.4      Absolute Eosinophils 0.5      Absolute Basophils 0.1      Absolute Immature Granulocytes 0.0      Absolute NRBCs 0.0     DRUG ABUSE SCREEN 1 URINE (ED) - Abnormal    Amphetamines Urine Screen Negative      Barbituates Urine Screen Negative      Benzodiazepine Urine Screen Positive (*)     Cannabinoids Urine Screen Negative      Cocaine Urine Screen Negative      Opiates Urine Screen Negative     ALCOHOL BREATH TEST POCT - Abnormal    Alcohol Breath Test 0.049 (*)    MAGNESIUM - Normal    Magnesium 1.8       No orders to display          Critical care was not performed.     Medical Decision Making  The patient's presentation was of moderate complexity (a chronic illness mild to moderate exacerbation, progression, or side effect of treatment).    The patient's evaluation involved:  review of external note(s) from 1 sources (detox admission 4/23)  ordering and/or review of 3+ test(s) in this encounter (see separate area of note for details)  review of 3+ test result(s) ordered prior to this encounter (previous hemoglobin, comprehensive metabolic panel, magnesium levels)  discussion of management or test interpretation with another health professional (mental health intake)    The patient's management necessitated high  risk (a decision regarding hospitalization).    Assessment & Plan    63 year old male with history drug abuse and dependence treatment department seeking detox from alcohol.  Has been drinking alcohol daily for the past 4 months and experiences withdrawal symptoms with attempt at alcohol cessation.  Patient denies a history of alcohol withdrawal seizures or DTs.  He does not have any acute medical concerns.  He arrived with a low alcohol level of 0.049.  He did experience tremulousness shortly after evaluation here in the emergency department.  He was started on MSSA protocol.  His laboratory evaluation is remarkable for anemia with a hemoglobin of 10.2 which is consistent with previous results.  The patient's liver enzymes are not significantly elevated.  Remainder of his metabolic panel is essentially normal.  Patient did have some dyspepsia while here in the emergency department which resolved with Maalox.  He was given Vistaril per his request for anxiety.  His withdrawal is being adequately managed with the MSSA protocol.  Patient received oral thiamine, folate, and multivitamin.  He appears medically stable for detox admission.    I have reviewed the nursing notes. I have reviewed the findings, diagnosis, plan and need for follow up with the patient.    New Prescriptions    No medications on file       Final diagnoses:   Alcohol dependence with uncomplicated withdrawal (H)     Chart documentation was completed with Dragon voice-recognition software. Even though reviewed, this chart may still contain some grammatical, spelling, and word errors.     Valentin Menjivar Md  [facility] EMERGENCY DEPARTMENT  8/2/2023     Valentin Menjivar MD  08/03/23 0058

## 2023-08-03 NOTE — PROGRESS NOTES
S; Patient admitted via ED for detox from alcohol.  B patient has been drinking 1liter to 1 quart vodka daily, last use 8/2/23@1530.Patient denies history of DT,s or seizures. U-tox positive for Benzo's but patient denies use and it appears that valium was administered prior to specimen collection. Patient has a history of hypertension but denies any other chronic or any acute illness or injury. Patient has a history of depression and anxiety,but denies any past or current suicidal or homicidal ideation..  A: Patient appears in moderate alcohol withdrawal on admission.  R: Monitor and treat per MSSA with valium protocol. Withdrawal precautions and 15 min checks for safety.

## 2023-08-03 NOTE — PROGRESS NOTES
TRANSITIONS OF CARE (DENISHA) LOG   DENISHA tasks should be completed by the CC within one (1) business day of notification of each transition. Follow up contact with member is required after return to their usual care setting.  Note:  If CC finds out about the transitions fifteen (15) days or more after the member has returned to their usual care setting, no DENISHA log is needed. However, the CC should check in with the member to discuss the transition process, any changes needed to the care plan and document it in a case note.    Member Name:  Durga Aguirre MCO Name:  [facility] MCO/Health Plan Member ID#: [id number]    Product: [employer] Care Coordinator Contact:  Varsha Stewart RN, PHN Agency/County/Care System: [facility]   Transition Communication Actions from Care Management Contact   Transition #1   Notification Date: 8/3/23 Transition Date:   8/2/23 Transition From: Home     Is this the member s usual care setting?               yes Transition To: Hospital, [facility]   Transition Type:  Planned  Reason for Admission/Comments:  Etoh detox  Contact member/responsible party to offer assistance with transition Date completed: NA- member is acutely ill requiring hospitalization.  Will follow up with member upon discharge    Notes from conversation with the member/responsible party, provider, discharging and receiving facility (as applicable):   Date 8/3/23:CC contacted Hospital /discharge planner via the [facility] Hand-In Process, with community care plan included.    Reviewed and update care plan as needed.  Notified community service providers and placed services None on hold as needed.  Transition log initiated.   PCP, Rohit Solitario, notified of hospitalization via EMR.     Shared CC contact info, care plan/services with receiving setting--Date completed: 8/3/23   Name & Title of receiving setting contact: [facility]  [facility]   Notified PCP of transition--Date completed:  8/2/23     via  EMR  Name of PCP: Rohit Solitario     Transition #2   Notification Date: 8/7/23 Transition Date:   8/6/23 Transition From: [facility]     Is this the member s usual care setting?               no Transition To: Home   Transition Type:  Planned  Reason for Admission/Comments:  Etoh Detox  Contact member/responsible party to offer assistance with transition Date completed: 8/7/23    Notes from conversation with the member/responsible party, provider, discharging and receiving facility (as applicable):   Date 8/7/23:CC contacted member and reviewed discharge summary.  Member has a follow-up appointment with PCP in 7 days: No: Offered Assistance with setting up a follow up appointment.  Member scheduled an appt for f/unit(s) on 8/24.  CC placed call to the clinic to move it up.  Nurse says that PCP has several same day appts this week.  Instruction was to call back in the morning for same day availability.  Return call to member to provide this instruction.  Member confirms understanding.   Member has had a change in condition: No  Home visit needed: No  Care plan reviewed and updated.  The following home based services None were resumed.  New referrals placed: No  Transition log completed.   PCP, Rohit Solitario, notified of transition back to home via EMR.     Shared CC contact info, care plan/services with receiving setting--Date completed: 8/7/23      Notified PCP of transition--Date completed:  8/6/23     via  EMR  Name of PCP: Rohit Solitario      *RETURN TO USUAL CARE SETTING: *Complete tasks below when the member is discharging TO their usual care setting within one (1) business day of notification..      For situations where the Care Coordinator is notified of the discharge prior to the date of discharge, the Care Coordinator must follow up with the member or  designated representative to confirm that discharge actually occurred and discuss required DENISHA tasks as outlined in the DENISHA Instructions.  (This includes situations where it may be a  new  usual care setting for the member. (i.e., a community member who decides upon permanent nursing home placement following hospitalization and rehab).    Discuss with Member/Responsible Party:    Check  Yes  - if the member, family member and/or SNF/facility staff manages the following:    If  No  provide explanation in the comments section.          Date completed: 8/6/23 Communicated with member or their designated representative about the following:  care transition process; about changes to the member s health status; plan of care updates; education about transitions and how to prevent unplanned transitions/readmissions    Four Pillars for Optimal Transition:    Check  Yes  - if the member, family member and/or SNF/facility staff manages the following:    If  No  provide explanation in the comments section.          [x]  Yes     []  No Does the member have a follow-up appointment scheduled with primary care or specialist? (Mental health hospitalizations--the appt. should be w/in 7 days)  member will call in morning for same day hospital follow up.  F/U is currently schedule for 8/24              For mental health hospitalizations:  [x]  Yes     []  No     Does the member have a follow-up appointment scheduled with a mental health practitioner within 7 days of discharge?  [x]  Yes     []  No     Has a medication review been completed with member? If no, refer to PCP, home care nurse, MTM, pharmacist  [x]  Yes     []  No     Can the member manage their medications or is there a system in place to manage medications (e.g. home care set-up)?         [x]  Yes     []  No     Can the member verbalize warning signs and symptoms to watch for and how to respond?  [x]  Yes     []  No     Does the member have a copy of and understand their  discharge instructions?  If no, assist to obtain copy of discharge instructions, review discharge instructions, and assist to contact PCP to discuss questions about their recent hospitalization.  [x]  Yes     []  No     Does the member have adequate food, housing and transportation?  If no, add goal and discuss additional supports available to the member                                                                                                                                                                                 [x]  Yes     []  No     Is the member safe in their home?  If no, document needs and support provided                                                                                                                                                                          []  Yes     [x]  No     Are there any concerns of vulnerability, abuse, or neglect?  If yes, document concerns and actions taken by Care Coordinator as a mandated                                                                                                                                                                              [x]  Yes     []  No     Does the member use a Personal Health Care Record?  Check  Yes  if visit summary, discharge summary, and/or healthcare summary are being used as a PHR.                                                                                                                                                                                  [x]  Yes     []  No     Have you reviewed the discharge summary with the member? If  No  provide explanation in comments.  [x]  Yes     []  No     Have you updated the member s care plan/support plan? Add new diagnosis, medications, treatments, goals & interventions, as applicable. If No, provide explanation in comments.    Comments:           Notes from conversation with the member/responsible party, provider, discharging and receiving  facility (as applicable):  Member plans to go to in pt treatment at [facility] after F/U with PCP. States these were hospital instructions about the process to enter [facility].      Varsha Stewart RN, BSN, PHN  [facility]  [phone]  Fax: [phone]

## 2023-08-03 NOTE — PROGRESS NOTES
Triage & Transition Services, [address]     Durga Aguirre  August 3, 2023    Insurance: Medicare     Legal Status: Voluntary     SUDs Assessment Status: Completing paperwork for assessment     ROIs on file: None     Living Situation: Did not discuss     Current Providers and Supports:  Did not discuss    Encounter: Pt wanted to go to [facility] but due to Medicare insurance pt is unable. Pt is willing to look into other treatment options such as [facility]. Reviewed pt assessment paperwork and pt intends to complete it and return to staff.     Collateral: NA     Consulted with Chanel Marcelino  on Patient s plan of care.          Current Plan: Complete paperwork for assessment and discuss treatment options further - pt would like to choose depending on wait times and distance.      RN updated.    Bernice Kimball  Triage & Transition Services - Mental Health and Addiction Service Line  [address] - Adult Inpatient Addiction Psychiatry Unit

## 2023-08-04 PROCEDURE — 250N000013 HC RX MED GY IP 250 OP 250 PS 637

## 2023-08-04 PROCEDURE — 128N000004 HC R&B CD ADULT

## 2023-08-04 PROCEDURE — 250N000013 HC RX MED GY IP 250 OP 250 PS 637: Performed by: EMERGENCY MEDICINE

## 2023-08-04 PROCEDURE — 99232 SBSQ HOSP IP/OBS MODERATE 35: CPT | Performed by: PSYCHIATRY & NEUROLOGY

## 2023-08-04 PROCEDURE — 250N000013 HC RX MED GY IP 250 OP 250 PS 637: Performed by: PSYCHIATRY & NEUROLOGY

## 2023-08-04 RX ORDER — CHLORAL HYDRATE 500 MG
1 CAPSULE ORAL DAILY
Status: DISCONTINUED | OUTPATIENT
Start: 2023-08-04 | End: 2023-08-06 | Stop reason: HOSPADM

## 2023-08-04 RX ADMIN — LISINOPRIL 40 MG: 20 TABLET ORAL at 08:09

## 2023-08-04 RX ADMIN — SIMVASTATIN 40 MG: 20 TABLET, FILM COATED ORAL at 21:50

## 2023-08-04 RX ADMIN — Medication 1 G: at 15:27

## 2023-08-04 RX ADMIN — HYDROCHLOROTHIAZIDE 12.5 MG: 12.5 TABLET ORAL at 08:08

## 2023-08-04 RX ADMIN — DONEPEZIL HYDROCHLORIDE 10 MG: 10 TABLET ORAL at 08:09

## 2023-08-04 RX ADMIN — NICOTINE 1 PATCH: 14 PATCH, EXTENDED RELEASE TRANSDERMAL at 08:08

## 2023-08-04 RX ADMIN — ARIPIPRAZOLE 5 MG: 5 TABLET ORAL at 08:09

## 2023-08-04 RX ADMIN — NICOTINE POLACRILEX 2 MG: 2 GUM, CHEWING BUCCAL at 12:17

## 2023-08-04 RX ADMIN — HYDROXYZINE HYDROCHLORIDE 25 MG: 25 TABLET, FILM COATED ORAL at 21:50

## 2023-08-04 RX ADMIN — CITALOPRAM HYDROBROMIDE 20 MG: 20 TABLET ORAL at 08:10

## 2023-08-04 RX ADMIN — HYDROXYZINE HYDROCHLORIDE 25 MG: 25 TABLET, FILM COATED ORAL at 12:16

## 2023-08-04 RX ADMIN — GABAPENTIN 300 MG: 300 CAPSULE ORAL at 21:50

## 2023-08-04 RX ADMIN — GABAPENTIN 300 MG: 300 CAPSULE ORAL at 14:04

## 2023-08-04 RX ADMIN — TRAZODONE HYDROCHLORIDE 50 MG: 50 TABLET ORAL at 21:50

## 2023-08-04 RX ADMIN — GABAPENTIN 300 MG: 300 CAPSULE ORAL at 08:10

## 2023-08-04 RX ADMIN — HYDROXYZINE HYDROCHLORIDE 25 MG: 25 TABLET, FILM COATED ORAL at 08:09

## 2023-08-04 ASSESSMENT — ACTIVITIES OF DAILY LIVING (ADL)
ADLS_ACUITY_SCORE: 32
ADLS_ACUITY_SCORE: 32
LAUNDRY: WITH SUPERVISION
HYGIENE/GROOMING: INDEPENDENT
ADLS_ACUITY_SCORE: 32
ADLS_ACUITY_SCORE: 32
DRESS: INDEPENDENT
DRESS: INDEPENDENT
ADLS_ACUITY_SCORE: 32
ORAL_HYGIENE: INDEPENDENT
ADLS_ACUITY_SCORE: 32
ORAL_HYGIENE: INDEPENDENT
HYGIENE/GROOMING: INDEPENDENT
ADLS_ACUITY_SCORE: 32
LAUNDRY: UNABLE TO COMPLETE
ADLS_ACUITY_SCORE: 32
ADLS_ACUITY_SCORE: 32

## 2023-08-04 ASSESSMENT — ANXIETY QUESTIONNAIRES
1. FEELING NERVOUS, ANXIOUS, OR ON EDGE: MORE THAN HALF THE DAYS
GAD7 TOTAL SCORE: 9
GAD7 TOTAL SCORE: 9
3. WORRYING TOO MUCH ABOUT DIFFERENT THINGS: MORE THAN HALF THE DAYS
7. FEELING AFRAID AS IF SOMETHING AWFUL MIGHT HAPPEN: NOT AT ALL
5. BEING SO RESTLESS THAT IT IS HARD TO SIT STILL: SEVERAL DAYS
6. BECOMING EASILY ANNOYED OR IRRITABLE: SEVERAL DAYS
4. TROUBLE RELAXING: SEVERAL DAYS
2. NOT BEING ABLE TO STOP OR CONTROL WORRYING: MORE THAN HALF THE DAYS
IF YOU CHECKED OFF ANY PROBLEMS ON THIS QUESTIONNAIRE, HOW DIFFICULT HAVE THESE PROBLEMS MADE IT FOR YOU TO DO YOUR WORK, TAKE CARE OF THINGS AT HOME, OR GET ALONG WITH OTHER PEOPLE: SOMEWHAT DIFFICULT

## 2023-08-04 ASSESSMENT — PATIENT HEALTH QUESTIONNAIRE - PHQ9: SUM OF ALL RESPONSES TO PHQ QUESTIONS 1-9: 13

## 2023-08-04 NOTE — PLAN OF CARE
Problem: Plan of Care - These are the overarching goals to be used throughout the patient stay.    Goal: Optimal Comfort and Wellbeing  Outcome: Progressing   Goal Outcome Evaluation:         Pt highly anxious and hypertensive rating  anxiety 9/10, hydroxyzine 25mg x2, and Lisinopril administered previous BP this /89, rechecked trending down,  to 131/95, MSSA 5/7 , was up multiple times at [facility] from vivid dreams, pt recently lost his mother which was a trigger, he is consuming adequate nutrition , denies SI/HI,  considering grief therapy and [facility] in [place]. Spent most of the shift in the lounge watching TV and socializing, pt calm and cooperative.    Pt will be out of detox at 1643 this evening.

## 2023-08-04 NOTE — PROGRESS NOTES
Have not heard back from [facility] about open beds, obtained ROIs and sent referrals out to [facility] and [facility] for IP Treatment as back up options. [facility] did not answer so unsure of bed availability; [facility] has immediate openings. Contacted [facility] as that is located in [place] and that is the pts preferred location, they do not have beds open until next week.

## 2023-08-04 NOTE — CONSULTS
"[facility]  Consult Note - Hospitalist Service  Date of Admission:  8/2/2023  Consult Requested by: Psychiatry  Reason for Consult: \"Alcohol detox\"    Assessment & Plan   Durga Aguirre is a 63 year old male w/ PMH of alcohol use disorder, anxiety, depression, HLD, HTN, mass of L chest wall, hx of spontaneous PTX, arthritis admitted on 8/2/2023 for alcohol detox.     #Alcohol use disorder  #Alcohol withdrawal  Drinking 750mL vodka per day, last drink 1700 on day of arrival, 8/2.   No hx of withdrawal seizures or DT.   - agree w/ MSSA with diazepam  - agree with supplements  - general management per psychiatry    #Anxiety  #Depression  On PTA abilify, citalopram, hydroxyzine, gabapentin.   Pt w/ worsening anxiety and depression over last several months related to loss of his mother in April.   - management per psychiatry    #HLD  #HTN  - continue PTA lisinopril  - continue PTA simvastatin    #Chronic normocytic anemia  Hgb 10.2, stable and near baseline. No symptoms or signs of acute bleed.   - monitor at next PCP appt w/ CBC    #Possible cognitive decline  No record of Alzheimer or dementia diagnosis in pt chart, but pharmacy med rec reveals pt recently rx'd donepezil by Dr. Solitario. Pt unsure if taking, pharmacy ordered.   - okay to continue donepezil for now  - will research further tomorrow for clarification    #Arthritis  - acetaminophen prn    #Nicotine dependence  - nicotine gum and patch prn    Other:  #L chest wall mass: 2019 XR of chest demonstrated several healing rib fractures. Further worked up with mammogram and US after pt developed painful mass at L lateral chest in 07/2021. Workup benign, symptoms resolved over time. No acute management indicated at this time  #Hx of spontaneous PTX: Chart review documents this occurred in 1997. No acute management indicated.     The patient's care was discussed with the Bedside Nurse and Patient.    Clinically " "Significant Risk Factors Present on Admission                  # Hypertension: Noted on problem list      # Obesity: Estimated body mass index is 31.85 kg/m  as calculated from the following:    Height as of this encounter: 1.778 m (5' 10\").    Weight as of this encounter: 100.7 kg (222 lb).            David Arellano PA-C  Hospitalist Service  Securely message with [employer] (more info)  Text page via [facility] Paging/Directory   ______________________________________________________________________    Chief Complaint   Alcohol detox    History is obtained from the patient, chart review.     History of Present Illness   Durga Aguirre is a 63 year old male w/ PMH of alcohol use disorder, anxiety, depression, HLD, HTN, mass of L chest wall, hx of spontaneous PTX, arthritis who presented to the ED on 8/2/23 for alcohol detox.   Pt reports drinking 750ml of vodka per day, last drink @ 1700 on day of presentation to the ED. Pt denies hx of withdrawal seizures and DT. Currently, most prominent w/d symptoms is his anxiety. Feels it is very severe today and has been much worse in general since the unexpected passing of his mother several months ago. Reports disappointment in himself for \"going off the path\" aka staying sober.   Has no other health concerns at the moment. Denies chest pain, SOB, abdominal pain, N/V, hallucinations.       Past Medical History    Past Medical History:   Diagnosis Date    Alcohol abuse     Anxiety and depression     Arthritis     Hyperlipidemia     Hypertension     Mass of left chest wall     Other spontaneous pneumothorax 1997       Past Surgical History   Past Surgical History:   Procedure Laterality Date    COLONOSCOPY      HC EXCISION PARTIAL TALUS OR CALCANEUS, BONE      fx calcaneus- 9 screws in foot       Medications   I have reviewed this patient's current medications  Current Facility-Administered Medications   Medication    acetaminophen (TYLENOL) tablet 650 mg    alum & mag " hydroxide-simethicone (MAALOX) suspension 30 mL    ARIPiprazole (ABILIFY) tablet 5 mg    citalopram (celeXA) tablet 20 mg    diazepam (VALIUM) tablet 5-20 mg    [START ON 8/4/2023] donepezil (ARICEPT) tablet 10 mg    gabapentin (NEURONTIN) capsule 300 mg    hydrochlorothiazide (HYDRODIURIL) tablet 12.5 mg    hydrOXYzine (ATARAX) tablet 25 mg    lisinopril (ZESTRIL) tablet 40 mg    [START ON 8/4/2023] nicotine (NICODERM CQ) 14 MG/24HR 24 hr patch 1 patch    nicotine (NICORETTE) gum 2 mg    nicotine Patch in Place    polyethylene glycol (MIRALAX) Packet 17 g    simvastatin (ZOCOR) tablet 40 mg    traZODone (DESYREL) tablet 50 mg     Facility-Administered Medications Ordered in Other Encounters   Medication    Self Administer Medications: Behavioral Services          Social History   I have reviewed this patient's social history and updated it with pertinent information if needed.  Social History     Tobacco Use    Smoking status: Every Day     Packs/day: 0.25     Years: 20.00     Pack years: 5.00     Types: Cigarettes    Smokeless tobacco: Never    Tobacco comments:     Smokes 2-3 1/2 cigs daily   Vaping Use    Vaping Use: Never used   Substance Use Topics    Alcohol use: Yes     Comment: daily use 1 L vodka    Drug use: Not Currently         Family History   I have reviewed this patient's family history and updated it with pertinent information if needed.  Family History   Problem Relation Age of Onset    No Known Problems Mother     Lymphoma Father     No Known Problems Maternal Grandmother     No Known Problems Maternal Grandfather     No Known Problems Paternal Grandmother     No Known Problems Paternal Grandfather     No Known Problems Sister     No Known Problems Son     No Known Problems Brother     No Known Problems Daughter     No Known Problems Other     Unknown/Adopted No family hx of     Depression No family hx of     Anxiety Disorder No family hx of     Schizophrenia No family hx of     Bipolar Disorder  No family hx of     Suicide No family hx of     Substance Abuse No family hx of     Dementia No family hx of     Shani Disease No family hx of     Parkinsonism No family hx of     Autism Spectrum Disorder No family hx of     Intellectual Disability (Mental Retardation) No family hx of     Mental Illness No family hx of          Allergies   No Known Allergies  ------------------------------------------------------------------------     Physical Exam   Vital Signs: Temp: 98.2  F (36.8  C) Temp src: Oral BP: (!) 156/101 Pulse: 78   Resp: 16 SpO2: 95 % O2 Device: None (Room air)    Weight: 222 lbs 0 oz    General Appearance: Lying on back in bed, appears fatigued. NAD.  Respiratory: Lungs CTAB.  Cardiovascular: RRR, no MRG.   GI: Abdomen soft, non distended, non tender.  Skin: Warm, dry. No evident abnormalities otherwise.  Other: Anxious affect. Slight tremor.      Medical Decision Making       45 MINUTES SPENT BY ME on the date of service doing chart review, history, exam, documentation & further activities per the note.      Data   ------------------------- PAST 24 HR DATA REVIEWED -----------------------------------------------    I have personally reviewed the following data over the past 24 hrs:    TSH: 0.80 T4: N/A A1C: 5.9 (H)       Imaging results reviewed over the past 24 hrs:   No results found for this or any previous visit (from the past 24 hour(s)).  Recent Labs   Lab 08/02/23  1945   WBC 5.3   HGB 10.2*   MCV 87         POTASSIUM 3.8   CHLORIDE 100   CO2 28   BUN 9.1   CR 0.95   ANIONGAP 12   OTIS 8.5*   *   ALBUMIN 3.6   PROTTOTAL 5.8*   BILITOTAL 0.2   ALKPHOS 112   ALT 37   AST 35

## 2023-08-04 NOTE — PROGRESS NOTES
"SPIRITUAL HEALTH SERVICES Progress Note  [facility] ([facility]) 3AW    Saw pt Durga Aguirre per routine consult for emotional support.    Patient/Family Understanding of Illness and Goals of Care - Durga shared he's here for drinking and views his stay on the unit as a \"good thing\" and a stepping stone toward sobriety.     Distress and Loss - Durga tearfully reflected on the circumstances regarding his mom's recent death, and processed grief and loneliness. He named and processed several significant stressors and losses which he has not yet been able to grieve due to drinking.     Strengths, Coping, and Resources - Durga has been using drinking to cope with loss and is  noticing that \"bad things\" happen when he's drinking so he's open to exploring other coping strategies. We discussed treatment and support options. I recommended a grief support group, and Durga said he'd be interested after \"I get some sobriety under my belt.\"    Meaning, Beliefs, and Spirituality - Durga is [employer] and shared he's been thinking about attending [employer] again but his drinking is a barrier. He identified that \"holding on to the good memories\" about his loved ones is important and has felt God's presence at night when he looks up at the stars. A motivator for getting sober: cleaning out his 6-car garage. He named multiple times that it felt uncomfortable for him to cry, since as a man he was raised not to. We prayed together per his request.    Plan of Care - Acknowledged emotions and normalized pt reaction, processed grief/loss, investigated pt understanding of current situation and related values. Durga is aware he can request another visit through his nurse.  remains available per pt request.     Sophie Johnson, [facility]  Chaplain Resident  Pager [phone]      * [facility] remains available 24/7 for emergent requests/referrals, either by having the switchboard page the on-call  or by entering an ASAP/STAT consult in Epic (this " will also page the on-call ). Routine Epic consults receive an initial response within 24 hours.*

## 2023-08-04 NOTE — PLAN OF CARE
Problem: Alcohol Withdrawal  Goal: Alcohol Withdrawal Symptom Control  Outcome: Progressing     Problem: Plan of Care - These are the overarching goals to be used throughout the patient stay.    Goal: Optimal Comfort and Wellbeing  Outcome: Progressing    Behavioral  Pt appeared sleeping comfortably overnight; no behavioral concerns noted;     Medical  Pt continues in alcohol withdrawal; MSSA 4; no medication for withdrawal given this shift; Pt has received a total of 60 mg of valium this hospitalization. Pt last required medication for withdrawal on 8/3 @ 1643.  No new medical concerns noted.

## 2023-08-04 NOTE — PROGRESS NOTES
"[facility], [place]   Psychiatric Progress Note        Interim History:   The patient's care was discussed with the treatment team during the daily team meeting and/or staff's chart notes were reviewed.  Staff report patient is still in withdrawal. Being referred to CD treatment at [facility].     The patient reports that he is feeling better physically but is still \"nervous and depressed.\" Denies any SI. Sleep was okay but \"vivid dreams.\" Is open to CD treatment but reports he can't go up to [facility] due to his insurance. Feels sad about the loss of his mother, which was a large trigger for his relapse.          Medications:      ARIPiprazole  5 mg Oral Daily    citalopram  20 mg Oral Daily    donepezil  10 mg Oral Daily    fish oil-omega-3 fatty acids  1 g Oral Daily    gabapentin  300 mg Oral TID    hydrochlorothiazide  12.5 mg Oral Daily    lisinopril  40 mg Oral Daily    nicotine  1 patch Transdermal Daily    nicotine   Transdermal Q8H    simvastatin  40 mg Oral QPM          Allergies:   No Known Allergies       Labs:   No results found for this or any previous visit (from the past 24 hour(s)).       Psychiatric Examination:     BP (!) 131/95   Pulse 91   Temp 98.5  F (36.9  C) (Oral)   Resp 16   Ht 1.778 m (5' 10\")   Wt 100.7 kg (222 lb)   SpO2 94%   BMI 31.85 kg/m    Weight is 222 lbs 0 oz  Body mass index is 31.85 kg/m .  Orthostatic Vitals       None              Appearance: awake, alert and adequately groomed  Attitude:  cooperative  Eye Contact:  good  Mood:  anxious and depressed  Affect:  mood congruent  Speech:  clear, coherent  Psychomotor Behavior:  no evidence of tardive dyskinesia, dystonia, or tics  Thought Process:  goal oriented  Associations:  no loose associations  Thought Content:  no evidence of suicidal ideation or homicidal ideation and no evidence of psychotic thought  Insight:  fair  Judgement:  intact  Oriented to:  time, person, and place  Attention " "Span and Concentration:  intact  Recent and Remote Memory:  fair         Precautions:     Behavioral Orders   Procedures    Code 1 - Restrict to Unit    Routine Programming     As clinically indicated    Status 15     Every 15 minutes.    Withdrawal precautions          Diagnoses:     Alcohol use disorder, severe  Alcohol withdrawal with unspecified complication   MDD, recurrent, moderate  Nicotine dependence with current use     Clinically Significant Risk Factors                 # Hypertension: Noted on problem list            # Obesity: Estimated body mass index is 31.85 kg/m  as calculated from the following:    Height as of this encounter: 1.778 m (5' 10\").    Weight as of this encounter: 100.7 kg (222 lb).  , PRESENT ON ADMISSION                  Plan:     1) MSSA with Valium.   2) Continue Abilify, Celexa and gabapentin. Continue Aricept.   3) Nicotine replacement available.   4) Will resume Naltrexone after detox is completed.   5) Patient open to CD treatment.  6) Will be referred to grief therapy at discharge.       Disposition Plan   Reason for ongoing admission: requires detoxification from substance that poses a risk of bodily harm during withdrawal period  Discharge location: [facility] facility  Discharge Medications: not ordered  Follow-up Appointments: not scheduled  Legal Status: voluntary    Entered by: Wally Aj MD on August 4, 2023 at 1:27 PM                "

## 2023-08-04 NOTE — PROGRESS NOTES
"  Unit 3A    UNIVERSAL ADULT DIAGNOSTIC ASSESSMENT - Substance Use Disorder    Provider Name and Credentials: Lauro Lopez MS, [facility]     PATIENT'S NAME: Durga Aguirre  PREFERRED NAME: Durga  PRONOUNS: he/him/his     MRN: [id number]  : 1960   Last 4 SSN: [id number]  ACCT. NUMBER:  [id number]  DATE OF SERVICE: 2023   START TIME: 11:30am  END TIME: 12:30pm  PREFERRED PHONE: [phone]   EMAIL: [email or web address]   May we leave a program related message: Yes  SERVICE MODALITY:  In-person      Identifying Information:  Patient is a 63 year old,  male who was referred for an assessment by self. The pronoun use throughout this assessment reflects the patient's chosen pronoun. Patient attended the session alone.     Chief Complaint:   The reason for seeking services at this time is: \"I am tired of the life style\"  The problem(s) began at the age of 61 years old. Patient has attempted to resolve these concerns in the past through Residential treatment at [facility] and Outpatient at [facility] .  Patient does not appear to be in severe withdrawal, an imminent safety risk to self or others, or requiring immediate medical attention and may proceed with the assessment interview.    Social/Family History:  Patient reported he grew up in [place]. Patient was raised by mother and father. Patient reported that his childhood was great.  Patient describes current relationships with family of origin as positive.      The patient describes his cultural background as hard worker.  Cultural influences and impact on patient's life structure, values, norms, and healthcare:  Patient denies .  Contextual influences on patient's health include: Contextual Factors: Individual Factors MH/CD issues .  Patient identified his preferred language to be English. Patient reported he does not need the assistance of an  or other support involved in therapy.     Patient reported had no significant delays in " developmental tasks.  Patient's highest education level was high school graduate and associate degree / vocational certificate. Patient identified the following learning problems: attention, concentration, hearing, and speech.  Patient reports he is able to understand written materials.    Patient's current relationship status is single for all life.   Patient identified his sexual orientation as straight.  Patient reported having one child(juany).     Patient's current living/housing situation involves staying in own home/apartment.  Patient lives with alone and he reports that housing is stable. Patient identified siblings and neighbors  as part of his support system.  Patient identified the quality of these relationships as stable and meaningful.      Patient reports he is not involved in [employer] activities. Patients reports spirituality impacts his recovery in the following ways:  Patient denies.     Patient reports engaging in the following recreational/leisure activities: biking. Patient reports engaging in the following recreation/leisure activities while using: isolation and watch TV. Patient reports the following people are supportive of recovery: neighbors and sister.  Patient is currently unemployed.  Patient reports his income is obtained through SSDI disability.  Patient does identify finances as a current stressor. Cultural and socioeconomic factors do not affect the patient's access to services.    Patient reports the following substance related arrests or legal issues: DUI.  Patient denies being on probation / parole / under the jurisdiction of the court.    Patient's Strengths and Limitations:  Patient identified the following strengths or resources that will help him succeed in treatment: help from sister and remaining sober. Things that may interfere with the patient's success in treatment include: none identified.     Assessments:  The following assessments were completed by patient for  this visit:  PHQ9:       12/10/2019     8:43 PM 12/30/2019    10:00 AM 11/15/2021    11:00 AM 9/13/2022    10:10 AM 4/21/2023     1:46 PM 5/25/2023    11:07 AM 8/4/2023    12:55 PM   PHQ-9 SCORE   PHQ-9 Total Score MyChart    3 (Minimal depression) 7 (Mild depression)     PHQ-9 Total Score 0 1 0 3 7 5 13     GAD7:       4/29/2019     2:59 PM 5/20/2019    10:30 AM 8/28/2019     1:00 PM 10/28/2019     7:43 PM 12/10/2019     8:43 PM 12/30/2019    10:00 AM 8/4/2023    12:55 PM   YUSUF-7 SCORE   Total Score 5 6 12 1 0 2 9     PROMIS 10-Global Health (all questions and answers displayed):       8/4/2023    12:00 PM   PROMIS 10   In general, would you say your health is: 3   In general, would you say your quality of life is: 4   In general, how would you rate your physical health? 3   In general, how would you rate your mental health, including your mood and your ability to think? 4   In general, how would you rate your satisfaction with your social activities and relationships? 2   In general, please rate how well you carry out your usual social activities and roles. (This includes activities at home, at work and in your community, and responsibilities as a parent, child, spouse, employee, friend, etc.) 3   To what extent are you able to carry out your everyday physical activities such as walking, climbing stairs, carrying groceries, or moving a chair? 3   In the past 7 days, how often have you been bothered by emotional problems such as feeling anxious, depressed, or irritable? 4   In the past 7 days, how would you rate your fatigue on average? 3   In the past 7 days, how would you rate your pain on average, where 0 means no pain, and 10 means worst imaginable pain? 3   Global Mental Health Score 12   Global Physical Health Score 13   PROMIS TOTAL - SUBSCORES 25     GAIN-sliding scale:      8/29/2022    10:00 AM 11/14/2022    12:00 PM 8/4/2023    12:56 PM   When was the last time that you had significant problems...   with  feeling very trapped, lonely, sad, blue, depressed or hopeless about the future? 1+ years ago Past month 2 to 12 months ago   with sleep trouble, such as bad dreams, sleeping restlessly, or falling asleep during the day? Never 1+ years ago 1+ years ago   with feeling very anxious, nervous, tense, scared, panicked or like something bad was going to happen? Past month Past month 1+ years ago   with becoming very distressed & upset when something reminded you of the past? 1+ years ago 2 to 12 months ago 1+ years ago   with thinking about ending your life or committing suicide? Never Never Never          8/29/2022    10:00 AM 11/14/2022    12:00 PM 8/4/2023    12:56 PM   When was the last time that you did the following things 2 or more times?   Lied or conned to get things you wanted or to avoid having to do something? Past month 2 to 12 months ago Never   Had a hard time paying attention at school, work or home? Never 1+ years ago Never   Had a hard time listening to instructions at school, work or home? Past month Never Never   Were a bully or threatened other people? Never 1+ years ago Never   Started physical fights with other people? Never Never Never       Personal and Family Medical History:   Patient did not report a family history of mental health concerns.  Patient reports the following family history: Patient denies.  Family History   Problem Relation Age of Onset    No Known Problems Mother     Lymphoma Father     No Known Problems Maternal Grandmother     No Known Problems Maternal Grandfather     No Known Problems Paternal Grandmother     No Known Problems Paternal Grandfather     No Known Problems Sister     No Known Problems Son     No Known Problems Brother     No Known Problems Daughter     No Known Problems Other     Unknown/Adopted No family hx of     Depression No family hx of     Anxiety Disorder No family hx of     Schizophrenia No family hx of     Bipolar Disorder No family hx of     Suicide No  family hx of     Substance Abuse No family hx of     Dementia No family hx of     L[place]led[place] Disease No family hx of     Parkinsonism No family hx of     Autism Spectrum Disorder No family hx of     Intellectual Disability (Mental Retardation) No family hx of     Mental Illness No family hx of         Patient reported the following previous mental health diagnoses: patient denies.  Patient reports his primary mental health symptoms include: Patient denies and these do not impact his ability to function.   Patient has received mental health services in the past: Therapy only went two times.  Psychiatric Hospitalizations: None.  Patient denies a history of civil commitment.  Current mental health services/providers include:  Patient denies.    Patient has had a physical exam to rule out medical causes for current symptoms.  Date of last physical exam was within the past year. Symptoms have developed since last physical exam and client was encouraged to follow up with PCP.  . The patient has a [facility] Primary Care Provider, who is named Rohit Solitario. Patient reports no current medical concerns.  Patient denies any issues with pain.  Patient denies pregnancy. There are not significant appetite / nutritional concerns / weight changes. Patient does  report a history of an eating disorder. Patient does report a history of head injury / trauma / cognitive impairment.      Patient reports current meds as:   Outpatient Medications Marked as Taking for the 8/2/23 encounter (Hospital Encounter)   Medication Sig    ARIPiprazole (ABILIFY) 5 MG tablet Take 1 tablet (5 mg) by mouth daily    citalopram (CELEXA) 20 MG tablet Take 1 tablet (20 mg) by mouth daily    donepezil (ARICEPT) 5 MG tablet Take 10 mg by mouth daily    gabapentin (NEURONTIN) 300 MG capsule Take 1 capsule (300 mg) by mouth 3 times daily    hydrochlorothiazide (HYDRODIURIL) 12.5 MG tablet Take 1 tablet (12.5 mg) by mouth daily    hydrOXYzine (ATARAX) 50 MG  tablet Take 0.5-1 tablets (25-50 mg) by mouth 3 times daily as needed for anxiety    lisinopril (ZESTRIL) 40 MG tablet Take 1 tablet (40 mg) by mouth daily Hold for a systolic blood pressure of 110    magnesium oxide (MAG-OX) 400 MG tablet Take 1 tablet (400 mg) by mouth 2 times daily    Melatonin 10 MG TABS tablet Take 10 mg by mouth nightly as needed for sleep    multivitamin w/minerals (THERA-VIT-M) tablet Take 1 tablet by mouth daily    naltrexone (DEPADE/REVIA) 50 MG tablet Take 2 tablets (100 mg) by mouth daily    nicotine (NICODERM CQ) 14 MG/24HR 24 hr patch Place 1 patch onto the skin every 24 hours    nicotine (NICORETTE) 2 MG gum Place 2 mg inside cheek every hour as needed for smoking cessation    simvastatin (ZOCOR) 40 MG tablet Take 1 tablet (40 mg) by mouth every evening    thiamine (B-1) 100 MG tablet Take 1 tablet (100 mg) by mouth daily    traZODone (DESYREL) 50 MG tablet Take 1 tablet (50 mg) by mouth nightly as needed for sleep       Medication Adherence:  Patient reports taking prescribed medications as prescribed.    Patient Allergies:  No Known Allergies    Medical History:    Past Medical History:   Diagnosis Date    Alcohol abuse     Anxiety and depression     Arthritis     Hyperlipidemia     Hypertension     Mass of left chest wall     Other spontaneous pneumothorax 1997       Substance Use:  Patient reported no family history of chemical health issues. Patient has not received substance use disorder and/or gambling treatment in the past. Patient has been to detox. Patient is not currently receiving any chemical dependency treatment. Patient reports no history of support group attendance.        Substance Age of first use Pattern and duration of use (include amounts and frequency) Date of last use     Withdrawal potential Route of administration   Has used Alcohol 10 Daily since covid started 1 liter of vodka 08/06/23 No oral   Has not used Marijuana            Has not used Amphetamines           Has not used Cocaine/ crack           Has not used Hallucinogens        Has not used Inhalants        Has not used Heroin        Has not used Other Opiates        Has not used Benzodiazepine          Has not used Barbiturates        Has not used Over the counter meds.        Has not used Caffeine        Has not used Nicotine         Has not used other substances not listed above:  Identify:              Patient reported the following problems as a result of their substance use: DUI and relationship problems.  Patient is concerned about substance use.     Patient reports experiencing the following withdrawal symptoms within the past 12 months: none and the following within the past 30 days: sweating, shaky/jittery/tremors, unable to sleep, agitation, fatigue, sad/depressed feeling, vivid/unpleasant dreams, irritability, high blood pressure, dizziness, diarrhea, unable to eat, and anxiety/worry.   Patients reports urges to use Alcohol.  Patient reports he has used more Alcohol than intended and over a longer period of time than intended. Patient reports he has had unsuccessful attempts to cut down or control use of Alcohol.  Patient reports longest period of abstinence was 3 years and return to use was due to dog  in patients lap. Patient reports he has needed to use more Alcohol to achieve the same effect.  Patient does  report diminished effect with use of same amount of Alcohol.     Patient does  report a great deal of time is spent in activities necessary to obtain, use, or recover from Alcohol effects.  Patient does  report important social, occupational, or recreational activities are given up or reduced because of Alcohol use.  Alcohol use is continued despite knowledge of having a persistent or recurrent physical or psychological problem that is likely to have caused or exacerbated by use.  Patient reports the following problem behaviors while under the influence of substances patient denies.      Patient reports his recovery goals are occupied time attend AA.     Patient reports substance use has not impacted his ability to function in a school setting. Patient reports substance use has impacted his ability to function in a work setting.  Patients demographics and history impact his recovery in the following ways:  Patient denies.     Patient does not have a history of gambling concerns and/or treatment. Patient does not have other addictive behaviors he is concerned about.         Significant Losses / Trauma / Abuse / Neglect Issues:   Patient did not serve in the .  There are indications or report of significant loss, trauma, abuse or neglect issues related to: are indications or report of significant loss, trauma, abuse or neglect issues related to dog dying in patients lap.  Concerns for possible neglect are not present.     Safety Assessment:   Current Safety Concerns:  [place] Suicide Severity Rating Scale (Short Version)      1/12/2023     7:15 AM 4/12/2023    10:42 AM 4/12/2023    11:07 AM 4/12/2023     3:22 PM 5/7/2023     1:59 PM 8/2/2023     6:31 PM 8/3/2023     2:30 AM   [place] Suicide Severity Rating (Short Version)   Over the past 2 weeks have you felt down, depressed, or hopeless? no no   no no    Over the past 2 weeks have you had thoughts of killing yourself? no no   no no    Have you ever attempted to kill yourself? no no   no no    Q1 Wished to be Dead (Past Month)    no   no   Q2 Suicidal Thoughts (Past Month)    no   no   Q3 Suicidal Thought Method    no   no   Q4 Suicidal Intent without Specific Plan    no   no   Q5 Suicide Intent with Specific Plan    no   no   Q6 Suicide Behavior (Lifetime)    no   no   Level of Risk per Screen    low risk   low risk   Required Interventions   Room searched;Room made safe;Belongings removed;Patient searched       Interventions   Monitored via video         Patient denies current homicidal ideation and behaviors.  Patient denies current  self-injurious ideation and behaviors.    Patient denied risk behaviors associated with substance use.  Patient denies any high risk behaviors associated with mental health symptoms.  Patient reports the following current concerns for their personal safety: None.  Patient reports there are not firearms in the house. There are no firearms in the home..     History of Safety Concerns:  Patient denied a history of homicidal ideation.     Patient denied a history of personal safety concerns.    Patient denied a history of assaultive behaviors.    Patient denied a history of sexual assault behaviors.     Patient denied a history of risk behaviors associated with substance use.  Patient denies any history of high risk behaviors associated with mental health symptoms.  Patient reports the following protective factors: positive relationships positive social network, sober network, and positive family connections, dedication to family/friends, regular sleep, committment to well-being, sense of meaning, and pets    Risk Plan:  See Recommendations for Safety and Risk Management Plan    Review of Symptoms per patient report:  Substance Use:  vomiting, daily use, substance related legal problems, and driving under the influence     Collateral Contact Summary:   Collateral contacts contributing to this assessment:  None    If court related records were reviewed, summarize here: None    Information from collateral contacts supported/largely agreed with information from the client and associated risk ratings.    Information in this assessment was obtained from the medical record and provided by patient who is a good historian.    Patient will have open access to their mental health medical record.    Diagnostic Criteria: 1.) Alcohol/drug is often taken in larger amounts or over a longer period than was intended.  Met for Alcohol.  2.) There is a persistent desire or unsuccessful efforts to cut down or control alcohol/drug use.  Met  for Alcohol.  3.) A great deal of time is spent in activities necessary to obtain alcohol, use alcohol, or recover from its effects.  Met for Alcohol.  4.) Craving, or a strong desire or urge to use alcohol/drug.  Met for Alcohol.  6.) Continued alcohol use despite having persistent or recurrent social or interpersonal problems caused or exacerbated by the effects of alcohol/drug.  Met for Alcohol.  7.) Important social, occupational, or recreational activities are given up or reduced because of alcohol/drug use.  Met for Alcohol.  8.) Recurrent alcohol/drug use in situations in which it is physically hazardous.  Met for Alcohol.  9.) Alcohol/drug use is continued despite knowledge of having a persistent or recurrent physical or psychological problem that is likely to have been caused or exacerbated by alcohol.  Met for Alcohol.  10.) Tolerance, as defined by either of the following: A need for markedly increased amounts of alcohol/drug to achieve intoxication or desired effect..  Met for Alcohol.  11.) Withdrawal, as manifested by either of the following: The characteristic withdrawal syndrome for alcohol/drug (refer to Criteria A and B of the criteria set for alcohol/drug withdrawal).. Met for Alcohol.     As evidenced by self report and criteria, client meets the following DSM5 Diagnoses:   (Sustained by DSM5 Criteria Listed Above)  Alcohol Use Disorder   303.90 (F10.20) Severe In a controlled environment.    Recommendations:     1. Plan for Safety and Risk Management:  Recommended that patient call 911 or go to the local ED should there be a change in any of these risk factors..      Report to child / adult protection services was NA.     2. [facility] Referrals:   Recommendations:  Patient should attend residential treatment at this time.  Patient reports they are willing to follow these recommendations.     Patient would like the following family or other support people involved in their treatment: Patient reports  sister. Patient does not have a history of opiate use.    3. Mental Health Referrals:  Patient should seek professional mental health services such as a therapist and psychiatrist to learn how his substance use overall effects his mental health.     4. Patient identified no cultural concerns that need to be addressed in treatment.    5. Recommendations for treatment focus:   Alcohol / Substance Use - tolerance, withdrawal, progressive use, loss of control, cravings. Patient may lack insight into triggers/cues at this time. Patient may lack coping skills at this time. Patient may be at high risk for relapse at this time. .     Clinical Substantiation:  Summary: Discussed with pt their desired outcome; reviewed living situation and community supports; reviewed type of use and risk factors for continued use. Risk ratings/justification below:   Dimension 1 -  Acute Intoxication/Withdrawal: 0 - No Problem Patient is medically stable and cleared for discharge.  Dimension 2 - Biomedical: 1 - Minor Problem Patient reports he has a primary care provider at this time. Patient reports he has had a physical in the last year. Patient denies any medical or physical concerns at this time. Patient was encouraged to seek medical attention as needed and requested to address any and all medical concerns.   Dimension 3 - Emotional/Behavioral/Cognitive Conditions: 2 - Moderate Problem Patient should seek professional mental health services such as a therapist and psychiatrist to learn how his substance use overall effects his mental health.   Dimension 4 - Readiness to Change:  1 - Minor Problem Patient endorses external and internal motivators for change.   Dimension 5 - Relapse/Continued Use/ Continued Problem Potential: 4 - Extreme Problem tolerance, withdrawal, progressive use, loss of control, cravings. Patient may lack insight into triggers/cues at this time. Patient may lack coping skills at this time. Patient may be at high risk  for relapse at this time. .   Dimension 6 - Recovery Environment:  3 - Severe Problem Patient reports he lives at home alone and reports his home is stable. Patient is is unemployed at this time. Patient may lack social sober support at this time. Patient may lack structure, routine, and engagement into positive activities.     Placement/Program/Barriers Identified: None    Referral: Residential Treatment       DAANES Assessment ID: [id number]    Provider Name/ Credentials:  Lauro Lopez MS, [facility]   August 4, 2023

## 2023-08-04 NOTE — PROGRESS NOTES
Triage & Transition Services, [address]     Durga Aguirre  August 4, 2023    Insurance: Medicare/[employer] PMAP     Legal Status: Voluntary     SUDs Assessment Status: Assessment complete     ROIs on file: [facility]     Living Situation: Home     Current Providers and Supports:  No current OP provider; sister lives in [place]    Encounter: Discussed options for [facility], their residential treatment is located in [place], so the pt decided to look at other options closer to home ([place]) and sister ([place]). Pt agreed to go to [facility] residential facility, TESS was signed. Called [facility] and they said their [facility] has openings for pt - pt agreed to go. Referral process began (H&P was sent, progress notes were sent, assessment will be sent once completed). Pt requested OP grief therapy, this was scheduled and added to the AVS.     Collateral: NA     Consulted with Lauro Lopez on Patient s plan of care.     Current Plan: Transfer to [facility] once referral is completed.      RN updated.    Bernice Kimball  Triage & Transition Services - Mental Health and Addiction Service Line  [address] - Adult Inpatient Addiction Psychiatry Unit

## 2023-08-04 NOTE — PLAN OF CARE
Problem: Alcohol Withdrawal  Goal: Alcohol Withdrawal Symptom Control  Outcome: Progressing    Patient continues on detox assessment and management with valium. He was running slightly hypertensive with the 4 and 8 pm assessment but did not met parameters for IM to be called. MSSA scores were 8 and 4, patient was given 10 mg of valium x1 with 2 dosed hydroxyzine for anxiety of 7/10. Patient was given a pitcher of water encouraged to drink more fluids. He was able to complete his comprehensive assessment paper work this evening after multiple reminders. Patient denies SI/SIB/HI and contracted to be safe. Patient reports his plan was to go to [facility] but could not because of his insurance and he is now looking into [facility] and [facility]. Staff will continue to monitor and assist as needed.

## 2023-08-04 NOTE — PROGRESS NOTES
Brief Medicine Progress Note  August 4, 2023     Following pt for acute elevation of triglyceride levels and for closer examination regarding reasoning for donepezil rx.     #Hypertriglyceridemia  Pt has hx of HLD, taking simvastatin daily. Per chart review, triglycerides in 04/2023 around 200. This admission, elevated to 1,025. Pt started on abilify in April, and hypertriglyceridemia is a known adverse effect.   - recommend alternative therapy to abilify, suspect this is causing increase in triglycerides  - start fish oil once daily  - follow up with PCP w/in 1-2 weeks of discharge for redraw FASTING lipid panel and discussion of lifestyle/diet modifications, monitoring, treatment, etc    #Possible cognitive decline  No further information discovered upon further chart review regarding prescription of donepezil.   - imperative for pt to follow up w/ PCP within 1-2 weeks of discharge in order to clarify if this rx is appropriate/ document diagnosis associated w/ rx  - based on pharmacy med rec and rx records, appears pt has been taking this medication, so will keep order placed  - notify medicine in pt experiencing possible adverse effects of drug, including syncope, HTN (despite adequate withdrawal tx), palpitations, persistent HR >120, bradycardia (<50), chest pain, diarrhea, N/V (despite adequate withdrawal tx); may be indications to hold medication    Thank you for allowing us to participate in the care of this patient. Medicine will sign off at this point. Please do not hesitate to reach out with further questions and concerns.       David Arellano PA-C  [facility] Hospitalist Service  Page via [employer] or [employer]

## 2023-08-05 PROCEDURE — G0177 OPPS/PHP; TRAIN & EDUC SERV: HCPCS

## 2023-08-05 PROCEDURE — 250N000013 HC RX MED GY IP 250 OP 250 PS 637: Performed by: PSYCHIATRY & NEUROLOGY

## 2023-08-05 PROCEDURE — 128N000004 HC R&B CD ADULT

## 2023-08-05 PROCEDURE — 250N000013 HC RX MED GY IP 250 OP 250 PS 637

## 2023-08-05 PROCEDURE — 250N000013 HC RX MED GY IP 250 OP 250 PS 637: Performed by: EMERGENCY MEDICINE

## 2023-08-05 RX ADMIN — LISINOPRIL 40 MG: 20 TABLET ORAL at 08:31

## 2023-08-05 RX ADMIN — HYDROXYZINE HYDROCHLORIDE 25 MG: 25 TABLET, FILM COATED ORAL at 16:28

## 2023-08-05 RX ADMIN — GABAPENTIN 300 MG: 300 CAPSULE ORAL at 13:20

## 2023-08-05 RX ADMIN — Medication 1 G: at 08:37

## 2023-08-05 RX ADMIN — NICOTINE 1 PATCH: 14 PATCH, EXTENDED RELEASE TRANSDERMAL at 08:36

## 2023-08-05 RX ADMIN — TRAZODONE HYDROCHLORIDE 50 MG: 50 TABLET ORAL at 21:37

## 2023-08-05 RX ADMIN — HYDROXYZINE HYDROCHLORIDE 25 MG: 25 TABLET, FILM COATED ORAL at 21:41

## 2023-08-05 RX ADMIN — HYDROCHLOROTHIAZIDE 12.5 MG: 12.5 TABLET ORAL at 08:31

## 2023-08-05 RX ADMIN — GABAPENTIN 300 MG: 300 CAPSULE ORAL at 08:34

## 2023-08-05 RX ADMIN — DONEPEZIL HYDROCHLORIDE 10 MG: 10 TABLET ORAL at 08:33

## 2023-08-05 RX ADMIN — HYDROXYZINE HYDROCHLORIDE 25 MG: 25 TABLET, FILM COATED ORAL at 12:15

## 2023-08-05 RX ADMIN — ARIPIPRAZOLE 5 MG: 5 TABLET ORAL at 08:37

## 2023-08-05 RX ADMIN — CITALOPRAM HYDROBROMIDE 20 MG: 20 TABLET ORAL at 08:38

## 2023-08-05 RX ADMIN — SIMVASTATIN 40 MG: 20 TABLET, FILM COATED ORAL at 21:38

## 2023-08-05 RX ADMIN — GABAPENTIN 300 MG: 300 CAPSULE ORAL at 21:38

## 2023-08-05 ASSESSMENT — ACTIVITIES OF DAILY LIVING (ADL)
ADLS_ACUITY_SCORE: 32
ADLS_ACUITY_SCORE: 32
DRESS: INDEPENDENT
LAUNDRY: UNABLE TO COMPLETE
ADLS_ACUITY_SCORE: 32
HYGIENE/GROOMING: INDEPENDENT
DRESS: INDEPENDENT
HYGIENE/GROOMING: INDEPENDENT
DRESS: INDEPENDENT
ADLS_ACUITY_SCORE: 32
ADLS_ACUITY_SCORE: 32
HYGIENE/GROOMING: INDEPENDENT
ORAL_HYGIENE: INDEPENDENT
ADLS_ACUITY_SCORE: 32
ORAL_HYGIENE: INDEPENDENT
ADLS_ACUITY_SCORE: 32
LAUNDRY: UNABLE TO COMPLETE
ADLS_ACUITY_SCORE: 32
ORAL_HYGIENE: INDEPENDENT
ADLS_ACUITY_SCORE: 32

## 2023-08-05 NOTE — PLAN OF CARE
Problem: Sleep Disturbance  Goal: Adequate Sleep/Rest  Outcome: Progressing   Goal Outcome Evaluation:    The patient has detoxed from alcohol, and he slept okay(7 hours). The team conducted safety checks every 15 minutes with no related concerns.

## 2023-08-05 NOTE — CARE PLAN
"Occupational Therapy     08/05/23 1700   Group Therapy Session   Group Attendance attended group session   Time Session Began 1630   Time Session Ended 1730   Total Time (minutes) 60   Total # Attendees 8   Group Type recreation   Group Topic Covered cognitive activities;coping skills/lifestyle management;leisure exploration/use of leisure time;structured socialization   Group Session Detail OT: Education cognitive wellness and interactive social activity (Scattegories) to increase concentration, focus, attention to task/detail, task follow through, frustration tolerance, memory recall, healthy leisure engagement, coping with stress, heathy distraction engagement, cognitive wellness, social wellness, and social engagement   Patient Response/Contribution cooperative with task;listened actively;confused;other (see comments)  (pleasant; engaged)   Patient Participation Detail Pt reported during check-in he enjoys \"putting on my headphones and riding my bike\" as a way to support his cognitive wellness. Pt sat among peers to complete presented activity and engaged in reciprocal social interactions with peers. Pt initially struggled to follow verbal directions for novel activity and intermittently required verbal reminders from therapist about which letter he needed to use. Pt able to identify several unique responses. Pt reported he enjoyed the activity and verbalized understanding of importance of cognitive wellness in a healthy lifestyle.         "

## 2023-08-05 NOTE — PLAN OF CARE
Goal Outcome Evaluation:    Plan of Care Reviewed With: patient            Pt alert and engaged on the unit, states he slept better last noc, denies pain, no SI/HI, referrals left for [facility] pt interested in grief support from the recent loss of his mother, he is med compliant, denies pain. Blood pressure this /89, pt has a hx of hypertension and is on scheduled Hydrochlorothiazide 12.5mg and Lisinopril 40mg qam, continues out of detox and awaiting placement.

## 2023-08-05 NOTE — PLAN OF CARE
"  Problem: Alcohol Withdrawal  Goal: Alcohol Withdrawal Symptom Control  Outcome: Progressing     Pt scored <8 on MSSA for >24 hours.    She has not required medication for withdrawal for >24 hours.    Pt has been removed from detox status per unit protocol.     Patient was up and visible in the milieu, engaging and socializing with staff and peers,     Patient is running hypertensive but did not meet criteria to call internal medicine. Patient is still working on his discharge plan but is not sure if he will be going to [facility].    BP (!) 164/98 (BP Location: Left arm, Patient Position: Sitting)   Pulse 87   Temp 97.1  F (36.2  C) (Temporal)   Resp 16   Ht 1.778 m (5' 10\")   Wt 100.7 kg (222 lb)   SpO2 96%   BMI 31.85 kg/m         "

## 2023-08-05 NOTE — PROGRESS NOTES
Triage & Transition Services, [address]     Writer met with patient about discharge planning. Patient is kind and tearful while meeting with writer. He is interested in treatment at [facility] in [place]. Writer called [facility] and left message to call unit back for updates. Last update from previous  states beds open next week.      Plan: Inpatient at [facility] when bed opens.     Annemarie English  Triage & Transition Services - Mental Health and Addiction Service Line  [address] - Adult Inpatient Addiction Psychiatry Unit

## 2023-08-06 VITALS
DIASTOLIC BLOOD PRESSURE: 96 MMHG | HEIGHT: 70 IN | TEMPERATURE: 97.4 F | OXYGEN SATURATION: 94 % | SYSTOLIC BLOOD PRESSURE: 157 MMHG | BODY MASS INDEX: 31.78 KG/M2 | RESPIRATION RATE: 16 BRPM | WEIGHT: 222 LBS | HEART RATE: 80 BPM

## 2023-08-06 PROCEDURE — 250N000013 HC RX MED GY IP 250 OP 250 PS 637: Performed by: PSYCHIATRY & NEUROLOGY

## 2023-08-06 PROCEDURE — 250N000013 HC RX MED GY IP 250 OP 250 PS 637

## 2023-08-06 PROCEDURE — 99238 HOSP IP/OBS DSCHRG MGMT 30/<: CPT | Performed by: PSYCHIATRY & NEUROLOGY

## 2023-08-06 PROCEDURE — 250N000013 HC RX MED GY IP 250 OP 250 PS 637: Performed by: EMERGENCY MEDICINE

## 2023-08-06 RX ADMIN — ARIPIPRAZOLE 5 MG: 5 TABLET ORAL at 08:30

## 2023-08-06 RX ADMIN — HYDROXYZINE HYDROCHLORIDE 25 MG: 25 TABLET, FILM COATED ORAL at 08:34

## 2023-08-06 RX ADMIN — DONEPEZIL HYDROCHLORIDE 10 MG: 10 TABLET ORAL at 08:30

## 2023-08-06 RX ADMIN — NICOTINE 1 PATCH: 14 PATCH, EXTENDED RELEASE TRANSDERMAL at 08:31

## 2023-08-06 RX ADMIN — GABAPENTIN 300 MG: 300 CAPSULE ORAL at 08:30

## 2023-08-06 RX ADMIN — LISINOPRIL 40 MG: 20 TABLET ORAL at 08:29

## 2023-08-06 RX ADMIN — HYDROCHLOROTHIAZIDE 12.5 MG: 12.5 TABLET ORAL at 08:30

## 2023-08-06 RX ADMIN — Medication 1 G: at 08:30

## 2023-08-06 RX ADMIN — CITALOPRAM HYDROBROMIDE 20 MG: 20 TABLET ORAL at 08:30

## 2023-08-06 ASSESSMENT — ACTIVITIES OF DAILY LIVING (ADL)
ADLS_ACUITY_SCORE: 32
HYGIENE/GROOMING: INDEPENDENT
ADLS_ACUITY_SCORE: 32
ADLS_ACUITY_SCORE: 32
ORAL_HYGIENE: INDEPENDENT
DRESS: INDEPENDENT
ADLS_ACUITY_SCORE: 32

## 2023-08-06 NOTE — DISCHARGE SUMMARY
[facility], [facility] Course:     Patient detoxed without complication using Valium. He was treated with his outpatient medication regimen as follows:   ARIPiprazole  5 mg Oral Daily    citalopram  20 mg Oral Daily    donepezil  10 mg Oral Daily    fish oil-omega-3 fatty acids  1 g Oral Daily    gabapentin  300 mg Oral TID    hydrochlorothiazide  12.5 mg Oral Daily    lisinopril  40 mg Oral Daily    nicotine  1 patch Transdermal Daily    nicotine   Transdermal Q8H    simvastatin  40 mg Oral QPM   Patient tolerated this regimen well. He feels that Abilify/Citalopram combination is helpful for depression. He has his medications prescribed by his PMD and he has regular follow up.    As of day of discharge patient was calm and cheerful with no suicidal or homicidal thoughts and no evidence of psychosis or medical issues.              HPI as per admit note done by Dr. Aj on 8/3/2023     The patient is a 64yo male with a history of alcohol use disorder and depression who was admitted to detoxify from alcohol. He reports that he is feeling better. Denies nausea. Did get some good sleep last night. The patient relapsed after his mother passed away. Mood is anxious but denies any SI. Denies any history of withdrawal seizures or DTs. Has been taking his medications regularly. Was on Naltrexone and it was helpful until he stopped taking it. Open to CD treatment.      Per ER:  Durga Aguirre is a 63 year old male who presents to the emergency department seeking detox from alcohol.  Patient states has been drinking alcohol daily since he was released from detox in April of this year.  He states that he drinks up to 1 L of vodka per day.  He last drink 2 hours prior to coming to the emergency department.  He reports a history of tremulousness as well as nausea with attempted alcohol cessation.  No history of alcohol withdrawal seizures or DTs.  He denies any recent fall or injury.   He denies any depression or suicidal ideation.  He denies any recent illness or medical concerns.         Past Psychiatric History:     MDD. On Celexa, Abilify and gabapentin recently.   Denies any history of suicide attempts.         Substance Use and History:     Alcohol use disorder. Denies withdrawal seizures or DTs. Records indicate possible hallucinations in withdrawal. Has been on Naltrexone. 1/4 PPD smoker.         Past Medical History:   PAST MEDICAL HISTORY:   Past Medical History:   Diagnosis Date    Alcohol abuse     Anxiety and depression     Arthritis     Hyperlipidemia     Hypertension     Mass of left chest wall     Other spontaneous pneumothorax 1997       PAST SURGICAL HISTORY:   Past Surgical History:   Procedure Laterality Date    COLONOSCOPY      HC EXCISION PARTIAL TALUS OR CALCANEUS, BONE      fx calcaneus- 9 screws in foot             Family History:   FAMILY HISTORY:   Family History   Problem Relation Age of Onset    No Known Problems Mother     Lymphoma Father     No Known Problems Maternal Grandmother     No Known Problems Maternal Grandfather     No Known Problems Paternal Grandmother     No Known Problems Paternal Grandfather     No Known Problems Sister     No Known Problems Son     No Known Problems Brother     No Known Problems Daughter     No Known Problems Other     Unknown/Adopted No family hx of     Depression No family hx of     Anxiety Disorder No family hx of     Schizophrenia No family hx of     Bipolar Disorder No family hx of     Suicide No family hx of     Substance Abuse No family hx of     Dementia No family hx of     Little Neck Disease No family hx of     Parkinsonism No family hx of     Autism Spectrum Disorder No family hx of     Intellectual Disability (Mental Retardation) No family hx of     Mental Illness No family hx of            Social History:   Please see the full psychosocial profile from the clinical treatment coordinator.   SOCIAL HISTORY:   Social History      Tobacco Use    Smoking status: Every Day     Packs/day: 0.25     Years: 20.00     Pack years: 5.00     Types: Cigarettes    Smokeless tobacco: Never    Tobacco comments:     Smokes 2-3 1/2 cigs daily   Substance Use Topics    Alcohol use: Yes     Comment: daily use 1 L vodka            Physical ROS:   The 10-point review of systems was negative except as noted in HPI.         PTA Medications:     Medications Prior to Admission   Medication Sig Dispense Refill Last Dose    ARIPiprazole (ABILIFY) 5 MG tablet Take 1 tablet (5 mg) by mouth daily 30 tablet 0 8/1/2023    citalopram (CELEXA) 20 MG tablet Take 1 tablet (20 mg) by mouth daily 30 tablet 0 8/1/2023    donepezil (ARICEPT) 5 MG tablet Take 10 mg by mouth daily   Unknown    gabapentin (NEURONTIN) 300 MG capsule Take 1 capsule (300 mg) by mouth 3 times daily 90 capsule 5 8/1/2023    hydrochlorothiazide (HYDRODIURIL) 12.5 MG tablet Take 1 tablet (12.5 mg) by mouth daily 90 tablet 0 8/2/2023    hydrOXYzine (ATARAX) 50 MG tablet Take 0.5-1 tablets (25-50 mg) by mouth 3 times daily as needed for anxiety 60 tablet 0 8/2/2023    lisinopril (ZESTRIL) 40 MG tablet Take 1 tablet (40 mg) by mouth daily Hold for a systolic blood pressure of 110 30 tablet 0 8/2/2023    magnesium oxide (MAG-OX) 400 MG tablet Take 1 tablet (400 mg) by mouth 2 times daily 60 tablet 0 8/1/2023    Melatonin 10 MG TABS tablet Take 10 mg by mouth nightly as needed for sleep       multivitamin w/minerals (THERA-VIT-M) tablet Take 1 tablet by mouth daily 90 tablet 0 8/1/2023    naltrexone (DEPADE/REVIA) 50 MG tablet Take 2 tablets (100 mg) by mouth daily 60 tablet 2 8/1/2023    nicotine (NICODERM CQ) 14 MG/24HR 24 hr patch Place 1 patch onto the skin every 24 hours   Past Month    nicotine (NICORETTE) 2 MG gum Place 2 mg inside cheek every hour as needed for smoking cessation   Past Week    simvastatin (ZOCOR) 40 MG tablet Take 1 tablet (40 mg) by mouth every evening 30 tablet 0 8/1/2023     "thiamine (B-1) 100 MG tablet Take 1 tablet (100 mg) by mouth daily 90 tablet 0 8/1/2023    traZODone (DESYREL) 50 MG tablet Take 1 tablet (50 mg) by mouth nightly as needed for sleep 30 tablet 5           Allergies:   No Known Allergies       Labs:     No results found for this or any previous visit (from the past 48 hour(s)).         Physical and Psychiatric Examination:     BP (!) 157/96   Pulse 80   Temp 97.4  F (36.3  C) (Temporal)   Resp 16   Ht 1.778 m (5' 10\")   Wt 100.7 kg (222 lb)   SpO2 94%   BMI 31.85 kg/m    Weight is 222 lbs 0 oz  Body mass index is 31.85 kg/m .    Physical Exam:  I have reviewed the physical exam as documented by the medical team and agree with findings and assessment and have no additional findings to add at this time.    Mental Status Exam:  Appearance: awake, alert and adequately groomed  Attitude:  cooperative  Eye Contact:  good  Mood:  anxious  Affect:  mood congruent  Speech:  clear, coherent  Language: fluent and intact in English  Psychomotor, Gait, Musculoskeletal:  no evidence of tardive dyskinesia, dystonia, or tics  Thought Process:  goal oriented  Associations:  no loose associations  Thought Content:  no evidence of suicidal ideation or homicidal ideation and no evidence of psychotic thought  Insight:  fair  Judgement:  intact  Oriented to:  time, person, and place  Attention Span and Concentration:  intact  Recent and Remote Memory:  fair  Fund of Knowledge:  appropriate    Minimal change in mental status in the past 24 hours         Admission Diagnoses:     Alcohol use disorder, severe  Alcohol withdrawal with unspecified complication   MDD, recurrent, moderate  Nicotine dependence with current use          Assessment & Plan:     Discharge to home with plan to enter [facility] as soon as bed available  Patient has follow up with PMD    More than 20 minutes spent on this visit including patient interview, coordination of care with staff, reviewing medical record, " psychoeducation, providing supportive therapy regarding coping with chronic mental illness, entering orders and preparing documentation for the visit    Entered by: Zion Wood MD on 8/6/2023 at 8:50 AM

## 2023-08-06 NOTE — PLAN OF CARE
Problem: Plan of Care - These are the overarching goals to be used throughout the patient stay.    Goal: Optimal Comfort and Wellbeing  Outcome: Progressing  Intervention: Provide Person-Centered Care  Recent Flowsheet Documentation  Taken 8/5/2023 1100 by Meagan Wood RN  Trust Relationship/Rapport: care explained   Goal Outcome Evaluation:    Plan of Care Reviewed With: patient       Pt remains hypertensive this shift , but not enough to notify IM, he is on hypertensive's q AM, and is med compliant, remains out of detox, with no SI/HI, pain or psych symptoms noted. Pt social in the milieu with peers, and endorsing a good appetite, Hydroxyzine and Trazodone given at HS.

## 2023-08-06 NOTE — PROGRESS NOTES
Brief Medicine Progress Note  August 6, 2023     Spoke with patient nurse this am, she has been monitoring his blood pressure the past few days. States his BP has been SBPs in 150s-160s most days with occasional SBP in 170s. Patient is asymptomatic. He has been receiving his daily BP medications. No chest pain, no headaches, no vision changes. Pt is likely going to discharge today, so nursing wants to ensure BP is addressed before he leaves.     This provider reviewed patient vitals, did find they have been consistently elevated as reported by nurse. Given that patient is asymptomatic and pressures have been stable since he has been here, feel it is appropriate to defer to PCP for blood pressure checks and medication adjustments. This will ensure proper follow up and monitoring occurs to monitor for any adverse effects, hypotension, etc.     Plan:  - no change to patient BP meds today  - follow up with PCP w/in 1-2 weeks of discharge for blood pressure check and titration meds as appropriate      David Arellano PA-C  [facility] Hospitalist Service  Page via [employer] or [employer]

## 2023-08-06 NOTE — PLAN OF CARE
Problem: Alcohol Withdrawal  Goal: Alcohol Withdrawal Symptom Control  Outcome: Progressing     Problem: Sleep Disturbance  Goal: Adequate Sleep/Rest  Outcome: Progressing   Goal Outcome Evaluation:    The patient has detoxed from alcohol, and he slept okay. The team conducted safety checks every 15 minutes with no related concerns.

## 2023-08-06 NOTE — H&P
[facility], [facility] Course:     Patient detoxed without complication with Valium. He was treated with his outpatient medication regimen as follows:   ARIPiprazole  5 mg Oral Daily    citalopram  20 mg Oral Daily    donepezil  10 mg Oral Daily    fish oil-omega-3 fatty acids  1 g Oral Daily    gabapentin  300 mg Oral TID    hydrochlorothiazide  12.5 mg Oral Daily    lisinopril  40 mg Oral Daily    nicotine  1 patch Transdermal Daily    nicotine   Transdermal Q8H    simvastatin  40 mg Oral QPM   Patient tolerated this regimen well. He feels that Abilify/Citalopram combination is helpful for depression. He has his medications prescribed by his PMD and he has regular follow up.    As of day of discharge patient was calm and cheerful with no suicidal or homicidal thoughts and no evidence of psychosis or medical issues.              HPI as per admit note done by Dr. Aj on 8/3/2023     The patient is a 62yo male with a history of alcohol use disorder and depression who was admitted to detoxify from alcohol. He reports that he is feeling better. Denies nausea. Did get some good sleep last night. The patient relapsed after his mother passed away. Mood is anxious but denies any SI. Denies any history of withdrawal seizures or DTs. Has been taking his medications regularly. Was on Naltrexone and it was helpful until he stopped taking it. Open to CD treatment.      Per ER:  Durga Aguirre is a 63 year old male who presents to the emergency department seeking detox from alcohol.  Patient states has been drinking alcohol daily since he was released from detox in April of this year.  He states that he drinks up to 1 L of vodka per day.  He last drink 2 hours prior to coming to the emergency department.  He reports a history of tremulousness as well as nausea with attempted alcohol cessation.  No history of alcohol withdrawal seizures or DTs.  He denies any recent fall or injury.   He denies any depression or suicidal ideation.  He denies any recent illness or medical concerns.         Past Psychiatric History:     MDD. On Celexa, Abilify and gabapentin recently.   Denies any history of suicide attempts.         Substance Use and History:     Alcohol use disorder. Denies withdrawal seizures or DTs. Records indicate possible hallucinations in withdrawal. Has been on Naltrexone. 1/4 PPD smoker.         Past Medical History:   PAST MEDICAL HISTORY:   Past Medical History:   Diagnosis Date    Alcohol abuse     Anxiety and depression     Arthritis     Hyperlipidemia     Hypertension     Mass of left chest wall     Other spontaneous pneumothorax 1997       PAST SURGICAL HISTORY:   Past Surgical History:   Procedure Laterality Date    COLONOSCOPY      HC EXCISION PARTIAL TALUS OR CALCANEUS, BONE      fx calcaneus- 9 screws in foot             Family History:   FAMILY HISTORY:   Family History   Problem Relation Age of Onset    No Known Problems Mother     Lymphoma Father     No Known Problems Maternal Grandmother     No Known Problems Maternal Grandfather     No Known Problems Paternal Grandmother     No Known Problems Paternal Grandfather     No Known Problems Sister     No Known Problems Son     No Known Problems Brother     No Known Problems Daughter     No Known Problems Other     Unknown/Adopted No family hx of     Depression No family hx of     Anxiety Disorder No family hx of     Schizophrenia No family hx of     Bipolar Disorder No family hx of     Suicide No family hx of     Substance Abuse No family hx of     Dementia No family hx of     [place] Disease No family hx of     Parkinsonism No family hx of     Autism Spectrum Disorder No family hx of     Intellectual Disability (Mental Retardation) No family hx of     Mental Illness No family hx of            Social History:   Please see the full psychosocial profile from the clinical treatment coordinator.   SOCIAL HISTORY:   Social History      Tobacco Use    Smoking status: Every Day     Packs/day: 0.25     Years: 20.00     Pack years: 5.00     Types: Cigarettes    Smokeless tobacco: Never    Tobacco comments:     Smokes 2-3 1/2 cigs daily   Substance Use Topics    Alcohol use: Yes     Comment: daily use 1 L vodka            Physical ROS:   The 10-point review of systems was negative except as noted in HPI.         PTA Medications:     Medications Prior to Admission   Medication Sig Dispense Refill Last Dose    ARIPiprazole (ABILIFY) 5 MG tablet Take 1 tablet (5 mg) by mouth daily 30 tablet 0 8/1/2023    citalopram (CELEXA) 20 MG tablet Take 1 tablet (20 mg) by mouth daily 30 tablet 0 8/1/2023    donepezil (ARICEPT) 5 MG tablet Take 10 mg by mouth daily   Unknown    gabapentin (NEURONTIN) 300 MG capsule Take 1 capsule (300 mg) by mouth 3 times daily 90 capsule 5 8/1/2023    hydrochlorothiazide (HYDRODIURIL) 12.5 MG tablet Take 1 tablet (12.5 mg) by mouth daily 90 tablet 0 8/2/2023    hydrOXYzine (ATARAX) 50 MG tablet Take 0.5-1 tablets (25-50 mg) by mouth 3 times daily as needed for anxiety 60 tablet 0 8/2/2023    lisinopril (ZESTRIL) 40 MG tablet Take 1 tablet (40 mg) by mouth daily Hold for a systolic blood pressure of 110 30 tablet 0 8/2/2023    magnesium oxide (MAG-OX) 400 MG tablet Take 1 tablet (400 mg) by mouth 2 times daily 60 tablet 0 8/1/2023    Melatonin 10 MG TABS tablet Take 10 mg by mouth nightly as needed for sleep       multivitamin w/minerals (THERA-VIT-M) tablet Take 1 tablet by mouth daily 90 tablet 0 8/1/2023    naltrexone (DEPADE/REVIA) 50 MG tablet Take 2 tablets (100 mg) by mouth daily 60 tablet 2 8/1/2023    nicotine (NICODERM CQ) 14 MG/24HR 24 hr patch Place 1 patch onto the skin every 24 hours   Past Month    nicotine (NICORETTE) 2 MG gum Place 2 mg inside cheek every hour as needed for smoking cessation   Past Week    simvastatin (ZOCOR) 40 MG tablet Take 1 tablet (40 mg) by mouth every evening 30 tablet 0 8/1/2023     "thiamine (B-1) 100 MG tablet Take 1 tablet (100 mg) by mouth daily 90 tablet 0 8/1/2023    traZODone (DESYREL) 50 MG tablet Take 1 tablet (50 mg) by mouth nightly as needed for sleep 30 tablet 5           Allergies:   No Known Allergies       Labs:     No results found for this or any previous visit (from the past 48 hour(s)).         Physical and Psychiatric Examination:     BP (!) 157/96   Pulse 80   Temp 97.4  F (36.3  C) (Temporal)   Resp 16   Ht 1.778 m (5' 10\")   Wt 100.7 kg (222 lb)   SpO2 94%   BMI 31.85 kg/m    Weight is 222 lbs 0 oz  Body mass index is 31.85 kg/m .    Physical Exam:  I have reviewed the physical exam as documented by the medical team and agree with findings and assessment and have no additional findings to add at this time.    Mental Status Exam:  Appearance: awake, alert and adequately groomed  Attitude:  cooperative  Eye Contact:  good  Mood:  anxious  Affect:  mood congruent  Speech:  clear, coherent  Language: fluent and intact in English  Psychomotor, Gait, Musculoskeletal:  no evidence of tardive dyskinesia, dystonia, or tics  Thought Process:  goal oriented  Associations:  no loose associations  Thought Content:  no evidence of suicidal ideation or homicidal ideation and no evidence of psychotic thought  Insight:  fair  Judgement:  intact  Oriented to:  time, person, and place  Attention Span and Concentration:  intact  Recent and Remote Memory:  fair  Fund of Knowledge:  appropriate    Minimal change in mental status in the past 24 hours         Admission Diagnoses:     Alcohol use disorder, severe  Alcohol withdrawal with unspecified complication   MDD, recurrent, moderate  Nicotine dependence with current use          Assessment & Plan:     Discharge to home with plan to enter [facility] as soon as bed available  Patient has follow up with PMD    Entered by: Zion Wood MD on 8/6/2023 at 8:46 AM           "

## 2023-08-08 ENCOUNTER — PATIENT OUTREACH (OUTPATIENT)
Dept: GERIATRIC MEDICINE | Facility: CLINIC | Age: 63
End: 2023-08-08
Payer: MEDICARE

## 2023-08-08 ENCOUNTER — TELEPHONE (OUTPATIENT)
Dept: INTERNAL MEDICINE | Facility: CLINIC | Age: 63
End: 2023-08-08
Payer: MEDICARE

## 2023-08-08 NOTE — TELEPHONE ENCOUNTER
Reason for Call:  Appointment Request    Patient requesting this type of appt:  Hospital/ED Follow-Up     Requested provider: Rohit Solitario    Reason patient unable to be scheduled: Not within requested timeframe    When does patient want to be seen/preferred time: 3-7 days    Comments: Is scheduled for Aug 24th but needs to be seen sooner.     Could we send this information to you in [facility] or would you prefer to receive a phone call?:   Patient would prefer a phone call   Okay to leave a detailed message?:  he said he will answer  at Home number on file [phone] (home)    Call taken on 8/8/2023 at 7:04 AM by Nelly Rodriguez

## 2023-08-08 NOTE — PROGRESS NOTES
[facility] Care Coordination Contact      [facility] Six-Month Telephone Assessment    6 month telephone assessment completed on 8/7/23.    ER visits: Yes- [facility] 5/7/23 Alcohol intoxication  Hospitalizations: Yes -  8/2/23-8/6/23 [facility]oh detox and 4/12/23- 4/14/23 alcohol abuse at  [facility]  TCU stays: No  Significant health status changes: Denies  Falls/Injuries: No  ADL/IADL changes: No  Changes in services: No    Caregiver Assessment follow up:  NA    Goals: See POC in chart for goal progress documentation.      Member recently discharged from [facility] 8/6/23.  Member has plans to enter [facility] after OV with PCP.  Currently has appt for 8/24 but needs to be seen sooner.  Member is calling clinic for same day availability.     Will see member in 6 months for an annual health risk assessment.   Encouraged member to call CC with any questions or concerns in the meantime.     Varsha Stewart RN, BSN, PHN  [facility]  [phone]  Fax: [phone]

## 2023-08-10 ENCOUNTER — PATIENT OUTREACH (OUTPATIENT)
Dept: GERIATRIC MEDICINE | Facility: CLINIC | Age: 63
End: 2023-08-10
Payer: MEDICARE

## 2023-08-10 NOTE — Clinical Note
Dr Solitario,   I am the [facility] coordinator for Durga.  This is an FYI only that he admitted to [facility] for substance use on 8/16.  Thank you,  Varsha Stewart RN, BSN, PHN [facility] [phone] Fax: [phone]

## 2023-08-10 NOTE — PROGRESS NOTES
[facility] Care Coordination Contact    Call placed to member to follow up on discharge.  Member states [facility] put him in contact with [facility] and he will admit there on Monday morning, will arrive by noon. No questions or concerns at this time.   Varsha Stewart RN, BSN, PHN  [facility]  [phone]  Fax: [phone]

## 2023-08-11 NOTE — TELEPHONE ENCOUNTER
Attempted to call patient phone rings and rings then goes to busy tone. If Patient calls please relay message from provider below

## 2023-08-12 DIAGNOSIS — F19.20 CHEMICAL DEPENDENCY (H): ICD-10-CM

## 2023-08-12 DIAGNOSIS — F10.229 ALCOHOL DEPENDENCE WITH INTOXICATION WITH COMPLICATION (H): ICD-10-CM

## 2023-08-12 DIAGNOSIS — F43.21 COMPLICATED GRIEVING: ICD-10-CM

## 2023-08-12 DIAGNOSIS — F10.24 ALCOHOL DEPENDENCE WITH ALCOHOL-INDUCED MOOD DISORDER (H): ICD-10-CM

## 2023-08-12 DIAGNOSIS — F41.1 GENERALIZED ANXIETY DISORDER: ICD-10-CM

## 2023-08-12 DIAGNOSIS — F32.9 REACTIVE DEPRESSION: ICD-10-CM

## 2023-08-12 DIAGNOSIS — F10.939 ALCOHOL WITHDRAWAL WITH COMPLICATION WITH INPATIENT TREATMENT, WITH UNSPECIFIED COMPLICATION (H): ICD-10-CM

## 2023-08-14 DIAGNOSIS — F10.939 ALCOHOL WITHDRAWAL WITH COMPLICATION WITH INPATIENT TREATMENT, WITH UNSPECIFIED COMPLICATION (H): ICD-10-CM

## 2023-08-14 RX ORDER — NALTREXONE HYDROCHLORIDE 50 MG/1
100 TABLET, FILM COATED ORAL DAILY
Qty: 60 TABLET | Refills: 0 | Status: SHIPPED | OUTPATIENT
Start: 2023-08-14 | End: 2023-08-17

## 2023-08-14 RX ORDER — HYDROXYZINE HYDROCHLORIDE 50 MG/1
25-50 TABLET, FILM COATED ORAL 3 TIMES DAILY PRN
Qty: 60 TABLET | Refills: 0 | Status: SHIPPED | OUTPATIENT
Start: 2023-08-14 | End: 2023-09-06

## 2023-08-14 RX ORDER — ARIPIPRAZOLE 5 MG/1
TABLET ORAL
Qty: 30 TABLET | Refills: 0 | Status: SHIPPED | OUTPATIENT
Start: 2023-08-14 | End: 2023-10-26

## 2023-08-14 RX ORDER — MAGNESIUM OXIDE 400 MG/1
400 TABLET ORAL 2 TIMES DAILY
Qty: 60 TABLET | Refills: 0 | Status: SHIPPED | OUTPATIENT
Start: 2023-08-14 | End: 2023-08-17

## 2023-08-15 RX ORDER — HYDROXYZINE HYDROCHLORIDE 50 MG/1
25-50 TABLET, FILM COATED ORAL 3 TIMES DAILY PRN
Qty: 60 TABLET | Refills: 0 | OUTPATIENT
Start: 2023-08-15

## 2023-08-17 ENCOUNTER — OFFICE VISIT (OUTPATIENT)
Dept: FAMILY MEDICINE | Facility: CLINIC | Age: 63
End: 2023-08-17
Payer: COMMERCIAL

## 2023-08-17 VITALS
HEART RATE: 92 BPM | OXYGEN SATURATION: 94 % | SYSTOLIC BLOOD PRESSURE: 123 MMHG | DIASTOLIC BLOOD PRESSURE: 83 MMHG | RESPIRATION RATE: 18 BRPM | HEIGHT: 70 IN | BODY MASS INDEX: 31.35 KG/M2 | TEMPERATURE: 98.5 F | WEIGHT: 219 LBS

## 2023-08-17 DIAGNOSIS — R73.03 PREDIABETES: ICD-10-CM

## 2023-08-17 DIAGNOSIS — Z78.9 LIVES IN GROUP HOME: Primary | ICD-10-CM

## 2023-08-17 DIAGNOSIS — F41.1 GENERALIZED ANXIETY DISORDER: ICD-10-CM

## 2023-08-17 DIAGNOSIS — I10 ESSENTIAL HYPERTENSION: ICD-10-CM

## 2023-08-17 DIAGNOSIS — F19.20 CHEMICAL DEPENDENCY (H): ICD-10-CM

## 2023-08-17 DIAGNOSIS — F10.24 ALCOHOL DEPENDENCE WITH ALCOHOL-INDUCED MOOD DISORDER (H): ICD-10-CM

## 2023-08-17 DIAGNOSIS — Z23 ENCOUNTER FOR IMMUNIZATION: ICD-10-CM

## 2023-08-17 DIAGNOSIS — E78.5 HYPERLIPIDEMIA LDL GOAL <130: ICD-10-CM

## 2023-08-17 DIAGNOSIS — F10.229 ALCOHOL DEPENDENCE WITH INTOXICATION WITH COMPLICATION (H): ICD-10-CM

## 2023-08-17 RX ORDER — FOLIC ACID 1 MG/1
1 TABLET ORAL DAILY
COMMUNITY
End: 2023-12-18

## 2023-08-17 RX ORDER — HYDROCHLOROTHIAZIDE 12.5 MG/1
12.5 TABLET ORAL DAILY
Qty: 90 TABLET | Refills: 0 | Status: SHIPPED | OUTPATIENT
Start: 2023-08-17 | End: 2023-08-21

## 2023-08-17 RX ORDER — LISINOPRIL 40 MG/1
40 TABLET ORAL DAILY
Qty: 90 TABLET | Refills: 0 | Status: SHIPPED | OUTPATIENT
Start: 2023-08-17 | End: 2023-08-21

## 2023-08-17 RX ORDER — BUSPIRONE HYDROCHLORIDE 15 MG/1
15 TABLET ORAL 3 TIMES DAILY
COMMUNITY
End: 2023-08-17

## 2023-08-17 RX ORDER — ESCITALOPRAM OXALATE 10 MG/1
10 TABLET ORAL DAILY
COMMUNITY
End: 2023-12-18

## 2023-08-17 RX ORDER — BUSPIRONE HYDROCHLORIDE 15 MG/1
15 TABLET ORAL 3 TIMES DAILY
Qty: 90 TABLET | Refills: 0 | Status: SHIPPED | OUTPATIENT
Start: 2023-08-17 | End: 2023-09-25

## 2023-08-17 RX ORDER — CITALOPRAM HYDROBROMIDE 20 MG/1
20 TABLET ORAL DAILY
Qty: 30 TABLET | Refills: 1 | Status: SHIPPED | OUTPATIENT
Start: 2023-08-17 | End: 2023-12-18

## 2023-08-17 RX ORDER — METOPROLOL SUCCINATE 50 MG/1
50 TABLET, EXTENDED RELEASE ORAL DAILY
COMMUNITY
End: 2023-12-18

## 2023-08-17 RX ORDER — NALTREXONE HYDROCHLORIDE 50 MG/1
100 TABLET, FILM COATED ORAL DAILY
Qty: 60 TABLET | Refills: 0 | Status: SHIPPED | OUTPATIENT
Start: 2023-08-17 | End: 2023-12-18

## 2023-08-17 RX ORDER — MAGNESIUM OXIDE 400 MG/1
400 TABLET ORAL 2 TIMES DAILY
Qty: 60 TABLET | Refills: 0 | Status: SHIPPED | OUTPATIENT
Start: 2023-08-17 | End: 2023-09-15

## 2023-08-17 RX ORDER — SIMVASTATIN 40 MG
40 TABLET ORAL EVERY EVENING
Qty: 90 TABLET | Refills: 0 | Status: SHIPPED | OUTPATIENT
Start: 2023-08-17 | End: 2023-12-18

## 2023-08-17 ASSESSMENT — ANXIETY QUESTIONNAIRES
2. NOT BEING ABLE TO STOP OR CONTROL WORRYING: MORE THAN HALF THE DAYS
5. BEING SO RESTLESS THAT IT IS HARD TO SIT STILL: NOT AT ALL
7. FEELING AFRAID AS IF SOMETHING AWFUL MIGHT HAPPEN: NOT AT ALL
GAD7 TOTAL SCORE: 7
3. WORRYING TOO MUCH ABOUT DIFFERENT THINGS: MORE THAN HALF THE DAYS
GAD7 TOTAL SCORE: 7
IF YOU CHECKED OFF ANY PROBLEMS ON THIS QUESTIONNAIRE, HOW DIFFICULT HAVE THESE PROBLEMS MADE IT FOR YOU TO DO YOUR WORK, TAKE CARE OF THINGS AT HOME, OR GET ALONG WITH OTHER PEOPLE: SOMEWHAT DIFFICULT
1. FEELING NERVOUS, ANXIOUS, OR ON EDGE: MORE THAN HALF THE DAYS
6. BECOMING EASILY ANNOYED OR IRRITABLE: NOT AT ALL

## 2023-08-17 ASSESSMENT — ENCOUNTER SYMPTOMS
DYSURIA: 0
FATIGUE: 0
NERVOUS/ANXIOUS: 1
DIARRHEA: 0
WOUND: 0
DIFFICULTY URINATING: 0
HEADACHES: 0
FREQUENCY: 0
VOMITING: 0
NAUSEA: 0
SORE THROAT: 0
COUGH: 0
CHILLS: 0
PALPITATIONS: 0
DYSPHORIC MOOD: 1
ABDOMINAL PAIN: 0
DIZZINESS: 0
SHORTNESS OF BREATH: 1
FEVER: 0

## 2023-08-17 ASSESSMENT — PATIENT HEALTH QUESTIONNAIRE - PHQ9
SUM OF ALL RESPONSES TO PHQ QUESTIONS 1-9: 4
5. POOR APPETITE OR OVEREATING: SEVERAL DAYS

## 2023-08-17 NOTE — PROGRESS NOTES
[facility] PHYSICAL EXAM     Patient: Durga Aguirre  YOB: 1960    Date of Exam: 8/17/23  Arrival Time: 08 49 AM  Departure Time: 09 26 AM    Allergies: No Known Allergies    Patient Concerns: Physical.    HPI:   63-year-old male with past medical history hypertension, hyperlipidemia, anxiety/depression, alcohol abuse disorder, spontaneous pneumothorax (1997) presents for [facility] intake physical.  Patient has been in the program for 1 day.  His drug of choice was alcohol, his last drink was on Sunday.  He typically drinks 1 L of vodka per day.  He denies symptoms of alcohol withdrawal today including nausea, vomiting, tremors.  He denies a history of IV drug use.  He is not currently sexually active.    Per chart review, patient was hospitalized from 8/2/2023 to 8/6/2023 for alcohol detox.  That hospitalization was secondary to a relapse after his mother passed away.  There was some confusion regarding his donepezil.  The patient denies a history of Alzheimer's or dementia, however pharmacy med rec demonstrated prescription of donepezil by pt's PCP.  Patient reports that that was an accident by [facility] and should be his Abilify prescription.  He reports he is currently not taking the donepezil at this time. He endorses anxiety and depressive symptoms today, however, he denies suicidal ideation or homicidal ideation. No other acute concerns/symptoms at time of exam.            Immunizations:   Most Recent Immunizations   Administered Date(s) Administered    COVID-19 MONOVALENT 12+ (Pfizer) 01/13/2022    TD,PF 7+ (Tenivac) 01/01/2006    TDAP Vaccine (Adacel) 09/16/2013    Zoster recombinant adjuvanted (SHINGRIX) 09/13/2022       Do you need any refills on your Medications today? Yes: numerous    Free of communicable diseases? No.    Health Maintenance:   COVID- 3 doses  TDAP- 2013  PNA- not completed  Colonoscopy- 2018 polyp, due for repeat  PSA- 1.09 2016  Eye- 2023    Review of Systems    Constitutional:  Negative for chills, fatigue and fever.   HENT:  Negative for congestion and sore throat.    Respiratory:  Positive for shortness of breath (on exertion). Negative for cough.    Cardiovascular:  Negative for chest pain and palpitations.   Gastrointestinal:  Negative for abdominal pain, diarrhea, nausea and vomiting.   Genitourinary:  Negative for difficulty urinating, dysuria and frequency.   Skin:  Negative for rash and wound.   Neurological:  Negative for dizziness and headaches.   Psychiatric/Behavioral:  Positive for dysphoric mood. Negative for suicidal ideas. The patient is nervous/anxious.      Current Outpatient Medications   Medication    ARIPiprazole (ABILIFY) 5 MG tablet    busPIRone (BUSPAR) 15 MG tablet    citalopram (CELEXA) 20 MG tablet    gabapentin (NEURONTIN) 300 MG capsule    hydrochlorothiazide (HYDRODIURIL) 12.5 MG tablet    hydrOXYzine (ATARAX) 50 MG tablet    lisinopril (ZESTRIL) 40 MG tablet    magnesium oxide (MAG-OX) 400 MG tablet    Melatonin 10 MG TABS tablet    multivitamin w/minerals (THERA-VIT-M) tablet    naltrexone (DEPADE/REVIA) 50 MG tablet    simvastatin (ZOCOR) 40 MG tablet    thiamine (B-1) 100 MG tablet    traZODone (DESYREL) 50 MG tablet    escitalopram (LEXAPRO) 10 MG tablet    folic acid (FOLVITE) 1 MG tablet    metoprolol succinate ER (TOPROL XL) 50 MG 24 hr tablet    nicotine (NICORETTE) 2 MG gum     No current facility-administered medications for this visit.     Facility-Administered Medications Ordered in Other Visits   Medication    Self Administer Medications: Behavioral Services     Past Medical History:   Diagnosis Date    Alcohol abuse     Anxiety and depression     Arthritis     Hyperlipidemia     Hypertension     Mass of left chest wall     Other spontaneous pneumothorax 1997       Past Surgical History:   Procedure Laterality Date    COLONOSCOPY      HC EXCISION PARTIAL TALUS OR CALCANEUS, BONE      fx calcaneus- 9 screws in foot       Family  "History   Problem Relation Age of Onset    No Known Problems Mother     Lymphoma Father     No Known Problems Sister     No Known Problems Brother     No Known Problems Maternal Grandmother     No Known Problems Maternal Grandfather     No Known Problems Paternal Grandmother     No Known Problems Paternal Grandfather     No Known Problems Daughter     No Known Problems Son     No Known Problems Other     Unknown/Adopted No family hx of     Depression No family hx of     Anxiety Disorder No family hx of     Schizophrenia No family hx of     Bipolar Disorder No family hx of     Suicide No family hx of     Substance Abuse No family hx of     Dementia No family hx of     [place] Disease No family hx of     Parkinsonism No family hx of     Autism Spectrum Disorder No family hx of     Intellectual Disability (Mental Retardation) No family hx of     Mental Illness No family hx of        Social History     Tobacco Use    Smoking status: Every Day     Packs/day: 0.25     Years: 20.00     Pack years: 5.00     Types: Cigarettes    Smokeless tobacco: Never    Tobacco comments:     Smokes 2-3 1/2 cigs daily   Substance Use Topics    Alcohol use: Not Currently     Comment: daily use 1 L vodka             General Physical Exam:  Vitals: /83 (BP Location: Right arm)   Pulse 92   Temp 98.5  F (36.9  C) (Oral)   Resp 18   Ht 1.778 m (5' 10\")   Wt 99.3 kg (219 lb)   SpO2 94%   BMI 31.42 kg/m      Past Medical History Reviewed? Yes.    Physical Exam  Constitutional:       General: He is not in acute distress.     Appearance: He is not ill-appearing.   HENT:      Right Ear: Tympanic membrane normal.      Left Ear: Tympanic membrane normal.      Mouth/Throat:      Mouth: Mucous membranes are moist.      Pharynx: No oropharyngeal exudate or posterior oropharyngeal erythema.   Eyes:      Extraocular Movements: Extraocular movements intact.      Pupils: Pupils are equal, round, and reactive to light.   Cardiovascular:      " Rate and Rhythm: Normal rate and regular rhythm.      Heart sounds: Normal heart sounds. No murmur heard.  Pulmonary:      Effort: Pulmonary effort is normal. No respiratory distress.      Breath sounds: Normal breath sounds.   Abdominal:      General: Bowel sounds are normal.      Palpations: Abdomen is soft.      Tenderness: There is no abdominal tenderness.   Genitourinary:     Comments: Exam deferred by pt.  Musculoskeletal:      Cervical back: Neck supple.      Right lower leg: No edema.      Left lower leg: No edema.   Lymphadenopathy:      Cervical: No cervical adenopathy.   Skin:     General: Skin is warm and dry.   Neurological:      General: No focal deficit present.      Mental Status: He is alert and oriented to person, place, and time.      Motor: No tremor.      Deep Tendon Reflexes:      Reflex Scores:       Patellar reflexes are 1+ on the right side and 1+ on the left side.     Comments: CN II-XII grossly intact.    Psychiatric:         Thought Content: Thought content normal.         Judgment: Judgment normal.           Recommended Diet and Special Instructions: No  Limitations or restrictions for activities: No  Information Pertinent to treatment in case of emergency None    Assessment/Plan  1. Lives in group home  VSS, patient presents for intake physical for [facility] substance use treatment program. Will check TB per program requirements. Defer MH management to alexia Lynn.    Patient reports he is NOT taking donepezil. Advised RN at [facility] that abrupt discontinuation could have significant withdrawal symptoms such as increased anxiety/agitation, delusions, hallucinations, etc. And to reach out if he develops any of the above symptoms.   - Quantiferon TB Gold Plus; Future    2. Essential hypertension  BP at goal, continue Acei/hydrochlorothiazide.       3. Hyperlipidemia LDL goal <130  Patient's triglycerides significantly elevated while at detox. This could be from abilify. Will recheck LIPID panel  while fasting and have patient discuss with Mark Lynn.  - Lipid panel reflex to direct LDL Fasting; Future  - simvastatin (ZOCOR) 40 MG tablet; Take 1 tablet (40 mg) by mouth every evening  Dispense: 90 tablet; Refill: 0    4. Encounter for immunization  Advised patient to speak to pharmacy re: TDAP coverage.   - PNEUMOCOCCAL 20 VALENT CONJUGATE (PREVNAR 20); Future    5. Prediabetes  Advised diet/lifestyle modifications.    6. Generalized anxiety disorder  Refill per patient/RN at [facility] request. Defer MH management to Mark Lynn.   - busPIRone (BUSPAR) 15 MG tablet; Take 1 tablet (15 mg) by mouth 3 times daily  Dispense: 90 tablet; Refill: 0  - citalopram (CELEXA) 20 MG tablet; Take 1 tablet (20 mg) by mouth daily  Dispense: 30 tablet; Refill: 1    7. Chemical dependency (H)  Refill per patient/RN at [facility] request. Defer MH management to Mark Lynn.  Patient will need ER visit for ETOH withdrawal symptoms.  - naltrexone (DEPADE/REVIA) 50 MG tablet; Take 2 tablets (100 mg) by mouth daily  Dispense: 60 tablet; Refill: 0    8. Alcohol dependence with alcohol-induced mood disorder (H)  Refill per patient/RN at [facility] request. Defer MH management to Mark Lynn.   - naltrexone (DEPADE/REVIA) 50 MG tablet; Take 2 tablets (100 mg) by mouth daily  Dispense: 60 tablet; Refill: 0    9. Alcohol dependence with intoxication with complication (H)  Refill per patient/RN at [facility] request. Defer MH management to Mark Lynn.   - magnesium oxide (MAG-OX) 400 MG tablet; Take 1 tablet (400 mg) by mouth 2 times daily  Dispense: 60 tablet; Refill: 0  - hydrochlorothiazide (HYDRODIURIL) 12.5 MG tablet; Take 1 tablet (12.5 mg) by mouth daily  Dispense: 90 tablet; Refill: 0  - lisinopril (ZESTRIL) 40 MG tablet; Take 1 tablet (40 mg) by mouth daily Hold for a systolic blood pressure of 110  Dispense: 90 tablet; Refill: 0    We were unable to complete Medicare wellness requirements due to significant time spent navigating donepezil issue. He is scheduled to  see PCP on 8/24. He was instructed to make a wellness visit with his PCP in the future to discuss preventative items such as repeat PSA, colonoscopy, and lung cancer screening.    Referrals   NO REFERRALS MADE TODAY    Follow up plan   Follow-up with PCP and/or NP clinic for lab review, sooner if needed.    All questions/concerns addressed. Patient stated understanding/agreement to plan of care.    RONAL Rios, CNP  [facility] [employer]    Note: Chart documentation was done in part with Dragon Voice Recognition software.  Although reviewed after completion, some word and grammatical errors may remain. Please contact author for any clarification or concerns.

## 2023-08-17 NOTE — TELEPHONE ENCOUNTER
Juwan Oleary,    Yes- patient reports that he is not taking aricept but taking abilify.    ThanksTyler

## 2023-08-17 NOTE — TELEPHONE ENCOUNTER
[facility] called with med error  [phone]    Reports that pt was prescribed Abilify by Dr. Greer 5/25 5mg tablet.    Somehow the pharmacy transcribed this as Aricept 5mg, tabs  at 10mg dose and pt was dispensed Aricept and given Aricept under Dr. Whittaker's name.    Also see this on med list as historical    Durga Radford's sister is aware of issue and let pt know about it per pharmacy.    Routing to provider who saw pt today and Dr. Whittaker for review.    Provider today - Zion Diana - Are you able to verify if pt was taking the abilify in visit?  There is a note for pt to discuss Aricept with provider.  Has this been discontinued? Is pt currently aware to not take Aricept?    Mamta Prather, RN  [facility] RN Triage Team

## 2023-08-17 NOTE — PATIENT INSTRUCTIONS
Please discuss tetanus booster with pharmacy.    Please follow-up with your PCP for a medicare wellness visit to discuss repeat colonoscopy, PSA screening, and Lung cancer screening.    See Mark Lynn for Mental health medication management.

## 2023-08-19 DIAGNOSIS — F10.229 ALCOHOL DEPENDENCE WITH INTOXICATION WITH COMPLICATION (H): ICD-10-CM

## 2023-08-21 RX ORDER — LISINOPRIL 40 MG/1
40 TABLET ORAL DAILY
Qty: 30 TABLET | Refills: 5 | Status: SHIPPED | OUTPATIENT
Start: 2023-08-21 | End: 2023-12-18

## 2023-08-21 RX ORDER — HYDROCHLOROTHIAZIDE 12.5 MG/1
12.5 TABLET ORAL DAILY
Qty: 30 TABLET | Refills: 5 | Status: SHIPPED | OUTPATIENT
Start: 2023-08-21 | End: 2023-12-18

## 2023-09-06 ENCOUNTER — PATIENT OUTREACH (OUTPATIENT)
Dept: GERIATRIC MEDICINE | Facility: CLINIC | Age: 63
End: 2023-09-06

## 2023-09-06 DIAGNOSIS — F10.939 ALCOHOL WITHDRAWAL WITH COMPLICATION WITH INPATIENT TREATMENT, WITH UNSPECIFIED COMPLICATION (H): ICD-10-CM

## 2023-09-06 RX ORDER — HYDROXYZINE HYDROCHLORIDE 50 MG/1
TABLET, FILM COATED ORAL
Qty: 60 TABLET | Refills: 0 | Status: SHIPPED | OUTPATIENT
Start: 2023-09-06 | End: 2023-10-26

## 2023-09-06 NOTE — Clinical Note
Dr Solitario,   This is an FYI that Durga left [facility] on 10/18. Per his sister Cate this was unplanned.  Was arrested on 10/20 with DUI.  When I am able to reach him I will encourage in clinic appt for follow up.   Thank you,   Varsha Stewart RN, BSN, PHN [facility] [phone] Fax: [phone]
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
## 2023-09-15 DIAGNOSIS — F10.229 ALCOHOL DEPENDENCE WITH INTOXICATION WITH COMPLICATION (H): ICD-10-CM

## 2023-09-15 NOTE — TELEPHONE ENCOUNTER
magnesium oxide (MAG-OX) 400 MG tablet   60 tablet 0 8/17/2023     Last Office Visit : 8-  Future Office visit:  none

## 2023-09-15 NOTE — TELEPHONE ENCOUNTER
"Juwan Gabriel,    Do you know if the standard \"Thera-Vit-M\" has magnesium in it? If so, this patient may not need this supplement anymore (he has a hx of ETOH use disorder. Currently at [facility].)  Thanks!  Tyler"

## 2023-09-18 RX ORDER — MAGNESIUM OXIDE 400 MG/1
400 TABLET ORAL 2 TIMES DAILY
Qty: 60 TABLET | Refills: 0 | Status: SHIPPED | OUTPATIENT
Start: 2023-09-18 | End: 2023-09-18

## 2023-09-18 NOTE — CONFIDENTIAL NOTE
RN from [facility] reported that patient's multivitamin has magnesium in it. Will discontinue mag-ox rx.

## 2023-09-21 DIAGNOSIS — F41.1 GENERALIZED ANXIETY DISORDER: ICD-10-CM

## 2023-09-22 RX ORDER — BUSPIRONE HYDROCHLORIDE 15 MG/1
15 TABLET ORAL 3 TIMES DAILY
Qty: 270 TABLET | OUTPATIENT
Start: 2023-09-22

## 2023-09-22 NOTE — TELEPHONE ENCOUNTER
He is at an inpatient Rehab facility, provided a bridge Rx last month. Further management should come from his psych provider.

## 2023-09-22 NOTE — TELEPHONE ENCOUNTER
busPIRone (BUSPAR) 15 MG tablet   Last Written Prescription Date:   8/17/2023  Last Fill Quantity: 90,   # refills: 0  Last Office Visit : 8/17/2023  Future Office visit:  None    Routing refill request to provider for review/approval because:  Only a 30 day supply sent to pharm last order.   Would Provider like a 90 day supply =270 Tabs with refills for Pt care.  Please review and send new order.   Thank you     Sophie Lopez RN  Central Triage Red Flags/Med Refills

## 2023-09-25 RX ORDER — BUSPIRONE HYDROCHLORIDE 15 MG/1
15 TABLET ORAL 3 TIMES DAILY
Qty: 90 TABLET | Refills: 0 | Status: SHIPPED | OUTPATIENT
Start: 2023-09-25 | End: 2023-12-18

## 2023-09-26 NOTE — CONFIDENTIAL NOTE
Received refill request from [facility] staff. Will provide another bridge, however, encouraged [facility] staff to have patient establish with a psych provider.

## 2023-10-26 DIAGNOSIS — F32.9 REACTIVE DEPRESSION: ICD-10-CM

## 2023-10-26 DIAGNOSIS — F10.939 ALCOHOL WITHDRAWAL WITH COMPLICATION WITH INPATIENT TREATMENT, WITH UNSPECIFIED COMPLICATION (H): ICD-10-CM

## 2023-10-26 DIAGNOSIS — F41.1 GENERALIZED ANXIETY DISORDER: ICD-10-CM

## 2023-10-26 DIAGNOSIS — F10.24 ALCOHOL DEPENDENCE WITH ALCOHOL-INDUCED MOOD DISORDER (H): ICD-10-CM

## 2023-10-26 DIAGNOSIS — F43.21 COMPLICATED GRIEVING: ICD-10-CM

## 2023-10-26 RX ORDER — HYDROXYZINE HYDROCHLORIDE 50 MG/1
TABLET, FILM COATED ORAL
Qty: 60 TABLET | Refills: 0 | Status: SHIPPED | OUTPATIENT
Start: 2023-10-26 | End: 2023-12-11

## 2023-10-26 RX ORDER — ARIPIPRAZOLE 5 MG/1
TABLET ORAL
Qty: 30 TABLET | Refills: 0 | Status: SHIPPED | OUTPATIENT
Start: 2023-10-26 | End: 2023-12-18

## 2023-11-14 ENCOUNTER — PATIENT OUTREACH (OUTPATIENT)
Dept: GERIATRIC MEDICINE | Facility: CLINIC | Age: 63
End: 2023-11-14
Payer: MEDICARE

## 2023-11-14 NOTE — PROGRESS NOTES
[facility] Care Coordination Contact    Call placed to member to follow up on potential CD admission.  Member states that he doesn't want to go back to [facility] states he is hoping that he can do a day program versus inpt. Has an appt for 12/20 at [facility] for intake into their outpt program in [place].  Varsha Stewart RN, BSN, PHN  [facility]  [phone]  Fax: [phone]

## 2023-12-01 DIAGNOSIS — F10.939 ALCOHOL WITHDRAWAL WITH COMPLICATION WITH INPATIENT TREATMENT, WITH UNSPECIFIED COMPLICATION (H): ICD-10-CM

## 2023-12-01 RX ORDER — TRAZODONE HYDROCHLORIDE 50 MG/1
50 TABLET, FILM COATED ORAL
Qty: 30 TABLET | Refills: 0 | Status: SHIPPED | OUTPATIENT
Start: 2023-12-01 | End: 2024-01-11

## 2023-12-10 DIAGNOSIS — F10.939 ALCOHOL WITHDRAWAL WITH COMPLICATION WITH INPATIENT TREATMENT, WITH UNSPECIFIED COMPLICATION (H): ICD-10-CM

## 2023-12-10 DIAGNOSIS — F41.1 GENERALIZED ANXIETY DISORDER: ICD-10-CM

## 2023-12-11 RX ORDER — HYDROXYZINE HYDROCHLORIDE 50 MG/1
TABLET, FILM COATED ORAL
Qty: 60 TABLET | Refills: 4 | Status: SHIPPED | OUTPATIENT
Start: 2023-12-11 | End: 2023-12-18

## 2023-12-12 RX ORDER — BUSPIRONE HYDROCHLORIDE 5 MG/1
TABLET ORAL
Qty: 120 TABLET | Refills: 0 | OUTPATIENT
Start: 2023-12-12

## 2023-12-12 NOTE — TELEPHONE ENCOUNTER
This dose must be incorrect. Recent rx from another provider after rehab was for 15 mg - but that wasn't even from me.   Does he have psychiatrist/MH provider?   If not - I really have no idea what he is on for meds since 8/23- should set up virtual appt to get any RF on meds.

## 2023-12-13 NOTE — TELEPHONE ENCOUNTER
Talked with pt and he does not have a psychiatrist or mental health provider. Pt was scheduled for a video visit to get a refill on his buspirone and go over dosage on 12/18.

## 2023-12-18 ENCOUNTER — VIRTUAL VISIT (OUTPATIENT)
Dept: INTERNAL MEDICINE | Facility: CLINIC | Age: 63
End: 2023-12-18
Payer: MEDICARE

## 2023-12-18 DIAGNOSIS — F41.1 GENERALIZED ANXIETY DISORDER: ICD-10-CM

## 2023-12-18 DIAGNOSIS — E78.1 HYPERTRIGLYCERIDEMIA: Chronic | ICD-10-CM

## 2023-12-18 DIAGNOSIS — I10 ESSENTIAL HYPERTENSION: Chronic | ICD-10-CM

## 2023-12-18 DIAGNOSIS — F10.24 ALCOHOL DEPENDENCE WITH ALCOHOL-INDUCED MOOD DISORDER (H): Primary | ICD-10-CM

## 2023-12-18 PROCEDURE — 99443 PR PHYSICIAN TELEPHONE EVALUATION 21-30 MIN: CPT | Mod: 95 | Performed by: INTERNAL MEDICINE

## 2023-12-18 RX ORDER — HYDROXYZINE HYDROCHLORIDE 50 MG/1
25-50 TABLET, FILM COATED ORAL EVERY 8 HOURS PRN
Qty: 180 TABLET | Refills: 3 | Status: SHIPPED | OUTPATIENT
Start: 2023-12-18 | End: 2024-09-17

## 2023-12-18 RX ORDER — BUSPIRONE HYDROCHLORIDE 15 MG/1
15 TABLET ORAL 2 TIMES DAILY
Qty: 180 TABLET | Refills: 1 | Status: SHIPPED | OUTPATIENT
Start: 2023-12-18 | End: 2024-06-13

## 2023-12-18 RX ORDER — LISINOPRIL 40 MG/1
40 TABLET ORAL DAILY
Qty: 90 TABLET | Refills: 3 | Status: SHIPPED | OUTPATIENT
Start: 2023-12-18 | End: 2024-09-04

## 2023-12-18 RX ORDER — SIMVASTATIN 40 MG
40 TABLET ORAL EVERY EVENING
Qty: 90 TABLET | Refills: 3 | Status: SHIPPED | OUTPATIENT
Start: 2023-12-18 | End: 2024-09-04

## 2023-12-18 RX ORDER — GABAPENTIN 300 MG/1
300 CAPSULE ORAL 2 TIMES DAILY
Qty: 180 CAPSULE | Refills: 1 | Status: SHIPPED | OUTPATIENT
Start: 2023-12-18 | End: 2024-01-16

## 2023-12-18 RX ORDER — NALTREXONE HYDROCHLORIDE 50 MG/1
100 TABLET, FILM COATED ORAL DAILY
Qty: 180 TABLET | Refills: 1 | Status: SHIPPED | OUTPATIENT
Start: 2023-12-18 | End: 2024-02-16

## 2023-12-18 RX ORDER — CITALOPRAM HYDROBROMIDE 20 MG/1
20 TABLET ORAL DAILY
Qty: 90 TABLET | Refills: 1 | Status: SHIPPED | OUTPATIENT
Start: 2023-12-18 | End: 2024-06-13

## 2023-12-18 RX ORDER — HYDROCHLOROTHIAZIDE 12.5 MG/1
12.5 TABLET ORAL DAILY
Qty: 90 TABLET | Refills: 3 | Status: SHIPPED | OUTPATIENT
Start: 2023-12-18 | End: 2024-09-04

## 2023-12-18 RX ORDER — ARIPIPRAZOLE 5 MG/1
TABLET ORAL
Qty: 90 TABLET | Refills: 1 | Status: SHIPPED | OUTPATIENT
Start: 2023-12-18 | End: 2024-06-13

## 2023-12-18 ASSESSMENT — ANXIETY QUESTIONNAIRES
1. FEELING NERVOUS, ANXIOUS, OR ON EDGE: NEARLY EVERY DAY
3. WORRYING TOO MUCH ABOUT DIFFERENT THINGS: NEARLY EVERY DAY
GAD7 TOTAL SCORE: 9
2. NOT BEING ABLE TO STOP OR CONTROL WORRYING: MORE THAN HALF THE DAYS
6. BECOMING EASILY ANNOYED OR IRRITABLE: NOT AT ALL
7. FEELING AFRAID AS IF SOMETHING AWFUL MIGHT HAPPEN: SEVERAL DAYS
GAD7 TOTAL SCORE: 9
5. BEING SO RESTLESS THAT IT IS HARD TO SIT STILL: NOT AT ALL
IF YOU CHECKED OFF ANY PROBLEMS ON THIS QUESTIONNAIRE, HOW DIFFICULT HAVE THESE PROBLEMS MADE IT FOR YOU TO DO YOUR WORK, TAKE CARE OF THINGS AT HOME, OR GET ALONG WITH OTHER PEOPLE: SOMEWHAT DIFFICULT

## 2023-12-18 ASSESSMENT — PATIENT HEALTH QUESTIONNAIRE - PHQ9
5. POOR APPETITE OR OVEREATING: NOT AT ALL
SUM OF ALL RESPONSES TO PHQ QUESTIONS 1-9: 3

## 2023-12-18 NOTE — PROGRESS NOTES
Durga is a 63 year old who is being evaluated via a billable video visit.      How would you like to obtain your AVS? Tiffanyharclaude  If the video visit is dropped, the invitation should be resent by: Text to cell phone: [phone]  Will anyone else be joining your video visit? No          Assessment & Plan     Alcohol dependence with alcohol-induced mood disorder (H)  Generalized anxiety disorder  Drinking again since discharge from rehab.  I had this discussion with him many times.  Typically goes like this: Recommend focusing on her sobriety.  Becoming more active and outpatient treatment after he leaves rehab for which she has not done on a consistent basis.  Follow-up with mental health.  I think this is an area where the system has failed him.  He goes to rehab but afterwards never seems to have anything scheduled for mental health or addiction follow-up.  He stopped his meds or starts taking them more regularly or just based on the schedule that he feels works for him.  Oftentimes, as is the case today this resolves and taking some meds less often than they were prescribed during rehab some not at all and some more often.  -Restart the meds based on the doses we have used previously  -Needs to establish long-term psychiatric and addiction medicine care.  Having the services done here in primary care has not worked for this patient.  He needs more intensive, close follow-up.  -Continue his other meds for hypertension and hyperlipidemia.   -Continue home medication  - naltrexone (DEPADE/REVIA) 50 MG tablet; Take 2 tablets (100 mg) by mouth daily  - ARIPiprazole (ABILIFY) 5 MG tablet; TAKE ONE TABLET (5 MG) BY MOUTH ONE TIME DAILY  - gabapentin (NEURONTIN) 300 MG capsule; Take 1 capsule (300 mg) by mouth 2 times daily  - Adult Mental Health [facility] Referral; Future  - hydrOXYzine HCl (ATARAX) 50 MG tablet; Take 0.5-1 tablets (25-50 mg) by mouth every 8 hours as needed for anxiety  - busPIRone (BUSPAR) 15 MG tablet;  "Take 1 tablet (15 mg) by mouth 2 times daily  - citalopram (CELEXA) 20 MG tablet; Take 1 tablet (20 mg) by mouth daily      Essential hypertension  - hydrochlorothiazide (HYDRODIURIL) 12.5 MG tablet; Take 1 tablet (12.5 mg) by mouth daily  - lisinopril (ZESTRIL) 40 MG tablet; Take 1 tablet (40 mg) by mouth daily (Hold for a systolic blood pressure of 100)    Hypertriglyceridemia  - simvastatin (ZOCOR) 40 MG tablet; Take 1 tablet (40 mg) by mouth every evening    Review of prior external note(s) from - rehab  Ordering of each unique test  Prescription drug management         BMI:   Estimated body mass index is 31.42 kg/m  as calculated from the following:    Height as of 8/17/23: 1.778 m (5' 10\").    Weight as of 8/17/23: 99.3 kg (219 lb).       See Patient Instructions    Rohit Solitario MD  [facility]    Kathy Calixto is a 63 year old, presenting for the following health issues:  Depression (Pt was discharged from rehab 10/18/2023, states was not given psych to follow up with, wants to go back to  [facility])    Long hx alcohol abuse- 8-10/23 residential program.  Out 10/18 .  After d/c no f/u mental health services. Did not f/u with any outpatient addiction services.     Started drinking again mid November. Now drinking several /day everyday.      HPI     Depression and Anxiety Follow-Up  How are you doing with your depression since your last visit? No change  How are you doing with your anxiety since your last visit?  No change  Are you having other symptoms that might be associated with depression or anxiety? No  Have you had a significant life event? Grief or Loss   Do you have any concerns with your use of alcohol or other drugs? No    Social History     Tobacco Use    Smoking status: Every Day     Packs/day: 0.25     Years: 20.00     Additional pack years: 0.00     Total pack years: 5.00     Types: Cigarettes    Smokeless tobacco: Never    Tobacco comments:     Smokes 2-3 " 1/2 cigs daily   Vaping Use    Vaping Use: Never used   Substance Use Topics    Alcohol use: Not Currently     Comment: daily use 1 L vodka    Drug use: Not Currently         8/4/2023    12:55 PM 8/17/2023     8:56 AM 12/18/2023     2:43 PM   PHQ   PHQ-9 Total Score 13 4 3   Q9: Thoughts of better off dead/self-harm past 2 weeks Not at all Not at all Not at all         8/4/2023    12:55 PM 8/17/2023     8:56 AM 12/18/2023     2:43 PM   YUSUF-7 SCORE   Total Score 9 7 9         12/18/2023     2:43 PM   Last PHQ-9   1.  Little interest or pleasure in doing things 1   2.  Feeling down, depressed, or hopeless 1   3.  Trouble falling or staying asleep, or sleeping too much 0   4.  Feeling tired or having little energy 0   5.  Poor appetite or overeating 0   6.  Feeling bad about yourself 1   7.  Trouble concentrating 0   8.  Moving slowly or restless 0   Q9: Thoughts of better off dead/self-harm past 2 weeks 0   PHQ-9 Total Score 3         12/18/2023     2:43 PM   YUSUF-7    1. Feeling nervous, anxious, or on edge 3   2. Not being able to stop or control worrying 2   3. Worrying too much about different things 3   4. Trouble relaxing 0   5. Being so restless that it is hard to sit still 0   6. Becoming easily annoyed or irritable 0   7. Feeling afraid, as if something awful might happen 1   YUSUF-7 Total Score 9   If you checked any problems, how difficult have they made it for you to do your work, take care of things at home, or get along with other people? Somewhat difficult       Suicide Assessment Five-step Evaluation and Treatment (SAFE-T)            Review of Systems         Objective           Vitals:  No vitals were obtained today due to virtual visit.    Physical Exam   GENERAL: alert and no distress              Telephone call     Type of service:  telephone  Visit   Time: 26 minutes

## 2023-12-20 ENCOUNTER — HOSPITAL ENCOUNTER (OUTPATIENT)
Dept: BEHAVIORAL HEALTH | Facility: CLINIC | Age: 63
Discharge: HOME | End: 2023-12-20
Attending: PSYCHIATRY & NEUROLOGY
Payer: MEDICARE

## 2023-12-20 PROCEDURE — 999N000216 HC STATISTIC ADULT CD FACE TO FACE-NO CHRG: Performed by: SOCIAL WORKER

## 2023-12-20 ASSESSMENT — PATIENT HEALTH QUESTIONNAIRE - PHQ9: SUM OF ALL RESPONSES TO PHQ QUESTIONS 1-9: 5

## 2023-12-20 ASSESSMENT — ANXIETY QUESTIONNAIRES
7. FEELING AFRAID AS IF SOMETHING AWFUL MIGHT HAPPEN: SEVERAL DAYS
5. BEING SO RESTLESS THAT IT IS HARD TO SIT STILL: SEVERAL DAYS
GAD7 TOTAL SCORE: 7
4. TROUBLE RELAXING: MORE THAN HALF THE DAYS
GAD7 TOTAL SCORE: 7
1. FEELING NERVOUS, ANXIOUS, OR ON EDGE: SEVERAL DAYS
6. BECOMING EASILY ANNOYED OR IRRITABLE: NOT AT ALL
2. NOT BEING ABLE TO STOP OR CONTROL WORRYING: SEVERAL DAYS
3. WORRYING TOO MUCH ABOUT DIFFERENT THINGS: SEVERAL DAYS

## 2023-12-20 ASSESSMENT — COLUMBIA-SUICIDE SEVERITY RATING SCALE - C-SSRS
TOTAL  NUMBER OF ABORTED OR SELF INTERRUPTED ATTEMPTS LIFETIME: NO
6. HAVE YOU EVER DONE ANYTHING, STARTED TO DO ANYTHING, OR PREPARED TO DO ANYTHING TO END YOUR LIFE?: NO
TOTAL  NUMBER OF INTERRUPTED ATTEMPTS LIFETIME: NO
2. HAVE YOU ACTUALLY HAD ANY THOUGHTS OF KILLING YOURSELF?: NO
ATTEMPT LIFETIME: NO
1. HAVE YOU WISHED YOU WERE DEAD OR WISHED YOU COULD GO TO SLEEP AND NOT WAKE UP?: NO

## 2023-12-20 NOTE — PROGRESS NOTES
"    [facility]    ADULT Mental Health Assessment Appointment Note    Patient name:  Durga Aguirre    Preferred Pronoun: He/his  MRN: [id number]  YOB: 1960  Address:  [address]  [facility] [address]  Preferred phone: [phone]   May we leave a referral related message: Yes    Date of service: 12/20/23  Start time: 13:00  End time: 13:13  Service modality:  In-person    Identifying Information:  Patient is a 63 year old,  Patient was referred for an assessment by self.  Patient attended the session alone. Patient identified their preferred language to be English. Patient reported they does not need the assistance of an  or other support involved in therapy.     Services Requested:   The reason for seeking services at this time is: \"I want to do that alcohol program in [place] again.\"  Patient has attempted to resolve these concerns in the past.  Patient does have legal concerns but is not court ordered for services at this time.    Patient reports a previous history of alcohol abuse and completing various levels of treatment.  Patient was recently in residential care through [facility] from August 18 to October 18, 2023.  Patient reports that he relapsed soon after completing this program as he lives alone and was lonely.  Patient describes that he is trying to stay away from alcohol but does drink 2-3 nights a week to \"relax.\"  Patient indicates that his level of drinking varies and is impacted by his financial situation.  Patient admits that sometimes he is drinking more.  Patient does recognize some feelings of sadness related to his mother passing away in April 2023.  Patient describes it was very difficult for him as he was the one that found her the next day.  Patient is now dealing with stressors related to her estate and the impact it has on him financially.  Patient denies he is overly anxious and is trying to focus on day to " day instead of getting worried about the future.  Patient is not able to work at this time due to physical limitations but is hoping to return to work soon.  Patient reports chronic pain does cause him limitations.  Patient reports he previously attended the co-occurring program in [facility] and is hopeful to return to that program.  Patient indicates he is not able to drive at this time and would plan to take the bus there.  Patient reports he has court coming up in January 2024 related to a prior DUI that he still needs to pay the fines.  Patient denies he is court ordered to services at this time.    Tx: Not currently seeing a therapist but is interested in grief support.  Meds: PCP.  Patient feels they are helping and his depression and anxiety are low.    SI: Patient denies any current or past thoughts of wishing his life was over or suicidal ideation.  He denies any prior attempts.  C-SSRS completed.  SH: denies current or past  HI: denies current or past.  Patient indicates he is not an aggressive person.    Mental Health:  Review of Symptoms per patient report:  Depression: Lack of interest, Change in energy level, Low self-worth, and Feeling sad, down, or depressed  Nikki: No Symptoms  Psychosis: No Symptoms  Anxiety: Excessive worry, Nervousness, Psychomotor agitation, and Ruminations  Panic: No symptoms  Post Traumatic Stress Disorder: No Symptoms  Eating Disorder: No Symptoms  ADD / ADHD: No symptoms  Conduct Disorder: No symptoms  Autism Spectrum Disorder: No symptoms  Obsessive Compulsive Disorder: No Symptoms    Substance Use:  Do you  have a history of alcohol or illicit drug use? Yes  Substance Use Disorder Substance is often taken in larger amounts or over a longer period than was intended.  Met for:  Alcohol There is persistent desire or unsuccessful efforts to cut down or control use of the substance.  Met for:  Alcohol Craving, or a strong desire or urge to use the substance.  Met for:  Alcohol  Continued use of the substance despite having persistent or recurrent social or interpersonal problems caused or exacerbated by the effects of its use.  Met for:  Alcohol  Patient smokes 5 to 6 cigarettes/day.    Significant Losses / Trauma / Abuse / Neglect Issues:   Patient did not serve in the .  There are indications or report of significant loss, trauma, abuse or neglect issues related to: death of his mother in April 2023, job loss a few years ago, and major medical problems chronic pain .  Concerns for possible neglect are not present.    Medical History:  Patient reports the following current medical concerns: chronic pain and no current dental concerns.  Patient reports pain concerns including chronic knee pain.  Patient does not want help addressing pain concerns..   There are not significant appetite / nutritional concerns / weight changes.   Patient does not report a history of head injury / trauma / cognitive impairment.    Current Mental Status Exam:   Appearance:  Appropriate    Eye Contact:  Good   Psychomotor:  Normal   Attitude / Demeanor: Cooperative  Interested  Speech Rate:  Normal/ Responsive  Volume:  Normal  volume  Language:  intact  Mood:   Sad   Affect:   Appropriate    Thought Content: Clear   Thought Process: Coherent  Goal Directed     Associations:  No loosening of associations  Insight:   Good   Judgment:  Intact   Orientation:  All  Attention:  Good    Rating Scales:  PHQ9:        8/17/2023     8:56 AM 12/18/2023     2:43 PM 12/20/2023    12:00 PM   PHQ-9 SCORE   PHQ-9 Total Score 4 3 5   ;    GAD7:        8/17/2023     8:56 AM 12/18/2023     2:43 PM 12/20/2023    12:00 PM   YUSUF-7 SCORE   Total Score 7 9 7     PROMIS-10 Scores  Global Mental Health Score: 11  Global Physical Health Score: 15  PROMIS TOTAL - SUBSCORES: 26     Safety Assessment:   Current Safety Concerns:[place] Suicide Severity Rating Scale (Lifetime/Recent)      8/1/2021     8:00 AM 3/24/2022     2:00 PM  8/27/2022     1:00 AM 11/12/2022    10:00 PM 4/12/2023     3:22 PM 8/3/2023     2:30 AM 12/20/2023     1:00 PM   [place] Suicide Severity Rating (Lifetime/Recent)   Q1 Wish to be Dead (Lifetime)  No No No No No    Q2 Non-Specific Active Suicidal Thoughts (Lifetime)  No No No No No    Q1 Wished to be Dead (Past Month)  no no no no no    Q2 Suicidal Thoughts (Past Month)  no no no no no    Q3 Suicidal Thought Method   no no no no    Q4 Suicidal Intent without Specific Plan   no no no no    Q5 Suicide Intent with Specific Plan   no no no no    Q6 Suicide Behavior (Lifetime)   no no no no    Level of Risk per Screen   low risk low risk low risk low risk    RETIRED: 1. Wish to be Dead (Recent) No         RETIRED: 2. Non-Specific Active Suicidal Thoughts (Recent) No         Q1 Wish to be Dead (Lifetime)       N   Q2 Non-Specific Active Suicidal Thoughts (Lifetime)       N   Actual Attempt (Lifetime)       N   Has subject engaged in non-suicidal self-injurious behavior? (Lifetime)       N   Interrupted Attempts (Lifetime)       N   Aborted or Self-Interrupted Attempt (Lifetime)       N   Preparatory Acts or Behavior (Lifetime)       N   Calculated C-SSRS Risk Score (Lifetime/Recent)       No Risk Indicated     Patient denies current homicidal ideation and behaviors.  Patient denies current self-injurious ideation and behaviors.    Patient denied risk behaviors associated with substance use.  Patient reported substance use associated with mental health symptoms.  Patient reports the following current concerns for their personal safety: None.  Patient reports there are not  firearms in the house.    Recommended that patient call 911 or go to the local ED should there be a change in any of these risk factors.    Clinical Impressions:  Substance Use Disorder Substance is often taken in larger amounts or over a longer period than was intended.  Met for:  Alcohol There is persistent desire or unsuccessful efforts to cut down or  control use of the substance.  Met for:  Alcohol Craving, or a strong desire or urge to use the substance.  Met for:  Alcohol Use of the substance is continued despite knowledge of having a persistent or recurrent physical or psychological problem that is likely to have been cause or exacerbated by the substance.  Met for:  Alcohol Tolerance:  either a need for markedly increased amounts of the substance to achieve the desired effect or a markedly diminished effect with continued use of the same amount of the substance.  Met for:  Alcohol      Clinical Substantiation:  Summary of Visit: Patient attended the appointment today seeking a substance abuse evaluation in order to get into substance specific treatment.  Patient has previously attended the alcohol program in [place] and is hopeful to return to this program.   advised that she cannot assist patient with completing a substance specific evaluation because of licensure.   did see in patient's chart that he had a full substance specific diagnostic assessment completed 08/04/2023.   will pass patient's chart onto the patient applicators to get patient into substance specific treatment.  Patient indicates he is interested in grief support at this time but is not able to attend any appointments in person and is more motivated to work on his alcohol abuse needs at this time.    Therapeutic Interventions Provided: supportive active listening, explored alternatives, and emotional validation    Recommendations/Plan: Patient will be referred to patient Navigator's to assist patient with getting into a substance specific program    Referral: Substance specific resources, referral to grief therapy      [facility],  December 20, 2023  [facility]

## 2023-12-26 ENCOUNTER — PATIENT OUTREACH (OUTPATIENT)
Dept: GERIATRIC MEDICINE | Facility: CLINIC | Age: 63
End: 2023-12-26
Payer: MEDICARE

## 2023-12-26 NOTE — PROGRESS NOTES
[facility] Care Coordination Contact    CC notes in epic that [facility] has attempted to reach member for scheduling IOP treatment. Call to member to notify and provide with phone number for scheduling.  CC spoke to member and provided scheduling [phone]. Member had no questions states will call.   Varsha Stewart RN, BSN, PHN  [facility]  [phone]  Fax: [phone]

## 2023-12-27 ENCOUNTER — TELEPHONE (OUTPATIENT)
Dept: BEHAVIORAL HEALTH | Facility: CLINIC | Age: 63
End: 2023-12-27
Payer: MEDICARE

## 2023-12-27 NOTE — TELEPHONE ENCOUNTER
12/27/2023  Left voicemail for client regarding [facility] referral. Contact info provided.  Katerina OWENS, [facility]

## 2024-01-03 ENCOUNTER — TELEPHONE (OUTPATIENT)
Dept: BEHAVIORAL HEALTH | Facility: CLINIC | Age: 64
End: 2024-01-03
Payer: MEDICARE

## 2024-01-03 NOTE — TELEPHONE ENCOUNTER
Spoke to client, he is interested in scheduling [facility] however he needs to get his license back first. 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 SInce the tremor is not limiting significantly, we decided to monitor it for now. Will consider trial of amantadine in the future. I also refilled her aricept Rx today and referred her for PT and OT. \par \par f/u 4months

## 2024-01-03 NOTE — TELEPHONE ENCOUNTER
Chel DANIELS from [facility] called, states HIV was declined during pregnancy and will need drawn. Will verify Dr Mancilla of lab needing drawn and update patient as well.   Routing refill request to provider for review/approval because:  Drug not on the [facility] refill protocol

## 2024-01-10 DIAGNOSIS — F10.939 ALCOHOL WITHDRAWAL WITH COMPLICATION WITH INPATIENT TREATMENT, WITH UNSPECIFIED COMPLICATION (H): ICD-10-CM

## 2024-01-11 RX ORDER — TRAZODONE HYDROCHLORIDE 50 MG/1
50 TABLET, FILM COATED ORAL
Qty: 30 TABLET | Refills: 1 | Status: SHIPPED | OUTPATIENT
Start: 2024-01-11 | End: 2024-03-12

## 2024-01-11 NOTE — TELEPHONE ENCOUNTER
Prescription approved per [facility] Refill Protocol.  Rosa Chavez, RN  [facility] Triage Nurse

## 2024-01-15 ENCOUNTER — TELEPHONE (OUTPATIENT)
Dept: BEHAVIORAL HEALTH | Facility: CLINIC | Age: 64
End: 2024-01-15
Payer: MEDICARE

## 2024-01-16 ENCOUNTER — VIRTUAL VISIT (OUTPATIENT)
Dept: ADDICTION MEDICINE | Facility: CLINIC | Age: 64
End: 2024-01-16
Payer: MEDICARE

## 2024-01-16 DIAGNOSIS — F41.1 GENERALIZED ANXIETY DISORDER: ICD-10-CM

## 2024-01-16 DIAGNOSIS — F10.20 ALCOHOL USE DISORDER, SEVERE, DEPENDENCE (H): Primary | ICD-10-CM

## 2024-01-16 PROBLEM — F10.230 ALCOHOL DEPENDENCE WITH UNCOMPLICATED WITHDRAWAL (H): Status: RESOLVED | Noted: 2023-08-03 | Resolved: 2024-01-16

## 2024-01-16 PROBLEM — F19.90 SUBSTANCE USE DISORDER: Status: RESOLVED | Noted: 2019-11-20 | Resolved: 2024-01-16

## 2024-01-16 PROBLEM — F10.229 ALCOHOL DEPENDENCE WITH INTOXICATION WITH COMPLICATION (H): Status: RESOLVED | Noted: 2021-03-13 | Resolved: 2024-01-16

## 2024-01-16 PROBLEM — F19.20 CHEMICAL DEPENDENCY (H): Status: RESOLVED | Noted: 2019-12-31 | Resolved: 2024-01-16

## 2024-01-16 PROBLEM — F10.10 ALCOHOL ABUSE: Status: RESOLVED | Noted: 2023-04-12 | Resolved: 2024-01-16

## 2024-01-16 PROCEDURE — 99443 PR PHYSICIAN TELEPHONE EVALUATION 21-30 MIN: CPT | Mod: 93

## 2024-01-16 RX ORDER — ACAMPROSATE CALCIUM 333 MG/1
666 TABLET, DELAYED RELEASE ORAL 3 TIMES DAILY
Qty: 180 TABLET | Refills: 1 | Status: SHIPPED | OUTPATIENT
Start: 2024-01-16 | End: 2024-02-16

## 2024-01-16 RX ORDER — GABAPENTIN 300 MG/1
600 CAPSULE ORAL 3 TIMES DAILY
Qty: 180 CAPSULE | Refills: 1 | Status: SHIPPED | OUTPATIENT
Start: 2024-01-16 | End: 2024-02-16

## 2024-01-16 RX ORDER — GABAPENTIN 300 MG/1
600 CAPSULE ORAL 3 TIMES DAILY
Qty: 180 CAPSULE | Refills: 1 | Status: SHIPPED | OUTPATIENT
Start: 2024-01-16 | End: 2024-01-16

## 2024-01-16 ASSESSMENT — PATIENT HEALTH QUESTIONNAIRE - PHQ9
10. IF YOU CHECKED OFF ANY PROBLEMS, HOW DIFFICULT HAVE THESE PROBLEMS MADE IT FOR YOU TO DO YOUR WORK, TAKE CARE OF THINGS AT HOME, OR GET ALONG WITH OTHER PEOPLE: NOT DIFFICULT AT ALL
SUM OF ALL RESPONSES TO PHQ QUESTIONS 1-9: 3
SUM OF ALL RESPONSES TO PHQ QUESTIONS 1-9: 3
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
## 2024-01-16 NOTE — NURSING NOTE
Is the patient currently in the state of [place]? YES    Visit mode:TELEPHONE    If the visit is dropped, the patient can be reconnected by: TELEPHONE VISIT: Text to cell phone:   Telephone Information:   Mobile [phone]       Will anyone else be joining the visit? No  (If patient encounters technical issues they should call [phone])    How would you like to obtain your AVS? MyChart    Are changes needed to the allergy or medication list? Pt stated no changes to allergies and Pt stated no med changes    Rooming Documentation: Attendance Guidelines - Care team has reviewed attendance agreement with patient. Patient advised that two failed appointments within 6 months may lead to termination of current episode of care.      Switched visit to telephone because pt was having tech issues. Provider approved for change.     Reason for visit: Consult     DIEGO Paulino

## 2024-01-24 DIAGNOSIS — F10.20 UNCOMPLICATED ALCOHOL DEPENDENCE (H): ICD-10-CM

## 2024-01-24 RX ORDER — LANOLIN ALCOHOL/MO/W.PET/CERES
100 CREAM (GRAM) TOPICAL DAILY
Qty: 90 TABLET | Refills: 0 | Status: SHIPPED | OUTPATIENT
Start: 2024-01-24 | End: 2024-03-11

## 2024-02-05 ENCOUNTER — PATIENT OUTREACH (OUTPATIENT)
Dept: GERIATRIC MEDICINE | Facility: CLINIC | Age: 64
End: 2024-02-05
Payer: MEDICARE

## 2024-02-05 NOTE — PROGRESS NOTES
[facility] Care Coordination Contact    Called member to schedule annual HRA home visit. HRA has been scheduled for 2/12/24 at noon.  Varsha Stewart RN, BSN, PHN  [facility]  [phone]  Fax: [phone]

## 2024-02-06 ENCOUNTER — TRANSFERRED RECORDS (OUTPATIENT)
Dept: HEALTH INFORMATION MANAGEMENT | Facility: CLINIC | Age: 64
End: 2024-02-06
Payer: MEDICARE

## 2024-02-12 ENCOUNTER — PATIENT OUTREACH (OUTPATIENT)
Dept: GERIATRIC MEDICINE | Facility: CLINIC | Age: 64
End: 2024-02-12
Payer: MEDICARE

## 2024-02-12 ASSESSMENT — ACTIVITIES OF DAILY LIVING (ADL): DEPENDENT_IADLS:: INDEPENDENT

## 2024-02-12 ASSESSMENT — LIFESTYLE VARIABLES: HOW OFTEN DO YOU HAVE A DRINK CONTAINING ALCOHOL: 4 OR MORE TIMES A WEEK

## 2024-02-12 NOTE — PROGRESS NOTES
"[facility] Care Coordination Contact    [facility] Home Visit Assessment     Home visit for Health Risk Assessment with Durga Aguirre completed on February 12, 2024    Type of residence:: Private home - stairs  Current living arrangement:: I live alone     Assessment completed with:: Patient    Current Care Plan  Member currently receiving the following home care services:   NA  Member currently receiving the following community resources: [facility]- will start outpatient day program.      Medication Review  Medication reconciliation completed in Epic: Yes  Medication set-up & administration: Independent-does not set up.  Self-administers medications.  Medication Risk Assessment Medication (1 or more, place referral to MTM): N/A: No risk factors identified  MTM Referral Placed: No: Declines    Mental/Behavioral Health   Depression Screening:   PHQ-2 Total Score (Adult) - Positive if 3 or more points; Administer PHQ-9 if positive: 0       Mental health DX:: Yes   Mental health DX how managed:: Medication.  Member declines psychotherapy.  States he will receive some therapy when he enters his day program and also mentions his preference to \"leave the past in the past\".  Education provided, continues to decline at this time.     Falls Assessment:   Fallen 2 or more times in the past year?: No   Any fall with injury in the past year?: No    ADL/IADL Dependencies:   Dependent ADLs:: Independent  Dependent IADLs:: Independent    Health Plan sponsored benefits: [employer] [facility]+: Shared information regarding preventative health screening and health plan supplemental benefits/incentives. Reviewed medication disposal form.    PCA Assessment completed at visit: No     Elderly Waiver Eligibility: No, no EW services requested or assessed as appropriate.     Care Plan & Recommendations:   [employer] PAR was given to member for a resource for transportation.   Plans to find a new dentist who is closer to " him.  Has contact for [facility] Care Coordination.   Will start outpatient day treatment for substance abuse.   Plans to monitor is hypertension with a home cuff more closely.  Plans to continue to walk the dog he watches during the day to manage the tightness in his hamstrings.     See [facility] for detailed assessment information.    Follow-Up Plan: Member informed of future contact, plan to f/u with member with a 6 month telephone assessment.  Contact information shared with member and family, encouraged member to call with any questions or concerns at any time.    [place] care continuum providers: Please see Snapshot and Care Management Flowsheets for Specific details of care plan.    This CC note routed to PCP, Rohit Solitario

## 2024-02-14 ENCOUNTER — PATIENT OUTREACH (OUTPATIENT)
Dept: GERIATRIC MEDICINE | Facility: CLINIC | Age: 64
End: 2024-02-14
Payer: MEDICARE

## 2024-02-14 ENCOUNTER — TELEPHONE (OUTPATIENT)
Dept: BEHAVIORAL HEALTH | Facility: CLINIC | Age: 64
End: 2024-02-14

## 2024-02-14 NOTE — TELEPHONE ENCOUNTER
"Pt is a(n) adult (18+ out of HS) Seeking as eval for Adult ROYA Assessment (no programming)..  Appointment scheduled by:  Patient.  (self-pay - complete Cost Estimate)  Brief reason for appt:  Pt is requesting a ROYA Eval   needed?  NO  Contact information verified/updated: Yes    Toy Nolasco    \"We have scheduled your evaluation. In the event that your insurance coverage comes back as out of network, you may receive a call to cancel your appointment and direct you to your insurance company for in-network coverage.\"    Disclaimer regarding insurance read to patient?  Yes    "

## 2024-02-14 NOTE — LETTER
February 14, 2024    JOANAN BANUELOS  [id number]  [place] [facility] [address]        Dear Joanna:    At [facility], we re dedicated to improving your health and wellness. Enclosed is the Care Plan developed with you on 2/12/2024. Please review the Care Plan carefully.    As a reminder, during your visit we talked about:  Ways to manage your physical and mental health  Using health care to maintain and improve your health   Your preventive care needs     Remember to contact your care coordinator if you:  Are hospitalized, or plan to be hospitalized   Have a fall    Have a change in your physical or mental health  Need help finding support or services    If you have questions, or don t agree with your Care Plan, call me at [phone]. You can also call me if your needs change. TTY users, call the [place] [employer] at (944) or 1-984.857.6531 (eacaba-iw-mzcjxt relay service).    Sincerely,          Varsha Stewart RN, PHN    E-mail: [email or web address]  Phone: [phone]      [facility]    [id number] accepted    [id number] (07/2022)

## 2024-02-14 NOTE — PROGRESS NOTES
[facility] Care Coordination Contact    Received after visit chart from care coordinator.  Completed following tasks: Mailed copy of care plan/support plan to member, Mailed [place] Choices signature sheet pages 3-4, and Mailed Safe Medication Disposal     Bisi Honeycutt  Care Management Specialist  [facility]  [phone]

## 2024-02-16 ENCOUNTER — VIRTUAL VISIT (OUTPATIENT)
Dept: ADDICTION MEDICINE | Facility: CLINIC | Age: 64
End: 2024-02-16
Payer: MEDICARE

## 2024-02-16 DIAGNOSIS — F41.1 GENERALIZED ANXIETY DISORDER: ICD-10-CM

## 2024-02-16 DIAGNOSIS — F10.20 ALCOHOL USE DISORDER, SEVERE, DEPENDENCE (H): Primary | ICD-10-CM

## 2024-02-16 DIAGNOSIS — F10.24 ALCOHOL DEPENDENCE WITH ALCOHOL-INDUCED MOOD DISORDER (H): ICD-10-CM

## 2024-02-16 PROCEDURE — 99442 PR PHYSICIAN TELEPHONE EVALUATION 11-20 MIN: CPT | Mod: FQ

## 2024-02-16 RX ORDER — ACAMPROSATE CALCIUM 333 MG/1
666 TABLET, DELAYED RELEASE ORAL 3 TIMES DAILY
Qty: 180 TABLET | Refills: 1 | Status: SHIPPED | OUTPATIENT
Start: 2024-02-16 | End: 2024-04-15

## 2024-02-16 RX ORDER — NALTREXONE HYDROCHLORIDE 50 MG/1
100 TABLET, FILM COATED ORAL DAILY
Qty: 180 TABLET | Refills: 1 | Status: SHIPPED | OUTPATIENT
Start: 2024-02-16 | End: 2024-05-20

## 2024-02-16 RX ORDER — GABAPENTIN 300 MG/1
600 CAPSULE ORAL 3 TIMES DAILY
Qty: 180 CAPSULE | Refills: 1 | Status: SHIPPED | OUTPATIENT
Start: 2024-02-16 | End: 2024-05-15

## 2024-02-16 ASSESSMENT — PAIN SCALES - GENERAL: PAINLEVEL: NO PAIN (0)

## 2024-02-16 NOTE — NURSING NOTE
Is the patient currently in the state of [place]? YES    Visit mode:TELEPHONE    If the visit is dropped, the patient can be reconnected by: TELEPHONE VISIT: Phone number:   Telephone Information:   Mobile [phone]       Will anyone else be joining the visit? NO  (If patient encounters technical issues they should call [phone])    How would you like to obtain your AVS? MyChart    Are changes needed to the allergy or medication list? Pt stated no changes to allergies and Pt stated no med changes    Reason for visit: LORENZO CORTEZ
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
## 2024-03-11 DIAGNOSIS — F10.20 UNCOMPLICATED ALCOHOL DEPENDENCE (H): ICD-10-CM

## 2024-03-11 DIAGNOSIS — F10.939 ALCOHOL WITHDRAWAL WITH COMPLICATION WITH INPATIENT TREATMENT, WITH UNSPECIFIED COMPLICATION (H): ICD-10-CM

## 2024-03-11 RX ORDER — LANOLIN ALCOHOL/MO/W.PET/CERES
100 CREAM (GRAM) TOPICAL DAILY
Qty: 90 TABLET | Refills: 0 | Status: SHIPPED | OUTPATIENT
Start: 2024-03-11 | End: 2024-09-26

## 2024-03-12 RX ORDER — TRAZODONE HYDROCHLORIDE 50 MG/1
50 TABLET, FILM COATED ORAL
Qty: 90 TABLET | Refills: 0 | Status: SHIPPED | OUTPATIENT
Start: 2024-03-12 | End: 2024-06-13

## 2024-03-19 ENCOUNTER — OFFICE VISIT (OUTPATIENT)
Dept: INTERNAL MEDICINE | Facility: CLINIC | Age: 64
End: 2024-03-19
Payer: MEDICARE

## 2024-03-19 VITALS
OXYGEN SATURATION: 94 % | WEIGHT: 217.5 LBS | SYSTOLIC BLOOD PRESSURE: 128 MMHG | TEMPERATURE: 98.4 F | HEART RATE: 103 BPM | BODY MASS INDEX: 31.21 KG/M2 | DIASTOLIC BLOOD PRESSURE: 74 MMHG

## 2024-03-19 DIAGNOSIS — K04.7 TOOTH ABSCESS: Primary | ICD-10-CM

## 2024-03-19 PROCEDURE — 99213 OFFICE O/P EST LOW 20 MIN: CPT | Performed by: PHYSICIAN ASSISTANT

## 2024-03-19 RX ORDER — RESPIRATORY SYNCYTIAL VIRUS VACCINE 120MCG/0.5
0.5 KIT INTRAMUSCULAR ONCE
Qty: 1 EACH | Refills: 0 | Status: CANCELLED | OUTPATIENT
Start: 2024-03-19 | End: 2024-03-19

## 2024-03-19 NOTE — PROGRESS NOTES
"  Assessment & Plan     Tooth abscess  Reviewed to take NSAID with food  Abx as noted with swelling I am concerned  NEEDS to see Dental reviewed that today several times   - diclofenac (VOLTAREN) 50 MG EC tablet; Take 1 tablet (50 mg) by mouth 3 times daily as needed for moderate pain  - amoxicillin-clavulanate (AUGMENTIN) 875-125 MG tablet; Take 1 tablet by mouth 2 times daily            Nicotine/Tobacco Cessation  He reports that he has been smoking cigarettes. He has a 5 pack-year smoking history. He has never used smokeless tobacco.  Nicotine/Tobacco Cessation Plan  Per PCP       BMI  Estimated body mass index is 31.21 kg/m  as calculated from the following:    Height as of 8/17/23: 1.778 m (5' 10\").    Weight as of this encounter: 98.7 kg (217 lb 8 oz).             Kathy Calixto is a 64 year old, presenting for the following health issues:  Dental Pain    History of Present Illness       Reason for visit:  Abses tooth  Symptom onset:  3-7 days ago  Symptom intensity:  Severe  Symptom progression:  Staying the same    He eats 2-3 servings of fruits and vegetables daily.He consumes 1 sweetened beverage(s) daily.He exercises with enough effort to increase his heart rate 20 to 29 minutes per day.  He exercises with enough effort to increase his heart rate 4 days per week.   He is taking medications regularly.                     Objective    /74   Pulse 103   Temp 98.4  F (36.9  C) (Temporal)   Wt 98.7 kg (217 lb 8 oz)   SpO2 94%   BMI 31.21 kg/m    Body mass index is 31.21 kg/m .  Physical Exam   GENERAL: alert and no distress  HENT: normal cephalic/atraumatic, oral mucous membranes moist, and swelling upper right gingival area with likely abscess.  No purulence noted on the surface    MS: no gross musculoskeletal defects noted, no edema            Signed Electronically by: Paula Skelton PA-C    "

## 2024-03-20 ENCOUNTER — HOSPITAL ENCOUNTER (OUTPATIENT)
Dept: BEHAVIORAL HEALTH | Facility: CLINIC | Age: 64
Discharge: HOME | End: 2024-03-20
Attending: PSYCHIATRY & NEUROLOGY | Admitting: PSYCHIATRY & NEUROLOGY
Payer: MEDICARE

## 2024-03-20 VITALS — BODY MASS INDEX: 30.38 KG/M2 | WEIGHT: 217 LBS | HEIGHT: 71 IN

## 2024-03-20 DIAGNOSIS — F17.200 TOBACCO USE DISORDER, MODERATE, DEPENDENCE: Primary | ICD-10-CM

## 2024-03-20 DIAGNOSIS — F10.20 ALCOHOL USE DISORDER, SEVERE, DEPENDENCE (H): ICD-10-CM

## 2024-03-20 PROCEDURE — H0001 ALCOHOL AND/OR DRUG ASSESS: HCPCS

## 2024-03-20 ASSESSMENT — ANXIETY QUESTIONNAIRES
GAD7 TOTAL SCORE: 2
GAD7 TOTAL SCORE: 2
7. FEELING AFRAID AS IF SOMETHING AWFUL MIGHT HAPPEN: NOT AT ALL
4. TROUBLE RELAXING: NOT AT ALL
3. WORRYING TOO MUCH ABOUT DIFFERENT THINGS: SEVERAL DAYS
5. BEING SO RESTLESS THAT IT IS HARD TO SIT STILL: NOT AT ALL
2. NOT BEING ABLE TO STOP OR CONTROL WORRYING: SEVERAL DAYS
6. BECOMING EASILY ANNOYED OR IRRITABLE: NOT AT ALL
1. FEELING NERVOUS, ANXIOUS, OR ON EDGE: NOT AT ALL

## 2024-03-20 ASSESSMENT — PAIN SCALES - GENERAL: PAINLEVEL: MODERATE PAIN (4)

## 2024-03-20 ASSESSMENT — COLUMBIA-SUICIDE SEVERITY RATING SCALE - C-SSRS
2. HAVE YOU ACTUALLY HAD ANY THOUGHTS OF KILLING YOURSELF?: NO
1. HAVE YOU WISHED YOU WERE DEAD OR WISHED YOU COULD GO TO SLEEP AND NOT WAKE UP?: NO
ATTEMPT LIFETIME: NO
TOTAL  NUMBER OF INTERRUPTED ATTEMPTS LIFETIME: NO
TOTAL  NUMBER OF ABORTED OR SELF INTERRUPTED ATTEMPTS LIFETIME: NO
6. HAVE YOU EVER DONE ANYTHING, STARTED TO DO ANYTHING, OR PREPARED TO DO ANYTHING TO END YOUR LIFE?: NO

## 2024-03-20 ASSESSMENT — PATIENT HEALTH QUESTIONNAIRE - PHQ9: SUM OF ALL RESPONSES TO PHQ QUESTIONS 1-9: 0

## 2024-03-20 NOTE — PROGRESS NOTES
[facility]  Provider Name:  PONCE Varela/ELANA     Telephone: [phone]      PATIENT'S NAME: Durga Aguirre  PREFERRED NAME: Durga  PRONOUNS: he/him/his    MRN: [id number]  : 1960  ADDRESS:   [facility] [address] [place] [address]  E-MAIL: [email or web address]   ACCT. NUMBER:  [id number]  DATE OF SERVICE: 2024  START TIME: 2:35 pm  END TIME: 3:42 pm  PREFERRED PHONE: [phone]   May we leave a program related message: Yes  SERVICE MODALITY:  In-person:        Last 4 digits of SSN #: [id number]     EMERGENCY CONTACT:   Cate Lovell (sister)  Tel #: [phone]     UNIVERSAL ADULT SUBSTANCE USE DISORDER DIAGNOSTIC ASSESSMENT    Identifying Information:  The patient is a 64 year old, /White male.  The patient was referred for an assessment by [facility].  The patient attended the session alone.     Chief Complaint:   The reason for seeking services at this time is:  The patient reported the reason for participating in the substance use disorder assessment today on 3/20/2024 was due to the patient's own awareness he needed help and due to pressure from the legal system.  The precipitating event was the patient reported being arrested for 2 DWI charges in , he was uncertain of the date of his first DWI charge in  which he reported is still pending, but he reported being arrested for his second DWI charge on 2023, which was a gross misdemeanor DWI charge in [facility], when he reported having a CACHORRO in the 0.10 range.  The patient reported having 2 prior gross misdemeanor DWI charges on 2013 and on 2026 and he reported having an alcohol-related careless driving charge on 9/10/2005.  The patient also reported having a misdemeanor RAYSA charge on 10/7/2013 and a disorderly conduct charge on 2006.  The patient denied having any other history of arrests or legal charges.  The  patient reported his heaviest use of alcohol had been off and on since 2009, when he reported a pattern of drinking between a half pint to 1 full pint of vodka between 3-4 times per week on average.  The patient reported his last use of alcohol was on 3/11/2024 and he had removed all of the alcohol from his home.  The patient reported his plan and goal going forward was to continue to abstain from alcohol and from all other non-prescribed mood altering chemicals.  The patient was requesting a referral to enter a MEDICARE Intensive Outpatient program at one of the [facility] for substance use disorder treatment or for co-occurring mental health and substance use disorder treatment.  The patient first had a concern about having substance abuse issues during the mid-1990's.  The patient reported he had attempted to stop his use of alcohol by attending substance use disorder treatment in the past.  The patient reported participating in 5 prior substance use disorder treatment programs, with the last substance use disorder treatment program being residential treatment at [facility] in 7/2023.  The patient reported having 3 inpatient detoxification admissions, with the last inpatient detoxification admission being 3-years ago.  The patient is currently receiving the following services: The patient is currently working with medical providers and is receiving Medication Assisted Therapy with prescribed Naltrexone, Campral, and Gabapentin.  The patient denied having any recent attendance at 12-step or other recovery support group meetings.  The patient does not appear to be in severe withdrawal, an imminent safety risk to self or others, or requiring immediate medical attention and may proceed with the assessment interview.    Social/Family History:  The patient reported growing up in [place], [place].  The patient reported being raised by both of his biological parents in the same family home until his father   in , when the patient was 20-years old.  The patient denied experiencing or witnessing any verbal, physical or sexual abuse when he was growing up in the family home.  The patient reported overall his childhood was happy.  The patient reported feeling supported by his mother, his father, and his sister when he was growing up.  The patient reported being raised in the Denominational Confucianist (Hinduism).  The patient described his current relationships with his family of origin as being good.      The patient describes his cultural background as being a /White male.  Cultural influences and impact on patient's life structure, values, norms, and healthcare: The patient denied cultural concerns had an impact on life structure, values, norms, or healthcare.  Contextual influences on patient's health include: Community Factors: The patient reported he had first started to drink alcohol with his friends/peers in social situations.  The patient identified his preferred language to be English.  The patient reported he does not need the assistance of an  or other support involved in therapy.  The patient reported he is not currently involved in community maximo activities.  The patient felt his spirituality would likely have some impact in his recovery.    The patient reported having no significant delays in developmental tasks.  The patient's highest education level was associate degree / vocational certificate.  The patient identified the following learning problems: none reported.  The patient reports he is able to understand written materials.    The patient reported the following relationship history: The patient denied having any history of being .  The patient identified as being heterosexual and he reported being single and not in a romantic relationship at this time.  The patient reported having 1 son age 35.     The patient's current living/housing situation involves staying in own  home/apartment.  The patient reported living alone and he reported his housing is stable.  The patient denied having any concerns regarding his immediate living environment and/or neighborhood and he plans to continue to live there.  The patient identified his sister, his son, and one close friend as being his primary support network at this time.  The patient identified the quality of his relationships with his support network as being good.  The patient would like the following people involved in treatment services if recommended: None at this time.     The patient reported engaging in the following recreational/leisure activities: biking, watching TV and going for walks.  The patient reported engaging in the following recreation/leisure activities while using alcohol or other non-prescribed mood altering chemicals: The patient's use of alcohol had been done independently of his social/recreational/leisure activities.  The patient reported the following people are supportive of his recovery: his sister, his son, and one close friend.  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  The patient reported his income is obtained from Social Security income.  The patient reported having financial stressors at this time, including money being tight at this time.  Cultural and socioeconomic factors do not affect the patient's access to services.    The patient reported the following substance related arrests or legal issues: The patient reported being arrested for 2 DWI charges in 2023, he was uncertain of the date of his first DWI charge in 2023 which he reported is still pending, but he reported being arrested for his second DWI charge on 7/21/2023, which was a gross misdemeanor DWI charge in [facility], when he reported having a CACHORRO in the 0.10 range.  The patient reported having 2 prior gross misdemeanor DWI charges on  4/16/2013 and on 5/7/2026 and he reported having an alcohol-related careless driving charge on 9/10/2005.  The patient also reported having a misdemeanor RAYSA charge on 10/7/2013 and a disorderly conduct charge on 5/29/2006.  The patient denied having any other history of arrests or legal charges.  The patient reported currently being on court probation in [facility] for a gross misdemeanor DWI charge.    Patient's Strengths and Limitations:  The patient identified the following strengths or resources that will help him succeed in treatment: commitment to health and well being, family support, and motivation.  Things that may interfere with the patient's success in treatment include: lack of a sober peer support network, physical health concerns, mental health concerns, and socially isolated.     Assessments:  The following assessments were completed by patient for this visit:  PHQ9:       8/4/2023    12:55 PM 8/17/2023     8:56 AM 12/18/2023     2:43 PM 12/20/2023    12:00 PM 1/16/2024    10:54 AM 2/12/2024     5:12 PM 3/20/2024     2:00 PM   PHQ-9 SCORE   PHQ-9 Total Score MyChart     3 (Minimal depression) 1 (Minimal depression)    PHQ-9 Total Score 13 4 3 5 3 1 0     GAD7:       12/10/2019     8:43 PM 12/30/2019    10:00 AM 8/4/2023    12:55 PM 8/17/2023     8:56 AM 12/18/2023     2:43 PM 12/20/2023    12:00 PM 3/20/2024     2:00 PM   YUSUF-7 SCORE   Total Score 0 2 9 7 9 7 2     PROMIS 10-Global Health (all questions and answers displayed):       8/4/2023    12:00 PM 12/20/2023    12:00 PM 2/12/2024     5:14 PM 3/20/2024     2:00 PM   PROMIS 10   In general, would you say your health is:   Very good    In general, would you say your quality of life is:   Very good    In general, how would you rate your physical health?   Very good    In general, how would you rate your mental health, including your mood and your ability to think?   Very good    In general, how would you rate your satisfaction with your social  activities and relationships?   Good    In general, please rate how well you carry out your usual social activities and roles   Good    To what extent are you able to carry out your everyday physical activities such as walking, climbing stairs, carrying groceries, or moving a chair?   Mostly    In the past 7 days, how often have you been bothered by emotional problems such as feeling anxious, depressed, or irritable?   Sometimes    In the past 7 days, how would you rate your fatigue on average?   Mild    In the past 7 days, how would you rate your pain on average, where 0 means no pain, and 10 means worst imaginable pain?   5    In general, would you say your health is: 3 3 4 4   In general, would you say your quality of life is: 4 2 4 4   In general, how would you rate your physical health? 3 4 4 3   In general, how would you rate your mental health, including your mood and your ability to think? 4 3 4 4   In general, how would you rate your satisfaction with your social activities and relationships? 2 3 3 4   In general, please rate how well you carry out your usual social activities and roles. (This includes activities at home, at work and in your community, and responsibilities as a parent, child, spouse, employee, friend, etc.) 3 2 3 4   To what extent are you able to carry out your everyday physical activities such as walking, climbing stairs, carrying groceries, or moving a chair? 3 4 4 5   In the past 7 days, how often have you been bothered by emotional problems such as feeling anxious, depressed, or irritable? 4 3 3 3   In the past 7 days, how would you rate your fatigue on average? 3 3 2 2   In the past 7 days, how would you rate your pain on average, where 0 means no pain, and 10 means worst imaginable pain? 3 1 5 4   Global Mental Health Score 12 11 14 15   Global Physical Health Score 13 15 15 15   PROMIS TOTAL - SUBSCORES 25 26 29 30     [place] Suicide Severity Rating Scale (Lifetime/Recent)       3/24/2022     2:00 PM 8/27/2022     1:00 AM 11/12/2022    10:00 PM 4/12/2023     3:22 PM 8/3/2023     2:30 AM 12/20/2023     1:00 PM 3/20/2024     2:00 PM   [place] Suicide Severity Rating (Lifetime/Recent)   Q1 Wish to be Dead (Lifetime) No No No No No     Q2 Non-Specific Active Suicidal Thoughts (Lifetime) No No No No No     Q1 Wished to be Dead (Past Month) no no no no no     Q2 Suicidal Thoughts (Past Month) no no no no no     Q3 Suicidal Thought Method  no no no no     Q4 Suicidal Intent without Specific Plan  no no no no     Q5 Suicide Intent with Specific Plan  no no no no     Q6 Suicide Behavior (Lifetime)  no no no no     Level of Risk per Screen  low risk low risk low risk low risk     Q1 Wish to be Dead (Lifetime)      N N   Q2 Non-Specific Active Suicidal Thoughts (Lifetime)      N N   Actual Attempt (Lifetime)      N N   Has subject engaged in non-suicidal self-injurious behavior? (Lifetime)      N N   Interrupted Attempts (Lifetime)      N N   Aborted or Self-Interrupted Attempt (Lifetime)      N N   Preparatory Acts or Behavior (Lifetime)      N N   Calculated C-SSRS Risk Score (Lifetime/Recent)      No Risk Indicated No Risk Indicated     GAIN-sliding scale:      8/29/2022    10:00 AM 11/14/2022    12:00 PM 8/4/2023    12:56 PM 3/20/2024     2:00 PM   When was the last time that you had significant problems...   with feeling very trapped, lonely, sad, blue, depressed or hopeless about the future? 1+ years ago Past month 2 to 12 months ago 1+ years ago   with sleep trouble, such as bad dreams, sleeping restlessly, or falling asleep during the day? Never 1+ years ago 1+ years ago Never   with feeling very anxious, nervous, tense, scared, panicked or like something bad was going to happen? Past month Past month 1+ years ago Past month   with becoming very distressed & upset when something reminded you of the past? 1+ years ago 2 to 12 months ago 1+ years ago Never   with thinking about ending your life  or committing suicide? Never Never Never Never          8/29/2022    10:00 AM 11/14/2022    12:00 PM 8/4/2023    12:56 PM 3/20/2024     2:00 PM   When was the last time that you did the following things 2 or more times?   Lied or conned to get things you wanted or to avoid having to do something? Past month 2 to 12 months ago Never 1+ years ago   Had a hard time paying attention at school, work or home? Never 1+ years ago Never Never   Had a hard time listening to instructions at school, work or home? Past month Never Never Never   Were a bully or threatened other people? Never 1+ years ago Never Never   Started physical fights with other people? Never Never Never Never     Personal and Family Medical History:  The patient did not report a family history of mental health concerns.  The patient reported having no awareness of any his family members having a history of chronic medical problems, substance abuse problems or mental health problems.    The patient reported the following previous mental health diagnoses: The patient reported a history of Depression NOS and Anxiety disorder NOS.  The patient reported his primary mental health symptoms include: depression and anxiety and these do not impact his ability to function.  The patient has received mental health services in the past: The patient reported taking his prescribed psychotropic medications as prescribed.  The patient reported a history of working with a 1:1 mental health therapist in the past, but he denied working with a 1:1 mental health therapist at this time.  Psychiatric Hospitalizations: None.  The patient denies a history of civil commitment.  Current mental health services/providers include:  The patient reported taking his prescribed psychotropic medications as prescribed.  The patient reported a history of working with a 1:1 mental health therapist in the past, but he denied working with a 1:1 mental health therapist at this time.    The patient  has had a physical exam to rule out medical causes for current symptoms.  Date of last physical exam was within the past year. The patient was encouraged to follow up with PCP if symptoms were to develop.  The patient has a [facility] Primary Care Provider, who is named Rohit Solitario.  The patient reported the following medical concerns:   Past Medical History:   Diagnosis Date    Alcohol abuse     Anxiety and depression     Arthritis     Hyperlipidemia     Hypertension     Mass of left chest wall     Other spontaneous pneumothorax 1997    Right knee pain    The patient reported taking his medications as prescribed and following the recommendations of his healthcare providers.  The patient reported pain concerns including having some knee and ankle pain.  The patient did not feel there was any need for additional help addressing this pain concerns.  The patient is male and is not pregnant.  There are not significant appetite / nutritional concerns / weight changes.  The patient does not report having a history of an eating disorder.  The patient does report a history of head injury / trauma / cognitive impairment.  The patient reported having a few head injuries from sports and accidents, but he has never had a diagnosed concussion.     The patient reported current medications as:   Outpatient Medications Marked as Taking for the 3/20/24 encounter (Hospital Encounter) with [facility] Esc[facility]   Medication Sig    acamprosate (CAMPRAL) 333 MG EC tablet Take 2 tablets (666 mg) by mouth 3 times daily    amoxicillin-clavulanate (AUGMENTIN) 875-125 MG tablet Take 1 tablet by mouth 2 times daily    ARIPiprazole (ABILIFY) 5 MG tablet TAKE ONE TABLET (5 MG) BY MOUTH ONE TIME DAILY    busPIRone (BUSPAR) 15 MG tablet Take 1 tablet (15 mg) by mouth 2 times daily    citalopram (CELEXA) 20 MG tablet Take 1 tablet (20 mg) by mouth daily    diclofenac (VOLTAREN) 50 MG EC tablet Take 1 tablet (50 mg) by mouth 3 times daily as  needed for moderate pain    gabapentin (NEURONTIN) 300 MG capsule Take 2 capsules (600 mg) by mouth 3 times daily Increase gabapentin dose 300 mg/day to a target dose of 600 mg TID    hydrochlorothiazide (HYDRODIURIL) 12.5 MG tablet Take 1 tablet (12.5 mg) by mouth daily    hydrOXYzine HCl (ATARAX) 50 MG tablet Take 0.5-1 tablets (25-50 mg) by mouth every 8 hours as needed for anxiety    lisinopril (ZESTRIL) 40 MG tablet Take 1 tablet (40 mg) by mouth daily (Hold for a systolic blood pressure of 100)    Melatonin 10 MG TABS tablet Take 10 mg by mouth nightly as needed for sleep    multivitamin w/minerals (THERA-VIT-M) tablet Take 1 tablet by mouth daily    naltrexone (DEPADE/REVIA) 50 MG tablet Take 2 tablets (100 mg) by mouth daily    simvastatin (ZOCOR) 40 MG tablet Take 1 tablet (40 mg) by mouth every evening    thiamine (B-1) 100 MG tablet TAKE 1 TABLET (100 MG) BY MOUTH DAILY    traZODone (DESYREL) 50 MG tablet TAKE ONE TABLET BY MOUTH AT BEDTIME AS NEEDED FOR SLEEP     Medication Adherence:  The patient reported taking his prescribed medications as prescribed.  The patient reported being able  to self-administer his medications.    Patient Allergies:    No Known Allergies    Medical History:    Past Medical History:   Diagnosis Date    Alcohol abuse     Anxiety and depression     Arthritis     Hyperlipidemia     Hypertension     Mass of left chest wall     Other spontaneous pneumothorax 1997    Right knee pain       Substance Use:  The patient reported having no family history of chemical health issues.  The patient reported participating in 5 prior substance use disorder treatment programs, with the last substance use disorder treatment program being residential treatment at [facility] in 7/2023.  The patient reported having 3 inpatient detoxification admissions, with the last inpatient detoxification admission being 3-years ago.  The patient is currently receiving the following services: The patient is  currently working with medical providers and is receiving Medication Assisted Therapy with prescribed Naltrexone, Campral, and Gabapentin.  The patient denied having any recent attendance at 12-step or other recovery support group meetings.       Substance Age of first use Pattern and duration of use (include amounts and frequency) Date of last use     Withdrawal potential Route of use   Has used Alcohol 10 The patient reported his heaviest use of alcohol had been off and on since , when he reported a pattern of drinking between a half pint to 1 full pint of vodka between 3-4 times per week on average.    The patient reported his longest period of time without drinking alcohol had been for 3-years from  until 2016 and his return to drinking alcohol had been due to having grief and loss issues associated with his dog dying in a short period of time after his girlfriend/significant other had .     During the past 12-months, he reported a pattern of drinking between a half pint to 1 full pint of vodka between 3-4 times per week on average.    The patient denied having any memory impairment and/or blackouts due to his use of alcohol within the past 12-months.   3/11/2024    (2 ounces of vodka in a mixed drink) No Oral   Has used Marijuana   13 The patient reported his heaviest use of THC/cannabis had been during his teenage years, when he reported a pattern of smoking between 1-2 bowls of THC/cannabis on a daily basis.   1980 No Smoke   Has not used Amphetamines          Has used Cocaine/crack    16 The patient reported his heaviest use of powder cocaine had been between the ages of 16 and 20, when he reported a pattern of snorting a 1/4 gram of powder cocaine between 2-3 times per month on average.   20 No Snort   Has used Hallucinogens 12 LSD: 1 time in life.     Psilocybin mushrooms: 1 time in life.    16 No Oral   Has not used Inhalants        Has not used Heroin        Has not used Other Opiates         Has not used Benzodiazepines          Has not used Barbiturates        Has not used Over the counter medications        Has used Nicotine 7 The patient reported a current pattern of smoking between 4-6 cigarettes on a daily basis.    3/20/2024  Yes  Smoked    Has use Caffeine 14 The patient reported a current pattern of drinking 1 cup of coffee around 2 times per month on average.    3/20/2024  No  Oral   Has not used other substances not listed above:  Identify:           The patient reported the following problems as a result of their substance use: relationship problems, family problems, legal issues, DUI, chronic health problems which were exacerbated by his use of alcohol, and mental health symptoms which were exacerbated by his use of alcohol.  The patient is concerned about substance use.  The patient reported his recovery goal is: The patient's plan and goal is to abstain from alcohol and from all other non-prescribed mood altering chemicals.     The patient reports experiencing the following withdrawal symptoms within the past 12 months: shaky/jittery/tremors, fatigue, sad/depressed feeling, diarrhea, and anxiety/worry and the following within the past 30 days: shaky/jittery/tremors, fatigue, sad/depressed feeling, diarrhea, and anxiety/worry. (DSM-11)  The patient reported having urges to use alcohol and nicotine.  (DSM-4)  The patient reported he has used more alcohol than intended or over a longer period of time than intended.  (DSM-1)  The patient reported he has had unsuccessful attempts to cut down or control use of alcohol and nicotine.  (DSM-2)  The patient reported his longest period of time without drinking alcohol had been for 3-years from  until 2016 and his return to drinking alcohol had been due to having grief and loss issues associated with his dog dying in a short period of time after his girlfriend/significant other had .  The patient reported he has needed to use more alcohol  and nicotine to achieve the same effect.  (DSM-10)  The patient does report diminished effect with use of same amount of alcohol and nicotine.  (DSM-10)     The patient does not report a great deal of time is spent in activities necessary to obtain, use, or recover from alcohol effects.  (DSM-3)  The patient does not report important social, occupational, or recreational activities are given up or reduced because of alcohol use.  (DSM-7)  Alcohol use is continued despite knowledge of having a persistent or recurrent physical or psychological problem that is likely to have been caused or exacerbated by use.  (DSM-9)  The patient reported the following problem behaviors while under the influence of substances: The patient reported having relationship conflict with his family members and being more socially isolated when under the influence of alcohol.  (DSM-6)  The patient reported recurrent use of alcohol in physically hazardous such as driving a motor vehicle while under the influence.  (DSM-8)    The patient reported his substance use has not negatively impacted his ability to function in a school setting within the past 12-months.  (DSM-5)  The patient reported his substance use has not negatively impacted his ability to function in a work setting within the past 12-months.  (DSM-5)  The patient's demographics and history impact his recovery in the following ways: The patient reported he had first started to drink alcohol with his friends/peers in social situations.  The patient reported engaging in the following recreation/leisure activities while using alcohol or other non-prescribed mood altering chemicals: The patient's use of alcohol had been done independently of his social/recreational/leisure activities.  The patient reported the following people are supportive of his recovery: his sister, his son, and one close friend.    The patient denied having current or past concerns regarding gambling and denied  ever participating in a gambling treatment program.  The patient does not have other addictive behaviors he is concerned about at this time.    Dimension Scale Ratings:    Dimension 1 -  Acute Intoxication/Withdrawal: 0 - No Problem    Dimension 2 - Biomedical: 1 - Minor Problem    Dimension 3 - Emotional/Behavioral/Cognitive Conditions: 2 - Moderate Problem    Dimension 4 - Readiness to Change:  2 - Moderate Problem    Dimension 5 - Relapse/Continued Use/ Continued Problem Potential: 3 - Severe Problem    Dimension 6 - Recovery Environment:  2 - Moderate Problem    Significant Losses / Trauma / Abuse / Neglect Issues:   The patient did not serve in the .  There are indications or report of significant loss, trauma, abuse or neglect issues related to: The patient denied having any history of being verbally, emotionally, physically, or sexually abused.  The patient reported having a history of trauma issues due to the deaths of his parents, due to finding his mother dead in her home from heart failure, due to the death of his girlfriend/significant other and due to the death of his pet dog.  The patient denied having any history of suicide attempts and denied having any current suicide ideation.  The patient denied having any history of self-injurious behavior.   Concerns for possible neglect are not present.    Safety Assessment:   The patient denies current homicidal ideation and behaviors.  The patient denies current self-injurious ideation and behaviors.    The patient reported unsafe motor vehicle operation, reported having legal consequences, reported having mental health problems, and reported having health problems associated with substance use.  The patient reported substance use associated with mental health symptoms.  The patient reported the following current concerns for their personal safety: None.  The patient reported there are firearms in the home. The firearms are secured in a locked space.      History of Safety Concerns:  The patient denied a history of homicidal ideation.     The patient denied a history of personal safety concerns.    The patient denied a history of assaultive behaviors.    The patient denied having any history of sexual assault behaviors.  The patient denied having any history of being registered as a sex offender.  The patient reported a history of unsafe motor vehicle operation, reported a history of having legal consequences, reported a history of having mental health problems, and reported a history of having health problems associated with substance use.  The patient reported a history of substance use associated with mental health symptoms.  The patient reported the following protective factors: positive relationships positive family connections, forward/future oriented thinking, safe and stable environment, adherence with prescribed medication, and commitment to well-being.    Risk Plan:  See Recommendations for Safety and Risk Management Plan    Review of Symptoms per patient report:  Substance Use:  significant withdrawal symptoms, substance use related medical issues, substance use related mental health issues, cravings/urges to use, family relationship problems due to substance use, and social problems related to substance use.    Diagnostic Criteria:   1.)  Substance is often taken in larger amounts or over a longer period than was intended.  Met for:  alcohol.  2.)  There is persistent desire or unsuccessful efforts to cut down or control use of the substance.  Met for:  alcohol and nicotine.  4.)  Craving, or a strong desire or urge to use the substance.  Met for:  alcohol and nicotine.  6.)  Continued use of the substance despite having persistent or recurrent social or interpersonal problems caused or exacerbated by the effects of its use.  Met for:  alcohol.  8.)  Recurrent use of the substance in which it is physically hazardous.  Met for:  alcohol.  9.)  Use of  the substance is continued despite knowledge of having a persistent or recurrent physical or psychological problem that is likely to have been cause or exacerbated by the substance.  Met for:  alcohol.  10.)  Tolerance:  either a need for markedly increased amounts of the substance to achieve the desired effect or a markedly diminished effect with continued use of the same amount of the substance.  Met for:  alcohol and nicotine.  11.)  Withdrawal:  either patient endorses characteristic withdrawal syndrome for the substance or the substance (or closely related substance) is taken to relieve or avoid withdrawal symptoms.  Met for:  alcohol and nicotine.    Collateral Contact Summary:   Collateral contacts contributing to this assessment:  The patient's electronic medical records were reviewed at time of assessment.    No additional collateral data had been obtained at the time of this documentation.     If court related records were reviewed, summarize here: None    Information from collateral contacts supported/largely agreed with information from the client and associated risk ratings.    Information in this assessment was obtained from the medical record and provided by the patient who is a good historian.        The patient will have open access to his substance use disorder assessment medical record.    As evidenced by self report and criteria, the patient meets the following DSM-5 Diagnoses: (Sustained by DSM-5 Criteria Listed Above)      1.)  Alcohol Use Disorder Severe - 303.90 (F10.20)  2.)  Tobacco Use Disorder Moderate - 305.10 (F17.200)  3.)  Depression NOS, per patient self-report  4.)  Anxiety disorder NOS, per patient self-report    Specify if: In early remission:  After full criteria for alcohol/drug use disorder were previously met, none of the criteria for alcohol/drug use disorder have been met for at least 3 months but for less than 12 months (with the exception that Criterion A4,  Craving or a  strong desire or urge to use alcohol/drug  may be met).     In sustained remission:   After full criteria for alcohol use disorder were previously met, none of the criteria for alcohol/drug use disorder have been met at any time during a period of 12 months or longer (with the exception that Criterion A4,  Craving or strong desire or urge to use alcohol/drug  may be met).     Specify if:   This additional specifier is used if the individual is in an environment where access to alcohol is restricted.    Mild: Presence of 2-3 symptoms  Moderate: Presence of 4-5 symptoms  Severe: Presence of 6 or more symptoms    Recommendations:     1. Plan for Safety and Risk Management:     Recommended that patient call 911 or go to the local ED should there be a change in any of these risk factors..            Report to child / adult protection services was not needed.    2. ROYA Referrals:      Recommendations:      1.)  It was recommended the patient abstain from alcohol and from all other non-prescribed mood altering chemicals.   2.)  Follow all of the recommendations of his medical and mental health providers.  3.)  Follow all of the terms and conditions of his court probation, including participating in random alcohol and drug screening with court probation as directed.   4.)  Enter a MEDICARE Intensive Outpatient program at one of the [facility] for substance use disorder treatment or for co-occurring mental health and substance use disorder treatment.  5.)  Follow all of the recommendations of his substance use disorder treatment providers.  6.)  Attend 12-step or other recovery support group meetings on a weekly basis.      7.)  Additionally, if the patient were unable to maintain abstinence from alcohol and from all other non-prescribed mood altering chemicals at the intensive outpatient substance use disorder treatment level of care, it would be recommended he have an updated substance use disorder  assessment to have further recommendations made as needed.       The patient reported he was willing to follow the above recommendations.      The patient meets criteria for the following levels of care based on ASAM Criteria:      Withdrawal Management - No Withdrawal Management Indicated.    Treatment/Recovery Services - 2.1 Intensive Outpatient Services.      The patient's placement aligns with the assessment and placement level of care recommendation based on current ASAM Dimension scale ratings.     The patient would like the following family or other support people involved in their treatment:  None at this time.  The patient does not have any history of opioid abuse.      3.  Mental Health Referrals:     The patient would benefit from continuing to follow all of the recommendations of his mental health providers.    4. Clinical Substantiation for the above recommendations: The patient would benefit from developing long-term sober maintenance skills, would benefit from developing sober coping skills, would benefit from developing a sober peer support network, has dual issues of mental health and substance abuse, has mental health symptoms which are exacerbated by substance abuse, and has medical issues which are exacerbated by substance abuse.    5.  Cultural Concerns:    The patient did not identify having any cultural concerns regarding mental health, physical health, or substance use issues.     6. Recommendations for treatment focus:      Alcohol / Substance Use - See #2. ROYA Referrals above for details on recommendations.    7. Interactive Complexity: No    8. Safety Plan:  Patient denied any current/recent/lifetime history of suicidal ideation and/or behaviors.  No safety plan indicated at this time.     Provider Name/ Credentials:  ELANA Varela  March 20, 2024

## 2024-03-20 NOTE — PROGRESS NOTES
[facility]  [facility] [place] [address]  3/20/2024    Durga Aguirre  [id number]  [facility] [address]      Durga,    This is to verify that Durga Aguirre ([id number]) participated in a substance use disorder assessment in person at [facility] in [place], [place] at [address] in the [facility] with PONCE Garcia/ELANA at [facility] in [place], [place] on 3/20/2024.    Recommendations:  1.)  It was recommended the patient abstain from alcohol and from all other non-prescribed mood altering chemicals.   2.)  Follow all of the recommendations of his medical and mental health providers.  3.)  Follow all of the terms and conditions of his court probation, including participating in random alcohol and drug screening with court probation as directed.   4.)  Enter a MEDICARE Intensive Outpatient program at one of the [facility] locations for substance use disorder treatment or for co-occurring mental health and substance use disorder treatment.  5.)  Follow all of the recommendations of his substance use disorder treatment providers.  6.)  Attend 12-step or other recovery support group meetings on a weekly basis.      7.)  Additionally, if the patient were unable to maintain abstinence from alcohol and from all other non-prescribed mood altering chemicals at the intensive outpatient substance use disorder treatment level of care, it would be recommended he have an updated substance use disorder assessment to have further recommendations made as needed.       The patient reported he was willing to follow the above recommendations.     For any additional issues or needs for follow-up regarding today's on 3/20/2024 substance use disorder assessment, please contact our patient navigator for assistance:   ELANA Yo   Tel #: [phone]     If you have any additional questions or concerns, please feel free to contact me by any of the  contact methods listed below.    Sincerely,    PONCE Pittman, [facility]  [place] [address]  [email or web address]  Tel: [phone]  Fax: [phone]

## 2024-03-21 ENCOUNTER — TELEPHONE (OUTPATIENT)
Dept: BEHAVIORAL HEALTH | Facility: CLINIC | Age: 64
End: 2024-03-21
Payer: MEDICARE

## 2024-03-25 ENCOUNTER — TELEPHONE (OUTPATIENT)
Dept: INTERNAL MEDICINE | Facility: CLINIC | Age: 64
End: 2024-03-25
Payer: MEDICARE

## 2024-03-25 DIAGNOSIS — K04.7 TOOTH ABSCESS: ICD-10-CM

## 2024-03-25 NOTE — TELEPHONE ENCOUNTER
"Rohit, pharmacist with [facility] called the clinic stating that the pt was prescribed amoxicillin-clavulanate (AUGMENTIN) 875-125 MG tablet during an OV on 3/19 for a tooth abscess.   The pt reported to the pharmacy that his symptoms are still present and not improved even though he has finished his course of antibiotics.   Rohit is requesting that we reach out to the pt to assess if he is still needing care/a new script.     Per 3/19 OV note:  \"Reviewed to take NSAID with food  Abx as noted with swelling I am concerned  NEEDS to see Dental reviewed that today several times\"    Routing to team to follow-up with pt and triage if needed.     Thank you,  Naina Saldaña RN  "

## 2024-03-26 NOTE — TELEPHONE ENCOUNTER
Antibiotics aren't going to fix an abscessed tooth - it ultimately needs to be removed.  But I'd recommend 14 d of rx. I think he had 7.   Beyond this if it recurs again he needs to get in sooner to have the tooth removed

## 2024-03-26 NOTE — TELEPHONE ENCOUNTER
Relayed provider message. Pt verbalizes understanding and agrees to plan of care.     Pt already has dentist appointment scheduled for 4/12/24.

## 2024-03-26 NOTE — TELEPHONE ENCOUNTER
Patient called clinic to request Rx Augmentin for ongoing tooth abscess.     Patient reports scheduled first available with dentist 4/12/24.     Patient also reports good results with previous Rx Augmentin.  Completed that round yesterday and patient noticing pain and swelling are beginning to recur.     Rx and pharmacy pended. Did not complete quantity in case provider prefers longer duration than 7 days.  Please complete if approved.     Triage please follow up with patient.   [phone]  May leave detailed msg/orders on voice mail.     Nhi BAUTISTA, RN      March 26, 2024  11:20 AM

## 2024-03-29 ENCOUNTER — TELEPHONE (OUTPATIENT)
Dept: BEHAVIORAL HEALTH | Facility: CLINIC | Age: 64
End: 2024-03-29
Payer: MEDICARE

## 2024-03-29 NOTE — TELEPHONE ENCOUNTER
----- Message from Susanna Osborne sent at 3/27/2024  4:10 PM CDT -----  Regarding: SERVICE INITIATION  SERVICE INITIATION SCHEDULED ON 4/4, [place] PM

## 2024-04-04 ENCOUNTER — HOSPITAL ENCOUNTER (OUTPATIENT)
Dept: BEHAVIORAL HEALTH | Facility: CLINIC | Age: 64
Discharge: HOME | End: 2024-04-04
Attending: FAMILY MEDICINE
Payer: MEDICARE

## 2024-04-04 PROCEDURE — H2035 A/D TX PROGRAM, PER HOUR: HCPCS | Performed by: COUNSELOR

## 2024-04-04 ASSESSMENT — COLUMBIA-SUICIDE SEVERITY RATING SCALE - C-SSRS
TOTAL  NUMBER OF ABORTED OR SELF INTERRUPTED ATTEMPTS LIFETIME: NO
6. HAVE YOU EVER DONE ANYTHING, STARTED TO DO ANYTHING, OR PREPARED TO DO ANYTHING TO END YOUR LIFE?: NO
2. HAVE YOU ACTUALLY HAD ANY THOUGHTS OF KILLING YOURSELF?: NO
ATTEMPT LIFETIME: NO
TOTAL  NUMBER OF INTERRUPTED ATTEMPTS LIFETIME: NO
1. HAVE YOU WISHED YOU WERE DEAD OR WISHED YOU COULD GO TO SLEEP AND NOT WAKE UP?: NO

## 2024-04-04 NOTE — PROGRESS NOTES
Individual Session Summary   START TIME: 1 PM   END TIME: 2:48 PM   Duration: 2 Hours      Data:  Met with client on this date for a service initiation.  Client identified difficulties managing grief. On 4/15 will jaya a year since his mother passed away. Client identifies feeling high at risk for a relapse and created a plan to include structure and activities over the weekend to progress with sobriety.     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 Client endorsed openness with disclosure and remembered previous participation in programming. Client has had 2 days of sobriety and is high at risk for relapse. Client endorse low confidence in maintaining sobriety.      Plan. Client will begin programming on 4/8 and complete treatment plan. Client will maintain sobriety over the weekend and follow plan to monitor urges and cravings.          Yamila Patterson, [facility], [facility]     Attestation: Dr. Loretta HANNON - Provides oversight and supervision of care.

## 2024-04-04 NOTE — PROGRESS NOTES
"Comprehensive Assessment Update    Comprehensive assessment dated 3/20/24 was reviewed and updates are as follows:   Reason for admission today:  \"The main reason is to be more responsible, be more myself, instead of putting into altered states. And to reinstate my drivers licenses and to save some money.\"    Dates of last use and substance(s) used:  5/2/24, 4-5 oz of Alcohol.   Patient does not have a history of opiate use.    Safety concerns:  None identified.        Other:      Health Screening:  Given patient's past history, a medication, and physical condition, is there a fall risk?  No  Does the patient have any pain? No  Is the patient on a special diet? If yes, please explain: no coconut.   Does the patient have any concerns regarding your nutritional status? If yes, please explain: no  Has the patient had any appetite changes in the last 3 months?  No  Has the patient had any weight loss or weight gain in the last 3 months? No  Has the patient have a history of an eating disorder or been over-eating, avoiding meals, or inducing vomiting?  No  Does the patient have any dental concerns? (Problems with teeth, pain, cavities, braces)?  NO, abscess tooth, dental appointment on 4/12.  Are immunizations up to date?  Yes  Any recent exposure to TB, Hepatitis, Measles, or Strep?  No    Brief Biosocial Gambling Screening:  Do you have any current or past concerns regarding gambling?  No      Dimension Scale Ratings:    Dimension 1: 0 Client displays full functioning with good ability to tolerate and cope with withdrawal discomfort. No signs or symptoms of intoxication or withdrawal or resolving signs or symptoms.    Summary to support rating:  Last reported alcohol use on 4/2. Reports to drinking 3-5 oz of alcohol that was left in home. Denies any current experience of withdrawals.     Dimension 2: 0 Client displays full functioning with good ability to cope with physical discomfort.  Summary to support rating:  " "Client had a routine physical done within the past year. No medical concerns identified. Client reported knee and ankle pain and reports it as manageable.     Dimension 3: 2 Client has difficulty with impulse control and lacks coping skills. Client has thoughts of suicide or harm to others without means; however, the thoughts may interfere with participation in some treatment activities. Client has difficulty functioning in significant life areas. Client has moderate symptoms of emotional, behavioral, or cognitive problems. Client is able to participate in most treatment activities.  Summary to support rating: Client denies suicidal and homicidal thoughts.     Dimension 4: 1 Client is motivated with active reinforcement, to explore treatment and strategies for change, but ambivalent about illness or need for change.  Summary to support rating:  Client reports addiction as \"unnecessary\" and as a \"problem\" when he was pulled over and charged with DUI. Client denies consequences relating to \"day to day life.\" Client appears to be in the contemplation stage of change.     Dimension 5: 3 Client has poor recognition and understanding of relapse and recidivism issues and displays moderately high vulnerability for further substance use or mental health problems. Client has few coping skills and rarely applies coping skills.  Summary to support rating:  Client has had a short period of sobriety. Client lack healthy coping tools to manage difficult experiences. Client has external motivation for sobriety and low internal motivation.     Dimension 6: 3 Client is not engaged in structured, meaningful activity and the client's peers, family, significant other, and living environment are unsupportive, or there is significant criminal justice system involvement.  Summary to support rating:  Client has an upcoming court date, 4/25. Client has a DWI and restricted license that is identified as a barrier for employment.       Initial " "Service Plan (ISP)    Immediate health, safety, and preliminary service needs identified and plan includes the following based on available information from clients, referral sources, and collateral information.    Safety (SI, SIB, suicide attempts, aggressive behaviors):  History of SI (suicidal ideation)?  No  History of SIB (self-injurious behavior)?  No  History of VI (violent ideation)?  No  History of HI (homicidal ideation)?  No      Recommended that patient call 911 or go to the local ED should there be a change in any of these risk factors.       Name: Durga Aguirre  YOB: 1960  Date: 4/4/2024  My primary care provider: Dr. Loja   [facility]  My prescriber: Dr. Loja  Other care team support:  NA   My Triggers (check the ones that apply to you):   Relationship Conflict-Have not talked to his son for about a year.  Work Difficulties- Not currently working.  School Concerns- NA  Home Environment- No  Social Support- Currently lacks social activities. Have friends to talk to on the phone.  Peer Relationships- Good relationships with friends.   Financial Concerns- Not yet  Housing Concerns- No  Medical/Health- Pain in right knee, uses walker as needed  Substance Use-Yes, alcohol use.   Legal Difficulties- Has upcoming court date and a DUI charge with no current license.        Additional People, Places, and Things that I can access for support:     Has sober friends to reach out to talk on the phone.  Medication management.  Grocery store helps feel safe.      What is important to me and makes life worth living: \"Haven't thought of it before, but getting my drivers license back is important, maybe be able to go bowling and take a cooking class.\"          GREEN    Good Control  1. I feel good  2. No suicidal thoughts   3. Can work, sleep and play      Action Steps  1. Self-care: balanced meals, exercising, sleep practices, etc.  2. Take your medications as " prescribed.  3. Continue appointments with prescriber.  4.  Do the healthy things that I enjoy.  5. Other: NA                 YELLOW  Getting Worse  I have ANY of these:  1. I do not feel good  2. Difficulty Concentrating  3. Sleep is changing  4. Increase/Change in my thoughts to hurt self and/or others, but I can still manage and not act on it.   5. Not taking care of self.  6. Other:                 Action Steps (in addition to the above):  1. Inform your therapist and psychiatric prescriber/PCP.  2. Keep taking your medications as prescribed.    3. Turn to people you can ask for help: sister and friends  4. Use internal coping strategies -see below.  5. Create safe environment:        -Store firearms for safety       -Lock and limit medications       -Notify friends/family of increase in symptoms  6. Other:                 RED  Get Help  If I have ANY of these:  1. Current and uncontrollable thoughts and/or behaviors to hurt self and/or others.   2. Other: NA   Actions to manage my safety  1.Contact your emergency person:   -Name: Cate Soto  -Number: [phone]  2. Call or Text [phone]  3. Call my crisis team-         -County: [place]        -Number: [phone]  3. Or Call 911 or go to the emergency room right away  4.  Other:                My Internal Coping Strategies include the following ([place] ones you use):  Take a bath  Blow bubbles  Belly breathing  Arts & Crafts  Coloring  Chew gum  Fidget toys  Safe space: my living room  Time with pets  Coping toolbox  Temperature change  Exercise  Other:     Safety Concerns  How To Identify Situations That Make Your Mental Health Worse:  Triggers are things that make your mental health worse.  Look at the list below to help you find your triggers and what you can do about them.     1. Identify Early Warning Signs:    Sometimes symptoms return, even when people do their best to stay well. Symptoms can develop over a short period of time with little or no  warning, but most of the time they emerge gradually over several weeks.  Early warning signs are changes that people experience when a relapse is starting. Some early warning signs are common and others are not as common.   Common Early Warning Signs:   Feeling tense or nervous  Eating less or more  Trouble sleeping  Feeling depressed or low  Feeling irritable  Isolating  Trouble concentrating  Urges to harm self  Urges to harm others  Other: Feeling lonely      2. Identify action steps to take when warning signs are noticed:    Taking Action- It is important to take action if you are experiencing early warning signs of a relapse.  The faster you act, the more likely it is that you can avoid a full relapse.  It is helpful to identify several specific ways to cope with symptoms.      The following is my list of symptoms and coping strategies that I can use when they are present:    Symptom Coping Strategies   Anxiety -Talk with someone in your support system and let him or her know how you are feeling.  -Use relaxation techniques such as deep breathing or imagery.  -Use positive affirmations to counteract negative self-talk such as  I am learning to let go of worry.    Depression - Schedule your day; include activities you must do and activities you enjoy doing.  - Get some exercise - walk, run, bike, or swim.  - Give yourself credit for even the smallest things you get done.   Sleep Difficulties   - Go to sleep at the same time every day.  - Do something relaxing before bed, such as drinking herbal tea or listening to music.  - Avoid having discussions about upsetting topics before going to bed.   Delusions   - Distract yourself from the disturbing thought by doing something that requires your attention such as a puzzle.  - Check out your beliefs by talking to someone you trust.    Hallucinations   - Use headphones to listen to music.  - Tell voices to  stop  or say to yourself,  I am safe.   - Ignore the  hallucinations as much as possible; focus on other things.   Concentration Difficulties - Minimize distractions so there is only one thing for you to focus on at a time.    - Ask the person you are having a conversation with to slow down or repeat things you are unsure of.      Patient consented to co-developed safety plan.  Safety and risk management plan was completed.  Patient agreed to use safety plan should any safety concerns arise.  A copy was given to the patient.     Health:  Client does NOT have health issues that would impede participation in treatment    Transportation: Client will be transported to treatment by getting a ride from a friend until able to drive. Client will be provided information to use medical transport.     Other:  NA    Patient does not have any identified barriers to participating in referred services.    Vulnerable Adult Assessment    Does the patient possess a physical or mental infirmity or other physical, mental, or emotional dysfunction?  No. Patient is not a vulnerable adult.    This patient is not a functional Vulnerable Adult according to [place] Statute 626.5572 subdivision 21.      Treatment suggestions for client for the time period until the initial treatment planning session:  Familiarize with [employer] [facility]. Complete Urges and Cravings as experienced over the weekend. Complete goals for treatment plan. Complete safety plan.

## 2024-04-05 ENCOUNTER — TELEPHONE (OUTPATIENT)
Dept: BEHAVIORAL HEALTH | Facility: CLINIC | Age: 64
End: 2024-04-05
Payer: MEDICARE

## 2024-04-05 NOTE — TELEPHONE ENCOUNTER
----- Message from STEPHANY Soto LADC sent at 2024  3:58 PM CDT -----  Regarding: Please schedule  Hello,    Location of programmin [address] [place] [address] [address], [place] [id number]  Date and time: 24 at 3:30 pm  Group: [id number] (Phase 1)  Provider: Yamila Patterson ([id number])  Number of visits to be scheduled: 1 session  Length/Duration of Appointment in minutes: 1 hr  Visit Type: In person    GROUP  Location of programmin [address], [place] [address]  Start Date: 24  Group: [id number] on ,  and  at 5pm-8pm  Provider: AMMON Soto LADC, LPCC   Number of visits to be scheduled: 10 sessions  Length/Duration of Appointment in minutes: 3 hrs  Visit Type: In person    Yamila Reno MPS, LSW, LADC, LPCC  MI/CD Psychotherapist  [facility]   [address], [place] [address]  Phone: [phone] Fax:  [phone]

## 2024-04-05 NOTE — ADDENDUM NOTE
Encounter addended by: Yamila Patterson LPCC, LADC on: 4/4/2024 8:10 PM   Actions taken: Clinical Note Signed

## 2024-04-08 ENCOUNTER — TELEPHONE (OUTPATIENT)
Dept: BEHAVIORAL HEALTH | Facility: CLINIC | Age: 64
End: 2024-04-08
Payer: MEDICARE

## 2024-04-08 ENCOUNTER — HOSPITAL ENCOUNTER (OUTPATIENT)
Dept: BEHAVIORAL HEALTH | Facility: CLINIC | Age: 64
Discharge: HOME | End: 2024-04-08
Attending: FAMILY MEDICINE
Payer: MEDICARE

## 2024-04-08 ENCOUNTER — BEH TREATMENT PLAN (OUTPATIENT)
Dept: BEHAVIORAL HEALTH | Facility: CLINIC | Age: 64
End: 2024-04-08

## 2024-04-08 PROCEDURE — H2035 A/D TX PROGRAM, PER HOUR: HCPCS | Mod: HQ | Performed by: COUNSELOR

## 2024-04-08 PROCEDURE — H2035 A/D TX PROGRAM, PER HOUR: HCPCS | Mod: 59 | Performed by: COUNSELOR

## 2024-04-08 NOTE — PROGRESS NOTES
"Individual Session Summary   START TIME: 3 PM   END TIME: 4:30 PM   Duration: 1.5 Hours        Data:  Met with client on this date to complete his treatment plan. Durga arrived early due to his bus schedule.  Durga reports no change in his sobriety date. He identifies transportation as a barrier and states that he will not have a ride from programming tomorrow. Durga worked with therapist to contact Medicare for non medical transportation information and he was told that he does not have coverage. He was provided information to contact Medicaid through [facility] to request that as a benefit. Durga reports mood as \"alright.\" He denies current cravings and urges to use.     Intervention: Goal planning, MI, CBT, case management.      Assessment: Durga endorsed some sadness around his late mother. He presented with a flat affect. Durga endorsed some ambivalence around his alcohol use and reports it as \"not a problem\" but something he should look at.      Plan. Client will connect with [facility] for transportation benefits. Client will communicate with therapist around attendance concerns. Client will remain sober throughout programming.         Yamila Patterson, [facility], LADC     Attestation: Dr. Loretta HANNON - Provides oversight and supervision of care.    "

## 2024-04-08 NOTE — PROGRESS NOTES
"[facility]  MENTAL HEALTH AND ADDICTION    CO-OCCURRING RESIDENTIAL AND IOP TREATMENT PLAN    [facility] LEVEL OF CARE:  1.0 Outpatient Program  Level of care updates: 2.1 Intensive Outpatient Program Date:  24  Level of care updates: 1.0 Outpatient Program Date:  24  Level of care updates: 1.0 Outpatient Program Date:  24  Level of care updates: 1.0 Outpatient Program/Discharge Date: 10/8/24      DIMENSION 1: Intoxication / Withdrawal Potential     Initial Risk Ratin  Discharge Risk Ratin  Identified areas of concern/focus: Last reported alcohol use on 24. Reports to drinking alcohol that was left in home. Durga reported difficulty in falling asleep and recently reports increased ability to sleep easily.    As evidenced by: Ongoing  symptoms of withdrawals. Take Gabapentin to manage cravings. Not currently taking naltrexone due to it being on backordered.    Durga's Goal: \"I want to keep myself self-energize in ways that helps me avoid drinking and manage any withdrawals.\"  Clinical Goals:  Durga to maintain abstinence throughout outpatient treatment.     Date/ Initials Methods/Interventions Target Date Extended Date Extended Date Stopped Completed Initials   24 Client will report any chemical use to staff. Until program completion    10/8/24     5/29/24 Durga will refrain from any illicit substance or alcohol use, and report any substance use or alcohol use to primary counselor to determine if any changes need to be made to treatment plan to address withdrawal.  24 RK   24 Durga will utilize progressive muscle relaxation and 4-5-7 breath work before bed time to manage current experience of tension and PAWS.  9/11/24   10/8/24 RK             Update: Durga was on medications to manage urges and cravings and reports that it is effective. Durga attended AA groups and reports it as supportive.    DIMENSION 2: Biomedical " "Conditions/Complications   Initial Risk Ratin  Discharge Risk Ratin  Identified areas of concern/focus:  Left heel hurts from walking. Reports that it creates discomfort when walking a lot. Dental appointment schedule for  at 11[place]. Client has a primary care doctor, Dr. Bennett, through [facility].    As evidenced by:    Client has a diagnosis of peripheral neuropathy and an appointment scheduled in October with a specialty doctor. His current pain experience is described as a \"constant 5/10\" and interferes with his ability to work and do physical activity. Client's diagnosis is likely due to his long-term alcohol use.    Durga's Goal: \"I will continue to have my medical needs met and addressed\"  Clinical Goals:  Address medical concerns as they arise and maintain management of physical health problems.     Date/ Initials Methods/Interventions Target Date Extended Date Extended Date Stopped Completed Initials   2024 Inform staff of any medical concerns or conditions that may impact participating in treatment programming.  24 RK   2024 Client will consistently take medications as prescribed.  24 RK   24  RK Durga will do daily full body stretching for 20 minutes. 24 RK     DIMENSION 3:Emotional/Behavioral Conditions/Complications   Initial Risk Ratin  Discharge Risk Ratin  Identified areas of concern/focus: Durga  reports improvement in experience of depression and anxiety. Durga described experience as \"maintainable.\"     296.31 (F33.0) Major Depressive Disorder, Recurrent Episode, Mild _ and With mixed features.     As evidenced by:    ANXIETY:  irritability, restlessness, and \"cabin fever\"  DEPRESSION:  difficulty concentrating, fatigue, and low energy, procrastination    Durga's Goal: \"I want to learn new ways to stay in the present moment. I want to be happy, secure in my choices I make for myself.\"  Clinical " Goals:  Durga to gain coping skills to address mental health, report better understanding of relationship between mental health and substance use.      Date/ Initials Methods/Interventions Target Date Extended Date Extended Date Stopped Completed Initials   4/8/2024 Durga will participate in 3 hours of group therapy 3 days per week.  5/23/24 5/224 5/27/24 4/8/2024 Durga will increase and strengthen protective factors by staying connected with sister, friends and talk to family on the phone. 5/23/24 5/23/24 4/8/2024 Durga to attempt weekly mindfulness practices (breathing, body scan, guided meditation). Share with counselor any benefits or challenges.  5/20/24 7/12/24 9/24/24 10/8/24   5/29/24 Durga to identify at least 3 grounding skills she can begin to use and discuss effectiveness with counselor.  7/12/24 9/24/24 9/24/24 7/31/24 Durga will participate in 2 hours of group therapy 1 days per week.  9/24/24   10/8/24                                                                                                                                             Date/ Initials Methods/Interventions Target Date Extend Date Extend Date Stopped Completed Initials   General Instructions: MH Skills Group - IOP - 6 sessions  Attend each of the following groups one time a week and complete all 6 by due date, unless excused by primary counselor. All group work to be completed each session to receive an effective outcome.       4/8/24 sk > Neurobiology of addiction:  Informs Durga of brain changes and reduces feelings of shame, instructs on steps to help heal   4/30/24 4/30/24 4/8/24 sk > Learn and use Mindfulness to facilitate effective action in problem solving and promote health and well-being  5/7/24 5/7/24 4/8/24 sk > Mental Health Part 1: Learn 3 core processes of Acceptance, Cognitive Defusion, and Being Present  5/14/24 5/14/24 4/8/24 sk > Mental Health Part 2: Learn 3 core processes of  "Self-as-Context, Values, and Committed Action  24  E 24 Ind   24 sk > Learn and apply Self-Care in a variety of areas to improve mental and physical health, reduce PAWS, and increase feelings of self-empowerment, resiliency and well-being.   24 sk > Learn Stress Management techniques to reduce PAWS and stress-related symptoms, increase resiliency, and learn healthy routines to replace dysfunctional habits.  24     Format for D3 Insert for MH Skills in [facility] IOP Groups:   Dimension 3:   Initial Risk Ratin  Identified areas of concern/focus: Durga lacks understanding of how addiction affects other aspects of mental and physical health. Durga would benefit from learning new healthy coping skills, stress management, relaxation techniques, and psychotherapy specific to addiction, cross-addictions, and PAWS.     Durga will increase and strengthen protective factors by gaining knowledge and skills and learn how to apply self-care, stress management and psychotherapy to build a repertoire of daily habits that counteract PAWS, fatigue, depression and anxiety leading to increased resiliency and well-being.    Mental Health Skills Groups is required for first phase of treatment:       DIMENSION 4: Treatment Acceptance/Resistance   Initial Risk Ratin  Discharge Risk Ratin  Identified areas of concern/focus:    Alcohol Use Disorders;  303.90 (F10.20) Alcohol Use Disorder Severe  Ambivalence about change  History of failed treatment attempts  Moderate motivation for treatment    As evidenced by:  Preoccupation with chemical use.   Ambivalence about change.   Tolerance.  Persistent desire or unsuccessful efforts to cut down or control substance use.  Substance use is continued despite persistent or recurrent problems related to substance use.  Craving, or a strong desire to use the substance    Durga's Goal: \"Attending programming will help me reduce " "feelings isolation at home\"  Clinical Goals: Gain insight into how substance has impacted your life and actively engage in the treatment process.     Date/ Initials Methods/Interventions Target Date Extended Date Extended Date Stopped Completed Initials   2024 Durga will meet individually with [facility] weekly/Bi-weekly to review progress on treatment plan goals. 24 RK     2024 Durga to discuss any decrease in motivation to continue attending treatment.  24 RK   24 Durga will identify weekly and daily goals to help increase motivation and engagement in recovery.  24 RK   24 Identify pros and cons of alcohol use and impact on maintaining sobriety important life areas 24 RK   24 Durga will continue to identify benefits of sobriety and programming.  9/24/24   10/8/24 RK                       DIMENSION 5: Relapse/Continued Problem Potential   Initial Risk Rating: 3  Discharge Risk Ratin  Identified areas of concern/focus:  High risk for relapse  Lack of knowledge/coping skills related to relapse triggers and coping strategies    As Evidenced by:  Client unable to identify relapse triggers.    Client lacks coping skills for relapse prevention.    Chemical use in client's environment.  History of daily use.  Failed attempts to quit.      Durga's Goal: \"I want to learn to manage my stressors, clean stove, bills, clean house, well so I don't feel overwhelmed and I feel good about where I live.\"  Clinical Goals:  Develop coping skills and relapse prevention strategies to utilize when experiencing triggers, cravings and/or urges to drink.     Date/ Initials Methods/Interventions Target Date Extended Date Extended Date Stopped Completed Initials   2024 Durga will comply with urine drug screens at staff request to monitor for substance use. 5/23/24 7/12/24 9/24/24 10/8/24     4/8/24 Durga will rate urges to use daily in " "group. 24 Client will complete relapse prevention assignment identifying potential triggers for relapse and coping strategies for each. 24 RK   24 Durga to list 5 negative effects of substance use and of not giving enough effort to self-care and mindfulness.  24 RK   24 Durga to identify 5 positive alternatives of things he can do when feeling a strong or difficulty emotion instead of drinking. Discuss with counselor.  24 Durga will complete a Personal Care Recovery Plan in Phase 3 9/24/24   10/8/24   7/31/24 Durga will use SMART goal planning to break daily responsibilities into manageable tasks.  9/24/24   10/8/24             DIMENSION 6: Recovery Environment   Initial Risk Rating: 3  Discharge Risk Ratin  Identified areas of concern/focus: Lack of sober support  Chemical use in the home  Lack of sober / recreational interests  Legal issues  Court on 24 for most previous DUI.     As evidenced by:    Clients lacks sober activities.      Durga's Goal: \"I want to see what I can do to build more structured activities, like biking.\"  Clinical Goals: Improve recovery environment to increase the probability of maintaining long term sobriety.   Establish a transition plan connecting to culturally informed services in the community for post-treatment follow up care.  Must be reached in order to have services terminated?  Yes  Target Date: 24       Date/ Initials Methods/Interventions Target Date Extended Date Extended Date Stopped Completed Initials   2024 Durga to identify 5 aspects of his current environment that have to change in order to support and maintain good mental health and sobriety.  24 RK     2024 Client will verbalize the role chemical use has played in legal problems.   24 RK   2024 Client will increase sober support by attending recovery " "meetings regularly. 5/23/24 7/12/24 7/30/24 RK   5/29/24 Durga to identify 3 things that you consider 'missing' from your life right now that may have contributed to continued use. Share with counselor.  7/12/24 9/24/24 9/24/24 7/31/24 Durga will follow all probation rules and attend DUI court bi-weekly. Durga will follow mandate for UA requests by court.     10/8/24                                                                             Durga Strengths: \"I have a good mind, open minded, skillful with auto body and polite, good listener, respectful, content.\" Durga Treatment Plan Adaptations:  Durga does not need adjustments at this time.  The following adjustments will be made based on the above identified plan: NA   The following staff have contributed to this plan: Yamila Patterson, MPS, LADC, LPCC.       Were family/support people involved in the treatment planning?  No - List reason why not:  Client's preference.    Durga attests their date of last use as 6/7/24. Durga attests they have participated in the creation of this treatment plan. Durga has been provided a copy of this treatment plan and is in agreement with how this plan is written and will be stored in the electronic record.     Durga Signature:  _____________________________  Date: ____________________    [facility] Signature:  _____________________________  Date: ____________________     Attestation: Dr. Loretta HANNON - Provides oversight and supervision of care.      "

## 2024-04-08 NOTE — TELEPHONE ENCOUNTER
----- Message from Yamila Patterson, STEPHANY, ELANA sent at 2024  3:53 PM CDT -----  Regarding: Please schedule 1:1  Hello,    Location of programmin [address], [place] [address]  Date and time: 4/10/24 at 2:30pm  Group: [id number] (Phase 1)  Provider: Yamila Patterson ([id number])  Number of visits to be scheduled: 1 session  Length/Duration of Appointment in minutes: 1 hr  Visit Type: In person    Thanks,  Yamila Patterson, ANA, AMMON, ELANA, STEPHANY  MI/CD Psychotherapist  [facility]   [facility] [address], [place] [address]  Phone: [phone] Fax:  [phone]

## 2024-04-09 NOTE — GROUP NOTE
Group Therapy Documentation    PATIENT'S NAME: Durga Aguirre  MRN:   [id number]  :   1960  ACCT. NUMBER: [id number]  DATE OF SERVICE: 24  START TIME:  5:00 PM  END TIME:  8:00 PM  FACILITATOR(S): Yamila Patterson, STEPHANY, ELANA  TOPIC: BEH Group Therapy  Number of patients attending the group:  7  Group Length:  3 Hours    Group Therapy Type: Addiction, Psychoeducation, Psychotherapeutic, and Skills/Education    Summary of Group / Topics Discussed:    Anger/Conflict Management , Cognitive Therapy Techniques, Co-occurring Illness, Coping Skills/Lifestyle Managemet, Emotional Regulation, Grief & Loss, Interpersonal Effectiveness, Meditation/Breathing Exercises, Mindfulness, Relaxation Techniques, Stress Management, and Thinking Errors/Negative Self-Talk    Topic 7: Personal Growth and Development (MH)/Reducing Risks  This topic will address various ways to implement and sustain overall health and wellness by focusing on the various aspects of spirituality and gaining perspective.     Objective(s):  Client will learn strategies to reduce stress and increase overall health and wellness.  Client will gain insight on mental health functioning and identify how emotional management has impacted recovery.   Client will identify emotions that often get in the way of being able to think greater than you feel, demonstrate understanding of skills to develop positive states, increase self-regulation, recovery and mental health functioning.        Structure (modalities, homework, worksheets, etc)   Facilitate group discussion on gaining perspective, acceptance practice and how our individual values impact our thoughts, feelings and actions.  Provide psychoeducation on the need for a balanced lifestyle and ways to achieve this.  Practice mindfulness to increase awareness of bodily sensations, thoughts, emotions and surroundings through self-compassion.   Use teach-back techniques to ensure client's understanding.  Role  "play situations where \"emotions run the show,\" discuss coping tools.  Provide psycho education on how recovery skills can be used to increase internal motivation for sobriety and recovery.    Client will be provided handouts and worksheets to enhance learning.     Expected therapeutic outcome(s):  Client will:  Client will sustain improved health and wellness.  Client will integrate spirituality, values, and stress management into their daily life.   Build recovery resilience against cravings, leading to resilience against relapse in early sobriety.      Therapeutic outcome(s) measured by:   Client's ability to teach-back the techniques learned in group.   Client will demonstrate knowledge of ground skills, half smile and techniques to activate PFC.         Dimensions Focus: D3, D4, D5      Group Attendance:  Attended group session    Patient's response to the group topic/interactions:  cooperative with task, discussed personal experience with topic, expressed readiness to alter behaviors, expressed reluctance to alter behavior, listened actively, and verbalizations were off topic    Patient appeared to be Actively participating, Attentive, and Engaged.        Client specific details:  Durga engaged well with today's topic and shared his big picture goal in recovery. He disclosed about his mother's upcoming death anniversary date (4/15) and the raw emotions he feels around that. He reports high motivation for recovery and identifies his mother as someone he is doing it for. Durga shared some stories as feedback that was off topic.     Attestation: Dr. Loretta HANNON - Provides oversight and supervision of care.      "

## 2024-04-10 ENCOUNTER — HOSPITAL ENCOUNTER (OUTPATIENT)
Dept: BEHAVIORAL HEALTH | Facility: CLINIC | Age: 64
Discharge: HOME | End: 2024-04-10
Attending: FAMILY MEDICINE
Payer: MEDICARE

## 2024-04-10 PROCEDURE — H2035 A/D TX PROGRAM, PER HOUR: HCPCS | Performed by: COUNSELOR

## 2024-04-10 NOTE — PROGRESS NOTES
Individual Session Summary   START TIME: 2:40 PM   END TIME: 4 PM   Duration: 1.5 Hours    Data:  Met with client on this date for an individual session to complete diagnostic assessment. Client reports experience of ongoing grief and loss around his mother's death in the past year and the passing of his pet and girlfriend previously. Client endorsed worries around his mother's upcoming death anniversary (4/15) and used part of the session to explore ways to manage grief and plan activities to care for his late mother memories and plan for self-care. Client shared stories about her baking cookies and bread. He made connection with how he can have cookies to honor her memory. Client plans to ask his sister to meet up on that day for lunch in honor of their mother. Client identified coming to group on Monday as self care and biking.     Client reports concerns about finding a ride to group tomorrow. Therapist remind him of the attendance policy and encouraged him to contact [employer] regarding the add on for ride benefits. Client reports that he forgot to make the call yesterday and that he plans to go into the DMV tomorrow to inquire about his license.     Intervention: CBT, Grief Processing, Assessing, reflection, validation     Assessment: Client participated well in the DA. Client endorsed tearfulness when processing grief and appeared motivated to plan an activity with his sister to honor his mother's death anniversary. Client endorsed sadness around his relationship with his son. Client appear to be contemplative about programming.     Plan. Client will seek transportation and attend programming as scheduled. Client will continue to maintain sobriety. Client will follow plan to manage grief in the next few days.        Yamila Patterson, [facility], [facility]     Attestation: Dr. Loretta HANNON - Provides oversight and supervision of care.

## 2024-04-10 NOTE — PROGRESS NOTES
"[facility] Adult Mental Health Outpatient Programs  Evaluator Name:  Yamila Patterson     Credentials:  MPS, LSW, LPCC, LADC    Client'S NAME: Durga Aguirre  PREFERRED NAME: Durga  PREFERRED PRONOUNS: he/him/his     MRN:   [id number]  :   1960   ACCT. NUMBER: [id number]  DATE OF SERVICE: 4/10/24  START TIME: 2:40 PM  END TIME: 4 PM  PREFERRED PHONE: [phone]   May we leave a program related message: No  Service Modality:  In-person    STANDARD ADULT DIAGNOSTIC ASSESSMENT    Identifying Information:  Client is a 64 year old, .  The pronoun use throughout this assessment reflects the Client's chosen pronoun.  Client was referred for an assessment by self.  Client attended the session alone.     Chief Complaint:   The reason for seeking services at this time is: \" Awareness and pressure from legal system to address addiction.\"  The problem(s) began 3/17/2020 during the start of COVID. Client has attempted to resolve these concerns in the past through [facility]. Ateended programming for 2 months in 2023.  Client reports experience of grief and loss as a trigger for his most recent relapse. Client isolates at home and lack of connection with others and reports an increase in vulnerability for alcohol use. Client identifies drinking as a coping tools to manage symptoms relating to grief and depression. Client identifies benefit from using trazodone to manage sleep difficulties. Client has difficulty with concentration and noted it most prevalent when using reading materials. Client reports an increase in difficulty reading books and reports that he used to enjoy reading. Client reports history of panic attacks \"15 years ago\" and reports that medications are effective, client denies any recent experiences. Client reports substance (LSD) induced hallucinations when he was 12 years old. Denies any current symptoms experience. Client has two DWI charges and identify treatment engagement as a " "necessary step to re-instate his 's privileges. Client reports on and off alcohol use since 2009. Client reports having 3 detox admissions since 2020, with the last one being over a year ago. Client is currently engaged in MAT and is prescribed with Naltrexone, Gabapentin and Campral to manage cravings.     Social/Family History:  Client reported they grew up in  [place], [place] .  They were raised by biological parents until father passed away when he was 20 years old. Client denies any experience of abuse and trauma as a child. Client report feeling loved and supported by both of his parents. Parents stayed . And client was raised in family home.    Client reported that their childhood was \"great.\" Reports that he enjoyed spending time with his parents. Reports that he witnessed a lot of the adults drinking growing up.  Client described their current relationships with family of origin as good, identifies younger sister as supportive.      The client describes their cultural background as American, American/white male.  Cultural influences and impact on client's life structure, values, norms, and healthcare:  Client denied cultural concerns had an impact on life structure, values, norms, or healthcare.    Contextual influences on Client's health include: NA.  Cultural, Contextual, and socioeconomic factors do not affect the Client's access to services.  Client identified their preferred language to be English. Client reported they do not  need the assistance of an  or other support involved in therapy.     Client reported had no significant delays in developmental tasks.   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 Client identified the following learning problems: concentration and reading.  Modifications will be used to assist communication in therapy. Client is able to read and use group worksheets. Client reports they are  " able to understand written materials.    Client reported the following relationship history: Client denies ever being . .  Client's current relationship status is single.   Client identified their sexual orientation as heterosexual.  Client reported having one child, a son, age 35, whom he currently has an estranged relationship with and has not had any contact with for almost a year. Client identified siblings and friends as part of their support system.  Client identified the quality of these relationships as good.     Client's current living/housing situation involves staying in own home/apartment.  They live on their own and they report that housing is stable. Client reports feeling safe in his environment and neighborhood. Client is currently unemployed and is looking forward to working again once he obtains his driving privileges.  Client reports their finances are obtained through disability and general assistance.  Client does not identify finances as a current stressor.      Client reported that they have been involved with the legal system. Client has 2 prior gross misdemeanor DWI charges on 4/16/2013 and on 5/7/2016.   The client denied having any other history of arrests or legal charges Client has an upcoming  court date for DUI charge. Client is currently working to re-instate 's license. Client denies being on probation / parole / under the jurisdiction of the court.    Client's Strengths and Limitations:  Client identified the following strengths or resources that will help them succeed in treatment: commitment to health and well being, exercise routine, maximo / spirituality, friends / good social support, family support, insight, motivation, and sense of humor. Things that may interfere with the client's success in treatment include: transportation concerns and until 's license is obtained, ack of a sober social support network, mental health concerns, and social isolation  .    _______________________________________________  Personal and Family Medical History:  Client   does not report a family history of mental health concerns.  Client reports family history includes Lymphoma in his father; No Known Problems in his brother, daughter, maternal grandfather, maternal grandmother, mother, paternal grandfather, paternal grandmother, sister, son, and another family member; Substance Abuse in his father..     Client does report Mental Health Diagnosis and/or Treatment. Client reported the following previous diagnoses which include(s): an Anxiety Disorder and Depression.  Client reported symptoms began 2020 when alcohol use increased.   Client has received mental health services in the past: inpatient mental health services at [facility] , reports that he completed the program and relapsed on alcohol use after he came home to the environment he care-take his late mother in. Client denies any psychiatric Hospitalizations. Client denies a history of civil commitment.  Client is not receiving other mental health services.         Client has not had a physical exam to rule out medical causes for current symptoms.  Date of last physical exam was within the past year. Client was encouraged to follow up with PCP if symptoms were to develop. The Client has a [facility] Primary Care Provider, who is named Rohit Solitario.  Client reports no current medical concerns.  Client denies any issues with pain..   There are not significant appetite / nutritional concerns / weight changes.  Client does not report a history of head injury / trauma / cognitive impairment.      Patient reports current meds as:   Current Outpatient Medications   Medication Sig Dispense Refill    acamprosate (CAMPRAL) 333 MG EC tablet Take 2 tablets (666 mg) by mouth 3 times daily 180 tablet 1    ARIPiprazole (ABILIFY) 5 MG tablet TAKE ONE TABLET (5 MG) BY MOUTH ONE TIME DAILY 90 tablet 1    busPIRone (BUSPAR) 15 MG tablet Take 1  tablet (15 mg) by mouth 2 times daily 180 tablet 1    citalopram (CELEXA) 20 MG tablet Take 1 tablet (20 mg) by mouth daily 90 tablet 1    diclofenac (VOLTAREN) 50 MG EC tablet Take 1 tablet (50 mg) by mouth 3 times daily as needed for moderate pain 30 tablet 0    gabapentin (NEURONTIN) 300 MG capsule Take 2 capsules (600 mg) by mouth 3 times daily Increase gabapentin dose 300 mg/day to a target dose of 600 mg  capsule 1    hydrochlorothiazide (HYDRODIURIL) 12.5 MG tablet Take 1 tablet (12.5 mg) by mouth daily 90 tablet 3    hydrOXYzine HCl (ATARAX) 50 MG tablet Take 0.5-1 tablets (25-50 mg) by mouth every 8 hours as needed for anxiety 180 tablet 3    lisinopril (ZESTRIL) 40 MG tablet Take 1 tablet (40 mg) by mouth daily (Hold for a systolic blood pressure of 100) 90 tablet 3    Melatonin 10 MG TABS tablet Take 10 mg by mouth nightly as needed for sleep      multivitamin w/minerals (THERA-VIT-M) tablet Take 1 tablet by mouth daily 90 tablet 0    naltrexone (DEPADE/REVIA) 50 MG tablet Take 2 tablets (100 mg) by mouth daily 180 tablet 1    simvastatin (ZOCOR) 40 MG tablet Take 1 tablet (40 mg) by mouth every evening 90 tablet 3    thiamine (B-1) 100 MG tablet TAKE 1 TABLET (100 MG) BY MOUTH DAILY 90 tablet 0    traZODone (DESYREL) 50 MG tablet TAKE ONE TABLET BY MOUTH AT BEDTIME AS NEEDED FOR SLEEP 90 tablet 0     No current facility-administered medications for this encounter.     Medication Adherence:  Client reports  .  taking prescribed medications as prescribed.    Client Allergies:  No Known Allergies    Medical History:    Past Medical History:   Diagnosis Date    Alcohol abuse     Anxiety and depression     Arthritis     Hyperlipidemia     Hypertension     Mass of left chest wall     Other spontaneous pneumothorax 1997    Right knee pain        Current Mental Status Exam:   Appearance:  Appropriate    Eye Contact:  Good   Psychomotor:  Normal       Gait / station:  no problem  Attitude /  Demeanor: Cooperative  Pleasant Attentive  Speech      Rate / Production: Normal/ Responsive      Volume:  Normal  volume      Language:  no problems  Mood:   Normal  Affect:   Appropriate    Thought Content: Clear   Thought Process: Coherent       Associations: No loosening of associations  Insight:   Good   Judgment:  Intact   Orientation:  All  Attention/concentration: Good    Substance Use:  Client did report a family history of substance use concerns; see medical history section for details.  Client has received chemical dependency treatment in the past at [facility] .  Client has ever been to detox. Client reports going to detox 3 times and over a year ago.      Client is currently receiving the following services: MICD Treatment at [facility] . Client reported the following problems as a result of their substance use: DUI, family problems, and legal issues.    Client reports using alcohol 6 times per day and has 6 beers and Vodka and water at a time. Patient first started drinking at age 13 years old.  Patient reported date of last use was 4/2/24.  Patient reports heaviest use is current use.  Client reports using tobacco 2 times per day. Client started using tobacco at age 17.  Client denies using cannabis.  Client denies using caffeine.  Client reports using/abusing the following substance(s). Patient reported no other substance use.     CAGE- AID:        12/20/2023    12:00 PM 2/12/2024     5:15 PM   CAGE-AID Total Score   Total Score 1 1   Total Score MyChart  1 (A total score of 2 or greater is considered clinically significant)     Substance Use: daily use, substance related legal problems, and driving under the influence    Significant Losses / Trauma / Abuse / Neglect Issues:   Client did not serve in the .  There are indications or report of significant loss, trauma, abuse or neglect issues related to: are indications or report of significant loss, trauma, abuse or neglect issues  related to mother passing away a year ago and an estranged relationship with his his son. Client identifies this as current stressors.   Concerns for possible neglect are not present.     Safety Assessment:   Current Safety Concerns:  [place] Suicide Severity Rating Scale (Short Version)      1/12/2023     7:15 AM 4/12/2023    10:42 AM 4/12/2023    11:07 AM 4/12/2023     3:22 PM 5/7/2023     1:59 PM 8/2/2023     6:31 PM 8/3/2023     2:30 AM   [place] Suicide Severity Rating (Short Version)   Over the past 2 weeks have you felt down, depressed, or hopeless? no no   no no    Over the past 2 weeks have you had thoughts of killing yourself? no no   no no    Have you ever attempted to kill yourself? no no   no no    Q1 Wished to be Dead (Past Month)    no   no   Q2 Suicidal Thoughts (Past Month)    no   no   Q3 Suicidal Thought Method    no   no   Q4 Suicidal Intent without Specific Plan    no   no   Q5 Suicide Intent with Specific Plan    no   no   Q6 Suicide Behavior (Lifetime)    no   no   Level of Risk per Screen    low risk   low risk   Required Interventions   Room searched;Room made safe;Belongings removed;Patient searched       Interventions   Monitored via video         Client denies current homicidal ideation and behaviors.  Client denies current self-injurious ideation and behaviors.    Client denied risk behaviors associated with substance use.  Client denies any high risk behaviors associated with mental health symptoms.  Client reports the following current concerns for their personal safety: None.  Client reports there are not firearms in the house. There are no firearms in the home..     History of Safety Concerns:  Client denied a history of homicidal ideation.     Client denied a history of personal safety concerns.    Client denied a history of assaultive behaviors.    Client denied a history of sexual assault behaviors.     Client denied a history of risk behaviors associated with substance  use.  Client denies any history of high risk behaviors associated with mental health symptoms.  Client reports the following protective factors: dedication to family/friends, safe and stable environment, regular sleep, and abstinence from substances    Risk Plan:  See Preliminary Treatment Plan for Safety and Risk Management Plan    Review of Symptoms per Client report:  Depression: No symptoms, Lack of interest, Difficulties concentrating, Ruminations, Withdrawn, and Anger outbursts  Nikki:  No Symptoms  Psychosis: No Symptoms  Anxiety: Poor concentration  Panic:  No symptoms  Post Traumatic Stress Disorder:  Experienced traumatic event loss of girlfriend and mother, ongoing experience of grief and loss.    Eating Disorder: No Symptoms  ADD / ADHD:  Inattentive, Difficulties listening, Distractibility, Forgetful, Interrupts, and Restlessness/fidgety  Conduct Disorder: No symptoms  Autism Spectrum Disorder: No symptoms  Obsessive Compulsive Disorder: No Symptoms    Patient reports the following compulsive behaviors and treatment history:  None .      Diagnostic Criteria:   Major Depressive Disorder  A) Recurrent episode(s) - symptoms have been present during the same 2-week period and represent a change from previous functioning 5 or more symptoms (required for diagnosis)   - Depressed mood. Note: In children and adolescents, can be irritable mood.     - Diminished interest or pleasure in all, or almost all, activities.    - Feelings of worthlessness or excessive guilt.   B) The symptoms cause clinically significant distress or impairment in social, occupational, or other important areas of functioning  C) The episode is not attributable to the physiological effects of a substance or to another medical condition  D) The occurence of major depressive episode is not better explained by other thought / psychotic disorders  E) There has never been a manic episode or hypomanic episode    Functional Status:  Client  reports the following functional impairments: self-care, social interactions, and work / vocational responsibilities.     WHODAS:        No data to display                Clinical Summary:  1. Psychosocial, Cultural and Contextual Factors: None identified .  2. Principal DSM5 Diagnoses  (Sustained by DSM5 Criteria Listed Above):   296.31 (F33.0) Major Depressive Disorder, Recurrent Episode, Mild _ and With mixed features.  3. Other Diagnoses that is relevant to services:   Adjustment Disorders  309.28 (F43.23) With mixed anxiety and depressed mood. Grief and Loss  4. Provisional Diagnosis:  None at this time   5. Prognosis: Return to Baseline Functioning and Expect Improvement.  6. Likely consequences of symptoms if not treated: Symptoms may worsen and client will likely require higher level of care to improve functioning and difficulty with substance use.   7. Client strengths include:  goal-focused, good listener, motivated, open to suggestions / feedback, supportive, and willing to ask questions .     Recommendations:     1. Plan for Safety and Risk Management:Recommended that patient call 911 or go to the local ED should there be a change in any of these risk factors..  Report to child / adult protection services was NA.     2. Client's identified  sobriety and managing overwhelming feelings of sadness as focus areas .     3. Initial Treatment will focus on: Depressed Mood - for symptom relief  Anxiety - relating to PAWS  Grief / Loss - Establish ways to make new meaning for the loss experienced  Alcohol / Substance Use - Sobriety and sustained recovery .     4. Resources/Service Plan:       services are not indicated.     Modifications to assist communication are not indicated.     Additional disability accommodations are not indicated.      5. Collaboration:  Collaboration / coordination of treatment will be initiated with the following support professionals:  Coordination with  .      6.   Referrals:  The following referral(s) will be initiated:  None at this time .    A Release of Information has been obtained for the following:   .    7. ROYA: ROYA:  Discussed Discussed the general effects of drugs and alcohol on health and well-being and Attend a sober community support program including AA, [facility], [facility], etc.).. Provider gave patient printed information about the effects of chemical use on their health and well being. Recommendations:  Attend IOP at [facility] consistently and complete programming .     8. Records were reviewed at time of assessment.  Information in this assessment was obtained from the medical record and provided by patient who is a fair historian.   Patient will have open access to their mental health medical record.      Eval type:  Mental Health    Yamila Patterson, MPS, LSW, LADC, LPCC  MI/CD Psychotherapist  [facility]   [address], [place] [address]  Phone: [phone] Fax:  [phone]     Attestation: Dr. Loretta HANNON - Provides oversight and supervision of care.

## 2024-04-11 NOTE — PROGRESS NOTES
Addiction Outpatient Weekly Clinical Staffing     Durga Aguirre was staffed on 4/11/2024 . Durga Aguirre was staffed on recovery strengths, barriers and treatment progress.     Staff present: Ruby Martinez, [facility], Mi Michaels, [facility], [facility], Margarita Hernandez, [facility], Donald Welander, [facility], Og Ortiz, [facility] , Senait Ramos MS, [facility] , Valentina Martinez [facility], [facility] , Yamila Patterson [facility], [facility] , Danny Lozada MS, [facility] , and Megan Conde [facility], [facility]     Date: 4/11/2024 Time: 12:22 PM    Staff Signature: Yamila Patterson, [facility], [facility]     Attestation: Dr. Loretta HANNON - Provides oversight and supervision of care.

## 2024-04-11 NOTE — PROGRESS NOTES
Medical Consult for Treatment Planning                      This writer consulted with Dr. Romi Burgos, Addiction Medicine director, for Durga Aguirre Initial  treatment plan on 4/11/2024 including recovery strengths, barriers and treatment engagement.     Attestation:   Dr. Burgos - Medical Director - Provides oversight and supervision of care.    Staff Signature: Yamila Patterson, [facility], [facility]  Date: 4/11/2024

## 2024-04-14 DIAGNOSIS — F10.20 ALCOHOL USE DISORDER, SEVERE, DEPENDENCE (H): ICD-10-CM

## 2024-04-15 ENCOUNTER — HOSPITAL ENCOUNTER (OUTPATIENT)
Dept: BEHAVIORAL HEALTH | Facility: CLINIC | Age: 64
Discharge: HOME | End: 2024-04-15
Attending: FAMILY MEDICINE
Payer: MEDICARE

## 2024-04-15 PROCEDURE — H2035 A/D TX PROGRAM, PER HOUR: HCPCS | Mod: HQ | Performed by: COUNSELOR

## 2024-04-15 RX ORDER — ACAMPROSATE CALCIUM 333 MG/1
666 TABLET, DELAYED RELEASE ORAL 3 TIMES DAILY
Qty: 180 TABLET | Refills: 0 | Status: SHIPPED | OUTPATIENT
Start: 2024-04-15 | End: 2024-05-15

## 2024-04-15 NOTE — TELEPHONE ENCOUNTER
Refill sent.  Appointment rescheduled for 4/17/24.  No further refills until appointment completed

## 2024-04-16 ENCOUNTER — HOSPITAL ENCOUNTER (OUTPATIENT)
Dept: BEHAVIORAL HEALTH | Facility: CLINIC | Age: 64
Discharge: HOME | End: 2024-04-16
Attending: FAMILY MEDICINE
Payer: MEDICARE

## 2024-04-16 PROCEDURE — H2035 A/D TX PROGRAM, PER HOUR: HCPCS | Mod: HQ | Performed by: SOCIAL WORKER

## 2024-04-16 NOTE — GROUP NOTE
Group Therapy Documentation    PATIENT'S NAME: Durga Aguirre  MRN:   [id number]  :   1960  ACCT. NUMBER: [id number]  DATE OF SERVICE: 4/15/24  START TIME:  5:00 PM  END TIME:  8:00 PM  FACILITATOR(S): Yamila Patterson, STEPHANY, ELANA  TOPIC: BEH Group Therapy  Number of patients attending the group:  6  Group Length:  3 Hours    Group Therapy Type: Addiction, Psychoeducation, and Skills/Education    Attestation: Dr. Loretta HANNON - Provides oversight and supervision of care.      Dimension Focus: D1, D3, D4, D5    Summary of Group / Topics Discussed:    Anger/Conflict Management , Balanced Lifestyle , Cognitive Therapy Techniques, Co-occurring Illness, Coping Skills/Lifestyle Managemet, Distress Tolerance, Emotional Regulation, Grief & Loss, Interpersonal Effectiveness, Mindfulness, Relaxation Techniques, and Symptom Management    Emotion Regulation:  Understanding Emotions: Facilitated a group discussion around the purpose of understanding emotions and connecting that to the DBT module emotion regulation. Client learned the purpose of emotions: to motivate, protect, communicate. And ways in which people tend to use secondary emotions to suppress/cover up primary emotions which often results in others improperly attending to our needs as message was miscommunicated for instance, when someone feels shame or sadness but express anger, people may avoid rather than comfort/support and therefore the shame or sadness is not validated and emotional need goes unmet. Client used the ice benavides analogy to identify emotions that are difficult to feel and identified primary and secondary emotion and express and ways in which to effective express hard emotions. Client engaged in discussion with group about emotion expression and benefits of properly identifying emotions and identify coping thoughts to use.     Group Objectives/Goals    Client will understand what emotions are and the purpose and functionality of  "emotions    Client will understand primary and secondary emotions and the impact of emotion expression, both when effective and when ineffective/adaptive and maladaptive patterns.    Client will have opportunity to discuss difficult emotions to feel, express, and respond to with their peers in a group setting to improve group rapport and practice identifying and labeling emotions    Expected therapeutic outcomes:    Understand bodily sensations that predicts primary emotions and thoughts that creates secondary emotions.   Increased practiced of using coping thoughts to calm and self-soothe when experiencing emotional distress.    Group Attendance:  Attended group session    Patient's response to the group topic/interactions:  cooperative with task, discussed personal experience with topic, expressed readiness to alter behaviors, expressed understanding of topic, gave appropriate feedback to peers, and listened actively    Patient appeared to be Actively participating, Attentive, and Engaged.        Client specific details:  Durga reports no change in his sobriety date and states that he was able to do stay busy and have lunch today to manage his ongoing experience of grief and loss. He acknowledge his mother's death anniversary today and reports 1/10 for desire to drink. He reports feeling good and less intensity in his grief experience. He engaged well learning about primary and secondary emotions and shared difficulty in labelling his emotions. He suggested the \"f-that\" meditation and reports benefits in being able to use it. He read examples of coping thoughts and reports that he has been using a few. He plans to create his own for homework.     "

## 2024-04-17 ENCOUNTER — TELEPHONE (OUTPATIENT)
Dept: BEHAVIORAL HEALTH | Facility: CLINIC | Age: 64
End: 2024-04-17

## 2024-04-17 ENCOUNTER — VIRTUAL VISIT (OUTPATIENT)
Dept: ADDICTION MEDICINE | Facility: CLINIC | Age: 64
End: 2024-04-17
Payer: MEDICARE

## 2024-04-17 ENCOUNTER — HOSPITAL ENCOUNTER (OUTPATIENT)
Dept: BEHAVIORAL HEALTH | Facility: CLINIC | Age: 64
Discharge: HOME | End: 2024-04-17
Attending: FAMILY MEDICINE
Payer: MEDICARE

## 2024-04-17 DIAGNOSIS — F41.1 GENERALIZED ANXIETY DISORDER: ICD-10-CM

## 2024-04-17 DIAGNOSIS — F10.20 ALCOHOL USE DISORDER, SEVERE, DEPENDENCE (H): Primary | ICD-10-CM

## 2024-04-17 DIAGNOSIS — F17.200 TOBACCO USE DISORDER, MODERATE, DEPENDENCE: ICD-10-CM

## 2024-04-17 PROCEDURE — 99443 PR PHYSICIAN TELEPHONE EVALUATION 21-30 MIN: CPT | Mod: FQ

## 2024-04-17 PROCEDURE — H2035 A/D TX PROGRAM, PER HOUR: HCPCS | Performed by: COUNSELOR

## 2024-04-17 ASSESSMENT — PATIENT HEALTH QUESTIONNAIRE - PHQ9
SUM OF ALL RESPONSES TO PHQ QUESTIONS 1-9: 1
SUM OF ALL RESPONSES TO PHQ QUESTIONS 1-9: 1
10. IF YOU CHECKED OFF ANY PROBLEMS, HOW DIFFICULT HAVE THESE PROBLEMS MADE IT FOR YOU TO DO YOUR WORK, TAKE CARE OF THINGS AT HOME, OR GET ALONG WITH OTHER PEOPLE: NOT DIFFICULT AT ALL

## 2024-04-17 ASSESSMENT — ANXIETY QUESTIONNAIRES
GAD7 TOTAL SCORE: 1
4. TROUBLE RELAXING: NOT AT ALL
GAD7 TOTAL SCORE: 1
5. BEING SO RESTLESS THAT IT IS HARD TO SIT STILL: NOT AT ALL
GAD7 TOTAL SCORE: 1
1. FEELING NERVOUS, ANXIOUS, OR ON EDGE: SEVERAL DAYS
3. WORRYING TOO MUCH ABOUT DIFFERENT THINGS: NOT AT ALL
7. FEELING AFRAID AS IF SOMETHING AWFUL MIGHT HAPPEN: NOT AT ALL
6. BECOMING EASILY ANNOYED OR IRRITABLE: NOT AT ALL
2. NOT BEING ABLE TO STOP OR CONTROL WORRYING: NOT AT ALL
IF YOU CHECKED OFF ANY PROBLEMS ON THIS QUESTIONNAIRE, HOW DIFFICULT HAVE THESE PROBLEMS MADE IT FOR YOU TO DO YOUR WORK, TAKE CARE OF THINGS AT HOME, OR GET ALONG WITH OTHER PEOPLE: NOT DIFFICULT AT ALL
8. IF YOU CHECKED OFF ANY PROBLEMS, HOW DIFFICULT HAVE THESE MADE IT FOR YOU TO DO YOUR WORK, TAKE CARE OF THINGS AT HOME, OR GET ALONG WITH OTHER PEOPLE?: NOT DIFFICULT AT ALL
7. FEELING AFRAID AS IF SOMETHING AWFUL MIGHT HAPPEN: NOT AT ALL

## 2024-04-17 ASSESSMENT — PAIN SCALES - GENERAL: PAINLEVEL: MODERATE PAIN (5)

## 2024-04-17 NOTE — TELEPHONE ENCOUNTER
----- Message from Yamila Patterson, STEPHANY, ELANA sent at 2024  4:54 PM CDT -----  Regarding: Please schedule 1:1  Hello,    Location of programmin [address] [place] [address] [address], [place] [address]  Date and time: 24 at 1pm  Group: [id number] (Phase 1)  Provider: Yamila Patterson ([id number])  Number of visits to be scheduled: 1 session  Length/Duration of Appointment in minutes: 1 hr  Visit Type: In person    Thanks,  Yamila Patterson, ANA, TIESHAW, ELANA, STEPHANY  MI/CD Psychotherapist  [facility]   [address], [place] [address]  Phone: [phone] Fax:  [phone]  
No-Patient/Caregiver offered and refused free interpretation services.

## 2024-04-17 NOTE — PROGRESS NOTES
"Individual Session Summary   START TIME: 1 PM   END TIME: 2 PM   Duration: 1 Hour    Data:  Met with client on this date for an individual session. The session focused on client's treatment plan goal for DIM(s) 1, 3, 4, 5, 6 of his individual treatment plan. Client addressed motivation for treatment, anxiety relating to upcoming court date and processed grief relating to loss of his father, late girlfriend and her dog. Client reports that having accountability worked in the past to maintain 3 years of sobriety. He identified 2-3 friends and sister to reach out to for accountability and support when needed. Client completed Understand Motives for Substance Use worksheet and identified: To deal with difficult emotions, it has become a ritual/habit, to cope with past traumatic events, as his top 3 and to numb out, to get a thrill or  rush, to fit in with friends, to relax and feel more calm, to be social, and to cope with depression as reasons for use. Client used session to explore reasons and to prepare to come up with alternative ways to cope. Client added that depression and anxiety has also been a result of drinking. Client identified grief as issues that contributes to use and that needs to be resolved, stating that his grief for his father still feels fresh although it has been 44 years.     Intervention: CBT, grief processing, MI, ROYA inventory, reflection and clarification questions.     Assessment: Client endorsed external motivation, identifying legal pressure. Client was able to reframe thoughts around use, stating that he does not want to go through the difficulty he did when he lost his driving privilege and not wanting to \"ruin my health\" as additional motivators. Client showed an improvement in insight around intrinsic motivation for substance use. Client was on time for session and tearful in discussing grief. Client endorse some difficulty with concentration while driving.      Plan. Client will " maintain sobriety and follow goal to increase physical activity over the weekend (biking to the river). Client will use mindfulness to increase attention and concentration while driving.        Yamila Patterson, [facility], LADC     Attestation: Dr. Loretta HANNON - Provides oversight and supervision of care.

## 2024-04-17 NOTE — NURSING NOTE
Is the patient currently in the state of [place]? YES    Visit mode:TELEPHONE    If the visit is dropped, the patient can be reconnected by: TELEPHONE VISIT: Phone number: [phone]    Will anyone else be joining the visit? NO  (If patient encounters technical issues they should call [phone]    How would you like to obtain your AVS? MyChart    Are changes needed to the allergy or medication list? No    Are refills needed on medications prescribed by this physician? NO    Reason for visit: RECHECK    Selina CORTEZ

## 2024-04-17 NOTE — PROGRESS NOTES
"     [facility] Addiction Medicine    A/P                                                    ASSESSMENT/PLAN  1. Alcohol use disorder, severe, dependence (H)  -needs improvement  -no use since \"early April\" when started in IOP programming  -continue with acamprosate (refills available)  -Continue with naltrexone 100 mg daily) refill available  -recommending 12 step recovery groups  -continue IOP programming and follow all recommendations     Continued Complex Management  The longitudinal plan of care for Alcohol Use Disorder (AUD) was addressed during this visit. Due to the added complexity in care, I will continue to support Durga in the subsequent management and with ongoing continuity of care.      2. Generalized anxiety disorder  -no change.  Hydroxyzine  effective in managing anxiety symptoms.  YUSUF score 1, PHQ9 score 1  -continue with buspar, celexa, and hydroxizine as prescribed  -continue with gabapenting 300 mg TID to support anxiety and alcohol cessation  -plans on riding bike and improving exercise.        3. Tobacco use disorder, moderate, dependence  -no change  -not interested in cessation or NRT  -continue to ask assess and advise.       Apr 17, 2024  - campral, naltrexone, gabapentin  IOP programming       Last encounter A/P  1. Alcohol use disorder, severe, dependence (H)  3. Alcohol dependence with alcohol-induced mood disorder (H)  -needs improvement.  Decrease in cravings, continued alcohol intake averaging 3 drinks every few days.  Not at goal   - acamprosate (CAMPRAL) 333 MG EC tablet; Take 2 tablets (666 mg) by mouth 3 times daily  Dispense: 180 tablet; Refill: 1  - gabapentin (NEURONTIN) 300 MG capsule; Take 2 capsules (600 mg) by mouth 3 times daily Increase gabapentin dose 300 mg/day to a target dose of 600 mg TID  Dispense: 180 capsule; Refill: 1  -continue with 100mg naltrexone.   -complete ROYA assessment and follow recommendations  -recommend 12 step recovery groups.  Finding " "new groups  -2 month follow up     2. Generalized anxiety disorder  -needs improvement  -Continue with buspar, celexa, hydroxizine as prescribed  - gabapentin (NEURONTIN) 300 MG capsule; Take 2 capsules (600 mg) by mouth 3 times daily Increase gabapentin dose 300 mg/day to a target dose of 600 mg TID  Dispense: 180 capsule; Refill: 1  - increase physical activity  -recommend alcohol cessation  -recommend psychotherapy.  Pt currently declining      PDMP Review         Value Time User    State PDMP site checked  Yes 2/16/2024 10:53 AM Christina Posadas NP              RTC  Return in about 1 month (around 5/17/2024).      Counseled the patient on the importance of having a recovery program in addition to medication to manage recovery.  Components include avoiding isolating, having willingness to change, avoiding triggers and managing cravings. Encouraged having some type of sober network and practicing honesty with trusted support person(s). Encouraged other services such as counseling, 12 step or other self-help organizations.              SUBJECTIVE                                                    Durga Aguirre is a 64 year old male who presents to clinic today for follow up    Visit performed In Person, face-to-face      ROYA History:  Substance Use History:   \"Have you ever had any history with [...] use?\" And \"When was your last use?  ALCOHOL - 1/16  CANNABIS -   PRESCRIPTION STIMULANTS (includes Ritalin, Adderall, Vyvanse) -   COCAINE/CRACK -   METH/AMPHETAMINES (includes ecstacy, MDMA/abel) -   OPIATES -   BENZODIAZEPINES (includes Ativan, Klonopin, Xanax) -   KRATOM (mild opioid and stimulant effects) -   KETAMINE -   HALLUCINOGENS (includes DXM) -   BEHAVIORAL (Gambling, Eating d/o, Compulsivity) -   History of treatment -   NICOTINE  Cigarettes:   Chew/snus:   Vaping:   Past NRT/medication use: Naltrexone        Previous withdrawal treatment episodes (e.g. detox): 8/6/23  Previous ROYA treatment programs: " "[facility] Aug-Oct 2023,   Hospitalizations or overdose:   Medical complications from substance use:   IV Drug use?: denies  Previous Medication for Addiction Tx: naltrexone  Longest period of full abstinence: 3 years  Activities that have previously supported abstinence: treatment, meetings  Current Recovery Activities: none        Infectious disease screening  Hep C:      Recent HPI Details:  HPI Feb 16, 2024  - Started acamprosate \"It's working, reduced   Last drink a couple days ago.  3 drinks. Not at goal  Roommate moved in, she works at adult  in medical field.   Next court appointment on March 13. ROYA assessment scheduled 3/20, hoping to change court visit   Gabapentin 600 mg twice a day.  Sometimes will take additional dose for anxiety during the day.  Anxiety still a concern.  Did not schedule with psychotherapy \"I want to do one thing at a time\"          TODAY'S VISIT  HPI Apr 17, 2024  - Monday was anniversary of Mom's death. Has licence back. Since beginning of April 2 has not been drinking.  \"I figured I shouldn't drink when I'm in the program\" Enrolled in IOP.  Anxiety somewhat controlled, doesn't like hydroxyzine side effects.   Court on 25th. Hopeful that being in programming will be helpful to court case.       OBJECTIVE  PHYSICAL EXAM:  There were no vitals taken for this visit.    GENERAL: healthy, alert and no distress  EYES: Eyes grossly normal to inspection, PERRL and conjunctivae and sclerae normal  RESP: No respiratory distress  MENTAL STATUS EXAM  Speech: Normal  Mood/Affect: normal affect  Insight: Fair      PHQ-9 Score:       2/12/2024     5:12 PM 3/20/2024     2:00 PM 4/17/2024     3:23 PM   PHQ   PHQ-9 Total Score 1 0 1   Q9: Thoughts of better off dead/self-harm past 2 weeks Not at all Not at all Not at all       YUSUF-7 Score:      12/20/2023    12:00 PM 3/20/2024     2:00 PM 4/17/2024     3:24 PM   YUSUF-7 SCORE   Total Score   1 (minimal anxiety)   Total Score 7 2 1       LABS (may " not contain today's labs)                                                        Today's lab data  No results found for any visits on 04/17/24.        HISTORY                                                    Problem list reviewed & adjusted, as indicated.  Patient Active Problem List   Diagnosis    CARDIOVASCULAR SCREENING; LDL GOAL LESS THAN 130    Generalized anxiety disorder    Advanced directives, counseling/discussion    Essential hypertension    Hypertriglyceridemia    Dupuytren's contracture of hand    Alcohol withdrawal with complication with inpatient treatment, with unspecified complication (H)    Hypokalemia    Vasovagal syncope    History of colonic polyps    Elevated creatine kinase    Alcohol use disorder, severe, dependence (H)    Tobacco use disorder, moderate, dependence         MEDICATION LIST (prior to visit)  Current Outpatient Medications   Medication Sig Dispense Refill    acamprosate (CAMPRAL) 333 MG EC tablet Take 2 tablets (666 mg) by mouth 3 times daily 180 tablet 0    ARIPiprazole (ABILIFY) 5 MG tablet TAKE ONE TABLET (5 MG) BY MOUTH ONE TIME DAILY 90 tablet 1    busPIRone (BUSPAR) 15 MG tablet Take 1 tablet (15 mg) by mouth 2 times daily 180 tablet 1    citalopram (CELEXA) 20 MG tablet Take 1 tablet (20 mg) by mouth daily 90 tablet 1    diclofenac (VOLTAREN) 50 MG EC tablet Take 1 tablet (50 mg) by mouth 3 times daily as needed for moderate pain 30 tablet 0    gabapentin (NEURONTIN) 300 MG capsule Take 2 capsules (600 mg) by mouth 3 times daily Increase gabapentin dose 300 mg/day to a target dose of 600 mg  capsule 1    hydrochlorothiazide (HYDRODIURIL) 12.5 MG tablet Take 1 tablet (12.5 mg) by mouth daily 90 tablet 3    hydrOXYzine HCl (ATARAX) 50 MG tablet Take 0.5-1 tablets (25-50 mg) by mouth every 8 hours as needed for anxiety 180 tablet 3    lisinopril (ZESTRIL) 40 MG tablet Take 1 tablet (40 mg) by mouth daily (Hold for a systolic blood pressure of 100) 90 tablet 3     Melatonin 10 MG TABS tablet Take 10 mg by mouth nightly as needed for sleep      multivitamin w/minerals (THERA-VIT-M) tablet Take 1 tablet by mouth daily 90 tablet 0    naltrexone (DEPADE/REVIA) 50 MG tablet Take 2 tablets (100 mg) by mouth daily 180 tablet 1    simvastatin (ZOCOR) 40 MG tablet Take 1 tablet (40 mg) by mouth every evening 90 tablet 3    thiamine (B-1) 100 MG tablet TAKE 1 TABLET (100 MG) BY MOUTH DAILY 90 tablet 0    traZODone (DESYREL) 50 MG tablet TAKE ONE TABLET BY MOUTH AT BEDTIME AS NEEDED FOR SLEEP 90 tablet 0     No current facility-administered medications for this visit.       MEDICATION LIST (after visit)  Current Outpatient Medications   Medication Sig Dispense Refill    acamprosate (CAMPRAL) 333 MG EC tablet Take 2 tablets (666 mg) by mouth 3 times daily 180 tablet 0    ARIPiprazole (ABILIFY) 5 MG tablet TAKE ONE TABLET (5 MG) BY MOUTH ONE TIME DAILY 90 tablet 1    busPIRone (BUSPAR) 15 MG tablet Take 1 tablet (15 mg) by mouth 2 times daily 180 tablet 1    citalopram (CELEXA) 20 MG tablet Take 1 tablet (20 mg) by mouth daily 90 tablet 1    diclofenac (VOLTAREN) 50 MG EC tablet Take 1 tablet (50 mg) by mouth 3 times daily as needed for moderate pain 30 tablet 0    gabapentin (NEURONTIN) 300 MG capsule Take 2 capsules (600 mg) by mouth 3 times daily Increase gabapentin dose 300 mg/day to a target dose of 600 mg  capsule 1    hydrochlorothiazide (HYDRODIURIL) 12.5 MG tablet Take 1 tablet (12.5 mg) by mouth daily 90 tablet 3    hydrOXYzine HCl (ATARAX) 50 MG tablet Take 0.5-1 tablets (25-50 mg) by mouth every 8 hours as needed for anxiety 180 tablet 3    lisinopril (ZESTRIL) 40 MG tablet Take 1 tablet (40 mg) by mouth daily (Hold for a systolic blood pressure of 100) 90 tablet 3    Melatonin 10 MG TABS tablet Take 10 mg by mouth nightly as needed for sleep      multivitamin w/minerals (THERA-VIT-M) tablet Take 1 tablet by mouth daily 90 tablet 0    naltrexone (DEPADE/REVIA) 50 MG  tablet Take 2 tablets (100 mg) by mouth daily 180 tablet 1    simvastatin (ZOCOR) 40 MG tablet Take 1 tablet (40 mg) by mouth every evening 90 tablet 3    thiamine (B-1) 100 MG tablet TAKE 1 TABLET (100 MG) BY MOUTH DAILY 90 tablet 0    traZODone (DESYREL) 50 MG tablet TAKE ONE TABLET BY MOUTH AT BEDTIME AS NEEDED FOR SLEEP 90 tablet 0     No current facility-administered medications for this visit.         Answers submitted by the patient for this visit:  Patient Health Questionnaire (Submitted on 4/17/2024)  If you checked off any problems, how difficult have these problems made it for you to do your work, take care of things at home, or get along with other people?: Not difficult at all  PHQ9 TOTAL SCORE: 1  YUSUF-7 (Submitted on 4/17/2024)  YUSUF 7 TOTAL SCORE: 1        No Known Allergies        Christina Posadas NP  [facility] Medicine  [phone]  Virtual Visit Details    Type of service:  Telephone Visit   Phone call duration: 25 minutes       Originating Location (pt. Location): Home    Distant Location (provider location):  On-site

## 2024-04-17 NOTE — GROUP NOTE
Group Therapy Documentation    PATIENT'S NAME: Durga Aguirre  MRN:   [id number]  :   1960  ACCT. NUMBER: [id number]  DATE OF SERVICE: 24  START TIME:  5:00 PM  END TIME:  8:00 PM  FACILITATOR(S): Megan Conde [facility]  TOPIC: BEH Group Therapy  Attestation: Romi Burgos MD,  - Provides oversight and supervision of care.     Number of patients attending the group:  3  Group Length:  3 Hours    Group Therapy Type: Addiction    Summary of Group / Topics Discussed:    Psychoeducation/Skills Self-Care and Humor in Recovery (IOP)  Learn how powerful self-care is in healing from addiction and building resiliency in mental and physical health.  Learn many aspects of self-care linked to elevating moods, increasing energy reserve and staminal, thinking clearer, focusing better on tasks and improving organizational skills.     Objective(s):  Name 3 reasons self-care is crucial for optimal health and recovery from illness.    Identify 2 mental and physical illnesses directly linked to poor self-care.  Identify 3 ways social connections build resilience and may strengthen our immune systems.  Create a personal plan of action to add to daily schedule of structure and routine.     Structure (modalities, homework, worksheets, etc.):  Education on Self-Care with emphasis on Recovery (PowerPoint)  Why Self-care is a necessity, not a luxury (discussion/white board)  Worksheet to explore and prioritize needs  Action plan to start this week    Expected therapeutic outcome(s):   Be more proactive in their mental and physical health   Start their personal self-care plan this week and note results in journal  Take an inventory of choices and behaviors that hinder their health and have a negative effect on the quality of their lives.    Work to remove the obstacles that keep them from these aspects of self-care.     Therapeutic outcome(s) measured by:   Teachback, creation of personal action plan, and group  "review and evaluation    Group Attendance:  Attended group session    Patient's response to the group topic/interactions:  discussed personal experience with topic and expressed readiness to alter behaviors    Patient appeared to be Actively participating.        Client specific details:  Durga shared that he is nervous about his upcoming court date as this is his 5th DUI. He spoke about grief issues over his mother's death on 4/15/2023. He shared his history of treatments with the group and that he tries to keep himself \"in the moment.\" His stress is low, a 2 with 10 being highest. He has not had cravings this week, \"just some thoughts.\" He said he went to a bar yesterday with sober friend and had \"a great hamburger\" and that being at a bar didn't bother him. We discussed how the addiction will use associations of alcohol use to create triggers and he said he was aware of this but did not feel this would put him at risk. WORKSHEET: Durga chose exercise as the category he wants to work on this week and his step is to start bicycling again as \"I know how great it can make me feel\" and he will \"Try to go up the big hill at least once this week.\" This topic completes one of the six MH Skills Groups required under D3 of his Treatment Plan.       "

## 2024-04-18 ENCOUNTER — HOSPITAL ENCOUNTER (OUTPATIENT)
Dept: BEHAVIORAL HEALTH | Facility: CLINIC | Age: 64
Discharge: HOME | End: 2024-04-18
Attending: FAMILY MEDICINE
Payer: MEDICARE

## 2024-04-18 PROCEDURE — H2035 A/D TX PROGRAM, PER HOUR: HCPCS | Mod: HQ | Performed by: COUNSELOR

## 2024-04-19 DIAGNOSIS — F10.20 UNCOMPLICATED ALCOHOL DEPENDENCE (H): ICD-10-CM

## 2024-04-19 NOTE — GROUP NOTE
Group Therapy Documentation    PATIENT'S NAME: Durga Aguirre  MRN:   [id number]  :   1960  ACCT. NUMBER: [id number]  DATE OF SERVICE: 24  START TIME:  5:00 PM  END TIME:  8:00 PM  FACILITATOR(S): Yamila Patterson, STEPHANY, ELANA  TOPIC: BEH Group Therapy  Number of patients attending the group:  6  Group Length:  3 Hours    Group Therapy Type: Addiction, Psychoeducation, Psychotherapeutic, and Skills/Education    Summary of Group / Topics Discussed:    Cognitive Therapy Techniques, Co-occurring Illness, Coping Skills/Lifestyle Managemet, Distress Tolerance, Emotional Regulation, Grief & Loss, Interpersonal Effectiveness, Meditation/Breathing Exercises, Mindfulness, Reviewed Previous Skills Group Topic (Emotional Regulation), Self-Esteem/Self [place], Stress Management, Symptom Management, and Trauma Informed Care    Dimension Focus: D3, D5    Summary of Group / Topics Discussed: Gaining Perspective/Reframing     Co-occurring Illness, Coping Skills/Lifestyle Managemet, Mindfulness, and Psychoeducation/Skills, Emotional management.     Personal Growth and Development (MH)  This topic will address various ways to implement and sustain overall health and wellness by focusing on the various aspects of emotional regulation and gaining perspective.     Objective(s):  Client will learn strategies to reduce stress and increase overall health and wellness.  Client will gain insight on mental health functioning and identify a personal plan to build positive emotions, resilience.  Client will demonstrate understanding of skills to develop positive states, increase self-regulation, recovery and mental health functioning.   Clients will identify two ways to dispute negative filters using coping thoughts.     Structure (modalities, homework, worksheets, etc)   Facilitate group discussion on gaining perspective, and how our interpretation of our sensations and feelings impact our thoughts and actions.  Provide  psychoeducation on the need for a balanced lifestyle and ways to achieve this.  Practice mindfulness to increase awareness of bodily sensations, thoughts, emotions and surroundings through RAIN for self-compassion.   Use teach-back techniques to ensure client's understanding.  Client will be provided handouts and worksheets to enhance learning.     Expected therapeutic outcome(s):  Client will:  Client will sustain improved health and wellness.  Client will integrate spirituality, values, and stress management into their daily life.      Therapeutic outcome(s) measured by:   Client's ability to teach-back the techniques learned in group.   Client will create their own coping thoughts and practice building emotions to balance out destructive emotions.    Attestation: Dr. Loretta HANNON - Provides oversight and supervision of care.        Group Attendance:  Attended group session    Patient's response to the group topic/interactions:  cooperative with task, discussed personal experience with topic, expressed readiness to alter behaviors, expressed reluctance to alter behavior, expressed understanding of topic, and gave appropriate feedback to peers    Patient appeared to be Actively participating, Attentive, and Engaged.        Client specific details:  Durga actively participated and provided feedback. Durga completed his homework to identify coping thoughts before group. He completed the worksheet to build positive emotions and noted a struggle to come up with 10 activities he would like to participate in. Durga denies feeling at risk for a relapse and plans to go biking over the weekend.

## 2024-04-22 ENCOUNTER — HOSPITAL ENCOUNTER (OUTPATIENT)
Dept: BEHAVIORAL HEALTH | Facility: CLINIC | Age: 64
Discharge: HOME | End: 2024-04-22
Attending: FAMILY MEDICINE
Payer: MEDICARE

## 2024-04-22 PROCEDURE — H2035 A/D TX PROGRAM, PER HOUR: HCPCS | Mod: HQ | Performed by: COUNSELOR

## 2024-04-23 ENCOUNTER — HOSPITAL ENCOUNTER (OUTPATIENT)
Dept: BEHAVIORAL HEALTH | Facility: CLINIC | Age: 64
Discharge: HOME | End: 2024-04-23
Attending: FAMILY MEDICINE
Payer: MEDICARE

## 2024-04-23 PROCEDURE — H2035 A/D TX PROGRAM, PER HOUR: HCPCS | Mod: HQ | Performed by: SOCIAL WORKER

## 2024-04-23 NOTE — ADDENDUM NOTE
Encounter addended by: Yamila Patterson LPCC, LADC on: 4/23/2024 12:19 PM   Actions taken: Charge Capture section accepted

## 2024-04-23 NOTE — GROUP NOTE
Group Therapy Documentation    PATIENT'S NAME: Durga Aguirre  MRN:   [id number]  :   1960  ACCT. NUMBER: [id number]  DATE OF SERVICE: 24  START TIME:  5:00 PM  END TIME:  8:00 PM  FACILITATOR(S): Megan Conde [facility]  TOPIC: BEH Group Therapy  Attestation: Romi Burgos MD,  - Provides oversight and supervision of care.      Number of patients attending the group:  3  Group Length:  3 Hours    Group Therapy Type: Addiction    Summary of Group / Topics Discussed:    Psychoeducation/Skills Stress Management and Addiction    Objective(s):  Identify 2 ways stress is connected to relapse in addiction  Identify 3 ways stress affects physical and mental health  Identify 3 ways to manage stress  Identify 1 healthy daily routine to commit to starting this week.     Structure (modalities, homework, worksheets, etc.):  Stress Management Handout   Definition of Stress  Importance of self-awareness to warning signs of stress  Stress reduction in recovery  Skills used to reduce stress and counteract its impact  Demonstration and practice of deep breathing exercise   Demonstration and practice of Mindfulness   Demonstration and practice of Progressive Relaxation exercise   Demonstration of Aromatherapy inhalers    Expected therapeutic outcome(s):   Decreased frequency and intensity of PAWS  Increased stamina and resiliency  Improved ability to function at optimal level     Therapeutic outcome(s) measured by:   Teachback and group review and evaluation    Group Attendance:  Attended group session    Patient's response to the group topic/interactions:  expressed readiness to alter behaviors    Patient appeared to be Attentive.        Client specific details:  Durga shared that he is doing well and got exercise by helping a friend move over the weekend. He also is doing work on his patio that is helping him stay busy. He has a court date this Thursday and said he feels good because he has a good  ". He continues to practice deep breathing and is working on staying present. His stress is 4-5 due to court case and he reported no cravings, \"just thoughts.\" At times it appeared hard for Durga to grasp the context of some of the discussion questions but he made the effort and was able to understand content in a more general way. REVIEW: The most important part of this topic for Durga was \"described me to me!\" And he wants to learn more \"to better motivate me, using a positive mind\" and he was relieved that \"I'm not the only one to procrastinate!\" This topic completes one of the six MH Skills Groups required under D3 of his Treatment Plan.     "

## 2024-04-23 NOTE — GROUP NOTE
Group Therapy Documentation    PATIENT'S NAME: Durga Aguirre  MRN:   [id number]  :   1960  ACCT. NUMBER: [id number]  DATE OF SERVICE: 24  START TIME:  5:00 PM  END TIME:  8:00 PM  FACILITATOR(S): Yamila Patterson, STEPHANY, ELANA  TOPIC: BEH Group Therapy  Number of patients attending the group:  3  Group Length:  3 Hours    Group Therapy Type: Addiction, Psychoeducation, Psychotherapeutic, and Skills/Education    Summary of Group / Topics Discussed:    Cognitive Therapy Techniques, Co-occurring Illness, Coping Skills/Lifestyle Managemet, Emotional Regulation, Interpersonal Effectiveness, and Thinking Errors/Negative Self-Talk    Cognitive restructuring  Distortions: Client's received an overview of how to identify common cognitive distortions. Patients will explore alternatives to cognitive distortions and practice challenging their negative thought patterns. The goal is to help patients target modify ineffective thought patterns.     Client Session Goals / Objectives:  Familiarized self with ineffective / unhealthy thoughts and how they develop.    Explored impact of ineffective thoughts / distortions on mood and activity  Formulated new neutral/positive alternatives to challenge less helpful ineffective thoughts.  Practiced and plan to apply in daily life    Cognitive restructuring  Client was provided handout on common cognitive distortions including: filtering, polarized thinking, overgeneralization, jumping to conclusions, catastrophizing, personalization, control fallacies, fallacy of fairness, blaming, should, emotional reasoning, fallacy of change, global labeling, always being right, heavens reward fallacy. Client participated in discussion about how cognitive distortions are ways our mind convinces us of something that isn't really true. Cognitive distortions usually reinforce negative thinking and emotions by telling ourselves thing that sound rational and accurate but only serve to keep us  feeling bad about ourselves. By learning to correctly identify this kind of thinking, a person can then answer the negative thinking and refute, and overtime will slowly diminish irrational thought and replace with balanced thinking. Client completed worksheet identifying cognitive distortions most commonly experienced by  with specific examples and ways to start refuting this thinking pattern.     Group Objectives:  Client will understand cognitive distortions and be able to identify cognitive distortions they most use     Client will gain insight regarding the impact cognitive distortions have on one's ability to accurately perceive their world and function     Client will learn ways to adjust these cognitive distortions and with practice can change these negative perceptions over time     Client will follow guided worksheet to practice exploring these cognitions further to improve self-awareness    Dimension Focus: D3, D4, D5    Attestation: Dr. Loretta HANNON - Provides oversight and supervision of care.        Group Attendance:  Attended group session    Patient's response to the group topic/interactions:  cooperative with task, discussed personal experience with topic, expressed readiness to alter behaviors, expressed understanding of topic, gave appropriate feedback to peers, and listened actively    Patient appeared to be Actively participating, Attentive, and Engaged.        Client specific details:  Durga reports no change in his sobriety date and positive impact from spending time with his friend over the weekend. He processed feelings of anxiety from upcoming court date. Durga provided good examples of self-talk he endorse to cope with anxiety. Durga participated well with learning about cognitive distortions and identified ones that have played a role in causing distress in the past. Durga reports high motivation for treatment and made statements that demonstrate more thoughts around the benefits of  sobriety.

## 2024-04-24 ENCOUNTER — HOSPITAL ENCOUNTER (OUTPATIENT)
Dept: BEHAVIORAL HEALTH | Facility: CLINIC | Age: 64
Discharge: HOME | End: 2024-04-24
Attending: FAMILY MEDICINE
Payer: MEDICARE

## 2024-04-24 ENCOUNTER — TELEPHONE (OUTPATIENT)
Dept: BEHAVIORAL HEALTH | Facility: CLINIC | Age: 64
End: 2024-04-24
Payer: MEDICARE

## 2024-04-24 PROCEDURE — H2035 A/D TX PROGRAM, PER HOUR: HCPCS | Performed by: COUNSELOR

## 2024-04-24 NOTE — PROGRESS NOTES
"Individual Session Summary   START TIME: 1 PM   END TIME: 2 PM   Duration: 1 Hour    Data:  Met with client on this date for an individual session. The session focused on client's treatment plan goal for DIM(s) 3, 5, 6 of his individual treatment plan. Durga processed anxiety relating to having court tomorrow. He reflected on incidents that got him his charges and made connection with impact of substance use/drinking and driving. Durga reports motivation to \"do it right this time.\" He shared stories from the MADD panel and reports that he will never drink and drive again. He reports that having a roommate helped him in the past to maintain 3 years of sobriety. He reports that he has someone who is interested and explained how he plans to approach it financially. With some guidance, Durga was able to identify some benefits/advantage of living alone, stating that he has never thought about that. He reports that he has low stress and more freedom in his home now and will try to weigh that in about his roommate situation. He explored ways to increase sober socialization outside of the home. Durga requested a letter about his program involvement.     Intervention: CBT, MI, Psychoeducation, anxiety management     Assessment: Durga was alert and engaged well. He had some minimization around the incidents that led to DUI charges calling the charges and arrest \"silly.\" He was able to admit that he would have made better choices if he were sober.  He appear to have some struggle around processing information relating to psychoeducation     Plan. Client will utilize mindfulness to cope with anxiety as it arises. Client will maintain sobriety and participate in programming consistently.          Yamila Patterson, [facility], [facility]     Attestation: Dr. Loretta HANNON - Provides oversight and supervision of care.    "

## 2024-04-24 NOTE — TELEPHONE ENCOUNTER
----- Message from Yamila Patterson, STEPHANY, ELANA sent at 2024  5:14 PM CDT -----  Regarding: Please schedule 1:1  Hello,    Location of programmin [address] [place] [address] [address], [place] [address]  Date and time: 24 at 1pm  Group: [id number] (Phase 1)  Provider: Yamila Patterson ([id number])  Number of visits to be scheduled: 1 session  Length/Duration of Appointment in minutes: 1 hr  Visit Type: In person    Thanks,  Yamila Patterson, ANA, TIESHAW, ELANA, STEPHANY  MI/CD Psychotherapist  [facility]   [address], [place] [address]  Phone: [phone] Fax:  [phone]

## 2024-04-25 ENCOUNTER — HOSPITAL ENCOUNTER (OUTPATIENT)
Dept: BEHAVIORAL HEALTH | Facility: CLINIC | Age: 64
Discharge: HOME | End: 2024-04-25
Attending: FAMILY MEDICINE
Payer: MEDICARE

## 2024-04-25 PROCEDURE — H2035 A/D TX PROGRAM, PER HOUR: HCPCS | Mod: HQ | Performed by: COUNSELOR

## 2024-04-26 RX ORDER — MULTIPLE VITAMINS W/ MINERALS TAB 9MG-400MCG
1 TAB ORAL DAILY
Qty: 90 TABLET | Refills: 0 | OUTPATIENT
Start: 2024-04-26

## 2024-04-26 NOTE — GROUP NOTE
Group Therapy Documentation    PATIENT'S NAME: Durga Aguirre  MRN:   [id number]  :   1960  ACCT. NUMBER: [id number]  DATE OF SERVICE: 24  START TIME:  5:00 PM  END TIME:  8:00 PM  FACILITATOR(S): Yamila Patterson, STEPHANY, ELANA  TOPIC: BEH Group Therapy  Number of patients attending the group:  6  Group Length:  3 Hours    Group Therapy Type: Addiction, Psychotherapeutic, and Skills/Education    Summary of Group / Topics Discussed:    Balanced Lifestyle , Cognitive Therapy Techniques, Co-occurring Illness, Emotional Regulation, Interpersonal Effectiveness, Meditation/Breathing Exercises, Mindfulness, Proper Nutrition & Exercise, Relaxation Techniques, Self-Care Activities, Self-Esteem/Self [place], Symptom Management, and Thinking Errors/Negative Self-Talk    Group Therapy Type: Addiction, Psychoeducation, Psychotherapeutic, and Skills/Education     Summary of Group / Topics Discussed:     Cognitive Therapy Techniques, Co-occurring Illness, Coping Skills/Lifestyle Managemet, Distress Tolerance, Emotional Regulation, Grief & Loss, Interpersonal Effectiveness, Meditation/Breathing Exercises, Mindfulness, Reviewed Previous Skills Group Topic (Emotional Regulation), Self-Esteem/Self [place], Stress Management, Symptom Management, and Trauma Informed Care.    Dimension Focus: D3, D5     Summary of Group / Topics Discussed: Gaining Perspective/Cognitive Distortions/Reframing     Co-occurring Illness, Coping Skills/Lifestyle Managemet, Mindfulness, and Psychoeducation/Skills, Emotional management.     Personal Growth and Development (MH)  This topic will address various ways to implement and sustain overall health and wellness by focusing on the various aspects of emotional regulation and gaining perspective.     Objective(s):  Client will learn strategies to improve awareness if cognitive distortions used and increase ability to challenge and change negative mental filters.   Client will gain insight on mental  "health functioning and identify a personal plan to build positive emotions, resilience.  Client will demonstrate understanding of skills to develop positive states, increase self-regulation, recovery and mental health functioning.   Clients will identify two ways to dispute negative filters using the \"Challenge Your Negative Thinking Tool.\"    Structure (modalities, homework, worksheets, etc)   Facilitate group discussion on gaining perspective, and how our interpretation of our sensations and feelings impact our thoughts and actions.  Provide psychoeducation on the need for a balanced lifestyle and ways to achieve this.  Practice mindfulness to increase awareness of bodily sensations, thoughts, emotions and surroundings through RAIN for self-compassion.   Use teach-back techniques to ensure client's understanding.  Client will be provided handouts and worksheets to enhance learning.     Expected therapeutic outcome(s):  Client will:  Client will sustain improved health and wellness.  Client will integrate spirituality, values, and stress management into their daily life.      Therapeutic outcome(s) measured by:   Client's ability to teach-back the techniques learned in group.   Client will create their own coping thoughts and practice building emotions to balance out destructive emotions.     Attestation: Dr. Loretta HANNON - Provides oversight and supervision of care.         Group Attendance:  Attended group session    Patient's response to the group topic/interactions:  cooperative with task, discussed personal experience with topic, expressed readiness to alter behaviors, expressed understanding of topic, gave appropriate feedback to peers, and listened actively    Patient appeared to be Actively participating, Attentive, and Engaged.        Client specific details:  Durga arrived 10 mins late to group due to court ending late. He reports that he will have to participate in DWI court for the next year and appeared " stressed by it. He used the worksheet to challenge negative thoughts relating to anxiety around court and obtaining evidence contrary to his irrational thoughts. Durga was able to verbalize statements of acceptance of his court ruling and reports that his stress levels were low. Durga made a plan to increase nutrition over the weekend by adding greens to his meat and making smoothies to improve energy.

## 2024-04-26 NOTE — TELEPHONE ENCOUNTER
MR, 3rd request for update from pharm    Previous requests sent to in patient behavioral health MD, please approve or deny if appropriate    Sulma Santos CMA

## 2024-04-29 ENCOUNTER — HOSPITAL ENCOUNTER (OUTPATIENT)
Dept: BEHAVIORAL HEALTH | Facility: CLINIC | Age: 64
Discharge: HOME | End: 2024-04-29
Attending: FAMILY MEDICINE
Payer: MEDICARE

## 2024-04-29 PROCEDURE — H2035 A/D TX PROGRAM, PER HOUR: HCPCS | Mod: HQ | Performed by: COUNSELOR

## 2024-04-30 ENCOUNTER — HOSPITAL ENCOUNTER (OUTPATIENT)
Dept: BEHAVIORAL HEALTH | Facility: CLINIC | Age: 64
Discharge: HOME | End: 2024-04-30
Attending: FAMILY MEDICINE
Payer: MEDICARE

## 2024-04-30 PROCEDURE — H2035 A/D TX PROGRAM, PER HOUR: HCPCS | Mod: HQ | Performed by: SOCIAL WORKER

## 2024-04-30 NOTE — GROUP NOTE
Group Therapy Documentation    PATIENT'S NAME: Durga Aguirre  MRN:   [id number]  :   1960  ACCT. NUMBER: [id number]  DATE OF SERVICE: 24  START TIME:  5:00 PM  END TIME:  8:00 PM  FACILITATOR(S): Yamila Patterson LPCC, ELANA  TOPIC: BEH Group Therapy  Number of patients attending the group:  4  Group Length:  3 Hours    Group Therapy Type: Addiction, Psychotherapeutic, and Skills/Education    Summary of Group / Topics Discussed:    Anger/Conflict Management , Cognitive Therapy Techniques, Co-occurring Illness, Coping Skills/Lifestyle Managemet, Emotional Regulation, Interpersonal Effectiveness, Meditation/Breathing Exercises, Self-Esteem/Self [place], Thinking Errors/Negative Self-Talk, and Trauma Informed Care    Cognitive Restructuring Negative Self-Talk and Identifying The Emotional Cues For Cravings in Relapse: Facilitated a group discussion around cognitive restructuring focusing on self-talk primarily on negative self-talk and emotional cues that have led to substance use in the past and that creates urges and cravings presently. Provided psychoeducation on emotional management, negative self-talk and connections to one's self-talk feeds into one's addiction, MH and decisions. Clients explored how their self-talk has impacted their decision making and how their use has further impacted their thoughts and sensations. Provided a discussion on one's self-talk has lead to them justify continued use or can lead to a relapse. Provided handouts and an educational video on the power of emotional courage.     Objective(s):    Clients will increase emotional intelligence, become aware of self-talk and images they use to justify substance use.  Client will identify at least 3 emotional cues with examples of past use and current cravings, and make a list of ways to handle difficult emotions.   Clients will increase their ability to recognize when they are using self-talk to convince them its ok to use, which  will help reduce risk of relapse.    Client will utilize coping mantras and mindfulness to complete emotional cue relapse prevention worksheet.     Expected therapeutic outcome(s):    Patient will:   To gain self and emotional-awareness to help pave a way for personal growth and empower client to increase emotional management and avoid relapses.  Increase commitment to change and confidence in managing difficult and uncomfortable sensations.     Be able to analyze behavior and utilize coping strategies to help reinforce change.      Dimension Focus: D3, D4, D5    Attestation: Dr. Loretta HANNON - Provides oversight and supervision of care.        Group Attendance:  Attended group session    Patient's response to the group topic/interactions:  cooperative with task, discussed personal experience with topic, expressed understanding of topic, listened actively, and verbalizations were off topic    Patient appeared to be Actively participating, Attentive, and Engaged.        Client specific details:  Durga reports ongoing sobriety and processed anxiety around going to court tomorrow to find out about his DUI program. Durga reports that he has been managing his emotions well and that he is able to talk himself out of difficult emotions. Durga identified external motivators for sobriety. Durga reports feeling in control of his current experiences and denies feeling at risk for a recurrent use.

## 2024-05-01 ENCOUNTER — TELEPHONE (OUTPATIENT)
Dept: BEHAVIORAL HEALTH | Facility: CLINIC | Age: 64
End: 2024-05-01
Payer: MEDICARE

## 2024-05-01 NOTE — TELEPHONE ENCOUNTER
----- Message from STEPHANY Soto, ELANA sent at 2024  7:23 PM CDT -----  Regarding: Please schedule for group  Location of programmin [address] [place] [address] [address], [place] [address]  Date: 24  Group: [id number] on ,  and  at 5pm-8pm  Provider: AMMON Soto, ELANA, STEPHANY   Number of visits to be scheduled: 10 sessions  Length/Duration of Appointment in minutes: 3 hrs  Visit Type: In person    Yamila Reno

## 2024-05-01 NOTE — TELEPHONE ENCOUNTER
----- Message from Yamila Patterson, STEPHANY, ELANA sent at 2024  7:47 PM CDT -----  Regarding: Please schedule 1:1  Hello,    Location of programmin [address], [place] [address]  Date and time: 24 at 3:30pm  Group: [id number] (Phase 1)  Provider: Yamila Patterson ([id number])  Number of visits to be scheduled: 1 session  Length/Duration of Appointment in minutes: 1 hr  Visit Type: In person    Thanks,  Yamila Patterson, ANA, AMMON, ELANA, STEPHANY  MI/CD Psychotherapist  [facility]   [facility] [address], [place] [address]  Phone: [phone] Fax:  [phone]

## 2024-05-01 NOTE — PROGRESS NOTES
Group Therapy Documentation    PATIENT'S NAME:    Durga Aguirre   MRN:                          [id number]   :                          1960  ACCT. NUMBER:      [id number]  DATE OF SERVICE:  24  START TIME:  5:00 PM  END TIME:  8:00 PM  FACILITATOR(S): Megan Conde [facility]  TOPIC: BEH Group Therapy    Attestation: Romi Burgos MD,  - Provides oversight and supervision of care.     Number of patients attending the group:  3  Group Length:  3 Hours     Group Therapy Type: Addiction     Summary of Group / Topics Discussed:     Psychoeducation/Skills How Neurobiology Affects Behavior (IOP)  This topic will give a general overview of the neurobiology of addiction with the purpose of teaching new brain-based coping strategies to reduce the impact of their cravings, urges and distorted memories.  The clients will learn how the midbrain uses memory and associations to create cravings and how to counteract these.      Objective(s):  Clients will identify 3 ways to calm their overactive midbrain and strengthen their frontal cortex to lessen the impact of cravings and urges for addictive substances.    Clients will identify the underlying mechanisms at work to help them detach from the thoughts and emotions that trigger using.   Describe the reward pathway involved in addiction  Identify 1 skill  to counteract cravings and urges to use substances    Structure (modalities, homework, worksheets, etc.):  History of the study of addiction as a disease (PowerPoint)  Psychoeducation on the areas of the midbrain involved in addiction and how the various parts of the brain communicate with each other (PowerPoint)  Psychoeducation and discussion on how the escalation of addiction manifests in personal behaviors leading to negative consequences   Hands on practice of evidenced based strategies to calm the midbrain   Hands on practice of evidenced based strategies to strengthen the frontal  "cortex  Psychoeducation of anatomy of cravings, urges and addictive thinking and how to counteract this pattern   Show video clip of cross-addictions & process addictions       Expected therapeutic outcome(s):   A reduction in shame and guilt due to understanding how addiction influences behavior.  Reduction of use episodes due to using skills to reduce cravings.     Therapeutic outcome(s) measured by:   Teachback and group review and evaluation form     Group Attendance:  Attended group session     Patient's response to the group topic/interactions:  discussed personal experience with topic, expressed readiness to alter behaviors, and expressed understanding of topic     Patient appeared to be Attentive and Engaged.        Client specific details: Durga was in a quiet mood at the beginning of the session. He went to Court today and was assigned to the [facility]. He will have a phone interview on 5/24/24 which will determine if they accept him in this program. He understands that this will have certain restrictions for him. Over the weekend he helped move a friend's furniture and also \"seeding a lawn\" and \"watched lots of TV.\" He is not attending Sober Support meetings and said he doesn't like AA. We spoke of other support groups available. 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 He is using deep breathing \"to stay calm\" and his stress is at 5 due to \"starting community UAs and having lots of appointments to go to.\" He said he worries about things he'll have to do in the future to satisfy the DWI Court. Asked about his anxiety he said, \"No other concerns.\" He said that when he has thoughts of using again,\"I think about my past and I never want to go through that again and that's my motivation.\" He also said he is close to his sister and she supports him. REVIEW: Durga's favorite part of the topic today was the EDYTA Talk by Michael Hopson and he wrote that he liked, " "\"This inner light that he spoke of\" and \"he was a pleasure to listen to.\" This topic completes one of the six MH Skills Groups required under D3 of his Treatment Plan.     "

## 2024-05-06 ENCOUNTER — HOSPITAL ENCOUNTER (OUTPATIENT)
Dept: BEHAVIORAL HEALTH | Facility: CLINIC | Age: 64
Discharge: HOME | End: 2024-05-06
Attending: FAMILY MEDICINE
Payer: MEDICARE

## 2024-05-06 PROCEDURE — H2035 A/D TX PROGRAM, PER HOUR: HCPCS | Mod: HQ | Performed by: COUNSELOR

## 2024-05-07 ENCOUNTER — HOSPITAL ENCOUNTER (OUTPATIENT)
Dept: BEHAVIORAL HEALTH | Facility: CLINIC | Age: 64
Discharge: HOME | End: 2024-05-07
Attending: FAMILY MEDICINE
Payer: MEDICARE

## 2024-05-07 PROCEDURE — H2035 A/D TX PROGRAM, PER HOUR: HCPCS | Mod: HQ | Performed by: SOCIAL WORKER

## 2024-05-07 NOTE — GROUP NOTE
Group Therapy Documentation    PATIENT'S NAME: Durga Aguirre  MRN:   [id number]  :   1960  ACCT. NUMBER: [id number]  DATE OF SERVICE: 24  START TIME:  5:00 PM  END TIME:  8:00 PM  FACILITATOR(S): Yamila Patterson, STEPHANY, ELANA  TOPIC: BEH Group Therapy  Number of Clients attending the group:  3  Group Length:  3 Hours    Group Therapy Type: Addiction, Psychoeducation, Psychotherapeutic, and Skills/Education    Summary of Group / Topics Discussed:    Cognitive Therapy Techniques, Co-occurring Illness, Coping Skills/Lifestyle Managemet, Choices in Recovery, Emotional Regulation, Interpersonal Effectiveness, Meditation/Breathing Exercises, Proper Nutrition & Exercise, Relaxation Techniques, Self-Care Activities, Self-Esteem/Self Clarion, Stress Management, Symptom Management, Thinking Errors/Negative Self-Talk, and Trauma Informed Care    CBT: Stinking Thinking: Facilitated a group discussion focusing on maladaptive patterns of thinking and techniques to change thinking patterns. Connecting changes in thinking pattern to support one's recovery. Provided the work sheet  stinking thinking  that has client's identify risking thinking patterns and how to replace those thoughts with health thoughts and coping skills.     Group Objective(s):    Clients will gain insight into how our emotions and thought processes can impact our recovery.    Client will gain understanding of substance use disorder symptoms and warning signs   Clients will be able to identify common risky thinking habits/challenge cognitive distortions    Clients will be able to identify strategies to help cope with and reduce the use of risky thinking patterns that feeds the stages of relapse.       Stages of Relapse: Provided a psychoeducation group on the stages of relapse by providing a handout that goes over the stages of relapses. Engaged in a discussion regarding early relapse prevention plan that goes over emotional relapse, mental relapse,  physical relapse. Providing an understanding that relapse as an essential and non-linear process. To help clients be aware of the stages of relapse so they are better able to cope with early warning signs. Clients to learn the importance of self-care and other healthy strategies to help reduce the risk of relapse.     Group Objectives/Goals  Client will identify all three stages of relapse    Client will identify at least one example of early warning signs relating to each of the three stages.    Client will identify healthy strategies they can use to cope with early warning signs.     Expected therapeutic outcome(s):    Client will:   Recognize  Stinking thinking  thoughts and replace them with more helpful coping thoughts.    Promote an increase in positive feelings and thoughts.   Change unhealthy thinking patterns that put us at increased risk of relapse.   Clients will complete weekly self-care tracker to increase focus and build self-care habits outside of programming.    Attestation: Dr. Loretta HANNON - Provides oversight and supervision of care.        Group Attendance:  Attended group session    Patient's response to the group topic/interactions:  cooperative with task, discussed personal experience with topic, expressed understanding of topic, gave appropriate feedback to peers, and listened actively    Patient appeared to be Actively participating, Attentive, and Engaged.        Client specific details:  Willie reports no change in his sobriety date. He participated well with the group today and discussed his understanding of the cycle of relapse. Durga showed improvement over the weekend and engaged in at least one activity that decreased isolation. Willie made plans to focus on improving physical health with daily exercises and tracking self-care.

## 2024-05-08 ENCOUNTER — HOSPITAL ENCOUNTER (OUTPATIENT)
Dept: BEHAVIORAL HEALTH | Facility: CLINIC | Age: 64
Discharge: HOME | End: 2024-05-08
Attending: FAMILY MEDICINE
Payer: MEDICARE

## 2024-05-08 ENCOUNTER — TELEPHONE (OUTPATIENT)
Dept: BEHAVIORAL HEALTH | Facility: CLINIC | Age: 64
End: 2024-05-08
Payer: MEDICARE

## 2024-05-08 PROCEDURE — H2035 A/D TX PROGRAM, PER HOUR: HCPCS | Performed by: COUNSELOR

## 2024-05-08 NOTE — TELEPHONE ENCOUNTER
----- Message from Yamila Patterson, STEPHANY, ELANA sent at 2024  6:08 PM CDT -----  Regarding: Please schedule 1:1  Hello,    Location of programmin [address], [place] [address]  Date and time: 24 at 2:30pm  Group: [id number] (Phase 1)  Provider: Yamila Patterson ([id number])  Number of visits to be scheduled: 1 session  Length/Duration of Appointment in minutes: 1 hr  Visit Type: In person    Thanks,  Yamila Patterson, ANA, AMMON, ELANA, STEPHANY  MI/CD Psychotherapist  [facility]   [facility] [address], [place] [address]  Phone: [phone] Fax:  [phone]

## 2024-05-08 NOTE — GROUP NOTE
Group Therapy Documentation    PATIENT'S NAME: Durga Aguirre  MRN:   [id number]  :   1960  ACCT. NUMBER: [id number]  DATE OF SERVICE: 24  START TIME:  5:00 PM  END TIME:  8:00 PM  FACILITATOR(S): Megan Conde [facility]  TOPIC: BEH Group Therapy  Attestation: Romi Burgos MD,  - Provides oversight and supervision of care.     Number of patients attending the group:  4  Group Length:  3 Hours    Group Therapy Type: Addiction    Summary of Group / Topics Discussed:    Psychoeducation/Skills Mindfulness in Action (IOP)  Learn what mindfulness is and how to practice it. Learn how to increase awareness of the present moment, thereby reducing ruminating thoughts and learn how to embrace negative emotions instead of suppressing or avoiding them. Consistent practice has been shown to decrease reactivity and help facilitate effective action to make positive changes in behavior.     Objective(s):  Identify 2 skills to increase awareness of the present moment to reduce negative impact of ruminating thoughts  Describe how to embrace negative emotions instead of suppressing or avoiding them.  Give 1 example of how mindfulness works in the brain to calm the Survival part of the Midbrain (which is overactive in addiction)  Give1 example of how mindfulness practice may decrease reactivity and facilitate effective action    Structure (modalities, homework, worksheets, etc.):  Complete  before and after  worksheet for mindfulness practice ([place] Exercise)  Participate in Awareness of Sounds meditation  Participate in Mindful Eating meditation  Complete Mindful Listening exercise with other group member(s) (Dyads or Triads)  Participate in Sitting Meditation     Expected therapeutic outcome(s):   Increased ability to remain in present moment  Increased ability to tolerate negative emotions without returning to addiction    Therapeutic outcome(s) measured by:   Teachback and group review and evaluation  "form.    Group Attendance:  Attended group session    Patient's response to the group topic/interactions:  expressed readiness to alter behaviors    Patient appeared to be Attentive and Engaged.        Client specific details:  Durga shared that he had a nice weekend and went out to dinner with \"distant cousins\" and he enjoyed this. He got exercise and fresh air from mowing his lawn and he is using deep breathing to calm himself. His stress is at 2 and his cravings have been very low. 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 It was good.\" His [place] exercise revolved around Financial stressors and page one reflected security issues and was more fear based while page two showed more proactive steps to live within his means. This topic completes one of the six MH Skills Groups required under D3 of his Treatment Plan.     .    "

## 2024-05-08 NOTE — PROGRESS NOTES
"Individual Session Summary   START TIME: 2:30 PM   END TIME: 3:05 PM   Duration: 31 Minutes    Data:  Met with client on this date for an individual session. The session focused on client's treatment plan goal for DIM(s) 2, 3, 4, 5 and 6 of his individual treatment plan. Durga reports that he is feeling well and rated anxiety and depression at a 2/10. He reports that's where he prefers it. Therapist assessed motivation for treatment and Durga reported that programming help because of the \"social piece\" and the \"camaraderie\" with his group members, noting that he likes his peers. He added \"I'll be honest, I just think this will look good for court.\" Discussed group materials and demonstrating knowledge to progress to Phase 2. Durga said that he \"day dreams\" in most groups and that most of the time it's like Ground Hog Day. He admits that some of the materials are tough. He was able to identify multiple choice worksheets and movie clips, Nadeem Talks as helpful. He processed how he uses watching tv as mindfulness and made connection to how his brain is trained to focus on screens. He reports that he hadn't thought of that before. Discussed mindfulness vs mindlessness and distinction between \"physical mindfulness and mental mindfulness.\" He shared about how mindfulness in last night's group helped him enjoyed his grape during the mindful eating activity then processed how breath work helps with grounding in the present and activate self-calm. Durga explored how he can use treatment to process difficult emotions and therapist shared observations of sadness and tears. He reports that he watched a movie from childhood this morning and cried due to the nostalgia and that most of the time when he feels sad, his approach is ignore it to watch movies. Therapist attempted to help Durga make connections between his emotional experiences and addiction. Durga reports that he's \"fine\" and that \"medication is working.\" He endorsed " benefits in using acamprosate calcium to manage alcohol cravings. Durga denies feeling at risk for a relapse and endorsed worries about UA's and accountability that will be required by the court.     Intervention: Psychoeducation, CBT, Mindfulness, MI     Assessment: Durga does not appear to endorse intrinsic motivation for treatment. Durga endorse loneliness and boredom as triggers for relapse and appear to benefit in the structure program provides. Durga endorsed some motivation to complete his self-care checklist and go biking this weekend. Durga appear to have more difficulty with the mind/body connection. Durga endorse anxiety around accountability provided by the [facility].      Plan. Client will use the self-care tracker to bring attention to daily self-care habits. Client will complete emotional cues for relapse prevention homework by 5/17. Client will remain sober from all substances and actively participate in groups.       Yamila Patterson, [facility], LADC     Attestation: Dr. Loretta HANNON - Provides oversight and supervision of care.

## 2024-05-09 ENCOUNTER — HOSPITAL ENCOUNTER (OUTPATIENT)
Dept: BEHAVIORAL HEALTH | Facility: CLINIC | Age: 64
Discharge: HOME | End: 2024-05-09
Attending: FAMILY MEDICINE
Payer: MEDICARE

## 2024-05-09 ENCOUNTER — TELEPHONE (OUTPATIENT)
Dept: BEHAVIORAL HEALTH | Facility: CLINIC | Age: 64
End: 2024-05-09
Payer: MEDICARE

## 2024-05-09 PROCEDURE — H2035 A/D TX PROGRAM, PER HOUR: HCPCS | Mod: HQ

## 2024-05-09 NOTE — TELEPHONE ENCOUNTER
----- Message from Yamila Patterson, STEPHANY, ELANA sent at 2024  5:41 PM CDT -----  Regarding: Please schedule 1:1  Hello,    Location of programmin [address] [place] [address] [address], [place] [address]  Date and time:  at 2:30pm  Group: [id number] (Phase 1)  Provider: Yamila Patterson ([id number])  Number of visits to be scheduled: 1 session  Length/Duration of Appointment in minutes: 1 hr  Visit Type: In person    Thanks,  Yamila Patterson, ANA, TIESHAW, ELANA, STEPHANY  MI/CD Psychotherapist  [facility]   [address], [place] [address]  Phone: [phone] Fax:  [phone]

## 2024-05-10 NOTE — GROUP NOTE
Group Therapy Documentation    PATIENT'S NAME: Durga Aguirre  MRN:   [id number]  :   1960  ACCT. NUMBER: [id number]  DATE OF SERVICE: 24  START TIME:  5:00 PM  END TIME:  8:00 PM  FACILITATOR(S): Marquis Quintero [facility]  TOPIC: BEH Group Therapy  Attestation: Romi Burgos MD,  - Provides oversight and supervision of care.     Number of patients attending the group:  5  Group Length:  3 Hours    Group Therapy Type: Psychoeducation    Summary of Group / Topics Discussed:    Psychoeducation/Skills Readiness to change (stages of change) [ROYA]  This topic will give a general overview of stage of change models and how they apply to the personal experience of each patient.    Objective(s):  Patient will identify at least 2 stage of change models.  Patient will identify at least 5 stages within the Prochaska-DiClemente model.  Patient will identify their own position within the model(s).    Structure:  Provide psychoeducation on readiness to change and stages of change.  Facilitate group discussion around each patient s reasons to change and current place in the model(s).  Use teach-back techniques to ensure patients  understanding.  Provide patients with handouts to enhance learning.    Expected therapeutic outcomes:    Understand change as an essential and non-linear process.  Reduce confusion about ambivalence.  Enhanced motivation to change.  Ability to maintain/return to sobriety    Therapeutic outcome(s) measured by:  Patient s ability to teach-back information learned in group.  Patient s demonstration of learning by identification of their own stage of change      Group Attendance:  Attended group session    Patient's response to the group topic/interactions:  cooperative with task, discussed personal experience with topic, and listened actively    Patient appeared to be Actively participating.        Client specific details:  Client participated with the check-in process and reported no  change in sobriety date, saying it was 4/2/24. Client expressed feeling happy living alone but he is still grieving the death of his mother one year ago this weekend. Client shared memories and how he was upset his sister was going to [place] without him to spread her ashes. He said he is having lunch with her this weekend to discuss splitting the ashes so he can spread the other half himself when he is able to travel to [place]. Client learned about the DiClemente-Prochaska, Kubler-Ross, and Positive Psychology models of Stages of Change.  Client participated in discussion and expressed he thought he was currently between the depression and testing stages in the Kubler-Ross model, due to still being sad about his mother but is trying to get himself to do new things instead of just watching TV alone with his cat all day. He spoke about starting a to do list and was advised to start with one small step. Client agreed he could start by turning off the TV before bed tonight so he doesn't wake up to it tomorrow and spend another entire day in front of it. Client shared that he thought the presentation today was helpful. This session relates to his Dimension 4 goal of increasing internal motivation to change. Client expressed being grateful for the social interaction with this group. Plan: Client will consider their stage of change and how to move forward.

## 2024-05-13 ENCOUNTER — HOSPITAL ENCOUNTER (OUTPATIENT)
Dept: BEHAVIORAL HEALTH | Facility: CLINIC | Age: 64
Discharge: HOME | End: 2024-05-13
Attending: FAMILY MEDICINE
Payer: MEDICARE

## 2024-05-13 PROCEDURE — H2035 A/D TX PROGRAM, PER HOUR: HCPCS | Mod: HQ | Performed by: SOCIAL WORKER

## 2024-05-14 ENCOUNTER — HOSPITAL ENCOUNTER (OUTPATIENT)
Dept: BEHAVIORAL HEALTH | Facility: CLINIC | Age: 64
Discharge: HOME | End: 2024-05-14
Attending: FAMILY MEDICINE
Payer: MEDICARE

## 2024-05-14 PROCEDURE — H2035 A/D TX PROGRAM, PER HOUR: HCPCS | Mod: HQ | Performed by: SOCIAL WORKER

## 2024-05-15 DIAGNOSIS — F10.20 ALCOHOL USE DISORDER, SEVERE, DEPENDENCE (H): ICD-10-CM

## 2024-05-15 DIAGNOSIS — F41.1 GENERALIZED ANXIETY DISORDER: ICD-10-CM

## 2024-05-15 RX ORDER — GABAPENTIN 300 MG/1
CAPSULE ORAL
Qty: 180 CAPSULE | Refills: 0 | OUTPATIENT
Start: 2024-05-15

## 2024-05-15 RX ORDER — GABAPENTIN 300 MG/1
600 CAPSULE ORAL 3 TIMES DAILY
Qty: 30 CAPSULE | Refills: 0 | Status: SHIPPED | OUTPATIENT
Start: 2024-05-15 | End: 2024-05-20

## 2024-05-15 RX ORDER — ACAMPROSATE CALCIUM 333 MG/1
666 TABLET, DELAYED RELEASE ORAL 3 TIMES DAILY
Qty: 180 TABLET | Refills: 0 | Status: SHIPPED | OUTPATIENT
Start: 2024-05-15 | End: 2024-05-20

## 2024-05-15 NOTE — TELEPHONE ENCOUNTER
"Date of Last Office Visit: 4/17/24  Date of Next Office Visit: 5/20/24  No shows since last visit: 0  Cancellations since last visit: 0    Medication requested: acamprosate (CAMPRAL) 333 MG EC tablet  Date last ordered: 4/15/24 Qty: 180 Refills: 0    Medication requested: gabapentin (NEURONTIN) 300 MG capsule  Date last ordered: 2/16/24 Qty: 180 Refills: 1 TOO early to refill     Review of [place] ?: Gabapentin    Medication last sold date: 4/23/24 Qty filled: 180  Other controlled substance on [place] ?: no    Lapse in medication adherence greater than 5 days?: no  If yes, call patient NA  Medication refill request verified as identical to current order?: yes  Result of Last DAM, VPA, Li+ Level, CBC, or Carbamazepine Level (at or since last visit): N/A    Last visit treatment plan:     A/P                                                    ASSESSMENT/PLAN  1. Alcohol use disorder, severe, dependence (H)  -needs improvement  -no use since \"early April\" when started in IOP programming  -continue with acamprosate (refills available)  -Continue with naltrexone 100 mg daily) refill available  -recommending 12 step recovery groups  -continue IOP programming and follow all recommendations      Continued Complex Management  The longitudinal plan of care for Alcohol Use Disorder (AUD) was addressed during this visit. Due to the added complexity in care, I will continue to support Durga in the subsequent management and with ongoing continuity of care.        2. Generalized anxiety disorder  -no change.  Hydroxyzine  effective in managing anxiety symptoms.  YUSUF score 1, PHQ9 score 1  -continue with buspar, celexa, and hydroxizine as prescribed  -continue with gabapenting 300 mg TID to support anxiety and alcohol cessation  -plans on riding bike and improving exercise.          3. Tobacco use disorder, moderate, dependence  -no change  -not interested in cessation or NRT  -continue to ask assess and advise.         Apr 17, " 2024  - campral, naltrexone, gabapentin  IOP programming     []Medication refilled per  Medication Refill in Ambulatory Care  policy.  [x]Medication unable to be refilled by RN due to criteria not met as indicated below:    []Eligibility - not seen in the last year   []Supervision - no future appointment   []Compliance - no shows, cancellations or lapse in therapy   []Verification - order discrepancy   [x]Controlled medication   [x]Medication not included in policy   []90-day supply request   []Other

## 2024-05-15 NOTE — GROUP NOTE
Group Therapy Documentation    PATIENT'S NAME: Durga Aguirre  MRN:   [id number]  :   1960  ACCT. NUMBER: [id number]  DATE OF SERVICE: 24  START TIME:  5:00 PM  END TIME:  8:00 PM  FACILITATOR(S): Megan Conde [facility] [employer]  TOPIC: BEH Group Therapy  Attestation: Romi Burgos MD,  - Provides oversight and supervision of care.     Number of patients attending the group:  3  Group Length:  3 Hours    Group Therapy Type: Addiction    Summary of Group / Topics Discussed:    Psychoeducation/Skills Cognitive Defusion and Acceptance (ACT & CBT) IOP  Clients learn key concepts of traditional CBT and build on these by learning three core concepts of third wave therapies (Acceptance, Being Present, Cognitive Defusion) and how to apply these in recovery.     Objective(s):  Increase psychological flexibility  by changing clients  relationships with negative thoughts and emotions and build resilience to cravings and negative moods  Practice 2 skills to detach from negative thoughts and emotions, observe them in action and then choose behaviors that serve clients  personal value system.    Structure (modalities, homework, worksheets, etc):  Psychoeducation on evolution to CBT  Demonstrate and practice in Liquid Mood meditation  Explore Self-Acceptance with positive statements to use each day   Handout on practicing Acceptance  Handout on Cognitive Defusion with thoughts and emotions   Visualizations for cognitive defusion and sitting with emotions    Expected therapeutic outcome(s):   Reduction of ruminating thoughts and enhanced emotional stability  Detachment from negative emotions in order to use these constructively (messengers)     Therapeutic outcome(s) measured by:   Teachback and Group Review and Evaluation    Group Attendance:  Attended group session    Patient's response to the group topic/interactions:  discussed personal experience with topic and expressed readiness to alter  "behaviors    Patient appeared to be Attentive and Engaged.        Client specific details:  Durga shared that he has started doing push-ups again and continues to use deep breathing and \"try to be in the moment\" to help with stress.  He said his stress is at 4 when he wakes up each morning but now is at a 2. He said he has had not cravings this week and added, \"I feel good now.\" He enjoyed the self-acceptance statements and thinks he may be sleeping too much, as much as 12 hours a night and waking up groggy. He said he would start taking his PRN Trazodone every other night or speak to his PCP about the dosage. He also take Melatonin on top of the medication and he said his provider is aware of this. WORKSHEET: The most important part of today's topic for Durga was \"self-acceptance statements\" and I sent these after group. He was surprised to learn \"That I am, therefore I am\" but I do not know the reference he is referring to here. This topic completes one of the six MH Skills Groups required under D3 of his Treatment Plan.         "

## 2024-05-15 NOTE — TELEPHONE ENCOUNTER
Campral ordered  Reviewed request, Pt in IOP programming.  Follow up scheduled 5/20  5 day gabapentin bridge to next appointment.

## 2024-05-16 ENCOUNTER — HOSPITAL ENCOUNTER (OUTPATIENT)
Dept: BEHAVIORAL HEALTH | Facility: CLINIC | Age: 64
Discharge: HOME | End: 2024-05-16
Attending: FAMILY MEDICINE
Payer: MEDICARE

## 2024-05-16 PROCEDURE — H2035 A/D TX PROGRAM, PER HOUR: HCPCS | Mod: HQ

## 2024-05-17 ENCOUNTER — HOSPITAL ENCOUNTER (OUTPATIENT)
Dept: BEHAVIORAL HEALTH | Facility: CLINIC | Age: 64
Discharge: HOME | End: 2024-05-17
Attending: FAMILY MEDICINE
Payer: MEDICARE

## 2024-05-17 PROCEDURE — H2035 A/D TX PROGRAM, PER HOUR: HCPCS | Performed by: COUNSELOR

## 2024-05-17 NOTE — PROGRESS NOTES
"Individual Session Summary   START TIME: 2:30 PM   END TIME: 3:30 PM   Duration: 1 Hour      Data:  Met with client on this date for an individual session. The session focused on client's treatment plan goal for DIM(s) 3, 5, 6 of his individual treatment plan. Client reports that \"I have to\" feel good about sobriety because of legal involvements . Court will make contact on 5/25 to get into the DWI court program. Will find out about being on the color wheel when the call comes in. Durga endorsed thoughts about drinking when he sees it on TV. He reports that he is able to talk himself out of it and has not acted impulsively due to it. Bringing more attention to eating healthy, substituting steamed broccoli for fresh. He would like to loose weight. He reports difficulty in understanding mindfulness. He believes that if he practices mindful eating then he may overeat and talked about \"mindful drinking.\" He explored results of mindful vs mindless vs mind full. Durga states that he plans to get what he can out of groups and described mindful moments he has had that allows him to re-center and notice things for what they are around him. Durga plans to reconnect with a friend to work on their car next week. He appear excited to work on cars again. Durga explored situations that may cause a relapse and he said that he typically drink before doing anything, including mowing and pulling grass. He states that he plans to pull grass this weekend. Durga denied drinking while mowing last week and reports that he plans to listen to music and pull grass. Durga was able to identify consequences of alcohol use and that he does not want the legal consequences for himself. Durga reports that he plans to also bike over the weekend and is brining more attention to improving physical health. Durga denies endorsing any mental health symptoms.     Intervention: CBT, mindfulness, relapse prevention, reflection     Assessment: Durga appeared " sober and no symptoms of withdrawals was observed. Durga was appropriately dressed and on time for his appointment. Durga participated fully during the session.      Plan. Client will refrain from all substance use behaviors. Durga will continue to attend programming and engage actively.       Yamila Patterson, [facility], [facility]     Attestation: Dr. Loretta HANNON - Provides oversight and supervision of care.

## 2024-05-17 NOTE — GROUP NOTE
Group Therapy Documentation    PATIENT'S NAME: Durga Aguirre  MRN:   [id number]  :   1960  [id number]  DATE OF SERVICE: 24  START TIME:  5:00 PM  END TIME:  8:00 PM  FACILITATOR(S): Marquis Quintero LADC  TOPIC: BEH Group Therapy  Number of patients attending the group:  7  Group Length:  3 Hours    Group Therapy Type: Psychoeducation    Summary of Group / Topics Discussed:    Psychoeducation/Skills Relapse Prevention (ROYA)  This topic will give a general overview of basic relapse prevention, including definitions of warning signs, triggers and cravings and the importance of addressing healthy coping skills for ongoing relapse prevention.    Objective(s):  Patients will identify 4 key warning signs and triggers  Patients will identify 4 healthy coping mechanisms         Structure (modalities, homework, worksheets, etc.):  Provide psychoeducation on relapse prevention and healthy coping methods.  Facilitate group discussion around each patient s current awareness of warning signs,  triggers and cravings.     Expected therapeutic outcome(s):    Patient will:  Be able to manage triggers and cravings without returning to substance use.      Therapeutic outcomes measured by:  Patients will name 4 of their warning signs and triggers  Patients will name 4 healthy coping mechanisms for dealing with their warning signs and triggers      Group Attendance:  Attended group session    Patient's response to the group topic/interactions:  cooperative with task and expressed readiness to alter behaviors    Patient appeared to be Engaged.        Client specific details:  Client participated in the check-in process and reported no change in sobriety date. Client expressed feeling proud because he had gone to lunch with his sister and heard her reasons for wanting to spread all of their mother's ashes in [place] and she told him that's where their mom had said she wanted to be. Durga said he realized he had only  "been thinking of himself and now understood his sister's perspective. They negotiated that Durga would keep a small portion of the ashes to spread himself at a later time. The video  Parth to Self:  Protecting Sobriety with the Science of Safety  was presented. This session relates to client's Dimension 5 goal to develop sober coping and living skills in order to address the development of a strategy for long term recovery. Client expressed that the idea of using multiple levels of relapse prevention techniques was helpful. Client said he found the video to be \"very motivational\" Plan:  Client to think of what additional  slice of cheese  (relapse prevention tool) they can add to their recovery.    "

## 2024-05-20 ENCOUNTER — HOSPITAL ENCOUNTER (OUTPATIENT)
Dept: BEHAVIORAL HEALTH | Facility: CLINIC | Age: 64
Discharge: HOME | End: 2024-05-20
Attending: FAMILY MEDICINE
Payer: MEDICARE

## 2024-05-20 ENCOUNTER — VIRTUAL VISIT (OUTPATIENT)
Dept: ADDICTION MEDICINE | Facility: CLINIC | Age: 64
End: 2024-05-20
Payer: MEDICARE

## 2024-05-20 DIAGNOSIS — F41.1 GENERALIZED ANXIETY DISORDER: ICD-10-CM

## 2024-05-20 DIAGNOSIS — F10.24 ALCOHOL DEPENDENCE WITH ALCOHOL-INDUCED MOOD DISORDER (H): ICD-10-CM

## 2024-05-20 DIAGNOSIS — F10.20 ALCOHOL USE DISORDER, SEVERE, DEPENDENCE (H): ICD-10-CM

## 2024-05-20 PROCEDURE — H2035 A/D TX PROGRAM, PER HOUR: HCPCS | Mod: HQ

## 2024-05-20 PROCEDURE — 99443 PR PHYSICIAN TELEPHONE EVALUATION 21-30 MIN: CPT | Mod: 93

## 2024-05-20 RX ORDER — ACAMPROSATE CALCIUM 333 MG/1
666 TABLET, DELAYED RELEASE ORAL 3 TIMES DAILY
Qty: 180 TABLET | Refills: 2 | Status: SHIPPED | OUTPATIENT
Start: 2024-05-20 | End: 2024-08-18

## 2024-05-20 RX ORDER — NALTREXONE HYDROCHLORIDE 50 MG/1
100 TABLET, FILM COATED ORAL DAILY
Qty: 180 TABLET | Refills: 0 | Status: SHIPPED | OUTPATIENT
Start: 2024-05-20 | End: 2024-09-11

## 2024-05-20 RX ORDER — GABAPENTIN 300 MG/1
600 CAPSULE ORAL 3 TIMES DAILY
Qty: 180 CAPSULE | Refills: 0 | Status: SHIPPED | OUTPATIENT
Start: 2024-05-20 | End: 2024-09-11

## 2024-05-20 ASSESSMENT — PAIN SCALES - GENERAL: PAINLEVEL: MILD PAIN (2)

## 2024-05-20 NOTE — ADDENDUM NOTE
Encounter addended by: Yamila Patterson LPCC, LADC on: 5/20/2024 4:50 PM   Actions taken: Pend clinical note

## 2024-05-20 NOTE — PROGRESS NOTES
"Virtual Visit Details    Type of service:  Telephone Visit   Phone call duration: 22 minutes   Originating Location (pt. Location): Home    Distant Location (provider location):  Off-site           [facility]    A/P                                                    ASSESSMENT/PLAN  There are no diagnoses linked to this encounter.  No orders of the defined types were placed in this encounter.    1. Alcohol use disorder, severe, dependence (H)  2. Alcohol dependence with alcohol-induced mood disorder (H)  -showing improvement.  Last use \"beginning of April\"   - acamprosate (CAMPRAL) 333 MG EC tablet; Take 2 tablets (666 mg) by mouth 3 times daily for 90 days  Dispense: 180 tablet; Refill: 2  - gabapentin (NEURONTIN) 300 MG capsule; Take 2 capsules (600 mg) by mouth 3 times daily for 30 days Increase gabapentin dose 300 mg/day to a target dose of 600 mg TID  Dispense: 180 capsule; Refill: 0  -continue with IOP programming and follow all recommendations  -follow up with DUI court as scheduled  -1 month follow up     Continued Complex Management  The longitudinal plan of care for Alcohol Use Disorder (AUD) was addressed during this visit. Due to the added complexity in care, I will continue to support Durga in the subsequent management and with ongoing continuity of care.    3. Generalized anxiety disorder  -showing improvement with alcohol  cessation   - gabapentin (NEURONTIN) 300 MG capsule; Take 2 capsules (600 mg) by mouth 3 times daily for 30 days Increase gabapentin dose 300 mg/day to a target dose of 600 mg TID  Dispense: 180 capsule; Refill: 0        May 20, 2024  - gabapentin 600 BID-TID  -Naltrexone 100 mg daily  Acamprosate BID-TID when remembers           Last encounter A/P from 4/17/24  1. Alcohol use disorder, severe, dependence (H)  -needs improvement  -no use since \"early April\" when started in IOP programming  -continue with acamprosate (refills available)  -Continue with " "naltrexone 100 mg daily) refill available  -recommending 12 step recovery groups  -continue IOP programming and follow all recommendations      Continued Complex Management  The longitudinal plan of care for Alcohol Use Disorder (AUD) was addressed during this visit. Due to the added complexity in care, I will continue to support Durga in the subsequent management and with ongoing continuity of care.        2. Generalized anxiety disorder  -no change.  Hydroxyzine  effective in managing anxiety symptoms.  YUSUF score 1, PHQ9 score 1  -continue with buspar, celexa, and hydroxizine as prescribed  -continue with gabapenting 300 mg TID to support anxiety and alcohol cessation  -plans on riding bike and improving exercise.          3. Tobacco use disorder, moderate, dependence  -no change  -not interested in cessation or NRT  -continue to ask assess and advise.          PDMP Review         Value Time User    State PDMP site checked  Yes 2/16/2024 10:53 AM Christina Posadas NP              RTC  No follow-ups on file.      Counseled the patient on the importance of having a recovery program in addition to medication to manage recovery.  Components include avoiding isolating, having willingness to change, avoiding triggers and managing cravings. Encouraged having some type of sober network and practicing honesty with trusted support person(s). Encouraged other services such as counseling, 12 step or other self-help organizations.              SUBJECTIVE                                                    Durga Aguirre is a 64 year old male who presents to clinic today for follow up    Visit performed In Person, face-to-face    Durga Aguirre is a 64 year old male who presents to clinic today for follow up     Visit performed In Person, face-to-face        ROYA History:  Substance Use History:   \"Have you ever had any history with [...] use?\" And \"When was your last use?  ALCOHOL - 1/16  CANNABIS -   PRESCRIPTION " "STIMULANTS (includes Ritalin, Adderall, Vyvanse) -   COCAINE/CRACK -   METH/AMPHETAMINES (includes ecstacy, MDMA/abel) -   OPIATES -   BENZODIAZEPINES (includes Ativan, Klonopin, Xanax) -   KRATOM (mild opioid and stimulant effects) -   KETAMINE -   HALLUCINOGENS (includes DXM) -   BEHAVIORAL (Gambling, Eating d/o, Compulsivity) -   History of treatment -   NICOTINE  Cigarettes:   Chew/snus:   Vaping:   Past NRT/medication use: Naltrexone        Previous withdrawal treatment episodes (e.g. detox): 8/6/23  Previous ROYA treatment programs: [facility] Aug-Oct 2023,   Hospitalizations or overdose:   Medical complications from substance use:   IV Drug use?: denies  Previous Medication for Addiction Tx: naltrexone  Longest period of full abstinence: 3 years  Activities that have previously supported abstinence: treatment, meetings  Current Recovery Activities: none       Recent HPI Details:  HPI Apr 17, 2024  - Monday was anniversary of Mom's death. Has licence back. Since beginning of April 2 has not been drinking.  \"I figured I shouldn't drink when I'm in the program\" Enrolled in IOP.  Anxiety somewhat controlled, doesn't like hydroxyzine side effects.   Court on 25th. Hopeful that being in programming will be helpful to court case.     TODAY'S VISIT  HPI May 20, 2024  - Getting things done around the house, visiting with friends.  Still in IOP programming 3 times a week.   No alcohol use since beginning of April   Gabapentin 600 mg 2-3 X day.    Campral two or three times  Naltrexone 100 mg daily  Cravings are controlled.   Starting DWI program at [facility]  Looking for a new roommate.   Not doing AA meetings, finds them triggering.   Feeling positive about IOP programming and group members.      OBJECTIVE  PHYSICAL EXAM:  There were no vitals taken for this visit.    GENERAL: healthy, alert and no distress  EYES: Eyes grossly normal to inspection, PERRL and conjunctivae and sclerae normal  RESP: No " respiratory distress  MENTAL STATUS EXAM  Appearance/Behavior: No appearant distress  Speech: Normal  Mood/Affect: normal affect      PHQ-9 Score:       2/12/2024     5:12 PM 3/20/2024     2:00 PM 4/17/2024     3:23 PM   PHQ   PHQ-9 Total Score 1 0 1   Q9: Thoughts of better off dead/self-harm past 2 weeks Not at all Not at all Not at all       YUSUF-7 Score:      12/20/2023    12:00 PM 3/20/2024     2:00 PM 4/17/2024     3:24 PM   YUSUF-7 SCORE   Total Score   1 (minimal anxiety)   Total Score 7 2 1       LABS (may not contain today's labs)                                                        Today's lab data  No results found for any visits on 05/20/24.        HISTORY                                                    Problem list reviewed & adjusted, as indicated.  Patient Active Problem List   Diagnosis    CARDIOVASCULAR SCREENING; LDL GOAL LESS THAN 130    Generalized anxiety disorder    Advanced directives, counseling/discussion    Essential hypertension    Hypertriglyceridemia    Dupuytren's contracture of hand    Alcohol withdrawal with complication with inpatient treatment, with unspecified complication (H)    Hypokalemia    Vasovagal syncope    History of colonic polyps    Elevated creatine kinase    Alcohol use disorder, severe, dependence (H)    Tobacco use disorder, moderate, dependence         MEDICATION LIST (prior to visit)  Current Outpatient Medications   Medication Sig Dispense Refill    acamprosate (CAMPRAL) 333 MG EC tablet Take 2 tablets (666 mg) by mouth 3 times daily 180 tablet 0    ARIPiprazole (ABILIFY) 5 MG tablet TAKE ONE TABLET (5 MG) BY MOUTH ONE TIME DAILY 90 tablet 1    busPIRone (BUSPAR) 15 MG tablet Take 1 tablet (15 mg) by mouth 2 times daily 180 tablet 1    citalopram (CELEXA) 20 MG tablet Take 1 tablet (20 mg) by mouth daily 90 tablet 1    diclofenac (VOLTAREN) 50 MG EC tablet Take 1 tablet (50 mg) by mouth 3 times daily as needed for moderate pain 30 tablet 0    gabapentin  (NEURONTIN) 300 MG capsule Take 2 capsules (600 mg) by mouth 3 times daily for 5 days Increase gabapentin dose 300 mg/day to a target dose of 600 mg TID 30 capsule 0    hydrochlorothiazide (HYDRODIURIL) 12.5 MG tablet Take 1 tablet (12.5 mg) by mouth daily 90 tablet 3    hydrOXYzine HCl (ATARAX) 50 MG tablet Take 0.5-1 tablets (25-50 mg) by mouth every 8 hours as needed for anxiety 180 tablet 3    lisinopril (ZESTRIL) 40 MG tablet Take 1 tablet (40 mg) by mouth daily (Hold for a systolic blood pressure of 100) 90 tablet 3    Melatonin 10 MG TABS tablet Take 10 mg by mouth nightly as needed for sleep      multivitamin w/minerals (THERA-VIT-M) tablet Take 1 tablet by mouth daily 90 tablet 0    naltrexone (DEPADE/REVIA) 50 MG tablet Take 2 tablets (100 mg) by mouth daily 180 tablet 1    simvastatin (ZOCOR) 40 MG tablet Take 1 tablet (40 mg) by mouth every evening 90 tablet 3    thiamine (B-1) 100 MG tablet TAKE 1 TABLET (100 MG) BY MOUTH DAILY 90 tablet 0    traZODone (DESYREL) 50 MG tablet TAKE ONE TABLET BY MOUTH AT BEDTIME AS NEEDED FOR SLEEP 90 tablet 0     No current facility-administered medications for this visit.       MEDICATION LIST (after visit)  Current Outpatient Medications   Medication Sig Dispense Refill    acamprosate (CAMPRAL) 333 MG EC tablet Take 2 tablets (666 mg) by mouth 3 times daily 180 tablet 0    ARIPiprazole (ABILIFY) 5 MG tablet TAKE ONE TABLET (5 MG) BY MOUTH ONE TIME DAILY 90 tablet 1    busPIRone (BUSPAR) 15 MG tablet Take 1 tablet (15 mg) by mouth 2 times daily 180 tablet 1    citalopram (CELEXA) 20 MG tablet Take 1 tablet (20 mg) by mouth daily 90 tablet 1    diclofenac (VOLTAREN) 50 MG EC tablet Take 1 tablet (50 mg) by mouth 3 times daily as needed for moderate pain 30 tablet 0    gabapentin (NEURONTIN) 300 MG capsule Take 2 capsules (600 mg) by mouth 3 times daily for 5 days Increase gabapentin dose 300 mg/day to a target dose of 600 mg TID 30 capsule 0    hydrochlorothiazide  (HYDRODIURIL) 12.5 MG tablet Take 1 tablet (12.5 mg) by mouth daily 90 tablet 3    hydrOXYzine HCl (ATARAX) 50 MG tablet Take 0.5-1 tablets (25-50 mg) by mouth every 8 hours as needed for anxiety 180 tablet 3    lisinopril (ZESTRIL) 40 MG tablet Take 1 tablet (40 mg) by mouth daily (Hold for a systolic blood pressure of 100) 90 tablet 3    Melatonin 10 MG TABS tablet Take 10 mg by mouth nightly as needed for sleep      multivitamin w/minerals (THERA-VIT-M) tablet Take 1 tablet by mouth daily 90 tablet 0    naltrexone (DEPADE/REVIA) 50 MG tablet Take 2 tablets (100 mg) by mouth daily 180 tablet 1    simvastatin (ZOCOR) 40 MG tablet Take 1 tablet (40 mg) by mouth every evening 90 tablet 3    thiamine (B-1) 100 MG tablet TAKE 1 TABLET (100 MG) BY MOUTH DAILY 90 tablet 0    traZODone (DESYREL) 50 MG tablet TAKE ONE TABLET BY MOUTH AT BEDTIME AS NEEDED FOR SLEEP 90 tablet 0     No current facility-administered medications for this visit.         No Known Allergies    Additional MDM Details:      Christina Posadas NP  [facility]  [phone]

## 2024-05-20 NOTE — PROGRESS NOTES
"[facility] Weekly Treatment Plan Review    Date span:  24-24    Weekly Treatment Plan Review     Treatment Plan initiated on: 24.    Dimension1: Acute Intoxication/Withdrawal Potential -   Previous Dimension Ratin  Current Dimension Ratin  Client Goals Addressed Since last Review: \"I will go on more walks, and come to groups as scheduled to keep my mind off of drinking and to gain more insight on my alcohol cravings.\"  Are Treatment Plan goals/methods effective? Yes    Date of Last Use 24  Any reports of withdrawal symptoms - No      Dimension 2: Biomedical Conditions & Complications -   Previous Dimension Ratin  Current Dimension Ratin  Client Goals Addressed Since last Review: \"I will continue to have my medical needs met and addressed\"  Are Treatment Plan goals/methods effective? Yes    Medical Concerns:  None identified.  Current Medications & Medication Changes:  Current Outpatient Medications   Medication Sig Dispense Refill    acamprosate (CAMPRAL) 333 MG EC tablet Take 2 tablets (666 mg) by mouth 3 times daily 180 tablet 0    ARIPiprazole (ABILIFY) 5 MG tablet TAKE ONE TABLET (5 MG) BY MOUTH ONE TIME DAILY 90 tablet 1    busPIRone (BUSPAR) 15 MG tablet Take 1 tablet (15 mg) by mouth 2 times daily 180 tablet 1    citalopram (CELEXA) 20 MG tablet Take 1 tablet (20 mg) by mouth daily 90 tablet 1    diclofenac (VOLTAREN) 50 MG EC tablet Take 1 tablet (50 mg) by mouth 3 times daily as needed for moderate pain 30 tablet 0    gabapentin (NEURONTIN) 300 MG capsule Take 2 capsules (600 mg) by mouth 3 times daily for 5 days Increase gabapentin dose 300 mg/day to a target dose of 600 mg TID 30 capsule 0    hydrochlorothiazide (HYDRODIURIL) 12.5 MG tablet Take 1 tablet (12.5 mg) by mouth daily 90 tablet 3    hydrOXYzine HCl (ATARAX) 50 MG tablet Take 0.5-1 tablets (25-50 mg) by mouth every 8 hours as needed for anxiety 180 tablet 3    lisinopril (ZESTRIL) 40 MG tablet Take 1 " "tablet (40 mg) by mouth daily (Hold for a systolic blood pressure of 100) 90 tablet 3    Melatonin 10 MG TABS tablet Take 10 mg by mouth nightly as needed for sleep      multivitamin w/minerals (THERA-VIT-M) tablet Take 1 tablet by mouth daily 90 tablet 0    naltrexone (DEPADE/REVIA) 50 MG tablet Take 2 tablets (100 mg) by mouth daily 180 tablet 1    simvastatin (ZOCOR) 40 MG tablet Take 1 tablet (40 mg) by mouth every evening 90 tablet 3    thiamine (B-1) 100 MG tablet TAKE 1 TABLET (100 MG) BY MOUTH DAILY 90 tablet 0    traZODone (DESYREL) 50 MG tablet TAKE ONE TABLET BY MOUTH AT BEDTIME AS NEEDED FOR SLEEP 90 tablet 0     No current facility-administered medications for this encounter.     Medication Prescriber:  Dr. Bennett, through [facility]  Taking meds as prescribed? Yes  Medication side effects or concerns:  None reported  Outside medical appointments this review period (list provider and reason for visit):  NA      Dimension 3: Emotional/Behavioral Conditions & Complications -   Previous Dimension Ratin  Current Dimension Ratin  Client Goals Addressed Since last Review: \"I want to learn new ways to stay in the present moment.\"  Are Treatment Plan goals/methods effective? Yes    PHQ2:       2024     1:00 PM 2024    10:46 AM 2024     2:57 PM 2023     8:56 AM 2023    10:33 AM 2022    10:12 AM 11/15/2021    10:27 AM   PHQ-2 (  [employer])   Q1: Little interest or pleasure in doing things 0 1 0 1 0 0 0   Q2: Feeling down, depressed or hopeless 1 0 0 1 0 0 0   PHQ-2 Score 1 1 0 2 0 0 0   Q1: Little interest or pleasure in doing things  Several days    Not at all Not at all   Q2: Feeling down, depressed or hopeless  Not at all    Not at all Not at all   PHQ-2 Score  1    0 0      GAD2:       2024    10:56 AM 2024    11:00 AM 2024     1:00 PM   YUSUF-2   Feeling nervous, anxious, or on edge 1 1 1   Not being able to stop or control worrying 0 1 1 "   YUSUF-2 Total Score 1 2    2 2     PROMIS 10-Global Health (all questions and answers displayed):       8/4/2023    12:00 PM 12/20/2023    12:00 PM 2/12/2024     5:14 PM 3/20/2024     2:00 PM 4/4/2024     1:00 PM   PROMIS 10   In general, would you say your health is:   Very good     In general, would you say your quality of life is:   Very good     In general, how would you rate your physical health?   Very good     In general, how would you rate your mental health, including your mood and your ability to think?   Very good     In general, how would you rate your satisfaction with your social activities and relationships?   Good     In general, please rate how well you carry out your usual social activities and roles   Good     To what extent are you able to carry out your everyday physical activities such as walking, climbing stairs, carrying groceries, or moving a chair?   Mostly     In the past 7 days, how often have you been bothered by emotional problems such as feeling anxious, depressed, or irritable?   Sometimes     In the past 7 days, how would you rate your fatigue on average?   Mild     In the past 7 days, how would you rate your pain on average, where 0 means no pain, and 10 means worst imaginable pain?   5     In general, would you say your health is: 3 3 4 4 3   In general, would you say your quality of life is: 4 2 4 4 4   In general, how would you rate your physical health? 3 4 4 3 3   In general, how would you rate your mental health, including your mood and your ability to think? 4 3 4 4 4   In general, how would you rate your satisfaction with your social activities and relationships? 2 3 3 4 2   In general, please rate how well you carry out your usual social activities and roles. (This includes activities at home, at work and in your community, and responsibilities as a parent, child, spouse, employee, friend, etc.) 3 2 3 4 2   To what extent are you able to carry out your everyday physical  "activities such as walking, climbing stairs, carrying groceries, or moving a chair? 3 4 4 5 3   In the past 7 days, how often have you been bothered by emotional problems such as feeling anxious, depressed, or irritable? 4 3 3 3 3   In the past 7 days, how would you rate your fatigue on average? 3 3 2 2 2   In the past 7 days, how would you rate your pain on average, where 0 means no pain, and 10 means worst imaginable pain? 3 1 5 4 4   Global Mental Health Score 12 11 14 15 13   Global Physical Health Score 13 15 15 15 13   PROMIS TOTAL - SUBSCORES 25 26 29 30 26     Mental health diagnosis 296.30 (F33.9) Major Depressive Disorder, Recurrent Episode, Unspecified _  300.02 (F41.1) Generalized Anxiety Disorder  Date of last SIB:  None reported  Date of  last SI:  None reported  Date of last HI: None reported  Behavioral Targets:  Reducing intensity and frequency of MH symptoms and increase more instances of coping through it.   Risk factors:  Lives alone, lack of insight on mental health experiences, substance use, isolation.  Protective factors:  positive relationships positive family connections, safe and stable environment, regular sleep, and regular physical activity  Outside mental health related appointments this review period (list provider and reason for visit):  NA  Current MH Assignments:  Complete assignment on emotions that have caused relapses and develop coping tools to manage difficult experiences.     Narrative:  Current Mental Health symptoms include: irritability, restlessness, and \"cabin fever\", difficulty concentrating, fatigue, and low energy, procrastination. Active interventions to stabilize mental health symptoms this week : Attend and participate in groups and programming fully. Begin to build behaviors reflecting self-care and mindfulness outside of programming.      Dimension 4: Treatment Acceptance / Resistance -   Previous Dimension Ratin  Current Dimension Ratin  Client Goals " "Addressed Since last Review: \"Attending programming will help me reduce feelings isolation at home\"  Are Treatment Plan goals/methods effective? Yes    ROYA Diagnosis:  Alcohol Use Disorder   303.90 (F10.20) Severe In early remission,   Commitment to tx process/Stage of change- 2  ROYA assignments - Complete consequences of use assignment.       Narrative - Durga attend programming well. He demonstrate some resistance with group materials and will often go off topic. Willie has trouble focusing on topics of mental health.       Dimension 5: Relapse / Continued Problem Potential -   Previous Dimension Rating:  3  Current Dimension Rating:  3  Client Goals Addressed Since last Review: \"I want to  use skills to gain more clarity over my substance behaviors.\"  Are Treatment Plan goals/methods effective? Yes    Relapses this week - None  Urges to use - YES, List reports thoughts to use but no urges due to taking anti-craving medications.  UA results - Will be randomly administered.  No results found for this or any previous visit (from the past 672 hour(s)).      Narrative- Willie is unable to identify relapse triggers, lacks coping skills for relapse prevention. Chemical use was in client's environment. Client reports low motivation for abstinence and external motivators for sobriety. Client does not see alcohol use as a big problem in his life.     Dimension 6: Recovery Environment -   Previous Dimension Rating:  3  Current Dimension Rating:  3  Client Goals Addressed Since last Review: \"I want to see what I can do to build more structured activities, like working on cars more and roller blading.\"  Are Treatment Plan goals/methods effective? Yes    Family Involvement - Patient declined to have family involved   Summarize attendance in family sessions - NA  Family supportive of treatment?  Yes    Community support group attendance - None at this time.  Recreational activities - Working to build structure.     Narrative - " Chemical use in the home, Lack of sober / recreational interests, Legal issues. Will be placed on color wheel for legal accountability.    Progress made on transition planning goals: Currently working on assignments.     Justification for Continued Treatment at this Level of Care:  Durga lacks insight on difficult experiences and on coping tools to use. Durga does not see a problem with his drinking and has external motivators for sobriety. Durga has serious legal changes due to DUI.   Treatment coordination activities since last review:  coordination with   Need for peer recovery support referral? Yes: Client denies  Referrals made since last review:  NA    Discharge Planning:  Target Discharge Date/Timeframe:  8/22/24  Target Phase 2 Start:  5/16/24  Target Phase 3 Start: 6/27/24   Med Mgmt Provider/Appt:  NA      Is patient a vulnerable adult?  No    Interdisciplinary Clinical Supervision including: LADC and Mental health professional    *Client received copy of changes: Yes  *Client is aware of right to access a treatment plan review: Yes    Yamila Patterson, MPS, LSW, LADC, LPCC  MI/CD Psychotherapist  [facility]   [address] [place] [address] [address], [place] [address]  Phone: [phone] Fax:  [phone]      Attestation: Dr. Loretta HANNON - Provides oversight and supervision of care.

## 2024-05-20 NOTE — NURSING NOTE
Is the patient currently in the state of [place]? YES    Visit mode:TELEPHONE    If the visit is dropped, the patient can be reconnected by: VIDEO VISIT: Text to cell phone:   Telephone Information:   Mobile [phone]       Will anyone else be joining the visit? No  (If patient encounters technical issues they should call [phone])    How would you like to obtain your AVS? MyChart    Are changes needed to the allergy or medication list? No    Are refills needed on medications prescribed by this physician? NO    Rooming Documentation: Assigned questionnaire(s) completed .    Reason for visit: DIEGO Ambriz

## 2024-05-21 ENCOUNTER — HOSPITAL ENCOUNTER (OUTPATIENT)
Dept: BEHAVIORAL HEALTH | Facility: CLINIC | Age: 64
Discharge: HOME | End: 2024-05-21
Attending: FAMILY MEDICINE
Payer: MEDICARE

## 2024-05-21 PROCEDURE — H2035 A/D TX PROGRAM, PER HOUR: HCPCS | Performed by: SOCIAL WORKER

## 2024-05-21 NOTE — GROUP NOTE
Group Therapy Documentation    PATIENT'S NAME: Durga Aguirre  MRN:   [id number]  :   1960  ACCT. NUMBER: [id number]  DATE OF SERVICE: 24  START TIME:  5:00 PM  END TIME: 8:00 PM  FACILITATOR(S): Zion Lau LADC  TOPIC: BEH Group Therapy  Number of patients attending the group:  7  Group Length:  3 Hours    Group Therapy Type: Addiction and Psychoeducation    Summary of Group / Topics Discussed:    Topic:    Community based Sober Support. Addressing External Stigma and Internal Shame about being a person with addiction, Understanding the History of The Recovery Community and how to connect with it in the local community. Learning about how others have gotten into long term sustained recovery from addiction by their involvement in Community Based Sober Support and/or being active in the larger  Recovery Community  helping others with substance use disorders get the help they need.     Objective(s):  Client will be introduced to the term stigma and learn about and discuss how stigma about addiction has played a detrimental role in people with substance use disorders getting the care they need to begin their recovery from addiction.     Client will identify the importance of helping others in recovery and the role of one person getting help and then helping the next person has played in the recovery movement.   Client will learn about and discuss the importance of making connections and being in community with others in recovery that share a similar commitment to sobriety.   Client will discuss a personal area of self-efficacy from their experience and relate how that experience plays a role in their current understanding of how they have the resiliency to maintain sobriety a day at a time.     Structure:  Provide psychoeducation on the history of the sober support movement, benefits of connecting with community based sober support network of people, meetings and activities on a regular basis to  build connections that sustain a balanced sober and vital life.   Provide psychoeducation on how by maintaining sobriety and being honest helps the integrity in one's life show up again.   Provide information on how community-based recovery communities have helped fight stigma in our society and have played a role in bettering access to treatment care and information on how to connect locally to this community.     Expected therapeutic outcomes:   Build recovery resilience as a proactive strategy to reduce stress and concomitant cravings, leading to resilience against relapse in early sobriety.  Growth in patient  sober network  via attending community based sober supportive activities and connecting and possibly volunteering with the local recovery community organization.     Therapeutic outcome(s) measured by:  Patient able to teach back about what they learned in the session about stigma, and the importance of being connected to and a part of a network of community based sober support activities.     An increase in patient involvement with community based sober support meetings supportive activities as reported by client in weekly check ins.     Patient self report in lessening in cravings ratings and use episodes while in treatment as patients become more involved in Community Based Sober Support and/or the larger Recovery Community.  As measured by UA's and client self-reports of use episodes       Group Attendance:  Attended group session    Patient's response to the group topic/interactions:  discussed personal experience with topic and expressed understanding of topic    Patient appeared to be Actively participating and Engaged.        Client specific details:    Client checked in reporting ongoing sobriety. Client was presented with and discussed educational material about the history of the recovery movement and the importance of being part of a community based network of sober supportive people.  Throughout this session client worked on his Dimension 6 goals of connecting with a sober supportive community network of meetings and people. In this session client completed a worksheet about topics from this session including stigma and personal reasons and passion for maintaining a sober life in recovery. He expressed that this time getting help the stigma of addiction did not get in the way of him getting help for his addiction. He shared that the day he would be able to attend a sober support meeting would be on Monday's and he will begin attending an AA Big Book Meeting at that time. He expressed what has helped in treatment has been sharing his story with others who understand addiction. .

## 2024-05-22 ENCOUNTER — TELEPHONE (OUTPATIENT)
Dept: BEHAVIORAL HEALTH | Facility: CLINIC | Age: 64
End: 2024-05-22

## 2024-05-22 ENCOUNTER — OFFICE VISIT (OUTPATIENT)
Dept: PODIATRY | Facility: CLINIC | Age: 64
End: 2024-05-22
Payer: MEDICARE

## 2024-05-22 ENCOUNTER — HOSPITAL ENCOUNTER (OUTPATIENT)
Dept: BEHAVIORAL HEALTH | Facility: CLINIC | Age: 64
Discharge: HOME | End: 2024-05-22
Attending: FAMILY MEDICINE
Payer: MEDICARE

## 2024-05-22 VITALS — WEIGHT: 220 LBS | SYSTOLIC BLOOD PRESSURE: 125 MMHG | DIASTOLIC BLOOD PRESSURE: 63 MMHG | BODY MASS INDEX: 30.68 KG/M2

## 2024-05-22 DIAGNOSIS — F10.20 ALCOHOL USE DISORDER, SEVERE, DEPENDENCE (H): Primary | ICD-10-CM

## 2024-05-22 DIAGNOSIS — G62.1 ALCOHOL-INDUCED POLYNEUROPATHY (H): Primary | ICD-10-CM

## 2024-05-22 DIAGNOSIS — R20.2 PARESTHESIA OF BOTH FEET: ICD-10-CM

## 2024-05-22 PROCEDURE — H2035 A/D TX PROGRAM, PER HOUR: HCPCS | Performed by: COUNSELOR

## 2024-05-22 PROCEDURE — 99203 OFFICE O/P NEW LOW 30 MIN: CPT | Performed by: PODIATRIST

## 2024-05-22 NOTE — PATIENT INSTRUCTIONS
Thank you for choosing [facility] Podiatry / Foot & Ankle Surgery!    DR. WADDELL'S CLINIC LOCATIONS:     [facility] TRIAGE LINE: [phone]   [address] APPOINTMENTS: [phone]   [place] [address] RADIOLOGY: [phone]   (Every other Tues - Wed - Fri PM) SET UP SURGERY: [phone]    PHYSICAL THERAPY: [phone]   [facility] [facility] BILLING QUESTIONS: [phone] [address] FAX: [phone]   [place] [address]    (Thurs & Fri AM)         NEUROPATHY  Peripheral neuropathy is damage of the peripheral nerves. Your peripheral nerves--the nerves in your toes and fingertips--are the ones on the periphery of your body. When the nerves are damaged, they don't function properly. People with peripheral neuropathy have decreased or abnormal sensation in their toes and fingers. Sometimes, they develop problems moving these parts of the body as well.    When a person develops neuropathy, they usually begin to feel numbness or tingling in their feet and sometime in their legs.  Other symptoms may include painful burning or hot feet, tingling or feeling like insects or ants are crawling on your feet or legs.      In the [place], the most common cause of peripheral neuropathy is diabetes. According to the American Diabetes Association, 60 to 70 percent of people with diabetes will develop neuropathy within their lifetime.  Other causes of peripheral neuropathy include:  Certain medications, including some chemotherapy drugs.   Heredity. Some people have a family history of peripheral neuropathy.   Advanced age. Peripheral neuropathy is more common as people age.   Arthritis. Certain type of arthritis can cause peripheral neuropathy.   Alcoholism. According to the [place] National Library of Medicine, up to half of all long-term heavy alcohol users develop peripheral neuropathy.   Neurological disorders. Certain neurological disorders, including spina bifida and fibromyalgia,  are associated with peripheral neuropathy.   Injury. Acute injury to the peripheral nerves may also cause peripheral neuropathy.     Pain Management Strategies:  1.Blood Sugar Control - Most important in diabetics  2. Medications such as: Amytriptylline, duloxetine, gabapentin, lyrica, tramadol  3. Nutritional therapy: Vitamin B6 (100mg daily), Vitamin B12 (75mcg daily), Vitamin D 2000 IU daily), Alpha-Lipoic Acid (600-1800mg daily), Acetyl-L-Carnitine (500-1000mg TID, L-methyl folate (1500mcg daily)  4. TENS (Trans-electrical nerve stimulation) with physical therapy  5. Compounding pain creams    RECOMMENDATION TO AVOID PROBLEMS  1.Wash your feet with lukewarm water and a mild soap and then dry them thoroughly, especially between the toes.  2. Examine your feet daily looking for cuts, corns, blisters, cracks, ect., especially after wearing new shoes. Make sure to look between your toes. If you cannot see the bottom of your feet, set a mirror on the floor and hold your foot over it, or ask a spouse, friend or family member to examine your feet for you. Contact your doctor immediately if new problems are noted or if sores are not healing.  3. Immediately apply moisturizer to the tops and bottoms of your feet, avoiding areas between the toes. Hand lotion (Intesive Care, Haylee, Eucerin, Neutrogena, Curel, ect...) is sufficient unless your doctor prescribes a medicated lotion. Apply sunscreen to your feet when going swimming outside.  4. Use clean comfortable shoes, wear white socks (if you have any bleeding or drainage, you will see it on white socks). Socks should not have thick seams or cut off the circulation around the leg. Break in new shoes slowly and rotate with older shoes until broken in. Check the inside of your shoes with your hand to look for areas of irritation or objects that may have fallen into your shoes.    5. Keep slippers by the side of your bed for use during the night.  6. Shoes should be fitted  by a professional and should not cause areas of irritation.  Check your feet regularly when wearing a new pair of shoes and replace them as needed.  7. Cut your nails straight across, but then gently round any sharp edges with a cardboard nail file.  If you have neuropathy, peripheral vascular disease or cannot see that well to trim your own toenails, see a medical professional for care.  8. Taking Vitamin B6, B12, and Alpha Lipoic Acid supplements can sometimes help.    DO NOT  1.  Do not soak your feet if you have an open sore.  Use only lukewarm water and always check the temperature with your hand as hot water can easily burn your feet.    2.  Never use a hot water bottle or heating pad on your feet.  Also do not apply cold compresses to your feet.  With decreased sensation, you could burn or freeze your feet.    3. Do not apply any of these to your feet:   -  Over the counter medicine for corns or warts   -  Harsh chemicals like boric acid   -  Do not self-treat corns, cuts, blisters or infections. Always consult your doctor.  4.  Do not wear sandals, slippers or walk barefoot, especially on hot sand or concrete or other harsh surfaces.  5.  If you smoke, stop!       [facility] LOCATIONS  [place]  [address] [place] [address]  [phone]   [address]  [phone] [place]  [address]  [phone]   [place]  [address]  [phone]   [place]  [address]  [phone] [place]  [address] [place] [address] [place]  [phone]   [place]  [address]  [phone]   [place]  [address] [place] [address]  [phone] [place]  [address] [phone]

## 2024-05-22 NOTE — Clinical Note
Scheduling Request    Patient Name: Durga Aguirre  Location of programming: [place] Phase 1 Mixed A   Start Date: {MONTH:109888} /***/***  Group: [id number]   Days; M, T, & Thurs   5:00 PM - 8:00 PM              Attending Provider (MD): Mike  Number of visits to be scheduled: 24 sessions  Duration of Appointment in minutes: 180 minutes                                 Visit Type: { OP VISIT TYPE:497143}

## 2024-05-22 NOTE — ADDENDUM NOTE
Encounter addended by: Yamila Patterson LPCC, LADC on: 5/21/2024 7:40 PM   Actions taken: Pend clinical note

## 2024-05-22 NOTE — PROGRESS NOTES
ASSESSMENT:  Encounter Diagnoses   Name Primary?    Alcohol-induced polyneuropathy (H24) Yes    Paresthesia of both feet      MEDICAL DECISION MAKING:  His presenting symptoms including numbness, coldness, burning and tingling in both feet suggest some form of peripheral neuropathy.  I suspect alcohol consumption plays a role in this.    I explained that a referral to neurology is appropriate for definitive diagnosis and other information.  He is accepting of this.    I discussed the importance of closely monitoring both feet, wearing supportive footwear and returning to clinic should he ever develop any sores on his feet.    I recommend he consider purchasing an over-the-counter accommodative type shoe insert.    Disclaimer: This note consists of symbols derived from keyboarding, dictation and/or voice recognition software. As a result, there may be errors in the script that have gone undetected. Please consider this when interpreting information found in this chart.    Raheem Pérez DPM, FACFAS, [facility] Department of Podiatry/Foot & Ankle Surgery      ____________________________________________________________________    HPI:       Durga presents today reporting numbness in both feet.  This started approximately 10 months ago.  There is associated burning and tingling  Pain rating a 4 out of 10 and fairly constant  More noticeable pain at rest  In addition to numbness, he reports burning, tingling, his feet feeling cold.  Alcohol abuse is listed in his problem list and he says he is trying to quit.  He has seen commercials on TV for peripheral neuropathy and also discussed this with his friend who has peripheral neuropathy.  *  Past Medical History:   Diagnosis Date    Alcohol abuse     Anxiety and depression     Arthritis     Hyperlipidemia     Hypertension     Mass of left chest wall     Other spontaneous pneumothorax 1997    Right knee pain    *  *  Past Surgical History:   Procedure Laterality Date     COLONOSCOPY      HC EXCISION PARTIAL TALUS OR CALCANEUS, BONE      fx calcaneus- 9 screws in foot   *  *  Current Outpatient Medications   Medication Sig Dispense Refill    acamprosate (CAMPRAL) 333 MG EC tablet Take 2 tablets (666 mg) by mouth 3 times daily for 90 days 180 tablet 2    ARIPiprazole (ABILIFY) 5 MG tablet TAKE ONE TABLET (5 MG) BY MOUTH ONE TIME DAILY 90 tablet 1    busPIRone (BUSPAR) 15 MG tablet Take 1 tablet (15 mg) by mouth 2 times daily 180 tablet 1    citalopram (CELEXA) 20 MG tablet Take 1 tablet (20 mg) by mouth daily 90 tablet 1    diclofenac (VOLTAREN) 50 MG EC tablet Take 1 tablet (50 mg) by mouth 3 times daily as needed for moderate pain 30 tablet 0    gabapentin (NEURONTIN) 300 MG capsule Take 2 capsules (600 mg) by mouth 3 times daily for 30 days Increase gabapentin dose 300 mg/day to a target dose of 600 mg  capsule 0    hydrochlorothiazide (HYDRODIURIL) 12.5 MG tablet Take 1 tablet (12.5 mg) by mouth daily 90 tablet 3    hydrOXYzine HCl (ATARAX) 50 MG tablet Take 0.5-1 tablets (25-50 mg) by mouth every 8 hours as needed for anxiety 180 tablet 3    lisinopril (ZESTRIL) 40 MG tablet Take 1 tablet (40 mg) by mouth daily (Hold for a systolic blood pressure of 100) 90 tablet 3    Melatonin 10 MG TABS tablet Take 10 mg by mouth nightly as needed for sleep      multivitamin w/minerals (THERA-VIT-M) tablet Take 1 tablet by mouth daily 90 tablet 0    naltrexone (DEPADE/REVIA) 50 MG tablet Take 2 tablets (100 mg) by mouth daily for 90 days 180 tablet 0    simvastatin (ZOCOR) 40 MG tablet Take 1 tablet (40 mg) by mouth every evening 90 tablet 3    thiamine (B-1) 100 MG tablet TAKE 1 TABLET (100 MG) BY MOUTH DAILY 90 tablet 0    traZODone (DESYREL) 50 MG tablet TAKE ONE TABLET BY MOUTH AT BEDTIME AS NEEDED FOR SLEEP 90 tablet 0         EXAM:    Vitals: /63   Wt 99.8 kg (220 lb)   BMI 30.68 kg/m    BMI: Body mass index is 30.68 kg/m .    Constitutional:  Durga Aguirre is in no  apparent distress, appears well-nourished.  Cooperative with history and physical exam.    Vascular:  Pedal pulses are palpable for both the DP and PT arteries.  CFT < 3 sec.  No edema.      Neuro: Light touch sensation is intact to the L4, L5, S1 distributions  No evidence of weakness, spasticity, or contracture in the lower extremities.   Protective sensation is diminished via monofilament testing    Derm: Normal texture and turgor.  No erythema, ecchymosis, or cyanosis.  No open lesions.     Musculoskeletal:    Lower extremity muscle strength is normal. No gross deformities.
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
## 2024-05-22 NOTE — TELEPHONE ENCOUNTER
----- Message from Yamila Patterson, STEPHANY, ELANA sent at 2024  4:55 PM CDT -----  Regarding: Please schedule 1:1  Hello,    Location of programmin [address] [place] [address] [address], [place] [address]  Date and time: 24 at 3pm  Group: [id number] (Phase 1)  Provider: Yamila Patterson ([id number])  Number of visits to be scheduled: 1 session  Length/Duration of Appointment in minutes: 1 hr  Visit Type: In person    Thanks,  Yamila Patterson, ANA, TIESHAW, ELANA, STEPHANY  MI/CD Psychotherapist  [facility]   [address], [place] [address]  Phone: [phone] Fax:  [phone]

## 2024-05-22 NOTE — LETTER
5/22/2024         RE: Durga Aguirre  [address]  [facility] [address]        Dear Colleague,    Thank you for referring your patient, Durga Aguirre, to the [facility]. Please see a copy of my visit note below.    ASSESSMENT:  Encounter Diagnoses   Name Primary?     Alcohol-induced polyneuropathy (H24) Yes     Paresthesia of both feet      MEDICAL DECISION MAKING:  His presenting symptoms including numbness, coldness, burning and tingling in both feet suggest some form of peripheral neuropathy.  I suspect alcohol consumption plays a role in this.    I explained that a referral to neurology is appropriate for definitive diagnosis and other information.  He is accepting of this.    I discussed the importance of closely monitoring both feet, wearing supportive footwear and returning to clinic should he ever develop any sores on his feet.    I recommend he consider purchasing an over-the-counter accommodative type shoe insert.    Disclaimer: This note consists of symbols derived from keyboarding, dictation and/or voice recognition software. As a result, there may be errors in the script that have gone undetected. Please consider this when interpreting information found in this chart.    Raheem Pérez, GISELE, FACFAS, [facility] Department of Podiatry/Foot & Ankle Surgery      ____________________________________________________________________    HPI:       Durga presents today reporting numbness in both feet.  This started approximately 10 months ago.  There is associated burning and tingling  Pain rating a 4 out of 10 and fairly constant  More noticeable pain at rest  In addition to numbness, he reports burning, tingling, his feet feeling cold.  Alcohol abuse is listed in his problem list and he says he is trying to quit.  He has seen commercials on TV for peripheral neuropathy and also discussed this with his friend who has peripheral neuropathy.  *  Past Medical History:    Diagnosis Date     Alcohol abuse      Anxiety and depression      Arthritis      Hyperlipidemia      Hypertension      Mass of left chest wall      Other spontaneous pneumothorax 1997     Right knee pain    *  *  Past Surgical History:   Procedure Laterality Date     COLONOSCOPY       HC EXCISION PARTIAL TALUS OR CALCANEUS, BONE      fx calcaneus- 9 screws in foot   *  *  Current Outpatient Medications   Medication Sig Dispense Refill     acamprosate (CAMPRAL) 333 MG EC tablet Take 2 tablets (666 mg) by mouth 3 times daily for 90 days 180 tablet 2     ARIPiprazole (ABILIFY) 5 MG tablet TAKE ONE TABLET (5 MG) BY MOUTH ONE TIME DAILY 90 tablet 1     busPIRone (BUSPAR) 15 MG tablet Take 1 tablet (15 mg) by mouth 2 times daily 180 tablet 1     citalopram (CELEXA) 20 MG tablet Take 1 tablet (20 mg) by mouth daily 90 tablet 1     diclofenac (VOLTAREN) 50 MG EC tablet Take 1 tablet (50 mg) by mouth 3 times daily as needed for moderate pain 30 tablet 0     gabapentin (NEURONTIN) 300 MG capsule Take 2 capsules (600 mg) by mouth 3 times daily for 30 days Increase gabapentin dose 300 mg/day to a target dose of 600 mg  capsule 0     hydrochlorothiazide (HYDRODIURIL) 12.5 MG tablet Take 1 tablet (12.5 mg) by mouth daily 90 tablet 3     hydrOXYzine HCl (ATARAX) 50 MG tablet Take 0.5-1 tablets (25-50 mg) by mouth every 8 hours as needed for anxiety 180 tablet 3     lisinopril (ZESTRIL) 40 MG tablet Take 1 tablet (40 mg) by mouth daily (Hold for a systolic blood pressure of 100) 90 tablet 3     Melatonin 10 MG TABS tablet Take 10 mg by mouth nightly as needed for sleep       multivitamin w/minerals (THERA-VIT-M) tablet Take 1 tablet by mouth daily 90 tablet 0     naltrexone (DEPADE/REVIA) 50 MG tablet Take 2 tablets (100 mg) by mouth daily for 90 days 180 tablet 0     simvastatin (ZOCOR) 40 MG tablet Take 1 tablet (40 mg) by mouth every evening 90 tablet 3     thiamine (B-1) 100 MG tablet TAKE 1 TABLET (100 MG) BY MOUTH  DAILY 90 tablet 0     traZODone (DESYREL) 50 MG tablet TAKE ONE TABLET BY MOUTH AT BEDTIME AS NEEDED FOR SLEEP 90 tablet 0         EXAM:    Vitals: /63   Wt 99.8 kg (220 lb)   BMI 30.68 kg/m    BMI: Body mass index is 30.68 kg/m .    Constitutional:  Durga Aguirre is in no apparent distress, appears well-nourished.  Cooperative with history and physical exam.    Vascular:  Pedal pulses are palpable for both the DP and PT arteries.  CFT < 3 sec.  No edema.      Neuro: Light touch sensation is intact to the L4, L5, S1 distributions  No evidence of weakness, spasticity, or contracture in the lower extremities.   Protective sensation is diminished via monofilament testing    Derm: Normal texture and turgor.  No erythema, ecchymosis, or cyanosis.  No open lesions.     Musculoskeletal:    Lower extremity muscle strength is normal. No gross deformities.          Again, thank you for allowing me to participate in the care of your patient.        Sincerely,        Raheem Pérez DPM

## 2024-05-22 NOTE — PROGRESS NOTES
"Individual Session Summary   START TIME: 3 PM   END TIME: 4 PM   Duration: 1 Hour      Data:  Met with client on this date for an individual session. The session focused on client's treatment plan goal for DIM(s) 1, 2, 3, 6 of his individual treatment plan. Durga provided information about discomfort due to numbness in his feet. He has scheduled to meet with specialists about it. Durga reports that he is unsure why it is happening and that the provider had \"theories\" about why. Therapist checked notes and the provider believed that it is due to alcohol use. Therapist and Durga talked about consequences of alcohol use and Durga had a difficult time reflecting on impact on health. He noted difficulty with short term memory and reports that it causes groups to be difficult. Durga reviewed his treatment goals and believes that he has completed all. He reports minimal craving experiences. \"I just blow off\" thoughts to use. Continuing to take medications to manage cravings. He shared that his blood pressure was low, believes that it's due to medications and remaining calm. Reports that karate class breathing helps (short inhale, longer exhale). Reports that he is working out during commercial breaks, around 5 times doing push ups.  Goal is 100 push ups. Durga reports no progress with doing body scans because \"it's boring.\" Durga reports that he likes group and programming \"better than going to AA groups.\"Reports that medications are helping his cravings and that it has not been difficult to move past them. Durga reports D6 stressors and that he is worried what might be mandated for him. He reports that he would only engage support groups if he is legally mandated to. Durga feels ready to progress to Phase 2 and provided a UA for alcohol testing. If no use is indicated, he will be set to begin Phase 2 next Monday.     Intervention: CBT, MI, Goal planning, UA     Assessment: Durga appear sober and no signs of withdrawals " was present. Durga appear to have increased engagement in self-care behaviors and in physical activity.      Plan. Client will make appointment to follow up with neurologist. Client will refrain from all substance use. Client will attend and engage in programming consistently.      Yamila Patterson, [facility], [facility]     Attestation: Dr. Loretta HANNON - Provides oversight and supervision of care.

## 2024-05-23 ENCOUNTER — TELEPHONE (OUTPATIENT)
Dept: BEHAVIORAL HEALTH | Facility: CLINIC | Age: 64
End: 2024-05-23
Payer: MEDICARE

## 2024-05-23 ENCOUNTER — HOSPITAL ENCOUNTER (OUTPATIENT)
Dept: BEHAVIORAL HEALTH | Facility: CLINIC | Age: 64
Discharge: HOME | End: 2024-05-23
Attending: PSYCHIATRY & NEUROLOGY
Payer: MEDICARE

## 2024-05-23 PROCEDURE — H2035 A/D TX PROGRAM, PER HOUR: HCPCS | Mod: HQ | Performed by: COUNSELOR

## 2024-05-23 NOTE — ADDENDUM NOTE
Encounter addended by: Yamila Patterson LPCC, LAD on: 5/23/2024 12:54 PM   Actions taken: Clinical Note Signed

## 2024-05-23 NOTE — TELEPHONE ENCOUNTER
----- Message from STEPHANY Soto, NOAH sent at 2024  1:39 PM CDT -----  Regarding: RE: Please schedule 1:1  Hi       Yes, we need him on the schedule for today. We are waiting for some UA results to step to Phase 2. Will request scheduling once we have everything we need. Please add him for group today at 5pm.    Thank you,  ----- Message -----  From: Manuel Cohen [facility]  Sent: 2024   7:43 AM CDT  To: STEPHANY Soto, NOAH  Subject: RE: Please schedule 1:1                          1:1 has been scheduled. Please let know us know if more phase I appts are needed. Thanks!  ----- Message -----  From: Yamila Patterson LPCC, NOAH  Sent: 2024   4:57 PM CDT  To: [facility]  Subject: Please schedule 1:1                              Hello,    Location of programmin[address] [place] [address] [address], [place] [address]  Date and time: 24 at 3pm  Group: [id number] (Phase 1)  Provider: Yamila Patterson ([id number])  Number of visits to be scheduled: 1 session  Length/Duration of Appointment in minutes: 1 hr  Visit Type: In person    ThanksYamila MPS, AMMON, ELANA, [facility]UL[facility]  MI/CD Psychotherapist  [facility]   [address] [place] [address] [address], [place] [address]  Phone: [phone] Fax:  [phone]

## 2024-05-23 NOTE — PROGRESS NOTES
Addiction Outpatient Weekly Clinical Staffing     Durga Aguirre was staffed on 5/21/2024 . Durga Aguirre was staffed on recovery strengths, barriers and treatment progress.     Staff present: Senait Ramos MS, [facility] , Yamila Patterson [facility], [facility] , Danny Lozada MS, [facility] , Megan Conde MSW, [facility] , and Gloria Manriquez [facility], [facility]     Date: 5/21/2024 Time: 2:55 PM    Staff Signature: Yamila Patterson [facility], [facility]   Attestation: Dr. Loretta HANNON - Provides oversight and supervision of care.

## 2024-05-24 NOTE — GROUP NOTE
Group Therapy Documentation    PATIENT'S NAME: Durga Aguirre  MRN:   [id number]  :   1960  ACCT. NUMBER: [id number]  DATE OF SERVICE: 24  START TIME:  5:00 PM  END TIME:  8:00 PM  FACILITATOR(S): Yamila Patterson LPCC, ELANA  TOPIC: BEH Group Therapy  Number of patients attending the group:  8  Group Length:  3 Hours    Group Therapy Type: Addiction, Psychoeducation, Psychotherapeutic, and Skills/Education    Summary of Group / Topics Discussed:    Boredom, Co-occurring Illness, Emotional Regulation, Interpersonal Effectiveness, Journaling, Meditation/Breathing Exercises, Mindfulness, Proper Nutrition & Exercise, Relaxation Techniques, Stress Management, Symptom Management, and Thinking Errors/Negative Self-Talk    STAGES OF RELAPSE     Reviewed with clients the stages of relapse. This topic will give a general overview of Stages of Relapse and how they apply to the personal experience of each patient.     Objective(s):   Client will identify all three stages of relapse    Client will identify at least one example of early warning signs relating to each of the three stages.    Client will identify healthy strategies they can use to cope with early warning signs.     Structure:   Provide psychoeducation on Stages of Relapse   Utilize white board for visual aid learning   Use teach-back techniques to ensure patients  understanding.   Provide patients with handouts to enhance learning.     Expected therapeutic outcomes:    Understand relapse as an essential and non-linear process.   Be aware of the stages of relapse so they are better able to cope with early warning signs.   Learn the importance of self-care and other healthy strategies to help reduce the risk of emotional, mental and physical relapse.      Therapeutic outcome(s) measured by:   Client s ability to teach-back information learned in group.   Client s demonstration of learning by identification of personal triggers in each stage of relapse  "and developing a holiday weekend relapse prevention plan.     Client s ability to Identify coping strategies they can use to reduce risk of relapse.      Dimensions: D3, D5    Attestation: Dr. Loretta HANNON - Provides oversight and supervision of care.         Group Attendance:  Attended group session    Patient's response to the group topic/interactions:  cooperative with task, discussed personal experience with topic, expressed readiness to alter behaviors, expressed understanding of topic, gave appropriate feedback to peers, and listened actively    Patient appeared to be Actively participating, Attentive, and Engaged.        Client specific details:  Durga reports no change in his last use date. He reports some anxiety about upcoming court and sobriety expectations. He said \"now I'll have a real reason to stay sober.\" Reports anxiety a 4/10. He identified breathing and exercising as helpful. He identified spending time pulling grass, with his sister and a friend as part of his weekend plans. He explored how fear plays a role in relapse, initially stating that it was not a trigger until hearing about another peer's experience with experiencing fear and relapse. He shared his experience and said that he did not realize that it was fear he was feeling. He developed a relapse prevention plan and identified external factors for reason to not use.     "

## 2024-05-24 NOTE — ADDENDUM NOTE
Encounter addended by: Yamila Patterson LPCC, LADC on: 5/23/2024 9:44 PM   Actions taken: Episode edited

## 2024-05-25 ENCOUNTER — HEALTH MAINTENANCE LETTER (OUTPATIENT)
Age: 64
End: 2024-05-25

## 2024-05-28 ENCOUNTER — TELEPHONE (OUTPATIENT)
Dept: BEHAVIORAL HEALTH | Facility: CLINIC | Age: 64
End: 2024-05-28
Payer: MEDICARE

## 2024-05-28 NOTE — TELEPHONE ENCOUNTER
----- Message from STEPHANY Soto, ELANA sent at 5/28/2024  1:03 PM CDT -----  Regarding: Please add to schedule  Scheduling them for their Group in P2:  Please schedule this client for Group Program [id number], [place] [facility] Co-Occurring Afternoon    Scheduling Request    Location of programming: [place] [facility] Co-occurring Afternoon Program.  Start Date: 5/30/24  Group: [id number] on Monday and Thursday at 5:00 PM to 8:00 PM (5 weeks)  Provider: Yamila Patterson ([id number])  Attending Provider: Romi Burgos MD is   Number of visits to be scheduled: 20  Duration of Appointment in minutes: 180  Visit Type: In-person or Treatment - 870        Thanks,  Yamila Patterson, MPS, LSW, ELANA, MACRINAC  MI/CD Psychotherapist  [facility] [address], [place] [address]  Phone: [phone] Fax:  [phone]

## 2024-05-28 NOTE — TELEPHONE ENCOUNTER
----- Message from Yamila Patterson, STEPHANY, ELANA sent at 2024  1:24 PM CDT -----  Regarding: Please schedule 1:1  Hello,    Location of programmin [address] [place] [address] [address], [place] [address]  Date and time: 24 at 2:30pm  Group: [id number] (Phase 2)  Provider: Yamila Patterson ([id number])  Number of visits to be scheduled: 1 session  Length/Duration of Appointment in minutes: 1 hr  Visit Type: In person    Thanks,  Yamila Patterson, ANA, AMMON, ELANA, STEPHANY  MI/CD Psychotherapist  [facility]   [facility] [address], [place] [address]  Phone: [phone] Fax:  [phone]

## 2024-05-29 ENCOUNTER — HOSPITAL ENCOUNTER (OUTPATIENT)
Dept: BEHAVIORAL HEALTH | Facility: CLINIC | Age: 64
Discharge: HOME | End: 2024-05-29
Attending: FAMILY MEDICINE
Payer: MEDICARE

## 2024-05-29 ENCOUNTER — TRANSFERRED RECORDS (OUTPATIENT)
Dept: PODIATRY | Facility: CLINIC | Age: 64
End: 2024-05-29
Payer: MEDICARE

## 2024-05-29 DIAGNOSIS — F10.20 ALCOHOL USE DISORDER, SEVERE, DEPENDENCE (H): Primary | ICD-10-CM

## 2024-05-29 PROCEDURE — H2035 A/D TX PROGRAM, PER HOUR: HCPCS | Performed by: COUNSELOR

## 2024-05-29 PROCEDURE — 80321 ALCOHOLS BIOMARKERS 1OR 2: CPT | Performed by: FAMILY MEDICINE

## 2024-05-29 PROCEDURE — 80307 DRUG TEST PRSMV CHEM ANLYZR: CPT | Performed by: FAMILY MEDICINE

## 2024-05-29 NOTE — PROGRESS NOTES
"Individual Session Summary   START TIME: 1 PM   END TIME: 2 PM   Duration: 1 Hour    Data:  Met with client on this date for an individual session. The session focused on client's treatment plan goal for DIM(s) 3, 5, 6 of his individual treatment plan. Durga reports that mental health is \"there.\" Durga reported that he had some contact with his son and that he wanted money. Durga said that he didn't respond but that it has been on his mind a lot. He discussed a plan to invite his son to lunch to discuss the possibility about building a relationship that isn't based on money transactions. Durga reports that his long weekend went well but that he continues to feel pain in his feet. Durga reports ongoing exercises and stretches as part of his self-care and helping his neighbors as part of socializing. He processed worries about court and states that he will engage support groups in mandated. Durga reports no experience of cravings and urges and denies feeling any risk for relapse.     Intervention: CBT, Mindfulness, psychoeducation, problem-solving, breath work.      Assessment: Durga addressed his peripheral neuropathy more using the lens of impact of alcohol use. He appeared hopeful that his condition will not worsen as he works on abstinence. He appear to have an increase on intrinsic motivation for sobriety.      Plan. Client will refrain from all substance use behaviors and attend programming consistently. Client will attend programming consistently and use groups to discuss his experience using coping tools.        Yamila Patterson, [facility], [facility]   Attestation: Dr. Loretta HANNON - Provides oversight and supervision of care.    "

## 2024-05-29 NOTE — PROGRESS NOTES
"[facility]  MENTAL HEALTH AND ADDICTION    CO-OCCURRING RESIDENTIAL AND IOP TREATMENT PLAN    [facility] LEVEL OF CARE:  1.0 Outpatient Program  Level of care updates: 1.0 Outpatient Program  Date:  24    DIMENSION 1: Intoxication / Withdrawal Potential     Initial Risk Ratin  Identified areas of concern/focus: Last reported alcohol use on . Reports to drinking 3-5 oz of alcohol that was left in home. Denies any current experience of withdrawal.     As evidenced by: Ongoing alcohol use. Current taking naltrexone and gabapentin to manage cravings.   Client's Goal: \"I will go on more walks and come to groups as scheduled to keep my mind off of drinking and to gain more insight on my alcohol cravings.\"  Clinical Goals:  Client to maintain abstinence throughout outpatient treatment.     Date/ Initials Methods/Interventions Target Date Extended Date Extended Date Stopped Completed Initials   24 Client will report any chemical use to staff. Until program completion         24 Client will refrain from any illicit substance or alcohol use, and report any substance use or alcohol use to primary counselor to determine if any changes need to be made to treatment plan to address withdrawal.  24                        Update: Client is on medications to manage urges and cravings. Client reports low level (2/10) urges and report that he sees urges as a \"momentary moment in space and time and continue with what I was doing.\"     DIMENSION 2: Biomedical Conditions/Complications   Initial Risk Ratin  Identified areas of concern/focus:  Left heel hurts from walking. Reports that it creates discomfort when walking a lot. Dental appointment schedule for  at 11[place]. Client has a primary care doctor, Dr. Bennett, through [facility].        As evidenced by:    Client has a diagnosis of peripheral neuropathy and an appointment scheduled in October with a " "specialty doctor. His current pain experience is described as a \"constant 5/10\" and interferes with his ability to work and do physical activity. Client's diagnosis is likely due to his long-term alcohol use.    Client's Goal: \"I will continue to have my medical needs met and addressed\"  Clinical Goals:  Address medical concerns as they arise and maintain management of physical health problems.     Date/ Initials Methods/Interventions Target Date Extended Date Extended Date Stopped Completed Initials   2024 Inform staff of any medical concerns or conditions that may impact participating in treatment programming.  24 Client will consistently take medications as prescribed.  24  RK Client will do daily full body stretching for 20 minutes. 24        DIMENSION 3:Emotional/Behavioral Conditions/Complications   Initial Risk Ratin  Identified areas of concern/focus: Client  reports ongoing experience of depression and anxiety. Client describes experience as \"maintainable.\"   296.31 (F33.0) Major Depressive Disorder, Recurrent Episode, Mild _ and With mixed features.     As evidenced by:    ANXIETY:  irritability, restlessness, and \"cabin fever\"  DEPRESSION:  difficulty concentrating, fatigue, and low energy, procrastination    Client's Goal: \"I want to learn new ways to stay in the present moment.\"  Clinical Goals:  Client to gain coping skills to address mental health, report better understanding of relationship between mental health and substance use.      Date/ Initials Methods/Interventions Target Date Extended Date Extended Date Stopped Completed Initials   2024 Client will participate in 3 hours of group therapy 3 days per week.  24 Client will increase and strengthen protective factors by staying connected with sister, friends and talk to family on the phone. 24 Client to attempt weekly " mindfulness practices (breathing, body scan, guided meditation). Share with counselor any benefits or challenges.  24 Client to identify at least 3 grounding skills she can begin to use and discuss effectiveness with counselor.  24                                                                                                                                                        Format for D3 Insert for MH Skills in [facility] IOP Groups:   Dimension 3:   Initial Risk Ratin  Identified areas of concern/focus: Client lacks understanding of how addiction affects other aspects of mental and physical health. Client would benefit from learning new healthy coping skills, stress management, relaxation techniques, and psychotherapy specific to addiction, cross-addictions, and PAWS.     Client will increase and strengthen protective factors by gaining knowledge and skills and learn how to apply self-care, stress management and psychotherapy to build a repertoire of daily habits that counteract PAWS, fatigue, depression and anxiety leading to increased resiliency and well-being.    Mental Health Skills Groups is required for first phase of treatment:    Date/ Initials Methods/Interventions Target Date Extend Date Extend Date Stopped Completed Initials   General Instructions: MH Skills Group - IOP - 6 sessions  Attend each of the following groups one time a week and complete all 6 by due date, unless excused by primary counselor. All group work to be completed each session to receive an effective outcome.       24 sk > Neurobiology of addiction:  Informs client of brain changes and reduces feelings of shame, instructs on steps to help heal   24 sk > Learn and use Mindfulness to facilitate effective action in problem solving and promote health and well-being  24 sk > Mental Health Part 1: Learn 3 core processes of Acceptance, Cognitive Defusion, and  "Being Present  24 sk > Mental Health Part 2: Learn 3 core processes of Self-as-Context, Values, and Committed Action  24  E 24 Ind   24 sk > Learn and apply Self-Care in a variety of areas to improve mental and physical health, reduce PAWS, and increase feelings of self-empowerment, resiliency and well-being.   24 sk > Learn Stress Management techniques to reduce PAWS and stress-related symptoms, increase resiliency, and learn healthy routines to replace dysfunctional habits.  24        DIMENSION 4: Treatment Acceptance/Resistance   Initial Risk Ratin  Identified areas of concern/focus:    Alcohol Use Disorders;  303.90 (F10.20) Alcohol Use Disorder Severe  Ambivalence about change  History of failed treatment attempts  Moderate motivation for treatment    As evidenced by:  Preoccupation with chemical use.   Ambivalence about change.   Tolerance.  Persistent desire or unsuccessful efforts to cut down or control substance use.  Substance use is continued despite persistent or recurrent problems related to substance use.  Craving, or a strong desire to use the substance    Client's Goal: \"Attending programming will help me reduce feelings isolation at home\"  Clinical Goals: Gain insight into how substance has impacted your life and actively engage in the treatment process.     Date/ Initials Methods/Interventions Target Date Extended Date Extended Date Stopped Completed Initials   2024 Client will meet individually with [facility] weekly to review progress on treatment plan goals. 24 Client to discuss any decrease in motivation to continue attending treatment.  24 Client will identify weekly and daily goals to help increase motivation and engagement in recovery.  24 Identify pros and cons of alcohol use and impact on maintaining sobriety important " "life areas 5/23/24 7/12/24                                 DIMENSION 5: Relapse/Continued Problem Potential   Initial Risk Rating: 3  Identified areas of concern/focus:  High risk for relapse  Lack of knowledge/coping skills related to to relapse triggers and coping strategies    As Evidenced by:  Client unable to identify relapse triggers.    Client lacks coping skills for relapse prevention.    Chemical use in client's environment.  History of daily use.  Failed attempts to quit.      Client's Goal: \"I want to  use skills to gain more clarity over my substance behaviors.\"  Clinical Goals:  Develop coping skills and relapse prevention strategies to utilize when experiencing triggers, cravings and/or urges to drink.     Date/ Initials Methods/Interventions Target Date Extended Date Extended Date Stopped Completed Initials   4/8/2024 Client will comply with urine drug screens at staff request to monitor for substance use. 5/23/24 7/12/24 4/8/24 Client will rate urges to use daily in group. 5/23/24 7/12/24 4/8/24 Client will complete relapse prevention assignment identifying potential triggers for relapse and coping strategies for each. 5/23/24 5/23/24 4/8/24 Client to list 5 negative effects of substance use and of not giving enough effort to self-care and mindfulness.  5/23/24 7/12/24 5/29/24 Client to identify 5 positive alternatives of things he can do when feeling a strong or difficulty emotion instead of drinking. Discuss with counselor.  7/12/24                                DIMENSION 6: Recovery Environment   Initial Risk Rating: 3  Identified areas of concern/focus: Lack of sober support  Chemical use in the home  Lack of sober / recreational interests  Legal issues  Court on 4/25/24 for most previous DUI.     As evidenced by:    Clients lacks sober activities.      Client's Goal: \"I want to see what I can do to build more structured activities, like working on cars more and roller " "blading.\"  Clinical Goals: Improve recovery environment to increase the probability of maintaining long term sobriety.   Establish a transition plan connecting to culturally informed services in the community for post-treatment follow up care.  Must be reached in order to have services terminated?  Yes  Target Date: 5/23/24       Date/ Initials Methods/Interventions Target Date Extended Date Extended Date Stopped Completed Initials   4/8/2024 Client to identify 5 aspects of his current environment that have to change in order to support and maintain good mental health and sobriety.  5/23/24 5/23/24 4/8/2024 Client will verbalize the role chemical use has played in legal problems.   5/23/24 7/12/24 4/8/2024 Client will increase sober support by attending recovery meetings regularly. 5/23/24 7/12/24 5/29/24 Client to identify 3 things that you consider 'missing' from your life right now that may have contributed to continued use. Share with counselor.  7/12/24                                                                                        Update: Client continues to endorse legal consequences of use and minimizes impact of alcohol use stating that \"it was over the limit once.\" Client is willing to go to sober support only if legally required.     Client Strengths: \"I have a good mind, open minded, skillful with auto body and polite, good listener, respectful, content.\" Client Treatment Plan Adaptations:  Client does not need adjustments at this time.  The following adjustments will be made based on the above identified plan: NA   The following staff have contributed to this plan: Yamila Patterson, MPS, LADC, LPCC.       Were family/support people involved in the treatment planning?  No - List reason why not:  Client's preference.    Durga attests their date of last use as 4/2/24. Durga attests they have participated in the creation of this treatment plan. Durga has been provided a copy of this " treatment plan and is in agreement with how this plan is written and will be stored in the electronic record.     Client Signature:  _______________________________  Date: _________________    LADC Signature:  _______________________________  Date: _____ ____________     Attestation: Dr. Loretta HANNON - Provides oversight and supervision of care.

## 2024-05-30 ENCOUNTER — HOSPITAL ENCOUNTER (OUTPATIENT)
Dept: BEHAVIORAL HEALTH | Facility: CLINIC | Age: 64
Discharge: HOME | End: 2024-05-30
Attending: FAMILY MEDICINE
Payer: MEDICARE

## 2024-05-30 PROCEDURE — H2035 A/D TX PROGRAM, PER HOUR: HCPCS | Mod: HQ | Performed by: COUNSELOR

## 2024-05-30 NOTE — ADDENDUM NOTE
Encounter addended by: Yamila Patterson LPCC, LAD on: 5/30/2024 12:49 PM   Actions taken: Clinical Note Signed

## 2024-05-30 NOTE — PROGRESS NOTES
Medical Consult for Treatment Planning                      This writer consulted with Dr. Romi Burgos, Addiction Medicine director, for Durga Aguirre Updated treatment plan on 5/30/2024 including recovery strengths, barriers and treatment engagement.     Attestation:   Dr. Burgos - Medical Director - Provides oversight and supervision of care.    Staff Signature: Yamila Patterson, [facility], [facility]  Date: 5/30/2024

## 2024-05-31 LAB — ETHYL GLUCURONIDE UR QL SCN: NORMAL NG/ML

## 2024-05-31 NOTE — GROUP NOTE
Group Therapy Documentation    PATIENT'S NAME: Durga Aguirre  MRN:   [id number]  :   1960  ACCT. NUMBER: [id number]  DATE OF SERVICE: 24  START TIME:  5:00 PM  END TIME:  8:00 PM  FACILITATOR(S): Yamila Patterson, STEPHANY, ELANA  TOPIC: BEH Group Therapy  Number of patients attending the group:  7  Group Length:  3 Hours    Group Therapy Type: Addiction, Psychoeducation, Psychotherapeutic, and Skills/Education    Summary of Group / Topics Discussed:    Anger/Conflict Management , Cognitive Therapy Techniques, Co-occurring Illness, Distress Tolerance, Emotional Regulation, Forgiveness, Interpersonal Effectiveness, Meditation/Breathing Exercises, Mindfulness, and Thinking Errors/Negative Self-Talk    Summary of Group / Topics Discussed:     Anger/Conflict Management , Cognitive Therapy Techniques, Co-occurring Illness, Coping Skills/Lifestyle Managemet, Emotional Regulation, Interpersonal Effectiveness, Meditation/Breathing Exercises, Self-Esteem/Self [place], Thinking Errors/Negative Self-Talk, and Trauma Informed Care     Cognitive Restructuring Negative Self-Talk and Identifying The Emotional Cues For Cravings in Relapse: Focus: Anger, primary and secondary emotions Facilitated a group discussion around cognitive restructuring focusing on self-talk primarily on negative self-talk and emotional cues that have led to substance use in the past and that creates urges and cravings presently. Provided psychoeducation on emotional management, negative self-talk and connections to one's self-talk feeds into one's addiction, MH and decisions. Clients explored how their self-talk has impacted their decision making and how their use has further impacted their thoughts and sensations. Provided a discussion on one's self-talk has lead to them justify continued use or can lead to a relapse. Provided handouts and an educational video on the power of emotional courage.     Objective(s):    Clients will increase emotional  "intelligence, become aware of self-talk and images they use to justify substance use.  Client will identify at least 3 emotional cues with examples of past use and current cravings, and make a list of ways to handle difficult emotions.   Clients will increase their ability to recognize when they are using self-talk to convince them its ok to use, which will help reduce risk of relapse.    Client will utilize coping mantras and mindfulness to complete emotional cue relapse prevention worksheet.      Expected therapeutic outcome(s):    Patient will:   To gain self and emotional-awareness to help pave a way for personal growth and empower client to increase emotional management and avoid relapses.  Increase commitment to change and confidence in managing difficult and uncomfortable sensations.     Be able to analyze behavior and utilize coping strategies to help reinforce change.       Dimension Focus: D3, D4, D5       Group Attendance:  Attended group session    Patient's response to the group topic/interactions:  cooperative with task, discussed personal experience with topic, expressed readiness to alter behaviors, expressed understanding of topic, gave appropriate feedback to peers, and listened actively    Patient appeared to be Actively participating, Attentive, and Engaged.        Client specific details:  Durga denies any change in his sober date. Durga participated with humor and with seriousness when needed in group. He appear to flow better with the treatment process. He processed anger from a text he received from his son that said \"loose my number.\" He used some time to explore primary emotions using the color wheel from his peer and realized that he was feeling hurt and confused. He made a plan to talk with his son over a meal. He reported that the long weekend went well. He stayed busy and met with his sister. He identified mindfulness, deep breathing and demonstrated smiling as things that's helpful for " him. He reports desire to use as 2/10 due to endorsing thoughts of the past and his son. He participated well in the mindfulness activity and noted how his addiction caused his anger to become worse.     Attestation: Dr. Loretta HANNON - Provides oversight and supervision of care.

## 2024-06-02 LAB
ETHYL GLUCURONIDE UR CFM-MCNC: NORMAL NG/ML
ETHYL SULFATE UR CFM-MCNC: 6102 NG/ML

## 2024-06-03 ENCOUNTER — HOSPITAL ENCOUNTER (OUTPATIENT)
Dept: BEHAVIORAL HEALTH | Facility: CLINIC | Age: 64
Discharge: HOME | End: 2024-06-03
Attending: FAMILY MEDICINE
Payer: MEDICARE

## 2024-06-03 PROCEDURE — H2035 A/D TX PROGRAM, PER HOUR: HCPCS | Mod: HQ | Performed by: COUNSELOR

## 2024-06-04 ENCOUNTER — HOSPITAL ENCOUNTER (OUTPATIENT)
Dept: BEHAVIORAL HEALTH | Facility: CLINIC | Age: 64
Discharge: HOME | End: 2024-06-04
Attending: FAMILY MEDICINE
Payer: MEDICARE

## 2024-06-04 ENCOUNTER — TELEPHONE (OUTPATIENT)
Dept: BEHAVIORAL HEALTH | Facility: CLINIC | Age: 64
End: 2024-06-04
Payer: MEDICARE

## 2024-06-04 PROCEDURE — H2035 A/D TX PROGRAM, PER HOUR: HCPCS | Performed by: COUNSELOR

## 2024-06-04 NOTE — ADDENDUM NOTE
Encounter addended by: Yamila Patterson LPCC, LADC on: 6/4/2024 6:41 PM   Actions taken: Clinical Note Signed

## 2024-06-04 NOTE — PROGRESS NOTES
Addiction Outpatient Weekly Clinical Staffing     Durga Aguirre was staffed on 6/4/2024 . Durga CONRAD Timothy was staffed on recovery strengths, barriers and treatment progress.     Staff present: Senait Ramos MS, [facility] , Valentina Martinez [facility], [facility] , Yamila Patterson [facility], [facility] , Danny Lozada MS, [facility] , Megan Conde MSW, [facility] , Gloria Manriquez [facility], [facility] , and Eda Lau    Date: 6/4/2024 Time: 3:13 PM    Staff Signature: Yamila Patterson, [facility], [facility]     Attestation: Dr. Loretta HANNON - Provides oversight and supervision of care.

## 2024-06-04 NOTE — PROGRESS NOTES
"[facility] Weekly Treatment Plan Review     Date:  24     Weekly Treatment Plan Review     Treatment Plan initiated on: 24.     Dimension1: Acute Intoxication/Withdrawal Potential -   Previous Dimension Ratin  Current Dimension Ratin  Client Goals Addressed Since last Review: \"I will go on more walks, and come to groups as scheduled to keep my mind off of drinking and to gain more insight on my alcohol cravings.\"  Are Treatment Plan goals/methods effective? Yes     Date of Last Use: 6/3/24  Any reports of withdrawal symptoms - Client has not had a 24 hour period yet of sobriety to monitor. Client reports to drinking 4-5 times a week in the evenings and up at 8 oz of alcohol. Client is at risk for withdrawal symptoms.       Dimension 2: Biomedical Conditions & Complications -   Previous Dimension Ratin  Current Dimension Ratin  Client Goals Addressed Since last Review: \"I will continue to have my medical needs met and addressed\"  Are Treatment Plan goals/methods effective? Yes     Medical Concerns:  None identified.  Current Medications & Medication Changes:  Current Facility-Administered Medications          Current Outpatient Medications   Medication Sig Dispense Refill    acamprosate (CAMPRAL) 333 MG EC tablet Take 2 tablets (666 mg) by mouth 3 times daily 180 tablet 0    ARIPiprazole (ABILIFY) 5 MG tablet TAKE ONE TABLET (5 MG) BY MOUTH ONE TIME DAILY 90 tablet 1    busPIRone (BUSPAR) 15 MG tablet Take 1 tablet (15 mg) by mouth 2 times daily 180 tablet 1    citalopram (CELEXA) 20 MG tablet Take 1 tablet (20 mg) by mouth daily 90 tablet 1    diclofenac (VOLTAREN) 50 MG EC tablet Take 1 tablet (50 mg) by mouth 3 times daily as needed for moderate pain 30 tablet 0    gabapentin (NEURONTIN) 300 MG capsule Take 2 capsules (600 mg) by mouth 3 times daily for 5 days Increase gabapentin dose 300 mg/day to a target dose of 600 mg TID 30 capsule 0    hydrochlorothiazide (HYDRODIURIL) 12.5 " "MG tablet Take 1 tablet (12.5 mg) by mouth daily 90 tablet 3    hydrOXYzine HCl (ATARAX) 50 MG tablet Take 0.5-1 tablets (25-50 mg) by mouth every 8 hours as needed for anxiety 180 tablet 3    lisinopril (ZESTRIL) 40 MG tablet Take 1 tablet (40 mg) by mouth daily (Hold for a systolic blood pressure of 100) 90 tablet 3    Melatonin 10 MG TABS tablet Take 10 mg by mouth nightly as needed for sleep        multivitamin w/minerals (THERA-VIT-M) tablet Take 1 tablet by mouth daily 90 tablet 0    naltrexone (DEPADE/REVIA) 50 MG tablet Take 2 tablets (100 mg) by mouth daily 180 tablet 1    simvastatin (ZOCOR) 40 MG tablet Take 1 tablet (40 mg) by mouth every evening 90 tablet 3    thiamine (B-1) 100 MG tablet TAKE 1 TABLET (100 MG) BY MOUTH DAILY 90 tablet 0    traZODone (DESYREL) 50 MG tablet TAKE ONE TABLET BY MOUTH AT BEDTIME AS NEEDED FOR SLEEP 90 tablet 0      No current facility-administered medications for this encounter.         Medication Prescriber:  Dr. Bennett, through [facility]  Taking meds as prescribed? Yes  Medication side effects or concerns:  None reported  Outside medical appointments this review period (list provider and reason for visit):  NA  Narrative: Client has peripheral neuropathy like due to ongoing alcohol abuse. Currently connected to a specialist. Working on sobriety.         Dimension 3: Emotional/Behavioral Conditions & Complications -   Previous Dimension Ratin  Current Dimension Ratin  Client Goals Addressed Since last Review: \"I want to learn new ways to stay in the present moment.\"  Are Treatment Plan goals/methods effective? Yes     PHQ2:        2024     1:00 PM 2024    10:46 AM 2024     2:57 PM 2023     8:56 AM 2023    10:33 AM 2022    10:12 AM 11/15/2021    10:27 AM   PHQ-2 (  [employer])   Q1: Little interest or pleasure in doing things 0 1 0 1 0 0 0   Q2: Feeling down, depressed or hopeless 1 0 0 1 0 0 0   PHQ-2 Score 1 1 0 2 0 0 " 0   Q1: Little interest or pleasure in doing things   Several days       Not at all Not at all   Q2: Feeling down, depressed or hopeless   Not at all       Not at all Not at all   PHQ-2 Score   1       0 0      GAD2:        1/16/2024    10:56 AM 2/9/2024    11:00 AM 4/4/2024     1:00 PM   YUSUF-2   Feeling nervous, anxious, or on edge 1 1 1   Not being able to stop or control worrying 0 1 1   YUSUF-2 Total Score 1 2    2 2      PROMIS 10-Global Health (all questions and answers displayed):        8/4/2023    12:00 PM 12/20/2023    12:00 PM 2/12/2024     5:14 PM 3/20/2024     2:00 PM 4/4/2024     1:00 PM   PROMIS 10   In general, would you say your health is:     Very good       In general, would you say your quality of life is:     Very good       In general, how would you rate your physical health?     Very good       In general, how would you rate your mental health, including your mood and your ability to think?     Very good       In general, how would you rate your satisfaction with your social activities and relationships?     Good       In general, please rate how well you carry out your usual social activities and roles     Good       To what extent are you able to carry out your everyday physical activities such as walking, climbing stairs, carrying groceries, or moving a chair?     Mostly       In the past 7 days, how often have you been bothered by emotional problems such as feeling anxious, depressed, or irritable?     Sometimes       In the past 7 days, how would you rate your fatigue on average?     Mild       In the past 7 days, how would you rate your pain on average, where 0 means no pain, and 10 means worst imaginable pain?     5       In general, would you say your health is: 3 3 4 4 3   In general, would you say your quality of life is: 4 2 4 4 4   In general, how would you rate your physical health? 3 4 4 3 3   In general, how would you rate your mental health, including your mood and your ability  to think? 4 3 4 4 4   In general, how would you rate your satisfaction with your social activities and relationships? 2 3 3 4 2   In general, please rate how well you carry out your usual social activities and roles. (This includes activities at home, at work and in your community, and responsibilities as a parent, child, spouse, employee, friend, etc.) 3 2 3 4 2   To what extent are you able to carry out your everyday physical activities such as walking, climbing stairs, carrying groceries, or moving a chair? 3 4 4 5 3   In the past 7 days, how often have you been bothered by emotional problems such as feeling anxious, depressed, or irritable? 4 3 3 3 3   In the past 7 days, how would you rate your fatigue on average? 3 3 2 2 2   In the past 7 days, how would you rate your pain on average, where 0 means no pain, and 10 means worst imaginable pain? 3 1 5 4 4   Global Mental Health Score 12 11 14 15 13   Global Physical Health Score 13 15 15 15 13   PROMIS TOTAL - SUBSCORES 25 26 29 30 26      Mental health diagnosis 296.30 (F33.9) Major Depressive Disorder, Recurrent Episode, Unspecified _  300.02 (F41.1) Generalized Anxiety Disorder  Date of last SIB:  None reported  Date of  last SI:  None reported  Date of last HI: None reported  Behavioral Targets:  Reducing intensity and frequency of MH symptoms and increase more instances of coping through it.   Risk factors:  Lives alone, lack of insight on mental health experiences, substance use, isolation.  Protective factors:  positive relationships positive family connections, safe and stable environment, regular sleep, and regular physical activity  Outside mental health related appointments this review period (list provider and reason for visit):  NA  Current MH Assignments:  Complete assignment on emotions that have caused relapses and develop coping tools to manage difficult experiences.      Narrative:  Current Mental Health symptoms include: irritability,  "restlessness, and \"cabin fever\", difficulty concentrating, fatigue, and low energy, procrastination. Active interventions to stabilize mental health symptoms this week : Attend and participate in groups and programming fully. Begin to build behaviors reflecting self-care and mindfulness outside of programming. Include activities to build a schedule in the evening. Process grief relating to sobriety.        Dimension 4: Treatment Acceptance / Resistance -   Previous Dimension Ratin  Current Dimension Ratin  Client Goals Addressed Since last Review: \"Attending programming will help me reduce feelings isolation at home\"  Are Treatment Plan goals/methods effective? Yes     ROYA Diagnosis:  Alcohol Use Disorder   303.90 (F10.20) Severe In early remission,   Commitment to tx process/Stage of change- 2  ROYA assignments - Complete consequences of use assignment.         Narrative - Durga attend programming well. He demonstrate some resistance with group materials and will often go off topic. Willie has trouble focusing on topics of mental health. Durga reports that he likes the \"socializing aspect\" of groups. Durga has a treatment contract and an accountability plan in place to increase motivation for treatment.         Dimension 5: Relapse / Continued Problem Potential -   Previous Dimension Rating:  3  Current Dimension Rating:  3  Client Goals Addressed Since last Review: \"I want to  use skills to gain more clarity over my substance behaviors.\"  Are Treatment Plan goals/methods effective? Yes     Relapses this week - None  Urges to use - YES, List reports thoughts to use but no urges due to taking anti-craving medications.  UA results - Will be randomly administered.  Recent Results   No results found for this or any previous visit (from the past 672 hour(s)).      Narrative- Willie is unable to identify relapse triggers, lacks coping skills for relapse prevention. Chemical use was in client's environment. " "Client reports low motivation for abstinence and external motivators for sobriety. Client does not see alcohol use as a big problem in his life. Durga has been using through programming so far and has had a difficult time being honest about his use.      Dimension 6: Recovery Environment -   Previous Dimension Rating:  3  Current Dimension Rating:  3  Client Goals Addressed Since last Review: \"I want to see what I can do to build more structured activities, like working on cars more and roller blading.\"  Are Treatment Plan goals/methods effective? Yes     Family Involvement - Patient declined to have family involved   Summarize attendance in family sessions - NA  Family supportive of treatment?  Yes     Community support group attendance - None at this time.  Recreational activities - Working to build structure.      Narrative - Chemical use in the home, Lack of sober / recreational interests, Legal issues. Will be placed on color wheel for legal accountability.      Progress made on transition planning goals: Currently working on assignments.      Justification for Continued Treatment at this Level of Care:  Durga lacks insight on difficult experiences and on coping tools to use. Durga does not see a problem with his drinking and has external motivators for sobriety. Durga has serious legal changes due to DUI.   Treatment coordination activities since last review:  coordination with   Need for peer recovery support referral? Yes: Client denies  Referrals made since last review:  NA     Discharge Planning:  Target Discharge Date/Timeframe:  8/22/24  Target Phase 2 Start:  5/16/24  Target Phase 3 Start: 7/9/24   Med Mgmt Provider/Appt:  NA        Is patient a vulnerable adult?  No     Interdisciplinary Clinical Supervision including: LADC and Mental health professional     *Client received copy of changes: Yes  *Client is aware of right to access a treatment plan review: Yes     Yamila Patterson, " MPS, LSW, LADC, LPCC  MI/CD Psychotherapist  [facility]   [address] [place] [address] [address], [place] [address]  Phone: [phone] Fax:  [phone]       Attestation: Dr. Loretta HANNON - Provides oversight and supervision of care.

## 2024-06-04 NOTE — TELEPHONE ENCOUNTER
----- Message from STEPHANY Soto LADC sent at 2024 10:31 AM CDT -----  Regarding: FW: Please schedule for 1:1  Hi Please make changes to the appointment. Time: from 2pm to 12n today.    Thank you,  ----- Message -----  From: Yamila Patterson LPCC, LADC  Sent: 6/3/2024   6:11 PM CDT  To: [facility]  Subject: Please schedule for 1:1                          Hello,    Location of programmin[address] [place] [address] [address], [place] [address]  Date and time: 24 at 2pm  Group: [id number] (Phase 2)  Provider: Yamila Patterson ([id number])  Number of visits to be scheduled: 1 session  Length/Duration of Appointment in minutes: 1 hr  Visit Type: In person    Thanks,  ANA Soto, TIESHAW, STEPHANY HUITRON  MI/CD Psychotherapist  [facility]   [facility] [address], [place] [address]  Phone: [phone] Fax:  [phone]

## 2024-06-04 NOTE — TELEPHONE ENCOUNTER
----- Message from Yamila Patterson, STEPHANY, ELANA sent at 6/3/2024  6:10 PM CDT -----  Regarding: Please schedule for 1:1  Hello,    Location of programmin [address] [place] [address] [address], [place] [address]  Date and time: 24 at 2pm  Group: [id number] (Phase 2)  Provider: Yamila Patterson ([id number])  Number of visits to be scheduled: 1 session  Length/Duration of Appointment in minutes: 1 hr  Visit Type: In person    Thanks,  Yamila Patterson, ANA, AMMON, ELANA, STEPHANY  MI/CD Psychotherapist  [facility]   [address], [place] [address]  Phone: [phone] Fax:  [phone]

## 2024-06-04 NOTE — PROGRESS NOTES
"Individual Session Summary   START TIME: 12 PM   END TIME: 12:55 PM   Duration: 1 Hour    Data:  Met with client on this date for an individual session. The session focused on client's treatment plan goal for DIM(s) 1, 3, 5, 6 of his individual treatment plan. Durga processed his alcohol use behaviors. Durga reports to drinking 2oz of vodka with water last night and that evenings are difficult for him. He identified feeling lonely and having thoughts about his father's car that the state have ordered him to move and his son being hospitalized due to alcohol use as \"adding up.\" He noted a tendency to keep things bottled up. He reports feeling \"really bad\" for drinking and said that he didn't think he would received a UA. He engaged in discussing requirement to be honest about his alcohol use, concerns about overdose and the high levels indicated in the UA results, and developing a contract for behavioral change. Durga agreed to build structure in the evenings that he does not have group by going to AA meetings near his house. He will bring in his AA card signatures for accountability. Durga will go to the park in the evenings or a drive to break away from the routine of drinking at home during that time. He will complete the consequences of alcohol use assignment to increase motivation for recovery.     Intervention: CBT, MI, problem solving, accountability, reflection, self-care     Assessment: Durga endorsed sadness and tearfulness around his thoughts of abstinence. He noted it as a 50 year old behavior and feeling \"fearful\" about never drinking again. Durga appeared more at ease when reminded of supports available to him in the program and in finding strengths through sobriety. Durga appeared to continue to endorse extrinsic motivations and had a plan to sober up a week before his 7/2 court date. Durga verbalized wanting \"life long sobriety if I do it this time.\" Durga appeared to have more openness towards " exploring personal consequences due to alcohol use.      Plan. Client will begin sobriety today and refrain from any alcohol use tonight. Client will engage contract plan to build structure in his evenings. Client will process post-use in the next group. Client will be open and honest about use behaviors and complete UA as requested. Client will continue to engage in programming consistently.      Yamila Patterson, [facility], [facility]   Attestation: Dr. Loretta HANNON - Provides oversight and supervision of care.

## 2024-06-04 NOTE — PROGRESS NOTES
[facility]YA Treatment Agreement   Client: Durga Aguirre  Date: June 4, 2024  MRN: [id number]    We want you to succeed in your substance use disorder program. Your therapist(s)/counselor(s) are concerned that your behavior may get in the way of reaching your treatment goals. So we have held a care conference to address these issues. The following treatment contract will help you get back on track.    I understand the following behavior does not let me fully take part in substance use disorder treatment at [facility]:  Substance use episodes.    As shown by: Reports to drinking 2 oz of vodka and water. Reports to drinking 4-5 nights each week with evenings being the most difficult. Reports improvement in not drinking during the day since starting programming. Last used 6/3/24.    To continue in substance use disorder treatment at [facility], I must:  Other (must state): Begin the AA support group near his home during the evenings when there is no group.   - Complete consequences of use worksheet.  - Develop a list of activities to build into the evening for structure, like going to the park by his house.   - Begin abstinence from alcohol 6/4/24.     If the above goals are not met, I understand I may be discharged from this Intensive Outpatient Co-Occurring Program and be provided with referrals for a higher level of care.         ________________      ______________________________________________   Date/Time   Client Signature          Yamila Patterson, LPCC, LADC     Attestation: Dr. Loretta HANNON - Provides oversight and supervision of care.

## 2024-06-04 NOTE — GROUP NOTE
Group Therapy Documentation    PATIENT'S NAME: Durga Aguirre  MRN:   [id number]  :   1960  ACCT. NUMBER: [id number]  DATE OF SERVICE: 24  START TIME:  5:00 PM  END TIME:  8:00 PM  FACILITATOR(S): Yamila Patterson LPCC, ELANA  TOPIC: BEH Group Therapy  Number of patients attending the group:  7  Group Length:  3 Hours    Group Therapy Type: Addiction, Psychoeducation, and Skills/Education    Summary of Group / Topics Discussed:    Balanced Lifestyle , Cognitive Therapy Techniques, Co-occurring Illness, Coping Skills/Lifestyle Managemet, Interpersonal Effectiveness, Journaling, Leisure Exploration/Use of Leisure Time, Relationships, and Thinking Errors/Negative Self-Talk    Topic 5:  Recovery Skills/Reducing Risks    This topic will give an overview of the importance of Recovery Skills, Vision and specifics with regard to applying them on a regular basis in sobriety.     Objective(s):   Client will create goals and identify small steps they can take this week to create action to their recovery vision.   Client will identify specifics that gives their life meaning and purpose (like connections, altruism, time in nature, developing hobbies) and share with group, practicing mental rehearsal.    Client will understand the importance of envisioning a sober life and increase motivation and inspiration for sobriety as a result.       Activities:  Client will increase understanding of the neurological impact of creating big picture goals and develop a recovery vision through journaling or drawing and share with group.     Structure:   Provide psycho education on how Recovery Skills can be used to increase internal motivation for long-term sobriety and recovery.    Facilitate group discussion around each client s choices of recovery skills to be put in practice and how they will self-evaluate outcomes.   Facilitate group discussion on recovery goals and ideal self.   Provide clients with handouts and worksheets  "to enhance learning.     Expected therapeutic outcomes:    Build recovery resilience against cravings, by using big picture thinking, leading to resilience against relapse in recovery  Growth in  sober network  via practicing skills designed to build healthy social connections in recovery.   Growth in developing optimal environment and in developing a life that aligns with the ideal self.     Therapeutic outcome(s) measured by:   Client s demonstration of learning and commitment via weekly check in, specifying skills practiced during previous week, increase in motivation for sobriety and recovery using Likert (or similar) scale.   Time spent practicing (multiple) Recovery Skills in a given week as measured by recovery diary or similar tracking tool.       Dimensions:  D4, D5, D6    Attestation: Dr. Loretta HANNON - Provides oversight and supervision of care.          Group Attendance:  Attended group session    Patient's response to the group topic/interactions:  cooperative with task, discussed personal experience with topic, expressed understanding of topic, gave appropriate feedback to peers, and listened actively    Patient appeared to be Actively participating, Attentive, and Engaged.        Client specific details:  Durga checked in a reports no change in his sobriety date. He reports improvement in his relationship with his son's relationship and a plan to eventually meet. Therapist spoke with Durga during the break and shared his UA results with him addressing concerns around his alcohol use. He admitted to using and said that evenings are difficult for him. He said that he \"does fine during the day but something is harder at night and I think my anxiety is building up.\" He created an appointment for 1:1 tomorrow. Durga struggled to think big picture and included thoughts from his past and goals he had created before as part of his recovery vision. He had a tough time to far ahead and imagining an ideal life for " himself. He struggled to create treatment related goals for the week and received guidance and feedback to include self-care and mindfulness. He plans to do post-use processing on Thursday.

## 2024-06-05 NOTE — ADDENDUM NOTE
Encounter addended by: Yamila Patterson LPCC, LADC on: 6/5/2024 3:17 PM   Actions taken: Clinical Note Signed

## 2024-06-05 NOTE — PROGRESS NOTES
Durga left a message saying that he was unable to follow the contract last night and that he drank. He said that it is really difficult and that he plans to talk more in group.     was left with Durga regarding his use episode and reminder for attendance to support group this evening.     Attestation: Dr. Loretta HANNON - Provides oversight and supervision of care.

## 2024-06-05 NOTE — ADDENDUM NOTE
Encounter addended by: Yamila Patterson, MACRINAC, LADC on: 6/5/2024 11:00 AM   Actions taken: Charge Capture section accepted

## 2024-06-06 ENCOUNTER — HOSPITAL ENCOUNTER (OUTPATIENT)
Dept: BEHAVIORAL HEALTH | Facility: CLINIC | Age: 64
Discharge: HOME | End: 2024-06-06
Attending: FAMILY MEDICINE
Payer: MEDICARE

## 2024-06-06 PROCEDURE — H2035 A/D TX PROGRAM, PER HOUR: HCPCS | Mod: HQ | Performed by: COUNSELOR

## 2024-06-07 NOTE — GROUP NOTE
Group Therapy Documentation    PATIENT'S NAME: Durag Aguirre  MRN:   [id number]  :   1960  ACCT. NUMBER: [id number]  DATE OF SERVICE: 24  START TIME:  5:00 PM  END TIME:  8:00 PM  FACILITATOR(S): Yamila Patterson, STEPHANY, ELANA  TOPIC: BEH Group Therapy  Number of patients attending the group:  7  Group Length:  3 Hours    Group Therapy Type: Addiction, Psychoeducation, Psychotherapeutic, and Skills/Education    Summary of Group / Topics Discussed:    Cognitive Therapy Techniques, Communication, Co-occurring Illness, Coping Skills/Lifestyle Managemet, Emotional Regulation, Forgiveness, Interpersonal Effectiveness, Meditation/Breathing Exercises, Self-Esteem/Self [place], Symptom Management, and Thinking Errors/Negative Self-Talk    Summary of Group / Topics Discussed:     Anger/Conflict Management , Cognitive Therapy Techniques, Co-occurring Illness, Coping Skills/Lifestyle Managemet, Emotional Regulation, Interpersonal Effectiveness, Meditation/Breathing Exercises, Self-Esteem/Self [place], Thinking Errors/Negative Self-Talk, and Trauma Informed Care     Cognitive Restructuring Negative Self-Talk and Identifying The Emotional Cues For Cravings in Relapse: Focus: Grief and Loss, primary and secondary emotions, link to addiction and mental health condition: Facilitated a group discussion around cognitive restructuring focusing on self-talk primarily on negative self-talk and emotional cues that have led to substance use in the past and that creates urges and cravings presently. Provided psychoeducation on emotional management, negative self-talk and connections to one's self-talk feeds into one's addiction, MH and decisions. Clients explored how their self-talk has impacted their decision making and how their use has further impacted their thoughts and sensations. Provided a discussion on one's self-talk has lead to them justify continued use or can lead to a relapse. Provided handouts and an educational  "video on the power of emotional courage.     Objective(s):    Clients will increase emotional intelligence, become aware of self-talk that produces toxic guilt and shame and reframing root programming that developed negative self-language.  Client will provide examples of impact of shame and guilt and how it relates to past use and current cravings, and make a list of ways to handle shame based language.   Clients will increase their ability to recognize when they are using self-talk to convince them its ok to use, which will help reduce risk of relapse.    Client will utilize write a letter to self about \"What's right about me.\"     Expected therapeutic outcome(s):    Patient will:   To gain self and emotional-awareness to help pave a way for personal growth and empower client to increase emotional management and avoid relapses.  Increase commitment to change and confidence in managing difficult and uncomfortable sensations.     Be able to analyze behavior and utilize coping strategies to help reinforce change.    To reprogram guilt and shame base self-talks.      Dimension Focus: D3, D5     Attestation: Dr. Loretta HANNON - Provides oversight and supervision of care.       Group Attendance:  Attended group session    Patient's response to the group topic/interactions:  cooperative with task, discussed personal experience with topic, expressed readiness to alter behaviors, expressed understanding of topic, listened actively, and verbalizations were off topic    Patient appeared to be Actively participating, Attentive, and Engaged.        Client specific details:  Durga processed post use and reported that he slept at 7pm last night and had no alcohol use. He reports that he still has liquor in his house and was given strong encouragement by his peers to get it out of the house. He processed viewing this step as \"tough.\" He made a goal to get all the alcohol out of the house today and go to a sober meeting this weekend. " "He understood that he was at risk for a higher level of care referral if unable to maintain a longer period of sobriety. He engaged well to explore situations in the past that created guilt for him and explored how guilt can be useful to create behavior change and defining toxic guilt. He identified values violated in the past due to addiction and underlying shaming thoughts that caused ongoing addiction. He believed that he has worked through his shame and guilt experiences. He explored themes of self-acceptance, self-forgiveness, compassion and identified internal beliefs about self that produces guilt and shame talks. He identified using \"I would like to, I want to, I will\" as replacement statements for \"I should.\" He participated in a mindfulness meditation for \"Healing from Self-Blame\" by Kacy Brooks.    "

## 2024-06-10 ENCOUNTER — HOSPITAL ENCOUNTER (OUTPATIENT)
Dept: BEHAVIORAL HEALTH | Facility: CLINIC | Age: 64
Discharge: HOME | End: 2024-06-10
Attending: FAMILY MEDICINE
Payer: MEDICARE

## 2024-06-10 PROCEDURE — H2035 A/D TX PROGRAM, PER HOUR: HCPCS | Mod: HQ | Performed by: COUNSELOR

## 2024-06-11 ENCOUNTER — TELEPHONE (OUTPATIENT)
Dept: BEHAVIORAL HEALTH | Facility: CLINIC | Age: 64
End: 2024-06-11
Payer: MEDICARE

## 2024-06-11 NOTE — ADDENDUM NOTE
Encounter addended by: Yamila Patterson LPCC, LADC on: 6/11/2024 5:22 PM   Actions taken: Clinical Note Signed

## 2024-06-11 NOTE — TELEPHONE ENCOUNTER
----- Message from Yamila Patterson, STEPHANY, ELANA sent at 2024 11:11 AM CDT -----  Regarding: Please schedule 1:1  Hello,    Location of programmin [address] [place] [address] [address], [place] [address]  Date and time:  at 1pm  Group: [id number] (Phase 12  Provider: Yamila Patterson ([id number])  Number of visits to be scheduled: 1 session  Length/Duration of Appointment in minutes: 1 hr  Visit Type: In person    ThanksYamila, ANA, TIESHAW, ELANA, MACRINAC  MI/CD Psychotherapist  [facility]   [address], [place] [address]  Phone: [phone] Fax:  [phone]

## 2024-06-11 NOTE — PROGRESS NOTES
Addiction Outpatient Weekly Clinical Staffing     Durga Aguirre was staffed on 6/11/2024 . Durga Aguirre was staffed on recovery strengths, barriers and treatment progress. Discussed Durga recurrent use that occurred on 6/7 and updates on his treatment contract.    Staff present: Paula Nicholson, [facility] , Senait Ramos MS, [facility] , Valentina Martinez [facility], [facility] , Yamila Patterson [facility], [facility] , Danny Lozada MS, [facility] , Megan Conde [facility], [facility] , and Gloria Manriquez [facility], [facility]     Date: 6/11/2024 Time: 5:17 PM    Staff Signature: Yamila Patterson [facility], [facility]     Attestation: Dr. Loretta HANNON - Provides oversight and supervision of care.

## 2024-06-11 NOTE — GROUP NOTE
"Group Therapy Documentation    PATIENT'S NAME: Durga Aguirre  MRN:   [id number]  :   1960  ACCT. NUMBER: [id number]  DATE OF SERVICE: 6/10/24  START TIME:  5:00 PM  END TIME:  8:00 PM  FACILITATOR(S): Yamila Patterson, STEPHANY, ELANA  TOPIC: BEH Group Therapy  Number of Clients attending the group:  8  Group Length:  3 Hours    Group Therapy Type: Addiction, Psychotherapeutic, and Skills/Education    Summary of Group / Topics Discussed:    Cognitive Therapy Techniques, Distress Tolerance, Emotional Regulation, Interpersonal Effectiveness, Journaling, and Self-Esteem/Self [place]      Distress tolerance: Radical Acceptance:  Provided a psychoeducation group focusing on the distress tolerance skill radical acceptance to tolerate heightened stress in the moment.  Clients identified situations that necessitate radical acceptance.  They focused on applying acceptance of the moment to tolerate difficult emotions and events. Clients discussed how to distinguish when this can be useful in their lives and when other skills are more relevant or helpful.    Group Objectives/Goals    Client to gain an understanding that some amount of pain exists for each of us in our lives.  Client will process the difficulty of acceptance in our lives and benefits of radical acceptance to mood and functioning.  Client to demonstrate understanding of when to use the radical acceptance to tolerate distress by providing examples of situations where this could be applied.  Client to identify barriers to applying radical acceptance to difficult situations and explore strategies to overcome them.  Share homework on \"What's right with me?\" to increase self-acceptance      Distress tolerance:  TIPP Skill: Facilited on a psychoeducation group focusing on the distress tolerance skill TIPP. Clients learned to tolerate distress by applying strategies to effect positive change in the present moment.  Reviewed each of the DBT TIPP (temperature, " "intense exercise, paced breathing, progressive muscle relaxation) strategies and discussed how to apply each.  Clients identified situations where they would benefit from applying strategies of TIPP. Clients discussed how to distinguish when this can be useful in their lives or when other strategies would be more relevant or helpful.    Group Objectives/Goals    Client to discuss how the use of intentional TIPP strategies can help reduce distress.  Client will increase ability to decide when to use TIPP strategies  Client to choose 1-2 in the moment actions to apply during times of distress.    Dimensions Covered: D1, D3, D4, D5    Attestation: Dr. Loretta HANNON - Provides oversight and supervision of care.        Group Attendance:  Attended group session    Patient's response to the group topic/interactions:  discussed personal experience with topic, expressed readiness to alter behaviors, expressed understanding of topic, gave appropriate feedback to peers, and listened actively    Patient appeared to be Actively participating, Attentive, and Engaged.        Client specific details:  Durga showed up and appeared to be sick. He said that he is recovering from a cough he had over the weekend. Durga shared that the alcohol is out of his out but that he drank the last of it last Thursday night. Durga presented as defensive in group and easily irritated. He reports that he is motivated for sobriety and states that his motivation for treatment is at a 10. Durga did not complete his assignment on \"What's right with me\". Durga participated in learning about TIPP and instructions on using temperature and progressive muscle relaxation for mood regulation. Durga reviewed his contract to staying in group.    "

## 2024-06-11 NOTE — PROGRESS NOTES
"[facility] Weekly Treatment Plan Review     Date:  24     Weekly Treatment Plan Review     Treatment Plan initiated on: 24.     Dimension1: Acute Intoxication/Withdrawal Potential -   Previous Dimension Ratin  Current Dimension Ratin  Client Goals Addressed Since last Review: \"I will go on more walks, and come to groups as scheduled to keep my mind off of drinking and to gain more insight on my alcohol cravings.\"  Are Treatment Plan goals/methods effective? Yes     Date of Last Use: 24  Any reports of withdrawal symptoms -  Client reports to drinking 4-5 times a week in the evenings and up at 8 oz of alcohol. Client is at risk for withdrawal symptoms.  Client reports that he drank all of the alcohol in his place on      Dimension 2: Biomedical Conditions & Complications -   Previous Dimension Ratin  Current Dimension Ratin  Client Goals Addressed Since last Review: \"I will continue to have my medical needs met and addressed\"  Are Treatment Plan goals/methods effective? Yes     Medical Concerns:  None identified.  Current Medications & Medication Changes:  Current Facility-Administered Medications               Current Outpatient Medications   Medication Sig Dispense Refill    acamprosate (CAMPRAL) 333 MG EC tablet Take 2 tablets (666 mg) by mouth 3 times daily 180 tablet 0    ARIPiprazole (ABILIFY) 5 MG tablet TAKE ONE TABLET (5 MG) BY MOUTH ONE TIME DAILY 90 tablet 1    busPIRone (BUSPAR) 15 MG tablet Take 1 tablet (15 mg) by mouth 2 times daily 180 tablet 1    citalopram (CELEXA) 20 MG tablet Take 1 tablet (20 mg) by mouth daily 90 tablet 1    diclofenac (VOLTAREN) 50 MG EC tablet Take 1 tablet (50 mg) by mouth 3 times daily as needed for moderate pain 30 tablet 0    gabapentin (NEURONTIN) 300 MG capsule Take 2 capsules (600 mg) by mouth 3 times daily for 5 days Increase gabapentin dose 300 mg/day to a target dose of 600 mg TID 30 capsule 0    hydrochlorothiazide " "(HYDRODIURIL) 12.5 MG tablet Take 1 tablet (12.5 mg) by mouth daily 90 tablet 3    hydrOXYzine HCl (ATARAX) 50 MG tablet Take 0.5-1 tablets (25-50 mg) by mouth every 8 hours as needed for anxiety 180 tablet 3    lisinopril (ZESTRIL) 40 MG tablet Take 1 tablet (40 mg) by mouth daily (Hold for a systolic blood pressure of 100) 90 tablet 3    Melatonin 10 MG TABS tablet Take 10 mg by mouth nightly as needed for sleep        multivitamin w/minerals (THERA-VIT-M) tablet Take 1 tablet by mouth daily 90 tablet 0    naltrexone (DEPADE/REVIA) 50 MG tablet Take 2 tablets (100 mg) by mouth daily 180 tablet 1    simvastatin (ZOCOR) 40 MG tablet Take 1 tablet (40 mg) by mouth every evening 90 tablet 3    thiamine (B-1) 100 MG tablet TAKE 1 TABLET (100 MG) BY MOUTH DAILY 90 tablet 0    traZODone (DESYREL) 50 MG tablet TAKE ONE TABLET BY MOUTH AT BEDTIME AS NEEDED FOR SLEEP 90 tablet 0      No current facility-administered medications for this encounter.         Medication Prescriber:  Dr. Bennett, through [facility]  Taking meds as prescribed? Yes  Medication side effects or concerns:  None reported  Outside medical appointments this review period (list provider and reason for visit):  NA  Narrative: Client has peripheral neuropathy like due to ongoing alcohol abuse. Currently connected to a specialist. Working on sobriety.         Dimension 3: Emotional/Behavioral Conditions & Complications -   Previous Dimension Ratin  Current Dimension Ratin  Client Goals Addressed Since last Review: \"I want to learn new ways to stay in the present moment.\"  Are Treatment Plan goals/methods effective? Yes     PHQ2:        2024     1:00 PM 2024    10:46 AM 2024     2:57 PM 2023     8:56 AM 2023    10:33 AM 2022    10:12 AM 11/15/2021    10:27 AM   PHQ-2 (  [employer])   Q1: Little interest or pleasure in doing things 0 1 0 1 0 0 0   Q2: Feeling down, depressed or hopeless 1 0 0 1 0 0 0 "   PHQ-2 Score 1 1 0 2 0 0 0   Q1: Little interest or pleasure in doing things   Several days       Not at all Not at all   Q2: Feeling down, depressed or hopeless   Not at all       Not at all Not at all   PHQ-2 Score   1       0 0      GAD2:        1/16/2024    10:56 AM 2/9/2024    11:00 AM 4/4/2024     1:00 PM   YUSUF-2   Feeling nervous, anxious, or on edge 1 1 1   Not being able to stop or control worrying 0 1 1   YUSUF-2 Total Score 1 2    2 2      PROMIS 10-Global Health (all questions and answers displayed):        8/4/2023    12:00 PM 12/20/2023    12:00 PM 2/12/2024     5:14 PM 3/20/2024     2:00 PM 4/4/2024     1:00 PM   PROMIS 10   In general, would you say your health is:     Very good       In general, would you say your quality of life is:     Very good       In general, how would you rate your physical health?     Very good       In general, how would you rate your mental health, including your mood and your ability to think?     Very good       In general, how would you rate your satisfaction with your social activities and relationships?     Good       In general, please rate how well you carry out your usual social activities and roles     Good       To what extent are you able to carry out your everyday physical activities such as walking, climbing stairs, carrying groceries, or moving a chair?     Mostly       In the past 7 days, how often have you been bothered by emotional problems such as feeling anxious, depressed, or irritable?     Sometimes       In the past 7 days, how would you rate your fatigue on average?     Mild       In the past 7 days, how would you rate your pain on average, where 0 means no pain, and 10 means worst imaginable pain?     5       In general, would you say your health is: 3 3 4 4 3   In general, would you say your quality of life is: 4 2 4 4 4   In general, how would you rate your physical health? 3 4 4 3 3   In general, how would you rate your mental health, including  your mood and your ability to think? 4 3 4 4 4   In general, how would you rate your satisfaction with your social activities and relationships? 2 3 3 4 2   In general, please rate how well you carry out your usual social activities and roles. (This includes activities at home, at work and in your community, and responsibilities as a parent, child, spouse, employee, friend, etc.) 3 2 3 4 2   To what extent are you able to carry out your everyday physical activities such as walking, climbing stairs, carrying groceries, or moving a chair? 3 4 4 5 3   In the past 7 days, how often have you been bothered by emotional problems such as feeling anxious, depressed, or irritable? 4 3 3 3 3   In the past 7 days, how would you rate your fatigue on average? 3 3 2 2 2   In the past 7 days, how would you rate your pain on average, where 0 means no pain, and 10 means worst imaginable pain? 3 1 5 4 4   Global Mental Health Score 12 11 14 15 13   Global Physical Health Score 13 15 15 15 13   PROMIS TOTAL - SUBSCORES 25 26 29 30 26      Mental health diagnosis 296.30 (F33.9) Major Depressive Disorder, Recurrent Episode, Unspecified _  300.02 (F41.1) Generalized Anxiety Disorder  Date of last SIB:  None reported  Date of  last SI:  None reported  Date of last HI: None reported  Behavioral Targets:  Reducing intensity and frequency of MH symptoms and increase more instances of coping through it.   Risk factors:  Lives alone, lack of insight on mental health experiences, substance use, isolation.  Protective factors:  positive relationships positive family connections, safe and stable environment, regular sleep, and regular physical activity  Outside mental health related appointments this review period (list provider and reason for visit):  NA  Current MH Assignments:  Complete assignment on emotions that have caused relapses and develop coping tools to manage difficult experiences.      Narrative:  Current Mental Health symptoms  "include: irritability, restlessness, and \"cabin fever\", difficulty concentrating, fatigue, and low energy, procrastination. Active interventions to stabilize mental health symptoms this week : Attend and participate in groups and programming fully. Begin to build behaviors reflecting self-care and mindfulness outside of programming. Include activities to build a schedule in the evening. Process grief relating to sobriety.        Dimension 4: Treatment Acceptance / Resistance -   Previous Dimension Ratin  Current Dimension Ratin  Client Goals Addressed Since last Review: \"Attending programming will help me reduce feelings isolation at home\"  Are Treatment Plan goals/methods effective? Yes     ROYA Diagnosis:  Alcohol Use Disorder   303.90 (F10.20) Severe In early remission,   Commitment to tx process/Stage of change- 2  ROYA assignments - Complete consequences of use assignment.         Narrative - Durga attend programming well. He demonstrate some resistance with group materials and will often go off topic. Willie has trouble focusing on topics of mental health. Durga reports that he likes the \"socializing aspect\" of groups. Durga has a treatment contract and an accountability plan in place to increase motivation for treatment.         Dimension 5: Relapse / Continued Problem Potential -   Previous Dimension Rating:  3  Current Dimension Rating:  3  Client Goals Addressed Since last Review: \"I want to  use skills to gain more clarity over my substance behaviors.\"  Are Treatment Plan goals/methods effective? Yes     Relapses this week - None  Urges to use - YES, List reports thoughts to use but no urges due to taking anti-craving medications.  UA results - Will be randomly administered.  Recent Results   No results found for this or any previous visit (from the past 672 hour(s)).      Narrative- Durga is unable to identify relapse triggers, lacks coping skills for relapse prevention. Chemical use was in " "client's environment. Client reports low motivation for abstinence and external motivators for sobriety. Client does not see alcohol use as a big problem in his life. Durga has been using through programming so far and has had a difficult time being honest about his use. Durga reports that he no longer has alcohol in his home. And that he drank the last of it on 6/6.     Dimension 6: Recovery Environment -   Previous Dimension Rating:  3  Current Dimension Rating:  3  Client Goals Addressed Since last Review: \"I want to see what I can do to build more structured activities, like working on cars more and roller blading.\"  Are Treatment Plan goals/methods effective? Yes     Family Involvement - Patient declined to have family involved   Summarize attendance in family sessions - NA  Family supportive of treatment?  Yes     Community support group attendance - None at this time.  Recreational activities - Working to build structure.      Narrative - Chemical use in the home, Lack of sober / recreational interests, Legal issues. Will be placed on color wheel for legal accountability.      Progress made on transition planning goals: Currently working on assignments.      Justification for Continued Treatment at this Level of Care:  Durga lacks insight on difficult experiences and on coping tools to use. Durga does not see a problem with his drinking and has external motivators for sobriety. Durga has serious legal changes due to DUI.   Treatment coordination activities since last review:  coordination with   Need for peer recovery support referral? Yes: Client denies  Referrals made since last review:  NA     Discharge Planning:  Target Discharge Date/Timeframe:  8/22/24  Target Phase 2 Start:  5/16/24  Target Phase 3 Start: 7/9/24   Med Mgmt Provider/Appt:  NA        Is patient a vulnerable adult?  No     Interdisciplinary Clinical Supervision including: LADC and Mental health professional     *Client " received copy of changes: Yes  *Client is aware of right to access a treatment plan review: Yes     Yamila Patterson, MPS, LSW, LADC, LPCC  MI/CD Psychotherapist  [facility] [place] [address] [place] [address] [address], [place] [address]  Phone: [phone] Fax:  [phone]       Attestation: Dr. Loretta HANNON - Provides oversight and supervision of care.

## 2024-06-11 NOTE — ADDENDUM NOTE
Encounter addended by: Yamila Patterson LPCC, LAD on: 6/11/2024 5:17 PM   Actions taken: Clinical Note Signed

## 2024-06-13 ENCOUNTER — PATIENT OUTREACH (OUTPATIENT)
Dept: GERIATRIC MEDICINE | Facility: CLINIC | Age: 64
End: 2024-06-13
Payer: MEDICARE

## 2024-06-13 ENCOUNTER — VIRTUAL VISIT (OUTPATIENT)
Dept: PSYCHIATRY | Facility: CLINIC | Age: 64
End: 2024-06-13
Payer: MEDICARE

## 2024-06-13 ENCOUNTER — HOSPITAL ENCOUNTER (OUTPATIENT)
Dept: BEHAVIORAL HEALTH | Facility: CLINIC | Age: 64
Discharge: HOME | End: 2024-06-13
Attending: FAMILY MEDICINE
Payer: MEDICARE

## 2024-06-13 DIAGNOSIS — F10.24 ALCOHOL DEPENDENCE WITH ALCOHOL-INDUCED MOOD DISORDER (H): Primary | ICD-10-CM

## 2024-06-13 DIAGNOSIS — F41.1 GENERALIZED ANXIETY DISORDER: ICD-10-CM

## 2024-06-13 PROCEDURE — H2035 A/D TX PROGRAM, PER HOUR: HCPCS | Mod: HQ | Performed by: COUNSELOR

## 2024-06-13 PROCEDURE — 99443 PR PHYSICIAN TELEPHONE EVALUATION 21-30 MIN: CPT | Mod: 93 | Performed by: NURSE PRACTITIONER

## 2024-06-13 RX ORDER — TRAZODONE HYDROCHLORIDE 50 MG/1
50 TABLET, FILM COATED ORAL
Qty: 90 TABLET | Refills: 0 | Status: SHIPPED | OUTPATIENT
Start: 2024-06-13 | End: 2024-09-10 | Stop reason: DRUGHIGH

## 2024-06-13 RX ORDER — CITALOPRAM HYDROBROMIDE 20 MG/1
20 TABLET ORAL DAILY
Qty: 90 TABLET | Refills: 1 | Status: SHIPPED | OUTPATIENT
Start: 2024-06-13 | End: 2024-09-10 | Stop reason: DRUGHIGH

## 2024-06-13 RX ORDER — BUSPIRONE HYDROCHLORIDE 15 MG/1
15 TABLET ORAL 2 TIMES DAILY
Qty: 180 TABLET | Refills: 1 | Status: SHIPPED | OUTPATIENT
Start: 2024-06-13

## 2024-06-13 RX ORDER — ARIPIPRAZOLE 5 MG/1
TABLET ORAL
Qty: 90 TABLET | Refills: 1 | Status: SHIPPED | OUTPATIENT
Start: 2024-06-13

## 2024-06-13 ASSESSMENT — PATIENT HEALTH QUESTIONNAIRE - PHQ9
10. IF YOU CHECKED OFF ANY PROBLEMS, HOW DIFFICULT HAVE THESE PROBLEMS MADE IT FOR YOU TO DO YOUR WORK, TAKE CARE OF THINGS AT HOME, OR GET ALONG WITH OTHER PEOPLE: SOMEWHAT DIFFICULT
SUM OF ALL RESPONSES TO PHQ QUESTIONS 1-9: 1
SUM OF ALL RESPONSES TO PHQ QUESTIONS 1-9: 1

## 2024-06-13 NOTE — PROGRESS NOTES
[facility] Care Coordination Contact    No longer active with [facility] case management effective 2/29/24.  Reason for community disenrollment: Other:  MA only.  Fell off health plan.   Varsha Stewart RN, BSN, PHN  [facility]  [phone]  Fax: [phone]

## 2024-06-13 NOTE — PROGRESS NOTES
Virtual Visit Details    Type of service:  Telephone Visit   Phone call duration: 55 minutes   Originating Location (pt. Location): Home    Distant Location (provider location):  On-site                                                               Outpatient Psychiatric Evaluation - Standard Adult    Name:  Durga Aguirre  : 1960    Source of Referral:  Primary Care Provider: Rohit Solitario MD     Current Psychotherapist: None    Identifying Data:  Patient is a 64 year old, single  White Not  or  male  who presents for initial visit with me.  Patient is currently employed full time. Patient attended the session alone. Consent to communicate signed for no one. Consent for treatment signed and included in electronic medical record. Discussed limits of confidentiality today. My Practice Policy was reviewed and signed.     Patient prefers to be called: Durga     Chief Complaint:    No chief complaint on file.        Impression:  Durga Aguirre       Treatment Plan:     1.  Acamprosate 666 mg 3 times daily: Alcohol dependence   2.  Abilify 5 mg daily: Mood regulation  3.  Buspirone 15 mg 2 times daily: Anxiety  4.  Citalopram 20 mg daily: Depression  5.  Hydroxyzine 25 to 50 mg every 8 hours as needed for anxiety  6.  Naltrexone 100 mg daily: Alcohol cravings  7.  Trazodone 50 mg at bedtime as needed for sleep  8.  Maintain sobriety and attend all outpatient programming as prescribed      . Medication side effects and alternatives reviewed. Health promotion activities recommended and reviewed today. All questions addressed. Education and counseling completed regarding risks and benefits of medications and psychotherapy options. Collaborative Care Psychiatry Service model reviewed today. Recommend therapy for additional support.     HPI:      Willie is a 64-year-old male visiting with me today to establish treatment for his depression.  He has been attending activities through [facility] due to alcohol use.  He has tapered himself down to 1 vodka drink daily.  Basically he tells me he has no alcohol cravings and feels the medications are working.  His outpatient treatment includes 3 days a week in a group setting.  He is planning to participate in the DUI court program starting in July.    Stressors include the death of his mother which occurred 1 year ago.  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  His dog  and his girlfriend  from cancer.       Psychiatric Review of Symptoms:  Depression: Depressed Mood  Sleep: No change    PHQ-9 scores:       2024     5:12 PM 3/20/2024     2:00 PM 2024     3:23 PM   PHQ-9 SCORE   PHQ-9 Total Score MyChart 1 (Minimal depression)  1 (Minimal depression)   PHQ-9 Total Score 1 0 1     Nikki:  No symptoms   MDQ Score: Negative Screen  Anxiety: Feeling nervous, anxious, or on edge    YUSUF-7 scores:        2023    12:00 PM 3/20/2024     2:00 PM 2024     3:24 PM   YUSUF-7 SCORE   Total Score   1 (minimal anxiety)   Total Score 7 2 1     Panic:  No symptoms   Agoraphobia:  No   PTSD:  History of Trauma   OCD:  No symptoms   Psychosis: No symptoms   ADD / ADHD: No symptoms  Gambling or shoplifting: No   Eating Disorder:  No symptoms 5  220**  Sleep:   No symptoms     Psychiatric History:   Hospitalizations: None  History of Commitment? No   Past Treatment: counseling, MI / CD day treatment, and medication(s) from physician / PCP  Suicide Attempts: None  Self-injurious Behavior: Denies  Electroconvulsive Therapy (ECT) or Transcranial Magnetic Stimulation (TMS): No   Genetic Testing: No     Substance Use History:  Current use of drugs or alcohol: Alcohol    Patient reports no problems as a result of their drinking / drug use.   Based on the clinical interview, there  are indications of drug or alcohol abuse. DUI .  Tobacco use: Yes Cigarettes  Ready to quit?  No  Nicotine Replacement Therapy tried: Nicotine patch and Nicotine gum    Caffeine: No  Patient has received chemical dependency treatment in the past [facility],   Recovery Programming Involvement:  Yes    Past Medical History:  Past Medical History:   Diagnosis Date    Alcohol abuse     Anxiety and depression     Arthritis     Hyperlipidemia     Hypertension     Mass of left chest wall     Other spontaneous pneumothorax 1997    Right knee pain       Surgery:   Past Surgical History:   Procedure Laterality Date    COLONOSCOPY      HC EXCISION PARTIAL TALUS OR CALCANEUS, BONE      fx calcaneus- 9 screws in foot     Allergies:   No Known Allergies  Primary Care Provider: Rohit Solitario MD  Seizures or Head Injury: No  Diet: No Restrictions  Food Allergies: No   Exercise: No regular exercise program  Supplements: Reviewed per Electronic Medical Record Today    Current Outpatient Medications   Medication Sig Dispense Refill    acamprosate (CAMPRAL) 333 MG EC tablet Take 2 tablets (666 mg) by mouth 3 times daily for 90 days 180 tablet 2    ARIPiprazole (ABILIFY) 5 MG tablet TAKE ONE TABLET (5 MG) BY MOUTH ONE TIME DAILY 90 tablet 1    busPIRone (BUSPAR) 15 MG tablet Take 1 tablet (15 mg) by mouth 2 times daily 180 tablet 1    citalopram (CELEXA) 20 MG tablet Take 1 tablet (20 mg) by mouth daily 90 tablet 1    diclofenac (VOLTAREN) 50 MG EC tablet Take 1 tablet (50 mg) by mouth 3 times daily as needed for moderate pain 30 tablet 0    gabapentin (NEURONTIN) 300 MG capsule Take 2 capsules (600 mg) by mouth 3 times daily for 30 days Increase gabapentin dose 300 mg/day to a target dose of 600 mg  capsule 0    hydrochlorothiazide (HYDRODIURIL) 12.5 MG tablet Take 1 tablet (12.5 mg) by mouth daily 90 tablet 3    hydrOXYzine HCl (ATARAX) 50 MG tablet Take 0.5-1 tablets (25-50 mg) by mouth every 8 hours as needed for anxiety 180 tablet 3    lisinopril (ZESTRIL) 40 MG tablet Take 1 tablet (40 mg) by mouth daily (Hold for a systolic blood pressure of 100) 90 tablet 3     Melatonin 10 MG TABS tablet Take 10 mg by mouth nightly as needed for sleep      multivitamin w/minerals (THERA-VIT-M) tablet Take 1 tablet by mouth daily 90 tablet 0    naltrexone (DEPADE/REVIA) 50 MG tablet Take 2 tablets (100 mg) by mouth daily for 90 days 180 tablet 0    simvastatin (ZOCOR) 40 MG tablet Take 1 tablet (40 mg) by mouth every evening 90 tablet 3    thiamine (B-1) 100 MG tablet TAKE 1 TABLET (100 MG) BY MOUTH DAILY 90 tablet 0    traZODone (DESYREL) 50 MG tablet TAKE ONE TABLET BY MOUTH AT BEDTIME AS NEEDED FOR SLEEP 90 tablet 0     No current facility-administered medications for this visit.        The [place] Prescription Monitoring Program has been reviewed and there are no concerns about diversionary activity for controlled substances at this time.      Vital Signs:  Vitals: There were no vitals taken for this visit.    Labs:  Most recent laboratory results reviewed and the pertinent results include: Ethyl glucuronide 840    Most recent EKG from April 2023 reviewed. QTc interval 482.     Review of Systems:  10 systems (general, cardiovascular, respiratory, eyes, ENT, endocrine, GI, , M/S, neurological) were reviewed. Most pertinent finding(s) is/are: Denies chest pain, denies shortness of breath, reports no skin rashes. The remaining systems are all unremarkable.  Family History:   Patient reported family history includes:   Family History   Problem Relation Age of Onset    No Known Problems Mother     Substance Abuse Father     Lymphoma Father     No Known Problems Maternal Grandmother     No Known Problems Maternal Grandfather     No Known Problems Paternal Grandmother     No Known Problems Paternal Grandfather     No Known Problems Brother     No Known Problems Sister     No Known Problems Son     No Known Problems Daughter     No Known Problems Other     Unknown/Adopted No family hx of     Depression No family hx of     Anxiety Disorder No family hx of     Schizophrenia No family hx of      Bipolar Disorder No family hx of     Suicide No family hx of     Dementia No family hx of     Hettinger Disease No family hx of     Parkinsonism No family hx of     Autism Spectrum Disorder No family hx of     Intellectual Disability No family hx of     Mental Illness No family hx of      Mental Illness History: Denies  Substance Abuse History: Yes: alcohol  Suicide History: Denies  Medications: Unknown     Social History:     Born: [place]  Raised: [place]  Childhood: easy.   Found mom  a year ago.    Siblings:  sister  Highest education level was high school graduate.   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 Feels safe at home.  Children: 33 year old son  -   Firearms/Weapons Access: No: Patient denies   Service: No    Mental Status Examination:  Appearance: awake, alert  Attitude: cooperative  Eye Contact:   Not able to assess  Gait and Station: No dizziness or falls  Psychomotor Behavior:   Not able to assess  Oriented to:  time, person, and place  Attention Span and Concentration:  Normal  Speech:   vtspeech: clear, coherent and Speaks: English  Mood:  anxious  Affect:  mood congruent  Associations:  no loose associations  Thought Process:  goal oriented  Thought Content:  no evidence of suicidal ideation or homicidal ideation, no auditory hallucinations present, and no visual hallucinations present  Recent and Remote Memory:  intact Not formally assessed. No amnesia.  Fund of Knowledge: appropriate  Insight:  good  Judgment: fair  Impulse Control:  fair    Suicide Risk Assessment:  Today Durga Aguirre reports no thoughts to harm themself or others. In addition, there are notable risk factors for self-harm, including anxiety, substance abuse, and mood change. However, risk is mitigated by history of seeking help when needed, future oriented, denies suicidal intent or plan, and denies  homicidal ideation, intent, or plan. Therefore, based on all available evidence including the factors cited above, Durga Aguirre does not appear to be at imminent risk for self-harm, does not meet criteria for a 72-hr hold, and therefore remains appropriate for ongoing outpatient level of care.  A thorough assessment of risk factors related to suicide and self-harm have been reviewed and are noted above. The patient convincingly denies suicidality on several occasions. Local community safety resources printed and reviewed for patient to use if needed. There was no deceit detected, and the patient presented in a manner that was believable.     DSM5 Diagnosis:  300.02 (F41.1) Generalized Anxiety Disorder  Substance-Related & Addictive Disorders Alcohol Use Disorder   303.90 (F10.20) Severe In early remission,     Medical Comorbidities Include:   Patient Active Problem List    Diagnosis Date Noted    Tobacco use disorder, moderate, dependence 03/20/2024     Priority: Medium    Alcohol use disorder, severe, dependence (H) 08/03/2023     Priority: Medium    Elevated creatine kinase 03/23/2022     Priority: Medium    Vasovagal syncope 07/30/2021     Priority: Medium    Hypokalemia 03/12/2021     Priority: Medium    Alcohol withdrawal with complication with inpatient treatment, with unspecified complication (H) 06/09/2020     Priority: Medium    History of colonic polyps 11/29/2018     Priority: Medium    Dupuytren's contracture of hand 04/10/2017     Priority: Medium    Hypertriglyceridemia 06/22/2016     Priority: Medium     Severe, measuring >1000 at times      Essential hypertension 02/22/2016     Priority: Medium    Advanced directives, counseling/discussion 09/23/2014     Priority: Medium     Advance Care Planning:   ACP Review and Resources Provided:  Reviewed chart for advance care plan.  Durga Aguirre has no plan or code status on file. Discussed available resources and provided with information. Confirmed code  status reflects current choices pending further ACP discussions.  Confirmed/documented designated decision maker(s). See permanent comments section of demographics in clinical tab.   Added by Maribel Potts on 9/23/2014            Generalized anxiety disorder 02/18/2013     Priority: Medium    CARDIOVASCULAR SCREENING; LDL GOAL LESS THAN 130 10/31/2010     Priority: Medium       The Patient Activation Measure (MICHAEL) score was completed and recorded in EPIC. This assesses patient knowledge, skill, and confidence for self-management.       6/4/2013    10:00 AM   MICHAEL Score (Last Two)   MICHAEL Raw Score 39   Activation Score 56.4   MICHAEL Level 3                  Treatment Plan:     1.  Acamprosate 666 mg 3 times daily  2.  Abilify 5 mg daily  3.  Buspirone 15 mg 2 times daily  4.  Citalopram 20 mg daily  5.  Hydroxyzine 25 to 50 mg every 8 hours as needed for anxiety  6.  Naltrexone 100 mg daily  7.  Trazodone 50 mg at bedtime as needed for sleep  8.  Maintain sobriety and attend all outpatient programming as prescribed      Continue all other medical directions per primary care provider.   Continue all other medications as reviewed per electronic medical record today.   Safety plan reviewed. To the Emergency Department as needed or call after hours crisis line at [phone] or [phone]. [place] Crisis Text Line: Text [place] to 639596  or  Suicide LifeLine Chat: suicidepreventionlifeline.org/chat/  To schedule individual or family therapy, call [facility] at [phone].   Schedule an appointment with me in 6 weeks or sooner as needed.  Call [facility] at [phone] to schedule.  Follow up with primary care provider as planned or for acute medical concerns.  Call the psychiatric nurse line with medication questions or concerns at [phone].  MyChart may be used to communicate with your provider, but this is not intended to be used for emergencies.      Crisis Resources   The [facility]  is an adults only unit located at [facility] in [place] is a short term (generally less than 23 hour stay) designed for crisis intervention and stabilization. Pts have the opportunity to meet quickly with a behavioral health team for evaluation in a calm and peaceful therapuetic environment. To be evaluated for admission pts are triaged throught the [facility] ED.      The following hotlines are for both adults and children. The and are open 24 hours a day, 7 days a week unless noted otherwise.        Crisis Lines      Crisis Text Line  Text 811719  You will be connected with a trained live crisis counselor to provide support.        Gambling Hotline  3.439.800.2810 [hope]        línea de crisis española  [phone]        [facility] & MYagonism.com Helpline  156.390.3498        National Hope Line  6.283.665.9114 [hope]        National Suicide Prevention Lifeline  Free and confidential support  988 or 1.073.401.TALK [8255]  http://suicidepreventionlifeline.org        The [place] Project (LGBTQ Youth Crisis Line)  4.507.036.9659  text START to 671-675        's Crisis Line  4.654.738.0162 (Press 1)  or text 026580    [facility] Mental Health Crisis Response  Within [place], call **CRISIS [**344948] to be connected to a mental health professional who can assist you.        [facility] Crisis  [phone]      [facility] Mobile Crisis  [phone]      [facility] Crisis  [phone]      [facility] Mobile Crisis  [phone] (adults)  [phone] (children)      [facility] Mobile Crisis  [phone] (adults)  [phone] (children)      [facility] Mobile Crisis  [phone]      [facility] Mobile Crisis  [phone]    Community Resources      Fast Tracker  Linking people to mental health and substance use disorder resources  fasttrackermn.org        [place] Mental Health Warmline  Peer to peer support  5 pm to 9 am 7 days/week  8.803.168.5449   https://mnwitw.org/ziggy        National [place] on Mental Illness (SHIRLENE)  182.574.8435 or 1.888.SHIRLENE.HELPS  https://namimn.org/        [facility]  [facility] residents only   [address] [place]  [phone]        [facility]  Free mental health counseling  https://walkin.org/  612.870.0565 X2    Mental Health Apps      Calm Harm  https://calmharm.co.uk/      My3  https://my3app.org/      Jayda Safety Plan  https://www.BlackBamboozStudioafetyplan.org/     Administrative Billing:   Time spent with patient included counseling and coordination of care regarding above diagnoses and treatment plan.    The longitudinal plan of care for the diagnosis(es)/condition(s) as documented were addressed during this visit. Due to the added complexity in care, I will continue to support Durga in the subsequent management and with ongoing continuity of care.    Patient Status:  This is a continuous care patient and medications will be prescribed by the psychiatric provider until further indicated.    Signed:   VIKY Bueno-BC   Psychiatry

## 2024-06-13 NOTE — NURSING NOTE
Is the patient currently in the state of [place]? YES    Visit mode:TELEPHONE    If the visit is dropped, the patient can be reconnected by: TELEPHONE VISIT: Phone number: [phone]    Will anyone else be joining the visit? No  (If patient encounters technical issues they should call [phone])    How would you like to obtain your AVS? MyChart    Are changes needed to the allergy or medication list? No    Are refills needed on medications prescribed by this physician? NO    Rooming Documentation: Assigned questionnaire(s) completed .    Reason for visit: Consult     DIEGO Welch

## 2024-06-14 NOTE — GROUP NOTE
Group Therapy Documentation    PATIENT'S NAME: Durga Aguirre  MRN:   [id number]  :   1960  ACCT. NUMBER: [id number]  DATE OF SERVICE: 24  START TIME:  5:00 PM  END TIME:  8:00 PM  FACILITATOR(S): Yamila Patterson, STEPHANY, ELANA  TOPIC: BEH Group Therapy  Number of patients attending the group:  7  Group Length:  3 Hours    Group Therapy Type: Addiction, Psychoeducation, and Psychotherapeutic    Summary of Group / Topics Discussed:    Boundaries, Co-occurring Illness, Coping Skills/Lifestyle Managemet, Distress Tolerance, Emotional Regulation, Mindfulness, Self-Esteem/Self [place], Symptom Management, Thinking Errors/Negative Self-Talk, and Trauma Informed Care    Summary of Group / Topics Discussed:     Cognitive Therapy Techniques, Communication, Co-occurring Illness, Coping Skills/Lifestyle Managemet, Emotional Regulation, Forgiveness, Interpersonal Effectiveness, Meditation/Breathing Exercises, Self-Esteem/Self [place], Symptom Management, and Thinking Errors/Negative Self-Talk     Summary of Group / Topics Discussed:     Anger/Conflict Management , Cognitive Therapy Techniques, Co-occurring Illness, Coping Skills/Lifestyle Managemet, Emotional Regulation, Interpersonal Effectiveness, Meditation/Breathing Exercises, Self-Esteem/Self [place], Thinking Errors/Negative Self-Talk, and Trauma Informed Care     Cognitive Restructuring Negative Self-Talk and Identifying The Emotional Cues For Cravings in Relapse: Focus: Shame root programming and  it's link to addiction and mental health conditions: Facilitated a group discussion around cognitive restructuring focusing on self-talk primarily on negative self-talk and emotional cues that have led to substance use in the past and that creates urges and cravings presently. Provided psychoeducation on emotional management, negative self-talk and connections to one's self-talk feeds into one's addiction, MH and decisions. Clients explored how their self-talk has  "impacted their decision making and how their use has further impacted their thoughts and sensations. Provided a discussion on one's self-talk has lead to them justify continued use or can lead to a relapse. Provided worksheet on \"Letting go of shame:Understanding how shame affects my life,\" create mantras to decrease feelings of shame and use of DBT skills: TIPP and 5 senses to manage stronger sensory experiences to reconnect to the present moment.      Objective(s):    Clients will increase emotional intelligence, become aware of self-talk that produces toxic shame and reframing root programming that developed a negative self-language.  Client will provide examples of impact of shame and how it relates to past use and current cravings, and how to increase use of self-compassion to increase thoughts and behaviors that reflect self-respect.  Clients will increase their ability to recognize when self talk is shame-based and use the 3 Elements to Heal Shame: Mindfulness, Gentleness and Connectedness to reduce risk of relapse.       Expected therapeutic outcome(s):    Client will:   Gain self and emotional-awareness to help pave a way for personal growth and empowerment to increase emotional management and avoid relapses.  Increase commitment to change and confidence in managing difficult and uncomfortable sensations.     Be able to analyze behavior and utilize coping strategies to help reinforce change and personal growth.    To reprogram shame based self-talks and replace with self-acceptance and compassion.      Dimension Focus: D3, D5      Attestation: Dr. Loretta HANNON - Provides oversight and supervision of care.         Group Attendance:  Attended group session    Patient's response to the group topic/interactions:  discussed personal experience with topic, expressed understanding of topic, and listened actively    Patient appeared to be Actively participating, Attentive, and Engaged.        Client specific details: " " Durga reports that he has not used since 6/7. He processed saying that he last used on 6/5 and stated \"you have a good memory\" to the therapist when reminded of his last use. Durga reports that all of the alcohol is out of his house and that his desire to use is 1/10. He reports that he did not change anything about himself to behaviors for the sake of his sobriety and that he is \"just staying abstinent.\" He struggled to create a treatment goal and stated that he plans to \"take care of things outside\" this weekend. Durga demonstrated resistance in making an action plan to include sober supports over the weekend. 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 When provided the worksheet to process shame, Durga said \"I don't have any issues with this.\" He was encouraged to process past experience of shame that may influence his current self-image and use of self-compassion for sobriety.     "

## 2024-06-17 ENCOUNTER — HOSPITAL ENCOUNTER (OUTPATIENT)
Dept: BEHAVIORAL HEALTH | Facility: CLINIC | Age: 64
Discharge: HOME | End: 2024-06-17
Attending: FAMILY MEDICINE
Payer: MEDICARE

## 2024-06-17 ENCOUNTER — TELEPHONE (OUTPATIENT)
Dept: BEHAVIORAL HEALTH | Facility: CLINIC | Age: 64
End: 2024-06-17

## 2024-06-17 DIAGNOSIS — F10.20 ALCOHOL USE DISORDER, SEVERE, DEPENDENCE (H): Primary | ICD-10-CM

## 2024-06-17 PROCEDURE — 80321 ALCOHOLS BIOMARKERS 1OR 2: CPT | Performed by: FAMILY MEDICINE

## 2024-06-17 PROCEDURE — H2035 A/D TX PROGRAM, PER HOUR: HCPCS | Mod: HQ | Performed by: SOCIAL WORKER

## 2024-06-17 PROCEDURE — 80307 DRUG TEST PRSMV CHEM ANLYZR: CPT | Performed by: FAMILY MEDICINE

## 2024-06-17 PROCEDURE — H2035 A/D TX PROGRAM, PER HOUR: HCPCS | Performed by: COUNSELOR

## 2024-06-17 NOTE — TELEPHONE ENCOUNTER
----- Message from Yamila Patterson, STEPHANY, ELANA sent at 2024 12:16 PM CDT -----  Regarding: Please schedule 1:1  Hello,    Location of programmin [address] [place] [address] [address], [place] [address]  Date and time: 24 at 3:30pm  Group: [id number] (Phase 1)  Provider: Yamila Patterson ([id number])  Number of visits to be scheduled: 1 session  Length/Duration of Appointment in minutes: 1 hr  Visit Type: In person    Thanks,  Yamila Patterson, ANA, AMMON, ELANA, STEPHANY  MI/CD Psychotherapist  [facility]   [facility] [address], [place] [address]  Phone: [phone] Fax:  [phone]

## 2024-06-18 LAB — ETHYL GLUCURONIDE UR QL SCN: NORMAL NG/ML

## 2024-06-18 NOTE — GROUP NOTE
Group Therapy Documentation    PATIENT'S NAME: Durga Aguirre  MRN:   [id number]  :   1960  ACCT. NUMBER: [id number]  DATE OF SERVICE: 24  START TIME:  5:00 PM  END TIME:  8:00 PM  FACILITATOR(S): Megan Conde [facility]  TOPIC: BEH Group Therapy  Attestation: Romi Burgos MD,  - Provides oversight and supervision of care.     Number of patients attending the group:  8    Group Length:  3 Hours    Group Therapy Type: Addiction    Summary of Group / Topics Discussed:    Process Group and Psychoeducation    Weekly Check-in    PowerPoint on Impact of Substance Use on Physical Health.    Clients got different handouts describing how various substances affect different bodily systems, processes and organs and the resulting damage that is common in substance use and addiction.  Each client reviewed their handout and summarized it for the group discussion.    Categories included:   Opiate use     Immune System    Complications affecting multiple body systems including  A single binge-drinking episode  and  Heavy drinking over a long period of time.      Medical Complications by body systems including Liver disease, Alcohol associated hepatitis; Severe Associated liver disease (ALD) morbidity; Abstinence to improve the prognosis of ALD.     Pulmonary System including pneumonia and ARDS    Endocrine System and Diabetes    Risks of Meth     Cardiovascular System    Group Attendance:  Attended group session    Patient's response to the group topic/interactions:  discussed personal experience with topic and expressed readiness to alter behaviors    Patient appeared to be Attentive and Engaged.        Client specific details:  Durga completed his weekly check-in sheet and shared that this was the first Father's Day that his son contacted him in years, his son texted him. He shared that he is enjoying learning about all these topics and that he likes this group. His sober activities included visiting  "with friends and mowing the lawn. He said he likes \"being in control, not ruffling any feathers.\" His depression was at 2 and that riding his bike again helps him feel better and his anxiety was at 3 and he feels good at this rating level. His motivation used to be \"because of court before\" but now \"it's my age, the mask has to come off\" and he shared how difficult this feels at times. This sharing about the challenges in recovery and abstinence \"after 50 years\" is new for him and he was praised for approaching this with the group. He also feels his meds are working which is helping him a lot. The topic he had to review was the Immune System and he spoke of how inflammation affects the liver. This topic relates to D2, D3, D4, and D5 of his Treatment Plan.  "

## 2024-06-19 LAB
ETHYL GLUCURONIDE UR CFM-MCNC: 840 NG/ML
ETHYL SULFATE UR CFM-MCNC: 402 NG/ML

## 2024-06-20 ENCOUNTER — HOSPITAL ENCOUNTER (OUTPATIENT)
Dept: BEHAVIORAL HEALTH | Facility: CLINIC | Age: 64
Discharge: HOME | End: 2024-06-20
Attending: FAMILY MEDICINE
Payer: MEDICARE

## 2024-06-20 PROCEDURE — H2035 A/D TX PROGRAM, PER HOUR: HCPCS | Mod: HQ

## 2024-06-20 NOTE — PATIENT INSTRUCTIONS
"Patient Education   The Panel Psychiatry Program  What to Expect  Here's what to expect in the Panel Psychiatry Program.   About the program  You'll be meeting with a psychiatric doctor to check your mental health. A psychiatric doctor helps you deal with troubling thoughts and feelings by giving you medicine. They'll make sure you know the plan for your care. You may see them for a long time. When you're feeling better, they may refer you back to seeing your family doctor.   If you have any questions, we'll be glad to talk to you.  About visits  Be open  At your visits, please talk openly about your problems. It may feel hard, but it's the best way for us to help you.  Cancelling visits  If you can't come to your visit, please call us right away at [phone]. If you don't cancel at least 24 hours (1 full day) before your visit, that's \"late cancellation.\"  Not showing up for your visits  Being very late is the same as not showing up. You'll be a \"no show\" if:  You're more than 15 minutes late for a 30-minute (half hour) visit.  You're more than 30 minutes late for a 60-minute (full hour) visit.  If you cancel late or don't show up 2 times within 6 months, we may end your care.  Getting help between visits  If you need help between visits, you can call us Monday to Friday from 8 a.m. to 4:30 p.m. at [phone].  Emergency care  Call 911 or go to the nearest emergency department if your life or someone else's life is in danger.  Call 988 anytime to reach the national Suicide and Crisis hotline.  Medicine refills  To refill your medicine, call your pharmacy. You can also call [facility]'s [facility] at [phone], Monday to Friday, 8 a.m. to 4:30 p.m. It can take 1 to 3 business days to get a refill.   Forms, letters, and tests  You may have papers to fill out, like FMLA, short-term disability, and workability. We can help you with these forms at your visits, but you must have an " appointment. You may need more than 1 visit for this, to be in an intensive therapy program, or both.  Before we can give you medicine for ADHD, we may refer you to get tested for it or confirm it another way.  We may not be able to give you an emotional support animal letter.  We don't do mental health checks ordered by the court.   We don't do mental health testing, but we can refer you to get tested.   Thank you for choosing us for your care.  For informational purposes only. Not to replace the advice of your health care provider. Copyright   2022 [employer]. All rights reserved. GetO2 471676 - 12/22.       Treatment Plan:     1.  Acamprosate 666 mg 3 times daily  2.  Abilify 5 mg daily  3.  Buspirone 15 mg 2 times daily  4.  Citalopram 20 mg daily  5.  Hydroxyzine 25 to 50 mg every 8 hours as needed for anxiety  6.  Naltrexone 100 mg daily  7.  Trazodone 50 mg at bedtime as needed for sleep  8.  Maintain sobriety and attend all outpatient programming as prescribed    Continue all other medical directions per primary care provider.   Continue all other medications as reviewed per electronic medical record today.   Safety plan reviewed. To the Emergency Department as needed or call after hours crisis line at [phone] or [phone]. [place] Crisis Text Line: Text [place] to 086521  or  Suicide LifeLine Chat: suicidepreventionlifeline.org/chat/  To schedule individual or family therapy, call [facility] at [phone].   Schedule an appointment with me in 6 weeks or sooner as needed.  Call [facility] at [phone] to schedule.  Follow up with primary care provider as planned or for acute medical concerns.  Call the psychiatric nurse line with medication questions or concerns at [phone].  MyChart may be used to communicate with your provider, but this is not intended to be used for emergencies.        Crisis Resources   The [facility] is an adults only unit  located at [facility] is a short term (generally less than 23 hour stay) designed for crisis intervention and stabilization. Pts have the opportunity to meet quickly with a behavioral health team for evaluation in a calm and peaceful therapuetic environment. To be evaluated for admission pts are triaged throught the [facility] ED.      The following hotlines are for both adults and children. The and are open 24 hours a day, 7 days a week unless noted otherwise.        Crisis Lines      Crisis Text Line  Text 620382  You will be connected with a trained live crisis counselor to provide support.        Gambling Hotline  0.446.684.4455 [hope]        línea de crisis española  [phone]        [facility]pack Helpline  610.259.5451        National Hope Line  6.131.129.9289 [hope]        National Suicide Prevention Lifeline  Free and confidential support  988 or 1.270.520.TALK [8255]  http://suicidepreventionlifeline.org        The [employer] Project (LGBTQ Youth Crisis Line)  7.261.092.3074  text START to 578-508        [place] Crisis Line  9.221.623.8382 (Press 1)  or text 436713    [facility] Mental Health Crisis Response  Within [place], call **CRISIS [**117282] to be connected to a mental health professional who can assist you.        [facility] Crisis  [phone]      [facility] Mobile Crisis  [phone]      [facility] Crisis  [phone]      [facility] Mobile Crisis  [phone] (adults)  [phone] (children)      [facility] Mobile Crisis  [phone] (adults)  [phone] (children)      [facility] Mobile Crisis  [phone]      [facility] Mobile Crisis  [phone]    Community Resources      Fast Tracker  Linking people to mental health and substance use disorder resources  fasttrackBillingstreetn.org        [place] Mental Health Warmline  Peer to peer support  5 pm to 9 am 7 days/week  0.308.243.4138   https://mnwitw.org/ziggy        National [employer] on Mental Illness (SHIRLENE)  580.638.7011 or 1.888.SHIRLENE.HELPS  https://namimn.org/        [facility] for Adult Mental Health  [facility]   [address] [facility]  [phone]        [facility]  Free mental health counseling  https://walkin.org/  612.870.0565 X2    Mental Health Apps      Calm Harm  https://calmharm.co.uk/      My3  https://my3app.org/      [place]da Safety Plan  https://www.mysafetyplan.org/

## 2024-06-21 NOTE — GROUP NOTE
Group Therapy Documentation    PATIENT'S NAME: Durga Aguirre  MRN:   [id number]  :   1960  ACCT. NUMBER: [id number]  DATE OF SERVICE: 24  START TIME:  5:00 PM  END TIME:  8:00 PM  FACILITATOR(S): Zion Lau LADC  TOPIC: BEH Group Therapy  Number of patients attending the group:  6  Group Length:  3 Hours    Group Therapy Type: Addiction and Psychoeducation    Summary of Group / Topics Discussed:      Role of Resilience, Sober Support, and Forgiveness, in Recovery (IOP)  The importance of communication and healthy sober support for healing from addiction and building resiliency in mental and physical health. Education on the importance of making connections in recovery to increase resiliency and its concurrent skill strengths of self-confidence and self-efficacy.     Objective(s):  Identify 3 ways social connections build resilience.  Patients will identify and discuss an area that they need help in and how asking for help would be of a benefit to ongoing sobriety.     Structure (modalities, homework, worksheets, etc.):    Resiliency (worksheet)  Education Resiliency and the role of Forgiveness in Resiliency (Video)  Patient will define and explain how either confidence, resiliency or self-efficacy have played a role in their recovery and identify which one they want to gain more strength in.     Expected therapeutic outcome(s):   Education on importance of putting effort into self-care practices while building on strengths of self-confidence, resiliency, and self-efficacy instead of focusing on placing effort into removing obstacles that keep patients from these aspects of self-care.   Patients to better cope with stressors and cravings for substance use in life without an exasperation of their substance use disorder or concurrent mental health issues.     Therapeutic outcome(s) measured by:   Teach-back, group review and discussion  Self-report in weekly check ins that show a reduction in  cravings, use episodes and or mental health ratings as identified by patient weekly      Group Attendance:  Attended group session    Patient's response to the group topic/interactions:  discussed personal experience with topic and expressed understanding of topic    Patient appeared to be Actively participating and Engaged.        Client specific details:  Client actively engaged and participated in the group discussion, art activity and watching and discussing an educational video. Client completed a worksheet in this session on the topic of resiliency. Client processed how resiliency, sober support and entering the process of forgiveness can lessen cravings and concomitant use episodes that have been related to holding on to resentments as opposed to letting go of them.  Client provided insight on how his resiliency helped him through use related problems in the past and how he is already finding a benefit to using his resilience to benefit his recovery. He shared how he has found it challenging to forgive himself and others but shared a willingness to do so. Client worked on Dimension 3 and 4 of treatment plan goals

## 2024-06-24 ENCOUNTER — HOSPITAL ENCOUNTER (OUTPATIENT)
Dept: BEHAVIORAL HEALTH | Facility: CLINIC | Age: 64
Discharge: HOME | End: 2024-06-24
Attending: FAMILY MEDICINE
Payer: MEDICARE

## 2024-06-24 PROCEDURE — H2035 A/D TX PROGRAM, PER HOUR: HCPCS | Mod: HQ | Performed by: COUNSELOR

## 2024-06-25 NOTE — GROUP NOTE
Group Therapy Documentation    PATIENT'S NAME: Durga Aguirre  MRN:   [id number]  :   1960  ACCT. NUMBER: [id number]  DATE OF SERVICE: 24  START TIME:  5:00 PM  END TIME:  8:00 PM  FACILITATOR(S): Yamila Patterson, STEPHANY, ELANA  TOPIC: BEH Group Therapy  Number of patients attending the group:  6  Group Length:  3 Hours    Group Therapy Type: Addiction, Psychoeducation, Psychotherapeutic, and Skills/Education    Summary of Group / Topics Discussed:    Cognitive Therapy Techniques, Co-occurring Illness, Distress Tolerance, Emotional Regulation, Journaling, Mindfulness, Proper Nutrition & Exercise, Self-Care Activities, Sleep Hygiene, Stress Management, and Thinking Errors/Negative Self-Talk    Topic:  Recovery Skills/Reducing Risks    Stages of Relapse: Provided a psychoeducation group on the stages of relapse by providing a handout that goes over the stages of relapses. Engaged in a discussion regarding early relapse prevention plan that goes over emotional relapse, mental relapse, physical relapse. Providing an understanding that relapse as an essential and non-linear process. To help clients be aware of the stages of relapse so they are better able to cope with early warning signs. Clients to learn the importance of self-care and other healthy strategies to help reduce the risk of relapse.     Group Objectives/Goals  Client will identify all three stages of relapse    Client will identify at least one example of early warning signs relating to each of the three stages.    Client will identify healthy strategies they can use to cope with early warning signs.     High Risk Situations  ?   Provided a psychoeducation group and process group regarding relapse prevention focusing on high-risk situations. Client will give insight into identifying personal high risk relapse factors/triggers, understand how these triggers were mismanaged in the past, and how to consciously recognize and change these  "self-defeating habits for the future.    ?   Group Objectives/Goals    Client will identify at least three early warning signs that may lead to relapse.   Client will explain how each early warning sign they identified could negatively impact their recovery.   Client will identify their plan to address each of their relapse warning signs     Client will demonstrate awareness of each stage of relapse   Client will identify one strategy to cope with triggers in high risk situations.  ?   Expected therapeutic outcomes:    ?   Understand High risk situations and triggers to use.   Identify how these situations lead to risky situations.   Identify skills/strategies to help cope with potential risky situations.    Gain insight on personal experience of stages of relapse.    Dimension Focus: D3, D4, D5    Attestation: Dr. Loretta HANNON - Provides oversight and supervision of care.        Group Attendance:  Attended group session    Patient's response to the group topic/interactions:  cooperative with task, discussed personal experience with topic, expressed understanding of topic, and listened actively    Patient appeared to be Actively participating, Attentive, and Engaged.        Client specific details:  Durga endorsed benefit from coming to group and interacting with his peers. Durga reports that his relapses \"just happens, I don't even have to think about it.\" He explored stages of relapses and recognizes past experiences of compulsion with the help of hearing about his peers' experiences. He made strong statements for sobriety and described benefits from sobriety in the past. He denied any recent use and participated well in discussing mindfulness from the video \"The tale of two wolves.\".    "

## 2024-06-26 NOTE — ADDENDUM NOTE
Encounter addended by: Yamila Patterson LPCC, LADC on: 6/26/2024 5:34 PM   Actions taken: Clinical Note Signed

## 2024-06-26 NOTE — PROGRESS NOTES
Email correspondence regarding documentation for clinical involvement with the court:    Cornelius Hutton is a TESS for Durga Aguirre.  Please send me a copy of Mr. Aguirre's comprehensive assessment and diagnostic assessment. Thank you.       Zulema Acuna [facility], [facility]   Senior Triage     [email or web address]   Phone: [phone]  Fax: [phone]    [facility]    [address], [address], [address]       Encrypted email sent with attachments:  Hello,    Attached are the requested documents. Please let me know if you have any other questions.    Thanks,       Attestation: Dr. Loretta HANNON - Provides oversight and supervision of care.

## 2024-06-27 ENCOUNTER — HOSPITAL ENCOUNTER (OUTPATIENT)
Dept: BEHAVIORAL HEALTH | Facility: CLINIC | Age: 64
Discharge: HOME | End: 2024-06-27
Attending: FAMILY MEDICINE
Payer: MEDICARE

## 2024-06-27 PROCEDURE — H2035 A/D TX PROGRAM, PER HOUR: HCPCS | Mod: HQ | Performed by: COUNSELOR

## 2024-06-28 NOTE — GROUP NOTE
Group Therapy Documentation    PATIENT'S NAME: Durga Aguirre  MRN:   [id number]  :   1960  ACCT. NUMBER: [id number]  DATE OF SERVICE: 24  START TIME:  5:00 PM  END TIME:  8:00 PM  FACILITATOR(S): Yamila Patterson, STEPHANY, ELANA  TOPIC: BEH Group Therapy  Number of patients attending the group:  6  Group Length:  3 Hours    Group Therapy Type: Addiction, Psychotherapeutic, and Skills/Education    Summary of Group / Topics Discussed:    Cognitive Therapy Techniques, Co-occurring Illness, Coping Skills/Lifestyle Managemet, Emotional Regulation, Forgiveness, Interpersonal Effectiveness, Self-Care Activities, Stress Management, Thinking Errors/Negative Self-Talk, and Trauma Informed Care    Summary of Group / Topics Discussed: Gaining Perspective/Emotional Processing     Co-occurring Illness, Coping Skills/Lifestyle Managemet, Mindfulness, and Psychoeducation/Skills, Emotional management.     Cognitive Restructuring Negative Self-Talk and Identifying The Emotional Cues For Cravings in Relapse: Focus: Hurt and sadness root programming and  it's link to addiction and mental health conditions: Facilitated a group discussion around cognitive restructuring focusing on self-talk primarily on negative self-talk and emotional cues that have led to substance use in the past and that creates urges and cravings presently. Provided psychoeducation on emotional management, negative self-talk and connections to one's self-talk feeds into one's addiction, MH and decisions. Clients explored how their self-talk has impacted their decision making and how their use has further impacted their thoughts and sensations. Provided a discussion on one's self-talk has lead to them justify continued use or can lead to a relapse. Provided outline to write a forgiveness letter for the way hurt and sadness was mishandled due to addiction and use.     Objective(s):    Clients will increase emotional intelligence, become aware of self-talk  "that prevents dealing with sadness in a healthy way and reframing root programming that developed a negative self-language.  Client will provide examples of impact of suppressing sadness and how it relates to past use and current cravings, and how to increase use of self-compassion to increase behaviors that is comforting during experience of sadness.  Clients will differentiate between sadness, depression and grief and process their personal experiences around it and experience of secondary emotions such as anger, disappointment and more.     Expected therapeutic outcome(s):    Client will:   Gain self and emotional-awareness to help pave a way for personal growth and empowerment to increase emotional management and avoid relapses.  Increase commitment to change and confidence in managing difficult and uncomfortable sensations.     Be able to analyze behavior and utilize coping strategies to help reinforce change and personal growth.    To reprogram shame based self-talks and replace with self-acceptance and compassion.      Dimension Focus: D3, D5      Attestation: Dr. Loretta HANNON - Provides oversight and supervision of care.        Group Attendance:  Attended group session    Patient's response to the group topic/interactions:  cooperative with task, discussed personal experience with topic, expressed understanding of topic, and listened actively    Patient appeared to be Actively participating, Attentive, and Engaged.        Client specific details:  Durga reports no change in sobriety date. Durga presented with little insight about his addiction and made a goal \"To stay sober\" over the weekend by \"just doing it.\" Durga participated in discussing what sadness looking like for him and how it can be used to evoke empathy. Explored how sadness help motivate what needs to happen next to have a need met. Durga participated in writing the forgiveness letter and asked to not share it in group. He plans to process in " 1:1.

## 2024-07-08 ENCOUNTER — HOSPITAL ENCOUNTER (OUTPATIENT)
Dept: BEHAVIORAL HEALTH | Facility: CLINIC | Age: 64
Discharge: HOME | End: 2024-07-08
Attending: FAMILY MEDICINE
Payer: MEDICARE

## 2024-07-08 PROCEDURE — H2035 A/D TX PROGRAM, PER HOUR: HCPCS | Mod: HQ | Performed by: COUNSELOR

## 2024-07-09 NOTE — GROUP NOTE
Group Therapy Documentation    PATIENT'S NAME: Durga Aguirre  MRN:   [id number]  :   1960  ACCT. NUMBER: [id number]  DATE OF SERVICE: 24  START TIME:  5:00 PM  END TIME:  8:00 PM  FACILITATOR(S): Yamila Patterson, STEPHANY, ELANA  TOPIC: BEH Group Therapy  Number of patients attending the group:  4  Group Length:  3 Hours    Group Therapy Type: Addiction, Psychotherapeutic, and Skills/Education    Summary of Group / Topics Discussed:    Co-occurring Illness, Distress Tolerance, Emotional Regulation, Interpersonal Effectiveness, Meditation/Breathing Exercises, Mindfulness, Proper Nutrition & Exercise, Relaxation Techniques, Self-Care Activities, Sleep Hygiene, Stress Management, and Symptom Management        Recovery Skills/Reducing Risks    This topic will give an overview of the importance of creating new neural networks based on positive experiences, emotions and memories and an overview of the brain chemistry involved with addiction and mental health functioning.     Objective(s):   Client will learn impact of attention (where we place attention defines us on a neurological level, neurons that fire together wire together) and how it shapes the landscape of the brain.   Client will identify ways in which they practice habits for 21 days  to increase positive states and dopamine, endorphin and serotonin production.     Activities:  Clients will complete worksheet ways to integrating Root Skills to increase self-regulation:  - Basic Self-Care - when we care for our bodies, it is much easier to use our minds. Understand Will Power (limited amount each day and fuel tanks runs low after a lot of decision making and self-regulation which adds to your stress-vulnerability). Get adequate sleep, hydration, nutrition, exercise, and time in nature.  - Practice Mindfulness - ability to be present and learn to observe thoughts, feeling, and body sensations with both compassion and curiosity. Using breathing to stay  aware of the present.   - Activate Parasympathetic Nervous System - learn how to self-calm through conscious activity. Deep breathing activates parasympathetic.  - Using Your pre-frontal cortex - Understand brain structure - evolution and role of 3 different parts and why PFC functioning is so important. Understand neuroplasticity - where you focus your attention defines you neurologically. Understand connection between mid brain and PFC becomes very weak or severed after severe substance use.  - Processing group: naming your emotions, processing and receiving feedback.  - Exercise (teaches us that behavior matters).  - Meditate (teaches us to focus our brains that are often stressed from over stimulation and multi-tasking)  - Practice doing  random acts of kindness .  Client will share discussion on how much time is spent in negative states vs positive states (mid-brain vs PFC)  Client will practice the RAIN of self-compassion.    Structure:   Provide psycho education on neurochemicals and impact on mood, motivation and developing habits to improve well-being.   Provide psycho education on how Recovery Skills can be used to increase internal motivation for sobriety and recovery.    Facilitate group discussion on concept of managing difficult internal experiences and how that is beneficial in avoiding relapse in recovery.   Provide clients with handouts, videos and worksheets to enhance learning.     Expected therapeutic outcomes:    Minimize the severity and occurrence of negative states and mindsets.   Build recovery resilience against cravings, leading to resilience against relapse in early sobriety.   Growth in developing skills to improve internal world and in managing difficult emotions.     Therapeutic outcome(s) measured by:   Client s ability to explain impact of implementing (regularly putting into practice) recovery skills and in building new habits to support mental health wellness and  "sobriety.  Client s demonstration of learning and commitment via weekly check in, specifying skills practiced during previous week, increase in motivation for sobriety and recovery using Likert (or similar) scale.     Attestation: Dr. Loretta HANNON - Provides oversight and supervision of care.       Dimensions: D1, D3, D4, D5       Group Attendance:  Attended group session    Patient's response to the group topic/interactions:  cooperative with task and gave appropriate feedback to peers    Patient appeared to be Actively participating, Attentive, and Engaged.        Client specific details:  Durga reports no change in his sobriety date and that he will be engaged with DWI court starting on Thursday. He processed missed absences last week and denies drinking over the weekend prior. Durga struggled to maintain attention during group and did not fully participate in mindfulness stating that \"I don't like the terminology.\" He said that he already knows self-compassion. He was unable to provide examples of how he uses it but gave an example of compassion to his cat. Durga had difficulty completing the worksheet in today's group, a worksheet he previously used.     "

## 2024-07-09 NOTE — ADDENDUM NOTE
Encounter addended by: Yamila Patterson LPCC, LADC on: 7/9/2024 4:45 PM   Actions taken: Clinical Note Signed

## 2024-07-09 NOTE — PROGRESS NOTES
"[facility] Weekly Treatment Plan Review     Date:  24     Weekly Treatment Plan Review     Treatment Plan initiated on: 24.  Durga had one unexcused absence during this reporting period.      Dimension1: Acute Intoxication/Withdrawal Potential -   Previous Dimension Ratin  Current Dimension Ratin  Client Goals Addressed Since last Review: \"I will go on more walks, and come to groups as scheduled to keep my mind off of drinking and to gain more insight on my alcohol cravings.\"  Are Treatment Plan goals/methods effective? Yes     Date of Last Use: 24  Any reports of withdrawal symptoms - Client reports that he has been sober for a month. Client reports to drinking 4-5 times a week in the evenings and up at 8 oz of alcohol. Client is at risk for withdrawal symptoms.       Dimension 2: Biomedical Conditions & Complications -   Previous Dimension Ratin  Current Dimension Ratin  Client Goals Addressed Since last Review: \"I will continue to have my medical needs met and addressed\"  Are Treatment Plan goals/methods effective? Yes     Medical Concerns:  None identified.  Current Medications & Medication Changes:  Current Facility-Administered Medications               Current Outpatient Medications   Medication Sig Dispense Refill    acamprosate (CAMPRAL) 333 MG EC tablet Take 2 tablets (666 mg) by mouth 3 times daily 180 tablet 0    ARIPiprazole (ABILIFY) 5 MG tablet TAKE ONE TABLET (5 MG) BY MOUTH ONE TIME DAILY 90 tablet 1    busPIRone (BUSPAR) 15 MG tablet Take 1 tablet (15 mg) by mouth 2 times daily 180 tablet 1    citalopram (CELEXA) 20 MG tablet Take 1 tablet (20 mg) by mouth daily 90 tablet 1    diclofenac (VOLTAREN) 50 MG EC tablet Take 1 tablet (50 mg) by mouth 3 times daily as needed for moderate pain 30 tablet 0    gabapentin (NEURONTIN) 300 MG capsule Take 2 capsules (600 mg) by mouth 3 times daily for 5 days Increase gabapentin dose 300 mg/day to a target dose of 600 mg " "TID 30 capsule 0    hydrochlorothiazide (HYDRODIURIL) 12.5 MG tablet Take 1 tablet (12.5 mg) by mouth daily 90 tablet 3    hydrOXYzine HCl (ATARAX) 50 MG tablet Take 0.5-1 tablets (25-50 mg) by mouth every 8 hours as needed for anxiety 180 tablet 3    lisinopril (ZESTRIL) 40 MG tablet Take 1 tablet (40 mg) by mouth daily (Hold for a systolic blood pressure of 100) 90 tablet 3    Melatonin 10 MG TABS tablet Take 10 mg by mouth nightly as needed for sleep        multivitamin w/minerals (THERA-VIT-M) tablet Take 1 tablet by mouth daily 90 tablet 0    naltrexone (DEPADE/REVIA) 50 MG tablet Take 2 tablets (100 mg) by mouth daily 180 tablet 1    simvastatin (ZOCOR) 40 MG tablet Take 1 tablet (40 mg) by mouth every evening 90 tablet 3    thiamine (B-1) 100 MG tablet TAKE 1 TABLET (100 MG) BY MOUTH DAILY 90 tablet 0    traZODone (DESYREL) 50 MG tablet TAKE ONE TABLET BY MOUTH AT BEDTIME AS NEEDED FOR SLEEP 90 tablet 0      No current facility-administered medications for this encounter.         Medication Prescriber:  Dr. Bennett, through [facility]  Taking meds as prescribed? Yes  Medication side effects or concerns:  None reported  Outside medical appointments this review period (list provider and reason for visit):  NA  Narrative: Client has peripheral neuropathy like due to ongoing alcohol abuse. Currently connected to a specialist. Working on sobriety.         Dimension 3: Emotional/Behavioral Conditions & Complications -   Previous Dimension Ratin  Current Dimension Ratin  Client Goals Addressed Since last Review: \"I want to learn new ways to stay in the present moment.\"  Are Treatment Plan goals/methods effective? Yes     PHQ2:        2024     1:00 PM 2024    10:46 AM 2024     2:57 PM 2023     8:56 AM 2023    10:33 AM 2022    10:12 AM 11/15/2021    10:27 AM   PHQ-2 (  [employer])   Q1: Little interest or pleasure in doing things 0 1 0 1 0 0 0   Q2: Feeling down, " depressed or hopeless 1 0 0 1 0 0 0   PHQ-2 Score 1 1 0 2 0 0 0   Q1: Little interest or pleasure in doing things   Several days       Not at all Not at all   Q2: Feeling down, depressed or hopeless   Not at all       Not at all Not at all   PHQ-2 Score   1       0 0      GAD2:        1/16/2024    10:56 AM 2/9/2024    11:00 AM 4/4/2024     1:00 PM   YUSUF-2   Feeling nervous, anxious, or on edge 1 1 1   Not being able to stop or control worrying 0 1 1   YUSUF-2 Total Score 1 2    2 2      PROMIS 10-Global Health (all questions and answers displayed):        8/4/2023    12:00 PM 12/20/2023    12:00 PM 2/12/2024     5:14 PM 3/20/2024     2:00 PM 4/4/2024     1:00 PM   PROMIS 10   In general, would you say your health is:     Very good       In general, would you say your quality of life is:     Very good       In general, how would you rate your physical health?     Very good       In general, how would you rate your mental health, including your mood and your ability to think?     Very good       In general, how would you rate your satisfaction with your social activities and relationships?     Good       In general, please rate how well you carry out your usual social activities and roles     Good       To what extent are you able to carry out your everyday physical activities such as walking, climbing stairs, carrying groceries, or moving a chair?     Mostly       In the past 7 days, how often have you been bothered by emotional problems such as feeling anxious, depressed, or irritable?     Sometimes       In the past 7 days, how would you rate your fatigue on average?     Mild       In the past 7 days, how would you rate your pain on average, where 0 means no pain, and 10 means worst imaginable pain?     5       In general, would you say your health is: 3 3 4 4 3   In general, would you say your quality of life is: 4 2 4 4 4   In general, how would you rate your physical health? 3 4 4 3 3   In general, how would you  rate your mental health, including your mood and your ability to think? 4 3 4 4 4   In general, how would you rate your satisfaction with your social activities and relationships? 2 3 3 4 2   In general, please rate how well you carry out your usual social activities and roles. (This includes activities at home, at work and in your community, and responsibilities as a parent, child, spouse, employee, friend, etc.) 3 2 3 4 2   To what extent are you able to carry out your everyday physical activities such as walking, climbing stairs, carrying groceries, or moving a chair? 3 4 4 5 3   In the past 7 days, how often have you been bothered by emotional problems such as feeling anxious, depressed, or irritable? 4 3 3 3 3   In the past 7 days, how would you rate your fatigue on average? 3 3 2 2 2   In the past 7 days, how would you rate your pain on average, where 0 means no pain, and 10 means worst imaginable pain? 3 1 5 4 4   Global Mental Health Score 12 11 14 15 13   Global Physical Health Score 13 15 15 15 13   PROMIS TOTAL - SUBSCORES 25 26 29 30 26      Mental health diagnosis 296.30 (F33.9) Major Depressive Disorder, Recurrent Episode, Unspecified _  300.02 (F41.1) Generalized Anxiety Disorder  Date of last SIB:  None reported  Date of  last SI:  None reported  Date of last HI: None reported  Behavioral Targets:  Reducing intensity and frequency of MH symptoms and increase more instances of coping through it.   Risk factors:  Lives alone, lack of insight on mental health experiences, substance use, isolation.  Protective factors:  positive relationships positive family connections, safe and stable environment, regular sleep, and regular physical activity  Outside mental health related appointments this review period (list provider and reason for visit):  NA  Current MH Assignments:  Complete assignment on emotions that have caused relapses and develop coping tools to manage difficult experiences.      Narrative:   "Current Mental Health symptoms include: irritability, restlessness, and \"cabin fever\", difficulty concentrating, fatigue, and low energy, procrastination. Active interventions to stabilize mental health symptoms this week : Attend and participate in groups and programming fully. Begin to build behaviors reflecting self-care and mindfulness outside of programming. Include activities to build a schedule in the evening. Process grief relating to sobriety.        Dimension 4: Treatment Acceptance / Resistance -   Previous Dimension Ratin  Current Dimension Ratin  Client Goals Addressed Since last Review: \"Attending programming will help me reduce feelings isolation at home\"  Are Treatment Plan goals/methods effective? Yes     ROYA Diagnosis:  Alcohol Use Disorder   303.90 (F10.20) Severe In early remission,   Commitment to tx process/Stage of change- 2  ROYA assignments - Complete consequences of use assignment.         Narrative - Durga attend programming well. He demonstrate some resistance with group materials and will often go off topic. Willie has trouble focusing on topics of mental health. Durga reports that he likes the \"socializing aspect\" of groups. Durga has a treatment contract and an accountability plan in place to increase motivation for treatment.         Dimension 5: Relapse / Continued Problem Potential -   Previous Dimension Rating:  3  Current Dimension Rating:  3  Client Goals Addressed Since last Review: \"I want to  use skills to gain more clarity over my substance behaviors.\"  Are Treatment Plan goals/methods effective? Yes     Relapses this week - None  Urges to use - YES, List reports thoughts to use but no urges due to taking anti-craving medications.  UA results - Will be randomly administered.  Recent Results   No results found for this or any previous visit (from the past 672 hour(s)).      Narrative- Willie is unable to identify relapse triggers, lacks coping skills for relapse " "prevention. Chemical use was in client's environment. Client reports low motivation for abstinence and external motivators for sobriety. Client does not see alcohol use as a big problem in his life. Durga has been using through programming so far and has had a difficult time being honest about his use.      Dimension 6: Recovery Environment -   Previous Dimension Rating:  3  Current Dimension Rating:  3  Client Goals Addressed Since last Review: \"I want to see what I can do to build more structured activities, like working on cars more and roller blading.\"  Are Treatment Plan goals/methods effective? Yes     Family Involvement - Patient declined to have family involved   Summarize attendance in family sessions - NA  Family supportive of treatment?  Yes     Community support group attendance - None at this time.  Recreational activities - Working to build structure.      Narrative - Chemical use in the home, Lack of sober / recreational interests, Legal issues. Will be placed on color wheel for legal accountability.      Progress made on transition planning goals: Currently working on assignments.      Justification for Continued Treatment at this Level of Care:  Durga lacks insight on difficult experiences and on coping tools to use. Durga does not see a problem with his drinking and has external motivators for sobriety. Durga has serious legal changes due to DUI.   Treatment coordination activities since last review:  coordination with   Need for peer recovery support referral? Yes: Client denies  Referrals made since last review:  PATRICE     Discharge Planning:  Target Discharge Date/Timeframe:  8/22/24  Target Phase 2 Start:  5/16/24  Target Phase 3 Start: 7/31/24   Med Mgmt Provider/Appt:  NA        Is patient a vulnerable adult?  No     Interdisciplinary Clinical Supervision including: LADC and Mental health professional     *Client received copy of changes: Yes  *Client is aware of right to access " a treatment plan review: Yes     Yamila Patterson, MPS, LSW, LADC, LPCC  MI/CD Psychotherapist  [facility]   [address] [place] [address] [address], [place] [address]  Phone: [phone] Fax:  [phone]       Attestation: Dr. Loretta HANNON - Provides oversight and supervision of care.

## 2024-07-11 ENCOUNTER — HOSPITAL ENCOUNTER (OUTPATIENT)
Dept: BEHAVIORAL HEALTH | Facility: CLINIC | Age: 64
Discharge: HOME | End: 2024-07-11
Attending: FAMILY MEDICINE
Payer: MEDICARE

## 2024-07-11 PROCEDURE — H2035 A/D TX PROGRAM, PER HOUR: HCPCS | Mod: HQ | Performed by: COUNSELOR

## 2024-07-12 NOTE — GROUP NOTE
Group Therapy Documentation    PATIENT'S NAME: Durga Aguirre  MRN:   [id number]  :   1960  ACCT. NUMBER: [id number]  DATE OF SERVICE: 24  START TIME:  5:00 PM  END TIME:  8:00 PM  FACILITATOR(S): Yamila Patterson, STEPHANY, ELANA  TOPIC: BEH Group Therapy  Number of Clients attending the group:  5  Group Length:  3 Hours    Group Therapy Type: Addiction, Life skill(s), and Psychotherapeutic    Summary of Group / Topics Discussed:    Co-occurring Illness, Coping Skills/Lifestyle Managemet, Distress Tolerance, Emotional Regulation, Leisure Exploration/Use of Leisure Time, Meditation/Breathing Exercises, Mindfulness, Relaxation Techniques, Self-Care Activities, Stress Management, Symptom Management,    Goal Setting (ROYA) and Mindfulness of Breath  This topic will give a general overview of short, intermediate and long term goals including the value of goal setting. It will explore how the pursuit of instant gratification adversely affects the ability to reach goals.  This topic will assist the clients in completing their recovery care plans. This topic will give a general overview of effective goal setting strategies using S.M.A.R.T. goals (Specific, measurable, attainable, relevant, and timely).     Objective(s):   Clients will identify the characteristics of short, intermediate and long term goals.  Clients will identify the characteristics of S.M.A.R.T. Goals   Clients will build a SMART goal around self-care, mindfulness and Root Skills.  Clients will identify at least one way instant gratification impacts their ability to reach goals  Clients will learn and mini practice mindfulness of breath to activate parasympathetic nervous system (4x4, 1-2-3-4/1-2-3-4-5-6-7-8, 5 senses and 4,7, 8) and select one to practice over the weekend.    Structure (modalities, homework, worksheets, etc):   Provide psychoeducation on goal setting and overcoming obstacles to goal attainment.  Provide psychoeducation on goal  setting using S.M.A.R.T phrase.  Guide practice of mindful breath work and discussion on impact after.  Facilitate group discussion around each Client s current state of goal setting and attainment.  Complete a worksheet on SMART goal setting    Expected therapeutic outcome(s):   Client will:  Learn how to set more effective goals.    Set goals and negotiate obstacles to reaching the goals for the weekend around sobriety and Root Skills.   Experience a higher incidence of goal attainment    Therapeutic outcome(s) measured by:   Completion of treatment goal setting worksheet and ability to identify goals that are specific, measurable, attainable, relevant, and timely for the weekend.     Dimensions: D3, D4, D5    Attestation: Dr. Loretta HANNON - Provides oversight and supervision of care.        Group Attendance:  Attended group session    Patient's response to the group topic/interactions:  expressed understanding of topic, gave appropriate feedback to peers, and listened actively    Patient appeared to be Actively participating, Attentive, and Engaged.        Client specific details:  Durga denied any change in his sobriety date and demonstrated improved attention today. He participated more fully in sharing about his treatment related difficulties and received prompts well to write out and complete worksheets. Durga reports that he had his DUI court today and that he asked to leave early to come to group, noting that he is prioritizing treatment. Durga eventually followed along with the rest of the group to practice breath work guided by this therapist. Durga made a SMART goal to bike up a difficult path at the park at least once this weekend. Durga had some resistance over having homework to identify relapse warning signs.

## 2024-07-15 ENCOUNTER — HOSPITAL ENCOUNTER (OUTPATIENT)
Dept: BEHAVIORAL HEALTH | Facility: CLINIC | Age: 64
Discharge: HOME | End: 2024-07-15
Attending: FAMILY MEDICINE
Payer: MEDICARE

## 2024-07-15 PROCEDURE — H2035 A/D TX PROGRAM, PER HOUR: HCPCS | Mod: HQ | Performed by: COUNSELOR

## 2024-07-16 ENCOUNTER — TELEPHONE (OUTPATIENT)
Dept: BEHAVIORAL HEALTH | Facility: CLINIC | Age: 64
End: 2024-07-16
Payer: MEDICARE

## 2024-07-16 NOTE — TELEPHONE ENCOUNTER
----- Message from Yamila Patterson sent at 7/15/2024  5:01 PM CDT -----  Regarding: Please schedule 1:1  Hello,    Location of programmin [address] [place] [address] [address], [place] [address]  Date and time:  at 11am  Group: [id number] (Phase 2)  Provider: Yamila Patterson ([id number])  Number of visits to be scheduled: 1 session  Length/Duration of Appointment in minutes: 1 hr  Visit Type: In person    ThanksYamila, MPS, LSW, LADC, LPCC  MI/CD Psychotherapist  [facility]   [address], [place] [address]  Phone: [phone] Fax:  [phone]

## 2024-07-16 NOTE — GROUP NOTE
Group Therapy Documentation    PATIENT'S NAME: Durga Aguirre  MRN:   [id number]  :   1960  [id number]  DATE OF SERVICE: 7/15/24  START TIME:  5:00 PM  END TIME:  8:00 PM  FACILITATOR(S): Yamila Patterson, STEPHANY, ELANA  TOPIC: BEH Group Therapy  Number of patients attending the group:  6  Group Length:  3 Hours    Group Therapy Type: Addiction, Psychotherapeutic, and Skills/Education    Summary of Group / Topics Discussed:    Co-occurring Illness, Choices in Recovery, Distress Tolerance, Emotional Regulation, Interpersonal Effectiveness, Proper Nutrition & Exercise, and Self-Care Activities    Group Topic: Distress Tolerance- HALTS      This topic will focus on skills necessary to distract the mind just long enough to intervene in an emotional response to a stressful situation.      Objective(s):     Client will increase awareness of Addiction Relapse Warning Signs using worksheet.   Client will gain insight into the skills associated with the acronym HALTS.   Client will identify ways they can incorporate HALTS skills to cope with stressful or distressing situations.    Client will practice effective coping skills to reduce unwanted behaviors.      Structure (modalities, homework, worksheets, etc)      Provide psychoeducation on the acronym HALTS   Facilitate group discussions to assure clients receive equal sharing time.    Use video (Michael Mate: The Power of Addiction and The Addiction of Power) for discussion and teach-back techniques to ensure client understanding.   Utilize handouts/worksheets to enhance learning process.   Complete homework on recognizing signs for each word in the acronym HALTS.      Expected therapeutic outcome(s):   Be able to handle and cope with distressing situations   Reduce unwanted behaviors.    Increase ability to manage feelings Hungry, Angry/Anxious. Lonely, Tired, Sick/Stressed/Sensory without having a relapse.      Therapeutic outcome(s) measured by:   "  Client s ability to teach back information, actively participate in group discussions, and answering questions relating to group materials.     Dimensions: D1, D3, D5    Attestation: Dr. Loretta HANNON - Provides oversight and supervision of care.                                           Group Attendance:  Attended group session    Patient's response to the group topic/interactions:  expressed understanding of topic and listened actively    Patient appeared to be Attentive and Engaged.        Client specific details:  Durga denied any change in sobriety date and made big statements about benefits of sobriety stating that he is \"feeling a high\" from it. He identified \"overconfidence\" as a relapse trigger and process in a limited way. Durga engaged well watching the video and sharing about addiction. Durga was reluctant to completing homework on HALTS and tried to get the group to agree with him to complete it in group on Thursday. He was redirected. He discussed feeling tired and how that is causing him to become irritable. He plans to rest after group and to discuss the assignment in 1:1 on Wednesday.     "

## 2024-07-17 ENCOUNTER — HOSPITAL ENCOUNTER (OUTPATIENT)
Dept: BEHAVIORAL HEALTH | Facility: CLINIC | Age: 64
Discharge: HOME | End: 2024-07-17
Attending: FAMILY MEDICINE
Payer: MEDICARE

## 2024-07-17 DIAGNOSIS — F10.20 ALCOHOL USE DISORDER, SEVERE, DEPENDENCE (H): Primary | ICD-10-CM

## 2024-07-17 PROCEDURE — H2035 A/D TX PROGRAM, PER HOUR: HCPCS | Performed by: COUNSELOR

## 2024-07-17 PROCEDURE — 80307 DRUG TEST PRSMV CHEM ANLYZR: CPT | Performed by: FAMILY MEDICINE

## 2024-07-17 NOTE — PROGRESS NOTES
"Individual Session Summary   START TIME: 11 AM   END TIME: 12 PM   Duration: 1 Hour    Data:  Met with client on this date for an individual session. The session focused on client's treatment plan goal for DIM(s) 1, 3, 4, 5, 6 of his individual treatment plan. Durga reports that his mood has been good. He denies any recent alcohol use and explained how he handled an urge to use last night. He reports it as \"easy to just push out of my head.\" He was cooperative in taking a UA. Durga used the session to process some difficulty he is experiencing with working and benefits of making him feel a \"sense of pride.\" Therapist addressed disruptive behaviors by him in group on Monday and he immediately agreed that he did not behave well in group and that he plans to try harder next time. He reports that he is trying hard and that he had 3 cups of coffee and tired which led to some irritability. He reports that he is trying to find his balance. He asked about his Phase 3 transition and was given his Phase 2 assignment to work on.     Intervention: CBT, Behavior management, Mindfulness, Reflection, feedback     Assessment: Durga appear to be white knuckling his sobriety and have little insight on the impact of not using coping tools. He had some resistance to using coping tools and building a schedule with sober activities. Durga reflected on some past pains that led to use and reports that he does not like talking about it. Durga endorse high motivation for sobriety and identified specific benefits from being sober.      Plan. Client will attend a sober support group tonight. Client will remain sober and connect with his PO after the session today. Client will increase cooperation in groups. Client will attend programming consistently.          Yamila Patterson, [facility], [facility]     Attestation: Dr. Loretta HANNON - Provides oversight and supervision of care.    "

## 2024-07-18 ENCOUNTER — HOSPITAL ENCOUNTER (OUTPATIENT)
Dept: BEHAVIORAL HEALTH | Facility: CLINIC | Age: 64
Discharge: HOME | End: 2024-07-18
Attending: FAMILY MEDICINE
Payer: MEDICARE

## 2024-07-18 LAB — ETHYL GLUCURONIDE UR QL SCN: NEGATIVE NG/ML

## 2024-07-18 PROCEDURE — H2035 A/D TX PROGRAM, PER HOUR: HCPCS | Mod: HQ | Performed by: COUNSELOR

## 2024-07-19 NOTE — GROUP NOTE
Group Therapy Documentation    PATIENT'S NAME: Durga Aguirre  MRN:   [id number]  :   1960  AC[facility]. NUMBER: [id number]  DATE OF SERVICE: 24  START TIME:  5:00 PM  END TIME:  8:00 PM  FACILITATOR(S): Yamila Patterson LPCC, ELANA  TOPIC: BEH Group Therapy  Number of patients attending the group:  6  Group Length:  3 Hours    Dimensions addressed: 3, 5, and 6    Summary of Group / Topics Discussed:    Summary of Group / Topics Discussed:     Balanced Lifestyle , Cognitive Therapy Techniques, Co-occurring Illness, Coping Skills/Lifestyle Managemet, Interpersonal Effectiveness, Journaling, Leisure Exploration/Use of Leisure Time, Relationships, and Thinking Errors/Negative Self-Talk     Topic 5:  Recovery Skills/Reducing Risks     This topic will give an overview of the importance of Recovery Skills, Vision and specifics with regard to applying them on a regular basis in sobriety.      Objective(s):   Client will create goals and identify small steps they can take this week to create action to their recovery vision.   Client will identify specifics that gives their life meaning and purpose (like connections, altruism, time in nature, developing hobbies) and share with group, practicing mental rehearsal.    Client will understand the importance of envisioning a sober life and increase motivation and inspiration for sobriety as a result.      Activities:  Client will increase understanding of the neurological impact of creating big picture goals and develop a recovery vision through journaling or drawing and share with group.      Structure:   Provide psycho education on how Recovery Skills can be used to increase internal motivation for long-term sobriety and recovery.    Facilitate group discussion around each client s choices of recovery skills to be put in practice and how they will self-evaluate outcomes.   Facilitate group discussion on recovery goals and ideal self.   Provide clients with handouts and  worksheets to enhance learning.      Expected therapeutic outcomes:    Build recovery resilience against cravings, by using big picture thinking, leading to resilience against relapse in recovery  Growth in  sober network  via practicing skills designed to build healthy social connections in recovery.   Growth in developing optimal environment and in developing a life that aligns with the ideal self.      Therapeutic outcome(s) measured by:   Client s demonstration of learning and commitment via weekly check in, specifying skills practiced during previous week, increase in motivation for sobriety and recovery using Likert (or similar) scale.   Time spent practicing (multiple) Recovery Skills in a given week as measured by recovery diary or similar tracking tool.          Attestation: Dr. Loretta HANNON - Provides oversight and supervision of care.        Group Attendance:  Attended group session    Patient's response to the group topic/interactions:  cooperative with task, discussed personal experience with topic, gave appropriate feedback to peers, and listened actively    Patient appeared to be Actively participating, Attentive, and Engaged.        Client specific details:  Durga shared feeling good about his sobriety and denied any changes in his sobriety date. Durga participated well in the Recovery Vision activity and georges what his vision of his recovery would look like. Durga georges [place] the [place] and [place] and reflected on time in [place]. Durga shared that he has never travelled sober and that he would want to see what that is like. Durga made a goal to attend a sober meeting this Saturday.

## 2024-07-22 ENCOUNTER — HOSPITAL ENCOUNTER (OUTPATIENT)
Dept: BEHAVIORAL HEALTH | Facility: CLINIC | Age: 64
Discharge: HOME | End: 2024-07-22
Attending: FAMILY MEDICINE
Payer: MEDICARE

## 2024-07-22 PROCEDURE — H2035 A/D TX PROGRAM, PER HOUR: HCPCS | Mod: HQ | Performed by: COUNSELOR

## 2024-07-23 NOTE — PROGRESS NOTES
Collateral Contact:    Durga called while meeting with his PO, Tammie, and requested for her to inquire about UAs and ni-weekly progress update. She was informed that UA might not be possible due to the procedure not being up to court level. She provided [phone] as her fax number for progress notes and will consider putting Durga for random UA through the [facility]. Durga was agreeable.     Attestation: Dr. Loretta HANNON - Provides oversight and supervision of care.

## 2024-07-23 NOTE — GROUP NOTE
"Group Therapy Documentation    PATIENT'S NAME: Durga Aguirre  MRN:   [id number]  :   1960  ACCT. NUMBER: [id number]  DATE OF SERVICE: 24  START TIME:  5:00 PM  END TIME:  8:00 PM  FACILITATOR(S): Yamila Patterson, STEPHANY, ELANA  TOPIC: BEH Group Therapy  Number of patients attending the group:  4  Group Length:  3 Hours    Dimensions addressed: 3, 5, and 6    Summary of Group / Topics Discussed:     Reframing, Cognitive Therapy Techniques, Co-occurring Illness, Coping Skills, Emotional Regulation, Mindfulness.     Summary of Group / Topics Discussed: Gaining Perspective/Reframing     Co-occurring Illness, Coping Skills/Lifestyle Managemet, Mindfulness, and Psychoeducation/Skills, Emotional management.     Personal Growth and Development (MH)  This topic addresses various education on using Reframe to improve overall mental health experiences and impulse control.      Objective(s):  Client will learn to pay attention to your thoughts, interrupt negative thought patterns, and develop more realistic and helpful ones.  Client will gain insight on thought patterns and impact on mental health functioning and addictive behaviors.  Client will distinguish between facts and irrational thoughts, demonstrate understanding of skills to develop realistic and constructive responses to difficult situaitons, increase self-regulation, recovery and mental health functioning.   Clients will receive education on \"Wise Mind\" and complete assignment on scenarios using reframe.  Client will receive education on Diaphragmatic Breathing and practice during group.      Structure (modalities, homework, worksheets, etc)   Facilitate group discussion on gaining perspective, acceptance practice and how our individual values impact our thoughts, feelings and actions.  Provide psychoeducation on the neurological benefits of using reframe and impact on mental health and addiction.   Practice mindfulness to increase awareness of bodily " sensations, thoughts, emotions and surroundings through breath work  Use teach-back techniques to ensure client's understanding.  Client will be provided handouts and worksheets to enhance learning.     Expected therapeutic outcome(s):  Client will:  Client will sustain improved problem-solving ability, sense of empowerment, impulse control and reduction of victim-lentz  mentality.   Client will integrate use of Reframe and stress management into their daily life.      Therapeutic outcome(s) measured by:   Client's ability to teach-back the techniques learned in group.   Client will dispute/Reframe thoughts that reinforce the pattern of distress and addiction.     Attestation: Dr. Loretta HANNON - Provides oversight and supervision of care.        Group Attendance:  Attended group session    Patient's response to the group topic/interactions:  cooperative with task, gave appropriate feedback to peers, and listened actively    Patient appeared to be Actively participating and Engaged.        Client specific details:  Durga appeared tired and reports that he did not sleep well last night. States that it is due to being out of melatonin. He plans to get some after group to present better to his PO tomorrow. He denies having any alcohol use. Durga had some difficulty understanding Reframing and use of Wise Mind and provided examples that were off topic. Durga reports to completing his assignment for Phase 3 but not in completing his required goal to attend a sober support meeting. Durga reports improved mental health functioning since working. Durga cooperated well in practicing Diaphragmatic Breathing.

## 2024-07-23 NOTE — ADDENDUM NOTE
Encounter addended by: Yamila Patterson LPCC, LADC on: 7/23/2024 1:25 PM   Actions taken: Clinical Note Signed

## 2024-07-25 ENCOUNTER — HOSPITAL ENCOUNTER (OUTPATIENT)
Dept: BEHAVIORAL HEALTH | Facility: CLINIC | Age: 64
Discharge: HOME | End: 2024-07-25
Attending: FAMILY MEDICINE
Payer: MEDICARE

## 2024-07-25 PROCEDURE — H2035 A/D TX PROGRAM, PER HOUR: HCPCS | Mod: HQ | Performed by: COUNSELOR

## 2024-07-26 NOTE — ADDENDUM NOTE
Encounter addended by: Yamila Patterson LPCC, LADC on: 7/25/2024 9:04 PM   Actions taken: Clinical Note Signed

## 2024-07-26 NOTE — GROUP NOTE
Group Therapy Documentation    PATIENT'S NAME: Durga Aguirre  MRN:   [id number]  :   1960  ACCT. NUMBER: [id number]  DATE OF SERVICE: 24  START TIME:  5:00 PM  END TIME:  8:00 PM  FACILITATOR(S): Yamila Patterson, STEPHANY, ELANA  TOPIC: BEH Group Therapy  Number of patients attending the group:  4  Group Length:  3 Hours    Dimensions addressed: 3, 5, 6      Summary of Group / Topics Discussed:     Cognitive Therapy Techniques, Co-occurring Illness, Coping Skills/Lifestyle Managemet, Emotional Regulation, Interpersonal Effectiveness, and Thinking Errors/Negative Self-Talk     Cognitive restructuring  Distortions: Client's received an overview of how to identify common cognitive distortions. Clients will explore alternatives to cognitive distortions and practice challenging their negative thought patterns. The goal is to help patients target modify ineffective thought patterns.      Client Session Goals / Objectives:  Familiarized self with ineffective / unhealthy thoughts and how they develop.    Explored impact of ineffective thoughts / distortions on mood and activity  Formulated new neutral/positive alternatives to challenge less helpful ineffective thoughts.  Practiced and plan to apply in daily life     Cognitive restructuring  Client was provided handout on common cognitive distortions including: filtering, polarized thinking, overgeneralization, jumping to conclusions, catastrophizing, personalization, control fallacies, fallacy of fairness, blaming, should, emotional reasoning, fallacy of change, global labeling, always being right, heavens reward fallacy. Client participated in discussion about how cognitive distortions are ways our mind convinces us of something that isn't really true. Cognitive distortions usually reinforce negative thinking and emotions by telling ourselves thing that sound rational and accurate but only serve to keep us feeling bad about ourselves. By learning to correctly  identify this kind of thinking, a person can then answer the negative thinking and refute, and overtime will slowly diminish irrational thought and replace with balanced thinking. Client completed worksheet identifying cognitive distortions most commonly experienced by  with specific examples and ways to start refuting this thinking pattern.      Group Objectives:  Client will understand cognitive distortions and be able to identify cognitive distortions they most use      Client will gain insight regarding the impact cognitive distortions have on one's ability to accurately perceive their world and function      Client will learn ways to adjust these cognitive distortions and with practice can change these negative perceptions over time      Client will follow guided worksheet to practice exploring these cognitions further to improve self-awareness       Attestation: Dr. Loretta HANNON - Provides oversight and supervision of care.        Group Attendance:  Attended group session    Patient's response to the group topic/interactions:  discussed personal experience with topic and listened actively    Patient appeared to be Actively participating and Engaged.        Client specific details:  Durga denied any change in his sobriety date. He shared having trouble sleeping at night and yawned throughout group. He reports that his trazodone and melatonin have been ineffective and did not appear to have much insight of experience of PAWS. Durga had some behavioral concerns (sighing as his peer was talking, and trying to rush the group process). He was redirectable for the most part. Durga showed some understanding of cognitive distortions. Durga completed his homework well and made a goal to go to an AA meeting this weekend. Durga tried to resist mindfulness practice and struggled with participation during loving-kindness practice.

## 2024-07-26 NOTE — PROGRESS NOTES
Addiction Outpatient Weekly Clinical Staffing     Durga CONRAD Kannansunshine was staffed on 7/11/2024 . Durga CONRAD Timothy was staffed on recovery strengths, barriers and treatment progress.     Staff present: Senait Ramos MS, [facility] , Valentina Martinez [facility], [facility] , Yamila Patterson [facility], [facility] , Danny Lozada MS, [facility] , Megan Conde [facility], [facility] , Gloria Manriquez [facility], [facility] , and Eda Lau, [facility]     Date: 7/11/2024 Time: 3:02 PM    Staff Signature: Yamila Patterson, [facility], [facility]     Attestation: Dr. Loretta HANNON - Provides oversight and supervision of care.

## 2024-07-29 ENCOUNTER — HOSPITAL ENCOUNTER (OUTPATIENT)
Dept: BEHAVIORAL HEALTH | Facility: CLINIC | Age: 64
Discharge: HOME | End: 2024-07-29
Attending: FAMILY MEDICINE
Payer: MEDICARE

## 2024-07-29 PROCEDURE — H2035 A/D TX PROGRAM, PER HOUR: HCPCS | Mod: HQ | Performed by: COUNSELOR

## 2024-07-30 ENCOUNTER — TELEPHONE (OUTPATIENT)
Dept: BEHAVIORAL HEALTH | Facility: CLINIC | Age: 64
End: 2024-07-30
Payer: MEDICARE

## 2024-07-30 NOTE — TELEPHONE ENCOUNTER
----- Message from Yamila Patterson sent at 2024  5:07 PM CDT -----  Regarding: Please schedule 1:1  Hello,    Location of programmin [address] [place] [address] [id number], [place] [address]  Date and time:  at 2pm  Group: [id number] (Phase 2)  Provider: Yamila Patterson ([id number])  Number of visits to be scheduled: 1 session  Length/Duration of Appointment in minutes: 1 hr  Visit Type: In person    ThanksYamila, MPS, LSW, LADC, LPCC  MI/CD Psychotherapist  [facility]   [address], [place] [address]  Phone: [phone] Fax:  [phone]

## 2024-07-30 NOTE — GROUP NOTE
"Group Therapy Documentation    PATIENT'S NAME: Durga Aguirre  MRN:   [id number]  :   1960  ACCT. NUMBER: [id number]  DATE OF SERVICE: 24  START TIME:  5:00 PM  END TIME:  8:00 PM  FACILITATOR(S): Yamila Patterson LPCC, ELANA  TOPIC: BEH Group Therapy  Number of patients attending the group:  5  Group Length:  3 Hours    Dimensions addressed: 3, 5, and 6    Summary of Group / Topics Discussed:    Cognitive restructuring  Distortions: Clients received an overview of how to identify common cognitive distortions and used personal examples to identify use of cognitive distortions and disputing it using the ABCD model. Clients will explore cognitive clarities and practice challenging their negative thought patterns. The goal is to help patients target modify ineffective thought patterns and belief systems.     Cognitive Restructuring Negative Self-Talk:     Facilitated a group discussion around cognitive restructuring focusing on self-talk primarily on negative self-talk.   Provided psychoeducation on negative self-talk and connections to one s self-talk feeds into one s addiction, MH and decisions.   Clients explored how their self-talk has impacted their decision making and how their use has further impacted their thoughts and self-talk.   Provided a discussion on one s self-talk has led to them justify continued use or can lead to a relapse. Provided handouts and an education video: \"You aren't at the mercy of your emotions -- your brain creates them  Romi Mike Pearson.\"     Objective(s):    Client will understand cognitive distortions and be able to identify which they most use.  Client will identify at least 3 self-talk messages they tell themselves to justify substance abuse.  Clients will increase their ability to recognize when they are using self-talk to convince them its ok to use, which will help reduce risk of relapse.  Client will learn ways to adjust these cognitive distortions and with " practice can change these negative perceptions over time.  Client will follow guided worksheet to practice exploring these cognitions further to improve self-awareness.    Attestation: Dr. Loretta HANNON - Provides oversight and supervision of care.        Group Attendance:  Attended group session    Patient's response to the group topic/interactions:  expressed understanding of topic, gave appropriate feedback to peers, and listened actively    Patient appeared to be Attentive and Engaged.        Client specific details:  Durga appeared to struggle with maintaining attention during group. He appeared tired and frequently minimized group information as he states that it doesn't apply to him. Durga shared that he is trying to understand the topic but that it is too big for him. Durga provided good feedback to a peer processing a post use episode. Durga reports ongoing sobriety and rated his motivation as a 10/10.

## 2024-07-31 ENCOUNTER — HOSPITAL ENCOUNTER (OUTPATIENT)
Dept: BEHAVIORAL HEALTH | Facility: CLINIC | Age: 64
Discharge: HOME | End: 2024-07-31
Attending: FAMILY MEDICINE
Payer: MEDICARE

## 2024-07-31 PROCEDURE — H2035 A/D TX PROGRAM, PER HOUR: HCPCS | Performed by: COUNSELOR

## 2024-07-31 NOTE — PROGRESS NOTES
"Individual Session Summary   START TIME: 1:30 PM   END TIME: 2:30 PM   Duration: 1 Hour    Data:  Met with client on this date for an individual session. The session focused on client's treatment plan goal for DIM(s) 1-6 of his individual treatment plan. Client collaborated to update treatment plan to reflect Phase transition. Client shared feeling his triggers at a 7/10 from attending an AA group last night. He identified feeling good about seeing some old AA members and being welcomed but reported that hearing about alcohol when others shared was triggering for him. He reports to experience a lot of tension in his body and thoughts to leave and go to the liquor store. Durga reported that he told himself \"that is not an option, I cannot drink\" and headed home. Durga reports to difficulty falling asleep and explored symptoms of PAWS. He reports that he used the tools previously discussed: deep stretching, (eating) making toasts and deep breathing and was unable to fall asleep. He reports that drinking coffee helps him sleep and wonders if his difficulty falling asleep is also due to caffeine. He says that he is unsure if he is experiencing PAWS and was open to understanding physical withdrawal and coping tools to improve sleep hygiene.     Intervention: Treatment Planning, CBT. Mindfulness, relaxation techniques, feedback.      Assessment: Client endorsed benefits in programming and made the statement that he would prefer to attend programming than AA groups. He shared \"some of the things we talk about in group is sticking more now but it's hard to put in words.\" He noted that repetition helps and made the statement \"I am in no hurry to finish groups.\" He appeared to understand his progress well and worked well to identify the readiness to move to Phase 3. No signs of intoxication or withdrawals were present. He was open to feedback on group behaviors and made a plan to \"tone it down.\"      Plan. Client will bring " completed Phase 2 packet tomorrow in group. He will practice progressive muscle relaxation and 4-5-7 breath work before bed to cope with tension. Client will improve behaviors in group with the understanding that it will not prevent him towards finishing the program. Client will remain sober.          [facility] Patterson, [facility], [facility]     Attestation: Dr. Loretta HANNON - Provides oversight and supervision of care.

## 2024-08-01 ENCOUNTER — HOSPITAL ENCOUNTER (OUTPATIENT)
Dept: BEHAVIORAL HEALTH | Facility: CLINIC | Age: 64
Discharge: HOME | End: 2024-08-01
Attending: FAMILY MEDICINE
Payer: MEDICARE

## 2024-08-01 PROCEDURE — H2035 A/D TX PROGRAM, PER HOUR: HCPCS | Mod: HQ

## 2024-08-02 NOTE — GROUP NOTE
Group Therapy Documentation    PATIENT'S NAME: Durga Aguirre  MRN:   [id number]  :   1960  [id number]  DATE OF SERVICE: 24  START TIME:  5:00 PM  END TIME:  8:00 PM  FACILITATOR(S): Danny Lozada MS, [facility]  TOPIC: BEH Group Therapy  Number of patients attending the group:  4  Group Length:  3 Hours    Dimensions addressed: 2 and 3    Summary of Group / Topics Discussed:    Nutrition for the Addict Brain: Facilitated a psychoeducation and process group focusing on nutrition and how that impacts addiction/recovery. Discussed with clients the impact of their addiction on their brain and how incorporating a healthy diet and lifestyle can aid/support their recovery. Provided educational video  How the foods you eat affects your brain,  discussed about how to eat healthy and the impacts of healthy eating on the brain. Provided an education packet that reviewed the effects of addiction on the brain and body and the benefits of healthy eating on one's recovery and wellbeing. Engaged clients in a discussion on how their addiction impacted their nutrition. Engaged clients in an activity of developing a healthy eating routine that they can start to implement in their recovery.        Group goals/objectives:      Clients will identify unhealthy eating patterns and how their addiction impacted their nutrition.    Client will gain insight into the importance of healthy eating into their recovery.   Client will identify one or two healthy eating tips they will implement during their recovery.   Client will work towards improving their health and wellness by incorporating healthy eating and exercise.       Group Attendance:  Attended group session    Patient's response to the group topic/interactions:  cooperative with task, discussed personal experience with topic, expressed readiness to alter behaviors, expressed understanding of topic, and gave appropriate feedback to peers    Patient appeared to be  Attentive and Engaged.        Client specific details: Client actively engaged and participated in the group discussion and activity. He answered the check in questions on victories & challenges, coping skills utilized, and plans for the weekend. His takeaway from the session on  Nutrition and the Addict Brain  was sugar cravings were part of the process in early recovery. Client expressed he would like to decrease his sugar intake and increase his daily water intake to remain hydrated.     Danny Lozada MS, LADC

## 2024-08-02 NOTE — ADDENDUM NOTE
Encounter addended by: Danny Lozada LADC on: 8/1/2024 11:57 PM   Actions taken: Clinical Note Signed

## 2024-08-05 ENCOUNTER — HOSPITAL ENCOUNTER (OUTPATIENT)
Dept: BEHAVIORAL HEALTH | Facility: CLINIC | Age: 64
Discharge: HOME | End: 2024-08-05
Attending: FAMILY MEDICINE
Payer: MEDICARE

## 2024-08-05 PROCEDURE — H2035 A/D TX PROGRAM, PER HOUR: HCPCS | Mod: HQ | Performed by: COUNSELOR

## 2024-08-06 NOTE — GROUP NOTE
Group Therapy Documentation    PATIENT'S NAME: Durga Aguirre  MRN:   [id number]  :   1960  [facility]. NUMBER: [id number]  DATE OF SERVICE: 24  START TIME:  5:00 PM  END TIME:  8:00 PM  FACILITATOR(S): Yamila Patterson, STEPHANY, ELANA  TOPIC: BEH Group Therapy  Number of patients attending the group:  4  Group Length:  3 Hours    Dimensions addressed: 3, 4, 5, and 6    Summary of Group / Topics Discussed:    Understanding the Interplay of Substance Use on Mental Health: Facilitated a group on mental health and general overview of various mental health diagnosis, symptoms, and treatment. Provided education on the neurobiology of mental health and the importance of addressing substance use issues and mental health issues at the same time. Engaged clients in a discussion about their understanding and experience around mental health and connecting their mental to their substance use.  Provide psychoeducation on diagnoses, etiology, cultural and environmental factors, and impact on functioning. Clients were provided various handouts and a educational video. Clients were paired off to discuss a few short and long term effects of drugs on their mental health and share with group. Client were assigned 10 consequences of substance use.    Group Objectives/Goals:    Clients will be able to identify 2-3 mental health specific symptoms as they relate to their diagnosis.   Clients will be able identify 2-3 neurotransmitters involved with addiction (serotonin, dopamine, adrenaline, oxytocin) and their functions.   Clients will be able to identify basic brain regions involved with addiction.   Clients will understand personal experience of PAWS and drug-induced mental health difficulties due to addiction.    Structure:     Provide psychoeducation on neurotransmitters and brain regions involved in addiction to illustrate how pre-frontal cortex executive functioning is diminished by substance use disorder.   Provide  psychoeducation on how biochemical imbalances resulting from substance use may lead to PAWS.   Facilitate group discussion around each client s current symptomology and MH diagnosis impact on brain functioning.    Use teach-back techniques to ensure client's understanding.   Provide clients with worksheets and handouts to enhance learning.       Expected therapeutic outcomes:      Better understanding of the disease concept of addiction and the interplay of mental health and addiction.  Reduce confusion about post-acute withdrawal symptoms to better manage them.   Gain understanding on the consequences of substance use to all areas of well being    Attestation: Dr. Loretta HANNON - Provides oversight and supervision of care.        Group Attendance:  Attended group session    Patient's response to the group topic/interactions:  cooperative with task, discussed personal experience with topic, expressed understanding of topic, and listened actively    Patient appeared to be Actively participating, Attentive, and Engaged.        Client specific details:  Durga reports no change in his LDOU. He reports ongoing difficulty with sleep and explored his personal experience with PAWS. He made a plan to continue to use breath work, and be more mindful about his cigarette use late at night. He turned in his Triggers and Cues worksheet and asked to transition to Phase 3 next week. He presented some challenges with identifying short and long term effects of drugs on mental health and engaged well with a peer who he completed the skill activity with. He endorsed euphoric recall in discussing past use of mixing vodka, wine and weed. He shared enjoyment in using LSD and going skiing. He was redirected to shift focus to consequences of use.

## 2024-08-08 ENCOUNTER — HOSPITAL ENCOUNTER (OUTPATIENT)
Dept: BEHAVIORAL HEALTH | Facility: CLINIC | Age: 64
Discharge: HOME | End: 2024-08-08
Attending: FAMILY MEDICINE
Payer: MEDICARE

## 2024-08-08 PROCEDURE — H2035 A/D TX PROGRAM, PER HOUR: HCPCS | Mod: HQ | Performed by: COUNSELOR

## 2024-08-09 ENCOUNTER — TELEPHONE (OUTPATIENT)
Dept: BEHAVIORAL HEALTH | Facility: CLINIC | Age: 64
End: 2024-08-09
Payer: MEDICARE

## 2024-08-09 NOTE — TELEPHONE ENCOUNTER
----- Message from Yamila Patterson sent at 8/9/2024  2:37 PM CDT -----  Regarding: Please add  Hello,  Please remove from Phase 2 schedules. Client is progressing to Phase 3.     Scheduling Request    Location of programming: [facility]occur[facility] Afternoon [facility].  Start Date: 8/13/24  Group: [id number] (Phase 3) on Tuesdays at 5:00 PM to 7:00 PM (8 weeks)  Attending Provider: AMMON Soto, ELANA, STEPHANY ([id number]), Romi Burgos MD is   Number of visits to be scheduled: 20  Duration of Appointment in minutes: 180  Visit Type: In-person      Thanks,  ANA Soto LSW, ELANA, MACRINAC  MI/CD Psychotherapist  [facility] [address], [place] [address]  Phone: [phone] Fax:  [phone]

## 2024-08-09 NOTE — GROUP NOTE
"Group Therapy Documentation    PATIENT'S NAME: Durga Aguirre  MRN:   [id number]  :   1960  ACCT. NUMBER: [id number]  DATE OF SERVICE: 24  START TIME:  5:00 PM  END TIME:  8:00 PM  FACILITATOR(S): Yamila Patterson, STEPHANY, ELANA  TOPIC: BEH Group Therapy  Number of patients attending the group:  4  Group Length:  3 Hours    Dimensions addressed: 3, 5, and 6    Summary of Group / Topics Discussed:     Topic: Navigating Relationships/Attachment Types    Attachment Styles: Group focused on 4 attachment styles (Secure, Avoidant, Anxious and Disorganized) and the 4 resulting personality styles (Secure, Dismissive, Preoccupied and Fearful-avoidant) to help clients understand how their childhood impacts personality and provides insight into relationship challenges and opportunities, and addiction. Provided video on signs of each attachment styles, and on [employer] [place]. Facilitated discussion on developing \"Earned Secure Attachment.\" Facilitated a mindfulness practice focusing on self-compassion and nurturing. Engaged clients in a discussion about the RAIN of self-compassion and the importance of investigating difficult emotions with compassion and nurture in recovery.     Group goals/objectives:    Clients will gain insight into the importance and benefits of focusing on positive characteristics of self and others.  Client will identify two ways they can increase positivity and compassion within their lives.   Client will identify attachment and personality styles pertaining to themselves and practiced narratives that develops an \"earned attachment.  Client will practice skills to let go of resentments or anger they may feel about their childhood through practice of RAIN of self-compassion and through the mantra \"what you resist, persist.\"    Attestation: Dr. Loretta HANNON - Provides oversight and supervision of care.              Group Attendance:  Attended group session    Patient's response to " the group topic/interactions:  cooperative with task, discussed personal experience with topic, expressed understanding of topic, and listened actively    Patient appeared to be Actively participating, Attentive, and Engaged.        Client specific details:  Durga reports that he has been sleeping better and that he is in a better mood today. He noted simplicity in managing urges and cravings and emotional difficulty. Durga had some difficulty understanding the group  material but was observed trying his hardest. He processed his P2 assignment and is set to begin P3 next week.

## 2024-08-09 NOTE — ADDENDUM NOTE
Encounter addended by: Yamila Patterson LPCC, LADC on: 8/8/2024 9:00 PM   Actions taken: Clinical Note Signed

## 2024-08-09 NOTE — PROGRESS NOTES
"[facility] Weekly Treatment Plan Review     Date:  24    Weekly Treatment Plan Review     Treatment Plan initiated on: 24.  Durga had one unexcused absence during this reporting period.      Dimension1: Acute Intoxication/Withdrawal Potential -   Previous Dimension Ratin  Current Dimension Ratin  Client Goals Addressed Since last Review: \"I will go on more walks, and come to groups as scheduled to keep my mind off of drinking and to gain more insight on my alcohol cravings.\"  Are Treatment Plan goals/methods effective? Yes     Date of Last Use: 24  Any reports of withdrawal symptoms - Client reports that he has been sober for a month. Client reports to drinking 4-5 times a week in the evenings and up at 8 oz of alcohol. Client is at risk for withdrawal symptoms.       Dimension 2: Biomedical Conditions & Complications -   Previous Dimension Ratin  Current Dimension Ratin  Client Goals Addressed Since last Review: \"I will continue to have my medical needs met and addressed\"  Are Treatment Plan goals/methods effective? Yes     Medical Concerns:  None identified.  Current Medications & Medication Changes:  Current Facility-Administered Medications               Current Outpatient Medications   Medication Sig Dispense Refill    acamprosate (CAMPRAL) 333 MG EC tablet Take 2 tablets (666 mg) by mouth 3 times daily 180 tablet 0    ARIPiprazole (ABILIFY) 5 MG tablet TAKE ONE TABLET (5 MG) BY MOUTH ONE TIME DAILY 90 tablet 1    busPIRone (BUSPAR) 15 MG tablet Take 1 tablet (15 mg) by mouth 2 times daily 180 tablet 1    citalopram (CELEXA) 20 MG tablet Take 1 tablet (20 mg) by mouth daily 90 tablet 1    diclofenac (VOLTAREN) 50 MG EC tablet Take 1 tablet (50 mg) by mouth 3 times daily as needed for moderate pain 30 tablet 0    gabapentin (NEURONTIN) 300 MG capsule Take 2 capsules (600 mg) by mouth 3 times daily for 5 days Increase gabapentin dose 300 mg/day to a target dose of 600 mg TID " "30 capsule 0    hydrochlorothiazide (HYDRODIURIL) 12.5 MG tablet Take 1 tablet (12.5 mg) by mouth daily 90 tablet 3    hydrOXYzine HCl (ATARAX) 50 MG tablet Take 0.5-1 tablets (25-50 mg) by mouth every 8 hours as needed for anxiety 180 tablet 3    lisinopril (ZESTRIL) 40 MG tablet Take 1 tablet (40 mg) by mouth daily (Hold for a systolic blood pressure of 100) 90 tablet 3    Melatonin 10 MG TABS tablet Take 10 mg by mouth nightly as needed for sleep        multivitamin w/minerals (THERA-VIT-M) tablet Take 1 tablet by mouth daily 90 tablet 0    naltrexone (DEPADE/REVIA) 50 MG tablet Take 2 tablets (100 mg) by mouth daily 180 tablet 1    simvastatin (ZOCOR) 40 MG tablet Take 1 tablet (40 mg) by mouth every evening 90 tablet 3    thiamine (B-1) 100 MG tablet TAKE 1 TABLET (100 MG) BY MOUTH DAILY 90 tablet 0    traZODone (DESYREL) 50 MG tablet TAKE ONE TABLET BY MOUTH AT BEDTIME AS NEEDED FOR SLEEP 90 tablet 0      No current facility-administered medications for this encounter.         Medication Prescriber:  Dr. Bennett, [facility]  Taking meds as prescribed? Yes  Medication side effects or concerns:  None reported  Outside medical appointments this review period (list provider and reason for visit):  NA  Narrative: Client has peripheral neuropathy like due to ongoing alcohol abuse. Currently connected to a specialist. Working on sobriety.         Dimension 3: Emotional/Behavioral Conditions & Complications -   Previous Dimension Ratin  Current Dimension Ratin  Client Goals Addressed Since last Review: \"I want to learn new ways to stay in the present moment.\"  Are Treatment Plan goals/methods effective? Yes     PHQ2:        2024     1:00 PM 2024    10:46 AM 2024     2:57 PM 2023     8:56 AM 2023    10:33 AM 2022    10:12 AM 11/15/2021    10:27 AM   PHQ-2 (  [employer])   Q1: Little interest or pleasure in doing things 0 1 0 1 0 0 0   Q2: Feeling down, " depressed or hopeless 1 0 0 1 0 0 0   PHQ-2 Score 1 1 0 2 0 0 0   Q1: Little interest or pleasure in doing things   Several days       Not at all Not at all   Q2: Feeling down, depressed or hopeless   Not at all       Not at all Not at all   PHQ-2 Score   1       0 0      GAD2:        1/16/2024    10:56 AM 2/9/2024    11:00 AM 4/4/2024     1:00 PM   YUSUF-2   Feeling nervous, anxious, or on edge 1 1 1   Not being able to stop or control worrying 0 1 1   YUSUF-2 Total Score 1 2    2 2      PROMIS 10-Global Health (all questions and answers displayed):        8/4/2023    12:00 PM 12/20/2023    12:00 PM 2/12/2024     5:14 PM 3/20/2024     2:00 PM 4/4/2024     1:00 PM   PROMIS 10   In general, would you say your health is:     Very good       In general, would you say your quality of life is:     Very good       In general, how would you rate your physical health?     Very good       In general, how would you rate your mental health, including your mood and your ability to think?     Very good       In general, how would you rate your satisfaction with your social activities and relationships?     Good       In general, please rate how well you carry out your usual social activities and roles     Good       To what extent are you able to carry out your everyday physical activities such as walking, climbing stairs, carrying groceries, or moving a chair?     Mostly       In the past 7 days, how often have you been bothered by emotional problems such as feeling anxious, depressed, or irritable?     Sometimes       In the past 7 days, how would you rate your fatigue on average?     Mild       In the past 7 days, how would you rate your pain on average, where 0 means no pain, and 10 means worst imaginable pain?     5       In general, would you say your health is: 3 3 4 4 3   In general, would you say your quality of life is: 4 2 4 4 4   In general, how would you rate your physical health? 3 4 4 3 3   In general, how would you  rate your mental health, including your mood and your ability to think? 4 3 4 4 4   In general, how would you rate your satisfaction with your social activities and relationships? 2 3 3 4 2   In general, please rate how well you carry out your usual social activities and roles. (This includes activities at home, at work and in your community, and responsibilities as a parent, child, spouse, employee, friend, etc.) 3 2 3 4 2   To what extent are you able to carry out your everyday physical activities such as walking, climbing stairs, carrying groceries, or moving a chair? 3 4 4 5 3   In the past 7 days, how often have you been bothered by emotional problems such as feeling anxious, depressed, or irritable? 4 3 3 3 3   In the past 7 days, how would you rate your fatigue on average? 3 3 2 2 2   In the past 7 days, how would you rate your pain on average, where 0 means no pain, and 10 means worst imaginable pain? 3 1 5 4 4   Global Mental Health Score 12 11 14 15 13   Global Physical Health Score 13 15 15 15 13   PROMIS TOTAL - SUBSCORES 25 26 29 30 26      Mental health diagnosis 296.30 (F33.9) Major Depressive Disorder, Recurrent Episode, Unspecified _  300.02 (F41.1) Generalized Anxiety Disorder  Date of last SIB:  None reported  Date of  last SI:  None reported  Date of last HI: None reported  Behavioral Targets:  Reducing intensity and frequency of MH symptoms and increase more instances of coping through it.   Risk factors:  Lives alone, lack of insight on mental health experiences, substance use, isolation.  Protective factors:  positive relationships positive family connections, safe and stable environment, regular sleep, and regular physical activity  Outside mental health related appointments this review period (list provider and reason for visit):  NA  Current MH Assignments:  Complete assignment on emotions that have caused relapses and develop coping tools to manage difficult experiences.      Narrative:   "Current Mental Health symptoms include: irritability, restlessness, and \"cabin fever\", difficulty concentrating, fatigue, and low energy, procrastination. Active interventions to stabilize mental health symptoms this week : Attend and participate in groups and programming fully. Begin to build behaviors reflecting self-care and mindfulness outside of programming. Include activities to build a schedule in the evening. Process grief relating to sobriety.        Dimension 4: Treatment Acceptance / Resistance -   Previous Dimension Ratin  Current Dimension Ratin  Client Goals Addressed Since last Review: \"Attending programming will help me reduce feelings isolation at home\"  Are Treatment Plan goals/methods effective? Yes     ROYA Diagnosis:  Alcohol Use Disorder   303.90 (F10.20) Severe In early remission,   Commitment to tx process/Stage of change- 2  ROYA assignments - Complete consequences of use assignment.         Narrative - Durga attend programming well. He demonstrate some resistance with group materials and will often go off topic. Willie has trouble focusing on topics of mental health. Durga reports that he likes the \"socializing aspect\" of groups. Durga has a treatment contract and an accountability plan in place to increase motivation for treatment.         Dimension 5: Relapse / Continued Problem Potential -   Previous Dimension Rating:  3  Current Dimension Rating:  3  Client Goals Addressed Since last Review: \"I want to  use skills to gain more clarity over my substance behaviors.\"  Are Treatment Plan goals/methods effective? Yes     Relapses this week - None  Urges to use - YES, List reports thoughts to use but no urges due to taking anti-craving medications.  UA results - Will be randomly administered.  Recent Results   No results found for this or any previous visit (from the past 672 hour(s)).      Narrative- Willie is unable to identify relapse triggers, lacks coping skills for relapse " "prevention. Chemical use was in client's environment. Client reports low motivation for abstinence and external motivators for sobriety. Client does not see alcohol use as a big problem in his life. Durga has been using through programming so far and has had a difficult time being honest about his use.      Dimension 6: Recovery Environment -   Previous Dimension Rating:  3  Current Dimension Ratin  Client Goals Addressed Since last Review: \"I want to see what I can do to build more structured activities, like working on cars more and roller blading.\"  Are Treatment Plan goals/methods effective? Yes     Family Involvement - Patient declined to have family involved   Summarize attendance in family sessions - NA  Family supportive of treatment?  Yes     Community support group attendance - None at this time.  Recreational activities - Working to build structure.      Narrative - Chemical use in the home, Lack of sober / recreational interests, Legal issues. Will be placed on color wheel for legal accountability. Durga is currently working with his sister and reports improvement in his mood and relationship with her.      Progress made on transition planning goals: Currently working on assignments.      Justification for Continued Treatment at this Level of Care:  Durga lacks insight on difficult experiences and on coping tools to use. Durga does not see a problem with his drinking and has external motivators for sobriety. Durga has serious legal changes due to DUI.   Treatment coordination activities since last review:  coordination with   Need for peer recovery support referral? Yes: Client denies  Referrals made since last review:  NA     Discharge Planning:  Target Discharge Date/Timeframe:  24  Target Phase 2 Start:  24  Target Phase 3 Start: 24   Med Mgmt Provider/Appt:  NA        Is patient a vulnerable adult?  No     Interdisciplinary Clinical Supervision including: ELANA and " Mental health professional     *Client received copy of changes: Yes  *Client is aware of right to access a treatment plan review: Yes     Yamila Patterson, MPS, LSW, LADC, LPCC  MI/CD Psychotherapist  [facility] [address] [place] [address] [address], [place] [address]  Phone: [phone] Fax:  [phone]       Attestation: Dr. Loretta HANNON - Provides oversight and supervision of care.

## 2024-08-13 ENCOUNTER — HOSPITAL ENCOUNTER (OUTPATIENT)
Dept: BEHAVIORAL HEALTH | Facility: CLINIC | Age: 64
Discharge: HOME | End: 2024-08-13
Attending: FAMILY MEDICINE
Payer: MEDICARE

## 2024-08-13 PROCEDURE — H2035 A/D TX PROGRAM, PER HOUR: HCPCS | Mod: HQ | Performed by: COUNSELOR

## 2024-08-14 NOTE — GROUP NOTE
"Group Therapy Documentation    PATIENT'S NAME: Durga Aguirre  MRN:   [id number]  :   1960  ACCT. NUMBER: [id number]  DATE OF SERVICE: 24  START TIME:  5:00 PM  END TIME:  7:00 PM  FACILITATOR(S): Yamila Patterson, STEPHANY, ELANA  TOPIC: BEH Group Therapy  Number of patients attending the group:  3  Group Length:  2 Hours    Dimensions addressed: 3, 4, 5, 6    Summary of Group / Topics Discussed:    Spirituality: Personal Growth and Development: Facilitated a group discussion around health and wellness focusing on the various aspects of spirituality. Facilitated a group discussion on spirituality and how individual values impact thoughts, feelings, focus and actions. Provided psychoeducation on the need for a balanced lifestyle and ways to achieve this. Engaged clients in a discussion around what is spirituality and their own experience to spirituality. Provided clients with handouts and worksheets. Provided the video \"What you focus on, you attract\"- Nakul Sutton    Group Objectives/Goals:    Client will learn strategies to reduce stress and increase overall health and wellness.  Client will identify a personal understanding of spirituality.   Client will explore how their own spirituality can connect to their recovery and wellbeing.   Client will identify their personal quotes, nature, values and how these may nurture their spiritual self.   Client will implement new behaviors that facilitate ongoing spiritual wellness.     Attestation: Dr. Loretta HANNON - Provides oversight and supervision of care.        Group Attendance:  Attended group session    Patient's response to the group topic/interactions:  cooperative with task, discussed personal experience with topic, and expressed understanding of topic    Patient appeared to be Actively participating and Engaged.        Client specific details:  Client made strong statements about wanting to be sober. Client arrived to group appearing agitated, and " reported that he was angry because of a paint accident that occurred at his job. He endorsed critical self-statements regarding the incident. Client appeared tired and reports that he has not been sleeping well. He made sounds, movement and yawned while peers were checking in and appeared to be disengaged. He redirected well and apologized to the peer stating that he is having a hard time focusing. He engaged well completing the Spirituality portion of his Recovery Care Plan and enjoyed the video arriving at laughing at the incident he was previously upset about.

## 2024-08-15 NOTE — ADDENDUM NOTE
Encounter addended by: Yamila Patterson LPCC, LADC on: 8/15/2024 3:08 PM   Actions taken: Clinical Note Signed

## 2024-08-21 ENCOUNTER — TELEPHONE (OUTPATIENT)
Dept: BEHAVIORAL HEALTH | Facility: CLINIC | Age: 64
End: 2024-08-21
Payer: MEDICARE

## 2024-08-21 NOTE — TELEPHONE ENCOUNTER
----- Message from Yamila Patterson sent at 8/20/2024  4:24 PM CDT -----  Regarding: Please schedule 1:1  Hello,    Scheduling Request    Location of programming: [facility] Co-occurring Afternoon Program.  Date: 8/23/24 at 11am   Group: [id number] (Phase 3)   Attending Provider: AMMON Soto, ELANA, STEPHANY ([id number]), Romi Burgos MD is   Number of visits to be scheduled: 1  Duration of Appointment in minutes: 60  Visit Type: In-person      ThanksYamila MPS, LSW, ELANA, LPCC  MI/CD Psychotherapist  [facility] [address], [place] [address]  Phone: [phone] Fax:  [phone]

## 2024-08-26 DIAGNOSIS — F41.1 GENERALIZED ANXIETY DISORDER: ICD-10-CM

## 2024-08-26 DIAGNOSIS — F10.20 ALCOHOL USE DISORDER, SEVERE, DEPENDENCE (H): ICD-10-CM

## 2024-08-27 ENCOUNTER — HOSPITAL ENCOUNTER (OUTPATIENT)
Dept: BEHAVIORAL HEALTH | Facility: CLINIC | Age: 64
Discharge: HOME | End: 2024-08-27
Attending: FAMILY MEDICINE
Payer: MEDICARE

## 2024-08-27 PROCEDURE — H2035 A/D TX PROGRAM, PER HOUR: HCPCS | Mod: HQ | Performed by: COUNSELOR

## 2024-08-27 RX ORDER — GABAPENTIN 300 MG/1
CAPSULE ORAL
Qty: 180 CAPSULE | Refills: 0 | OUTPATIENT
Start: 2024-08-27

## 2024-08-27 NOTE — TELEPHONE ENCOUNTER
Date of Last Office Visit: 5/20/2024  Date of Next Office Visit: None; routing for [facility] to assist pt with scheduling.    No shows since last visit: Yes on 6/17/2024  More than one patient-initiated cancellation (with reschedule) since last seen in clinic? No    []Medication refilled per  Medication Refill in Ambulatory Care  policy.  [x]Medication unable to be refilled by RN due to criteria not met as indicated below:    []Eligibility: has not had a provider visit within last 6 months   [x]Supervision: no future appointment; < 7 days before next appointment   [x]Compliance: no shows; cancellations; lapse in therapy   []Verification: order discrepancy; may need modification...   [] > 30-day supply request   []Advanced refill request: > 7 days before refill date   [x]Controlled medication   []Medication not included in policy   []Review: new med; med adjusted ? 30 days; safety alert; requires lab monitoring...   []Scope of Practice: refill request processed by LPN/MA   []Other:      Medication(s) requested:   -  gabapentin (NEURONTIN) 300 MG capsule   Date last ordered: 5/20/2024  Qty: 180  Refills: 0  Take 2 capsules (600 mg) by mouth 3 times daily for 30 days Increase gabapentin dose 300 mg/day to a target dose of 600 mg     Appropriate for refill? Provider to review.            Any Controlled Substance(s)? Yes   [place]  checked? Yes   Gabapentin 300 mg capsule was last sold on 5/24/2024 for quantity of 180.  Other controlled substance on [place] ?: No      Requested medication(s) verified as identical to current order? Yes    Any lapse in adherence to medication(s) greater than 5 days? Unknown, the patient is in [facility] program.      Additional action taken? routed encounter to provider for review.      Last visit treatment plan:   ASSESSMENT/PLAN  There are no diagnoses linked to this encounter.  No orders of the defined types were placed in this encounter.     1. Alcohol use disorder, severe, dependence (H)  2. Alcohol  "dependence with alcohol-induced mood disorder (H)  -showing improvement.  Last use \"beginning of April\"   - acamprosate (CAMPRAL) 333 MG EC tablet; Take 2 tablets (666 mg) by mouth 3 times daily for 90 days  Dispense: 180 tablet; Refill: 2  - gabapentin (NEURONTIN) 300 MG capsule; Take 2 capsules (600 mg) by mouth 3 times daily for 30 days Increase gabapentin dose 300 mg/day to a target dose of 600 mg TID  Dispense: 180 capsule; Refill: 0  -continue with IOP programming and follow all recommendations  -follow up with DUI court as scheduled  -1 month follow up      Continued Complex Management  The longitudinal plan of care for Alcohol Use Disorder (AUD) was addressed during this visit. Due to the added complexity in care, I will continue to support Durga in the subsequent management and with ongoing continuity of care.     3. Generalized anxiety disorder  -showing improvement with alcohol  cessation   - gabapentin (NEURONTIN) 300 MG capsule; Take 2 capsules (600 mg) by mouth 3 times daily for 30 days Increase gabapentin dose 300 mg/day to a target dose of 600 mg TID  Dispense: 180 capsule; Refill: 0           May 20, 2024  - gabapentin 600 BID-TID  -Naltrexone 100 mg daily  Acamprosate BID-TID when remembers               Last encounter A/P from 4/17/24  1. Alcohol use disorder, severe, dependence (H)  -needs improvement  -no use since \"early April\" when started in IOP programming  -continue with acamprosate (refills available)  -Continue with naltrexone 100 mg daily) refill available  -recommending 12 step recovery groups  -continue IOP programming and follow all recommendations      Continued Complex Management  The longitudinal plan of care for Alcohol Use Disorder (AUD) was addressed during this visit. Due to the added complexity in care, I will continue to support Durga in the subsequent management and with ongoing continuity of care.        2. Generalized anxiety disorder  -no change.  Hydroxyzine  " "effective in managing anxiety symptoms.  YUSUF score 1, PHQ9 score 1  -continue with buspar, celexa, and hydroxizine as prescribed  -continue with gabapenting 300 mg TID to support anxiety and alcohol cessation  -plans on riding bike and improving exercise.          3. Tobacco use disorder, moderate, dependence  -no change  -not interested in cessation or NRT  -continue to ask assess and advise.            PDMP Review           Value Time User     State PDMP site checked  Yes 2/16/2024 10:53 AM Christina Posadas NP                   RTC  No follow-ups on file.    Any medication(s) require lab monitoring? Yes   ADDICTION MED   Last POC UDS Results: [unfilled]    Last Drug Confirmation Results: No results found for: \"NALTX\", \"NALTXR\", \"UCRR\", \"GPENT\", \"FEN\", \"FENR\", \"MORPN\", \"MORPNR\", \"NFEN\", \"NFENR\", \"XYLAZINE\"    Last Drugs of Abuse Screening: No results found for: \"THC13\", \"PCP13\", \"COC13\", \"MAMP13\", \"OPI13\", \"AMP13\", \"BZO13\", \"TCA13\", \"MTD13\", \"BAR13\", \"OXY13\", \"PPX13\", \"BUP13\"     Last Fentanyl Qualitative Urine: No results found for: \"UFENT\"     Last Ethyl Alcohol Level:   Alcohol ethyl   Date Value Ref Range Status   11/12/2022 0.39 () <=0.01 g/dL Final                   "

## 2024-08-28 DIAGNOSIS — F10.20 ALCOHOL USE DISORDER, SEVERE, DEPENDENCE (H): ICD-10-CM

## 2024-08-28 DIAGNOSIS — F41.1 GENERALIZED ANXIETY DISORDER: ICD-10-CM

## 2024-08-28 RX ORDER — GABAPENTIN 300 MG/1
CAPSULE ORAL
Qty: 180 CAPSULE | Refills: 0 | OUTPATIENT
Start: 2024-08-28

## 2024-08-28 NOTE — GROUP NOTE
Group Therapy Documentation    PATIENT'S NAME: Durga Aguirre  MRN:   [id number]  :   1960  ACCT. NUMBER: [id number]  DATE OF SERVICE: 24  START TIME:  5:00 PM  END TIME:  7:00 PM  FACILITATOR(S): Yamila Patterson, STEPHNAY, ELANA  TOPIC: BEH Group Therapy  Number of patients attending the group:  3  Group Length:  2 Hours    Dimensions addressed: 3, 5, and 6    Summary of Group / Topics Discussed:    Mindfulness:  Meditation and mindfulness practice:  Clients received an overview on what mindfulness is and how mindfulness can benefit general health, mental health symptoms, and stressors. The history of mindfulness, its application to mental health therapies, and key concepts were also discussed. Clients discussed current awareness, knowledge, and practice of mindfulness skills. Client's also discussed barriers to mindfulness practice.  Client's participated in the following experiential mindfulness practices:  guided meditation. Facilitated client graduation.    Group Objectives/Goals:     Client to be able to demonstrate and verbalize understanding of key mindfulness concepts   Client to identify when and how to use mindfulness skills   Client to resolve barriers to practicing mindfulness skills   Client to Identify a plan to how to use mindfulness skills in daily life     Attestation: Dr. Loretta HANNON - Provides oversight and supervision of care.        Group Attendance:  Attended group session, Excused from group session, and Refused to attend group session    Patient's response to the group topic/interactions:  cooperative with task, expressed readiness to alter behaviors, expressed understanding of topic, gave appropriate feedback to peers, and listened actively    Patient appeared to be Actively participating, Attentive, and Engaged.        Client specific details:  Durga reports no change in his LDOU. He participated well providing feedback to others and appeared more playful and upbeat than usual.  He accepted feedback from his peers well about the improvement that they noticed in him. He participated well in breath work. .

## 2024-08-29 ENCOUNTER — TELEPHONE (OUTPATIENT)
Dept: INTERNAL MEDICINE | Facility: CLINIC | Age: 64
End: 2024-08-29
Payer: MEDICARE

## 2024-08-29 NOTE — TELEPHONE ENCOUNTER
RN attempted to call the patient at [phone], but he did not answer,  RN LVM indicating that he will need to be seen for refills to be done by Christina Posadas NP.    Tanika Marroquin RN on 8/29/2024 at 9:03 AM

## 2024-09-03 ENCOUNTER — HOSPITAL ENCOUNTER (OUTPATIENT)
Dept: BEHAVIORAL HEALTH | Facility: CLINIC | Age: 64
Discharge: HOME | End: 2024-09-03
Attending: FAMILY MEDICINE
Payer: MEDICARE

## 2024-09-03 PROCEDURE — H2035 A/D TX PROGRAM, PER HOUR: HCPCS | Performed by: COUNSELOR

## 2024-09-04 ENCOUNTER — OFFICE VISIT (OUTPATIENT)
Dept: INTERNAL MEDICINE | Facility: CLINIC | Age: 64
End: 2024-09-04
Payer: MEDICARE

## 2024-09-04 VITALS
RESPIRATION RATE: 18 BRPM | OXYGEN SATURATION: 95 % | HEIGHT: 71 IN | WEIGHT: 201.2 LBS | BODY MASS INDEX: 28.17 KG/M2 | SYSTOLIC BLOOD PRESSURE: 114 MMHG | DIASTOLIC BLOOD PRESSURE: 68 MMHG | HEART RATE: 78 BPM

## 2024-09-04 DIAGNOSIS — Z00.00 ENCOUNTER FOR MEDICARE ANNUAL WELLNESS EXAM: Primary | ICD-10-CM

## 2024-09-04 DIAGNOSIS — Z87.891 HISTORY OF SMOKING: ICD-10-CM

## 2024-09-04 DIAGNOSIS — I10 ESSENTIAL HYPERTENSION: ICD-10-CM

## 2024-09-04 DIAGNOSIS — E78.1 HYPERTRIGLYCERIDEMIA: ICD-10-CM

## 2024-09-04 DIAGNOSIS — F10.21 ALCOHOL USE DISORDER, SEVERE, IN EARLY REMISSION, DEPENDENCE (H): ICD-10-CM

## 2024-09-04 DIAGNOSIS — Z13.6 SCREENING FOR AAA (ABDOMINAL AORTIC ANEURYSM): ICD-10-CM

## 2024-09-04 PROBLEM — E87.6 HYPOKALEMIA: Status: RESOLVED | Noted: 2021-03-12 | Resolved: 2024-09-04

## 2024-09-04 PROBLEM — R74.8 ELEVATED CREATINE KINASE: Status: RESOLVED | Noted: 2022-03-23 | Resolved: 2024-09-04

## 2024-09-04 LAB
ALBUMIN SERPL BCG-MCNC: 4.5 G/DL (ref 3.5–5.2)
ALP SERPL-CCNC: 164 U/L (ref 40–150)
ALT SERPL W P-5'-P-CCNC: 17 U/L (ref 0–70)
ANION GAP SERPL CALCULATED.3IONS-SCNC: 12 MMOL/L (ref 7–15)
AST SERPL W P-5'-P-CCNC: 19 U/L (ref 0–45)
BILIRUB SERPL-MCNC: 0.4 MG/DL
BUN SERPL-MCNC: 24.3 MG/DL (ref 8–23)
CALCIUM SERPL-MCNC: 9.7 MG/DL (ref 8.8–10.4)
CHLORIDE SERPL-SCNC: 95 MMOL/L (ref 98–107)
CHOLEST SERPL-MCNC: 209 MG/DL
CREAT SERPL-MCNC: 1.03 MG/DL (ref 0.67–1.17)
EGFRCR SERPLBLD CKD-EPI 2021: 80 ML/MIN/1.73M2
ERYTHROCYTE [DISTWIDTH] IN BLOOD BY AUTOMATED COUNT: 11 % (ref 10–15)
FASTING STATUS PATIENT QL REPORTED: YES
FASTING STATUS PATIENT QL REPORTED: YES
GLUCOSE SERPL-MCNC: 373 MG/DL (ref 70–99)
HCO3 SERPL-SCNC: 25 MMOL/L (ref 22–29)
HCT VFR BLD AUTO: 42.4 % (ref 40–53)
HDLC SERPL-MCNC: 26 MG/DL
HGB BLD-MCNC: 14.3 G/DL (ref 13.3–17.7)
LDLC SERPL CALC-MCNC: ABNORMAL MG/DL
LDLC SERPL DIRECT ASSAY-MCNC: 60 MG/DL
MCH RBC QN AUTO: 27.4 PG (ref 26.5–33)
MCHC RBC AUTO-ENTMCNC: 33.7 G/DL (ref 31.5–36.5)
MCV RBC AUTO: 81 FL (ref 78–100)
NONHDLC SERPL-MCNC: 183 MG/DL
PLATELET # BLD AUTO: 260 10E3/UL (ref 150–450)
POTASSIUM SERPL-SCNC: 4.6 MMOL/L (ref 3.4–5.3)
PROT SERPL-MCNC: 7.3 G/DL (ref 6.4–8.3)
RBC # BLD AUTO: 5.21 10E6/UL (ref 4.4–5.9)
SODIUM SERPL-SCNC: 132 MMOL/L (ref 135–145)
TRIGL SERPL-MCNC: 976 MG/DL
WBC # BLD AUTO: 6.1 10E3/UL (ref 4–11)

## 2024-09-04 PROCEDURE — 83721 ASSAY OF BLOOD LIPOPROTEIN: CPT | Performed by: INTERNAL MEDICINE

## 2024-09-04 PROCEDURE — 36415 COLL VENOUS BLD VENIPUNCTURE: CPT | Performed by: INTERNAL MEDICINE

## 2024-09-04 PROCEDURE — G0009 ADMIN PNEUMOCOCCAL VACCINE: HCPCS | Performed by: INTERNAL MEDICINE

## 2024-09-04 PROCEDURE — 80061 LIPID PANEL: CPT | Performed by: INTERNAL MEDICINE

## 2024-09-04 PROCEDURE — 85027 COMPLETE CBC AUTOMATED: CPT | Performed by: INTERNAL MEDICINE

## 2024-09-04 PROCEDURE — G0402 INITIAL PREVENTIVE EXAM: HCPCS | Performed by: INTERNAL MEDICINE

## 2024-09-04 PROCEDURE — 90677 PCV20 VACCINE IM: CPT | Performed by: INTERNAL MEDICINE

## 2024-09-04 PROCEDURE — 80053 COMPREHEN METABOLIC PANEL: CPT | Performed by: INTERNAL MEDICINE

## 2024-09-04 PROCEDURE — 99214 OFFICE O/P EST MOD 30 MIN: CPT | Mod: 25 | Performed by: INTERNAL MEDICINE

## 2024-09-04 RX ORDER — LISINOPRIL 40 MG/1
40 TABLET ORAL DAILY
Qty: 90 TABLET | Refills: 3 | Status: SHIPPED | OUTPATIENT
Start: 2024-09-04

## 2024-09-04 RX ORDER — SIMVASTATIN 40 MG
40 TABLET ORAL EVERY EVENING
Qty: 90 TABLET | Refills: 3 | Status: SHIPPED | OUTPATIENT
Start: 2024-09-04

## 2024-09-04 RX ORDER — HYDROCHLOROTHIAZIDE 12.5 MG/1
12.5 TABLET ORAL DAILY
Qty: 90 TABLET | Refills: 3 | Status: SHIPPED | OUTPATIENT
Start: 2024-09-04

## 2024-09-04 ASSESSMENT — PATIENT HEALTH QUESTIONNAIRE - PHQ9
SUM OF ALL RESPONSES TO PHQ QUESTIONS 1-9: 7
10. IF YOU CHECKED OFF ANY PROBLEMS, HOW DIFFICULT HAVE THESE PROBLEMS MADE IT FOR YOU TO DO YOUR WORK, TAKE CARE OF THINGS AT HOME, OR GET ALONG WITH OTHER PEOPLE: EXTREMELY DIFFICULT
SUM OF ALL RESPONSES TO PHQ QUESTIONS 1-9: 7

## 2024-09-04 NOTE — PROGRESS NOTES
"Individual Session Summary   START TIME: 5 PM   END TIME: 6 PM   Duration: 1 Hour    **Individual session done in lieu of group due to there not being enough members.     Data:      Number of patients attending the group:  1  Group Length:  1 Hours    Dimensions addressed: 3 and 5    Summary of Group / Topics Discussed:    Alcohol and Narcotic Withdrawal: Clients were provided education group on alcohol and narcotic withdrawal and impacts of alcohol use on the brain and body. Reviewed the signs and symptoms of PAWS. Facilitated Phase 3 group discussion around each client's experiences with PAWS. Provided psychoeducation on importance of self-care and mindfulness to cope with the intensity of PAWS. Reviewed the \"Happy Hormones\" and activities that increases dopamine, serotonin, endorphins and oxytocin production within the body. Connected client to addiction medicine.      Group Objectives/Goals   Client will be able to identify signs and symptoms of PAWS  Client will gain insight into PAWS treatment options and importance of actively using program tools to cope with the intensity of the experience. ?     Expected therapeutic outcomes:??   Understand signs and symptoms of PAWS  Increase awareness of treatment options available for withdrawal and begin to use relapse prevention tools to cope.   Increase utilization on program tools such as breath work techniques, body scans, progressive muscle relaxation, practice of impermanence and building activities that improve \"happy hormone\" production.      Client reports no change in LDOU. Client reports increase struggle with insomnia, foggy brain and overthinking. Client identifies experience of PAWS and endorsing fear from it. Shared that he slept at 6:30am today and that he drank 2 of his trazodone pills last night and did not feel any impact from it. Client reports more days where he experiences insomnia and appear to struggle with use of program tools. Client agreed " "that his use during programming caused him to need support for PAWS while in Phase 3. Client is meeting with GP tomorrow and is hoping to adjust his medications to \"feel normal again.\" Client reviewed program tools and appeared to have a hard time fully grasping how to employ it at home. Client plan to eat early tonight and use breath work to cope through overthinking. 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 Client reviewed use of mindfulness and self-compassion to provide care for self when it becomes tough.     Plan: Client will connect with addiction medicine for consultation. Client will continue to work on completing his Personal Recovery Care Plan. Client will use program tools to cope through symptoms of PAWS.           Yamila Patterson, [facility], LADC     Attestation: Dr. Loretta HANNON - Provides oversight and supervision of care.    "

## 2024-09-04 NOTE — PATIENT INSTRUCTIONS
Patient Education   Preventive Care Advice   This is general advice given by our system to help you stay healthy. However, your care team may have specific advice just for you. Please talk to your care team about your preventive care needs.  Nutrition  Eat 5 or more servings of fruits and vegetables each day.  Try wheat bread, brown rice and whole grain pasta (instead of white bread, rice, and pasta).  Get enough calcium and vitamin D. Check the label on foods and aim for 100% of the RDA (recommended daily allowance).  Lifestyle  Exercise at least 150 minutes each week  (30 minutes a day, 5 days a week).  Do muscle strengthening activities 2 days a week. These help control your weight and prevent disease.  No smoking.  Wear sunscreen to prevent skin cancer.  Have a dental exam and cleaning every 6 months.  Yearly exams  See your health care team every year to talk about:  Any changes in your health.  Any medicines your care team has prescribed.  Preventive care, family planning, and ways to prevent chronic diseases.  Shots (vaccines)   HPV shots (up to age 26), if you've never had them before.  Hepatitis B shots (up to age 59), if you've never had them before.  COVID-19 shot: Get this shot when it's due.  Flu shot: Get a flu shot every year.  Tetanus shot: Get a tetanus shot every 10 years.  Pneumococcal, hepatitis A, and RSV shots: Ask your care team if you need these based on your risk.  Shingles shot (for age 50 and up)  General health tests  Diabetes screening:  Starting at age 35, Get screened for diabetes at least every 3 years.  If you are younger than age 35, ask your care team if you should be screened for diabetes.  Cholesterol test: At age 39, start having a cholesterol test every 5 years, or more often if advised.  Bone density scan (DEXA): At age 50, ask your care team if you should have this scan for osteoporosis (brittle bones).  Hepatitis C: Get tested at least once in your life.  STIs (sexually  transmitted infections)  Before age 24: Ask your care team if you should be screened for STIs.  After age 24: Get screened for STIs if you're at risk. You are at risk for STIs (including HIV) if:  You are sexually active with more than one person.  You don't use condoms every time.  You or a partner was diagnosed with a sexually transmitted infection.  If you are at risk for HIV, ask about PrEP medicine to prevent HIV.  Get tested for HIV at least once in your life, whether you are at risk for HIV or not.  Cancer screening tests  Cervical cancer screening: If you have a cervix, begin getting regular cervical cancer screening tests starting at age 21.  Breast cancer scan (mammogram): If you've ever had breasts, begin having regular mammograms starting at age 40. This is a scan to check for breast cancer.  Colon cancer screening: It is important to start screening for colon cancer at age 45.  Have a colonoscopy test every 10 years (or more often if you're at risk) Or, ask your provider about stool tests like a FIT test every year or Cologuard test every 3 years.  To learn more about your testing options, visit:   .  For help making a decision, visit:   https://bit.ly/qi55560.  Prostate cancer screening test: If you have a prostate, ask your care team if a prostate cancer screening test (PSA) at age 55 is right for you.  Lung cancer screening: If you are a current or former smoker ages 50 to 80, ask your care team if ongoing lung cancer screenings are right for you.  For informational purposes only. Not to replace the advice of your health care provider. Copyright   2023 [facility]. All rights reserved. Clinically reviewed by the [facility]. [employer] 343017 - REV 01/24.  Preventing Falls: Care Instructions  Injuries and health problems such as trouble walking or poor eyesight can increase your risk of falling. So can some medicines. But there are things you can do to help  "prevent falls. You can exercise to get stronger. You can also arrange your home to make it safer.    Talk to your doctor about the medicines you take. Ask if any of them increase the risk of falls and whether they can be changed or stopped.   Try to exercise regularly. It can help improve your strength and balance. This can help lower your risk of falling.     Practice fall safety and prevention.    Wear low-heeled shoes that fit well and give your feet good support. Talk to your doctor if you have foot problems that make this hard.  Carry a cellphone or wear a medical alert device that you can use to call for help.  Use stepladders instead of chairs to reach high objects. Don't climb if you're at risk for falls. Ask for help, if needed.  Wear the correct eyeglasses, if you need them.    Make your home safer.    Remove rugs, cords, clutter, and furniture from walkways.  Keep your house well lit. Use night-lights in hallways and bathrooms.  Install and use sturdy handrails on stairways.  Wear nonskid footwear, even inside. Don't walk barefoot or in socks without shoes.    Be safe outside.    Use handrails, curb cuts, and ramps whenever possible.  Keep your hands free by using a shoulder bag or backpack.  Try to walk in well-lit areas. Watch out for uneven ground, changes in pavement, and debris.  Be careful in the winter. Walk on the grass or gravel when sidewalks are slippery. Use de-icer on steps and walkways. Add non-slip devices to shoes.    Put grab bars and nonskid mats in your shower or tub and near the toilet. Try to use a shower chair or bath bench when bathing.   Get into a tub or shower by putting in your weaker leg first. Get out with your strong side first. Have a phone or medical alert device in the bathroom with you.   Where can you learn more?  Go to https://www.[employer].net/patiented  Enter G117 in the search box to learn more about \"Preventing Falls: Care Instructions.\"  Current as of: July 17, " 2023               Content Version: 14.0    7452-2655 [employer].   Care instructions adapted under license by your healthcare professional. If you have questions about a medical condition or this instruction, always ask your healthcare professional. [employer] disclaims any warranty or liability for your use of this information.      Learning About Stress  What is stress?     Stress is your body's response to a hard situation. Your body can have a physical, emotional, or mental response. Stress is a fact of life for most people, and it affects everyone differently. What causes stress for you may not be stressful for someone else.  A lot of things can cause stress. You may feel stress when you go on a job interview, take a test, or run a race. This kind of short-term stress is normal and even useful. It can help you if you need to work hard or react quickly. For example, stress can help you finish an important job on time.  Long-term stress is caused by ongoing stressful situations or events. Examples of long-term stress include long-term health problems, ongoing problems at work, or conflicts in your family. Long-term stress can harm your health.  How does stress affect your health?  When you are stressed, your body responds as though you are in danger. It makes hormones that speed up your heart, make you breathe faster, and give you a burst of energy. This is called the fight-or-flight stress response. If the stress is over quickly, your body goes back to normal and no harm is done.  But if stress happens too often or lasts too long, it can have bad effects. Long-term stress can make you more likely to get sick, and it can make symptoms of some diseases worse. If you tense up when you are stressed, you may develop neck, shoulder, or low back pain. Stress is linked to high blood pressure and heart disease.  Stress also harms your emotional health. It can make you ellis, tense, or  depressed. Your relationships may suffer, and you may not do well at work or school.  What can you do to manage stress?  You can try these things to help manage stress:   Do something active. Exercise or activity can help reduce stress. Walking is a great way to get started. Even everyday activities such as housecleaning or yard work can help.  Try yoga or mike chi. These techniques combine exercise and meditation. You may need some training at first to learn them.  Do something you enjoy. For example, listen to music or go to a movie. Practice your hobby or do volunteer work.  Meditate. This can help you relax, because you are not worrying about what happened before or what may happen in the future.  Do guided imagery. Imagine yourself in any setting that helps you feel calm. You can use online videos, books, or a teacher to guide you.  Do breathing exercises. For example:  From a standing position, bend forward from the waist with your knees slightly bent. Let your arms dangle close to the floor.  Breathe in slowly and deeply as you return to a standing position. Roll up slowly and lift your head last.  Hold your breath for just a few seconds in the standing position.  Breathe out slowly and bend forward from the waist.  Let your feelings out. Talk, laugh, cry, and express anger when you need to. Talking with supportive friends or family, a counselor, or a maximo leader about your feelings is a healthy way to relieve stress. Avoid discussing your feelings with people who make you feel worse.  Write. It may help to write about things that are bothering you. This helps you find out how much stress you feel and what is causing it. When you know this, you can find better ways to cope.  What can you do to prevent stress?  You might try some of these things to help prevent stress:  Manage your time. This helps you find time to do the things you want and need to do.  Get enough sleep. Your body recovers from the stresses  "of the day while you are sleeping.  Get support. Your family, friends, and community can make a difference in how you experience stress.  Limit your news feed. Avoid or limit time on social media or news that may make you feel stressed.  Do something active. Exercise or activity can help reduce stress. Walking is a great way to get started.  Where can you learn more?  Go to https://www.[employer].net/patiented  Enter N032 in the search box to learn more about \"Learning About Stress.\"  Current as of: October 24, 2023               Content Version: 14.0    3191-4240 [employer].   Care instructions adapted under license by your healthcare professional. If you have questions about a medical condition or this instruction, always ask your healthcare professional. [employer] disclaims any warranty or liability for your use of this information.      Learning About Sleeping Well  What does sleeping well mean?     Sleeping well means getting enough sleep to feel good and stay healthy. How much sleep is enough varies among people.  The number of hours you sleep and how you feel when you wake up are both important. If you do not feel refreshed, you probably need more sleep. Another sign of not getting enough sleep is feeling tired during the day.  Experts recommend that adults get at least 7 or more hours of sleep per day. Children and older adults need more sleep.  Why is getting enough sleep important?  Getting enough quality sleep is a basic part of good health. When your sleep suffers, your physical health, mood, and your thoughts can suffer too. You may find yourself feeling more grumpy or stressed. Not getting enough sleep also can lead to serious problems, including injury, accidents, anxiety, and depression.  What might cause poor sleeping?  Many things can cause sleep problems, including:  Changes to your sleep schedule.  Stress. Stress can be caused by fear about a single event, such as " "giving a speech. Or you may have ongoing stress, such as worry about work or school.  Depression, anxiety, and other mental or emotional conditions.  Changes in your sleep habits or surroundings. This includes changes that happen where you sleep, such as noise, light, or sleeping in a different bed. It also includes changes in your sleep pattern, such as having jet lag or working a late shift.  Health problems, such as pain, breathing problems, and restless legs syndrome.  Lack of regular exercise.  Using alcohol, nicotine, or caffeine before bed.  How can you help yourself?  Here are some tips that may help you sleep more soundly and wake up feeling more refreshed.  Your sleeping area   Use your bedroom only for sleeping and sex. A bit of light reading may help you fall asleep. But if it doesn't, do your reading elsewhere in the house. Try not to use your TV, computer, smartphone, or tablet while you are in bed.  Be sure your bed is big enough to stretch out comfortably, especially if you have a sleep partner.  Keep your bedroom quiet, dark, and cool. Use curtains, blinds, or a sleep mask to block out light. To block out noise, use earplugs, soothing music, or a \"white noise\" machine.  Your evening and bedtime routine   Create a relaxing bedtime routine. You might want to take a warm shower or bath, or listen to soothing music.  Go to bed at the same time every night. And get up at the same time every morning, even if you feel tired.  What to avoid   Limit caffeine (coffee, tea, caffeinated sodas) during the day, and don't have any for at least 6 hours before bedtime.  Avoid drinking alcohol before bedtime. Alcohol can cause you to wake up more often during the night.  Try not to smoke or use tobacco, especially in the evening. Nicotine can keep you awake.  Limit naps during the day, especially close to bedtime.  Avoid lying in bed awake for too long. If you can't fall asleep or if you wake up in the middle of the " "night and can't get back to sleep within about 20 minutes, get out of bed and go to another room until you feel sleepy.  Avoid taking medicine right before bed that may keep you awake or make you feel hyper or energized. Your doctor can tell you if your medicine may do this and if you can take it earlier in the day.  If you can't sleep   Imagine yourself in a peaceful, pleasant scene. Focus on the details and feelings of being in a place that is relaxing.  Get up and do a quiet or boring activity until you feel sleepy.  Avoid drinking any liquids before going to bed to help prevent waking up often to use the bathroom.  Where can you learn more?  Go to https://www.[employer].net/patiented  Enter J942 in the search box to learn more about \"Learning About Sleeping Well.\"  Current as of: July 10, 2023  Content Version: 14.1    1030-7010 [employer].   Care instructions adapted under license by your healthcare professional. If you have questions about a medical condition or this instruction, always ask your healthcare professional. [employer] disclaims any warranty or liability for your use of this information.    Learning About Depression Screening  What is depression screening?  Depression screening is a way to see if you have depression symptoms. It may be done by a doctor or counselor. It's often part of a routine checkup. That's because your mental health is just as important as your physical health.  Depression is a mental health condition that affects how you feel, think, and act. You may:  Have less energy.  Lose interest in your daily activities.  Feel sad and grouchy for a long time.  Depression is very common. It affects people of all ages.  Many things can lead to depression. Some people become depressed after they have a stroke or find out they have a major illness like cancer or heart disease. The death of a loved one or a breakup may lead to depression. It can run in families. " "Most experts believe that a combination of inherited genes and stressful life events can cause it.  What happens during screening?  You may be asked to fill out a form about your depression symptoms. You and the doctor will discuss your answers. The doctor may ask you more questions to learn more about how you think, act, and feel.  What happens after screening?  If you have symptoms of depression, your doctor will talk to you about your options.  Doctors usually treat depression with medicines or counseling. Often, combining the two works best. Many people don't get help because they think that they'll get over the depression on their own. But people with depression may not get better unless they get treatment.  The cause of depression is not well understood. There may be many factors involved. But if you have depression, it's not your fault.  A serious symptom of depression is thinking about death or suicide. If you or someone you care about talks about this or about feeling hopeless, get help right away.  It's important to know that depression can be treated. Medicine, counseling, and self-care may help.  Where can you learn more?  Go to https://www.[employer].net/patiented  Enter T185 in the search box to learn more about \"Learning About Depression Screening.\"  Current as of: June 24, 2023  Content Version: 14.1 2006-2024 [employer].   Care instructions adapted under license by your healthcare professional. If you have questions about a medical condition or this instruction, always ask your healthcare professional. [employer] disclaims any warranty or liability for your use of this information.       "

## 2024-09-04 NOTE — PROGRESS NOTES
Preventive Care Visit  [facility]  Rohit Solitario MD, Internal Medicine  Sep 4, 2024      Assessment & Plan     Encounter for Medicare annual wellness exam  Updated screening, immunizations, prevention.  Please see health maintenance list, care gaps     Alcohol use disorder, severe, in early remission, dependence (H)  Long hx of alcohol abuse- multiple attempts at sobriety and treatment w/underlying mood d/o. Seeing psychiatry currently and did see addiction medicine until missing most recent visit.   Cont f/u with both. Despite long hx - no sign of any alcohol liver disease currently  - CBC with platelets; Future  - Comprehensive metabolic panel (BMP + Alb, Alk Phos, ALT, AST, Total. Bili, TP); Future  - CBC with platelets  - Comprehensive metabolic panel (BMP + Alb, Alk Phos, ALT, AST, Total. Bili, TP)    Essential hypertension  Well controlled- cont meds  - Comprehensive metabolic panel (BMP + Alb, Alk Phos, ALT, AST, Total. Bili, TP); Future  - hydroCHLOROthiazide 12.5 MG tablet; Take 1 tablet (12.5 mg) by mouth daily.  - lisinopril (ZESTRIL) 40 MG tablet; Take 1 tablet (40 mg) by mouth daily.  - Comprehensive metabolic panel (BMP + Alb, Alk Phos, ALT, AST, Total. Bili, TP)    Hypertriglyceridemia  Typically much worse when actively drinking- recheck , cont statin   - Lipid panel reflex to direct LDL Fasting; Future  - Comprehensive metabolic panel (BMP + Alb, Alk Phos, ALT, AST, Total. Bili, TP); Future  - Lipid panel reflex to direct LDL Fasting  - Comprehensive metabolic panel (BMP + Alb, Alk Phos, ALT, AST, Total. Bili, TP)    Screening for AAA (abdominal aortic aneurysm)  History of smoking  - Abdominal Aortic Aneurysm Screening/Tracking  - US Abdominal Aorta Imaging; Future    Patient has been advised of split billing requirements and indicates understanding: Yes        Counseling  Appropriate preventive services were addressed with this patient via screening, questionnaire, or  discussion as appropriate for fall prevention, nutrition, physical activity, Tobacco-use cessation, social engagement, weight loss and cognition.  Checklist reviewing preventive services available has been given to the patient.  Reviewed patient's diet, addressing concerns and/or questions.   He is at risk for lack of exercise and has been provided with information to increase physical activity for the benefit of his well-being.   Discussed possible causes of fatigue. The patient's PHQ-9 score is consistent with mild depression. He was provided with information regarding depression.       Work on weight loss  Regular exercise  See Patient Instructions    Subjective   Durga is a 64 year old, presenting for the following:  Physical          Health Care Directive  Patient does not have a Health Care Directive or Living Will: Discussed advance care planning with patient; information given to patient to review.    HPI  Patient states he is currently sober.  Seeing both psychiatry as well as addiction medicine.  His main complaint currently is his insomnia which he states is worsened since quitting alcohol.              9/4/2024   General Health   How would you rate your overall physical health? Good   Feel stress (tense, anxious, or unable to sleep) Rather much      (!) STRESS CONCERN      9/4/2024   Nutrition   Diet: Low salt    Low fat/cholesterol    Breakfast skipped       Multiple values from one day are sorted in reverse-chronological order         9/4/2024   Exercise   Days per week of moderate/strenous exercise 1 day   Average minutes spent exercising at this level 10 min      (!) EXERCISE CONCERN      9/4/2024   Social Factors   Frequency of gathering with friends or relatives Three times a week   Worry food won't last until get money to buy more Patient declined   Food not last or not have enough money for food? No   Do you have housing? (Housing is defined as stable permanent housing and does not include  staying ouside in a car, in a tent, in an abandoned building, in an overnight shelter, or couch-surfing.) Yes   Are you worried about losing your housing? Yes   Lack of transportation? No   Unable to get utilities (heat,electricity)? No   Want help with housing or utility concern? No      (!) HOUSING CONCERN PRESENT      9/4/2024   Fall Risk   Fallen 2 or more times in the past year? No   Trouble with walking or balance? Yes   Gait Speed Test (Document in seconds) 4.1   Gait Speed Test Interpretation Less than or equal to 5.00 seconds - PASS             9/4/2024   Activities of Daily Living- Home Safety   Needs help with the following daily activites None of the above   Safety concerns in the home None of the above            9/4/2024   Dental   Dentist two times every year? Yes            9/4/2024   Hearing Screening   Hearing concerns? None of the above            9/4/2024   Driving Risk Screening   Patient/family members have concerns about driving No            9/4/2024   General Alertness/Fatigue Screening   Have you been more tired than usual lately? (!) YES            9/4/2024   Urinary Incontinence Screening   Bothered by leaking urine in past 6 months No             Today's PHQ-9 Score:       9/4/2024     9:40 AM   PHQ-9 SCORE   PHQ-9 Total Score MyChart 7 (Mild depression)   PHQ-9 Total Score 7         9/4/2024   Substance Use   If I could quit smoking, I would Completely agree   I want to quit somking, worry about health affects Somewhat agree   Willing to make a plan to quit smoking Somewhat agree   Willing to cut down before quitting Completely agree   Alcohol more than 3/day or more than 7/wk Not Applicable   Do you have a current opioid prescription? No   How severe/bad is pain from 1 to 10? 3/10   Do you use any other substances recreationally? No        Social History     Tobacco Use    Smoking status: Every Day     Current packs/day: 0.25     Average packs/day: 0.3 packs/day for 20.0 years (5.0 ttl  pk-yrs)     Types: Cigarettes    Smokeless tobacco: Never    Tobacco comments:     4-5 cigs a day 2/12/2024   Vaping Use    Vaping status: Never Used   Substance Use Topics    Alcohol use: Not Currently     Comment: daily use 1 L vodka    Drug use: Not Currently       Last PSA:   PSA   Date Value Ref Range Status   02/17/2016 1.09 0 - 4 ug/L Final     ASCVD Risk   The 10-year ASCVD risk score (Angie PADILLA, et al., 2019) is: 28.1%    Values used to calculate the score:      Age: 64 years      Sex: Male      Is Non- : No      Diabetic: No      Tobacco smoker: Yes      Systolic Blood Pressure: 114 mmHg      Is BP treated: Yes      HDL Cholesterol: 23 mg/dL      Total Cholesterol: 228 mg/dL            Reviewed and updated as needed this visit by Provider                      Current providers sharing in care for this patient include:  Patient Care Team:  Rohit Solitario MD as PCP - General (Internal Medicine)  Rohit Solitario MD as Assigned PCP  Christina Posadas NP as Assigned Behavioral Health Provider  Alejandro Cordon MD as MD (Neurology)  Raheem Pérez DPM as Assigned Surgical Provider    The following health maintenance items are reviewed in Epic and correct as of today:  Health Maintenance   Topic Date Due    DEPRESSION ACTION PLAN  Never done    Pneumococcal Vaccine: 65+ Years (1 of 2 - PCV) Never done    LUNG CANCER SCREENING  Never done    RSV VACCINE (1 - 1-dose 60+ series) Never done    ZOSTER IMMUNIZATION (2 of 2) 11/08/2022    AORTIC ANEURYSM SCREENING (SYSTEM ASSIGNED)  Never done    MEDICARE ANNUAL WELLNESS VISIT  02/18/2023    DTAP/TDAP/TD IMMUNIZATION (2 - Td or Tdap) 09/16/2023    BMP  08/02/2024    LIPID  08/03/2024    INFLUENZA VACCINE (1) Never done    COVID-19 Vaccine (4 - 2023-24 season) 09/01/2024    ANNUAL REVIEW OF HM ORDERS  12/18/2024    PHQ-9  03/04/2025    NICOTINE/TOBACCO CESSATION COUNSELING Q 1 YR  03/19/2025    FALL RISK  "ASSESSMENT  09/04/2025    GLUCOSE  08/02/2026    ADVANCE CARE PLANNING  02/18/2027    COLORECTAL CANCER SCREENING  02/06/2031    HEPATITIS C SCREENING  Completed    HPV IMMUNIZATION  Aged Out    MENINGITIS IMMUNIZATION  Aged Out    RSV MONOCLONAL ANTIBODY  Aged Out            Objective    Exam  /68   Pulse 78   Resp 18   Ht 1.803 m (5' 11\")   Wt 91.3 kg (201 lb 3.2 oz)   SpO2 95%   BMI 28.06 kg/m     Estimated body mass index is 28.06 kg/m  as calculated from the following:    Height as of this encounter: 1.803 m (5' 11\").    Weight as of this encounter: 91.3 kg (201 lb 3.2 oz).    Physical Exam  GENERAL: alert, no distress, and over weight  EYES: Eyes grossly normal to inspection, PERRL and conjunctivae and sclerae normal  HENT: normal cephalic/atraumatic, nose and mouth without ulcers or lesions, oropharynx clear, and oral mucous membranes moist  NECK: no adenopathy, no asymmetry, masses, or scars  RESP: lungs clear to auscultation - no rales, rhonchi or wheezes  CV: regular rate and rhythm, normal S1 S2, no S3 or S4, no murmur, click or rub, no peripheral edema  ABDOMEN: soft, nontender, no hepatosplenomegaly, no masses and bowel sounds normal  MS: no gross musculoskeletal defects noted, no edema  SKIN: no suspicious lesions or rashes  NEURO: Normal strength and tone, mentation intact and speech normal  PSYCH: mentation appears normal  LYMPH: no cervical adenopathy        9/4/2024   Mini Cog   Clock Draw Score 2 Normal   3 Item Recall 3 objects recalled   Mini Cog Total Score 5                 Signed Electronically by: Rohit Solitario MD    Answers submitted by the patient for this visit:  Patient Health Questionnaire (Submitted on 9/4/2024)  If you checked off any problems, how difficult have these problems made it for you to do your work, take care of things at home, or get along with other people?: Extremely difficult  PHQ9 TOTAL SCORE: 7    "

## 2024-09-05 ENCOUNTER — PATIENT OUTREACH (OUTPATIENT)
Dept: GASTROENTEROLOGY | Facility: CLINIC | Age: 64
End: 2024-09-05
Payer: MEDICARE

## 2024-09-05 NOTE — PROGRESS NOTES
"Spoke with client on the phone. Client reports some improvement with insomnia and PAWS but \"not enough\". He reports that he met wit his PCP today and no med changed were made. He asked for the addiction medicine information and was provided the [phone] to schedule. Client plans to call today.    Attestation: Dr. Loretta HANNON - Provides oversight and supervision of care.    "

## 2024-09-05 NOTE — ADDENDUM NOTE
Encounter addended by: Yamila Patterson LPCC, LADC on: 9/5/2024 4:51 PM   Actions taken: Clinical Note Signed

## 2024-09-06 NOTE — ADDENDUM NOTE
Encounter addended by: Yamila Patterson LPCC, LADC on: 9/5/2024 9:15 PM   Actions taken: Clinical Note Signed

## 2024-09-06 NOTE — PROGRESS NOTES
"[facility] Weekly Treatment Plan Review     Date:  24     Weekly Treatment Plan Review     Treatment Plan initiated on: 24.  Durga had one unexcused absence during this reporting period.      Dimension1: Acute Intoxication/Withdrawal Potential -   Previous Dimension Ratin  Current Dimension Ratin  Client Goals Addressed Since last Review: \"I will go on more walks, and come to groups as scheduled to keep my mind off of drinking and to gain more insight on my alcohol cravings.\"  Are Treatment Plan goals/methods effective? Yes     Date of Last Use: 24  Any reports of withdrawal symptoms - Client reports that he has been sober for a month. Client reports to drinking 4-5 times a week in the evenings and up at 8 oz of alcohol. Client is at risk for withdrawal symptoms.       Dimension 2: Biomedical Conditions & Complications -   Previous Dimension Ratin  Current Dimension Ratin  Client Goals Addressed Since last Review: \"I will continue to have my medical needs met and addressed\"  Are Treatment Plan goals/methods effective? Yes     Medical Concerns:  None identified.  Current Medications & Medication Changes:  Current Facility-Administered Medications               Current Outpatient Medications   Medication Sig Dispense Refill    acamprosate (CAMPRAL) 333 MG EC tablet Take 2 tablets (666 mg) by mouth 3 times daily 180 tablet 0    ARIPiprazole (ABILIFY) 5 MG tablet TAKE ONE TABLET (5 MG) BY MOUTH ONE TIME DAILY 90 tablet 1    busPIRone (BUSPAR) 15 MG tablet Take 1 tablet (15 mg) by mouth 2 times daily 180 tablet 1    citalopram (CELEXA) 20 MG tablet Take 1 tablet (20 mg) by mouth daily 90 tablet 1    diclofenac (VOLTAREN) 50 MG EC tablet Take 1 tablet (50 mg) by mouth 3 times daily as needed for moderate pain 30 tablet 0    gabapentin (NEURONTIN) 300 MG capsule Take 2 capsules (600 mg) by mouth 3 times daily for 5 days Increase gabapentin dose 300 mg/day to a target dose of 600 mg " "TID 30 capsule 0    hydrochlorothiazide (HYDRODIURIL) 12.5 MG tablet Take 1 tablet (12.5 mg) by mouth daily 90 tablet 3    hydrOXYzine HCl (ATARAX) 50 MG tablet Take 0.5-1 tablets (25-50 mg) by mouth every 8 hours as needed for anxiety 180 tablet 3    lisinopril (ZESTRIL) 40 MG tablet Take 1 tablet (40 mg) by mouth daily (Hold for a systolic blood pressure of 100) 90 tablet 3    Melatonin 10 MG TABS tablet Take 10 mg by mouth nightly as needed for sleep        multivitamin w/minerals (THERA-VIT-M) tablet Take 1 tablet by mouth daily 90 tablet 0    naltrexone (DEPADE/REVIA) 50 MG tablet Take 2 tablets (100 mg) by mouth daily 180 tablet 1    simvastatin (ZOCOR) 40 MG tablet Take 1 tablet (40 mg) by mouth every evening 90 tablet 3    thiamine (B-1) 100 MG tablet TAKE 1 TABLET (100 MG) BY MOUTH DAILY 90 tablet 0    traZODone (DESYREL) 50 MG tablet TAKE ONE TABLET BY MOUTH AT BEDTIME AS NEEDED FOR SLEEP 90 tablet 0      No current facility-administered medications for this encounter.         Medication Prescriber:  Dr. Bennett, through [facility]  Taking meds as prescribed? Yes  Medication side effects or concerns:  None reported  Outside medical appointments this review period (list provider and reason for visit):  NA  Narrative: Client has peripheral neuropathy like due to ongoing alcohol abuse. Currently connected to a specialist. Working on sobriety.         Dimension 3: Emotional/Behavioral Conditions & Complications -   Previous Dimension Ratin  Current Dimension Ratin  Client Goals Addressed Since last Review: \"I want to learn new ways to stay in the present moment.\"  Are Treatment Plan goals/methods effective? Yes       GAD2:        2024    10:56 AM 2024    11:00 AM 2024     1:00 PM   YUSUF-2   Feeling nervous, anxious, or on edge 1 1 1   Not being able to stop or control worrying 0 1 1   YUSUF-2 Total Score 1 2    2 2      PROMIS 10-Global Health (all questions and answers " displayed):        8/4/2023    12:00 PM 12/20/2023    12:00 PM 2/12/2024     5:14 PM 3/20/2024     2:00 PM 4/4/2024     1:00 PM   PROMIS 10   In general, would you say your health is:     Very good       In general, would you say your quality of life is:     Very good       In general, how would you rate your physical health?     Very good       In general, how would you rate your mental health, including your mood and your ability to think?     Very good       In general, how would you rate your satisfaction with your social activities and relationships?     Good       In general, please rate how well you carry out your usual social activities and roles     Good       To what extent are you able to carry out your everyday physical activities such as walking, climbing stairs, carrying groceries, or moving a chair?     Mostly       In the past 7 days, how often have you been bothered by emotional problems such as feeling anxious, depressed, or irritable?     Sometimes       In the past 7 days, how would you rate your fatigue on average?     Mild       In the past 7 days, how would you rate your pain on average, where 0 means no pain, and 10 means worst imaginable pain?     5       In general, would you say your health is: 3 3 4 4 3   In general, would you say your quality of life is: 4 2 4 4 4   In general, how would you rate your physical health? 3 4 4 3 3   In general, how would you rate your mental health, including your mood and your ability to think? 4 3 4 4 4   In general, how would you rate your satisfaction with your social activities and relationships? 2 3 3 4 2   In general, please rate how well you carry out your usual social activities and roles. (This includes activities at home, at work and in your community, and responsibilities as a parent, child, spouse, employee, friend, etc.) 3 2 3 4 2   To what extent are you able to carry out your everyday physical activities such as walking, climbing stairs,  "carrying groceries, or moving a chair? 3 4 4 5 3   In the past 7 days, how often have you been bothered by emotional problems such as feeling anxious, depressed, or irritable? 4 3 3 3 3   In the past 7 days, how would you rate your fatigue on average? 3 3 2 2 2   In the past 7 days, how would you rate your pain on average, where 0 means no pain, and 10 means worst imaginable pain? 3 1 5 4 4   Global Mental Health Score 12 11 14 15 13   Global Physical Health Score 13 15 15 15 13   PROMIS TOTAL - SUBSCORES 25 26 29 30 26      Mental health diagnosis 296.30 (F33.9) Major Depressive Disorder, Recurrent Episode, Unspecified _  300.02 (F41.1) Generalized Anxiety Disorder  Date of last SIB:  None reported  Date of  last SI:  None reported  Date of last HI: None reported  Behavioral Targets:  Reducing intensity and frequency of MH symptoms and increase more instances of coping through it.   Risk factors:  Lives alone, lack of insight on mental health experiences, substance use, isolation.  Protective factors:  positive relationships positive family connections, safe and stable environment, regular sleep, and regular physical activity  Outside mental health related appointments this review period (list provider and reason for visit):  NA  Current MH Assignments:  Complete assignment on emotions that have caused relapses and develop coping tools to manage difficult experiences.      Narrative:  Current Mental Health symptoms include: irritability, restlessness, and \"cabin fever\", difficulty concentrating, fatigue, and low energy, procrastination. Active interventions to stabilize mental health symptoms this week : Attend and participate in groups and programming fully. Begin to build behaviors reflecting self-care and mindfulness outside of programming. Include activities to build a schedule in the evening. Process grief relating to sobriety.        Dimension 4: Treatment Acceptance / Resistance -   Previous Dimension " "Ratin  Current Dimension Ratin  Client Goals Addressed Since last Review: \"Attending programming will help me reduce feelings isolation at home\"  Are Treatment Plan goals/methods effective? Yes     ROYA Diagnosis:  Alcohol Use Disorder   303.90 (F10.20) Severe In early remission,   Commitment to tx process/Stage of change- 2  ROYA assignments - Complete consequences of use assignment.         Narrative - Durga attend programming well. He demonstrate some resistance with group materials and will often go off topic. Willie has trouble focusing on topics of mental health. Durga reports that he likes the \"socializing aspect\" of groups. Durga has a treatment contract and an accountability plan in place to increase motivation for treatment.         Dimension 5: Relapse / Continued Problem Potential -   Previous Dimension Rating:  3  Current Dimension Rating:  3  Client Goals Addressed Since last Review: \"I want to  use skills to gain more clarity over my substance behaviors.\"  Are Treatment Plan goals/methods effective? Yes     Relapses this week - None  Urges to use - YES, List reports thoughts to use but no urges due to taking anti-craving medications.  UA results - Will be randomly administered.  Recent Results   No results found for this or any previous visit (from the past 672 hour(s)).      Narrative- Willie is unable to identify relapse triggers, lacks coping skills for relapse prevention. Chemical use was in client's environment. Client reports low motivation for abstinence and external motivators for sobriety. Client does not see alcohol use as a big problem in his life. Durga has been using through programming so far and has had a difficult time being honest about his use. Durga is currently experiencing withdrawal symptoms.      Dimension 6: Recovery Environment -   Previous Dimension Rating:  3  Current Dimension Ratin  Client Goals Addressed Since last Review: \"I want to see what I can do to " "build more structured activities, like working on cars more and roller blading.\"  Are Treatment Plan goals/methods effective? Yes     Family Involvement - Patient declined to have family involved   Summarize attendance in family sessions - NA  Family supportive of treatment?  Yes     Community support group attendance - None at this time.  Recreational activities - Working to build structure.      Narrative - Chemical use in the home, Lack of sober / recreational interests, Legal issues. Will be placed on color wheel for legal accountability. Durga is currently working with his sister and reports improvement in his mood and relationship with her.      Progress made on transition planning goals: Currently working on assignments.      Justification for Continued Treatment at this Level of Care:  Durga lacks insight on difficult experiences and on coping tools to use. Durga does not see a problem with his drinking and has external motivators for sobriety. Durga has serious legal changes due to DUI.   Treatment coordination activities since last review:  coordination with   Need for peer recovery support referral? Yes: Client denies  Referrals made since last review:  NA     Discharge Planning:  Target Discharge Date/Timeframe:  9/12/24  Target Phase 2 Start:  5/16/24  Target Phase 3 Start: 7/31/24   Med Mgmt Provider/Appt:  NA        Is patient a vulnerable adult?  No     Interdisciplinary Clinical Supervision including: LADC and Mental health professional     *Client received copy of changes: Yes  *Client is aware of right to access a treatment plan review: Yes     Yamila Patterson, MPS, LSW, LADC, LPCC  MI/CD Psychotherapist  [facility]   [facility], [place] [address]  Phone: [phone] Fax:  [phone]       Attestation: Dr. Loretta HANNON - Provides oversight and supervision of care.   "

## 2024-09-09 NOTE — ADDENDUM NOTE
Encounter addended by: Yamila Patterson LPCC, LADC on: 9/9/2024 2:40 PM   Actions taken: Clinical Note Signed

## 2024-09-09 NOTE — PROGRESS NOTES
Case Management Note    Data:  Connected with [facility] to provide update on Durga's current progress and concerns in programming. Concerns are around socialization after treatment and recommendations. Information for a Peer Specialist Support was left on PO's VM.     Contacts:   [place]: [phone]     Plan and Next Steps:   Continue to provide ongoing updates and information on discharge planning and recommendations.      Attestation: Dr. Loretta HANNON - Provides oversight and supervision of care.

## 2024-09-11 ENCOUNTER — VIRTUAL VISIT (OUTPATIENT)
Dept: ADDICTION MEDICINE | Facility: CLINIC | Age: 64
End: 2024-09-11
Payer: MEDICARE

## 2024-09-11 DIAGNOSIS — F10.24 ALCOHOL DEPENDENCE WITH ALCOHOL-INDUCED MOOD DISORDER (H): ICD-10-CM

## 2024-09-11 DIAGNOSIS — F10.20 ALCOHOL USE DISORDER, SEVERE, DEPENDENCE (H): Primary | ICD-10-CM

## 2024-09-11 DIAGNOSIS — G47.00 INSOMNIA, UNSPECIFIED TYPE: ICD-10-CM

## 2024-09-11 DIAGNOSIS — F41.1 GENERALIZED ANXIETY DISORDER: ICD-10-CM

## 2024-09-11 PROCEDURE — 99442 PR PHYSICIAN TELEPHONE EVALUATION 11-20 MIN: CPT | Mod: 93

## 2024-09-11 RX ORDER — NALTREXONE HYDROCHLORIDE 50 MG/1
100 TABLET, FILM COATED ORAL DAILY
Qty: 180 TABLET | Refills: 0 | Status: SHIPPED | OUTPATIENT
Start: 2024-09-11

## 2024-09-11 RX ORDER — GABAPENTIN 300 MG/1
600 CAPSULE ORAL 3 TIMES DAILY
Qty: 180 CAPSULE | Refills: 0 | Status: SHIPPED | OUTPATIENT
Start: 2024-09-11 | End: 2024-09-26

## 2024-09-11 RX ORDER — ACAMPROSATE CALCIUM 333 MG/1
666 TABLET, DELAYED RELEASE ORAL 3 TIMES DAILY
Qty: 180 TABLET | Refills: 0 | Status: SHIPPED | OUTPATIENT
Start: 2024-09-11 | End: 2024-09-26

## 2024-09-11 ASSESSMENT — PATIENT HEALTH QUESTIONNAIRE - PHQ9
SUM OF ALL RESPONSES TO PHQ QUESTIONS 1-9: 15
10. IF YOU CHECKED OFF ANY PROBLEMS, HOW DIFFICULT HAVE THESE PROBLEMS MADE IT FOR YOU TO DO YOUR WORK, TAKE CARE OF THINGS AT HOME, OR GET ALONG WITH OTHER PEOPLE: SOMEWHAT DIFFICULT
SUM OF ALL RESPONSES TO PHQ QUESTIONS 1-9: 15

## 2024-09-11 NOTE — PROGRESS NOTES
Virtual Visit Details    Type of service:  Telephone Visit   Phone call duration: 17 minutes   Originating Location (pt. Location): Home    Distant Location (provider location):  Off-site  Answers submitted by the patient for this visit:  Patient Health Questionnaire (Submitted on 9/11/2024)  If you checked off any problems, how difficult have these problems made it for you to do your work, take care of things at home, or get along with other people?: Somewhat difficult  PHQ9 TOTAL SCORE: 15           [facility] Addiction Medicine    A/P                                                    ASSESSMENT/PLAN  1. Alcohol use disorder, severe, dependence (H)  2. Alcohol dependence with alcohol-induced mood disorder (H)  -showing improvement  -restart naltrexone.  (Stopped after supply issues at pharmacy)   - gabapentin (NEURONTIN) 300 MG capsule; Take 2 capsules (600 mg) by mouth 3 times daily. Increase gabapentin dose 300 mg/day to a target dose of 600 mg TID  Dispense: 180 capsule; Refill: 0  - acamprosate (CAMPRAL) 333 MG EC tablet; Take 2 tablets (666 mg) by mouth 3 times daily.  Dispense: 180 tablet; Refill: 0  - naltrexone (DEPADE/REVIA) 50 MG tablet; Take 2 tablets (100 mg) by mouth daily.  Dispense: 180 tablet; Refill: 0  -continue with IOP programming and follow all recommendations     3. Generalized anxiety disorder  -needs improvement   - gabapentin (NEURONTIN) 300 MG capsule; Take 2 capsules (600 mg) by mouth 3 times daily. Increase gabapentin dose 300 mg/day to a target dose of 600 mg TID  Dispense: 180 capsule; Refill: 0      4. Insomnia, unspecified type  -needs improvement   -increase trazodone to 100 mg at HS.  (Ordered per psychiatry)  The problem of recurrent insomnia is discussed. Avoidance of caffeine sources is strongly encouraged. Sleep hygiene issues are reviewed. The use of sedative hypnotics for temporary relief is appropriate; we discussed the addictive nature of these drugs, and a one-time  "only prescription for prn use of a hypnotic is given, to use no more than 3 times per week for 2-3 weeks.        Sep 11, 2024  - acamprosate, naltrexone, IOP  Re-start gabapentin for anxiety  Trazodone 100 mg (increased by psychiatry for insomnia)        Continued Complex Management  The longitudinal plan of care for Alcohol Use Disorder (AUD) was addressed during this visit. Due to the added complexity in care, I will continue to support Durga in the subsequent management and with ongoing continuity of care.      Last encounter A/P  1. Alcohol use disorder, severe, dependence (H)  2. Alcohol dependence with alcohol-induced mood disorder (H)  -showing improvement.  Last use \"beginning of April\"   - acamprosate (CAMPRAL) 333 MG EC tablet; Take 2 tablets (666 mg) by mouth 3 times daily for 90 days  Dispense: 180 tablet; Refill: 2  - gabapentin (NEURONTIN) 300 MG capsule; Take 2 capsules (600 mg) by mouth 3 times daily for 30 days Increase gabapentin dose 300 mg/day to a target dose of 600 mg TID  Dispense: 180 capsule; Refill: 0  -continue with IOP programming and follow all recommendations  -follow up with DUI court as scheduled  -1 month follow up      Continued Complex Management  The longitudinal plan of care for Alcohol Use Disorder (AUD) was addressed during this visit. Due to the added complexity in care, I will continue to support Durga in the subsequent management and with ongoing continuity of care.     3. Generalized anxiety disorder  -showing improvement with alcohol  cessation   - gabapentin (NEURONTIN) 300 MG capsule; Take 2 capsules (600 mg) by mouth 3 times daily for 30 days Increase gabapentin dose 300 mg/day to a target dose of 600 mg TID  Dispense: 180 capsule; Refill: 0           May 20, 2024  - gabapentin 600 BID-TID  -Naltrexone 100 mg daily  Acamprosate BID-TID when remembers       PDMP Review         Value Time User    State PDMP site checked  Yes 9/11/2024  3:28 PM Christina Posadas, " "NP              RTC  Return in about 15 days (around 9/26/2024).      Counseled the patient on the importance of having a recovery program in addition to medication to manage recovery.  Components include avoiding isolating, having willingness to change, avoiding triggers and managing cravings. Encouraged having some type of sober network and practicing honesty with trusted support person(s). Encouraged other services such as counseling, 12 step or other self-help organizations.              SUBJECTIVE                                                    Durga Aguirre is a 64 year old male who presents to clinic today for follow up    Visit performed In Person, face-to-face      Substance Use History:   \"Have you ever had any history with [...] use?\" And \"When was your last use?  ALCOHOL - 1/16  CANNABIS -   PRESCRIPTION STIMULANTS (includes Ritalin, Adderall, Vyvanse) -   COCAINE/CRACK -   METH/AMPHETAMINES (includes ecstacy, MDMA/abel) -   OPIATES -   BENZODIAZEPINES (includes Ativan, Klonopin, Xanax) -   KRATOM (mild opioid and stimulant effects) -   KETAMINE -   HALLUCINOGENS (includes DXM) -   BEHAVIORAL (Gambling, Eating d/o, Compulsivity) -   History of treatment -   NICOTINE  Cigarettes:   Chew/snus:   Vaping:   Past NRT/medication use: Naltrexone        Previous withdrawal treatment episodes (e.g. detox): 8/6/23  Previous [facility] programs: [facility] Aug-Oct 2023,   Hospitalizations or overdose:   Medical complications from substance use:   IV Drug use?: denies  Previous Medication for Addiction Tx: naltrexone  Longest period of full abstinence: 3 years  Activities that have previously supported abstinence: treatment, meetings  Current Recovery Activities: none        Infectious disease screening  Hep C:          Hepatitis C Antibody   Date Value Ref Range Status   06/24/2019 Test canceled by physician NR^Nonreactive Final         HIV:            HIV Antigen Antibody Combo   Date Value Ref Range Status "   06/24/2019 Nonreactive NR^Nonreactive     Final       Comment:       HIV-1 p24 Ag & HIV-1/HIV-2 Ab Not Detected                  Psychiatric History (per patient report and problem list review)  Past diagnoses - depression  Current or past psychiatrist:   Current or past therapist:  (through [facility])   Hospitalizations/TMS/ECT -   Suicide Attempts -   Medication trials -     Recent HPI Details:  HPI May 20, 2024  - Getting things done around the house, visiting with friends.  Still in IOP programming 3 times a week.   No alcohol use since beginning of April   Gabapentin 600 mg 2-3 X day.    Campral two or three times  Naltrexone 100 mg daily  Cravings are controlled.   Starting DWI program at [facility]  Looking for a new roommate.   Not doing AA meetings, finds them triggering.   Feeling positive about IOP programming and group members.      TODAY'S VISIT  HPI Sep 11, 2024  - about a month ago had trouble sleeping.  Would fall asleep at 5 am and only sleep a couple hours.  Difficult concentrating. Increased anxiety.  Spoke with counselor, and considering it may be PAWS.     Still in IOP treatment at [place].  No alcohol use    Currently three months sober.   Out of gabapentin,   100 mg trazodone increased by psychiatry       OBJECTIVE  PHYSICAL EXAM:  There were no vitals taken for this visit.    RESP: No respiratory distress  MENTAL STATUS EXAM  Speech: Normal  Mood/Affect: depressed affect and flat  Insight: Fair      PHQ-9 Score:       6/13/2024    12:58 PM 9/4/2024     9:40 AM 9/11/2024     1:40 PM   PHQ   PHQ-9 Total Score 1 7 15   Q9: Thoughts of better off dead/self-harm past 2 weeks Not at all Not at all Not at all       YUSUF-7 Score:      12/20/2023    12:00 PM 3/20/2024     2:00 PM 4/17/2024     3:24 PM   YUSUF-7 SCORE   Total Score   1 (minimal anxiety)   Total Score 7 2 1       LABS (may not contain today's labs)                                                        Today's lab data  No  results found for any visits on 09/11/24.        HISTORY                                                    Problem list reviewed & adjusted, as indicated.  Patient Active Problem List   Diagnosis    CARDIOVASCULAR SCREENING; LDL GOAL LESS THAN 130    Generalized anxiety disorder    Essential hypertension    Hypertriglyceridemia    Dupuytren's contracture of hand    Alcohol withdrawal with complication with inpatient treatment, with unspecified complication (H)    Vasovagal syncope    History of colonic polyps    Alcohol use disorder, severe, dependence (H)    Tobacco use disorder, moderate, dependence         MEDICATION LIST (prior to visit)  Current Outpatient Medications   Medication Sig Dispense Refill    ARIPiprazole (ABILIFY) 5 MG tablet TAKE ONE TABLET (5 MG) BY MOUTH ONE TIME DAILY 90 tablet 1    busPIRone (BUSPAR) 15 MG tablet Take 1 tablet (15 mg) by mouth 2 times daily 180 tablet 1    citalopram (CELEXA) 40 MG tablet Take 1 tablet (40 mg) by mouth daily. 30 tablet 0    gabapentin (NEURONTIN) 300 MG capsule Take 2 capsules (600 mg) by mouth 3 times daily for 30 days Increase gabapentin dose 300 mg/day to a target dose of 600 mg  capsule 0    hydroCHLOROthiazide 12.5 MG tablet Take 1 tablet (12.5 mg) by mouth daily. 90 tablet 3    hydrOXYzine HCl (ATARAX) 50 MG tablet Take 0.5-1 tablets (25-50 mg) by mouth every 8 hours as needed for anxiety 180 tablet 3    lisinopril (ZESTRIL) 40 MG tablet Take 1 tablet (40 mg) by mouth daily. 90 tablet 3    Melatonin 10 MG TABS tablet Take 10 mg by mouth nightly as needed for sleep      multivitamin w/minerals (THERA-VIT-M) tablet Take 1 tablet by mouth daily 90 tablet 0    naltrexone (DEPADE/REVIA) 50 MG tablet Take 2 tablets (100 mg) by mouth daily for 90 days 180 tablet 0    simvastatin (ZOCOR) 40 MG tablet Take 1 tablet (40 mg) by mouth every evening. 90 tablet 3    thiamine (B-1) 100 MG tablet TAKE 1 TABLET (100 MG) BY MOUTH DAILY 90 tablet 0    traZODone  (DESYREL) 100 MG tablet Take 1 tablet (100 mg) by mouth at bedtime. 30 tablet 0     No current facility-administered medications for this visit.       MEDICATION LIST (after visit)  Current Outpatient Medications   Medication Sig Dispense Refill    ARIPiprazole (ABILIFY) 5 MG tablet TAKE ONE TABLET (5 MG) BY MOUTH ONE TIME DAILY 90 tablet 1    busPIRone (BUSPAR) 15 MG tablet Take 1 tablet (15 mg) by mouth 2 times daily 180 tablet 1    citalopram (CELEXA) 40 MG tablet Take 1 tablet (40 mg) by mouth daily. 30 tablet 0    gabapentin (NEURONTIN) 300 MG capsule Take 2 capsules (600 mg) by mouth 3 times daily for 30 days Increase gabapentin dose 300 mg/day to a target dose of 600 mg  capsule 0    hydroCHLOROthiazide 12.5 MG tablet Take 1 tablet (12.5 mg) by mouth daily. 90 tablet 3    hydrOXYzine HCl (ATARAX) 50 MG tablet Take 0.5-1 tablets (25-50 mg) by mouth every 8 hours as needed for anxiety 180 tablet 3    lisinopril (ZESTRIL) 40 MG tablet Take 1 tablet (40 mg) by mouth daily. 90 tablet 3    Melatonin 10 MG TABS tablet Take 10 mg by mouth nightly as needed for sleep      multivitamin w/minerals (THERA-VIT-M) tablet Take 1 tablet by mouth daily 90 tablet 0    naltrexone (DEPADE/REVIA) 50 MG tablet Take 2 tablets (100 mg) by mouth daily for 90 days 180 tablet 0    simvastatin (ZOCOR) 40 MG tablet Take 1 tablet (40 mg) by mouth every evening. 90 tablet 3    thiamine (B-1) 100 MG tablet TAKE 1 TABLET (100 MG) BY MOUTH DAILY 90 tablet 0    traZODone (DESYREL) 100 MG tablet Take 1 tablet (100 mg) by mouth at bedtime. 30 tablet 0     No current facility-administered medications for this visit.         No Known Allergies        Christina Posadas NP  [facility] Addiction Medicine  [phone]

## 2024-09-11 NOTE — NURSING NOTE
Current patient location: [address]  [facility] [address]    Is the patient currently in the state of [place]? YES    Visit mode:TELEPHONE    If the visit is dropped, the patient can be reconnected by: TELEPHONE VISIT: Phone number:   Telephone Information:   Mobile [phone]       Will anyone else be joining the visit? NO  (If patient encounters technical issues they should call [phone]    How would you like to obtain your AVS? MyChart    Are changes needed to the allergy or medication list? No    Are refills needed on medications prescribed by this physician? YES    Rooming Documentation:  Questionnaire(s) completed      Reason for visit: RECHECK    James CORTEZ

## 2024-09-16 ENCOUNTER — VIRTUAL VISIT (OUTPATIENT)
Dept: INTERNAL MEDICINE | Facility: CLINIC | Age: 64
End: 2024-09-16
Payer: MEDICARE

## 2024-09-16 DIAGNOSIS — E11.65 TYPE 2 DIABETES MELLITUS WITH HYPERGLYCEMIA, WITHOUT LONG-TERM CURRENT USE OF INSULIN (H): Primary | ICD-10-CM

## 2024-09-16 DIAGNOSIS — R73.9 HYPERGLYCEMIA: ICD-10-CM

## 2024-09-16 PROCEDURE — 99442 PR PHYSICIAN TELEPHONE EVALUATION 11-20 MIN: CPT | Mod: 93 | Performed by: INTERNAL MEDICINE

## 2024-09-16 NOTE — PROGRESS NOTES
"Durga is a 64 year old who is being evaluated via a billable telephone visit.    What phone number would you like to be contacted at? [phone]  How would you like to obtain your AVS? MyChart  Originating Location (pt. Location): Home    Distant Location (provider location):  On-site    Assessment & Plan   T2DM- uncontrolled  Hyperglycemia  Denies current alcohol use  Denies polyuria or polydispsia. After labs start metformin,     insulin vs GLP-1a based on results - A1c 11% range. Start metformin + GLP-1a.   See CDE  - Hemoglobin A1c; Future  - Glucose; Future  - UA without Microscopic - lab collect; Future  - Albumin Random Urine Quantitative with Creat Ratio; Future          BMI  Estimated body mass index is 28.06 kg/m  as calculated from the following:    Height as of 9/4/24: 1.803 m (5' 11\").    Weight as of 9/4/24: 91.3 kg (201 lb 3.2 oz).         See Patient Instructions    Subjective   Durga is a 64 year old, presenting for the following health issues:  Results    HPI     Discuss recent lab results.                Objective           Vitals:  No vitals were obtained today due to virtual visit.    Physical Exam   General: Alert and no distress //Respiratory: No audible wheeze, cough, or shortness of breath // Psychiatric:  Appropriate affect, tone, and pace of words            Phone call duration: 11 minutes  Signed Electronically by: Rohit Solitario MD    "

## 2024-09-17 DIAGNOSIS — F41.1 GENERALIZED ANXIETY DISORDER: ICD-10-CM

## 2024-09-17 RX ORDER — HYDROXYZINE HYDROCHLORIDE 50 MG/1
25-50 TABLET, FILM COATED ORAL EVERY 8 HOURS PRN
Qty: 180 TABLET | Refills: 0 | Status: SHIPPED | OUTPATIENT
Start: 2024-09-17

## 2024-09-19 ENCOUNTER — TELEPHONE (OUTPATIENT)
Dept: INTERNAL MEDICINE | Facility: CLINIC | Age: 64
End: 2024-09-19

## 2024-09-19 ENCOUNTER — LAB (OUTPATIENT)
Dept: LAB | Facility: CLINIC | Age: 64
End: 2024-09-19
Payer: MEDICARE

## 2024-09-19 DIAGNOSIS — R73.9 HYPERGLYCEMIA: ICD-10-CM

## 2024-09-19 LAB
ALBUMIN UR-MCNC: NEGATIVE MG/DL
APPEARANCE UR: CLEAR
BILIRUB UR QL STRIP: NEGATIVE
COLOR UR AUTO: YELLOW
CREAT UR-MCNC: 65.6 MG/DL
EST. AVERAGE GLUCOSE BLD GHB EST-MCNC: 280 MG/DL
FASTING STATUS PATIENT QL REPORTED: YES
GLUCOSE SERPL-MCNC: 314 MG/DL (ref 70–99)
GLUCOSE UR STRIP-MCNC: >=1000 MG/DL
HBA1C MFR BLD: 11.4 % (ref 0–5.6)
HGB UR QL STRIP: NEGATIVE
KETONES UR STRIP-MCNC: NEGATIVE MG/DL
LEUKOCYTE ESTERASE UR QL STRIP: NEGATIVE
MICROALBUMIN UR-MCNC: <12 MG/L
MICROALBUMIN/CREAT UR: NORMAL MG/G{CREAT}
NITRATE UR QL: NEGATIVE
PH UR STRIP: 6 [PH] (ref 5–7)
SP GR UR STRIP: <=1.005 (ref 1–1.03)
UROBILINOGEN UR STRIP-ACNC: 0.2 E.U./DL

## 2024-09-19 PROCEDURE — 36415 COLL VENOUS BLD VENIPUNCTURE: CPT

## 2024-09-19 PROCEDURE — 83036 HEMOGLOBIN GLYCOSYLATED A1C: CPT

## 2024-09-19 PROCEDURE — 82043 UR ALBUMIN QUANTITATIVE: CPT

## 2024-09-19 PROCEDURE — 82570 ASSAY OF URINE CREATININE: CPT

## 2024-09-19 PROCEDURE — 82947 ASSAY GLUCOSE BLOOD QUANT: CPT

## 2024-09-19 PROCEDURE — 81003 URINALYSIS AUTO W/O SCOPE: CPT

## 2024-09-19 NOTE — TELEPHONE ENCOUNTER
Patient calling to provide additional information to PCP regarding today's labs, as results were higher than expected.    Patient states that, for the past month, he has been taking:  - Glycogen, for joint support (Sweet Relief brand)  - Sweet Relief Cleanse     Routing to PCP for interpretation of today's labs, when all have been resulted.    Callback: [phone] ok to leave a detailed vm    Debby Dill RN  [facility]

## 2024-09-20 PROBLEM — E11.65 TYPE 2 DIABETES MELLITUS WITH HYPERGLYCEMIA, WITHOUT LONG-TERM CURRENT USE OF INSULIN (H): Status: ACTIVE | Noted: 2024-09-20

## 2024-09-20 PROBLEM — E11.9 DIABETES MELLITUS, TYPE 2 (H): Status: ACTIVE | Noted: 2024-09-20

## 2024-09-20 RX ORDER — LANCETS
EACH MISCELLANEOUS
Qty: 100 EACH | Refills: 11 | Status: SHIPPED | OUTPATIENT
Start: 2024-09-20

## 2024-09-24 ENCOUNTER — TELEPHONE (OUTPATIENT)
Dept: INTERNAL MEDICINE | Facility: CLINIC | Age: 64
End: 2024-09-24
Payer: MEDICARE

## 2024-09-24 ENCOUNTER — HOSPITAL ENCOUNTER (OUTPATIENT)
Dept: BEHAVIORAL HEALTH | Facility: CLINIC | Age: 64
Discharge: HOME | End: 2024-09-24
Attending: FAMILY MEDICINE
Payer: MEDICARE

## 2024-09-24 PROCEDURE — H2035 A/D TX PROGRAM, PER HOUR: HCPCS | Performed by: COUNSELOR

## 2024-09-24 NOTE — TELEPHONE ENCOUNTER
"Patient calling asking for PCP to prescribe antibiotic for tooth abscess. When asked if he has a dentist he states that he does but does not have an appointment for \"a couple of days\". Suggested calling dental office and asking for same day appointment due to abscess. He agrees with plan. Patient with no further questions or concerns.     Jovita Bruce RN    "

## 2024-09-25 DIAGNOSIS — F10.20 UNCOMPLICATED ALCOHOL DEPENDENCE (H): ICD-10-CM

## 2024-09-25 NOTE — PROGRESS NOTES
Individual Session Summary   START TIME: 5 PM   END TIME: 6 PM   Duration: 1 Hour  **Individual session done in lieu of group due to there not being enough group members in Phase 3.**    Data:  Met with client on this date for an individual session. The session focused on client's treatment plan goal for DIM(s) 3, 5 of his individual treatment plan. Client reports no change in LDOU and identified court and weekly UAs as incentives to stay sober. Client processed ways to increase internal motivations for sobriety. Client did not complete his Recovery Care Plan. He reports struggle with understanding self-care and with building a support system. Client reports not feeling ready yet to complete programming and asked for 2 more sessions to complete his packet. Client reports experience of trouble sleeping and that he is drinking NyQuil to sleep. He reports that he has caused him problems starting his care due to the alcohol in NyQuil. Client reports that he has been going to AA group and reconnected to some old peers from the community. Client plans to go on days that they are there to build a support network. Client reports that he applied for a job at [employer] and waiting to hear back next week. Client reports having a tooth ache and a new diagnosis of pre-diabetes.     Intervention: CBT, Feedback, Encouragement to complete Recovery Care Plan, psychoeducation.      Assessment: Client was in a good mood and actively engaged. Client appeared nervous in discussing treatment completion. Client appear to have concerns around experience of loneliness and not having many structured activities. Client reminisced on activities in the past that he enjoyed due to drinking.      Plan. Client will refrain from all alcohol use and from reminiscing on past definition of fun. Client will come to session with 2-3 things that he is able to enjoy as sober fun. Client will complete Recovery Care Plan in the next session.       Yamila  Leonardo, [facility], [facility]     Attestation: Dr. Loretta HANNON - Provides oversight and supervision of care.

## 2024-09-26 ENCOUNTER — TELEPHONE (OUTPATIENT)
Dept: EDUCATION SERVICES | Facility: CLINIC | Age: 64
End: 2024-09-26

## 2024-09-26 ENCOUNTER — VIRTUAL VISIT (OUTPATIENT)
Dept: ADDICTION MEDICINE | Facility: CLINIC | Age: 64
End: 2024-09-26
Payer: MEDICARE

## 2024-09-26 DIAGNOSIS — F10.24 ALCOHOL DEPENDENCE WITH ALCOHOL-INDUCED MOOD DISORDER (H): ICD-10-CM

## 2024-09-26 DIAGNOSIS — F99 INSOMNIA DUE TO OTHER MENTAL DISORDER: ICD-10-CM

## 2024-09-26 DIAGNOSIS — F51.05 INSOMNIA DUE TO OTHER MENTAL DISORDER: ICD-10-CM

## 2024-09-26 DIAGNOSIS — E11.65 TYPE 2 DIABETES MELLITUS WITH HYPERGLYCEMIA, WITHOUT LONG-TERM CURRENT USE OF INSULIN (H): ICD-10-CM

## 2024-09-26 DIAGNOSIS — F41.1 GENERALIZED ANXIETY DISORDER: ICD-10-CM

## 2024-09-26 DIAGNOSIS — F33.1 MAJOR DEPRESSIVE DISORDER, RECURRENT EPISODE, MODERATE WITH ANXIOUS DISTRESS (H): ICD-10-CM

## 2024-09-26 DIAGNOSIS — F10.20 ALCOHOL USE DISORDER, SEVERE, DEPENDENCE (H): Primary | ICD-10-CM

## 2024-09-26 RX ORDER — LANOLIN ALCOHOL/MO/W.PET/CERES
100 CREAM (GRAM) TOPICAL DAILY
Qty: 90 TABLET | Refills: 3 | Status: SHIPPED | OUTPATIENT
Start: 2024-09-26

## 2024-09-26 RX ORDER — ACAMPROSATE CALCIUM 333 MG/1
666 TABLET, DELAYED RELEASE ORAL 3 TIMES DAILY
Qty: 180 TABLET | Refills: 2 | Status: SHIPPED | OUTPATIENT
Start: 2024-09-26

## 2024-09-26 RX ORDER — GABAPENTIN 300 MG/1
600 CAPSULE ORAL 3 TIMES DAILY
Qty: 180 CAPSULE | Refills: 1 | Status: SHIPPED | OUTPATIENT
Start: 2024-09-26

## 2024-09-26 RX ORDER — TRAZODONE HYDROCHLORIDE 100 MG/1
100 TABLET ORAL AT BEDTIME
Qty: 30 TABLET | Refills: 2 | Status: SHIPPED | OUTPATIENT
Start: 2024-09-26

## 2024-09-26 RX ORDER — CITALOPRAM HYDROBROMIDE 40 MG/1
40 TABLET ORAL DAILY
Qty: 30 TABLET | Refills: 1 | Status: SHIPPED | OUTPATIENT
Start: 2024-09-26

## 2024-09-26 NOTE — PATIENT INSTRUCTIONS
Patient Education   Addiction Medicine  What to Expect  Here's what to expect from our Addiction Medicine program.  About Addiction Medicine  Addiction Medicine clinics help you with substance use problems. You set your own goals. We try to help you reach your goals. Your care plan can include:  Medicine  Creating a recovery plan  Helping you find local resources  Helping with treatment options  Clinic phone number and addresses  Clinic Phone: [phone]  Mental Health and Addiction Clinic  [facility]  [address], [address]  [place] [address]  [facility]  [address], [address]  [place] [address]  Walk-in services  We offer walk-in care for patients at the Recovery Clinic. This is only for patients with Opioid Use Disorder (OUD). Anyone with OUD is welcome. Our providers will refer you to the Recovery Clinic if you're struggling to keep up with your medicines or appointments.  Recovery Clinic ([facility])  [address], [address]  [place] [address]  Phone: [phone]  The Recovery Clinic is open for walk-ins Monday to Friday 9 a.m. to 11:30 a.m. and 12:30 p.m. to 3 p.m.  How it works  Come to your visits every time. The treatment works better when you do.   You can have as many visits as you need. When you're better, we'll refer you back to being cared for by your family doctor.   If you need it, we'll send you to doctors, psychiatrists, therapists, and other providers. We focus on treating addiction. We don't treat other problems, like managing other medicines or non-addiction issues.  About visits  Urine drug testing  We'll often test your pee (urine) for drugs. This is the only way we can know for sure whether or not you're using drugs. It helps us treat you without judgement.   Suboxone (buprenorphine)  If you're taking buprenorphine, you'll have a lot of visits at first. If your problem is getting worse, or you're  "using substances, we may schedule you for extra visits.   Cancelling visits  If you can't come to your visit, please call us right away at [phone]. If you don't cancel at least 24 hours (1 full day) before your visit, that's \"late cancellation.\"  Being late to visits  If you come late, you may not be seen. This will count as a \"no-show.\"  Please call the clinic if you're running late. This will help us plan, but it doesn't mean you'll be seen.   Being late is:  More than 15 minutes late for a return visit.  More than 30 minutes late for your first visit.  If you cancel late or don't show up 2 times within 6 months, we may transfer you to another clinic.   Getting help between visits  If you need help between visits, you can call us Monday to Friday from 8 a.m. to 4:30 p.m. at [phone]. You can also send us a message on [employer].  Medicine refills  If you miss or cancel a visit, you can still ask for a refill. But we can only refill your medicines if you've made a new appointment.  Please call your pharmacy for medicine refills. If you have a question about your refill, call us at [phone].  It takes up to 2 business days to refill your drugs. Let us know 2 to 3 days before you run out. Don't call more than 1 week before you run out. That's too early.   Please make sure we have your right phone number.  If we have a problem with your refill, we'll call you. If we call you, please call us back right away. If you don't, you may not get your medicines quickly.   Call your pharmacy to find out if your medicines are ready.   Keep your medicines in a safe place. Keep them away from pets and children. If your medicines are lost or stolen, we usually don't replace them. We recommend you file a police report if your medicines are stolen. Your insurance may not pay for early refills, even if you have a prescription.  Forms  Please give us at least 3 business days to fill out any forms. Bring the forms to your " visits if you can. We may refer you to other members of your care team to complete the forms.   Emergency care   Call 911 or go to the nearest emergency room if your life or someone else's life is in danger.  Call 988 anytime for the Suicide and Crisis Lifeline.  If you need care when we're closed, call your family doctor to see if they can help. You can also go to urgent care or an emergency room. [facility] emergency rooms may be able to give you buprenorphine or other medicine refills.  Thank you for choosing us for your care.  For informational purposes only. Not to replace the advice of your health care provider. Copyright   2023 [facility]. All rights reserved. [employer] 989341 - REV 05/23.

## 2024-09-26 NOTE — NURSING NOTE
Is the patient currently in the state of [place]? YES    Current patient location:  In [place] at a friend's home    Visit mode:TELEPHONE    If the visit is dropped, the patient can be reconnected by: TELEPHONE VISIT: Phone number: [phone]    Will anyone else be joining the visit? No  (If patient encounters technical issues they should call [phone])    How would you like to obtain your AVS? MyChart    Are changes needed to the allergy or medication list? Yes Temporary antibiotic and diabetes medication to be added     Are refills needed on medications prescribed by this physician? Discuss with Provider    Rooming Documentation: Questionnaire(s) completed.    Reason for visit: DIEGO Masters

## 2024-09-26 NOTE — PROGRESS NOTES
Virtual Visit Details    Type of service:  Telephone Visit   Phone call duration: 15 minutes   Originating Location (pt. Location): Home    Distant Location (provider location):  On-site           [facility]    A/P                                                    ASSESSMENT/PLAN  1. Alcohol use disorder, severe, dependence (H)  2. Alcohol dependence with alcohol-induced mood disorder (H)  -showing improvement   - acamprosate (CAMPRAL) 333 MG EC tablet; Take 2 tablets (666 mg) by mouth 3 times daily.  Dispense: 180 tablet; Refill: 2  -continue with gabapentin 600 mg TID   -continue with IOP programming and follow all recommendations  -offered UDS's through [facility] for convenience, Durga will check with .   -1 month follow up    3. Insomnia due to other mental disorder  -improved with trazodone and increased dose of trazodone on occasion  - traZODone (DESYREL) 100 MG tablet; Take 1 tablet (100 mg) by mouth at bedtime.  Dispense: 30 tablet; Refill: 2    4. Major depressive disorder, recurrent episode, moderate with anxious distress (H)  -showing improvement   - citalopram (CELEXA) 40 MG tablet; Take 1 tablet (40 mg) by mouth daily.  Dispense: 30 tablet; Refill: 1    5. Type 2 diabetes mellitus with hyperglycemia, without long-term current use of insulin (H)  -needs improvement  -has not picked up medications or started  -contacted PCP's diabetic educator requesting follow up addressed.     6. Generalized anxiety disorder  -showing improvement   - gabapentin (NEURONTIN) 300 MG capsule; Take 2 capsules (600 mg) by mouth 3 times daily. Increase gabapentin dose 300 mg/day to a target dose of 600 mg TID  Dispense: 180 capsule; Refill: 1           Continued Complex Management  The longitudinal plan of care for Alcohol Use Disorder (AUD) was addressed during this visit. Due to the added complexity in care, I will continue to support Durga in the subsequent management and  with ongoing continuity of care.      Last encounter A/P  1. Alcohol use disorder, severe, dependence (H)  2. Alcohol dependence with alcohol-induced mood disorder (H)  -showing improvement  -restart naltrexone.  (Stopped after supply issues at pharmacy)   - gabapentin (NEURONTIN) 300 MG capsule; Take 2 capsules (600 mg) by mouth 3 times daily. Increase gabapentin dose 300 mg/day to a target dose of 600 mg TID  Dispense: 180 capsule; Refill: 0  - acamprosate (CAMPRAL) 333 MG EC tablet; Take 2 tablets (666 mg) by mouth 3 times daily.  Dispense: 180 tablet; Refill: 0  - naltrexone (DEPADE/REVIA) 50 MG tablet; Take 2 tablets (100 mg) by mouth daily.  Dispense: 180 tablet; Refill: 0  -continue with IOP programming and follow all recommendations      3. Generalized anxiety disorder  -needs improvement   - gabapentin (NEURONTIN) 300 MG capsule; Take 2 capsules (600 mg) by mouth 3 times daily. Increase gabapentin dose 300 mg/day to a target dose of 600 mg TID  Dispense: 180 capsule; Refill: 0        4. Insomnia, unspecified type  -needs improvement   -increase trazodone to 100 mg at HS.  (Ordered per psychiatry)  The problem of recurrent insomnia is discussed. Avoidance of caffeine sources is strongly encouraged. Sleep hygiene issues are reviewed. The use of sedative hypnotics for temporary relief is appropriate; we discussed the addictive nature of these drugs, and a one-time only prescription for prn use of a hypnotic is given, to use no more than 3 times per week for 2-3 weeks.           Sep 11, 2024  - acamprosate, naltrexone, IOP  Re-start gabapentin for anxiety  Trazodone 100 mg (increased by psychiatry for insomnia)       PDMP Review         Value Time User    State PDMP site checked  Yes 9/11/2024  3:28 PM Christina Posadas NP              RTC  Return in about 1 month (around 10/26/2024).      Counseled the patient on the importance of having a recovery program in addition to medication to manage recovery.   "Components include avoiding isolating, having willingness to change, avoiding triggers and managing cravings. Encouraged having some type of sober network and practicing honesty with trusted support person(s). Encouraged other services such as counseling, 12 step or other self-help organizations.              SUBJECTIVE                                                    Durga Aguirre is a 64 year old male who presents to clinic today for follow up    Visit performed over phone       Substance Use History:   \"Have you ever had any history with [...] use?\" And \"When was your last use?  ALCOHOL - 1/16  CANNABIS - denies  PRESCRIPTION STIMULANTS (includes Ritalin, Adderall, Vyvanse) - denies  COCAINE/CRACK -   METH/AMPHETAMINES (includes ecstacy, MDMA/abel) -   OPIATES -   BENZODIAZEPINES (includes Ativan, Klonopin, Xanax) -   KRATOM (mild opioid and stimulant effects) -   KETAMINE -   HALLUCINOGENS (includes DXM) -   BEHAVIORAL (Gambling, Eating d/o, Compulsivity) -   History of treatment -   NICOTINE  Cigarettes:   Chew/snus:   Vaping:   Past NRT/medication use: Naltrexone        Previous withdrawal treatment episodes (e.g. detox): 8/6/23  Previous [facility] treatment programs: [facility] Aug-Oct 2023,   Hospitalizations or overdose:   Medical complications from substance use:   IV Drug use?: denies  Previous Medication for Addiction Tx: naltrexone  Longest period of full abstinence: 3 years  Activities that have previously supported abstinence: treatment, meetings  Current Recovery Activities: none        Infectious disease screening  Hep C:              Hepatitis C Antibody   Date Value Ref Range Status   06/24/2019 Test canceled by physician NR^Nonreactive Final         HIV:                  HIV Antigen Antibody Combo   Date Value Ref Range Status   06/24/2019 Nonreactive NR^Nonreactive     Final       Comment:       HIV-1 p24 Ag & HIV-1/HIV-2 Ab Not Detected            Psychiatric History (per patient report and problem " "list review)  Past diagnoses - depression  Current or past psychiatrist:   Current or past therapist:  (through [facility])   Hospitalizations/TMS/ECT -   Suicide Attempts -   Medication trials -        Recent HPI Details:  HPI Sep 11, 2024  - about a month ago had trouble sleeping.  Would fall asleep at 5 am and only sleep a couple hours.  Difficult concentrating. Increased anxiety.  Spoke with counselor, and considering it may be PAWS.     Still in IOP treatment at [facility].  No alcohol use     Currently three months sober.   Out of gabapentin,   100 mg trazodone increased by psychiatry     TODAY'S VISIT  HPI Sep 26, 2024  - Filling out work application for [employer], looking forward to returning to work.   \"Mood better since double dose\", anxiety \"always knocking on the door, I just don't answer it\"   Taking acamprosate, no cravings, just thoughts. \"Honestly I can't drink because I'm in DUI court\"   Still IOP programming 1 year program.  Twice a month UDS's with court,  would like to do with [place], will ask probation if possible.     Sleeping better with 100 mg trazodone on occasion  Recent A1C 11.  New DM diagnosis,  needs follow up DM care     OBJECTIVE  PHYSICAL EXAM:  There were no vitals taken for this visit.    GENERAL: healthy, alert and no distress  EYES: Eyes grossly normal to inspection, PERRL and conjunctivae and sclerae normal  RESP: No respiratory distress  MENTAL STATUS EXAM  Speech: Normal  Mood/Affect: depressed affect  Insight: Fair      PHQ-9 Score:       6/13/2024    12:58 PM 9/4/2024     9:40 AM 9/11/2024     1:40 PM   PHQ   PHQ-9 Total Score 1 7 15   Q9: Thoughts of better off dead/self-harm past 2 weeks Not at all Not at all Not at all       YUSUF-7 Score:      12/20/2023    12:00 PM 3/20/2024     2:00 PM 4/17/2024     3:24 PM   YUSUF-7 SCORE   Total Score   1 (minimal anxiety)   Total Score 7 2 1       LABS (may not contain today's labs)                                                  "       Today's lab data  No results found for any visits on 09/26/24.        HISTORY                                                    Problem list reviewed & adjusted, as indicated.  Patient Active Problem List   Diagnosis    CARDIOVASCULAR SCREENING; LDL GOAL LESS THAN 130    Generalized anxiety disorder    Essential hypertension    Hypertriglyceridemia    Dupuytren's contracture of hand    Alcohol withdrawal with complication with inpatient treatment, with unspecified complication (H)    Vasovagal syncope    History of colonic polyps    Alcohol use disorder, severe, dependence (H)    Tobacco use disorder, moderate, dependence    Type 2 diabetes mellitus with hyperglycemia, without long-term current use of insulin (H)         MEDICATION LIST (prior to visit)  Current Outpatient Medications   Medication Sig Dispense Refill    acamprosate (CAMPRAL) 333 MG EC tablet Take 2 tablets (666 mg) by mouth 3 times daily. 180 tablet 0    ARIPiprazole (ABILIFY) 5 MG tablet TAKE ONE TABLET (5 MG) BY MOUTH ONE TIME DAILY 90 tablet 1    blood glucose (NO BRAND SPECIFIED) test strip Use to test blood sugar 1x times daily. Preferred Blood Glucose Monitor Brands: per insurance. 100 strip 11    blood glucose monitoring (NO BRAND SPECIFIED) meter device kit Use to test blood sugar 1x times daily. Preferred Blood Glucose Monitor Brands: per insurance. 1 kit 0    busPIRone (BUSPAR) 15 MG tablet Take 1 tablet (15 mg) by mouth 2 times daily 180 tablet 1    citalopram (CELEXA) 40 MG tablet Take 1 tablet (40 mg) by mouth daily. 30 tablet 0    gabapentin (NEURONTIN) 300 MG capsule Take 2 capsules (600 mg) by mouth 3 times daily. Increase gabapentin dose 300 mg/day to a target dose of 600 mg  capsule 0    hydroCHLOROthiazide 12.5 MG tablet Take 1 tablet (12.5 mg) by mouth daily. 90 tablet 3    hydrOXYzine HCl (ATARAX) 50 MG tablet Take 0.5-1 tablets (25-50 mg) by mouth every 8 hours as needed for anxiety 180 tablet 0    lisinopril  (ZESTRIL) 40 MG tablet Take 1 tablet (40 mg) by mouth daily. 90 tablet 3    Melatonin 10 MG TABS tablet Take 10 mg by mouth nightly as needed for sleep      metFORMIN (GLUCOPHAGE) 500 MG tablet Take 1 tablet (500 mg) by mouth 2 times daily (with meals). 60 tablet 1    multivitamin w/minerals (THERA-VIT-M) tablet Take 1 tablet by mouth daily 90 tablet 0    naltrexone (DEPADE/REVIA) 50 MG tablet Take 2 tablets (100 mg) by mouth daily. 180 tablet 0    semaglutide (OZEMPIC) 2 MG/3ML pen Inject 0.25 mg subcutaneously every 7 days for 28 days, THEN 0.5 mg every 7 days. 3 mL 1    simvastatin (ZOCOR) 40 MG tablet Take 1 tablet (40 mg) by mouth every evening. 90 tablet 3    thiamine (B-1) 100 MG tablet TAKE 1 TABLET (100 MG) BY MOUTH DAILY 90 tablet 0    thin (NO BRAND SPECIFIED) lancets Use to test blood sugar 1x times daily. Preferred Blood Glucose Monitor Brands: per insurance. 100 each 11    traZODone (DESYREL) 100 MG tablet Take 1 tablet (100 mg) by mouth at bedtime. 30 tablet 0     No current facility-administered medications for this visit.       MEDICATION LIST (after visit)  Current Outpatient Medications   Medication Sig Dispense Refill    acamprosate (CAMPRAL) 333 MG EC tablet Take 2 tablets (666 mg) by mouth 3 times daily. 180 tablet 0    ARIPiprazole (ABILIFY) 5 MG tablet TAKE ONE TABLET (5 MG) BY MOUTH ONE TIME DAILY 90 tablet 1    blood glucose (NO BRAND SPECIFIED) test strip Use to test blood sugar 1x times daily. Preferred Blood Glucose Monitor Brands: per insurance. 100 strip 11    blood glucose monitoring (NO BRAND SPECIFIED) meter device kit Use to test blood sugar 1x times daily. Preferred Blood Glucose Monitor Brands: per insurance. 1 kit 0    busPIRone (BUSPAR) 15 MG tablet Take 1 tablet (15 mg) by mouth 2 times daily 180 tablet 1    citalopram (CELEXA) 40 MG tablet Take 1 tablet (40 mg) by mouth daily. 30 tablet 0    gabapentin (NEURONTIN) 300 MG capsule Take 2 capsules (600 mg) by mouth 3 times  daily. Increase gabapentin dose 300 mg/day to a target dose of 600 mg  capsule 0    hydroCHLOROthiazide 12.5 MG tablet Take 1 tablet (12.5 mg) by mouth daily. 90 tablet 3    hydrOXYzine HCl (ATARAX) 50 MG tablet Take 0.5-1 tablets (25-50 mg) by mouth every 8 hours as needed for anxiety 180 tablet 0    lisinopril (ZESTRIL) 40 MG tablet Take 1 tablet (40 mg) by mouth daily. 90 tablet 3    Melatonin 10 MG TABS tablet Take 10 mg by mouth nightly as needed for sleep      metFORMIN (GLUCOPHAGE) 500 MG tablet Take 1 tablet (500 mg) by mouth 2 times daily (with meals). 60 tablet 1    multivitamin w/minerals (THERA-VIT-M) tablet Take 1 tablet by mouth daily 90 tablet 0    naltrexone (DEPADE/REVIA) 50 MG tablet Take 2 tablets (100 mg) by mouth daily. 180 tablet 0    semaglutide (OZEMPIC) 2 MG/3ML pen Inject 0.25 mg subcutaneously every 7 days for 28 days, THEN 0.5 mg every 7 days. 3 mL 1    simvastatin (ZOCOR) 40 MG tablet Take 1 tablet (40 mg) by mouth every evening. 90 tablet 3    thiamine (B-1) 100 MG tablet TAKE 1 TABLET (100 MG) BY MOUTH DAILY 90 tablet 0    thin (NO BRAND SPECIFIED) lancets Use to test blood sugar 1x times daily. Preferred Blood Glucose Monitor Brands: per insurance. 100 each 11    traZODone (DESYREL) 100 MG tablet Take 1 tablet (100 mg) by mouth at bedtime. 30 tablet 0     No current facility-administered medications for this visit.         No Known Allergies      Christina Posadas NP  [facility]  [phone]

## 2024-09-26 NOTE — TELEPHONE ENCOUNTER
Called out to patient at request of Provider [place].  Patient has not started metformin, ozempic, and has not picked up meter.  Additionally he reports a tooth abscess.  Encouraged patient to start metformin, start ozempic if comfortable with pharmacy teaching, and  testing supplies.  Scheduled in person education visit for 10/4/24.    Briefly reviewed high sugar foods.  Patient reports he has been eating ice cream, cereal and just bought juice.  Encouraged patient to avoid these at this time until blood sugar and infection clear.    Sabrina Gupta MS, RD, LD, CDE

## 2024-09-27 NOTE — ADDENDUM NOTE
Encounter addended by: Yamila Patterson LPCC, LADC on: 9/27/2024 2:04 PM   Actions taken: Clinical Note Signed

## 2024-09-27 NOTE — PROGRESS NOTES
Addiction Outpatient Weekly Clinical Staffing     Durag Aguirre was staffed on 8/6/2024 . Durga Aguirre was staffed on recovery strengths, barriers and treatment progress.     Staff present: Paula Nicholson, [facility] , Senait Ramos MS, [facility] , Valentina Martinez [facility], [facility] , Yamila Patterson [facility], [facility] , Danny Lozada MS, [facility] , Megan Conde [facility] [place] [facility], [facility] , Gloria Manriquez [facility], [facility] , and Eda Lau, [facility]     Date: 8/6/2024  Time: 2:02 PM    Staff Signature: Yamila Patterson, [facility], [facility]   *Staffing done, notes documented late.    Attestation: Dr. Loretta HANNON - Provides oversight and supervision of care.

## 2024-10-01 ENCOUNTER — HOSPITAL ENCOUNTER (OUTPATIENT)
Dept: BEHAVIORAL HEALTH | Facility: CLINIC | Age: 64
Discharge: HOME | End: 2024-10-01
Attending: FAMILY MEDICINE
Payer: MEDICARE

## 2024-10-01 ENCOUNTER — TELEPHONE (OUTPATIENT)
Dept: BEHAVIORAL HEALTH | Facility: CLINIC | Age: 64
End: 2024-10-01
Payer: MEDICARE

## 2024-10-01 PROCEDURE — H2035 A/D TX PROGRAM, PER HOUR: HCPCS | Performed by: COUNSELOR

## 2024-10-01 NOTE — PROGRESS NOTES
Individual Session Summary   START TIME: 1:15PM   END TIME: 2:10 PM   Duration: 1 Hour    Data:  Met with client on this date for an individual session. The session focused on client's treatment plan goal for DIM(s) 3, 5, 6 of his individual treatment plan. Client reports no change in LDOU and an added layer of accountability since drinking Nyquil and blow positive for alcohol to the court. Client reports benefits in having accountability and in sobriety. Processed completed Recovery Care Plan and was provided information to increase social support. Client denies need for formal support at this time and took information for a peer specialist to contact if the need arise. Client endorse benefits from attending sober support groups and reconnecting with old members from that community. Client denies any experience of cravings and shared that his trazodone has increased and he is currently doing well with getting enough sleep. Client is waiting to hear back from [employer] about a job he interviewed for. Client reports no worries about returning to work and was encouraged to consider stress management techniques proactively.     Intervention: CBT, Feedback, Validation, Unconditional Positive Regard.     Assessment: Client appeared healthy and was alert and talkative through the session. Client appeared nervous about graduating next week. Client endorsed some resistance to connecting to formal supports. No signs of intoxications or withdrawals observed.      Plan. Client will attend group for graduation on 10/7 at 5pm. Client will remain sober.         Yamila Patterson, [facility], [facility]     Attestation: Dr. Loretta HANNON - Provides oversight and supervision of care.

## 2024-10-01 NOTE — TELEPHONE ENCOUNTER
----- Message from Yamila Patterson sent at 10/1/2024  1:13 PM CDT -----  Regarding: Please add to schedule  Hello,    Scheduling Request    Location of programming: [facility]occur[facility].  Date: 10/1 at 1:30pm  Group: [id number] (Phase 3)   Attending Provider: AMMON Soto, ELANA, STPEHANY ([id number]), Romi Burgos MD is   Number of visits to be scheduled: 1  Duration of Appointment in minutes:60  Visit Type: In-person      Thanks,  ANA Soto LSW, ELANA, LPCC  MI/CD Psychotherapist  [facility] [address], [place] [address]  Phone: [phone] Fax:  [phone]

## 2024-10-04 ENCOUNTER — TELEPHONE (OUTPATIENT)
Dept: INTERNAL MEDICINE | Facility: CLINIC | Age: 64
End: 2024-10-04

## 2024-10-04 ENCOUNTER — ALLIED HEALTH/NURSE VISIT (OUTPATIENT)
Dept: EDUCATION SERVICES | Facility: CLINIC | Age: 64
End: 2024-10-04
Payer: MEDICARE

## 2024-10-04 DIAGNOSIS — E11.65 TYPE 2 DIABETES MELLITUS WITH HYPERGLYCEMIA, WITHOUT LONG-TERM CURRENT USE OF INSULIN (H): ICD-10-CM

## 2024-10-04 PROCEDURE — G0108 DIAB MANAGE TRN  PER INDIV: HCPCS

## 2024-10-04 NOTE — PROGRESS NOTES
"Diabetes Self-Management Education & Support    Presents for: Initial Assessment for new diagnosis    Type of Service: In Person Visit      ASSESSMENT:  Pt referred by Pcp for new diagnosis of Type 2 Diabetes. Denies family history of diabetes. Started Metformin, 500mg, bid, approx one month ago. Denies side effects. Has not yet started Ozempic. Will take first dose in clinic today with help form NINI. 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 Pt also has a history of Severe Alcohol Abuse, Anxiety, Hypertension, Hyperlipidemia. Pt is currently not drinking.  Lives alone. Currently unemployed.     Reviewed basics of Type 2 Diabetes -  Target bg goals, fasting and 2 hrs post meal  Taught Ozempic administration  Taughtt meter instruction    NINI administered frst dose Ozempic due to pt's needle; phobia. Within several seconds of administering Ozempic, pt became dizzy, lightheaded and lost all color in his face. He said he felt like he was going to \"pass out\". Fortunately,, he did not. NINI stayed with pt and called for help. BP was taken 4 times within 15 minutes by a staff nurse. First 3 times, systolic was < 90, diastolic < 60. 4th time systolic was > 90, diastolic, > 60. Pt was given a total of 32 oz  water in a 20 minute period. He was also given a granola snack bar. It had been 4 hours since the patient has eaten. When the pt left the clinic he verbalized he felt well and comfortable driving home.     Of note: Bg was checked prior to Ozempic administration. Result was 368mg/dL, 4 hrs post meal. Pt denied missing medication doses.     Patient's most recent   Lab Results   Component Value Date    A1C 11.4 09/19/2024    A1C 5.3 03/12/2021     is not meeting goal of <7.0    Diabetes knowledge and skills assessment:   Patient is knowledgeable in diabetes management concepts related to: Needs new onset type 2 diabetes instruction.     Continue education with the following " "diabetes management concepts: Healthy Eating, Monitoring, and Taking Medication    Based on learning assessment above, most appropriate setting for further diabetes education would be: Individual setting.      PLAN  - Continue to take Metformin as prescribed.  - Eat 3 meals a day and make sure each meal has a source of protein, carbs and a healthy fat.     Topics to cover at upcoming visits: Healthy Eating, Monitoring, and Taking Medication    Follow-up: Rtc in one week.     See Care Plan for co-developed, patient-state behavior change goals.  AVS provided for patient today.    Education Materials Provided:  [employer] Healthy Living with Diabetes Book    SUBJECTIVE/OBJECTIVE:  Presents for: Initial Assessment for new diagnosis  Accompanied by: Self  Diabetes education in the past 24mo: No  Focus of Visit: Patient Unsure  Diabetes type: Type 2  Date of diagnosis: 09/2024  Disease course: Stable  How confident are you filling out medical forms by yourself:: Not Assessed  Diabetes management related comments/concerns: Learning how to use a bg meter  Transportation concerns: No  Difficulty affording diabetes medication?: No  Difficulty affording diabetes testing supplies?: No  Other concerns:: None  Cultural Influences/Ethnic Background:  Not  or       Diabetes Symptoms & Complications:  Diabetes Related Symptoms: None  Weight trend: Slowly decreasing.   Symptom course: Stable  Disease course: Stable  Complications assessed today?: No    Patient Problem List and Family Medical History reviewed for relevant medical history, current medical status, and diabetes risk factors.    Vitals:  There were no vitals taken for this visit.  Estimated body mass index is 28.06 kg/m  as calculated from the following:    Height as of 9/4/24: 1.803 m (5' 11\").    Weight as of 9/4/24: 91.3 kg (201 lb 3.2 oz).   Last 3 BP:   BP Readings from Last 3 Encounters:   09/04/24 114/68   05/22/24 125/63   03/19/24 128/74 " "      History   Smoking Status    Every Day    Types: Cigarettes   Smokeless Tobacco    Never       Labs:  Lab Results   Component Value Date    A1C 11.4 09/19/2024    A1C 5.3 03/12/2021     Lab Results   Component Value Date     09/19/2024     11/15/2022     03/12/2021     Lab Results   Component Value Date    LDL  09/04/2024      Comment:      Cannot estimate LDL when triglyceride exceeds 400 mg/dL    LDL 60 09/04/2024    LDL  03/13/2021     Cannot estimate LDL when triglyceride exceeds 400 mg/dL     HDL Cholesterol   Date Value Ref Range Status   03/13/2021 28 (L) >39 mg/dL Final     Direct Measure HDL   Date Value Ref Range Status   09/04/2024 26 (L) >=40 mg/dL Final   ]  GFR Estimate   Date Value Ref Range Status   09/04/2024 80 >60 mL/min/1.73m2 Final     Comment:     eGFR calculated using 2021 CKD-EPI equation.   03/13/2021 73 >60 mL/min/[1.73_m2] Final     Comment:     Non  GFR Calc  Starting 12/18/2018, serum creatinine based estimated GFR (eGFR) will be   calculated using the Chronic Kidney Disease Epidemiology Collaboration   (CKD-EPI) equation.       GFR Estimate If Black   Date Value Ref Range Status   03/13/2021 85 >60 mL/min/[1.73_m2] Final     Comment:      GFR Calc  Starting 12/18/2018, serum creatinine based estimated GFR (eGFR) will be   calculated using the Chronic Kidney Disease Epidemiology Collaboration   (CKD-EPI) equation.       Lab Results   Component Value Date    CR 1.03 09/04/2024    CR 1.07 03/13/2021     No results found for: \"MICROALBUMIN\"    Healthy Eating:  Healthy Eating Assessed Today: Yes  Cultural/[place] diet restrictions?: No  Do you have any food allergies or intolerances?: Yes ([place])  Meal planning/habits: None  Who cooks/prepares meals for you?: Self  Who purchases food in  your home?: Self  How many times a week on average do you eat food made away from home (restaurant/take-out)?: 1    Breakfast: pizza pocket or " ground beef with fried onions  Lunch: sometimes skips or sandwich or deli meat by itself  Dinner: hamburger with fried onions or 1/2 croissant with ham and cheese and potatoe salad  Snacks: snack size william bars or peanut butter cups  Other: Trying to avoid carbs since learning of diagnosis one month ago  Beverages: Water, Juice (diluted 1/2 water 1/2 juice)  Has patient met with a dietitian in the past?: No    Being Active:  Being Active Assessed Today: Yes  Exercise:: Currently not exercising    Monitoring:  Monitoring Assessed Today: Yes  Did patient bring glucose meter to appointment? : Yes  Blood Glucose Meter: Accu-chek  Times checking blood sugar at home (number):  (Taught how to check bg today. Will be asked to check once a day.)      Taking Medications:  Diabetes Medication(s)       Biguanides       metFORMIN (GLUCOPHAGE) 500 MG tablet Take 1 tablet (500 mg) by mouth 2 times daily (with meals).       Incretin Mimetic Agents       semaglutide (OZEMPIC) 2 MG/3ML pen Inject 0.25 mg subcutaneously every 7 days for 28 days, THEN 0.5 mg every 7 days.            Taking Medication Assessed Today: Yes  Current Treatments: Oral Medication (taken by mouth), Non-insulin Injectables (Has been prescribed Ozempic but has not yet started)  Problems taking diabetes medications regularly?: No  Diabetes medication side effects?: No    Problem Solving:  Problem Solving Assessed Today: No  Is the patient at risk for hypoglycemia?: No  Is the patient at risk for DKA?: No    Reducing Risks:  Reducing Risks Assessed Today: No  Diabetes Risks: Age over 45 years, Hyperlipidemia  CAD Risks: Diabetes Mellitus, Hypertension, Male sex, Sedentary lifestyle  Has dilated eye exam at least once a year?: Yes  Sees dentist every 6 months?: Yes  Feet checked by healthcare provider in the last year?: Yes    Healthy Coping:  Healthy Coping Assessed Today: No  Emotional response to diabetes: Ready to learn  Informal Support system:: None  Stage of  change: PREPARATION (Decided to change - considering how)  Patient Activation Measure Survey Score:      6/4/2013    10:00 AM   MICHAEL Score (Last Two)   MICHAEL Raw Score 39   Activation Score 56.4   MICHAEL Level 3       Care Plan and Education Provided:  - Healthy Eating: Balanced meals  - Monitoring: Frequency of monitoring, Individual glucose targets, Proper technique, Proper sharps disposal  - Meter instruction: Patient was instructed on Accu-Chek Guide Me meter and was able to provide an accurate return demonstration. Patient's blood glucose reading today was 368 mg/dL, 4 hrs post meal.   - Taking Medication: Administering and storing injectable diabetes medications, Proper site selection and rotation for injections, Side effects of prescribed medication(s), When to take medication(s), and GLP-1/GIP Instruction - Ozempic administration technique taught today. Patient verbalized understanding and was able to perform an accurate return demonstration of administration technique. Side effects were discussed, if patient has any abdominal pain, with or without nausea and/or vomiting, stop medication, call provider.    Time Spent: 75 minutes  Encounter Type: Individual    Any diabetes medication dose changes were made via the CDE Protocol per the patient's primary care provider. A copy of this encounter was shared with the provider.    Bertha Thorne, RN, BSN, [facility]   Certified Diabetes Care &   [facility]

## 2024-10-04 NOTE — TELEPHONE ENCOUNTER
Stat RN called to Diabetic Educator office. Upon arrival, pt skin ashen color sitting in chair. Pt was conversing with diabetic educator. Diabetic educator informed RN that he had just received his ozempic shot. Pt states he is afraid of needles. Pt was diaphoretic.     BP obtained 70/45 at 2:03pm. L arm, sitting. HR 51. 94% O2.     Pt drank some water and had a granola bar. BP slowly increasing. Pt declined to raise feet. Pt and diabetic educator discussing that pt only had a hot pocket to eat today 5 hours ago. Recommended to pt to eat before coming to the next appt. Discussed with diabetic educator it may be a good idea to do next injection in the recliner to easily lift patient's legs.     85/54 at 2:04 pm. L arm, sitting. HR 59. 96% O2.  84/58 at 2:09 pm - Pt's color has returned at this point. Pt no longer sweating.  L arm, sitting. HR 56. O2 96%.Pt stating he is feeling better. Switched arms for next BP read.  96/64 at 2:11 pm. R arm, sitting.    RN decided to do manual BP as pt color came back and patient said he felt back to baseline. Automatic blood pressure cuff did not match clinical picture anymore.     RN manual BP on L arm 94/68 at 2:20 pm.    RN huddled with PARUL Mejía. She reviewed chart. RN and provider recommend pt drink extra water at this time before leaving the clinic. RN advised pt to spend some time in the lobby and make sure feeling better before leaving clinic. Gave instruction to pt to drink another bottle of water before leaving. Pt drank the bottle in front of RN. RN gave pt bottle of apple juice to take with (advised pt to not drink entire bottle like the water due to blood glucose already being high). Pt states understanding and states a few times over the last 20 minutes of interaction that he feels back to normal. Care from triage RN was done with evaluation and education at 2:30 pm and pt was left to continue appointment with diabetic educator.     Gale Moncada,  RN

## 2024-10-04 NOTE — PATIENT INSTRUCTIONS
PLAN  - Continue to take Metformin as prescribed.  - Eat 3 meals a day and make sure each meal has a source of protein, carbs and a healthy fat.         Bertha Thorne, RN, BSN, [facility]   Certified Diabetes Care &   [facility]

## 2024-10-04 NOTE — LETTER
"    10/4/2024         RE: Durga Aguirre  [id number]  [place] [address]        Dear Colleague,    Thank you for referring your patient, Durga Aguirre, to the [facility]. Please see a copy of my visit note below.    Diabetes Self-Management Education & Support    Presents for: Initial Assessment for new diagnosis    Type of Service: In Person Visit      ASSESSMENT:  Pt referred by Pcp for new diagnosis of Type 2 Diabetes. Denies family history of diabetes. Started Metformin, 500mg, bid, approx one month ago. Denies side effects. Has not yet started Ozempic. Will take first dose in clinic today with help form NINI. 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 Pt also has a history of Severe Alcohol Abuse, Anxiety, Hypertension, Hyperlipidemia. Pt is currently not drinking.  Lives alone. Currently unemployed.     Reviewed basics of Type 2 Diabetes -  Target bg goals, fasting and 2 hrs post meal  Taught Ozempic administration  Taughtt meter instruction    NINI administered frst dose Ozempic due to pt's needle; phobia. Within several seconds of administering Ozempic, pt became dizzy, lightheaded and lost all color in his face. He said he felt like he was going to \"pass out\". Fortunately,, he did not. NINI stayed with pt and called for help. BP was taken 4 times within 15 minutes by a staff nurse. First 3 times, systolic was < 90, diastolic < 60. 4th time systolic was > 90, diastolic, > 60. Pt was given a total of 32 oz  water in a 20 minute period. He was also given a granola snack bar. It had been 4 hours since the patient has eaten. When the pt left the clinic he verbalized he felt well and comfortable driving home.     Of note: Bg was checked prior to Ozempic administration. Result was 368mg/dL, 4 hrs post meal. Pt denied missing medication doses.     Patient's most recent   Lab Results   Component Value Date    A1C 11.4 09/19/2024    A1C " 5.3 03/12/2021     is not meeting goal of <7.0    Diabetes knowledge and skills assessment:   Patient is knowledgeable in diabetes management concepts related to: Needs new onset type 2 diabetes instruction.     Continue education with the following diabetes management concepts: Healthy Eating, Monitoring, and Taking Medication    Based on learning assessment above, most appropriate setting for further diabetes education would be: Individual setting.      PLAN  - Continue to take Metformin as prescribed.  - Eat 3 meals a day and make sure each meal has a source of protein, carbs and a healthy fat.     Topics to cover at upcoming visits: Healthy Eating, Monitoring, and Taking Medication    Follow-up: Rtc in one week.     See Care Plan for co-developed, patient-state behavior change goals.  AVS provided for patient today.    Education Materials Provided:  [employer] Healthy Living with Diabetes Book    SUBJECTIVE/OBJECTIVE:  Presents for: Initial Assessment for new diagnosis  Accompanied by: Self  Diabetes education in the past 24mo: No  Focus of Visit: Patient Unsure  Diabetes type: Type 2  Date of diagnosis: 09/2024  Disease course: Stable  How confident are you filling out medical forms by yourself:: Not Assessed  Diabetes management related comments/concerns: Learning how to use a bg meter  Transportation concerns: No  Difficulty affording diabetes medication?: No  Difficulty affording diabetes testing supplies?: No  Other concerns:: None  Cultural Influences/Ethnic Background:  Not  or       Diabetes Symptoms & Complications:  Diabetes Related Symptoms: None  Weight trend: Slowly decreasing.   Symptom course: Stable  Disease course: Stable  Complications assessed today?: No    Patient Problem List and Family Medical History reviewed for relevant medical history, current medical status, and diabetes risk factors.    Vitals:  There were no vitals taken for this visit.  Estimated body mass index  "is 28.06 kg/m  as calculated from the following:    Height as of 9/4/24: 1.803 m (5' 11\").    Weight as of 9/4/24: 91.3 kg (201 lb 3.2 oz).   Last 3 BP:   BP Readings from Last 3 Encounters:   09/04/24 114/68   05/22/24 125/63   03/19/24 128/74       History   Smoking Status     Every Day     Types: Cigarettes   Smokeless Tobacco     Never       Labs:  Lab Results   Component Value Date    A1C 11.4 09/19/2024    A1C 5.3 03/12/2021     Lab Results   Component Value Date     09/19/2024     11/15/2022     03/12/2021     Lab Results   Component Value Date    LDL  09/04/2024      Comment:      Cannot estimate LDL when triglyceride exceeds 400 mg/dL    LDL 60 09/04/2024    LDL  03/13/2021     Cannot estimate LDL when triglyceride exceeds 400 mg/dL     HDL Cholesterol   Date Value Ref Range Status   03/13/2021 28 (L) >39 mg/dL Final     Direct Measure HDL   Date Value Ref Range Status   09/04/2024 26 (L) >=40 mg/dL Final   ]  GFR Estimate   Date Value Ref Range Status   09/04/2024 80 >60 mL/min/1.73m2 Final     Comment:     eGFR calculated using 2021 CKD-EPI equation.   03/13/2021 73 >60 mL/min/[1.73_m2] Final     Comment:     Non  GFR Calc  Starting 12/18/2018, serum creatinine based estimated GFR (eGFR) will be   calculated using the Chronic Kidney Disease Epidemiology Collaboration   (CKD-EPI) equation.       GFR Estimate If Black   Date Value Ref Range Status   03/13/2021 85 >60 mL/min/[1.73_m2] Final     Comment:      GFR Calc  Starting 12/18/2018, serum creatinine based estimated GFR (eGFR) will be   calculated using the Chronic Kidney Disease Epidemiology Collaboration   (CKD-EPI) equation.       Lab Results   Component Value Date    CR 1.03 09/04/2024    CR 1.07 03/13/2021     No results found for: \"MICROALBUMIN\"    Healthy Eating:  Healthy Eating Assessed Today: Yes  Cultural/[place] diet restrictions?: No  Do you have any food allergies or intolerances?: Yes " ([place])  Meal planning/habits: None  Who cooks/prepares meals for you?: Self  Who purchases food in  your home?: Self  How many times a week on average do you eat food made away from home (restaurant/take-out)?: 1    Breakfast: pizza pocket or ground beef with fried onions  Lunch: sometimes skips or sandwich or deli meat by itself  Dinner: hamburger with fried onions or 1/2 croissant with ham and cheese and potatoe salad  Snacks: snack size william bars or peanut butter cups  Other: Trying to avoid carbs since learning of diagnosis one month ago  Beverages: Water, Juice (diluted 1/2 water 1/2 juice)  Has patient met with a dietitian in the past?: No    Being Active:  Being Active Assessed Today: Yes  Exercise:: Currently not exercising    Monitoring:  Monitoring Assessed Today: Yes  Did patient bring glucose meter to appointment? : Yes  Blood Glucose Meter: Accu-chek  Times checking blood sugar at home (number):  (Taught how to check bg today. Will be asked to check once a day.)      Taking Medications:  Diabetes Medication(s)       Biguanides       metFORMIN (GLUCOPHAGE) 500 MG tablet Take 1 tablet (500 mg) by mouth 2 times daily (with meals).       Incretin Mimetic Agents       semaglutide (OZEMPIC) 2 MG/3ML pen Inject 0.25 mg subcutaneously every 7 days for 28 days, THEN 0.5 mg every 7 days.            Taking Medication Assessed Today: Yes  Current Treatments: Oral Medication (taken by mouth), Non-insulin Injectables (Has been prescribed Ozempic but has not yet started)  Problems taking diabetes medications regularly?: No  Diabetes medication side effects?: No    Problem Solving:  Problem Solving Assessed Today: No  Is the patient at risk for hypoglycemia?: No  Is the patient at risk for DKA?: No    Reducing Risks:  Reducing Risks Assessed Today: No  Diabetes Risks: Age over 45 years, Hyperlipidemia  CAD Risks: Diabetes Mellitus, Hypertension, Male sex, Sedentary lifestyle  Has dilated eye exam at least once a  year?: Yes  Sees dentist every 6 months?: Yes  Feet checked by healthcare provider in the last year?: Yes    Healthy Coping:  Healthy Coping Assessed Today: No  Emotional response to diabetes: Ready to learn  Informal Support system:: None  Stage of change: PREPARATION (Decided to change - considering how)  Patient Activation Measure Survey Score:      6/4/2013    10:00 AM   IMCHAEL Score (Last Two)   MICHAEL Raw Score 39   Activation Score 56.4   MICHAEL Level 3       Care Plan and Education Provided:  - Healthy Eating: Balanced meals  - Monitoring: Frequency of monitoring, Individual glucose targets, Proper technique, Proper sharps disposal  - Meter instruction: Patient was instructed on Accu-Chek Guide Me meter and was able to provide an accurate return demonstration. Patient's blood glucose reading today was 368 mg/dL, 4 hrs post meal.   - Taking Medication: Administering and storing injectable diabetes medications, Proper site selection and rotation for injections, Side effects of prescribed medication(s), When to take medication(s), and GLP-1/GIP Instruction - Ozempic administration technique taught today. Patient verbalized understanding and was able to perform an accurate return demonstration of administration technique. Side effects were discussed, if patient has any abdominal pain, with or without nausea and/or vomiting, stop medication, call provider.    Time Spent: 75 minutes  Encounter Type: Individual    Any diabetes medication dose changes were made via the CDE Protocol per the patient's primary care provider. A copy of this encounter was shared with the provider.    Bertha Thorne, RN, BSN, [facility]   Certified Diabetes Care &   [facility]

## 2024-10-08 ENCOUNTER — HOSPITAL ENCOUNTER (OUTPATIENT)
Dept: BEHAVIORAL HEALTH | Facility: CLINIC | Age: 64
Discharge: HOME | End: 2024-10-08
Attending: FAMILY MEDICINE
Payer: MEDICARE

## 2024-10-08 ENCOUNTER — ANCILLARY PROCEDURE (OUTPATIENT)
Dept: ULTRASOUND IMAGING | Facility: CLINIC | Age: 64
End: 2024-10-08
Attending: INTERNAL MEDICINE
Payer: MEDICARE

## 2024-10-08 DIAGNOSIS — Z13.6 SCREENING FOR AAA (ABDOMINAL AORTIC ANEURYSM): ICD-10-CM

## 2024-10-08 DIAGNOSIS — Z87.891 HISTORY OF SMOKING: ICD-10-CM

## 2024-10-08 PROCEDURE — 76775 US EXAM ABDO BACK WALL LIM: CPT

## 2024-10-08 PROCEDURE — H2035 A/D TX PROGRAM, PER HOUR: HCPCS | Performed by: COUNSELOR

## 2024-10-09 NOTE — ADDENDUM NOTE
Encounter addended by: Yamila Patterson, STEPHANY, LADC on: 10/8/2024 8:30 PM   Actions taken: Pend clinical note, Clinical Note Signed

## 2024-10-09 NOTE — ADDENDUM NOTE
Encounter addended by: Yamila Patterson LPCC, LADC on: 10/9/2024 5:32 PM   Actions taken: Clinical Note Signed

## 2024-10-09 NOTE — PROGRESS NOTES
Adult Substance Use Intensive Outpatient Discharge Instructions      Summary: Durga Aguirre were admitted to [facility]  Program on 4/4/24 and you are discharging from our program on 10/8/24.  If you have any questions after you are discharged please call your primary counselor at [phone] and address your questions or concerns.      Main Diagnosis: 296.31 (F33.0) Major Depressive Disorder, Recurrent Episode, Mild _ and With anxious distress    Recommendation:    Durga is recommended to continued to engage and participate in AA sober support to continue to provide you the continued support in your recovery.   Durga is encouraged to reach out to resource provided on [facility] to connect with a Peer Specialist Support.       Follow-up Appointments and Continued Care Providers  Individual therapy  Durga has a scheduled Primary Care/prescriber appointment on 10/22/24 at 10 with  Dr. Loja . If you have any questions or concern's about your upcoming appointment please call your provider at [facility] to address your questions and concerns.     Psychiatry  Durga has a scheduled Recovery Clinic appointment on 10/24/24 at 11am with  Christina Posadas. If you have any questions or concern's about your upcoming appointment please call your provider at the addictions clinic to address your questions and concerns.    Additional Resources/Support   Please continue to engage and participate in AA sober Support to provide you additional support with your recovery. If you don't have a support group please look at the following additional resources down below.       Managing Symptoms and Preventing Relapse  Abstinence/Relapse Prevention  Continue using healthy strategies to help manage daily stressors.   Maintain abstinence from all mood altering substances     Healthy Living/Socialization Skills:   Continue practicing assertiveness and maintain healthy boundaries   Make sure you are  reaching out to supportive people every day.   Take time for yourself and practice self care    Lifestyle Adjustment: Adjust your lifestyle to get enough sleep, relaxation, exercise and  good nutrition. Continue to develop healthy coping skills to decrease stress and promote a sober living environment. Do not use alcohol, illegal drugs or addictive medications other than what is currently prescribed. AA, NA, and  Sponsor are excellent resources for support.     General Medication Instructions:   See your medication sheet(s) for instructions.   Take all medicines as directed.  Make no changes unless your doctor suggests them.   Go to all your doctor visits.  Be sure to have all your required lab tests. This way, your medicines can be refilled on time.  Do not use any drugs not prescribed by your provider.  AA/NA and Sponsors are excellent resources for support  Avoid alcohol.      Symptoms to Report:  If you experience more anxiety, confusion, sleeplessness, deep sadness or thoughts of suicide, notify your treatment team or notify your primary care physician. IF ANY OF THE SYMPTOMS YOU ARE EXPERIENCING ARE A MEDICAL EMERGENCY CALL 911 IMMEDIATELY. If you or someone you know is struggling or in crisis, help is available.  Call or text Mamina Shkola or chat  at [email or web address]    Recovery Resources:  Some AA/NA meetings are being held online however most have returned to in-person or a hybrid combination please check online to verify*  AA meetings search for them at: https://aa-intergroup.org (worldwide meeting listings)  AA meetings for [place] area can be found online at: https://a[place].org (click local online meetings listings)  NA meetings for [place] area can be found online at: https://www.nam[place].org  (click find a meeting)  Alcoholics Anonymous (https://aa.org/): for information 24 hours/day  AA Intergroup service office in [place] (http://www.aastpaul.org/) 594.947.8542  AA Intergroup service office in [facility]  [place]: 769-360-0171. (http://www.a[place].org/)  Narcotics Anonymous (www.nam[place].org) (679) 385-7306  https://aafairviewriverside.org/meetings  SMART Recovery - self management for addiction recovery:  www.smartrecovery.org  Pathways ~ A Health Crisis Resource & Support Center:  784.244.2576.  https://prescribetoprevent.org/patient-education/videos/  http://www.harmreduction.org  [place] Opioid Prevention Coalition: www.opioidcoalition.org      Recovery apps for your phone for educational purposes and to locate in person and zoom recovery meetings  Everything AA - educational purpose and milena is a great resource  12 Step Toolkit - educational purpose learning about the 12 steps to recovery  [employer] - meeting milena  AA  - meeting milena  Meeting guide - meeting milena  Quick NA meeting - meeting milena  [employer]- has various apps  Sober Fun Activities: www.soberLearnBIGactivitiesDARA [employer]/[facility]//[facility] Recovery Connection ([facility])  [facility] connects people seeking recovery to resources that help foster and sustain long-term recovery.  Whether you are seeking resources for treatment, transportation, housing, job training, education, health care or other pathways to recovery, [facility] is a great place to start. Please call  [phone] or go to their website at www.[facility]Now[facility].org      Mental Health Crisis Resources  Throughout [place]: call **CRISIS (**948124)  Crisis Text Line: is available for free, 24/7 by texting [place] to 352154  Suicide Awareness Voices of Education (SAVE) (www.save.org): 530-175-EQUF (6272)  The National Suicide Prevention Lifeline is now: 988 Suicide and Crisis Lifeline. Call 988 anytime.  National Hope Line  1.800.SUICIDE [3900293]  National [place] on Mental Illness (www.[place].dionne.org): 266.123.6212 or 214-866-6363.  Emey6udsh: text the word LIFE to 84215 for immediate support and crisis intervention  Mental Health Consumer/Survivor Network of [place] (www.mhcsn.net):  "440.224.7659 or 568-864-5046  [place] Mental Health Warm Line  Peer to peer support  Monday thru Saturday, 12 pm to 10 pm  487.568.9729 or 2.115.137.2705  Text \"Support\" to 41795  Mental Health Association of [place] (www.mentalhealth.org): 308.425.3570 or 490-501-8565  Crisis Text Line  Text 766213  You will be connected with a trained live crisis counselor to provide support. Por espanol, texto  ELADIO a 198460 o texto a 442-AYUDAME en WhatsApp  Peer Support Connection [place] Warmline (PSC) 1-260.802.5590 Available from 5pm - 9am (7 days a week/365 days a year)  [facility] 1-427.194.9251 Community Outreach for Psych Emergencies    Great Pod casts for nutrition and wellness  Understand the connection between what you eat and how you feel. Hosted by licensed nutritionists and dietitians from [employer] we share practical, real-life solutions for healthier living through nutrition.   Listen on Apple Podcasts  Dishing Up Nutrition   [employer]   Nutrition     Here are a list of additional numbers you can call if you are wanting to resume services through [facility]:  [facility] Assessment Intake: [phone]  Outpatient  call: [phone]  [facility] Admissions [phone]    Recovery Clinic call: [phone]  [facility]: [phone]  Medical Records call: [phone]  Billing Department call: [phone]    THANK YOU FOR CHOOSING [facility]      Attestation: Dr. Loretta HANNON - Provides oversight and supervision of care.        "

## 2024-10-09 NOTE — PROGRESS NOTES
Addiction Outpatient Weekly Clinical Staffing     Durga Aguirre was staffed on 10/8/2024 . Durga Aguirre was staffed on recovery strengths, barriers and treatment progress. Client will graduate from programming today.    Staff present: Paula Nicholson [facility] , Senait Ramos MS, [facility] , Valentina Martinez [facility], [facility] , Yamila Patterson [facility], [facility] , Megan Conde [facility], [facility] , Gloria Manriquez [facility], [facility] , and Eda Lau, [facility]     Date: 10/8/2024 Time: 3:03 PM    Staff Signature: Yamila Patterson, [facility], [facility]     Attestation: Dr. Loretta HANNON - Provides oversight and supervision of care.

## 2024-10-10 ENCOUNTER — TELEPHONE (OUTPATIENT)
Dept: BEHAVIORAL HEALTH | Facility: CLINIC | Age: 64
End: 2024-10-10
Payer: MEDICARE

## 2024-10-10 NOTE — TELEPHONE ENCOUNTER
SPINE PATIENTS - NEW PROTOCOL PREVISIT    RECORDS RECEIVED FROM: ref'd by Raheem Pérez DPM.   REASON FOR VISIT: Alcohol-induced polyneuropathy (H24)  Paresthesia of both feet-    PROVIDER: [facility]ravo   DATE OF APPT: 10/25/2024   NOTES (FOR ALL VISITS) STATUS DETAILS   OFFICE NOTE from referring provider Internal Referral and notes in chart   NO WORK-UP  No imaging/no prev work-up N/A

## 2024-10-10 NOTE — TELEPHONE ENCOUNTER
----- Message from Yamila Patterson sent at 10/9/2024  4:40 PM CDT -----  Regarding: Please remove from schedule  Client graduated on 10/8. Please remove from schedule.     Yamila Patterson, MPS, LSW, LADC, LPCC  MI/CD Psychotherapist  [facility] [address], [place] [address]  Phone: [phone] Fax:  [phone]

## 2024-10-11 ENCOUNTER — ALLIED HEALTH/NURSE VISIT (OUTPATIENT)
Dept: EDUCATION SERVICES | Facility: CLINIC | Age: 64
End: 2024-10-11
Payer: MEDICARE

## 2024-10-11 DIAGNOSIS — E11.65 TYPE 2 DIABETES MELLITUS WITH HYPERGLYCEMIA, WITHOUT LONG-TERM CURRENT USE OF INSULIN (H): Primary | ICD-10-CM

## 2024-10-11 PROCEDURE — G0108 DIAB MANAGE TRN  PER INDIV: HCPCS

## 2024-10-11 PROCEDURE — 99207 PR NO CHARGE NURSE ONLY: CPT

## 2024-10-11 NOTE — LETTER
"    10/11/2024         RE: Durga Aguirre  [id number]  [facility] [address]        Dear Colleague,    Thank you for referring your patient, Durga Aguirre, to the [facility]. Please see a copy of my visit note below.    Diabetes Self-Management Education & Support    Presents for:      Type of Service: In Person Visit      ASSESSMENT:  From visit on 10/04/24:   Pt referred by Pcp for new diagnosis of Type 2 Diabetes. Denies family history of diabetes. Started Metformin, 500mg, bid, approx one month ago. Denies side effects. Has not yet started Ozempic. Will take first dose in clinic today [facility]. 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 Pt also has a history of Severe Alcohol Abuse, Anxiety, Hypertension, Hyperlipidemia. Pt is currently not drinking. Lives alone. Currently unemployed.     [facility] administered frst dose Ozempic due to pt's needle; phobia. Within several seconds of administering Ozempic, pt became dizzy, lightheaded and lost all color in his face. He said he felt like he was going to \"pass out\". Fortunately,, he did not. [facility] stayed with pt and called for help. BP was taken 4 times within 15 minutes by a staff nurse. First 3 times, systolic was < 90, diastolic < 60. 4th time systolic was > 90, diastolic, > 60. Pt was given a total of 32 oz water in a 20 minute period. He was also given a granola snack bar. It had been 4 hours since the patient has eaten. When the pt left the clinic he verbalized he felt well and comfortable driving home.       Pt returns to clinic today to attempt to self-administer 2nd dose Ozempic in clinic while [facility] observes. Pt was able to self administer injection without difficulty. He did not become dizzy or lightheaded. He was given a small snack after administering the medication and drank 8 oz of water.     Weight 07/2024: 223  Weight today: 199    BG Log:   10/11: F: 190  10/10: F: " 197, Pre D: 131, Bed: 248  10/09: F: 196, Bed: 171  10/08: F: 193. Pre D: 220, Bed: 196  10/07: F: 203, Pre L: 179, Pre D: 226  7 Day meter average: 207. Bg is slowly coming down.       Patient's most recent   Lab Results   Component Value Date    A1C 11.4 09/19/2024    A1C 5.3 03/12/2021     is not meeting goal of <7.0    Diabetes knowledge and skills assessment:   Patient is knowledgeable in diabetes management concepts related to: Monitoring and Taking Medication    Continue education with the following diabetes management concepts: Healthy Eating, Being Active, Monitoring, and Taking Medication    Based on learning assessment above, most appropriate setting for further diabetes education would be: Individual setting.      PLAN -  Continue to take Ozempic as prescribed.  Increase Metformin dose from 500mg twice a day to 500mg with breakfast and 1000mg with dinner.     Topics to cover at upcoming visits: Healthy Eating, Being Active, Monitoring, and Taking Medication    Follow-up: Rtc in 4 weeks.     See Care Plan for co-developed, patient-state behavior change goals.  AVS provided for patient today.      SUBJECTIVE/OBJECTIVE:  Presents for: Initial Assessment for new diagnosis  Accompanied by: Self  Diabetes education in the past 24mo: Yes  Focus of Visit: Patient Unsure  Diabetes type: Type 2  Date of diagnosis: 09/2024  Disease course: Stable  How confident are you filling out medical forms by yourself:: Not Assessed  Diabetes management related comments/concerns: Learning how to use a bg meter  Transportation concerns: No  Difficulty affording diabetes medication?: No  Difficulty affording diabetes testing supplies?: No  Other concerns:: None  Cultural Influences/Ethnic Background:  Not  or         Diabetes Symptoms & Complications:  Diabetes Related Symptoms: None  Weight trend: Slowly decreasing.   Symptom course: Stable  Disease course: Stable  Complications assessed today?: No       Patient  "Problem List and Family Medical History reviewed for relevant medical history, current medical status, and diabetes risk factors.    Vitals:  There were no vitals taken for this visit.  Estimated body mass index is 28.06 kg/m  as calculated from the following:    Height as of 9/4/24: 1.803 m (5' 11\").    Weight as of 9/4/24: 91.3 kg (201 lb 3.2 oz).   Last 3 BP:   BP Readings from Last 3 Encounters:   09/04/24 114/68   05/22/24 125/63   03/19/24 128/74       History   Smoking Status     Every Day     Types: Cigarettes   Smokeless Tobacco     Never       Labs:  Lab Results   Component Value Date    A1C 11.4 09/19/2024    A1C 5.3 03/12/2021     Lab Results   Component Value Date     09/19/2024     11/15/2022     03/12/2021     Lab Results   Component Value Date    LDL  09/04/2024      Comment:      Cannot estimate LDL when triglyceride exceeds 400 mg/dL    LDL 60 09/04/2024    LDL  03/13/2021     Cannot estimate LDL when triglyceride exceeds 400 mg/dL     HDL Cholesterol   Date Value Ref Range Status   03/13/2021 28 (L) >39 mg/dL Final     Direct Measure HDL   Date Value Ref Range Status   09/04/2024 26 (L) >=40 mg/dL Final   ]  GFR Estimate   Date Value Ref Range Status   09/04/2024 80 >60 mL/min/1.73m2 Final     Comment:     eGFR calculated using 2021 CKD-EPI equation.   03/13/2021 73 >60 mL/min/[1.73_m2] Final     Comment:     Non  GFR Calc  Starting 12/18/2018, serum creatinine based estimated GFR (eGFR) will be   calculated using the Chronic Kidney Disease Epidemiology Collaboration   (CKD-EPI) equation.       GFR Estimate If Black   Date Value Ref Range Status   03/13/2021 85 >60 mL/min/[1.73_m2] Final     Comment:      GFR Calc  Starting 12/18/2018, serum creatinine based estimated GFR (eGFR) will be   calculated using the Chronic Kidney Disease Epidemiology Collaboration   (CKD-EPI) equation.       Lab Results   Component Value Date    CR 1.03 09/04/2024    " "CR 1.07 03/13/2021     No results found for: \"MICROALBUMIN\"    Healthy Eating:  Healthy Eating Assessed Today: Yes  Cultural/Moravian diet restrictions?: No  Do you have any food allergies or intolerances?: Yes ([place])  Meal planning/habits: None  Who cooks/prepares meals for you?: Self  Who purchases food in  your home?: Self  How many times a week on average do you eat food made away from home (restaurant/take-out)?: 1     Breakfast: pizza pocket or ground beef with fried onions  Lunch: sometimes skips or sandwich or deli meat by itself  Dinner: hamburger with fried onions or 1/2 croissant with ham and cheese and potatoe salad  Snacks: snack size william bars or peanut butter cups  Other: Trying to avoid carbs since learning of diagnosis one month ago  Beverages: Water, Juice (diluted 1/2 water 1/2 juice)  Has patient met with a dietitian in the past?: No     Being Active:  Being Active Assessed Today: Yes  Exercise:: Currently not exercising       Taking Medications:  Diabetes Medication(s)       Biguanides       metFORMIN (GLUCOPHAGE) 500 MG tablet Take 1 tablet (500 mg) by mouth 2 times daily (with meals).       Incretin Mimetic Agents       semaglutide (OZEMPIC) 2 MG/3ML pen Inject 0.25 mg subcutaneously every 7 days for 28 days, THEN 0.5 mg every 7 days.             Patient Activation Measure Survey Score:      6/4/2013    10:00 AM   MICHAEL Score (Last Two)   MICHAEL Raw Score 39   Activation Score 56.4   MICHAEL Level 3       Care Plan and Education Provided:  Taking Medication: Action of prescribed medication, Dose increase    Time Spent: 30 minutes  Encounter Type: Individual    Any diabetes medication dose changes were made via the CDE Protocol per the patient's primary care provider. A copy of this encounter was shared with the provider.    Bertha Thorne, RN, BSN, [facility]   Certified Diabetes Care &   [facility]       "

## 2024-10-15 NOTE — TREATMENT PLAN
"[facility]  MENTAL HEALTH AND ADDICTION  CO-OCCURRING RESIDENTIAL AND IOP TREATMENT PLAN  DISCHARGE    [facility] LEVEL OF CARE:  1.0 Outpatient Program  Level of care updates: 2.1 Intensive Outpatient Program Date:  24  Level of care updates: 1.0 Outpatient Program Date:  24  Level of care updates: 1.0 Outpatient Program Date:  24  Level of care updates: 1.0 Outpatient Program/Discharge Date: 10/8/24      DIMENSION 1: Intoxication / Withdrawal Potential     Initial Risk Ratin  Discharge Risk Ratin  Identified areas of concern/focus: Last reported alcohol use on 24. Reports to drinking alcohol that was left in home. Durga reported difficulty in falling asleep and recently reports increased ability to sleep easily.    As evidenced by: Ongoing  symptoms of withdrawals. Take Gabapentin to manage cravings. Not currently taking naltrexone due to it being on backordered.    Durga's Goal: \"I want to keep myself self-energize in ways that helps me avoid drinking and manage any withdrawals.\"  Clinical Goals:  Durga to maintain abstinence throughout outpatient treatment.     Date/ Initials Methods/Interventions Target Date Extended Date Extended Date Stopped Completed Initials   24 Client will report any chemical use to staff. Until program completion    10/8/24     5/29/24 Durga will refrain from any illicit substance or alcohol use, and report any substance use or alcohol use to primary counselor to determine if any changes need to be made to treatment plan to address withdrawal.  24 RK   24 Durga will utilize progressive muscle relaxation and 4-5-7 breath work before bed time to manage current experience of tension and PAWS.  9/11/24   10/8/24 RK             Update: Durga was on medications to manage urges and cravings and reports that it is effective. Durga attended AA groups and reports it as supportive.    DIMENSION 2: Biomedical " "Conditions/Complications   Initial Risk Ratin  Discharge Risk Ratin  Identified areas of concern/focus:  Left heel hurts from walking. Reports that it creates discomfort when walking a lot. Dental appointment schedule for  at 11[place]. Client has a primary care doctor, Dr. Bennett, through [facility].    As evidenced by:    Client has a diagnosis of peripheral neuropathy and an appointment scheduled in October with a specialty doctor. His current pain experience is described as a \"constant 5/10\" and interferes with his ability to work and do physical activity. Client's diagnosis is likely due to his long-term alcohol use.    Durga's Goal: \"I will continue to have my medical needs met and addressed\"  Clinical Goals:  Address medical concerns as they arise and maintain management of physical health problems.     Date/ Initials Methods/Interventions Target Date Extended Date Extended Date Stopped Completed Initials   2024 Inform staff of any medical concerns or conditions that may impact participating in treatment programming.  24 RK   2024 Client will consistently take medications as prescribed.  24 RK   24  RK Durga will do daily full body stretching for 20 minutes. 24 RK     DIMENSION 3:Emotional/Behavioral Conditions/Complications   Initial Risk Ratin  Discharge Risk Ratin  Identified areas of concern/focus: Durga  reports improvement in experience of depression and anxiety. Durga described experience as \"maintainable.\"     296.31 (F33.0) Major Depressive Disorder, Recurrent Episode, Mild _ and With mixed features.     As evidenced by:    ANXIETY:  irritability, restlessness, and \"cabin fever\"  DEPRESSION:  difficulty concentrating, fatigue, and low energy, procrastination    Durga's Goal: \"I want to learn new ways to stay in the present moment. I want to be happy, secure in my choices I make for myself.\"  Clinical " Goals:  Durga to gain coping skills to address mental health, report better understanding of relationship between mental health and substance use.      Date/ Initials Methods/Interventions Target Date Extended Date Extended Date Stopped Completed Initials   4/8/2024 Durga will participate in 3 hours of group therapy 3 days per week.  5/23/24 5/224 5/27/24 4/8/2024 Durga will increase and strengthen protective factors by staying connected with sister, friends and talk to family on the phone. 5/23/24 5/23/24 4/8/2024 Durga to attempt weekly mindfulness practices (breathing, body scan, guided meditation). Share with counselor any benefits or challenges.  5/20/24 7/12/24 9/24/24 10/8/24   5/29/24 Durga to identify at least 3 grounding skills she can begin to use and discuss effectiveness with counselor.  7/12/24 9/24/24 9/24/24 7/31/24 Durga will participate in 2 hours of group therapy 1 days per week.  9/24/24   10/8/24                                                                                                                                             Date/ Initials Methods/Interventions Target Date Extend Date Extend Date Stopped Completed Initials   General Instructions: MH Skills Group - IOP - 6 sessions  Attend each of the following groups one time a week and complete all 6 by due date, unless excused by primary counselor. All group work to be completed each session to receive an effective outcome.       4/8/24 sk > Neurobiology of addiction:  Informs Durga of brain changes and reduces feelings of shame, instructs on steps to help heal   4/30/24 4/30/24 4/8/24 sk > Learn and use Mindfulness to facilitate effective action in problem solving and promote health and well-being  5/7/24 5/7/24 4/8/24 sk > Mental Health Part 1: Learn 3 core processes of Acceptance, Cognitive Defusion, and Being Present  5/14/24 5/14/24 4/8/24 sk > Mental Health Part 2: Learn 3 core processes of  "Self-as-Context, Values, and Committed Action  24  E 24 Ind   24 sk > Learn and apply Self-Care in a variety of areas to improve mental and physical health, reduce PAWS, and increase feelings of self-empowerment, resiliency and well-being.   24 sk > Learn Stress Management techniques to reduce PAWS and stress-related symptoms, increase resiliency, and learn healthy routines to replace dysfunctional habits.  24     Format for D3 Insert for MH Skills in [facility] IOP Groups:   Dimension 3:   Initial Risk Ratin  Identified areas of concern/focus: Durga lacks understanding of how addiction affects other aspects of mental and physical health. Durga would benefit from learning new healthy coping skills, stress management, relaxation techniques, and psychotherapy specific to addiction, cross-addictions, and PAWS.     Durga will increase and strengthen protective factors by gaining knowledge and skills and learn how to apply self-care, stress management and psychotherapy to build a repertoire of daily habits that counteract PAWS, fatigue, depression and anxiety leading to increased resiliency and well-being.    Mental Health Skills Groups is required for first phase of treatment:       DIMENSION 4: Treatment Acceptance/Resistance   Initial Risk Ratin  Discharge Risk Ratin  Identified areas of concern/focus:    Alcohol Use Disorders;  303.90 (F10.20) Alcohol Use Disorder Severe  Ambivalence about change  History of failed treatment attempts  Moderate motivation for treatment    As evidenced by:  Preoccupation with chemical use.   Ambivalence about change.   Tolerance.  Persistent desire or unsuccessful efforts to cut down or control substance use.  Substance use is continued despite persistent or recurrent problems related to substance use.  Craving, or a strong desire to use the substance    Durga's Goal: \"Attending programming will help me reduce " "feelings isolation at home\"  Clinical Goals: Gain insight into how substance has impacted your life and actively engage in the treatment process.     Date/ Initials Methods/Interventions Target Date Extended Date Extended Date Stopped Completed Initials   2024 Durga will meet individually with [facility] weekly/Bi-weekly to review progress on treatment plan goals. 24 RK     2024 Durga to discuss any decrease in motivation to continue attending treatment.  24 RK   24 Durga will identify weekly and daily goals to help increase motivation and engagement in recovery.  24 RK   24 Identify pros and cons of alcohol use and impact on maintaining sobriety important life areas 24 RK   24 Durga will continue to identify benefits of sobriety and programming.  9/24/24   10/8/24 RK                       DIMENSION 5: Relapse/Continued Problem Potential   Initial Risk Rating: 3  Discharge Risk Ratin  Identified areas of concern/focus:  High risk for relapse  Lack of knowledge/coping skills related to relapse triggers and coping strategies    As Evidenced by:  Client unable to identify relapse triggers.    Client lacks coping skills for relapse prevention.    Chemical use in client's environment.  History of daily use.  Failed attempts to quit.      Durga's Goal: \"I want to learn to manage my stressors, clean stove, bills, clean house, well so I don't feel overwhelmed and I feel good about where I live.\"  Clinical Goals:  Develop coping skills and relapse prevention strategies to utilize when experiencing triggers, cravings and/or urges to drink.     Date/ Initials Methods/Interventions Target Date Extended Date Extended Date Stopped Completed Initials   2024 Durga will comply with urine drug screens at staff request to monitor for substance use. 5/23/24 7/12/24 9/24/24 10/8/24     4/8/24 Durga will rate urges to use daily in " "group. 24 Client will complete relapse prevention assignment identifying potential triggers for relapse and coping strategies for each. 24 RK   24 Durga to list 5 negative effects of substance use and of not giving enough effort to self-care and mindfulness.  24 RK   24 Durga to identify 5 positive alternatives of things he can do when feeling a strong or difficulty emotion instead of drinking. Discuss with counselor.  24 Durga will complete a Personal Care Recovery Plan in Phase 3 9/24/24   10/8/24   7/31/24 Durga will use SMART goal planning to break daily responsibilities into manageable tasks.  9/24/24   10/8/24             DIMENSION 6: Recovery Environment   Initial Risk Rating: 3  Discharge Risk Ratin  Identified areas of concern/focus: Lack of sober support  Chemical use in the home  Lack of sober / recreational interests  Legal issues  Court on 24 for most previous DUI.     As evidenced by:    Clients lacks sober activities.      Durga's Goal: \"I want to see what I can do to build more structured activities, like biking.\"  Clinical Goals: Improve recovery environment to increase the probability of maintaining long term sobriety.   Establish a transition plan connecting to culturally informed services in the community for post-treatment follow up care.  Must be reached in order to have services terminated?  Yes  Target Date: 24       Date/ Initials Methods/Interventions Target Date Extended Date Extended Date Stopped Completed Initials   2024 Durga to identify 5 aspects of his current environment that have to change in order to support and maintain good mental health and sobriety.  24 RK     2024 Client will verbalize the role chemical use has played in legal problems.   24 RK   2024 Client will increase sober support by attending recovery " "meetings regularly. 5/23/24 7/12/24 7/30/24 RK   5/29/24 Durga to identify 3 things that you consider 'missing' from your life right now that may have contributed to continued use. Share with counselor.  7/12/24 9/24/24 9/24/24 7/31/24 Durga will follow all probation rules and attend DUI court bi-weekly. Durga will follow mandate for UA requests by court.     10/8/24                                                                             Durga Strengths: \"I have a good mind, open minded, skillful with auto body and polite, good listener, respectful, content.\" Durga Treatment Plan Adaptations:  Durga does not need adjustments at this time.  The following adjustments will be made based on the above identified plan: NA   The following staff have contributed to this plan: Yamila Patterson, MPS, LADC, LPCC.       Were family/support people involved in the treatment planning?  No - List reason why not:  Client's preference.    Durga attests their date of last use as 6/7/24. Durga attests they have participated in the creation of this treatment plan. Durga has been provided a copy of this treatment plan and is in agreement with how this plan is written and will be stored in the electronic record.     Durga Signature:  _____________________________  Date: ____________________    [facility] Signature:  _____________________________  Date: ____________________     Attestation: Dr. Loretta HANNON - Provides oversight and supervision of care.      "

## 2024-10-15 NOTE — ADDENDUM NOTE
Encounter addended by: Yamila Patterson LPCC, LADC on: 10/15/2024 6:32 PM   Actions taken: Clinical Note Signed

## 2024-10-15 NOTE — ADDENDUM NOTE
Encounter addended by: Yamila Patterson LPCC, LADC on: 10/15/2024 2:27 PM   Actions taken: Clinical Note Signed

## 2024-10-16 NOTE — PROGRESS NOTES
"Diabetes Self-Management Education & Support    Presents for:      Type of Service: In Person Visit      ASSESSMENT:  From visit on 10/04/24:   Pt referred by Pcp for new diagnosis of Type 2 Diabetes. Denies family history of diabetes. Started Metformin, 500mg, bid, approx one month ago. Denies side effects. Has not yet started Ozempic. Will take first dose in clinic today with help form [facility]. 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 Pt also has a history of Severe Alcohol Abuse, Anxiety, Hypertension, Hyperlipidemia. Pt is currently not drinking. Lives alone. Currently unemployed.     [facility] administered frst dose Ozempic due to pt's needle; phobia. Within several seconds of administering Ozempic, pt became dizzy, lightheaded and lost all color in his face. He said he felt like he was going to \"pass out\". Fortunately,, he did not. [facility] stayed with pt and called for help. BP was taken 4 times within 15 minutes by a staff nurse. First 3 times, systolic was < 90, diastolic < 60. 4th time systolic was > 90, diastolic, > 60. Pt was given a total of 32 oz water in a 20 minute period. He was also given a granola snack bar. It had been 4 hours since the patient has eaten. When the pt left the clinic he verbalized he felt well and comfortable driving home.       Pt returns to clinic today to attempt to self-administer 2nd dose Ozempic in clinic while [facility] [place]note 2[facility]CODY observes. Pt was able to self administer injection without difficulty. He did not become dizzy or lightheaded. He was given a small snack after administering the medication and drank 8 oz of water.     Weight 07/2024: 223  Weight today: 199    BG Log:   10/11: F: 190  10/10: F: 197, Pre D: 131, Bed: 248  10/09: F: 196, Bed: 171  10/08: F: 193. Pre D: 220, Bed: 196  10/07: F: 203, Pre L: 179, Pre D: 226  7 Day meter average: 207. Bg is slowly coming down.       Patient's most recent   Lab Results   Component Value Date    A1C 11.4 " 09/19/2024    A1C 5.3 03/12/2021     is not meeting goal of <7.0    Diabetes knowledge and skills assessment:   Patient is knowledgeable in diabetes management concepts related to: Monitoring and Taking Medication    Continue education with the following diabetes management concepts: Healthy Eating, Being Active, Monitoring, and Taking Medication    Based on learning assessment above, most appropriate setting for further diabetes education would be: Individual setting.      PLAN -  Continue to take Ozempic as prescribed.  Increase Metformin dose from 500mg twice a day to 500mg with breakfast and 1000mg with dinner.     Topics to cover at upcoming visits: Healthy Eating, Being Active, Monitoring, and Taking Medication    Follow-up: Rtc in 4 weeks.     See Care Plan for co-developed, patient-state behavior change goals.  AVS provided for patient today.      SUBJECTIVE/OBJECTIVE:  Presents for: Initial Assessment for new diagnosis  Accompanied by: Self  Diabetes education in the past 24mo: Yes  Focus of Visit: Patient Unsure  Diabetes type: Type 2  Date of diagnosis: 09/2024  Disease course: Stable  How confident are you filling out medical forms by yourself:: Not Assessed  Diabetes management related comments/concerns: Learning how to use a bg meter  Transportation concerns: No  Difficulty affording diabetes medication?: No  Difficulty affording diabetes testing supplies?: No  Other concerns:: None  Cultural Influences/Ethnic Background:  Not  or         Diabetes Symptoms & Complications:  Diabetes Related Symptoms: None  Weight trend: Slowly decreasing.   Symptom course: Stable  Disease course: Stable  Complications assessed today?: No       Patient Problem List and Family Medical History reviewed for relevant medical history, current medical status, and diabetes risk factors.    Vitals:  There were no vitals taken for this visit.  Estimated body mass index is 28.06 kg/m  as calculated from the  "following:    Height as of 9/4/24: 1.803 m (5' 11\").    Weight as of 9/4/24: 91.3 kg (201 lb 3.2 oz).   Last 3 BP:   BP Readings from Last 3 Encounters:   09/04/24 114/68   05/22/24 125/63   03/19/24 128/74       History   Smoking Status    Every Day    Types: Cigarettes   Smokeless Tobacco    Never       Labs:  Lab Results   Component Value Date    A1C 11.4 09/19/2024    A1C 5.3 03/12/2021     Lab Results   Component Value Date     09/19/2024     11/15/2022     03/12/2021     Lab Results   Component Value Date    LDL  09/04/2024      Comment:      Cannot estimate LDL when triglyceride exceeds 400 mg/dL    LDL 60 09/04/2024    LDL  03/13/2021     Cannot estimate LDL when triglyceride exceeds 400 mg/dL     HDL Cholesterol   Date Value Ref Range Status   03/13/2021 28 (L) >39 mg/dL Final     Direct Measure HDL   Date Value Ref Range Status   09/04/2024 26 (L) >=40 mg/dL Final   ]  GFR Estimate   Date Value Ref Range Status   09/04/2024 80 >60 mL/min/1.73m2 Final     Comment:     eGFR calculated using 2021 CKD-EPI equation.   03/13/2021 73 >60 mL/min/[1.73_m2] Final     Comment:     Non  GFR Calc  Starting 12/18/2018, serum creatinine based estimated GFR (eGFR) will be   calculated using the Chronic Kidney Disease Epidemiology Collaboration   (CKD-EPI) equation.       GFR Estimate If Black   Date Value Ref Range Status   03/13/2021 85 >60 mL/min/[1.73_m2] Final     Comment:      GFR Calc  Starting 12/18/2018, serum creatinine based estimated GFR (eGFR) will be   calculated using the Chronic Kidney Disease Epidemiology Collaboration   (CKD-EPI) equation.       Lab Results   Component Value Date    CR 1.03 09/04/2024    CR 1.07 03/13/2021     No results found for: \"MICROALBUMIN\"    Healthy Eating:  Healthy Eating Assessed Today: Yes  Cultural/Jainism diet restrictions?: No  Do you have any food allergies or intolerances?: Yes ([place])  Meal planning/habits: " None  Who cooks/prepares meals for you?: Self  Who purchases food in  your home?: Self  How many times a week on average do you eat food made away from home (restaurant/take-out)?: 1     Breakfast: pizza pocket or ground beef with fried onions  Lunch: sometimes skips or sandwich or deli meat by itself  Dinner: hamburger with fried onions or 1/2 croissant with ham and cheese and potatoe salad  Snacks: snack size william bars or peanut butter cups  Other: Trying to avoid carbs since learning of diagnosis one month ago  Beverages: Water, Juice (diluted 1/2 water 1/2 juice)  Has patient met with a dietitian in the past?: No     Being Active:  Being Active Assessed Today: Yes  Exercise:: Currently not exercising       Taking Medications:  Diabetes Medication(s)       Biguanides       metFORMIN (GLUCOPHAGE) 500 MG tablet Take 1 tablet (500 mg) by mouth 2 times daily (with meals).       Incretin Mimetic Agents       semaglutide (OZEMPIC) 2 MG/3ML pen Inject 0.25 mg subcutaneously every 7 days for 28 days, THEN 0.5 mg every 7 days.             Patient Activation Measure Survey Score:      6/4/2013    10:00 AM   MICHAEL Score (Last Two)   MICHAEL Raw Score 39   Activation Score 56.4   MICHAEL Level 3       Care Plan and Education Provided:  Taking Medication: Action of prescribed medication, Dose increase    Time Spent: 30 minutes  Encounter Type: Individual    Any diabetes medication dose changes were made via the CDE Protocol per the patient's primary care provider. A copy of this encounter was shared with the provider.    Bertha Thorne, RN, BSN, [facility]   Certified Diabetes Care &   [facility]

## 2024-10-16 NOTE — PATIENT INSTRUCTIONS
PLAN -  Continue to take Ozempic as prescribed.  Increase Metformin dose from 500mg twice a day to 500mg with breakfast and 1000mg with dinner.           Bertha Thorne, RN, BSN, [facility]   Certified Diabetes Care &   [facility]

## 2024-10-25 ENCOUNTER — PRE VISIT (OUTPATIENT)
Dept: NEUROLOGY | Facility: CLINIC | Age: 64
End: 2024-10-25

## 2024-10-25 ENCOUNTER — OFFICE VISIT (OUTPATIENT)
Dept: NEUROLOGY | Facility: CLINIC | Age: 64
End: 2024-10-25
Attending: PODIATRIST
Payer: MEDICARE

## 2024-10-25 ENCOUNTER — VIRTUAL VISIT (OUTPATIENT)
Dept: ADDICTION MEDICINE | Facility: CLINIC | Age: 64
End: 2024-10-25
Payer: MEDICARE

## 2024-10-25 VITALS
DIASTOLIC BLOOD PRESSURE: 77 MMHG | SYSTOLIC BLOOD PRESSURE: 119 MMHG | WEIGHT: 201.2 LBS | BODY MASS INDEX: 28.17 KG/M2 | HEART RATE: 82 BPM | HEIGHT: 71 IN | OXYGEN SATURATION: 97 %

## 2024-10-25 DIAGNOSIS — E11.65 TYPE 2 DIABETES MELLITUS WITH HYPERGLYCEMIA, WITHOUT LONG-TERM CURRENT USE OF INSULIN (H): ICD-10-CM

## 2024-10-25 DIAGNOSIS — F10.20 ALCOHOL USE DISORDER, SEVERE, DEPENDENCE (H): Primary | ICD-10-CM

## 2024-10-25 DIAGNOSIS — F51.05 INSOMNIA DUE TO OTHER MENTAL DISORDER: ICD-10-CM

## 2024-10-25 DIAGNOSIS — G62.9 PERIPHERAL POLYNEUROPATHY: Primary | ICD-10-CM

## 2024-10-25 DIAGNOSIS — F41.1 GENERALIZED ANXIETY DISORDER: ICD-10-CM

## 2024-10-25 DIAGNOSIS — F10.24 ALCOHOL DEPENDENCE WITH ALCOHOL-INDUCED MOOD DISORDER (H): ICD-10-CM

## 2024-10-25 DIAGNOSIS — F99 INSOMNIA DUE TO OTHER MENTAL DISORDER: ICD-10-CM

## 2024-10-25 DIAGNOSIS — F33.1 MAJOR DEPRESSIVE DISORDER, RECURRENT EPISODE, MODERATE WITH ANXIOUS DISTRESS (H): ICD-10-CM

## 2024-10-25 PROCEDURE — 99205 OFFICE O/P NEW HI 60 MIN: CPT | Performed by: PSYCHIATRY & NEUROLOGY

## 2024-10-25 PROCEDURE — 99443 PR PHYSICIAN TELEPHONE EVALUATION 21-30 MIN: CPT | Mod: 93

## 2024-10-25 NOTE — NURSING NOTE
Is the patient currently in the state of [place]? YES    Current patient location: [address] [facility] [address]    Visit mode:TELEPHONE    If the visit is dropped, the patient can be reconnected by: TELEPHONE VISIT: Phone number: [phone]    Will anyone else be joining the visit? No  (If patient encounters technical issues they should call [phone])    Are changes needed to the allergy or medication list? No    Are refills needed on medications prescribed by this physician? Discuss with Provider Diabetic medication needs to be added    Rooming Documentation: Questionnaire(s) completed.    Reason for visit: DIEGO Masters

## 2024-10-25 NOTE — PROGRESS NOTES
"Durga Aguirre is a 64 year old male who presents for:  Chief Complaint   Patient presents with    Consult     Numbness/Tingling- feet bilateral         Initial Vitals:  /77 (BP Location: Right arm, Patient Position: Sitting, Cuff Size: Adult Regular)   Pulse 82   Ht 1.803 m (5' 11\")   Wt 91.3 kg (201 lb 3.2 oz)   SpO2 97%   BMI 28.06 kg/m   Estimated body mass index is 28.06 kg/m  as calculated from the following:    Height as of this encounter: 1.803 m (5' 11\").    Weight as of this encounter: 91.3 kg (201 lb 3.2 oz).. Body surface area is 2.14 meters squared. BP completed using cuff size: janice Bell   "

## 2024-10-25 NOTE — PATIENT INSTRUCTIONS
AFTER VISIT SUMMARY (AVS):    At today's visit we thoroughly discussed various diagnostic possibilities for your symptoms, necessary evaluation, and the plan, which includes:  Orders Placed This Encounter   Procedures    Vitamin E    Vitamin B6    Vitamin B12    Vitamin B1 whole blood    Protein immunofixation urine    Protein Immunofixation Serum    Protein electrophoresis random urine    Methylmalonic Acid    Protein electrophoresis     We discussed trial of alpha lipoic acid at 600 mg daily.  This could be divided into 2-3 times per day to be taken with food to avoid stomach irritation.  Please contact my clinic with any intolerable side effects.     Additional recommendations after the work-up.    Next follow-up appointment is in the next 3 months or earlier if needed.    Please do not hesitate to call me with any questions or concerns.    Thanks.

## 2024-10-25 NOTE — LETTER
10/25/2024      Durga Aguirre  [id number]  [facility] [address]      Dear Colleague,    Thank you for referring your patient, Durga Aguirre, to the [facility]. Please see a copy of my visit note below.    INITIAL NEUROLOGY CONSULTATION    DATE OF VISIT: 10/25/2024  CLINIC LOCATION: [facility]  MRN: [id number]  PATIENT NAME: Durga Aguirre  YOB: 1960    REASON FOR VISIT:   Chief Complaint   Patient presents with     Consult     Numbness/Tingling- feet bilateral      HISTORY OF PRESENT ILLNESS:                                                    Mr. Durga Aguirre is 64 year old right handed male patient with past medical history of anxiety, hypertension, hyperlipidemia, alcohol use disorder, and diabetes mellitus type 2, who was seen today for bilateral feet numbness due to suspected neuropathy.    Per patient's report, he developed numbness in both feet approximately a year ago.  He also reports discomfort, but denies any significant degree of pain in his feet.  No feet weakness or other focal neurological symptoms.  He used to drink a quart of vodka per day for the last 4 years, but quit 6 months ago.  Denies prior history of chemotherapy, but has diabetes.  Currently on Ozempic and metformin.  He takes thiamine.  He is also on gabapentin 600 mg 3 times per day for anxiety, but does not take it consistently maybe once or twice per day on as-needed basis.  He did not notice any effect on his feet symptoms.    Laboratory evaluation from September 2024 demonstrates low sodium level of 132, elevated alkaline phosphatase of 164, elevated glucose of 373, elevated hemoglobin A1C of 11.4, and unremarkable CBC/urinalysis (except high urine glucose).  Vitamin B12 was low normal (287) in April 2023.  TSH was normal at that time.  I do not see prior B1 level.    Head CT from 4/12/2023 was negative for acute intracranial pathology.  Cervical spine MRI  "from the same day was negative for acute injury.  Images were personally reviewed and independently interpreted.    No additional useful information is available in Care Everywhere, which was reviewed.  PAST MEDICAL/SURGICAL HISTORY:                                                    I personally reviewed patient's past medical and surgical history with the patient at today's visit.  MEDICATIONS:                                                    I personally reviewed patient's medications and allergies with the patient at today's visit.  ALLERGIES:                                                    No Known Allergies  EXAM:                                                    VITAL SIGNS:   /77 (BP Location: Right arm, Patient Position: Sitting, Cuff Size: Adult Regular)   Pulse 82   Ht 1.803 m (5' 11\")   Wt 91.3 kg (201 lb 3.2 oz)   SpO2 97%   BMI 28.06 kg/m    Mini-Cog Assessment:  Mini Cog Assessment  Clock Draw Score: 2 Normal  3 Item Recall: 2 objects recalled  Mini Cog Total Score: 4  Administered by: : Lore TRACEY    General: pt is in NAD, cooperative.  Skin: normal turgor, moist mucous membranes, no lesions/rashes noticed.  HEENT: ATNC, EOMI, PERRL, white sclera, normal conjunctiva, no nystagmus or ptosis. No carotid bruits bilaterally.  Respiratory: lung sounds clear to auscultation bilaterally, no crackles, wheezes, rhonchi. Symmetric lung excursion, no accessory respiratory muscle use.  Cardiovascular: normal S1/S2, no murmurs/rubs/gallops.   Abdomen: Not distended.  : deferred.    Neurological:  Mental: alert, follows commands, Mini Cog Total Score: 4/5 with 2/3 on memory recall, no aphasia or dysarthria. Fund of knowledge is appropriate for age.  Cranial Nerves:  CN II: visual acuity - able to accurately count fingers with each eye. Visual fields intact, fundi: discs sharp, no papilledema and normal vessels bilaterally.  CN III, IV, VI: EOM intact, pupils equal and reactive  CN V: facial " sensation nl  CN VII: face symmetric, no facial droop  CN VIII: hearing normal  CN IX: palate elevation symmetric, uvula at midline  CN XI SCM normal, shoulder shrug nl  CN XII: tongue midline  Motor: Strength: 5/5 in all major groups of all extremities. Normal tone. No abnormal movements. No pronator drift b/l.  Reflexes: Triceps, biceps, brachioradialis, and patellar reflexes normal and symmetric, achilles reflexes are absent bilaterally. No clonus noted. Toes are down-going b/l.   Sensory: light touch, pinprick, and vibration reduced in both feet and up to 15 cm above the ankles. Romberg: negative.  Coordination: FNF and heel-shin tests intact b/l.   Gait:  Normal casual gait, able to tandem walk with mild difficulty.  DATA:   LABS/EEG/IMAGING/OTHER STUDIES: I reviewed pertinent medical records, as detailed in the history of present illness.  ASSESSMENT AND PLAN:      ASSESSMENT: Durga Aguirre is a 64 year old male patient with listed above past medical history, who presents with bilateral feet numbness for the last 12 months in context of excessive alcohol use and uncontrolled diabetes.    We had a detailed discussion with the patient regarding his presenting complaints.  The neurological exam today is consistent with peripheral polyneuropathy, which is likely multifactorial, related to uncontrolled diabetes and excessive alcohol use.  In addition, the patient also has low normal B12 level, which might be contributory.  Other vitamin deficiencies might also be playing a role.  We decided to do additional lab workup to evaluate for potential additional causes.  Meanwhile, I advised the patient to start alpha lipoic acid.  We might add additional vitamin supplementation if we find deficiencies.    DIAGNOSES:    ICD-10-CM    1. Peripheral polyneuropathy  G62.9 Adult Neurology  Referral     Vitamin E     Vitamin B6     Vitamin B12     Vitamin B1 whole blood     Protein immunofixation urine     Protein  "Immunofixation Serum     Protein electrophoresis random urine     Protein electrophoresis     Methylmalonic Acid        PLAN: At today's visit we thoroughly discussed various diagnostic possibilities for patient's symptoms, necessary evaluation, and the plan, which includes:  Orders Placed This Encounter   Procedures     Vitamin E     Vitamin B6     Vitamin B12     Vitamin B1 whole blood     Protein immunofixation urine     Protein Immunofixation Serum     Protein electrophoresis random urine     Methylmalonic Acid     Protein electrophoresis     We discussed trial of alpha lipoic acid at 600 mg daily.  This could be divided into 2-3 times per day to be taken with food to avoid stomach irritation.  Advised the patient to contact my clinic with any intolerable side effects.     Additional recommendations after the work-up.    Next follow-up appointment is in the next 3 months or earlier if needed.    Total Time: 61 minutes spent on the date of the encounter doing chart review, history and exam, documentation and further activities per the note.    Alejandro Cordon MD  [facility] Neu[facility]  (Chart documentation was completed in part with Dragon voice-recognition software. Even though reviewed, some grammatical, spelling, and word errors may remain.)            Durga Aguirre is a 64 year old male who presents for:  Chief Complaint   Patient presents with     Consult     Numbness/Tingling- feet bilateral         Initial Vitals:  /77 (BP Location: Right arm, Patient Position: Sitting, Cuff Size: Adult Regular)   Pulse 82   Ht 1.803 m (5' 11\")   Wt 91.3 kg (201 lb 3.2 oz)   SpO2 97%   BMI 28.06 kg/m   Estimated body mass index is 28.06 kg/m  as calculated from the following:    Height as of this encounter: 1.803 m (5' 11\").    Weight as of this encounter: 91.3 kg (201 lb 3.2 oz).. Body surface area is 2.14 meters squared. BP completed using cuff size: regular    Lore Bell       Again, " thank you for allowing me to participate in the care of your patient.        Sincerely,        Alejandro Cordon MD

## 2024-10-25 NOTE — PROGRESS NOTES
[facility]    A/P                                                    ASSESSMENT/PLAN  1. Alcohol use disorder, severe, dependence (H) (Primary)  2. Alcohol dependence with alcohol-induced mood disorder (H)  -showing improvement   - has refills of campral  -encouraged recovery activities   -continue with gabapentin 600 mg TID   -1 month follow up    3. Insomnia due to other mental disorder  -improved with cessation  -continue with strict abstinence  -gabapentin at night     4. Major depressive disorder, recurrent episode, moderate with anxious distress (H)  5. Generalized anxiety disorder  -showing improvement  -continue with celexa, buspar, gabapentin      6. Type 2 diabetes mellitus with hyperglycemia, without long-term current use of insulin (H)  -no change  -started taking medications, and following up with PCP and diabetes educator           Oct 25, 2024  - acamprosate  Recovery activities  Court ordered treatment and abstinence          Continued Complex Management  The longitudinal plan of care for Alcohol Use Disorder (AUD) was addressed during this visit. Due to the added complexity in care, I will continue to support Durga in the subsequent management and with ongoing continuity of care.      Last encounter A/P  ASSESSMENT/PLAN  1. Alcohol use disorder, severe, dependence (H)  2. Alcohol dependence with alcohol-induced mood disorder (H)  -showing improvement   - acamprosate (CAMPRAL) 333 MG EC tablet; Take 2 tablets (666 mg) by mouth 3 times daily.  Dispense: 180 tablet; Refill: 2  -continue with gabapentin 600 mg TID   -continue with IOP programming and follow all recommendations  -offered UDS's through [facility] for convenience, Durga will check with .   -1 month follow up     3. Insomnia due to other mental disorder  -improved with trazodone and increased dose of trazodone on occasion  - traZODone (DESYREL) 100 MG tablet; Take 1 tablet (100 mg) by  "mouth at bedtime.  Dispense: 30 tablet; Refill: 2     4. Major depressive disorder, recurrent episode, moderate with anxious distress (H)  -showing improvement   - citalopram (CELEXA) 40 MG tablet; Take 1 tablet (40 mg) by mouth daily.  Dispense: 30 tablet; Refill: 1     5. Type 2 diabetes mellitus with hyperglycemia, without long-term current use of insulin (H)  -needs improvement  -has not picked up medications or started  -contacted PCP's diabetic educator requesting follow up addressed.      6. Generalized anxiety disorder  -showing improvement   - gabapentin (NEURONTIN) 300 MG capsule; Take 2 capsules (600 mg) by mouth 3 times daily. Increase gabapentin dose 300 mg/day to a target dose of 600 mg TID  Dispense: 180 capsule; Refill: 1         PDMP Review         Value Time User    State PDMP site checked  Yes 9/11/2024  3:28 PM Christina Posadas NP              RTC  Return in about 1 month (around 11/25/2024).      Counseled the patient on the importance of having a recovery program in addition to medication to manage recovery.  Components include avoiding isolating, having willingness to change, avoiding triggers and managing cravings. Encouraged having some type of sober network and practicing honesty with trusted support person(s). Encouraged other services such as counseling, 12 step or other self-help organizations.              SUBJECTIVE                                                    Durga Aguirre is a 64 year old male who presents to clinic today for follow up    Visit performed over phone     Substance Use History:   \"Have you ever had any history with [...] use?\" And \"When was your last use?  ALCOHOL - 1/16  CANNABIS - denies  PRESCRIPTION STIMULANTS (includes Ritalin, Adderall, Vyvanse) - denies  COCAINE/CRACK -   METH/AMPHETAMINES (includes ecstacy, MDMA/abel) -   OPIATES -   BENZODIAZEPINES (includes Ativan, Klonopin, Xanax) -   KRATOM (mild opioid and stimulant effects) -   KETAMINE - " "  HALLUCINOGENS (includes DXM) -   BEHAVIORAL (Gambling, Eating d/o, Compulsivity) -   History of treatment -   NICOTINE  Cigarettes:   Chew/snus:   Vaping:   Past NRT/medication use: Naltrexone        Previous withdrawal treatment episodes (e.g. detox): 8/6/23  Previous [facility] treatment programs: [facility] Aug-Oct 2023,   Hospitalizations or overdose:   Medical complications from substance use:   IV Drug use?: denies  Previous Medication for Addiction Tx: naltrexone  Longest period of full abstinence: 3 years  Activities that have previously supported abstinence: treatment, meetings  Current Recovery Activities: none        Infectious disease screening  Hep C:              Hepatitis C Antibody   Date Value Ref Range Status   06/24/2019 Test canceled by physician NR^Nonreactive Final         HIV:                  HIV Antigen Antibody Combo   Date Value Ref Range Status   06/24/2019 Nonreactive NR^Nonreactive     Final       Comment:       HIV-1 p24 Ag & HIV-1/HIV-2 Ab Not Detected            Psychiatric History (per patient report and problem list review)  Past diagnoses - depression  Current or past psychiatrist:   Current or past therapist:  (through [facility])   Hospitalizations/TMS/ECT -   Suicide Attempts -   Medication trials -        Recent HPI Details:  HPI Sep 26, 2024  - Filling out work application for [employer], looking forward to returning to work.   \"Mood better since double dose\", anxiety \"always knocking on the door, I just don't answer it\"   Taking acamprosate, no cravings, just thoughts. \"Honestly I can't drink because I'm in DUI court\"   Still IOP programming 1 year program.  Twice a month UDS's with court,  would like to do with [place], will ask probation if possible.      Sleeping better with 100 mg trazodone on occasion  Recent A1C 11.  New DM diagnosis,  needs follow up DM care     TODAY'S VISIT  HPI Oct 25, 2024  - IOP graduated, now in DUI court.  Going to AA \"show up once and a while\" " "  Sleeping  \"normal\"   Mood applying for job, [employer]   Started ozempic,   Anxiety, better mowing lawn walking more.    No cravings, no alcohol use.  Feels physicial health improved with cessation.  Pleased with progress       OBJECTIVE  PHYSICAL EXAM:  There were no vitals taken for this visit.    GENERAL: healthy, alert and no distress  RESP: No respiratory distress  MENTAL STATUS EXAM  Appearance/Behavior: No appearant distress  Speech: Normal  Mood/Affect: normal affect  Insight: Adequate      PHQ-9 Score:       6/13/2024    12:58 PM 9/4/2024     9:40 AM 9/11/2024     1:40 PM   PHQ   PHQ-9 Total Score 1 7 15   Q9: Thoughts of better off dead/self-harm past 2 weeks Not at all  Not at all  Not at all        Patient-reported       YUSUF-7 Score:      12/20/2023    12:00 PM 3/20/2024     2:00 PM 4/17/2024     3:24 PM   YUSUF-7 SCORE   Total Score   1 (minimal anxiety)   Total Score 7 2 1       LABS (may not contain today's labs)                                                      Today's lab data  No results found for any visits on 10/25/24.        HISTORY                                                    Problem list reviewed & adjusted, as indicated.  Patient Active Problem List   Diagnosis    CARDIOVASCULAR SCREENING; LDL GOAL LESS THAN 130    Generalized anxiety disorder    Essential hypertension    Hypertriglyceridemia    Dupuytren's contracture of hand    Alcohol withdrawal with complication with inpatient treatment, with unspecified complication (H)    Vasovagal syncope    History of colonic polyps    Alcohol use disorder, severe, dependence (H)    Tobacco use disorder, moderate, dependence    Type 2 diabetes mellitus with hyperglycemia, without long-term current use of insulin (H)         MEDICATION LIST (prior to visit)  Current Outpatient Medications   Medication Sig Dispense Refill    acamprosate (CAMPRAL) 333 MG EC tablet Take 2 tablets (666 mg) by mouth 3 times daily. 180 tablet 2    ARIPiprazole " (ABILIFY) 5 MG tablet TAKE ONE TABLET (5 MG) BY MOUTH ONE TIME DAILY 90 tablet 1    blood glucose (NO BRAND SPECIFIED) test strip Use to test blood sugar 1x times daily. Preferred Blood Glucose Monitor Brands: per insurance. 100 strip 11    blood glucose monitoring (NO BRAND SPECIFIED) meter device kit Use to test blood sugar 1x times daily. Preferred Blood Glucose Monitor Brands: per insurance. 1 kit 0    busPIRone (BUSPAR) 15 MG tablet Take 1 tablet (15 mg) by mouth 2 times daily 180 tablet 1    citalopram (CELEXA) 40 MG tablet Take 1 tablet (40 mg) by mouth daily. 30 tablet 1    gabapentin (NEURONTIN) 300 MG capsule Take 2 capsules (600 mg) by mouth 3 times daily. Increase gabapentin dose 300 mg/day to a target dose of 600 mg  capsule 1    hydroCHLOROthiazide 12.5 MG tablet Take 1 tablet (12.5 mg) by mouth daily. 90 tablet 3    hydrOXYzine HCl (ATARAX) 50 MG tablet Take 0.5-1 tablets (25-50 mg) by mouth every 8 hours as needed for anxiety 180 tablet 0    lisinopril (ZESTRIL) 40 MG tablet Take 1 tablet (40 mg) by mouth daily. 90 tablet 3    Melatonin 10 MG TABS tablet Take 10 mg by mouth nightly as needed for sleep      metFORMIN (GLUCOPHAGE) 500 MG tablet Take 1 tablet (500 mg) by mouth 2 times daily (with meals). 60 tablet 1    multivitamin w/minerals (THERA-VIT-M) tablet Take 1 tablet by mouth daily 90 tablet 0    naltrexone (DEPADE/REVIA) 50 MG tablet Take 2 tablets (100 mg) by mouth daily. 180 tablet 0    semaglutide (OZEMPIC) 2 MG/3ML pen Inject 0.25 mg subcutaneously every 7 days for 28 days, THEN 0.5 mg every 7 days. 3 mL 1    simvastatin (ZOCOR) 40 MG tablet Take 1 tablet (40 mg) by mouth every evening. 90 tablet 3    thiamine (B-1) 100 MG tablet TAKE 1 TABLET (100 MG) BY MOUTH DAILY 90 tablet 3    thin (NO BRAND SPECIFIED) lancets Use to test blood sugar 1x times daily. Preferred Blood Glucose Monitor Brands: per insurance. 100 each 11    traZODone (DESYREL) 100 MG tablet Take 1 tablet (100 mg) by  mouth at bedtime. 30 tablet 2     No current facility-administered medications for this visit.       MEDICATION LIST (after visit)  Current Outpatient Medications   Medication Sig Dispense Refill    acamprosate (CAMPRAL) 333 MG EC tablet Take 2 tablets (666 mg) by mouth 3 times daily. 180 tablet 2    ARIPiprazole (ABILIFY) 5 MG tablet TAKE ONE TABLET (5 MG) BY MOUTH ONE TIME DAILY 90 tablet 1    blood glucose (NO BRAND SPECIFIED) test strip Use to test blood sugar 1x times daily. Preferred Blood Glucose Monitor Brands: per insurance. 100 strip 11    blood glucose monitoring (NO BRAND SPECIFIED) meter device kit Use to test blood sugar 1x times daily. Preferred Blood Glucose Monitor Brands: per insurance. 1 kit 0    busPIRone (BUSPAR) 15 MG tablet Take 1 tablet (15 mg) by mouth 2 times daily 180 tablet 1    citalopram (CELEXA) 40 MG tablet Take 1 tablet (40 mg) by mouth daily. 30 tablet 1    gabapentin (NEURONTIN) 300 MG capsule Take 2 capsules (600 mg) by mouth 3 times daily. Increase gabapentin dose 300 mg/day to a target dose of 600 mg  capsule 1    hydroCHLOROthiazide 12.5 MG tablet Take 1 tablet (12.5 mg) by mouth daily. 90 tablet 3    hydrOXYzine HCl (ATARAX) 50 MG tablet Take 0.5-1 tablets (25-50 mg) by mouth every 8 hours as needed for anxiety 180 tablet 0    lisinopril (ZESTRIL) 40 MG tablet Take 1 tablet (40 mg) by mouth daily. 90 tablet 3    Melatonin 10 MG TABS tablet Take 10 mg by mouth nightly as needed for sleep      metFORMIN (GLUCOPHAGE) 500 MG tablet Take 1 tablet (500 mg) by mouth 2 times daily (with meals). 60 tablet 1    multivitamin w/minerals (THERA-VIT-M) tablet Take 1 tablet by mouth daily 90 tablet 0    naltrexone (DEPADE/REVIA) 50 MG tablet Take 2 tablets (100 mg) by mouth daily. 180 tablet 0    semaglutide (OZEMPIC) 2 MG/3ML pen Inject 0.25 mg subcutaneously every 7 days for 28 days, THEN 0.5 mg every 7 days. 3 mL 1    simvastatin (ZOCOR) 40 MG tablet Take 1 tablet (40 mg) by mouth  every evening. 90 tablet 3    thiamine (B-1) 100 MG tablet TAKE 1 TABLET (100 MG) BY MOUTH DAILY 90 tablet 3    thin (NO BRAND SPECIFIED) lancets Use to test blood sugar 1x times daily. Preferred Blood Glucose Monitor Brands: per insurance. 100 each 11    traZODone (DESYREL) 100 MG tablet Take 1 tablet (100 mg) by mouth at bedtime. 30 tablet 2     No current facility-administered medications for this visit.         No Known Allergies        Christina Posadas NP  [facility] Addiction Medicine  [phone]    Time spent on phone call: 22 minutes  Originating Location (pt. Location): Home    Distant Location (provider location):  [facility]    Patient is unable to complete video visit due to lack of access to remote video technology.

## 2024-10-25 NOTE — PROGRESS NOTES
INITIAL NEUROLOGY CONSULTATION    DATE OF VISIT: 10/25/2024  CLINIC LOCATION: [facility]  MRN: [id number]  PATIENT NAME: Durga Aguirre  YOB: 1960    REASON FOR VISIT:   Chief Complaint   Patient presents with    Consult     Numbness/Tingling- feet bilateral      HISTORY OF PRESENT ILLNESS:                                                    Mr. Durga Aguirre is 64 year old right handed male patient with past medical history of anxiety, hypertension, hyperlipidemia, alcohol use disorder, and diabetes mellitus type 2, who was seen today for bilateral feet numbness due to suspected neuropathy.    Per patient's report, he developed numbness in both feet approximately a year ago.  He also reports discomfort, but denies any significant degree of pain in his feet.  No feet weakness or other focal neurological symptoms.  He used to drink a quart of vodka per day for the last 4 years, but quit 6 months ago.  Denies prior history of chemotherapy, but has diabetes.  Currently on Ozempic and metformin.  He takes thiamine.  He is also on gabapentin 600 mg 3 times per day for anxiety, but does not take it consistently maybe once or twice per day on as-needed basis.  He did not notice any effect on his feet symptoms.    Laboratory evaluation from September 2024 demonstrates low sodium level of 132, elevated alkaline phosphatase of 164, elevated glucose of 373, elevated hemoglobin A1C of 11.4, and unremarkable CBC/urinalysis (except high urine glucose).  Vitamin B12 was low normal (287) in April 2023.  TSH was normal at that time.  I do not see prior B1 level.    Head CT from 4/12/2023 was negative for acute intracranial pathology.  Cervical spine MRI from the same day was negative for acute injury.  Images were personally reviewed and independently interpreted.    No additional useful information is available in Care Everywhere, which was reviewed.  PAST MEDICAL/SURGICAL HISTORY:                   "                                  I personally reviewed patient's past medical and surgical history with the patient at today's visit.  MEDICATIONS:                                                    I personally reviewed patient's medications and allergies with the patient at today's visit.  ALLERGIES:                                                    No Known Allergies  EXAM:                                                    VITAL SIGNS:   /77 (BP Location: Right arm, Patient Position: Sitting, Cuff Size: Adult Regular)   Pulse 82   Ht 1.803 m (5' 11\")   Wt 91.3 kg (201 lb 3.2 oz)   SpO2 97%   BMI 28.06 kg/m    Mini-Cog Assessment:  Mini Cog Assessment  Clock Draw Score: 2 Normal  3 Item Recall: 2 objects recalled  Mini Cog Total Score: 4  Administered by: : Lore TRACEY    General: pt is in NAD, cooperative.  Skin: normal turgor, moist mucous membranes, no lesions/rashes noticed.  HEENT: ATNC, EOMI, PERRL, white sclera, normal conjunctiva, no nystagmus or ptosis. No carotid bruits bilaterally.  Respiratory: lung sounds clear to auscultation bilaterally, no crackles, wheezes, rhonchi. Symmetric lung excursion, no accessory respiratory muscle use.  Cardiovascular: normal S1/S2, no murmurs/rubs/gallops.   Abdomen: Not distended.  : deferred.    Neurological:  Mental: alert, follows commands, Mini Cog Total Score: 4/5 with 2/3 on memory recall, no aphasia or dysarthria. Fund of knowledge is appropriate for age.  Cranial Nerves:  CN II: visual acuity - able to accurately count fingers with each eye. Visual fields intact, fundi: discs sharp, no papilledema and normal vessels bilaterally.  CN III, IV, VI: EOM intact, pupils equal and reactive  CN V: facial sensation nl  CN VII: face symmetric, no facial droop  CN VIII: hearing normal  CN IX: palate elevation symmetric, uvula at midline  CN XI SCM normal, shoulder shrug nl  CN XII: tongue midline  Motor: Strength: 5/5 in all major groups of all extremities. " Normal tone. No abnormal movements. No pronator drift b/l.  Reflexes: Triceps, biceps, brachioradialis, and patellar reflexes normal and symmetric, achilles reflexes are absent bilaterally. No clonus noted. Toes are down-going b/l.   Sensory: light touch, pinprick, and vibration reduced in both feet and up to 15 cm above the ankles. Romberg: negative.  Coordination: FNF and heel-shin tests intact b/l.   Gait:  Normal casual gait, able to tandem walk with mild difficulty.  DATA:   LABS/EEG/IMAGING/OTHER STUDIES: I reviewed pertinent medical records, as detailed in the history of present illness.  ASSESSMENT AND PLAN:      ASSESSMENT: Durga Aguirre is a 64 year old male patient with listed above past medical history, who presents with bilateral feet numbness for the last 12 months in context of excessive alcohol use and uncontrolled diabetes.    We had a detailed discussion with the patient regarding his presenting complaints.  The neurological exam today is consistent with peripheral polyneuropathy, which is likely multifactorial, related to uncontrolled diabetes and excessive alcohol use.  In addition, the patient also has low normal B12 level, which might be contributory.  Other vitamin deficiencies might also be playing a role.  We decided to do additional lab workup to evaluate for potential additional causes.  Meanwhile, I advised the patient to start alpha lipoic acid.  We might add additional vitamin supplementation if we find deficiencies.    DIAGNOSES:    ICD-10-CM    1. Peripheral polyneuropathy  G62.9 Adult Neurology  Referral     Vitamin E     Vitamin B6     Vitamin B12     Vitamin B1 whole blood     Protein immunofixation urine     Protein Immunofixation Serum     Protein electrophoresis random urine     Protein electrophoresis     Methylmalonic Acid        PLAN: At today's visit we thoroughly discussed various diagnostic possibilities for patient's symptoms, necessary evaluation, and the plan,  which includes:  Orders Placed This Encounter   Procedures    Vitamin E    Vitamin B6    Vitamin B12    Vitamin B1 whole blood    Protein immunofixation urine    Protein Immunofixation Serum    Protein electrophoresis random urine    Methylmalonic Acid    Protein electrophoresis     We discussed trial of alpha lipoic acid at 600 mg daily.  This could be divided into 2-3 times per day to be taken with food to avoid stomach irritation.  Advised the patient to contact my clinic with any intolerable side effects.     Additional recommendations after the work-up.    Next follow-up appointment is in the next 3 months or earlier if needed.    Total Time: 61 minutes spent on the date of the encounter doing chart review, history and exam, documentation and further activities per the note.    Alejandro Cordon MD  [facility] Neurology  (Chart documentation was completed in part with Dragon voice-recognition software. Even though reviewed, some grammatical, spelling, and word errors may remain.)

## 2024-10-27 NOTE — PATIENT INSTRUCTIONS
Patient Education   Addiction Medicine  What to Expect  Here's what to expect from our Addiction Medicine program.  About Addiction Medicine  Addiction Medicine clinics help you with substance use problems. You set your own goals. We try to help you reach your goals. Your care plan can include:  Medicine  Creating a recovery plan  Helping you find local resources  Helping with treatment options  Clinic phone number and addresses  Clinic Phone: [phone]  Mental Health and Addiction Clinic  [facility]  [address], [address]  [place] [address]  [facility]  [address], [address]  [place] [address]  Walk-in services  We offer walk-in care for patients at the Recovery Clinic. This is only for patients with Opioid Use Disorder (OUD). Anyone with OUD is welcome. Our providers will refer you to the Recovery Clinic if you're struggling to keep up with your medicines or appointments.  Recovery Clinic ([facility])  [address], [address]  [place] [address]  Phone: [phone]  The Recovery Clinic is open for walk-ins Monday to Friday 9 a.m. to 11:30 a.m. and 12:30 p.m. to 3 p.m.  How it works  Come to your visits every time. The treatment works better when you do.   You can have as many visits as you need. When you're better, we'll refer you back to being cared for by your family doctor.   If you need it, we'll send you to doctors, psychiatrists, therapists, and other providers. We focus on treating addiction. We don't treat other problems, like managing other medicines or non-addiction issues.  About visits  Urine drug testing  We'll often test your pee (urine) for drugs. This is the only way we can know for sure whether or not you're using drugs. It helps us treat you without judgement.   Suboxone (buprenorphine)  If you're taking buprenorphine, you'll have a lot of visits at first. If your problem is getting worse, or you're  "using substances, we may schedule you for extra visits.   Cancelling visits  If you can't come to your visit, please call us right away at [phone]. If you don't cancel at least 24 hours (1 full day) before your visit, that's \"late cancellation.\"  Being late to visits  If you come late, you may not be seen. This will count as a \"no-show.\"  Please call the clinic if you're running late. This will help us plan, but it doesn't mean you'll be seen.   Being late is:  More than 15 minutes late for a return visit.  More than 30 minutes late for your first visit.  If you cancel late or don't show up 2 times within 6 months, we may transfer you to another clinic.   Getting help between visits  If you need help between visits, you can call us Monday to Friday from 8 a.m. to 4:30 p.m. at [phone]. You can also send us a message on [facility].  Medicine refills  If you miss or cancel a visit, you can still ask for a refill. But we can only refill your medicines if you've made a new appointment.  Please call your pharmacy for medicine refills. If you have a question about your refill, call us at [phone].  It takes up to 2 business days to refill your drugs. Let us know 2 to 3 days before you run out. Don't call more than 1 week before you run out. That's too early.   Please make sure we have your right phone number.  If we have a problem with your refill, we'll call you. If we call you, please call us back right away. If you don't, you may not get your medicines quickly.   Call your pharmacy to find out if your medicines are ready.   Keep your medicines in a safe place. Keep them away from pets and children. If your medicines are lost or stolen, we usually don't replace them. We recommend you file a police report if your medicines are stolen. Your insurance may not pay for early refills, even if you have a prescription.  Forms  Please give us at least 3 business days to fill out any forms. Bring the forms to your " visits if you can. We may refer you to other members of your care team to complete the forms.   Emergency care   Call 911 or go to the nearest emergency room if your life or someone else's life is in danger.  Call 988 anytime for the Suicide and Crisis Lifeline.  If you need care when we're closed, call your family doctor to see if they can help. You can also go to urgent care or an emergency room. [facility] emergency rooms may be able to give you buprenorphine or other medicine refills.  Thank you for choosing us for your care.  For informational purposes only. Not to replace the advice of your health care provider. Copyright   2023 [facility]. All rights reserved. [employer] [id number] - REV 05/23.

## 2024-11-04 DIAGNOSIS — E11.65 TYPE 2 DIABETES MELLITUS WITH HYPERGLYCEMIA, WITHOUT LONG-TERM CURRENT USE OF INSULIN (H): ICD-10-CM

## 2024-11-08 ENCOUNTER — ALLIED HEALTH/NURSE VISIT (OUTPATIENT)
Dept: EDUCATION SERVICES | Facility: CLINIC | Age: 64
End: 2024-11-08
Payer: MEDICARE

## 2024-11-08 DIAGNOSIS — E11.65 TYPE 2 DIABETES MELLITUS WITH HYPERGLYCEMIA, WITHOUT LONG-TERM CURRENT USE OF INSULIN (H): Primary | ICD-10-CM

## 2024-11-08 PROCEDURE — G0108 DIAB MANAGE TRN  PER INDIV: HCPCS

## 2024-11-08 NOTE — PATIENT INSTRUCTIONS
PLAN  Increase Ozempic to 0.5mg (next injection due in 7 days)  Increase Metformin AM dose to 1000mg. New dosinmg in AM and PM  Alternate times when checking bg between fasting to 2 hrs post meal.         Bertha Thorne, RN, BSN, [facility]   Certified Diabetes Care &   [facility]

## 2024-11-08 NOTE — LETTER
2024         RE: Durga Aguirre  [address]  [place] [address]        Dear Colleague,    Thank you for referring your patient, Durga Aguirre, to the [facility]. Please see a copy of my visit note below.    Diabetes Self-Management Education & Support    Presents for:      Type of Service: In Person Visit      ASSESSMENT:  Pt referred by Pcp for new diagnosis of Type 2 Diabetes. [facility]. Denies family history of diabetes. Hx of Severe Alcohol Abuse, Anxiety, Hypertension, Hyperlipidemia. Pt is currently not drinking. Lives alone. Currently unemployed but looking for part-time work     Current Diabetes Medications:  Ozempic: 0.25 every 7 days (denies side effects)   Metformin: 500mg with breakfast, 1000mg with dinner (denies side effects)     States neurologist suggested pt tale Alpha-Lipoic Acid for foot pain. Pt taking 600mg twice a day. States foot pain has somewhat improved since starting supplement.      Weight checked today: 197#. Wt 4 weeks ago: 201#'    BG Log:   Fastin, 150, 159, 136, 129  Pre-meal:  203  Post-meal: 113  Bed: 141  7 day ave; 147  14 day ave: 142  30 day ave: 160      Patient's most recent   Lab Results   Component Value Date    A1C 11.4 2024    A1C 5.3 2021     is not meeting goal of <7.0    Diabetes knowledge and skills assessment:   Patient is knowledgeable in diabetes management concepts related to:  Monitoring, and Taking Medication    Continue education with the following diabetes management concepts: Healthy Eating, Being Active, Monitoring, and Taking Medication    Based on learning assessment above, most appropriate setting for further diabetes education would be: Individual setting.      PLAN  Increase Ozempic to 0.5mg (next injection due in 7 days)  Increase Metformin AM dose to 1000mg. New dosinmg in AM and PM  Alternate times when checking bg between fasting and 2 hrs post meal    Topics to  "cover at upcoming visits: Healthy Eating, Being Active, Monitoring, and Taking Medication    Follow-up: Rtc on 3 months    See Care Plan for co-developed, patient-state behavior change goals.  AVS provided for patient today.    SUBJECTIVE/OBJECTIVE:  Presents for: Follow up  Accompanied by: Self  Diabetes education in the past 24mo: Yes  Focus of Visit: Patient Unsure  Diabetes type: Type 2  Date of diagnosis: 09/2024  Disease course: Stable  How confident are you filling out medical forms by yourself:: Not Assessed  Diabetes management related comments/concerns: None at this time.   Transportation concerns: No  Difficulty affording diabetes medication?: No  Difficulty affording diabetes testing supplies?: No  Other concerns:: None  Cultural Influences/Ethnic Background:  Not  or         Diabetes Symptoms & Complications:  Diabetes Related Symptoms: Mild neuropathy  Weight trend: Slowly decreasing.   Symptom course: Stable  Disease course: Stable  Complications assessed today?: No       Patient Problem List and Family Medical History reviewed for relevant medical history, current medical status, and diabetes risk factors.    Vitals:  There were no vitals taken for this visit.  Estimated body mass index is 28.06 kg/m  as calculated from the following:    Height as of 10/25/24: 1.803 m (5' 11\").    Weight as of 10/25/24: 91.3 kg (201 lb 3.2 oz).   Last 3 BP:   BP Readings from Last 3 Encounters:   10/25/24 119/77   09/04/24 114/68   05/22/24 125/63       History   Smoking Status     Every Day     Types: Cigarettes   Smokeless Tobacco     Never       Labs:  Lab Results   Component Value Date    A1C 11.4 09/19/2024    A1C 5.3 03/12/2021     Lab Results   Component Value Date     09/19/2024     11/15/2022     03/12/2021     Lab Results   Component Value Date    LDL  09/04/2024      Comment:      Cannot estimate LDL when triglyceride exceeds 400 mg/dL    LDL 60 09/04/2024    LDL  " "03/13/2021     Cannot estimate LDL when triglyceride exceeds 400 mg/dL     HDL Cholesterol   Date Value Ref Range Status   03/13/2021 28 (L) >39 mg/dL Final     Direct Measure HDL   Date Value Ref Range Status   09/04/2024 26 (L) >=40 mg/dL Final   ]  GFR Estimate   Date Value Ref Range Status   09/04/2024 80 >60 mL/min/1.73m2 Final     Comment:     eGFR calculated using 2021 CKD-EPI equation.   03/13/2021 73 >60 mL/min/[1.73_m2] Final     Comment:     Non  GFR Calc  Starting 12/18/2018, serum creatinine based estimated GFR (eGFR) will be   calculated using the Chronic Kidney Disease Epidemiology Collaboration   (CKD-EPI) equation.       GFR Estimate If Black   Date Value Ref Range Status   03/13/2021 85 >60 mL/min/[1.73_m2] Final     Comment:      GFR Calc  Starting 12/18/2018, serum creatinine based estimated GFR (eGFR) will be   calculated using the Chronic Kidney Disease Epidemiology Collaboration   (CKD-EPI) equation.       Lab Results   Component Value Date    CR 1.03 09/04/2024    CR 1.07 03/13/2021     No results found for: \"MICROALBUMIN\"    Taking Medications:  Diabetes Medication(s)       Biguanides       metFORMIN (GLUCOPHAGE) 500 MG tablet Take with food, 1000mg in the morning and 1000mg in the evening       Incretin Mimetic Agents       semaglutide (OZEMPIC) 2 MG/3ML pen Take 0.5mg every seven days.            Patient Activation Measure Survey Score:      6/4/2013    10:00 AM   MICHAEL Score (Last Two)   MICHAEL Raw Score 39   Activation Score 56.4   MICHAEL Level 3       Care Plan and Education Provided:  Taking Medication: Metformin and Ozempic dosage change made.     Time Spent: 30 minutes  Encounter Type: Individual    Any diabetes medication dose changes were made via the CDE Protocol per the patient's primary care provider. A copy of this encounter was shared with the provider.    Bertha Thorne RN, BSN, [facility]   Certified Diabetes Care &   [facility] " [facility]

## 2024-11-08 NOTE — PROGRESS NOTES
Diabetes Self-Management Education & Support    Presents for:      Type of Service: In Person Visit      ASSESSMENT:  Pt referred by Pcp for new diagnosis of Type 2 Diabetes. Last seen by [facility] 10/04/24. Denies family history of diabetes. Hx of Severe Alcohol Abuse, Anxiety, Hypertension, Hyperlipidemia. Pt is currently not drinking. Lives alone. Currently unemployed but looking for part-time work     Current Diabetes Medications:  Ozempic: 0.25 every 7 days (denies side effects)   Metformin: 500mg with breakfast, 1000mg with dinner (denies side effects)     States neurologist suggested pt tale Alpha-Lipoic Acid for foot pain. Pt taking 600mg twice a day. States foot pain has somewhat improved since starting supplement.      Weight checked today: 197#. Wt 4 weeks ago: 201#'    BG Log:   Fastin, 150, 159, 136, 129  Pre-meal:  203  Post-meal: 113  Bed: 141  7 day ave; 147  14 day ave: 142  30 day ave: 160      Patient's most recent   Lab Results   Component Value Date    A1C 11.4 2024    A1C 5.3 2021     is not meeting goal of <7.0    Diabetes knowledge and skills assessment:   Patient is knowledgeable in diabetes management concepts related to:  Monitoring, and Taking Medication    Continue education with the following diabetes management concepts: Healthy Eating, Being Active, Monitoring, and Taking Medication    Based on learning assessment above, most appropriate setting for further diabetes education would be: Individual setting.      PLAN  Increase Ozempic to 0.5mg (next injection due in 7 days)  Increase Metformin AM dose to 1000mg. New dosinmg in AM and PM  Alternate times when checking bg between fasting and 2 hrs post meal    Topics to cover at upcoming visits: Healthy Eating, Being Active, Monitoring, and Taking Medication    Follow-up: Rtc on 3 months    See Care Plan for co-developed, patient-state behavior change goals.  AVS provided for patient  "today.    SUBJECTIVE/OBJECTIVE:  Presents for: Follow up  Accompanied by: Self  Diabetes education in the past 24mo: Yes  Focus of Visit: Patient Unsure  Diabetes type: Type 2  Date of diagnosis: 09/2024  Disease course: Stable  How confident are you filling out medical forms by yourself:: Not Assessed  Diabetes management related comments/concerns: None at this time.   Transportation concerns: No  Difficulty affording diabetes medication?: No  Difficulty affording diabetes testing supplies?: No  Other concerns:: None  Cultural Influences/Ethnic Background:  Not  or         Diabetes Symptoms & Complications:  Diabetes Related Symptoms: Mild neuropathy  Weight trend: Slowly decreasing.   Symptom course: Stable  Disease course: Stable  Complications assessed today?: No       Patient Problem List and Family Medical History reviewed for relevant medical history, current medical status, and diabetes risk factors.    Vitals:  There were no vitals taken for this visit.  Estimated body mass index is 28.06 kg/m  as calculated from the following:    Height as of 10/25/24: 1.803 m (5' 11\").    Weight as of 10/25/24: 91.3 kg (201 lb 3.2 oz).   Last 3 BP:   BP Readings from Last 3 Encounters:   10/25/24 119/77   09/04/24 114/68   05/22/24 125/63       History   Smoking Status    Every Day    Types: Cigarettes   Smokeless Tobacco    Never       Labs:  Lab Results   Component Value Date    A1C 11.4 09/19/2024    A1C 5.3 03/12/2021     Lab Results   Component Value Date     09/19/2024     11/15/2022     03/12/2021     Lab Results   Component Value Date    LDL  09/04/2024      Comment:      Cannot estimate LDL when triglyceride exceeds 400 mg/dL    LDL 60 09/04/2024    LDL  03/13/2021     Cannot estimate LDL when triglyceride exceeds 400 mg/dL     HDL Cholesterol   Date Value Ref Range Status   03/13/2021 28 (L) >39 mg/dL Final     Direct Measure HDL   Date Value Ref Range Status   09/04/2024 26 " "(L) >=40 mg/dL Final   ]  GFR Estimate   Date Value Ref Range Status   09/04/2024 80 >60 mL/min/1.73m2 Final     Comment:     eGFR calculated using 2021 CKD-EPI equation.   03/13/2021 73 >60 mL/min/[1.73_m2] Final     Comment:     Non  GFR Calc  Starting 12/18/2018, serum creatinine based estimated GFR (eGFR) will be   calculated using the Chronic Kidney Disease Epidemiology Collaboration   (CKD-EPI) equation.       GFR Estimate If Black   Date Value Ref Range Status   03/13/2021 85 >60 mL/min/[1.73_m2] Final     Comment:      GFR Calc  Starting 12/18/2018, serum creatinine based estimated GFR (eGFR) will be   calculated using the Chronic Kidney Disease Epidemiology Collaboration   (CKD-EPI) equation.       Lab Results   Component Value Date    CR 1.03 09/04/2024    CR 1.07 03/13/2021     No results found for: \"MICROALBUMIN\"    Taking Medications:  Diabetes Medication(s)       Biguanides       metFORMIN (GLUCOPHAGE) 500 MG tablet Take with food, 1000mg in the morning and 1000mg in the evening       Incretin Mimetic Agents       semaglutide (OZEMPIC) 2 MG/3ML pen Take 0.5mg every seven days.            Patient Activation Measure Survey Score:      6/4/2013    10:00 AM   MICHAEL Score (Last Two)   MICHAEL Raw Score 39   Activation Score 56.4   MICHAEL Level 3       Care Plan and Education Provided:  Taking Medication: Metformin and Ozempic dosage change made.     Time Spent: 30 minutes  Encounter Type: Individual    Any diabetes medication dose changes were made via the CDE Protocol per the patient's primary care provider. A copy of this encounter was shared with the provider.    Bertha Thorne, RN, BSN, [facility]   Certified Diabetes Care &   [facility]   "

## 2024-12-20 ENCOUNTER — TELEPHONE (OUTPATIENT)
Dept: INTERNAL MEDICINE | Facility: CLINIC | Age: 64
End: 2024-12-20
Payer: MEDICARE

## 2024-12-20 NOTE — TELEPHONE ENCOUNTER
Landen (pharmacist) called needing verification of how often pt is testing BG.     Per scripts-    Disp Refills Start End YUN   thin (NO BRAND SPECIFIED) lancets 100 each 11 9/20/2024 -- --   Sig: Use to test blood sugar 1x times daily. Preferred Blood Glucose Monitor Brands: per insurance.     blood glucose (NO BRAND SPECIFIED) test strip 200 strip 3 11/8/2024 -- No   Sig: Needs Accu-Chek Guide strips. Use to test blood glucose test blood sugar 2x times daily. .     Routing to PCP to review and advise.     Please call Landen back with PCPs recommendations.

## 2024-12-21 ENCOUNTER — HEALTH MAINTENANCE LETTER (OUTPATIENT)
Age: 64
End: 2024-12-21

## 2024-12-23 NOTE — TELEPHONE ENCOUNTER
I believe most recent order was from CDE- see office note and recs. Would tell them rec for testing changed for now

## 2024-12-24 ENCOUNTER — TELEPHONE (OUTPATIENT)
Dept: INTERNAL MEDICINE | Facility: CLINIC | Age: 64
End: 2024-12-24
Payer: MEDICARE

## 2024-12-24 NOTE — TELEPHONE ENCOUNTER
Per pharmacist Medicare only covers testing once per day. Patient prefers to test once daily. Pharmacist notified of change. Per CDE he is to alternate fasting and postprandial blood sugars.     Jovita Bruce RN

## 2025-01-08 DIAGNOSIS — F41.1 GENERALIZED ANXIETY DISORDER: ICD-10-CM

## 2025-01-08 RX ORDER — HYDROXYZINE HYDROCHLORIDE 50 MG/1
25-50 TABLET, FILM COATED ORAL EVERY 8 HOURS PRN
Qty: 180 TABLET | Refills: 0 | Status: SHIPPED | OUTPATIENT
Start: 2025-01-08

## 2025-02-03 DIAGNOSIS — F99 INSOMNIA DUE TO OTHER MENTAL DISORDER: ICD-10-CM

## 2025-02-03 DIAGNOSIS — F51.05 INSOMNIA DUE TO OTHER MENTAL DISORDER: ICD-10-CM

## 2025-02-04 RX ORDER — TRAZODONE HYDROCHLORIDE 100 MG/1
100 TABLET ORAL AT BEDTIME
Qty: 30 TABLET | Refills: 0 | OUTPATIENT
Start: 2025-02-04

## 2025-02-04 NOTE — TELEPHONE ENCOUNTER
"Date of Last Office Visit: 10/25/2025  Date of Next Office Visit: None; routing for A to assist pt with scheduling.    No shows since last visit: No  More than one patient-initiated cancellation (with reschedule) since last seen in clinic? No    [x]Medication unable to be refilled by RN due to criteria not met as indicated below:      [x] > 30-day supply request      Medication(s) requested:     traZODone (DESYREL) 100 MG tablet 30 tablet 2 9/26/2024 -- No   Sig - Route: Take 1 tablet (100 mg) by mouth at bedtime. - Oral       Last visit treatment plan:   ASSESSMENT/PLAN  1. Alcohol use disorder, severe, dependence (H) (Primary)  2. Alcohol dependence with alcohol-induced mood disorder (H)  -showing improvement   - has refills of campral  -encouraged recovery activities   -continue with gabapentin 600 mg TID   -1 month follow up     3. Insomnia due to other mental disorder  -improved with cessation  -continue with strict abstinence  -gabapentin at night      4. Major depressive disorder, recurrent episode, moderate with anxious distress (H)  5. Generalized anxiety disorder  -showing improvement  -continue with celexa, buspar, gabapentin        6. Type 2 diabetes mellitus with hyperglycemia, without long-term current use of insulin (H)  -no change  -started taking medications, and following up with PCP and diabetes educator         Oct 25, 2024  - acamprosate  Recovery activities  Court ordered treatment and abstinence      RTC  Return in about 1 month (around 11/25/2024).     TODAY'S VISIT  HPI Oct 25, 2024  - IOP graduated, now in DUI court.  Going to AA \"show up once and a while\"   Sleeping  \"normal\"   Mood applying for job, [employer]   Started ozempic,   Anxiety, better mowing lawn walking more.    No cravings, no alcohol use.  Feels physicial health improved with cessation.  Pleased with progress                 "

## 2025-02-05 DIAGNOSIS — F99 INSOMNIA DUE TO OTHER MENTAL DISORDER: ICD-10-CM

## 2025-02-05 DIAGNOSIS — F51.05 INSOMNIA DUE TO OTHER MENTAL DISORDER: ICD-10-CM

## 2025-02-05 RX ORDER — TRAZODONE HYDROCHLORIDE 100 MG/1
100 TABLET ORAL AT BEDTIME
Qty: 30 TABLET | Refills: 0 | Status: SHIPPED | OUTPATIENT
Start: 2025-02-05

## 2025-02-05 NOTE — TELEPHONE ENCOUNTER
RN received a call from this patient asking if his medication Trazadone had been filled today.  RN reviewed the EHR and it had not been refilled at this point in time.  The patient does have a appointment now scheduled with Christina Posadas CNP 2/7/25.  RN instructed the patient that he will notify Christina Posadas CNP of his request and also that he has a new appointment with her. The patient thanked RN and Christina Posadas.      Rosalio Qureshi RN on 2/5/2025 at 4:37 PM

## 2025-02-05 NOTE — TELEPHONE ENCOUNTER
Reason for call:  Medication   If this is a refill request, has the caller requested the refill from the pharmacy already? Yes  Will the patient be using a [facility]? No  Name of the pharmacy and phone number for the current request: [facility] [place] [phone]    Name of the medication requested:    traZODone (DESYREL) 100 MG tablet Take 1 tablet (100 mg) by mouth at bedtime.       Other request: 2 left as of today has not taken todays med yet    Phone number to reach patient:  Cell number on file:    Telephone Information:   Mobile [phone]       Best Time:  asap    Can we leave a detailed message on this number?  YES    Travel screening: Not Applicable

## 2025-02-05 NOTE — TELEPHONE ENCOUNTER
Date of Last Office Visit: 10/25/24  Date of Next Office Visit: None; routing for A to assist pt with scheduling.    No shows since last visit: No  More than one patient-initiated cancellation (with reschedule) since last seen in clinic? No    []Medication refilled per  Medication Refill in Ambulatory Care  policy.  [x]Medication unable to be refilled by RN due to criteria not met as indicated below:    []Eligibility: has not had a provider visit within last 6 months   [x]Supervision: no future appointment; < 7 days before next appointment   []Compliance: no shows; cancellations; lapse in therapy   []Verification: order discrepancy; may need modification...   [] > 30-day supply request   []Advanced refill request: > 7 days before refill date   []Controlled medication   []Medication not included in policy   []Review: new med; med adjusted <= 30 days; safety alert; requires lab monitoring...   []Scope of Practice: refill request processed by LPN/MA   []Other:      Medication(s) requested:     -  traZODone (DESYREL) 100 MG tablet   Date last ordered: 9/26/24  Qty: 30  Refills: 2  Appropriate for refill? Provider to review.          Any Controlled Substance(s)? No      Requested medication(s) verified as identical to current order? Yes    Any lapse in adherence to medication(s) greater than 5 days? No      Additional action taken? routed encounter to provider for review.      Last visit treatment plan:   Last encounter A/P  ASSESSMENT/PLAN  1. Alcohol use disorder, severe, dependence (H)  2. Alcohol dependence with alcohol-induced mood disorder (H)  -showing improvement   - acamprosate (CAMPRAL) 333 MG EC tablet; Take 2 tablets (666 mg) by mouth 3 times daily.  Dispense: 180 tablet; Refill: 2  -continue with gabapentin 600 mg TID   -continue with IOP programming and follow all recommendations  -offered UDS's through [facility] for convenience, Durga will check with .   -1 month follow up     3.  Insomnia due to other mental disorder  -improved with trazodone and increased dose of trazodone on occasion  - traZODone (DESYREL) 100 MG tablet; Take 1 tablet (100 mg) by mouth at bedtime.  Dispense: 30 tablet; Refill: 2     4. Major depressive disorder, recurrent episode, moderate with anxious distress (H)  -showing improvement   - citalopram (CELEXA) 40 MG tablet; Take 1 tablet (40 mg) by mouth daily.  Dispense: 30 tablet; Refill: 1     5. Type 2 diabetes mellitus with hyperglycemia, without long-term current use of insulin (H)  -needs improvement  -has not picked up medications or started  -contacted PCP's diabetic educator requesting follow up addressed.      6. Generalized anxiety disorder  -showing improvement   - gabapentin (NEURONTIN) 300 MG capsule; Take 2 capsules (600 mg) by mouth 3 times daily. Increase gabapentin dose 300 mg/day to a target dose of 600 mg TID  Dispense: 180 capsule; Refill: 1           PDMP Review           Value Time User     State PDMP site checked  Yes 9/11/2024  3:28 PM Christina Posadas, NP                   RTC  Return in about 1 month (around 11/25/2024).    Any medication(s) require lab monitoring? No    Rosalio Qureshi, RN on 2/5/2025 at 3:45 PM

## 2025-02-07 ENCOUNTER — VIRTUAL VISIT (OUTPATIENT)
Dept: ADDICTION MEDICINE | Facility: CLINIC | Age: 65
End: 2025-02-07
Payer: COMMERCIAL

## 2025-02-07 DIAGNOSIS — F10.20 ALCOHOL USE DISORDER, SEVERE, DEPENDENCE (H): Primary | ICD-10-CM

## 2025-02-07 DIAGNOSIS — F17.209 NICOTINE DEPENDENCE WITH NICOTINE-INDUCED DISORDER, UNSPECIFIED NICOTINE PRODUCT TYPE: ICD-10-CM

## 2025-02-07 DIAGNOSIS — F41.1 GENERALIZED ANXIETY DISORDER: ICD-10-CM

## 2025-02-07 DIAGNOSIS — F99 INSOMNIA DUE TO OTHER MENTAL DISORDER: ICD-10-CM

## 2025-02-07 DIAGNOSIS — F32.A DEPRESSION, UNSPECIFIED DEPRESSION TYPE: ICD-10-CM

## 2025-02-07 DIAGNOSIS — F51.05 INSOMNIA DUE TO OTHER MENTAL DISORDER: ICD-10-CM

## 2025-02-07 PROCEDURE — 98015 SYNCH AUDIO-ONLY EST HIGH 40: CPT

## 2025-02-07 PROCEDURE — G2211 COMPLEX E/M VISIT ADD ON: HCPCS | Mod: 93

## 2025-02-07 RX ORDER — HYDROXYZINE HYDROCHLORIDE 50 MG/1
25-50 TABLET, FILM COATED ORAL EVERY 8 HOURS PRN
Qty: 180 TABLET | Refills: 0 | Status: SHIPPED | OUTPATIENT
Start: 2025-02-07

## 2025-02-07 RX ORDER — TRAZODONE HYDROCHLORIDE 150 MG/1
150 TABLET ORAL AT BEDTIME
Qty: 30 TABLET | Refills: 1 | Status: SHIPPED | OUTPATIENT
Start: 2025-02-07

## 2025-02-07 RX ORDER — DULOXETIN HYDROCHLORIDE 30 MG/1
CAPSULE, DELAYED RELEASE ORAL
Qty: 53 CAPSULE | Refills: 0 | Status: SHIPPED | OUTPATIENT
Start: 2025-02-07 | End: 2025-03-09

## 2025-02-07 ASSESSMENT — ANXIETY QUESTIONNAIRES
GAD7 TOTAL SCORE: 8
2. NOT BEING ABLE TO STOP OR CONTROL WORRYING: MORE THAN HALF THE DAYS
IF YOU CHECKED OFF ANY PROBLEMS ON THIS QUESTIONNAIRE, HOW DIFFICULT HAVE THESE PROBLEMS MADE IT FOR YOU TO DO YOUR WORK, TAKE CARE OF THINGS AT HOME, OR GET ALONG WITH OTHER PEOPLE: SOMEWHAT DIFFICULT
4. TROUBLE RELAXING: NOT AT ALL
8. IF YOU CHECKED OFF ANY PROBLEMS, HOW DIFFICULT HAVE THESE MADE IT FOR YOU TO DO YOUR WORK, TAKE CARE OF THINGS AT HOME, OR GET ALONG WITH OTHER PEOPLE?: SOMEWHAT DIFFICULT
1. FEELING NERVOUS, ANXIOUS, OR ON EDGE: SEVERAL DAYS
7. FEELING AFRAID AS IF SOMETHING AWFUL MIGHT HAPPEN: NOT AT ALL
3. WORRYING TOO MUCH ABOUT DIFFERENT THINGS: MORE THAN HALF THE DAYS
7. FEELING AFRAID AS IF SOMETHING AWFUL MIGHT HAPPEN: NOT AT ALL
5. BEING SO RESTLESS THAT IT IS HARD TO SIT STILL: NEARLY EVERY DAY
GAD7 TOTAL SCORE: 8
6. BECOMING EASILY ANNOYED OR IRRITABLE: NOT AT ALL
GAD7 TOTAL SCORE: 8

## 2025-02-07 NOTE — NURSING NOTE
Is the patient currently in the state of [place]? YES    Current patient location: [address] [facility] [address]    Visit mode:Converted to Telephone Pt having issues connecting to amwell-    If the visit is dropped, the patient can be reconnected by: TELEPHONE VISIT: Phone number: [phone]    Will anyone else be joining the visit? No  (If patient encounters technical issues they should call [phone])    Are changes needed to the allergy or medication list? No    Are refills needed on medications prescribed by this physician? Discuss with Provider    Rooming Documentation: Questionnaire(s) completed.    Reason for visit: DIEGO Masters

## 2025-02-07 NOTE — PROGRESS NOTES
Virtual Visit Details    Time spent on phone call: 50 minutes  Originating Location (pt. Location): Home    Distant Location (provider location):  Home    Patient is unable to complete video visit due to lack of access to remote video technology.           [facility]    A/P                                                    ASSESSMENT/PLAN  1. Alcohol use disorder, severe, dependence (H) (Primary)  -controlled in early remission  -encouraged recovery activities  -continue to follow up with DWI programming and follow all recommendations    2. Generalized anxiety disorder  -needs improvement.    -encouraged continuing medications and discussing side effects prior to self discontinuing.  Discouraged seeking medical advice from social media  - DULoxetine (CYMBALTA) 30 MG capsule; Take 1 capsule (30 mg) by mouth daily for 7 days, THEN 2 capsules (60 mg) daily for 23 days.  Dispense: 53 capsule; Refill: 0  - hydrOXYzine HCl (ATARAX) 50 MG tablet; Take 0.5-1 tablets (25-50 mg) by mouth every 8 hours as needed for anxiety.  Dispense: 180 tablet; Refill: 0  -encouraged psychotherapy.    I've explained to him that drugs of the SSRI class can have side effects such as weight gain, sexual dysfunction, insomnia, headache, nausea. These medications are generally effective at alleviating symptoms of anxiety and/or depression. Let me know if significant side effects do occur.  -2 week follow up      3. Insomnia due to other mental disorder  -needs improvement.  Will try increased dose of trazodone  - traZODone (DESYREL) 150 MG tablet; Take 1 tablet (150 mg) by mouth at bedtime.  Dispense: 30 tablet; Refill: 1    4. Depression, unspecified depression type  -needs improvement.  Durga self discontinued antidepressants several weeks ago.  Does not believe that this has contributed to his increased anxiety.  Will try cymbalta which may also improve his neuropathic pain   - DULoxetine (CYMBALTA) 30 MG capsule;  Take 1 capsule (30 mg) by mouth daily for 7 days, THEN 2 capsules (60 mg) daily for 23 days.  Dispense: 53 capsule; Refill: 0    5. Nicotine dependence with nicotine-induced disorder, unspecified nicotine product type  -needs improvement.  Reporting that anxiety relieved with cigarettes.  Considering that increased anxiety may be related to nicotine withdrawal.    - nicotine polacrilex (NICORETTE) 4 MG gum; Place 1 each (4 mg) inside cheek as needed for nicotine withdrawal symptoms.  Dispense: 120 each; Refill: 2  -discuss patch NRT at next visit     Feb 7, 2025  - cymbalta, duloxetine, hydroxizine         Continued Complex Management  The longitudinal plan of care for Alcohol Use Disorder (AUD) was addressed during this visit. Due to the added complexity in care, I will continue to support Durga in the subsequent management and with ongoing continuity of care.      Last encounter A/P  1. Alcohol use disorder, severe, dependence (H) (Primary)  2. Alcohol dependence with alcohol-induced mood disorder (H)  -showing improvement   - has refills of campral  -encouraged recovery activities   -continue with gabapentin 600 mg TID   -1 month follow up     3. Insomnia due to other mental disorder  -improved with cessation  -continue with strict abstinence  -gabapentin at night      4. Major depressive disorder, recurrent episode, moderate with anxious distress (H)  5. Generalized anxiety disorder  -showing improvement  -continue with celexa, buspar, gabapentin        6. Type 2 diabetes mellitus with hyperglycemia, without long-term current use of insulin (H)  -no change  -started taking medications, and following up with PCP and diabetes educator         Oct 25, 2024  - acamprosate  Recovery activities  Court ordered treatment and abstinence       PDMP Review         Value Time User    State PDMP site checked  Yes 9/11/2024  3:28 PM Christina Posadas, JOSIE              RTC  Future Appointments   Date Time Provider  "Department Rule   2/28/2025  8:30 AM Christina Posadas NP [facility]   3/27/2025  3:30 PM Alejandro Cordon MD [facility]           Counseled the patient on the importance of having a recovery program in addition to medication to manage recovery.  Components include avoiding isolating, having willingness to change, avoiding triggers and managing cravings. Encouraged having some type of sober network and practicing honesty with trusted support person(s). Encouraged other services such as counseling, 12 step or other self-help organizations.              SUBJECTIVE                                                    Durga Aguirre is a 65 year old male who presents to clinic today for follow up    Visit performed virtual video     Substance Use History:   \"Have you ever had any history with [...] use?\" And \"When was your last use?  ALCOHOL - 1/16  CANNABIS - denies  PRESCRIPTION STIMULANTS (includes Ritalin, Adderall, Vyvanse) - denies  COCAINE/CRACK -   METH/AMPHETAMINES (includes ecstacy, MDMA/abel) -   OPIATES -   BENZODIAZEPINES (includes Ativan, Klonopin, Xanax) -   KRATOM (mild opioid and stimulant effects) -   KETAMINE -   HALLUCINOGENS (includes DXM) -   BEHAVIORAL (Gambling, Eating d/o, Compulsivity) -   History of treatment -   NICOTINE  Cigarettes:   Chew/snus:   Vaping:   Past NRT/medication use: Naltrexone        Previous withdrawal treatment episodes (e.g. detox): 8/6/23  Previous [facility] treatment programs:  [facility] Aug-Oct 2023,   Hospitalizations or overdose:   Medical complications from substance use:   IV Drug use?: denies  Previous Medication for Addiction Tx: naltrexone  Longest period of full abstinence: 3 years  Activities that have previously supported abstinence: treatment, meetings  Current Recovery Activities: none        Infectious disease screening  Hep C:              Hepatitis C Antibody   Date Value Ref Range Status   06/24/2019 Test canceled by physician NR^Nonreactive " "Final         HIV:                  HIV Antigen Antibody Combo   Date Value Ref Range Status   06/24/2019 Nonreactive NR^Nonreactive     Final       Comment:       HIV-1 p24 Ag & HIV-1/HIV-2 Ab Not Detected            Psychiatric History (per patient report and problem list review)  Past diagnoses - depression  Current or past psychiatrist:   Current or past therapist:  (through [facility])   Hospitalizations/TMS/ECT -   Suicide Attempts -   Medication trials -     Recent HPI Details:  HPI Oct 25, 2024  - IOP graduated, now in DUI court.  Going to AA \"show up once and a while\"   Sleeping  \"normal\"   Mood applying for job, [employer]   Started ozempic,   Anxiety, better mowing lawn walking more.    No cravings, no alcohol use.  Feels physicial health improved with cessation.  Pleased with progress     TODAY'S VISIT  HPI Feb 7, 2025  - Struggling with anxiety.  Working part time.  Difficulty with trying to sleep.  Abstinent for \"quite a while\" May 7 2024.  Attending Phase 3 in DWI program.    Stopped medications, thought buspar was contributing to foot pain.  Has not taken any mental health medications for several weeks.  Read information on [facility] that gabapentin may cause neuropathy       OBJECTIVE  PHYSICAL EXAM:  There were no vitals taken for this visit.    GENERAL: healthy, alert and no distress  EYES: Eyes grossly normal to inspection, PERRL and conjunctivae and sclerae normal  RESP: No respiratory distress  MENTAL STATUS EXAM  Appearance/Behavior:   Speech: Normal  Mood/Affect: anxiety  Insight: Fair      PHQ-9 Score:       6/13/2024    12:58 PM 9/4/2024     9:40 AM 9/11/2024     1:40 PM   PHQ   PHQ-9 Total Score 1 7 15   Q9: Thoughts of better off dead/self-harm past 2 weeks Not at all  Not at all Not at all        Proxy-reported       YUSUF-7 Score:      3/20/2024     2:00 PM 4/17/2024     3:24 PM 2/7/2025     8:53 AM   YUSUF-7 SCORE   Total Score  1 (minimal anxiety) 8 (mild anxiety)   Total Score 2 1 8        " Proxy-reported       LABS (may not contain today's labs)                                                      Today's lab data  No results found for any visits on 02/07/25.        HISTORY                                                    Problem list reviewed & adjusted, as indicated.  Patient Active Problem List   Diagnosis    CARDIOVASCULAR SCREENING; LDL GOAL LESS THAN 130    Generalized anxiety disorder    Essential hypertension    Hypertriglyceridemia    Dupuytren's contracture of hand    Alcohol withdrawal with complication with inpatient treatment, with unspecified complication (H)    Vasovagal syncope    History of colonic polyps    Alcohol use disorder, severe, dependence (H)    Tobacco use disorder, moderate, dependence    Type 2 diabetes mellitus with hyperglycemia, without long-term current use of insulin (H)         MEDICATION LIST (after visit)  Current Outpatient Medications   Medication Sig Dispense Refill    acamprosate (CAMPRAL) 333 MG EC tablet Take 2 tablets (666 mg) by mouth 3 times daily. (Patient taking differently: Take 666 mg by mouth 3 times daily. Per patient is only taking 1 time daily) 180 tablet 2    ARIPiprazole (ABILIFY) 5 MG tablet TAKE ONE TABLET (5 MG) BY MOUTH ONE TIME DAILY 90 tablet 1    blood glucose (NO BRAND SPECIFIED) test strip Needs Accu-Chek Guide strips. Use to test blood glucose test blood sugar 2x times daily. . 200 strip 3    blood glucose monitoring (NO BRAND SPECIFIED) meter device kit Use to test blood sugar 1x times daily. Preferred Blood Glucose Monitor Brands: per insurance. 1 kit 0    busPIRone (BUSPAR) 15 MG tablet Take 1 tablet (15 mg) by mouth 2 times daily 180 tablet 1    citalopram (CELEXA) 40 MG tablet Take 1 tablet (40 mg) by mouth daily. 30 tablet 1    gabapentin (NEURONTIN) 300 MG capsule Take 2 capsules (600 mg) by mouth 3 times daily. Increase gabapentin dose 300 mg/day to a target dose of 600 mg TID (Patient taking differently: Take 600 mg by mouth  3 times daily. Increase gabapentin dose 300 mg/day to a target dose of 600 mg TID  Per patient is only taking 1 time daily as well) 180 capsule 1    hydroCHLOROthiazide 12.5 MG tablet Take 1 tablet (12.5 mg) by mouth daily. 90 tablet 3    hydrOXYzine HCl (ATARAX) 50 MG tablet Take 0.5-1 tablets (25-50 mg) by mouth every 8 hours as needed for anxiety 180 tablet 0    lisinopril (ZESTRIL) 40 MG tablet Take 1 tablet (40 mg) by mouth daily. 90 tablet 3    Melatonin 10 MG TABS tablet Take 10 mg by mouth nightly as needed for sleep      metFORMIN (GLUCOPHAGE) 500 MG tablet Take with food, 1000mg in the morning and 1000mg in the evening 120 tablet 3    multivitamin w/minerals (THERA-VIT-M) tablet Take 1 tablet by mouth daily (Patient not taking: Reported on 10/25/2024) 90 tablet 0    naltrexone (DEPADE/REVIA) 50 MG tablet Take 2 tablets (100 mg) by mouth daily. 180 tablet 0    semaglutide (OZEMPIC) 2 MG/3ML pen Take 0.5mg every seven days. 6 mL 3    simvastatin (ZOCOR) 40 MG tablet Take 1 tablet (40 mg) by mouth every evening. 90 tablet 3    thiamine (B-1) 100 MG tablet TAKE 1 TABLET (100 MG) BY MOUTH DAILY 90 tablet 3    thin (NO BRAND SPECIFIED) lancets Use to test blood sugar 1x times daily. Preferred Blood Glucose Monitor Brands: per insurance. 100 each 11    traZODone (DESYREL) 100 MG tablet Take 1 tablet (100 mg) by mouth at bedtime. 30 tablet 0     No current facility-administered medications for this visit.         No Known Allergies        Christina Posadas NP  [facility]  [phone]

## 2025-03-03 ENCOUNTER — TELEPHONE (OUTPATIENT)
Dept: INTERNAL MEDICINE | Facility: CLINIC | Age: 65
End: 2025-03-03

## 2025-03-03 NOTE — TELEPHONE ENCOUNTER
Pt called the clinic asking for a referral to [facility] for Dupuytrens in his feet. Pt stated he had it in his hands and now its in his feet.     Routing to PCP to review and advise.     Please call pt back with PCPs recommendations.

## 2025-03-06 ENCOUNTER — TELEPHONE (OUTPATIENT)
Dept: NEUROLOGY | Facility: CLINIC | Age: 65
End: 2025-03-06
Payer: COMMERCIAL

## 2025-03-06 NOTE — TELEPHONE ENCOUNTER
Message has been left for patient to remind him that he needs to complete his labs that Dr. Cordon ordered for him to complete prior to his next appointment with him on 3/27/25. Writer advised patient to call the lab department at [phone] to schedule when he is able to do so.

## 2025-03-06 NOTE — TELEPHONE ENCOUNTER
Please schedule pt with In person visit with MR.  Can use any open same day/next day slot.    Sulma Santos, YVAN

## 2025-03-08 ENCOUNTER — LAB (OUTPATIENT)
Dept: LAB | Facility: CLINIC | Age: 65
End: 2025-03-08
Payer: COMMERCIAL

## 2025-03-08 ENCOUNTER — DOCUMENTATION ONLY (OUTPATIENT)
Dept: INTERNAL MEDICINE | Facility: CLINIC | Age: 65
End: 2025-03-08

## 2025-03-08 DIAGNOSIS — G62.9 PERIPHERAL POLYNEUROPATHY: Primary | ICD-10-CM

## 2025-03-08 DIAGNOSIS — E11.65 TYPE 2 DIABETES MELLITUS WITH HYPERGLYCEMIA, WITHOUT LONG-TERM CURRENT USE OF INSULIN (H): ICD-10-CM

## 2025-03-08 DIAGNOSIS — E11.65 TYPE 2 DIABETES MELLITUS WITH HYPERGLYCEMIA, WITHOUT LONG-TERM CURRENT USE OF INSULIN (H): Primary | ICD-10-CM

## 2025-03-08 LAB
HOLD SPECIMEN: NORMAL
TOTAL PROTEIN SERUM FOR ELP: 6.4 G/DL (ref 6.4–8.3)
VIT B12 SERPL-MCNC: 298 PG/ML (ref 232–1245)

## 2025-03-08 PROCEDURE — 86335 IMMUNFIX E-PHORSIS/URINE/CSF: CPT | Performed by: PATHOLOGY

## 2025-03-08 PROCEDURE — 86334 IMMUNOFIX E-PHORESIS SERUM: CPT | Performed by: PATHOLOGY

## 2025-03-08 PROCEDURE — 84207 ASSAY OF VITAMIN B-6: CPT | Mod: 90

## 2025-03-08 PROCEDURE — 83036 HEMOGLOBIN GLYCOSYLATED A1C: CPT

## 2025-03-08 PROCEDURE — 83921 ORGANIC ACID SINGLE QUANT: CPT

## 2025-03-08 PROCEDURE — 82607 VITAMIN B-12: CPT

## 2025-03-08 PROCEDURE — 84166 PROTEIN E-PHORESIS/URINE/CSF: CPT | Performed by: PATHOLOGY

## 2025-03-08 PROCEDURE — 84165 PROTEIN E-PHORESIS SERUM: CPT | Performed by: PATHOLOGY

## 2025-03-08 PROCEDURE — 36415 COLL VENOUS BLD VENIPUNCTURE: CPT

## 2025-03-08 PROCEDURE — 80061 LIPID PANEL: CPT

## 2025-03-08 PROCEDURE — 84425 ASSAY OF VITAMIN B-1: CPT | Mod: 90

## 2025-03-08 PROCEDURE — 84446 ASSAY OF VITAMIN E: CPT | Mod: 90

## 2025-03-08 PROCEDURE — 84155 ASSAY OF PROTEIN SERUM: CPT

## 2025-03-08 PROCEDURE — 99000 SPECIMEN HANDLING OFFICE-LAB: CPT

## 2025-03-08 NOTE — PROGRESS NOTES
Please place or confirm orders for upcoming lab appointment on 03/08/25.    Durga had labs done and he was requesting an A1C test.    Please place order if lab is needed. Thank you!    Taina MCNEILL

## 2025-03-09 LAB
CHOLEST SERPL-MCNC: 151 MG/DL
EST. AVERAGE GLUCOSE BLD GHB EST-MCNC: 114 MG/DL
HBA1C MFR BLD: 5.6 % (ref 0–5.6)
HDLC SERPL-MCNC: 40 MG/DL
LDLC SERPL CALC-MCNC: 89 MG/DL
NONHDLC SERPL-MCNC: 111 MG/DL
TRIGL SERPL-MCNC: 108 MG/DL

## 2025-03-10 LAB
ALBUMIN SERPL ELPH-MCNC: 3.7 G/DL (ref 3.7–5.1)
ALPHA1 GLOB SERPL ELPH-MCNC: 0.4 G/DL (ref 0.2–0.4)
ALPHA2 GLOB SERPL ELPH-MCNC: 0.8 G/DL (ref 0.5–0.9)
B-GLOBULIN SERPL ELPH-MCNC: 0.8 G/DL (ref 0.6–1)
GAMMA GLOB SERPL ELPH-MCNC: 0.6 G/DL (ref 0.7–1.6)
LOCATION OF TASK: ABNORMAL
M PROTEIN SERPL ELPH-MCNC: 0 G/DL
PROT PATTERN SERPL ELPH-IMP: ABNORMAL

## 2025-03-11 LAB
LOCATION OF TASK: NORMAL
PROT ELPH PNL UR ELPH: NORMAL
PROT PATTERN SERPL IFE-IMP: NORMAL
PROT PATTERN UR ELPH-IMP: NORMAL

## 2025-03-12 LAB
A-TOCOPHEROL VIT E SERPL-MCNC: 9.5 MG/L
BETA+GAMMA TOCOPHEROL SERPL-MCNC: 1.5 MG/L
METHYLMALONATE SERPL-SCNC: 0.22 UMOL/L (ref 0–0.4)
PYRIDOXAL PHOS SERPL-SCNC: 15.1 NMOL/L
VIT B1 PYROPHOSHATE BLD-SCNC: 244 NMOL/L

## 2025-03-17 DIAGNOSIS — E11.65 TYPE 2 DIABETES MELLITUS WITH HYPERGLYCEMIA, WITHOUT LONG-TERM CURRENT USE OF INSULIN (H): ICD-10-CM

## 2025-03-26 ENCOUNTER — ALLIED HEALTH/NURSE VISIT (OUTPATIENT)
Dept: EDUCATION SERVICES | Facility: CLINIC | Age: 65
End: 2025-03-26
Payer: COMMERCIAL

## 2025-03-26 DIAGNOSIS — E11.65 TYPE 2 DIABETES MELLITUS WITH HYPERGLYCEMIA, WITHOUT LONG-TERM CURRENT USE OF INSULIN (H): Primary | ICD-10-CM

## 2025-03-26 PROCEDURE — G0108 DIAB MANAGE TRN  PER INDIV: HCPCS | Mod: AE

## 2025-03-26 NOTE — PROGRESS NOTES
ESTABLISHED PATIENT NEUROLOGY NOTE    DATE OF VISIT: 3/27/2025  CLINIC LOCATION: [facility]  MRN: [id number]  PATIENT NAME: Durga Aguirre  YOB: 1960    REASON FOR VISIT: No chief complaint on file.    SUBJECTIVE:                                                      HISTORY OF PRESENT ILLNESS: Patient is here to follow up regarding peripheral polyneuropathy. Please refer to my initial note from 10/25/2024 for further information.    Since the last visit, the patient reports ***.  He feels that alpha lipoic acid ***.  He denies interval development of new focal neurological symptoms.    His labs demonstrated low vitamin B6 (15.1) and I have recommended to start vitamin B6 replacement.  Vitamin B1 was elevated at 244 (not concerning), and the rest of the labs were normal, although white send B12 was less than 350 (298).  EXAM:                                                    Physical Exam:   Vitals: There were no vitals taken for this visit.    General: pt is in NAD, cooperative.  Skin: normal turgor, moist mucous membranes, no lesions/rashes noticed.  HEENT: ATNC, white sclera, normal conjunctiva.  Respiratory: Symmetric lung excursion, no accessory respiratory muscle use.  Abdomen: Non distended.  Neurological: awake, cooperative, follows commands, no exam changes compared to the initial visit.  ASSESSMENT AND PLAN:                                                    Assessment: 65 year old male patient presents for follow-up of peripheral polyneuropathy polyneuropathy after finishing the workup.  His vitamin B6 was low and vitamin B12 was relatively low.  I would recommend replacing both.  We also discussed the importance of controlling diabetes better and work on reducing excessive alcohol use.    Diagnoses:    ICD-10-CM    1. Peripheral polyneuropathy  G62.9         Plan:  There are no Patient Instructions on file for this visit.    Total Time: *** minutes spent on the date of  the encounter doing chart review, history and exam, documentation and further activities per the note.    Alejandro Cordon MD  [facility]  (Chart documentation was completed in part with Dragon voice-recognition software. Even though reviewed, some grammatical, spelling, and word errors may remain.)

## 2025-03-26 NOTE — PROGRESS NOTES
Diabetes Self-Management Education & Support    Presents for:      Type of Service: In Person Visit      Assessment:  Patient initially referred by Pcp for new diagnosis of Type 2 Diabetes. Last seen by [facility]. Denies family history of diabetes. Hx of Severe Alcohol Abuse, Anxiety, Hypertension, Hyperlipidemia. Pt is currently not drinking. Works at [employer], 3 days a week for 4-6 hours at a time.       Current Diabetes Medications:  Ozempic 0.5mg once a week: stopped 3 weeks ago due to insurance no loner covering med.    Metformin: 1000mg with breakfast, 1000mg with dinner (denies side effects)      Has been taking Alpha-Lipoic Acid for foot pain, 600mg, twice a day, per suggestion of neurologist. States has been somewhat helping. Sees neurologist tomorrow. Plans to discuss other medication options.      Weight checked today: 193#.  Wt in Nov: 197#. Wt in Sept: 201#.    BG Lo/25: Pre D: 137, Bed: 93  : Pre D: 164  : Pre L: 113  : Bed: 96  : Pre L: 108  30 day ave: 111  90 day [address]; 119      Lab Results   Component Value Date    A1C 5.6 2025    A1C 5.3 2021     is meeting goal of <7.0    Diabetes knowledge and skills assessment:   Patient is knowledgeable in diabetes management concepts related to: Healthy Eating, Monitoring, and Taking Medication    Based on learning assessment above, most appropriate setting for further diabetes education would be: Individual setting.    Care Plan and Education Provided:  Being Active: Finding a physical activity routine that works for you and Relationship of activity to glucose. On days you are not working, consider riding your bike or walking.   Monitoring: Frequency of monitoring: Continue to check Bg 3-4 times a week.  Taking Medication: Okay to not restart Ozempic. Consider discussing reducing dose of Metformin with Pcp.     Patient verbalized understanding of diabetes self-management education concepts discussed, opportunities  "for ongoing education and support, and recommendations provided today.    Plan  See above.     Topics to cover at upcoming visits: Healthy Eating, Taking Medication, Being Active, Monirtoring    Follow-up:  Rtc in 5 months.     See Care Plan for co-developed, patient-state behavior change goals.    Education Materials Provided:  No new materials provided today      Subjective/Objective  Presents for: Follow up  Accompanied by: Self  Diabetes education in the past 24mo: Yes  Focus of Visit: Patient Unsure  Diabetes type: Type 2  Date of diagnosis: 09/2024  Disease course: Improving  How confident are you filling out medical forms by yourself:: Not Assessed  Diabetes management related comments/concerns: None at this time.   Transportation concerns: No  Difficulty affording diabetes medication?: No  Difficulty affording diabetes testing supplies?: No  Other concerns:: None  Cultural Influences/Ethnic Background:  Not  or      Diabetes Symptoms & Complications:  Diabetes Related Symptoms: Mild neuropathy in feet  Weight trend: Slowly decreasing.   Symptom course: Stable  Disease course: Stable  Complications assessed today?: No       Patient Problem List and Family Medical History reviewed for relevant medical history, current medical status, and diabetes risk factors.    Vitals:  There were no vitals taken for this visit.  Estimated body mass index is 28.06 kg/m  as calculated from the following:    Height as of 10/25/24: 1.803 m (5' 11\").    Weight as of 10/25/24: 91.3 kg (201 lb 3.2 oz).   Last 3 BP:   BP Readings from Last 3 Encounters:   10/25/24 119/77   09/04/24 114/68   05/22/24 125/63       History   Smoking Status    Every Day    Types: Cigarettes   Smokeless Tobacco    Never       Labs:  Lab Results   Component Value Date    A1C 5.6 03/08/2025    A1C 5.3 03/12/2021     Lab Results   Component Value Date     09/19/2024     11/15/2022     03/12/2021     Lab Results " "  Component Value Date    LDL 89 03/08/2025    LDL  03/13/2021     Cannot estimate LDL when triglyceride exceeds 400 mg/dL     HDL Cholesterol   Date Value Ref Range Status   03/13/2021 28 (L) >39 mg/dL Final     Direct Measure HDL   Date Value Ref Range Status   03/08/2025 40 >=40 mg/dL Final   ]  GFR Estimate   Date Value Ref Range Status   09/04/2024 80 >60 mL/min/1.73m2 Final     Comment:     eGFR calculated using 2021 CKD-EPI equation.   03/13/2021 73 >60 mL/min/[1.73_m2] Final     Comment:     Non  GFR Calc  Starting 12/18/2018, serum creatinine based estimated GFR (eGFR) will be   calculated using the Chronic Kidney Disease Epidemiology Collaboration   (CKD-EPI) equation.       GFR Estimate If Black   Date Value Ref Range Status   03/13/2021 85 >60 mL/min/[1.73_m2] Final     Comment:      GFR Calc  Starting 12/18/2018, serum creatinine based estimated GFR (eGFR) will be   calculated using the Chronic Kidney Disease Epidemiology Collaboration   (CKD-EPI) equation.       Lab Results   Component Value Date    CR 1.03 09/04/2024    CR 1.07 03/13/2021     No results found for: \"MICROALBUMIN\"    Healthy Eating:  Healthy Eating Assessed Today: Yes  Cultural/Jew diet restrictions?: No  Do you have any food allergies or intolerances?: No  Meal planning/habits: None  Who cooks/prepares meals for you?: Self  Who purchases food in  your home?: Self  How many times a week on average do you eat food made away from home (restaurant/take-out)?: 1  Breakfast: 1c yogurt, black coffee  Lunch: [place]  Dinner: fish or pasta or take out upon occasion  Snacks: occasional sweet treat in the evening  Beverages: Water, Coffee  Has patient met with a dietitian in the past?: No    Being Active:  Being Active Assessed Today: Yes  Exercise:: Yes  Days per week of moderate to strenuous exercise (like a brisk walk): 3  On average, minutes per day of exercise at this level: 60  How intense was your " typical exercise? : Light (like stretching or slow walking) (Works at [employer] 3 days a week. On feet all day, walking.)  Exercise Minutes per Week: 180  Barrier to exercise: None    Monitoring:  Monitoring Assessed Today: Yes  Did patient bring glucose meter to appointment? : Yes  Blood Glucose Meter: Accu-chek  Times checking blood sugar at home (number): 3  Times checking blood sugar at home (per): Week  Blood glucose trend: Decreasing      Taking Medications:  Diabetes Medication(s)       Biguanides       metFORMIN (GLUCOPHAGE) 500 MG tablet Take with food, 2 TABLETS (1000mg) BY MOUTH in the morning and 2 TABLETS (1000mg) in the evening       Incretin Mimetic Agents       semaglutide (OZEMPIC) 2 MG/3ML pen Take 0.5mg every seven days.          Taking Medication Assessed Today: Yes  Current Treatments: Oral Medication (taken by mouth) (Stopped Ozempic 3 weeks ago due to insurance no longer covering med.)  Problems taking diabetes medications regularly?: No  Diabetes medication side effects?: No    Bertha Thorne RN, [facility]  Time Spent: 30 minutes  Encounter Type: Individual    Any diabetes medication dose changes were made via the [facility] Standing Orders under the patient's referring provider.

## 2025-03-26 NOTE — LETTER
3/26/2025         RE: Durga Aguirre  [id number]  [place] [address]        Dear Colleague,    Thank you for referring your patient, Durga Aguirre, to the [facility] [place]. Please see a copy of my visit note below.    Diabetes Self-Management Education & Support    Presents for:      Type of Service: In Person Visit      Assessment:  Patient initially referred by Pcp for new diagnosis of Type 2 Diabetes. [facility] Denies family history of diabetes. Hx of Severe Alcohol Abuse, Anxiety, Hypertension, Hyperlipidemia. Pt is currently not drinking. Works at [employer], 3 days a week for 4-6 hours at a time.       Current Diabetes Medications:  Ozempic 0.5mg once a week: stopped 3 weeks ago due to insurance no loner covering med.    Metformin: 1000mg with breakfast, 1000mg with dinner (denies side effects)      Has been taking Alpha-Lipoic Acid for foot pain, 600mg, twice a day, per suggestion of neurologist. States has been somewhat helping. Sees neurologist tomorrow. Plans to discuss other medication options.      Weight checked today: 193#.  Wt in Nov: 197#. Wt in Sept: 201#.    BG Lo/25: Pre D: 137, Bed: 93  : Pre D: 164  : Pre L: 113  : Bed: 96  : Pre L: 108  30 day ave: 111  90 day ave; 119      Lab Results   Component Value Date    A1C 5.6 2025    A1C 5.3 2021     is meeting goal of <7.0    Diabetes knowledge and skills assessment:   Patient is knowledgeable in diabetes management concepts related to: Healthy Eating, Monitoring, and Taking Medication    Based on learning assessment above, most appropriate setting for further diabetes education would be: Individual setting.    Care Plan and Education Provided:  Being Active: Finding a physical activity routine that works for you and Relationship of activity to glucose. On days you are not working, consider riding your bike or walking.   Monitoring: Frequency of monitoring:  "Continue to check Bg 3-4 times a week.  Taking Medication: Okay to not restart Ozempic. Consider discussing reducing dose of Metformin with Pcp.     Patient verbalized understanding of diabetes self-management education concepts discussed, opportunities for ongoing education and support, and recommendations provided today.    Plan  See above.     Topics to cover at upcoming visits: Healthy Eating, Taking Medication, Being Active, Monirtoring    Follow-up:  Rtc in 5 months.     See Care Plan for co-developed, patient-state behavior change goals.    Education Materials Provided:  No new materials provided today      Subjective/Objective  Presents for: Follow up  Accompanied by: Self  Diabetes education in the past 24mo: Yes  Focus of Visit: Patient Unsure  Diabetes type: Type 2  Date of diagnosis: 09/2024  Disease course: Improving  How confident are you filling out medical forms by yourself:: Not Assessed  Diabetes management related comments/concerns: None at this time.   Transportation concerns: No  Difficulty affording diabetes medication?: No  Difficulty affording diabetes testing supplies?: No  Other concerns:: None  Cultural Influences/Ethnic Background:  Not  or      Diabetes Symptoms & Complications:  Diabetes Related Symptoms: Mild neuropathy in feet  Weight trend: Slowly decreasing.   Symptom course: Stable  Disease course: Stable  Complications assessed today?: No       Patient Problem List and Family Medical History reviewed for relevant medical history, current medical status, and diabetes risk factors.    Vitals:  There were no vitals taken for this visit.  Estimated body mass index is 28.06 kg/m  as calculated from the following:    Height as of 10/25/24: 1.803 m (5' 11\").    Weight as of 10/25/24: 91.3 kg (201 lb 3.2 oz).   Last 3 BP:   BP Readings from Last 3 Encounters:   10/25/24 119/77   09/04/24 114/68   05/22/24 125/63       History   Smoking Status     Every Day     Types: " "Cigarettes   Smokeless Tobacco     Never       Labs:  Lab Results   Component Value Date    A1C 5.6 03/08/2025    A1C 5.3 03/12/2021     Lab Results   Component Value Date     09/19/2024     11/15/2022     03/12/2021     Lab Results   Component Value Date    LDL 89 03/08/2025    LDL  03/13/2021     Cannot estimate LDL when triglyceride exceeds 400 mg/dL     HDL Cholesterol   Date Value Ref Range Status   03/13/2021 28 (L) >39 mg/dL Final     Direct Measure HDL   Date Value Ref Range Status   03/08/2025 40 >=40 mg/dL Final   ]  GFR Estimate   Date Value Ref Range Status   09/04/2024 80 >60 mL/min/1.73m2 Final     Comment:     eGFR calculated using 2021 CKD-EPI equation.   03/13/2021 73 >60 mL/min/[1.73_m2] Final     Comment:     Non  GFR Calc  Starting 12/18/2018, serum creatinine based estimated GFR (eGFR) will be   calculated using the Chronic Kidney Disease Epidemiology Collaboration   (CKD-EPI) equation.       GFR Estimate If Black   Date Value Ref Range Status   03/13/2021 85 >60 mL/min/[1.73_m2] Final     Comment:      GFR Calc  Starting 12/18/2018, serum creatinine based estimated GFR (eGFR) will be   calculated using the Chronic Kidney Disease Epidemiology Collaboration   (CKD-EPI) equation.       Lab Results   Component Value Date    CR 1.03 09/04/2024    CR 1.07 03/13/2021     No results found for: \"MICROALBUMIN\"    Healthy Eating:  Healthy Eating Assessed Today: Yes  Cultural/Church diet restrictions?: No  Do you have any food allergies or intolerances?: No  Meal planning/habits: None  Who cooks/prepares meals for you?: Self  Who purchases food in  your home?: Self  How many times a week on average do you eat food made away from home (restaurant/take-out)?: 1  Breakfast: 1c yogurt, black coffee  Lunch: [place]  Dinner: fish or pasta or take out upon occasion  Snacks: occasional sweet treat in the evening  Beverages: Water, Coffee  Has patient met " with a dietitian in the past?: No    Being Active:  Being Active Assessed Today: Yes  Exercise:: Yes  Days per week of moderate to strenuous exercise (like a brisk walk): 3  On average, minutes per day of exercise at this level: 60  How intense was your typical exercise? : Light (like stretching or slow walking) (Works at [employer] 3 days a week. On feet all day, walking.)  Exercise Minutes per Week: 180  Barrier to exercise: None    Monitoring:  Monitoring Assessed Today: Yes  Did patient bring glucose meter to appointment? : Yes  Blood Glucose Meter: Accu-chek  Times checking blood sugar at home (number): 3  Times checking blood sugar at home (per): Week  Blood glucose trend: Decreasing      Taking Medications:  Diabetes Medication(s)       Biguanides       metFORMIN (GLUCOPHAGE) 500 MG tablet Take with food, 2 TABLETS (1000mg) BY MOUTH in the morning and 2 TABLETS (1000mg) in the evening       Incretin Mimetic Agents       semaglutide (OZEMPIC) 2 MG/3ML pen Take 0.5mg every seven days.          Taking Medication Assessed Today: Yes  Current Treatments: Oral Medication (taken by mouth) (Stopped Ozempic 3 weeks ago due to insurance no longer covering med.)  Problems taking diabetes medications regularly?: No  Diabetes medication side effects?: No    Bertha Thorne RN, [facility]  Time Spent: 30 minutes  Encounter Type: Individual    Any diabetes medication dose changes were made via the [facility] Standing Orders under the patient's referring provider.

## 2025-03-26 NOTE — PATIENT INSTRUCTIONS
Increase exercise to 5-6 days a week. On days not working, walk or ride bike.   Okay to not restart Ozempic.   Continue taking Metformin as prescribed.      Bertha Thorne RN, BSN, [facility]   Certified Diabetes Care &   [facility]

## 2025-03-27 ENCOUNTER — TELEPHONE (OUTPATIENT)
Dept: NEUROLOGY | Facility: CLINIC | Age: 65
End: 2025-03-27

## 2025-03-27 ENCOUNTER — OFFICE VISIT (OUTPATIENT)
Dept: NEUROLOGY | Facility: CLINIC | Age: 65
End: 2025-03-27
Payer: COMMERCIAL

## 2025-03-27 VITALS
OXYGEN SATURATION: 96 % | WEIGHT: 193 LBS | DIASTOLIC BLOOD PRESSURE: 88 MMHG | SYSTOLIC BLOOD PRESSURE: 131 MMHG | HEIGHT: 71 IN | HEART RATE: 80 BPM | BODY MASS INDEX: 27.02 KG/M2

## 2025-03-27 DIAGNOSIS — G62.9 PERIPHERAL POLYNEUROPATHY: Primary | ICD-10-CM

## 2025-03-27 NOTE — PATIENT INSTRUCTIONS
AFTER VISIT SUMMARY (AVS):    At today's visit we thoroughly discussed current symptoms, evaluation results, available treatment options, and the plan.    We decided to add vitamin B12.  Please take 500 to 1000 mcg of B12 every day in addition to your B6 replacement and alpha lipoic acid.  Continue to abstain from alcohol and control your diabetes.    Next follow-up appointment is in the next 6 months or earlier if needed.    Please do not hesitate to call me with any questions or concerns.    Thanks.

## 2025-03-27 NOTE — PROGRESS NOTES
"ESTABLISHED PATIENT NEUROLOGY NOTE    DATE OF VISIT: 3/27/2025  CLINIC LOCATION: [facility]  MRN: [id number]  PATIENT NAME: Durga Aguirre  YOB: 1960    REASON FOR VISIT:   Chief Complaint   Patient presents with    NEUROPATHY     Follow up     SUBJECTIVE:                                                      HISTORY OF PRESENT ILLNESS: Patient is here to follow up regarding peripheral polyneuropathy. Please refer to my initial note from 10/25/2024 for further information.    Since the last visit, the patient reports that he continues to have paresthesias in both lower extremities.  No significant degree of neuropathic pain.  He feels that alpha lipoic acid is tolerated.  He denies interval development of new focal neurological symptoms.  He improved control of his diabetes and quit drinking alcohol.  He started B6 replacement.    His labs demonstrated low vitamin B6 (15.1, on vitamin B6 replacement).  Vitamin B1 was elevated at 244 (not concerning), and the rest of the labs were normal, although B12 level (298) was less than 350.  His hemoglobin A1C improved to 5.6 from 11.4.  EXAM:                                                    Physical Exam:   Vitals: /88   Pulse 80   Ht 1.803 m (5' 11\")   Wt 87.5 kg (193 lb)   SpO2 96%   BMI 26.92 kg/m      General: pt is in NAD, cooperative.  Skin: normal turgor, moist mucous membranes, no lesions/rashes noticed.  HEENT: ATNC, white sclera, normal conjunctiva.  Respiratory: Symmetric lung excursion, no accessory respiratory muscle use.  Abdomen: Non distended.  Neurological: awake, cooperative, follows commands, no exam changes compared to the initial visit.  ASSESSMENT AND PLAN:                                                    Assessment: 65 year old male patient presents for follow-up of peripheral polyneuropathy after finishing the workup.  His vitamin B6 was low, and vitamin B12 was relatively low.  I would recommend " replacing B12 in addition to B6.  We also discussed the importance of controlling diabetes better and continued abstinence from alcohol.  Will plan on possibly rechecking B6/B12 at the next visit along with hemoglobin A1C.  The rest of my recommendations are summarized below.    Diagnoses:    ICD-10-CM    1. Peripheral polyneuropathy  G62.9         Plan: At today's visit we thoroughly discussed current symptoms, evaluation results, available treatment options, and the plan.    We decided to add vitamin B12.  Advised the patient to take 500 to 1000 mcg of B12 every day in addition to his B6 replacement and alpha lipoic acid.  I recommended to continue abstaining from alcohol and to control diabetes to prevent the progression of neuropathy.    Next follow-up appointment is in the next 6 months or earlier if needed.    Total Time: 26 minutes spent on the date of the encounter doing chart review, history and exam, documentation and further activities per the note.    Alejandro Cordon MD  [facility]  (Chart documentation was completed in part with Dragon voice-recognition software. Even though reviewed, some grammatical, spelling, and word errors may remain.)

## 2025-03-27 NOTE — NURSING NOTE
"Durga Aguirre is a 65 year old male who presents for:  Chief Complaint   Patient presents with    NEUROPATHY     Follow up      Pt notes he stopped taking the BP medication due to light headedness as a side effect.     Initial Vitals:  /88   Pulse 80   Ht 1.803 m (5' 11\")   Wt 87.5 kg (193 lb)   SpO2 96%   BMI 26.92 kg/m   Estimated body mass index is 26.92 kg/m  as calculated from the following:    Height as of this encounter: 1.803 m (5' 11\").    Weight as of this encounter: 87.5 kg (193 lb).. Body surface area is 2.09 meters squared. BP completed using cuff size: juan Killian CMA    "

## 2025-03-27 NOTE — LETTER
"3/27/2025      Durga Aguirre  [id number]  [place] [address]      Dear Colleague,    Thank you for referring your patient, Durga Aguirre, to the [facility]. Please see a copy of my visit note below.    ESTABLISHED PATIENT NEUROLOGY NOTE    DATE OF VISIT: 3/27/2025  CLINIC LOCATION: [facility]  MRN: [id number]  PATIENT NAME: Durga Aguirre  YOB: 1960    REASON FOR VISIT:   Chief Complaint   Patient presents with     NEUROPATHY     Follow up     SUBJECTIVE:                                                      HISTORY OF PRESENT ILLNESS: Patient is here to follow up regarding peripheral polyneuropathy. Please refer to my initial note from 10/25/2024 for further information.    Since the last visit, the patient reports that he continues to have paresthesias in both lower extremities.  No significant degree of neuropathic pain.  He feels that alpha lipoic acid is tolerated.  He denies interval development of new focal neurological symptoms.  He improved control of his diabetes and quit drinking alcohol.  He started B6 replacement.    His labs demonstrated low vitamin B6 (15.1, on vitamin B6 replacement).  Vitamin B1 was elevated at 244 (not concerning), and the rest of the labs were normal, although B12 level (298) was less than 350.  His hemoglobin A1C improved to 5.6 from 11.4.  EXAM:                                                    Physical Exam:   Vitals: /88   Pulse 80   Ht 1.803 m (5' 11\")   Wt 87.5 kg (193 lb)   SpO2 96%   BMI 26.92 kg/m      General: pt is in NAD, cooperative.  Skin: normal turgor, moist mucous membranes, no lesions/rashes noticed.  HEENT: ATNC, white sclera, normal conjunctiva.  Respiratory: Symmetric lung excursion, no accessory respiratory muscle use.  Abdomen: Non distended.  Neurological: awake, cooperative, follows commands, no exam changes compared to the initial visit.  ASSESSMENT AND PLAN:          "                                           Assessment: 65 year old male patient presents for follow-up of peripheral polyneuropathy after finishing the workup.  His vitamin B6 was low, and vitamin B12 was relatively low.  I would recommend replacing B12 in addition to B6.  We also discussed the importance of controlling diabetes better and continued abstinence from alcohol.  Will plan on possibly rechecking B6/B12 at the next visit along with hemoglobin A1C.  The rest of my recommendations are summarized below.    Diagnoses:    ICD-10-CM    1. Peripheral polyneuropathy  G62.9         Plan: At today's visit we thoroughly discussed current symptoms, evaluation results, available treatment options, and the plan.    We decided to add vitamin B12.  Advised the patient to take 500 to 1000 mcg of B12 every day in addition to his B6 replacement and alpha lipoic acid.  I recommended to continue abstaining from alcohol and to control diabetes to prevent the progression of neuropathy.    Next follow-up appointment is in the next 6 months or earlier if needed.    Total Time: 26 minutes spent on the date of the encounter doing chart review, history and exam, documentation and further activities per the note.    Alejandro Cordon MD  [facility]  (Chart documentation was completed in part with Dragon voice-recognition software. Even though reviewed, some grammatical, spelling, and word errors may remain.)      Again, thank you for allowing me to participate in the care of your patient.        Sincerely,        Alejandro Cordon MD    Electronically signed

## 2025-04-03 ENCOUNTER — VIRTUAL VISIT (OUTPATIENT)
Dept: ADDICTION MEDICINE | Facility: CLINIC | Age: 65
End: 2025-04-03
Payer: COMMERCIAL

## 2025-04-03 DIAGNOSIS — F10.24 ALCOHOL DEPENDENCE WITH ALCOHOL-INDUCED MOOD DISORDER (H): Primary | ICD-10-CM

## 2025-04-03 DIAGNOSIS — F41.1 GENERALIZED ANXIETY DISORDER: ICD-10-CM

## 2025-04-03 DIAGNOSIS — F33.1 MAJOR DEPRESSIVE DISORDER, RECURRENT EPISODE, MODERATE WITH ANXIOUS DISTRESS (H): ICD-10-CM

## 2025-04-03 DIAGNOSIS — F99 INSOMNIA DUE TO OTHER MENTAL DISORDER: ICD-10-CM

## 2025-04-03 DIAGNOSIS — F51.05 INSOMNIA DUE TO OTHER MENTAL DISORDER: ICD-10-CM

## 2025-04-03 RX ORDER — DULOXETIN HYDROCHLORIDE 60 MG/1
60 CAPSULE, DELAYED RELEASE ORAL DAILY
Qty: 90 CAPSULE | Refills: 1 | Status: SHIPPED | OUTPATIENT
Start: 2025-04-03 | End: 2025-09-30

## 2025-04-03 RX ORDER — HYDROXYZINE HYDROCHLORIDE 50 MG/1
25-50 TABLET, FILM COATED ORAL EVERY 8 HOURS PRN
Qty: 180 TABLET | Refills: 0 | Status: SHIPPED | OUTPATIENT
Start: 2025-04-03

## 2025-04-03 RX ORDER — NALTREXONE HYDROCHLORIDE 50 MG/1
100 TABLET, FILM COATED ORAL DAILY
Qty: 180 TABLET | Refills: 1 | Status: SHIPPED | OUTPATIENT
Start: 2025-04-03 | End: 2025-09-30

## 2025-04-03 RX ORDER — TRAZODONE HYDROCHLORIDE 150 MG/1
150 TABLET ORAL AT BEDTIME
Qty: 90 TABLET | Refills: 1 | Status: SHIPPED | OUTPATIENT
Start: 2025-04-03 | End: 2025-09-30

## 2025-04-03 ASSESSMENT — PAIN SCALES - GENERAL: PAINLEVEL_OUTOF10: MODERATE PAIN (4)

## 2025-04-03 NOTE — PROGRESS NOTES
Virtual Visit Details    Type of service:  Telephone Visit   Phone call duration: 15 minutes   Originating Location (pt. Location): Home    Distant Location (provider location):  On-site  Telephone visit completed due to the patient did not have access to video, while the distant provider did.           [facility]    A/P                                                    ASSESSMENT/PLAN  1. Alcohol dependence with alcohol-induced mood disorder (H) (Primary)  -controlled in early remission  -continue with naltrexone 100 mg  -continue with recovery activities and DUI classes as ordered by court  - naltrexone (DEPADE/REVIA) 50 MG tablet; Take 2 tablets (100 mg) by mouth daily.  Dispense: 180 tablet; Refill: 1    2. Major depressive disorder, recurrent episode, moderate with anxious distress (H)  -improved with cymbalta, and prn hydroxyzine and abstinence   - DULoxetine (CYMBALTA) 60 MG capsule; Take 1 capsule (60 mg) by mouth daily.  Dispense: 90 capsule; Refill: 1  - traZODone (DESYREL) 150 MG tablet; Take 1 tablet (150 mg) by mouth at bedtime.  Dispense: 90 tablet; Refill: 1  - hydrOXYzine HCl (ATARAX) 50 MG tablet; Take 0.5-1 tablets (25-50 mg) by mouth every 8 hours as needed for anxiety.  Dispense: 180 tablet; Refill: 0  -not interested in psychotherapy     3. Insomnia due to other mental disorder  -controlled with trazodone   - traZODone (DESYREL) 150 MG tablet; Take 1 tablet (150 mg) by mouth at bedtime.  Dispense: 90 tablet; Refill: 1        Apr 3, 2025  - naltrexone 100 mg recovery activities  Duloxetine and trazodone        Continued Complex Management  The longitudinal plan of care for Alcohol Use Disorder (AUD) was addressed during this visit. Due to the added complexity in care, I will continue to support Durga in the subsequent management and with ongoing continuity of care.      Last encounter A/P  1. Alcohol use disorder, severe, dependence (H) (Primary)  -controlled in  "early remission   -continue with naltrexone 100 mg   -encouraged recovery activities  -continue to follow up with DWI programming and follow all recommendations  -1 month follow up     2. Generalized anxiety disorder  3. Depression, unspecified depression type  -noticeable improvement in mood and anxiety with starting cymbalta.  Durga is not sure if it has helped neuropathy pain.    - DULoxetine (CYMBALTA) 60 MG capsule; Take 1 capsule (60 mg) by mouth daily.  Dispense: 30 capsule; Refill: 1  -not interested in psychotherapy      4. Insomnia due to other mental disorder  -improved with increased trazodone dose  - traZODone (DESYREL) 150 MG tablet; Take 1 tablet (150 mg) by mouth at bedtime.  Dispense: 30 tablet; Refill: 1         PDMP Review         Value Time User    State PDMP site checked  Yes 2/28/2025  4:19 PM Christina Posadas NP              RTC  Return in about 2 months (around 6/3/2025) for with me, using a phone visit.      Counseled the patient on the importance of having a recovery program in addition to medication to manage recovery.  Components include avoiding isolating, having willingness to change, avoiding triggers and managing cravings. Encouraged having some type of sober network and practicing honesty with trusted support person(s). Encouraged other services such as counseling, 12 step or other self-help organizations.              SUBJECTIVE                                                    Durga Aguirre is a 65 year old male who presents to clinic today for follow up    Visit performed over phone      bstance Use History:   \"Have you ever had any history with [...] use?\" And \"When was your last use?  ALCOHOL - 1/16  CANNABIS - denies  PRESCRIPTION STIMULANTS (includes Ritalin, Adderall, Vyvanse) - denies  COCAINE/CRACK -   METH/AMPHETAMINES (includes ecstacy, MDMA/abel) -   OPIATES -   BENZODIAZEPINES (includes Ativan, Klonopin, Xanax) -   KRATOM (mild opioid and stimulant effects) - " "  KETAMINE -   HALLUCINOGENS (includes DXM) -   BEHAVIORAL (Gambling, Eating d/o, Compulsivity) -   History of treatment -   NICOTINE  Cigarettes:   Chew/snus:   Vaping:   Past NRT/medication use: Naltrexone        Previous withdrawal treatment episodes (e.g. detox): 8/6/23  Previous [facility] treatment programs: [facility] Aug[facility]Oct 2023,   Hospitalizations or overdose:   Medical complications from substance use:   IV Drug use?: denies  Previous Medication for Addiction Tx: naltrexone  Longest period of full abstinence: 3 years  Activities that have previously supported abstinence: treatment, meetings  Current Recovery Activities: none        Infectious disease screening  Hep C:              Hepatitis C Antibody   Date Value Ref Range Status   06/24/2019 Test canceled by physician NR^Nonreactive Final         HIV:                  HIV Antigen Antibody Combo   Date Value Ref Range Status   06/24/2019 Nonreactive NR^Nonreactive     Final       Comment:       HIV-1 p24 Ag & HIV-1/HIV-2 Ab Not Detected            Psychiatric History (per patient report and problem list review)  Past diagnoses - depression  Current or past psychiatrist:   Current or past therapist:  (through [facility])   Hospitalizations/TMS/ECT -   Suicide Attempts -   Medication trials -        Recent HPI Details:  HPI Feb 28, 2025  - \"doing really good\"  Not taking as much hydroxyzine, anxiety and mood improved with cymbalta, isn't sure if it has helped with neuropathy pain. Reports  \"I'm actually looking forward to working\"   feels walking around at work and staying busy helps feet pain. Feels like he's \"walking on ropes\"  Wondering about a referral for an injection.    Taking 600 mg alpha lipoic acid as recommended by podiatry      Sleeping well on 150 mg trazodone.     TODAY'S VISIT  HPI Apr 3, 2025  - \"I'm doing ok\"  had an appointment with Neurologist Dupuytren's contracture  Taking vitamin B and lipolic acid  Had appointment with neurology for Dupuytren's " "contracture in feet.  Continuing with B 12 and Lipolic Acid.  \"It's tolerable, either that or I'm getting use to it\"   No alcohol use, still taking naltrexone   Depression and anxiety \"bouncing around\" takes hydroxyzine before anxiety get's too bad.     Looking forward to working, still at [employer]  Still in court classes  Taking it for gabapentin pain in feet.     OBJECTIVE  PHYSICAL EXAM:  There were no vitals taken for this visit.    GENERAL: healthy, alert and no distress  EYES: Eyes grossly normal to inspection, PERRL and conjunctivae and sclerae normal  RESP: No respiratory distress  MENTAL STATUS EXAM  Appearance/Behavior: No appearant distress  Speech: Normal  Mood/Affect: normal affect  Insight: Fair      PHQ-9 Score:       6/13/2024    12:58 PM 9/4/2024     9:40 AM 9/11/2024     1:40 PM   PHQ   PHQ-9 Total Score 1 7 15   Q9: Thoughts of better off dead/self-harm past 2 weeks Not at all  Not at all Not at all        Proxy-reported       YUSUF-7 Score:      3/20/2024     2:00 PM 4/17/2024     3:24 PM 2/7/2025     8:53 AM   YUSUF-7 SCORE   Total Score  1 (minimal anxiety) 8 (mild anxiety)   Total Score 2 1 8        Proxy-reported       LABS (may not contain today's labs)                                                      Today's lab data  No results found for any visits on 04/03/25.        HISTORY                                                    Problem list reviewed & adjusted, as indicated.  Patient Active Problem List   Diagnosis    CARDIOVASCULAR SCREENING; LDL GOAL LESS THAN 130    Generalized anxiety disorder    Essential hypertension    Hypertriglyceridemia    Dupuytren's contracture of hand    Alcohol withdrawal with complication with inpatient treatment, with unspecified complication (H)    Vasovagal syncope    History of colonic polyps    Alcohol use disorder, severe, dependence (H)    Tobacco use disorder, moderate, dependence    Type 2 diabetes mellitus with hyperglycemia, without long-term " current use of insulin (H)         MEDICATION LIST (after visit)  Current Outpatient Medications   Medication Sig Dispense Refill    ARIPiprazole (ABILIFY) 5 MG tablet TAKE ONE TABLET (5 MG) BY MOUTH ONE TIME DAILY 90 tablet 1    blood glucose (NO BRAND SPECIFIED) test strip Needs Accu-Chek Guide strips. Use to test blood glucose test blood sugar 2x times daily. . 200 strip 3    blood glucose monitoring (NO BRAND SPECIFIED) meter device kit Use to test blood sugar 1x times daily. Preferred Blood Glucose Monitor Brands: per insurance. 1 kit 0    DULoxetine (CYMBALTA) 60 MG capsule Take 1 capsule (60 mg) by mouth daily. 30 capsule 1    gabapentin (NEURONTIN) 300 MG capsule Take 2 capsules (600 mg) by mouth 3 times daily. Increase gabapentin dose 300 mg/day to a target dose of 600 mg TID (Patient taking differently: Take 600 mg by mouth 3 times daily. Increase gabapentin dose 300 mg/day to a target dose of 600 mg TID  Per patient is only taking 1 time daily as well) 180 capsule 1    hydroCHLOROthiazide 12.5 MG tablet Take 1 tablet (12.5 mg) by mouth daily. 90 tablet 3    hydrOXYzine HCl (ATARAX) 50 MG tablet Take 0.5-1 tablets (25-50 mg) by mouth every 8 hours as needed for anxiety. 180 tablet 0    lisinopril (ZESTRIL) 40 MG tablet Take 1 tablet (40 mg) by mouth daily. 90 tablet 3    Melatonin 10 MG TABS tablet Take 10 mg by mouth nightly as needed for sleep      metFORMIN (GLUCOPHAGE) 500 MG tablet Take with food, 2 TABLETS (1000mg) BY MOUTH in the morning and 2 TABLETS (1000mg) in the evening 120 tablet 0    multivitamin w/minerals (THERA-VIT-M) tablet Take 1 tablet by mouth daily (Patient not taking: Reported on 10/25/2024) 90 tablet 0    naltrexone (DEPADE/REVIA) 50 MG tablet Take 2 tablets (100 mg) by mouth daily. 180 tablet 0    nicotine polacrilex (NICORETTE) 4 MG gum Place 1 each (4 mg) inside cheek as needed for nicotine withdrawal symptoms. 120 each 2    simvastatin (ZOCOR) 40 MG tablet Take 1 tablet (40 mg)  by mouth every evening. 90 tablet 3    thiamine (B-1) 100 MG tablet TAKE 1 TABLET (100 MG) BY MOUTH DAILY 90 tablet 3    thin (NO BRAND SPECIFIED) lancets Use to test blood sugar 1x times daily. Preferred Blood Glucose Monitor Brands: per insurance. 100 each 11    traZODone (DESYREL) 150 MG tablet Take 1 tablet (150 mg) by mouth at bedtime. 30 tablet 1     No current facility-administered medications for this visit.         No Known Allergies        Christina Posadas NP  [facility]  [phone]

## 2025-04-03 NOTE — PATIENT INSTRUCTIONS
Patient Education   Addiction Medicine  What to Expect  Here's what to expect from our Addiction Medicine program.  About Addiction Medicine  Addiction Medicine clinics help you with substance use problems. You set your own goals. We try to help you reach your goals. Your care plan can include:  Medicine  Creating a recovery plan  Helping you find local resources  Helping with treatment options  Clinic phone number and addresses  Clinic Phone: [phone]  Mental Health and Addiction Clinic  [facility]  [address], [address]  [place] [address]  [facility]  [address], [address]  [place] [address]  Walk-in services  We offer walk-in care for patients at the Recovery Clinic. This is only for patients with Opioid Use Disorder (OUD). Anyone with OUD is welcome. Our providers will refer you to the Recovery Clinic if you're struggling to keep up with your medicines or appointments.  Recovery Clinic ([facility])  [address], [address]  [place] [address]  Phone: [phone]  The Recovery Clinic is open for walk-ins Monday to Friday 9 a.m. to 11:30 a.m. and 12:30 p.m. to 3 p.m.  How it works  Come to your visits every time. The treatment works better when you do.   You can have as many visits as you need. When you're better, we'll refer you back to being cared for by your family doctor.   If you need it, we'll send you to doctors, psychiatrists, therapists, and other providers. We focus on treating addiction. We don't treat other problems, like managing other medicines or non-addiction issues.  About visits  Urine drug testing  We'll often test your pee (urine) for drugs. This is the only way we can know for sure whether or not you're using drugs. It helps us treat you without judgement.   Suboxone (buprenorphine)  If you're taking buprenorphine, you'll have a lot of visits at first. If your problem is getting worse, or you're  "using substances, we may schedule you for extra visits.   Cancelling visits  If you can't come to your visit, please call us right away at [phone]. If you don't cancel at least 24 hours (1 full day) before your visit, that's \"late cancellation.\"  Being late to visits  If you come late, you may not be seen. This will count as a \"no-show.\"  Please call the clinic if you're running late. This will help us plan, but it doesn't mean you'll be seen.   Being late is:  More than 15 minutes late for a return visit.  More than 30 minutes late for your first visit.  If you cancel late or don't show up 2 times within 6 months, we may transfer you to another clinic.   Getting help between visits  If you need help between visits, you can call us Monday to Friday from 8 a.m. to 4:30 p.m. at [phone]. You can also send us a message on [employer].  Medicine refills  If you miss or cancel a visit, you can still ask for a refill. But we can only refill your medicines if you've made a new appointment.  Please call your pharmacy for medicine refills. If you have a question about your refill, call us at [phone].  It takes up to 2 business days to refill your drugs. Let us know 2 to 3 days before you run out. Don't call more than 1 week before you run out. That's too early.   Please make sure we have your right phone number.  If we have a problem with your refill, we'll call you. If we call you, please call us back right away. If you don't, you may not get your medicines quickly.   Call your pharmacy to find out if your medicines are ready.   Keep your medicines in a safe place. Keep them away from pets and children. If your medicines are lost or stolen, we usually don't replace them. We recommend you file a police report if your medicines are stolen. Your insurance may not pay for early refills, even if you have a prescription.  Forms  Please give us at least 3 business days to fill out any forms. Bring the forms to your " visits if you can. We may refer you to other members of your care team to complete the forms.   Emergency care   Call 911 or go to the nearest emergency room if your life or someone else's life is in danger.  Call 988 anytime for the Suicide and Crisis Lifeline.  If you need care when we're closed, call your family doctor to see if they can help. You can also go to urgent care or an emergency room. [facility] emergency rooms may be able to give you buprenorphine or other medicine refills.  Thank you for choosing us for your care.  For informational purposes only. Not to replace the advice of your health care provider. Copyright   2023 [facility]. All rights reserved. [employer] 232969 - REV 05/23.

## 2025-04-03 NOTE — NURSING NOTE
Is the patient currently in the state of [place]? YES    Current patient location: [id number]  [facility] [address]    Visit mode:Telephone    If the visit is dropped, the patient can be reconnected by: TELEPHONE VISIT: Phone number:   Telephone Information:   Mobile [phone]       Will anyone else be joining the visit? No  (If patient encounters technical issues they should call [phone])    Are changes needed to the allergy or medication list? Pt stated no changes to allergies and Pt stated no med changes    Are refills needed on medications prescribed by this physician? Discuss with Provider    Rooming Documentation: Questionnaire(s) completed.    Reason for visit: RECHECK     Alethea Ferraro, VVF         Please see PT eval

## 2025-04-09 DIAGNOSIS — F33.1 MAJOR DEPRESSIVE DISORDER, RECURRENT EPISODE, MODERATE WITH ANXIOUS DISTRESS (H): ICD-10-CM

## 2025-04-09 RX ORDER — DULOXETIN HYDROCHLORIDE 60 MG/1
60 CAPSULE, DELAYED RELEASE ORAL DAILY
Qty: 90 CAPSULE | Refills: 1 | Status: SHIPPED | OUTPATIENT
Start: 2025-04-09

## 2025-04-27 DIAGNOSIS — E11.65 TYPE 2 DIABETES MELLITUS WITH HYPERGLYCEMIA, WITHOUT LONG-TERM CURRENT USE OF INSULIN (H): ICD-10-CM

## 2025-05-01 ENCOUNTER — OFFICE VISIT (OUTPATIENT)
Dept: INTERNAL MEDICINE | Facility: CLINIC | Age: 65
End: 2025-05-01
Payer: COMMERCIAL

## 2025-05-01 VITALS
DIASTOLIC BLOOD PRESSURE: 74 MMHG | OXYGEN SATURATION: 97 % | HEART RATE: 66 BPM | HEIGHT: 71 IN | WEIGHT: 202 LBS | SYSTOLIC BLOOD PRESSURE: 130 MMHG | RESPIRATION RATE: 16 BRPM | TEMPERATURE: 98.4 F | BODY MASS INDEX: 28.28 KG/M2

## 2025-05-01 DIAGNOSIS — M79.672 FOOT PAIN, BILATERAL: Primary | ICD-10-CM

## 2025-05-01 DIAGNOSIS — E11.9 TYPE 2 DIABETES MELLITUS WITHOUT COMPLICATION, WITHOUT LONG-TERM CURRENT USE OF INSULIN (H): ICD-10-CM

## 2025-05-01 DIAGNOSIS — I10 ESSENTIAL HYPERTENSION: Chronic | ICD-10-CM

## 2025-05-01 DIAGNOSIS — M72.2 DUPUYTREN'S CONTRACTURE OF FOOT: ICD-10-CM

## 2025-05-01 DIAGNOSIS — M79.671 FOOT PAIN, BILATERAL: Primary | ICD-10-CM

## 2025-05-01 PROCEDURE — G2211 COMPLEX E/M VISIT ADD ON: HCPCS | Performed by: INTERNAL MEDICINE

## 2025-05-01 PROCEDURE — 3075F SYST BP GE 130 - 139MM HG: CPT | Performed by: INTERNAL MEDICINE

## 2025-05-01 PROCEDURE — 99214 OFFICE O/P EST MOD 30 MIN: CPT | Performed by: INTERNAL MEDICINE

## 2025-05-01 PROCEDURE — 3078F DIAST BP <80 MM HG: CPT | Performed by: INTERNAL MEDICINE

## 2025-05-01 RX ORDER — ATORVASTATIN CALCIUM 20 MG/1
20 TABLET, FILM COATED ORAL AT BEDTIME
Qty: 90 TABLET | Refills: 3 | Status: SHIPPED | OUTPATIENT
Start: 2025-05-01

## 2025-05-01 RX ORDER — LISINOPRIL 10 MG/1
10 TABLET ORAL DAILY
Qty: 90 TABLET | Refills: 3 | Status: SHIPPED | OUTPATIENT
Start: 2025-05-01

## 2025-05-01 ASSESSMENT — PATIENT HEALTH QUESTIONNAIRE - PHQ9
SUM OF ALL RESPONSES TO PHQ QUESTIONS 1-9: 1
10. IF YOU CHECKED OFF ANY PROBLEMS, HOW DIFFICULT HAVE THESE PROBLEMS MADE IT FOR YOU TO DO YOUR WORK, TAKE CARE OF THINGS AT HOME, OR GET ALONG WITH OTHER PEOPLE: NOT DIFFICULT AT ALL
SUM OF ALL RESPONSES TO PHQ QUESTIONS 1-9: 1

## 2025-05-01 NOTE — ADDENDUM NOTE
CHIEF COMPLAINT:   Chief Complaint   Patient presents with    Left Knee - Pain    Office Visit       Eran Roque, 71 year old, male presenting for left knee pain.    Pt presents today accompanied by his wife  Any use of an assistive device? no assistive device  Has the patient tried any type of injection?No injections attempted  Has the patient ever been seen by another orthopedist for their current problem? no    Occupation: retired   Exercise: treadmill 4 days per week, has not done since knee started bothering him.   Injury noted: about 6 weeks ago, was trying to get on bicycle swung leg up and over. A couple of days later knee started bothering him   Hx of pain/anti-inflammatory regimen:  Advil PRN  Tobacco/Drug/Alcohol use verified.  Medications reviewed with patient:No changes made.  Surgical and Medical History reviewed with patient, no changes  PCP Kyle Pope MD   Encounter addended by: Marquis Quintero, [facility] on: 2/19/2020 11:48 AM   Actions taken: Clinical Note Signed

## 2025-05-01 NOTE — PROGRESS NOTES
"  Assessment & Plan     Foot pain, bilateral  Dupuytren's contracture of foot  See podiatry   - Orthopedic  Referral; Future    Type 2 diabetes mellitus without complication, without long-term current use of insulin (H)  Cont metformin   - atorvastatin (LIPITOR) 20 MG tablet; Take 1 tablet (20 mg) by mouth at bedtime.  - Albumin Random Urine Quantitative with Creat Ratio; Future  - Basic metabolic panel; Future  - Lipid Profile; Future  - Hemoglobin A1c; Future    Essential hypertension  Cont meds, sobriety key here. W/o alcohol- requiring significantly less mg   - lisinopril (ZESTRIL) 10 MG tablet; Take 1 tablet (10 mg) by mouth daily.  - Albumin Random Urine Quantitative with Creat Ratio; Future        The longitudinal plan of care for the diagnosis(es)/condition(s) as documented were addressed during this visit. Due to the added complexity in care, I will continue to support Durga in the subsequent management and with ongoing continuity of care.    Nicotine/Tobacco Cessation  He reports that he has been smoking cigarettes. He started smoking about 25 years ago. He has a 12.6 pack-year smoking history. He has never used smokeless tobacco.  Nicotine/Tobacco Cessation Plan  Information offered: Patient not interested at this time      BMI  Estimated body mass index is 28.17 kg/m  as calculated from the following:    Height as of this encounter: 1.803 m (5' 11\").    Weight as of this encounter: 91.6 kg (202 lb).             Subjective   Durga is a 65 year old, presenting for the following health issues:  Diabetes and Referral (To [facility], having plantar fibromatosis, in both feet)    History of Present Illness       Reason for visit:  Feet  Symptom onset:  More than a month  Symptoms include:  Sore  Symptom intensity:  Severe  Symptom progression:  Staying the same  Had these symptoms before:  Yes  Has tried/received treatment for these symptoms:  Yes  Previous treatment was successful:  No  What makes it worse: " " Walking  What makes it better:  Rest   He is taking medications regularly.                      Objective    /74   Pulse 66   Temp 98.4  F (36.9  C) (Temporal)   Resp 16   Ht 1.803 m (5' 11\")   Wt 91.6 kg (202 lb)   SpO2 97%   BMI 28.17 kg/m    Body mass index is 28.17 kg/m .  Physical Exam   GENERAL: alert and no distress  RESP: lungs clear to auscultation - no rales, rhonchi or wheezes  CV: regular rate and rhythm, normal S1 S2, no S3 or S4, no murmur, click or rub, no peripheral edema   Diabetic foot exam: normal DP and PT pulses, no trophic changes or ulcerative lesions, normal sensory exam, and normal monofilament exam            Signed Electronically by: Rohit Solitario MD    "

## 2025-05-05 ENCOUNTER — PATIENT OUTREACH (OUTPATIENT)
Dept: CARE COORDINATION | Facility: CLINIC | Age: 65
End: 2025-05-05
Payer: COMMERCIAL

## 2025-06-05 ENCOUNTER — VIRTUAL VISIT (OUTPATIENT)
Dept: ADDICTION MEDICINE | Facility: CLINIC | Age: 65
End: 2025-06-05
Payer: COMMERCIAL

## 2025-06-05 DIAGNOSIS — F10.20 ALCOHOL USE DISORDER, SEVERE, DEPENDENCE (H): Primary | ICD-10-CM

## 2025-06-05 DIAGNOSIS — F17.200 TOBACCO USE DISORDER, MODERATE, DEPENDENCE: ICD-10-CM

## 2025-06-05 DIAGNOSIS — F33.1 MAJOR DEPRESSIVE DISORDER, RECURRENT EPISODE, MODERATE WITH ANXIOUS DISTRESS (H): ICD-10-CM

## 2025-06-05 DIAGNOSIS — E78.1 HYPERTRIGLYCERIDEMIA: Chronic | ICD-10-CM

## 2025-06-05 DIAGNOSIS — I10 ESSENTIAL HYPERTENSION: Chronic | ICD-10-CM

## 2025-06-05 DIAGNOSIS — F41.1 GENERALIZED ANXIETY DISORDER: ICD-10-CM

## 2025-06-05 RX ORDER — GABAPENTIN 300 MG/1
600 CAPSULE ORAL AT BEDTIME
COMMUNITY
Start: 2025-06-05

## 2025-06-05 ASSESSMENT — PAIN SCALES - GENERAL: PAINLEVEL_OUTOF10: MODERATE PAIN (4)

## 2025-06-05 NOTE — PROGRESS NOTES
Virtual Visit Details    Type of service:  Telephone Visit   Phone call duration: 30 minutes   Originating Location (pt. Location): Home    Distant Location (provider location):  On-site  Telephone visit completed due to the patient did not have access to video, while the distant provider did.

## 2025-06-05 NOTE — PROGRESS NOTES
[facility]    A/P                                                    ASSESSMENT/PLAN  1. Alcohol use disorder, severe, dependence (H)  -controlled in early remission  - gabapentin (NEURONTIN) 300 MG capsule; Take 2 capsules (600 mg) by mouth at bedtime.  - Follow-up appointment scheduled for August 7, 2025, at 9:00 AM.  - Treatment recommendations include continuing current medications: duloxetine for mood, hydrochlorothiazide and lisinopril for blood pressure, trazodone for sleep, and atorvastatin for cholesterol.  - Patient expressed concerns about the lack of injections for foot condition and dizziness related to blood pressure medication.  -maintains commitment to abstinence.     2. Major depressive disorder, recurrent episode, moderate with anxious distress (H)  3. Generalized anxiety disorder  -improved with cymbalta, hydroxizine and gabapentin  - gabapentin (NEURONTIN) 300 MG capsule; Take 2 capsules (600 mg) by mouth at bedtime.    4. Tobacco use disorder, moderate, dependence  -Has NRT available at [facility].      5. Hypertriglyceridemia  Continue atorvastatin as prescribed    6. Essential hypertension  -improved with alcohol cessation.   - Discussed potential side effects of medications, including dizziness and dry mouth.  - Encouraged patient to monitor blood pressure regularly and maintain communication with healthcare providers regarding any changes in symptoms or medication effects.    Jun 5, 2025  - stopped naltrexone  Taking gabapentin, hydroxizine       Continued Complex Management  The longitudinal plan of care for Alcohol Use Disorder (AUD) was addressed during this visit. Due to the added complexity in care, I will continue to support Durga in the subsequent management and with ongoing continuity of care.      Last encounter A/P  1. Alcohol dependence with alcohol-induced mood disorder (H) (Primary)  -controlled in early remission  -continue with naltrexone 100  mg  -continue with recovery activities and DUI classes as ordered by court  - naltrexone (DEPADE/REVIA) 50 MG tablet; Take 2 tablets (100 mg) by mouth daily.  Dispense: 180 tablet; Refill: 1     2. Major depressive disorder, recurrent episode, moderate with anxious distress (H)  -improved with cymbalta, and prn hydroxyzine and abstinence   - DULoxetine (CYMBALTA) 60 MG capsule; Take 1 capsule (60 mg) by mouth daily.  Dispense: 90 capsule; Refill: 1  - traZODone (DESYREL) 150 MG tablet; Take 1 tablet (150 mg) by mouth at bedtime.  Dispense: 90 tablet; Refill: 1  - hydrOXYzine HCl (ATARAX) 50 MG tablet; Take 0.5-1 tablets (25-50 mg) by mouth every 8 hours as needed for anxiety.  Dispense: 180 tablet; Refill: 0  -not interested in psychotherapy      3. Insomnia due to other mental disorder  -controlled with trazodone   - traZODone (DESYREL) 150 MG tablet; Take 1 tablet (150 mg) by mouth at bedtime.  Dispense: 90 tablet; Refill: 1      PDMP Review         Value Time User    State PDMP site checked  Yes 2/28/2025  4:19 PM Christina Posadas NP              RTC  No follow-ups on file.      Counseled the patient on the importance of having a recovery program in addition to medication to manage recovery.  Components include avoiding isolating, having willingness to change, avoiding triggers and managing cravings. Encouraged having some type of sober network and practicing honesty with trusted support person(s). Encouraged other services such as counseling, 12 step or other self-help organizations.      Opioid warning reviewed.  Risk of overdose following a period of abstinence due to decrease tolerance was discussed including risk of death.  Strongly recommended abstain from alcohol, benzodiazepines, THC, opioids and other drugs of abuse.  Increased risk of return to opioid use after use of these substances discussed.  Increased risk of overdose/death with use of other substances particularly benzodiazepines/alcohol  "reviewed.        SUBJECTIVE                                                    Durga Aguirre is a 65 year old male who presents to clinic today for follow up    Visit performed In over phone.       ROYA History:  \"Have you ever had any history with [...] use?\" And \"When was your last use?  ALCOHOL - 1/16  CANNABIS - denies  PRESCRIPTION STIMULANTS (includes Ritalin, Adderall, Vyvanse) - denies  COCAINE/CRACK -   METH/AMPHETAMINES (includes ecstacy, MDMA/abel) -   OPIATES -   BENZODIAZEPINES (includes Ativan, Klonopin, Xanax) -   KRATOM (mild opioid and stimulant effects) -   KETAMINE -   HALLUCINOGENS (includes DXM) -   BEHAVIORAL (Gambling, Eating d/o, Compulsivity) -   History of treatment -   NICOTINE  Cigarettes:   Chew/snus:   Vaping:   Past NRT/medication use: Naltrexone        Previous withdrawal treatment episodes (e.g. detox): 8/6/23  Previous ROYA treatment programs: [facility] Aug-Oct 2023,   Hospitalizations or overdose:   Medical complications from substance use:   IV Drug use?: denies  Previous Medication for Addiction Tx: naltrexone  Longest period of full abstinence: 3 years  Activities that have previously supported abstinence: treatment, meetings  Current Recovery Activities: none        Infectious disease screening  Hep C:              Hepatitis C Antibody   Date Value Ref Range Status   06/24/2019 Test canceled by physician NR^Nonreactive Final         HIV:                  HIV Antigen Antibody Combo   Date Value Ref Range Status   06/24/2019 Nonreactive NR^Nonreactive     Final       Comment:       HIV-1 p24 Ag & HIV-1/HIV-2 Ab Not Detected            Psychiatric History (per patient report and problem list review)  Past diagnoses - depression  Current or past psychiatrist:   Current or past therapist:  (through [facility])   Hospitalizations/TMS/ECT -   Suicide Attempts -   Medication trials -     Recent HPI Details:  HPI Apr 3, 2025  - \"I'm doing ok\"  had an appointment with [facility] " "contracture  Taking vitamin B and lipolic acid  Had appointment with neurology for Dupuytren's contracture in feet.  Continuing with B 12 and Lipolic Acid.  \"It's tolerable, either that or I'm getting use to it\"   No alcohol use, still taking naltrexone   Depression and anxiety \"bouncing around\" takes hydroxyzine before anxiety get's too bad.     Looking forward to working, still at [employer]  Still in court classes  Taking it for gabapentin pain in feet.     TODAY'S VISIT  HPI Jun 5, 2025  History of Present Illness-  Durga Aguirre, 65-year-old male    - Working a lot at [employer], which exacerbates foot pain.  - Painful lumps on tendons across the middle of both feet, more severe on one foot.  - Previous injections for Dupuytren's contracture in the hand were effective.  - Consulted [facility] and [facility] for foot issues; no injections provided.  - Neurologist recommended vitamins, including lipoic acid, for neuropathy.  - Blood tests indicated low B6 and B12 levels.  - Previous A1C levels were as high as 11, currently around 5.6, indicating controlled diabetes.  - Experiences dizziness when bending over, possibly related to blood pressure medication.  - Son has been in detox multiple times due to alcohol issues.  - Psychosocial details: Durga has stopped alcohol use, which has improved his blood pressure and diabetes. He has a history of panic attacks and is currently managing his mood with medication. He lives with his cousin and works long hours at [employer].      OBJECTIVE  PHYSICAL EXAM:  There were no vitals taken for this visit.    GENERAL: healthy, alert and no distress  EYES: Eyes grossly normal to inspection, PERRL and conjunctivae and sclerae normal  RESP: No respiratory distress  MENTAL STATUS EXAM  Appearance/Behavior: No appearant distress  Speech: Normal  Mood/Affect: flat  Insight: Adequate      PHQ-9 Score:       9/11/2024     1:40 PM 5/1/2025    12:44 PM 6/5/2025     " 8:49 AM   PHQ   PHQ-9 Total Score 15 1  1    Q9: Thoughts of better off dead/self-harm past 2 weeks Not at all  Not at all Not at all        Patient-reported    Proxy-reported       YUSUF-7 Score:      3/20/2024     2:00 PM 4/17/2024     3:24 PM 2/7/2025     8:53 AM   YUSUF-7 SCORE   Total Score  1 (minimal anxiety) 8 (mild anxiety)   Total Score 2 1 8        Proxy-reported       LABS (may not contain today's labs)                                                      Today's lab data  No results found for any visits on 06/05/25.        HISTORY                                                    Problem list reviewed & adjusted, as indicated.  Patient Active Problem List   Diagnosis    CARDIOVASCULAR SCREENING; LDL GOAL LESS THAN 130    Generalized anxiety disorder    Essential hypertension    Hypertriglyceridemia    Dupuytren's contracture of hand    Alcohol withdrawal with complication with inpatient treatment, with unspecified complication (H)    Vasovagal syncope    History of colonic polyps    Alcohol use disorder, severe, dependence (H)    Tobacco use disorder, moderate, dependence    Type 2 diabetes mellitus with hyperglycemia, without long-term current use of insulin (H)    Major depressive disorder, recurrent episode, moderate with anxious distress (H)         MEDICATION LIST (after visit)  Current Outpatient Medications   Medication Sig Dispense Refill    atorvastatin (LIPITOR) 20 MG tablet Take 1 tablet (20 mg) by mouth at bedtime. 90 tablet 3    blood glucose (NO BRAND SPECIFIED) test strip Needs Accu-Chek Guide strips. Use to test blood glucose test blood sugar 2x times daily. . 200 strip 3    blood glucose monitoring (NO BRAND SPECIFIED) meter device kit Use to test blood sugar 1x times daily. Preferred Blood Glucose Monitor Brands: per insurance. 1 kit 0    DULoxetine (CYMBALTA) 60 MG capsule Take 1 capsule (60 mg) by mouth daily. (Patient not taking: Reported on 5/1/2025) 90 capsule 1    gabapentin  (NEURONTIN) 300 MG capsule Take 2 capsules (600 mg) by mouth 3 times daily. Increase gabapentin dose 300 mg/day to a target dose of 600 mg  capsule 1    hydrOXYzine HCl (ATARAX) 50 MG tablet Take 0.5-1 tablets (25-50 mg) by mouth every 8 hours as needed for anxiety. 180 tablet 0    lisinopril (ZESTRIL) 10 MG tablet Take 1 tablet (10 mg) by mouth daily. 90 tablet 3    metFORMIN (GLUCOPHAGE) 500 MG tablet TAKE TWO TABLETS (1000 MG) BY MOUTH TWICE A DAY (MORNING AND EVENING) WITH FOOD 360 tablet 3    naltrexone (DEPADE/REVIA) 50 MG tablet Take 2 tablets (100 mg) by mouth daily. (Patient not taking: Reported on 5/1/2025) 180 tablet 1    nicotine polacrilex (NICORETTE) 4 MG gum Place 1 each (4 mg) inside cheek as needed for nicotine withdrawal symptoms. (Patient not taking: Reported on 5/1/2025) 120 each 2    thiamine (B-1) 100 MG tablet TAKE 1 TABLET (100 MG) BY MOUTH DAILY 90 tablet 3    thin (NO BRAND SPECIFIED) lancets Use to test blood sugar 1x times daily. Preferred Blood Glucose Monitor Brands: per insurance. 100 each 11    traZODone (DESYREL) 150 MG tablet Take 1 tablet (150 mg) by mouth at bedtime. 90 tablet 1     No current facility-administered medications for this visit.         No Known Allergies    Additional MDM Details:  none      Christina Posadas NP  [facility]  [phone]

## 2025-06-05 NOTE — NURSING NOTE
Current patient location: [id number] [facility] [address]    Is the patient currently in the state of [place]? YES    Visit mode: TELEPHONE    If the visit is dropped, the patient can be reconnected by:TELEPHONE VISIT: Phone number:   Telephone Information:   Mobile [phone]       Will anyone else be joining the visit? NO  (If patient encounters technical issues they should call [phone])    Are changes needed to the allergy or medication list? No    Are refills needed on medications prescribed by this physician? Discuss with provider    Rooming Documentation:  Not applicable    Reason for visit: RECHECK    James CORTEZ

## 2025-06-14 ENCOUNTER — HEALTH MAINTENANCE LETTER (OUTPATIENT)
Age: 65
End: 2025-06-14

## 2025-06-18 NOTE — TELEPHONE ENCOUNTER
[facility]: Please contact patient to make follow up appt with provider, Brenda Posadas NP    Date of Last Office Visit: 2/16/24  Date of Next Office Visit: none scheduled   No shows since last visit: 0  Cancellations since last visit: 0 (? - 4/15/24 erroneous encounter)    Medication requested: acamprosate (CAMPRAL) 333 MG EC tablet  Date last ordered: 2/16/24 Qty: 180 Refills: 1     Review of [place] ?: NA    Lapse in medication adherence greater than 5 days?: no  If yes, call patient and gather details: NA  Medication refill request verified as identical to current order?: yes  Result of Last DAM, VPA, Li+ Level, CBC, or Carbamazepine Level (at or since last visit): N/A    Last visit treatment plan:   1. Alcohol use disorder, severe, dependence (H)  3. Alcohol dependence with alcohol-induced mood disorder (H)  -needs improvement.  Decrease in cravings, continued alcohol intake averaging 3 drinks every few days.  Not at goal   - acamprosate (CAMPRAL) 333 MG EC tablet; Take 2 tablets (666 mg) by mouth 3 times daily  Dispense: 180 tablet; Refill: 1  - gabapentin (NEURONTIN) 300 MG capsule; Take 2 capsules (600 mg) by mouth 3 times daily Increase gabapentin dose 300 mg/day to a target dose of 600 mg TID  Dispense: 180 capsule; Refill: 1  -continue with 100mg naltrexone.   -complete ROYA assessment and follow recommendations  -recommend 12 step recovery groups.  Finding new groups  -2 month follow up  2. Generalized anxiety disorder  -needs improvement  -Continue with buspar, celexa, hydroxizine as prescribed  - gabapentin (NEURONTIN) 300 MG capsule; Take 2 capsules (600 mg) by mouth 3 times daily Increase gabapentin dose 300 mg/day to a target dose of 600 mg TID  Dispense: 180 capsule; Refill: 1  - increase physical activity  -recommend alcohol cessation  -recommend psychotherapy.  Pt currently declining          []Medication refilled per  Medication Refill in Ambulatory Care  policy.  [x]Medication unable to be refilled by RN  due to criteria not met as indicated below:    []Eligibility - not seen in the last year   [x]Supervision - no future appointment   []Compliance - no shows, cancellations or lapse in therapy   []Verification - order discrepancy   []Controlled medication   []Medication not included in policy   []90-day supply request   []Other   PAST MEDICAL HISTORY:  Anxiety and depression     Attention deficit disorder (ADD)     Obesity     REBEL (obstructive sleep apnea) CPAP

## 2025-06-19 ENCOUNTER — OFFICE VISIT (OUTPATIENT)
Dept: PODIATRY | Facility: CLINIC | Age: 65
End: 2025-06-19
Attending: INTERNAL MEDICINE
Payer: COMMERCIAL

## 2025-06-19 VITALS — HEIGHT: 71 IN | BODY MASS INDEX: 28.28 KG/M2 | WEIGHT: 202 LBS

## 2025-06-19 DIAGNOSIS — M79.672 FOOT PAIN, BILATERAL: ICD-10-CM

## 2025-06-19 DIAGNOSIS — M72.2 PLANTAR FASCIAL FIBROMATOSIS: Primary | ICD-10-CM

## 2025-06-19 DIAGNOSIS — M79.89 PALPABLE MASS OF SOFT TISSUE OF FOOT: ICD-10-CM

## 2025-06-19 DIAGNOSIS — M79.671 FOOT PAIN, BILATERAL: ICD-10-CM

## 2025-06-19 DIAGNOSIS — M72.2 DUPUYTREN'S CONTRACTURE OF FOOT: ICD-10-CM

## 2025-06-19 PROBLEM — D12.2 BENIGN NEOPLASM OF ASCENDING COLON: Status: ACTIVE | Noted: 2024-02-08

## 2025-06-19 ASSESSMENT — PAIN SCALES - GENERAL: PAINLEVEL_OUTOF10: SEVERE PAIN (9)

## 2025-06-19 NOTE — PATIENT INSTRUCTIONS
Thank you for choosing [facility] / [facility]le Surgery!    DR. WADDELL'S CLINIC LOCATIONS:     [facility] TRIAGE LINE: [phone]   [address] APPOINTMENTS: [phone]   [place] [address] RADIOLOGY: [phone]   (Every other Tues - Wed - Fri PM) SET UP SURGERY: [phone]    PHYSICAL THERAPY: [phone]   [place] SPECIALTY BILLING QUESTIONS: [phone] [address] FAX: [phone]   [place] [address]    (Thurs & Fri AM)      PLANTAR FIBROMA  A plantar fibroma is a fibrous knot (nodule) in the arch of the foot. It is embedded within the plantar fascia, a band of tissue that extends from the heel to the toes on the bottom of the foot. A plantar fibroma can develop in one or both feet, is benign (non-malignant), and usually will not go away or get smaller without treatment. Definitive causes for this condition have not been clearly identified.    SIGNS & SYMPTOMS  The characteristic sign of a plantar fibroma is a noticeable lump in the arch that feels firm to the touch. This mass can remain the same size or get larger over time, or additional fibromas may develop.  People who have a plantar fibroma may or may not have pain. When pain does occur, it is often caused by shoes pushing against the lump in the arch, although it can also arise when walking or standing barefoot.    DIAGNOSIS  To diagnose a plantar fibroma, the foot and ankle surgeon will examine the foot and press on the affected area. Sometimes this can produce pain that extends down to the toes. An MRI or biopsy may be performed to further evaluate the lump and aid in diagnosis.    TREATMENT OPTIONS   Non-surgical treatment may help relieve the pain of a plantar fibroma, although it will not make the mass disappear. The foot and ankle surgeon may select one or more of the following non-surgical options:     Steroid injections. Injecting corticosteroid medication into the mass may help  shrink  it and thereby relieve the pain that occurs when walking. This reduction  may be only temporary and the fibroma could slowly return to its original size.      Orthotic devices. If the fibroma is stable, meaning it is not changing in size,  custom orthotic devices (shoe inserts) may relieve the pain by distributing the  patient s weight away from the fibroma.      Physical therapy. The pain is sometimes treated through physical therapy  methods that deliver anti-inflammatory medication into the fibroma without the  need for injection.  Cross friction massage.     Verapamil Cream: Applying 10% or higher verapamil cream can help to decrease  size.     Surgery: If the mass increases in size or pain, the patient should be further  evaluated. Surgical treatment to remove the fibroma is considered if the patient  continues to experience pain following non-surgical approaches. Surgical  removal of a plantar fibroma may result in a flattening of the arch or  development of hammertoes. Orthotic devices may be prescribed to provide  support to the foot. Due to the high incidence of recurrence with this condition,  continued follow-up with the foot and ankle surgeon is recommended.    SMOKING CESSATION  What's in cigarette smoke? - Cigarette smoke contains over 4,000 chemicals. Nicotine is one of the main ingredients which is an insecticide/herbicide. It is poisonous to our nervous system, increases blood clotting risk, and decreases the body's defenses to fight off infection. Another chemical is Carbon Monoxide is an asphyxiating gas that permanently binds to blood cells and blocks the transport of oxygen. This leads to tissue death and decreases your metabolism. Tar is a chemical that coats your lungs and trachea which impairs new oxygen coming in and carbon dioxide getting out of your body.   How does smoking impact surgery? - Smoking is particularly hazardous with regards to surgery. Surgery puts stress on the body and a  smoker's body is already under strain from these chemicals. Putting the two together, especially for an elective surgery, could be a recipe for disaster. Smoking before and after surgery increases your risk of heart problems, slow wound healing, delayed bone healing, blood clots, wound infection and anesthesia complications.   What are the benefits of quitting? - In 20 minutes your blood pressure will drop back down to normal. In 8 hours the carbon monoxide (a toxic gas) levels in your blood stream will drop by half, and oxygen levels will return to normal. In 48 hours your chance of having a heart attack will have decreased. All nicotine will have left your body. Your sense of taste and smell will return to a normal level. In 72 hours your bronchial tubes will relax, and your energy levels will increase. In 2 weeks your circulation will increase, and it will continue to improve for the next 10 weeks.    Recommendations for elective surgery - Ideally, patients should quit smoking 8 weeks before and at least 2 weeks after elective surgery in order to avoid complications. Simply cutting back on the amount of cigarettes smoked per day does not offer any benefit or decrease the risk of poor wound healing, heart problems, and infection. Smokers should also start taking Vitamin C and B for two weeks before surgery and two weeks after surgery.    Ways to Stop Smokin. Nicotine patches, lozenges, or gum  2. Support Groups  3. Medications (see below)    List of Medications:  1. Varenicline Tartrate (CHANTIX) (may be discontinued by FDA as of 2021)  2. Bupropion HCL (WELLBUTRIN, ZYBAN) - note: make sure Wellbutrin is for smoking cessation and not other issues   3. Nicotine Patch (NICODERM)   4. Nicotine Inhaler (NICOTROL)   5. Nicotine Gum Nicotine Polacrilex   6. Nicotine Lozenge: Nicotine Polacrilex (COMMIT)   * [employer] offers a smoking support group as well!  Please visit:  [email or web address]  If you are interested in these, ask about getting a prescription or talk to your primary care doctor about what may be the best way for you to quit.

## 2025-06-19 NOTE — LETTER
6/19/2025      Durga gAuirre  [id number]  [facility] [address]      Dear Colleague,    Thank you for referring your patient, Durga Aguirre, to the [facility]. Please see a copy of my visit note below.    ASSESSMENT:  Encounter Diagnoses   Name Primary?     Plantar fascial fibromatosis, bilateral Yes     Palpable mass of soft tissue of foot      Foot pain, bilateral      Dupuytren's contracture of foot      MEDICAL DECISION MAKING:  Durga's clinical presentation is consistent with plantar fibromatosis/plantar fibromas.  This seems to involve the right foot more than the left.  I explained that I typically address these via footwear, custom orthoses and occasionally surgical removal.  I explained that I have also referred people to radiation oncology.  He is specifically interested in having injections.  This is not a procedure that I offer.  He is likely referring to some form of collagenase.  I told him I will do my best to research and see if there are options for him.  I do not believe our hand surgeon is comfortable offering this for the foot.  I do not think these are Dupuytren's contractures, but rather plantar fibromatosis.    Disclaimer: This note consists of symbols derived from keyboarding, dictation and/or voice recognition software. As a result, there may be errors in the script that have gone undetected. Please consider this when interpreting information found in this chart.    Raheem Pérez DPM, FACFAS, [facility] [place] Department of Podiatry/Foot & Ankle Surgery      ____________________________________________________________________    HPI:       I was asked by Rohit Solitario MD  to evaluate Durga Aguirre in consultation for Dupuytren's contracture of foot.       Durga Aguirre presents today reporting pain related to tendons in his feet.  He has a history of injections for Dupuytren's contracture of his hands.  He is hoping for a similar treatment for his  feet.  He has dealt with this pain for 2 years.  He has done some stretching.  History of a left calcaneal fracture and ORIF.  No residual pain or issues from that.  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  Walking is his main form of exercise.  Reports wearing supportive footwear.    Type 2 DM  Last Hgb A1C = 5.6%  Metformin.    Past Medical History:   Diagnosis Date     Alcohol abuse      Anxiety and depression      Arthritis      Hyperlipidemia      Hypertension      Mass of left chest wall      Other spontaneous pneumothorax 1997     Right knee pain    *  *  Past Surgical History:   Procedure Laterality Date     COLONOSCOPY       HC EXCISION PARTIAL TALUS OR CALCANEUS, BONE      fx calcaneus- 9 screws in foot   *  *  Current Outpatient Medications   Medication Sig Dispense Refill     atorvastatin (LIPITOR) 20 MG tablet Take 1 tablet (20 mg) by mouth at bedtime. 90 tablet 3     blood glucose (NO BRAND SPECIFIED) test strip Needs Accu-Chek Guide strips. Use to test blood glucose test blood sugar 2x times daily. . 200 strip 3     blood glucose monitoring (NO BRAND SPECIFIED) meter device kit Use to test blood sugar 1x times daily. Preferred Blood Glucose Monitor Brands: per insurance. 1 kit 0     DULoxetine (CYMBALTA) 60 MG capsule Take 1 capsule (60 mg) by mouth daily. 90 capsule 1     gabapentin (NEURONTIN) 300 MG capsule Take 2 capsules (600 mg) by mouth at bedtime.       hydrOXYzine HCl (ATARAX) 50 MG tablet Take 0.5-1 tablets (25-50 mg) by mouth every 8 hours as needed for anxiety. 180 tablet 0     lisinopril (ZESTRIL) 10 MG tablet Take 1 tablet (10 mg) by mouth daily. 90 tablet 3     metFORMIN (GLUCOPHAGE) 500 MG tablet TAKE TWO TABLETS (1000 MG) BY MOUTH TWICE A DAY (MORNING AND EVENING) WITH FOOD 360 tablet 3     nicotine polacrilex (NICORETTE) 4 MG gum Place 1 each (4 mg) inside cheek as needed for nicotine withdrawal symptoms. (Patient not taking: Reported on  5/1/2025) 120 each 2     thiamine (B-1) 100 MG tablet TAKE 1 TABLET (100 MG) BY MOUTH DAILY 90 tablet 3     thin (NO BRAND SPECIFIED) lancets Use to test blood sugar 1x times daily. Preferred Blood Glucose Monitor Brands: per insurance. 100 each 11     traZODone (DESYREL) 150 MG tablet Take 1 tablet (150 mg) by mouth at bedtime. 90 tablet 1         EXAM:    Vitals: There were no vitals taken for this visit.  BMI: There is no height or weight on file to calculate BMI.    Vasc:      Pedal pulses are palpable for the dorsalis pedis posterior tibial artery, bilateral foot.  Capillary fill time </= 3 seconds  Pedal skin appears well-perfused  Neuro:      Light touch sensation intact to all sensory nerve distributions, bilateral foot.  No apparent spastic contractures or other deformity secondary to neurologic compromise.  Derm:      No calluses  No wounds   No worrisome lesions  MSK:      There are multiple and somewhat linear firm lumps along the plantar fascial band of the plantar right foot.  This is more subtle on the left.  He has moderate contractures of lesser digits, both feet (hammer toes)  This is not present as a Dupuytren's contracture  Bilateral lower extremity muscle strength presents is normal.  No gross deformities  Adequate ankle and subtalar joint range of motion  Calf:    Neg for redness, swelling or tenderness        Again, thank you for allowing me to participate in the care of your patient.        Sincerely,        Raheem Pérez DPM    Electronically signed

## 2025-06-19 NOTE — NURSING NOTE
"Chief Complaint   Patient presents with    Consult     Wants to discuss injection in both feet       Initial Ht 1.803 m (5' 11\")   Wt 91.6 kg (202 lb)   BMI 28.17 kg/m   Estimated body mass index is 28.17 kg/m  as calculated from the following:    Height as of this encounter: 1.803 m (5' 11\").    Weight as of this encounter: 91.6 kg (202 lb).  Medications and allergies reviewed.      Joselyn BATISTA MA    "

## 2025-06-19 NOTE — PROGRESS NOTES
ASSESSMENT:  Encounter Diagnoses   Name Primary?    Plantar fascial fibromatosis, bilateral Yes    Palpable mass of soft tissue of foot     Foot pain, bilateral     Dupuytren's contracture of foot      MEDICAL DECISION MAKING:  Durga's clinical presentation is consistent with plantar fibromatosis/plantar fibromas.  This seems to involve the right foot more than the left.  I explained that I typically address these via footwear, custom orthoses and occasionally surgical removal.  I explained that I have also referred people to radiation oncology.  He is specifically interested in having injections.  This is not a procedure that I offer.  He is likely referring to some form of collagenase.  I told him I will do my best to research and see if there are options for him.  I do not believe our hand surgeon is comfortable offering this for the foot.  I do not think these are Dupuytren's contractures, but rather plantar fibromatosis.    Disclaimer: This note consists of symbols derived from keyboarding, dictation and/or voice recognition software. As a result, there may be errors in the script that have gone undetected. Please consider this when interpreting information found in this chart.    Raheem Pérez DPM, FACFAS, MS    [place] Department of Podiatry/Foot & Ankle Surgery      ____________________________________________________________________    HPI:       I was asked by Rohit Solitario MD  to evaluate Durga Aguirre in consultation for Dupuytren's contracture of foot.       Durga Aguirre presents today reporting pain related to tendons in his feet.  He has a history of injections for Dupuytren's contracture of his hands.  He is hoping for a similar treatment for his feet.  He has dealt with this pain for 2 years.  He has done some stretching.  History of a left calcaneal fracture and ORIF.  No residual pain or issues from that.  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  Walking is his main form of exercise.  Reports wearing supportive footwear.    Type 2 DM  Last Hgb A1C = 5.6%  Metformin.    Past Medical History:   Diagnosis Date    Alcohol abuse     Anxiety and depression     Arthritis     Hyperlipidemia     Hypertension     Mass of left chest wall     Other spontaneous pneumothorax 1997    Right knee pain    *  *  Past Surgical History:   Procedure Laterality Date    COLONOSCOPY      HC EXCISION PARTIAL TALUS OR CALCANEUS, BONE      fx calcaneus- 9 screws in foot   *  *  Current Outpatient Medications   Medication Sig Dispense Refill    atorvastatin (LIPITOR) 20 MG tablet Take 1 tablet (20 mg) by mouth at bedtime. 90 tablet 3    blood glucose (NO BRAND SPECIFIED) test strip Needs Accu-Chek Guide strips. Use to test blood glucose test blood sugar 2x times daily. . 200 strip 3    blood glucose monitoring (NO BRAND SPECIFIED) meter device kit Use to test blood sugar 1x times daily. Preferred Blood Glucose Monitor Brands: per insurance. 1 kit 0    DULoxetine (CYMBALTA) 60 MG capsule Take 1 capsule (60 mg) by mouth daily. 90 capsule 1    gabapentin (NEURONTIN) 300 MG capsule Take 2 capsules (600 mg) by mouth at bedtime.      hydrOXYzine HCl (ATARAX) 50 MG tablet Take 0.5-1 tablets (25-50 mg) by mouth every 8 hours as needed for anxiety. 180 tablet 0    lisinopril (ZESTRIL) 10 MG tablet Take 1 tablet (10 mg) by mouth daily. 90 tablet 3    metFORMIN (GLUCOPHAGE) 500 MG tablet TAKE TWO TABLETS (1000 MG) BY MOUTH TWICE A DAY (MORNING AND EVENING) WITH FOOD 360 tablet 3    nicotine polacrilex (NICORETTE) 4 MG gum Place 1 each (4 mg) inside cheek as needed for nicotine withdrawal symptoms. (Patient not taking: Reported on 5/1/2025) 120 each 2    thiamine (B-1) 100 MG tablet TAKE 1 TABLET (100 MG) BY MOUTH DAILY 90 tablet 3    thin (NO BRAND SPECIFIED) lancets Use to test blood sugar 1x times daily. Preferred Blood Glucose Monitor Brands: per insurance. 100 each 11    traZODone  (DESYREL) 150 MG tablet Take 1 tablet (150 mg) by mouth at bedtime. 90 tablet 1         EXAM:    Vitals: There were no vitals taken for this visit.  BMI: There is no height or weight on file to calculate BMI.    Vasc:      Pedal pulses are palpable for the dorsalis pedis posterior tibial artery, bilateral foot.  Capillary fill time </= 3 seconds  Pedal skin appears well-perfused  Neuro:      Light touch sensation intact to all sensory nerve distributions, bilateral foot.  No apparent spastic contractures or other deformity secondary to neurologic compromise.  Derm:      No calluses  No wounds   No worrisome lesions  MSK:      There are multiple and somewhat linear firm lumps along the plantar fascial band of the plantar right foot.  This is more subtle on the left.  He has moderate contractures of lesser digits, both feet (hammer toes)  This is not present as a Dupuytren's contracture  Bilateral lower extremity muscle strength presents is normal.  No gross deformities  Adequate ankle and subtalar joint range of motion  Calf:    Neg for redness, swelling or tenderness

## 2025-06-23 ENCOUNTER — TELEPHONE (OUTPATIENT)
Dept: PODIATRY | Facility: CLINIC | Age: 65
End: 2025-06-23
Payer: COMMERCIAL

## 2025-06-23 NOTE — TELEPHONE ENCOUNTER
Other: Patient calling to speak with Dr. Pérez's nurse. He was told that Dr. Pérez would look into providers to suggest for injections into the feet and call him back with the phone number to contact. Please call him back.       Could we send this information to you in [employer] or would you prefer to receive a phone call?:   Patient would prefer a phone call   Okay to leave a detailed message?: Yes at Cell number on file:    Telephone Information:   Mobile [phone]

## 2025-06-24 NOTE — TELEPHONE ENCOUNTER
Durga is looking for a provider that does collagenase injections for his plantar fibromas.    I do not think anyone within the [employer] system does this.  I reached out to one of our radiation oncologists who knows of a podiatrist that offers this.  I just heard back today.    The podiatrist is Karey Hollins.    I don't know anything more, other than she is local.  It is an off label use of collagenase.      He will have to look her up and make an appointment.    Dr. Pérez

## 2025-06-24 NOTE — TELEPHONE ENCOUNTER
Phone call to patient and offered the information below. Looked up Karey Hollins on [employer] and gave patient phone number per his request. He is aware he will need to check into where she is located and if covered by his insurance or not. He verbalized understanding.     DIONNE Price RN

## 2025-07-29 DIAGNOSIS — F41.1 GENERALIZED ANXIETY DISORDER: ICD-10-CM

## 2025-07-29 DIAGNOSIS — F10.20 ALCOHOL USE DISORDER, SEVERE, DEPENDENCE (H): ICD-10-CM

## 2025-07-29 RX ORDER — GABAPENTIN 300 MG/1
300 CAPSULE ORAL 3 TIMES DAILY
Qty: 90 CAPSULE | Refills: 0 | OUTPATIENT
Start: 2025-07-29

## 2025-07-29 NOTE — TELEPHONE ENCOUNTER
1) Reviewed refill request(s) from [facility] - [place], [place] - [address]    2) Any Controlled Substance(s)? Yes   [place]  checked? No; not current delegate    3) Refill(s) requested for:         Pt is no longer prescribed gabapentin with these directions. Pt takes gabapentin 300 mg capsules, take 2 capsules (600 mg) by mouth at bedtime.     4) Action taken: refill request(s) sent back to pharmacy as DENIED.    Joselyn English RN on 7/29/2025 at 2:49 PM

## 2025-08-01 ENCOUNTER — TELEPHONE (OUTPATIENT)
Dept: INTERNAL MEDICINE | Facility: CLINIC | Age: 65
End: 2025-08-01

## 2025-08-01 DIAGNOSIS — E11.65 TYPE 2 DIABETES MELLITUS WITH HYPERGLYCEMIA, WITHOUT LONG-TERM CURRENT USE OF INSULIN (H): ICD-10-CM

## 2025-08-01 DIAGNOSIS — I10 ESSENTIAL HYPERTENSION: ICD-10-CM

## 2025-08-04 ENCOUNTER — TELEPHONE (OUTPATIENT)
Dept: ADDICTION MEDICINE | Facility: CLINIC | Age: 65
End: 2025-08-04
Payer: COMMERCIAL

## 2025-08-04 DIAGNOSIS — F41.1 GENERALIZED ANXIETY DISORDER: ICD-10-CM

## 2025-08-04 DIAGNOSIS — F10.20 ALCOHOL USE DISORDER, SEVERE, DEPENDENCE (H): ICD-10-CM

## 2025-08-04 RX ORDER — GABAPENTIN 300 MG/1
600 CAPSULE ORAL AT BEDTIME
Qty: 60 CAPSULE | Refills: 0 | Status: SHIPPED | OUTPATIENT
Start: 2025-08-04

## 2025-08-10 DIAGNOSIS — F41.1 GENERALIZED ANXIETY DISORDER: ICD-10-CM

## 2025-08-11 RX ORDER — HYDROXYZINE HYDROCHLORIDE 50 MG/1
TABLET, FILM COATED ORAL
Qty: 180 TABLET | Refills: 0 | Status: SHIPPED | OUTPATIENT
Start: 2025-08-11

## 2025-08-19 DIAGNOSIS — F41.1 GENERALIZED ANXIETY DISORDER: ICD-10-CM

## 2025-08-19 DIAGNOSIS — F10.20 ALCOHOL USE DISORDER, SEVERE, DEPENDENCE (H): ICD-10-CM

## 2025-08-20 RX ORDER — GABAPENTIN 300 MG/1
600 CAPSULE ORAL 3 TIMES DAILY
Qty: 180 CAPSULE | Refills: 0 | Status: SHIPPED | OUTPATIENT
Start: 2025-08-20

## 2025-08-21 ENCOUNTER — LAB (OUTPATIENT)
Dept: LAB | Facility: CLINIC | Age: 65
End: 2025-08-21
Payer: COMMERCIAL

## 2025-08-21 DIAGNOSIS — E11.9 TYPE 2 DIABETES MELLITUS WITHOUT COMPLICATION, WITHOUT LONG-TERM CURRENT USE OF INSULIN (H): ICD-10-CM

## 2025-08-21 DIAGNOSIS — I10 ESSENTIAL HYPERTENSION: Chronic | ICD-10-CM

## 2025-08-21 LAB
ANION GAP SERPL CALCULATED.3IONS-SCNC: 11 MMOL/L (ref 7–15)
BUN SERPL-MCNC: 12.9 MG/DL (ref 8–23)
CALCIUM SERPL-MCNC: 9.1 MG/DL (ref 8.8–10.4)
CHLORIDE SERPL-SCNC: 102 MMOL/L (ref 98–107)
CHOLEST SERPL-MCNC: 158 MG/DL
CREAT SERPL-MCNC: 0.86 MG/DL (ref 0.67–1.17)
CREAT UR-MCNC: 32.7 MG/DL
EGFRCR SERPLBLD CKD-EPI 2021: >90 ML/MIN/1.73M2
EST. AVERAGE GLUCOSE BLD GHB EST-MCNC: 128 MG/DL
FASTING STATUS PATIENT QL REPORTED: YES
FASTING STATUS PATIENT QL REPORTED: YES
GLUCOSE SERPL-MCNC: 140 MG/DL (ref 70–99)
HBA1C MFR BLD: 6.1 % (ref 0–5.6)
HCO3 SERPL-SCNC: 25 MMOL/L (ref 22–29)
HDLC SERPL-MCNC: 33 MG/DL
LDLC SERPL CALC-MCNC: 78 MG/DL
MICROALBUMIN UR-MCNC: <12 MG/L
MICROALBUMIN/CREAT UR: NORMAL MG/G{CREAT}
NONHDLC SERPL-MCNC: 125 MG/DL
POTASSIUM SERPL-SCNC: 4.3 MMOL/L (ref 3.4–5.3)
SODIUM SERPL-SCNC: 138 MMOL/L (ref 135–145)
TRIGL SERPL-MCNC: 237 MG/DL

## 2025-08-22 ENCOUNTER — TELEPHONE (OUTPATIENT)
Dept: INTERNAL MEDICINE | Facility: CLINIC | Age: 65
End: 2025-08-22
Payer: COMMERCIAL

## 2025-08-23 ENCOUNTER — RESULTS FOLLOW-UP (OUTPATIENT)
Dept: INTERNAL MEDICINE | Facility: CLINIC | Age: 65
End: 2025-08-23
Payer: COMMERCIAL

## 2025-08-27 ENCOUNTER — VIRTUAL VISIT (OUTPATIENT)
Dept: ADDICTION MEDICINE | Facility: CLINIC | Age: 65
End: 2025-08-27
Payer: COMMERCIAL

## 2025-08-27 DIAGNOSIS — F33.1 MAJOR DEPRESSIVE DISORDER, RECURRENT EPISODE, MODERATE WITH ANXIOUS DISTRESS (H): ICD-10-CM

## 2025-08-27 DIAGNOSIS — I10 ESSENTIAL HYPERTENSION: ICD-10-CM

## 2025-08-27 DIAGNOSIS — F10.20 ALCOHOL USE DISORDER, SEVERE, DEPENDENCE (H): Primary | ICD-10-CM

## 2025-08-27 DIAGNOSIS — F41.1 GENERALIZED ANXIETY DISORDER: ICD-10-CM
